# Patient Record
Sex: FEMALE | Race: WHITE | Employment: OTHER | URBAN - METROPOLITAN AREA
[De-identification: names, ages, dates, MRNs, and addresses within clinical notes are randomized per-mention and may not be internally consistent; named-entity substitution may affect disease eponyms.]

---

## 2018-04-14 ENCOUNTER — HOSPITAL ENCOUNTER (EMERGENCY)
Age: 73
Discharge: HOME | End: 2018-04-14
Payer: MEDICARE

## 2018-04-14 VITALS
OXYGEN SATURATION: 92 % | RESPIRATION RATE: 20 BRPM | SYSTOLIC BLOOD PRESSURE: 148 MMHG | TEMPERATURE: 98.1 F | WEIGHT: 167.56 LBS | BODY MASS INDEX: 27.92 KG/M2 | HEART RATE: 61 BPM | HEIGHT: 65 IN | DIASTOLIC BLOOD PRESSURE: 68 MMHG

## 2018-04-14 DIAGNOSIS — B02.9 HERPES ZOSTER WITHOUT COMPLICATION: Primary | ICD-10-CM

## 2018-04-14 PROCEDURE — 99282 EMERGENCY DEPT VISIT SF MDM: CPT

## 2018-04-14 RX ORDER — ACYCLOVIR 400 MG/1
400 TABLET ORAL
Qty: 50 TABLET | Refills: 0 | Status: SHIPPED | OUTPATIENT
Start: 2018-04-14 | End: 2018-04-24

## 2018-04-14 RX ORDER — AMLODIPINE BESYLATE 2.5 MG/1
5 TABLET ORAL DAILY
COMMUNITY
End: 2019-05-28 | Stop reason: SDUPTHER

## 2018-05-03 ENCOUNTER — APPOINTMENT (OUTPATIENT)
Dept: GENERAL RADIOLOGY | Age: 73
End: 2018-05-03
Payer: MEDICARE

## 2018-05-03 ENCOUNTER — HOSPITAL ENCOUNTER (EMERGENCY)
Age: 73
Discharge: HOME | End: 2018-05-04
Attending: EMERGENCY MEDICINE
Payer: MEDICARE

## 2018-05-03 DIAGNOSIS — J06.9 VIRAL UPPER RESPIRATORY TRACT INFECTION: Primary | ICD-10-CM

## 2018-05-03 DIAGNOSIS — R68.89 FLU-LIKE SYMPTOMS: ICD-10-CM

## 2018-05-03 LAB
ANION GAP SERPL CALCULATED.3IONS-SCNC: 9 MMOL/L (ref 7–16)
BUN BLDV-MCNC: 11 MG/DL (ref 8–23)
CALCIUM SERPL-MCNC: 9.3 MG/DL (ref 8.6–10.2)
CHLORIDE BLD-SCNC: 100 MMOL/L (ref 98–107)
CO2: 30 MMOL/L (ref 22–29)
CREAT SERPL-MCNC: 0.7 MG/DL (ref 0.5–1)
EKG ATRIAL RATE: 67 BPM
EKG P AXIS: 54 DEGREES
EKG P-R INTERVAL: 150 MS
EKG Q-T INTERVAL: 390 MS
EKG QRS DURATION: 90 MS
EKG QTC CALCULATION (BAZETT): 412 MS
EKG R AXIS: 39 DEGREES
EKG T AXIS: 28 DEGREES
EKG VENTRICULAR RATE: 67 BPM
GFR AFRICAN AMERICAN: >60
GFR NON-AFRICAN AMERICAN: >60 ML/MIN/1.73
GLUCOSE BLD-MCNC: 109 MG/DL (ref 74–109)
HCT VFR BLD CALC: 42.6 % (ref 34–48)
HEMOGLOBIN: 14.1 G/DL (ref 11.5–15.5)
INFLUENZA A BY PCR: NOT DETECTED
INFLUENZA B BY PCR: NOT DETECTED
MAGNESIUM: 1.9 MG/DL (ref 1.6–2.6)
MCH RBC QN AUTO: 31.3 PG (ref 26–35)
MCHC RBC AUTO-ENTMCNC: 33.1 % (ref 32–34.5)
MCV RBC AUTO: 94.7 FL (ref 80–99.9)
PDW BLD-RTO: 12.8 FL (ref 11.5–15)
PLATELET # BLD: 163 E9/L (ref 130–450)
PMV BLD AUTO: 10.7 FL (ref 7–12)
POTASSIUM SERPL-SCNC: 4.3 MMOL/L (ref 3.5–5)
PRO-BNP: 145 PG/ML (ref 0–125)
RBC # BLD: 4.5 E12/L (ref 3.5–5.5)
SODIUM BLD-SCNC: 139 MMOL/L (ref 132–146)
TROPONIN: <0.01 NG/ML (ref 0–0.03)
WBC # BLD: 8 E9/L (ref 4.5–11.5)

## 2018-05-03 PROCEDURE — 85027 COMPLETE CBC AUTOMATED: CPT

## 2018-05-03 PROCEDURE — 80048 BASIC METABOLIC PNL TOTAL CA: CPT

## 2018-05-03 PROCEDURE — 83735 ASSAY OF MAGNESIUM: CPT

## 2018-05-03 PROCEDURE — 83880 ASSAY OF NATRIURETIC PEPTIDE: CPT

## 2018-05-03 PROCEDURE — 71046 X-RAY EXAM CHEST 2 VIEWS: CPT

## 2018-05-03 PROCEDURE — 93005 ELECTROCARDIOGRAM TRACING: CPT

## 2018-05-03 PROCEDURE — 99284 EMERGENCY DEPT VISIT MOD MDM: CPT

## 2018-05-03 PROCEDURE — 84484 ASSAY OF TROPONIN QUANT: CPT

## 2018-05-03 PROCEDURE — 36415 COLL VENOUS BLD VENIPUNCTURE: CPT

## 2018-05-03 PROCEDURE — 87502 INFLUENZA DNA AMP PROBE: CPT

## 2018-05-03 ASSESSMENT — ENCOUNTER SYMPTOMS
EYES NEGATIVE: 1
CHEST TIGHTNESS: 0
COUGH: 1
RHINORRHEA: 1
ALLERGIC/IMMUNOLOGIC NEGATIVE: 1
SHORTNESS OF BREATH: 0
ABDOMINAL PAIN: 0

## 2018-05-04 VITALS
HEIGHT: 64 IN | BODY MASS INDEX: 29.88 KG/M2 | OXYGEN SATURATION: 95 % | RESPIRATION RATE: 20 BRPM | DIASTOLIC BLOOD PRESSURE: 92 MMHG | HEART RATE: 58 BPM | WEIGHT: 175 LBS | SYSTOLIC BLOOD PRESSURE: 115 MMHG | TEMPERATURE: 98.6 F

## 2018-05-04 NOTE — ED PROVIDER NOTES
61-year-old female presenting with coughing and nasal discharges started yesterday. She felt feverish in addition is having some body aches. This has continued through today. She had shingles couple of weeks ago that has resolved. No chest pain, no shortness of breath, no lightheadedness, no dizziness. She is tolerating oral intake, she continues to take care of herself at home. She is awake and alert and oriented at this time. No significant cardiac or lung history. Review of Systems   Constitutional: Positive for fever. HENT: Positive for rhinorrhea. Eyes: Negative. Respiratory: Positive for cough. Negative for chest tightness and shortness of breath. Cardiovascular: Negative for chest pain and palpitations. Gastrointestinal: Negative for abdominal pain. Endocrine: Negative. Genitourinary: Negative. Musculoskeletal: Positive for myalgias. Skin: Negative. Allergic/Immunologic: Negative. Neurological: Negative. Hematological: Negative. Psychiatric/Behavioral: Negative. Physical Exam   Constitutional: She is oriented to person, place, and time. She appears well-developed and well-nourished. No distress. Looks uncomfortable   HENT:   Head: Normocephalic and atraumatic. Discharge from left naris   Eyes: Conjunctivae are normal. Pupils are equal, round, and reactive to light. Neck: Normal range of motion. No thyromegaly present. Cardiovascular: Normal rate and regular rhythm. Pulmonary/Chest: Effort normal and breath sounds normal. No respiratory distress. She has no wheezes. She has no rales. She exhibits no tenderness. Abdominal: Soft. She exhibits no distension. There is no tenderness. There is no rebound and no guarding. Musculoskeletal: Normal range of motion. She exhibits no edema or tenderness. Neurological: She is alert and oriented to person, place, and time. No cranial nerve deficit. Coordination normal.   Skin: Skin is warm and dry.  No erythema. Psychiatric: She has a normal mood and affect. Procedures    [facility]       EKG: Sinus rhythm, rate 67, normal axis, no ST elevations or depressions, no T wave abnormalities. No significant changes compared to 10/2017. Time: 0035  Re-evaluation. Patients symptoms show no change  Repeat physical examination is not changed         --------------------------------------------- PAST HISTORY ---------------------------------------------  Past Medical History:  has a past medical history of Arthritis; Bladder dysfunction; Bowel dysfunction; HTN (hypertension); and Ulcer ([address] Past Surgical History:  has a past surgical history that includes Hysterectomy; Brain aneurysm surgery (1993); Nerve Surgery; Colonoscopy; Endoscopy, colon, diagnostic; and Cataract removal with implant (Right, 11/22/2016). Social History:  reports that she has been smoking Cigarettes. She has been smoking about 0.50 packs per day. She has never used smokeless tobacco. She reports that she does not drink alcohol or use drugs. Family History: family history includes Heart Disease in her father. The patients home medications have been reviewed. Allergies: Dilantin [phenytoin sodium extended]; Mobic [meloxicam];  Phenobarbital; Phenytoin sodium extended; Prednisone; Sulfa antibiotics; and Famciclovir    -------------------------------------------------- RESULTS -------------------------------------------------  Labs:  Results for orders placed or performed during the hospital encounter of 05/03/18   Rapid influenza A/B antigens   Result Value Ref Range    Influenza A by PCR Not Detected Not Detected    Influenza B by PCR Not Detected Not Detected   CBC   Result Value Ref Range    WBC 8.0 4.5 - 11.5 E9/L    RBC 4.50 3.50 - 5.50 E12/L    Hemoglobin 14.1 11.5 - 15.5 g/dL    Hematocrit 42.6 34.0 - 48.0 %    MCV 94.7 80.0 - 99.9 fL    MCH 31.3 26.0 - 35.0 pg    MCHC 33.1 32.0 - 34.5 %    RDW 12.8 11.5 - 15.0 fL Platelets 353 512 - 131 E9/L    MPV 10.7 7.0 - 12.0 fL   Basic Metabolic Panel   Result Value Ref Range    Sodium 139 132 - 146 mmol/L    Potassium 4.3 3.5 - 5.0 mmol/L    Chloride 100 98 - 107 mmol/L    CO2 30 (H) 22 - 29 mmol/L    Anion Gap 9 7 - 16 mmol/L    Glucose 109 74 - 109 mg/dL    BUN 11 8 - 23 mg/dL    CREATININE 0.7 0.5 - 1.0 mg/dL    GFR Non-African American >60 >=60 mL/min/1.73    GFR African American >60     Calcium 9.3 8.6 - 10.2 mg/dL   Brain Natriuretic Peptide   Result Value Ref Range    Pro- (H) 0 - 125 pg/mL   Troponin   Result Value Ref Range    Troponin <0.01 0.00 - 0.03 ng/mL   Magnesium   Result Value Ref Range    Magnesium 1.9 1.6 - 2.6 mg/dL   EKG 12 Lead   Result Value Ref Range    Ventricular Rate 67 BPM    Atrial Rate 67 BPM    P-R Interval 150 ms    QRS Duration 90 ms    Q-T Interval 390 ms    QTc Calculation (Bazett) 412 ms    P Axis 54 degrees    R Axis 39 degrees    T Axis 28 degrees       Radiology:  XR CHEST STANDARD (2 VW)   Final Result   1. No acute cardiopulmonary disease.          ------------------------- NURSING NOTES AND VITALS REVIEWED ---------------------------  Date / Time Roomed:  5/3/2018 10:31 PM  ED Bed Assignment:  14/14    The nursing notes within the ED encounter and vital signs as below have been reviewed. BP (!) 151/73   Pulse 82   Temp 98.6 °F (37 °C) (Oral)   Resp 16   Ht 5' 4\" (1.626 m)   Wt 175 lb (79.4 kg)   SpO2 95%   BMI 30.04 kg/m²   Oxygen Saturation Interpretation: Normal      ------------------------------------------ PROGRESS NOTES ------------------------------------------  I have spoken with the patient and spouse and discussed todays results, in addition to providing specific details for the plan of care and counseling regarding the diagnosis and prognosis. Their questions are answered at this time and they are agreeable with the plan. I discussed at length with them reasons for immediate return here for re evaluation.  They

## 2018-05-05 ENCOUNTER — HOSPITAL ENCOUNTER (EMERGENCY)
Age: 73
Discharge: HOME | End: 2018-05-05
Attending: EMERGENCY MEDICINE
Payer: MEDICARE

## 2018-05-05 ENCOUNTER — APPOINTMENT (OUTPATIENT)
Dept: CT IMAGING | Age: 73
End: 2018-05-05
Payer: MEDICARE

## 2018-05-05 VITALS
BODY MASS INDEX: 29.71 KG/M2 | DIASTOLIC BLOOD PRESSURE: 72 MMHG | HEIGHT: 64 IN | TEMPERATURE: 97.3 F | SYSTOLIC BLOOD PRESSURE: 140 MMHG | RESPIRATION RATE: 18 BRPM | OXYGEN SATURATION: 95 % | HEART RATE: 58 BPM | WEIGHT: 174 LBS

## 2018-05-05 DIAGNOSIS — R25.1 TREMOR: Primary | ICD-10-CM

## 2018-05-05 LAB
ALBUMIN SERPL-MCNC: 4 G/DL (ref 3.5–5.2)
ALP BLD-CCNC: 77 U/L (ref 35–104)
ALT SERPL-CCNC: 14 U/L (ref 0–32)
ANION GAP SERPL CALCULATED.3IONS-SCNC: 12 MMOL/L (ref 7–16)
AST SERPL-CCNC: 16 U/L (ref 0–31)
BASOPHILS ABSOLUTE: 0.06 E9/L (ref 0–0.2)
BASOPHILS RELATIVE PERCENT: 0.9 % (ref 0–2)
BILIRUB SERPL-MCNC: 0.9 MG/DL (ref 0–1.2)
BUN BLDV-MCNC: 16 MG/DL (ref 8–23)
CALCIUM SERPL-MCNC: 10 MG/DL (ref 8.6–10.2)
CHLORIDE BLD-SCNC: 99 MMOL/L (ref 98–107)
CO2: 27 MMOL/L (ref 22–29)
CREAT SERPL-MCNC: 0.8 MG/DL (ref 0.5–1)
EOSINOPHILS ABSOLUTE: 0.08 E9/L (ref 0.05–0.5)
EOSINOPHILS RELATIVE PERCENT: 1.3 % (ref 0–6)
GFR AFRICAN AMERICAN: >60
GFR NON-AFRICAN AMERICAN: >60 ML/MIN/1.73
GLUCOSE BLD-MCNC: 134 MG/DL (ref 74–109)
HCT VFR BLD CALC: 45.3 % (ref 34–48)
HEMOGLOBIN: 15.1 G/DL (ref 11.5–15.5)
IMMATURE GRANULOCYTES #: 0.01 E9/L
IMMATURE GRANULOCYTES %: 0.2 % (ref 0–5)
LYMPHOCYTES ABSOLUTE: 1.59 E9/L (ref 1.5–4)
LYMPHOCYTES RELATIVE PERCENT: 25.2 % (ref 20–42)
MAGNESIUM: 1.9 MG/DL (ref 1.6–2.6)
MCH RBC QN AUTO: 31.2 PG (ref 26–35)
MCHC RBC AUTO-ENTMCNC: 33.3 % (ref 32–34.5)
MCV RBC AUTO: 93.6 FL (ref 80–99.9)
MONOCYTES ABSOLUTE: 0.66 E9/L (ref 0.1–0.95)
MONOCYTES RELATIVE PERCENT: 10.4 % (ref 2–12)
NEUTROPHILS ABSOLUTE: 3.92 E9/L (ref 1.8–7.3)
NEUTROPHILS RELATIVE PERCENT: 62 % (ref 43–80)
PDW BLD-RTO: 12.8 FL (ref 11.5–15)
PLATELET # BLD: 189 E9/L (ref 130–450)
PMV BLD AUTO: 10.7 FL (ref 7–12)
POTASSIUM SERPL-SCNC: 4.4 MMOL/L (ref 3.5–5)
RBC # BLD: 4.84 E12/L (ref 3.5–5.5)
SODIUM BLD-SCNC: 138 MMOL/L (ref 132–146)
TOTAL PROTEIN: 7.1 G/DL (ref 6.4–8.3)
TSH SERPL DL<=0.05 MIU/L-ACNC: 1.67 UIU/ML (ref 0.27–4.2)
WBC # BLD: 6.3 E9/L (ref 4.5–11.5)

## 2018-05-05 PROCEDURE — 85025 COMPLETE CBC W/AUTO DIFF WBC: CPT

## 2018-05-05 PROCEDURE — 99284 EMERGENCY DEPT VISIT MOD MDM: CPT

## 2018-05-05 PROCEDURE — 70450 CT HEAD/BRAIN W/O DYE: CPT

## 2018-05-05 PROCEDURE — 80053 COMPREHEN METABOLIC PANEL: CPT

## 2018-05-05 PROCEDURE — 96374 THER/PROPH/DIAG INJ IV PUSH: CPT

## 2018-05-05 PROCEDURE — 83735 ASSAY OF MAGNESIUM: CPT

## 2018-05-05 PROCEDURE — 36415 COLL VENOUS BLD VENIPUNCTURE: CPT

## 2018-05-05 PROCEDURE — 6360000002 HC RX W HCPCS: Performed by: PREVENTIVE MEDICINE

## 2018-05-05 PROCEDURE — 84443 ASSAY THYROID STIM HORMONE: CPT

## 2018-05-05 RX ORDER — LORAZEPAM 2 MG/ML
1 INJECTION INTRAMUSCULAR ONCE
Status: COMPLETED | OUTPATIENT
Start: 2018-05-05 | End: 2018-05-05

## 2018-05-05 RX ADMIN — LORAZEPAM 1 MG: 2 INJECTION INTRAMUSCULAR; INTRAVENOUS at 14:20

## 2018-05-05 ASSESSMENT — ENCOUNTER SYMPTOMS
CHEST TIGHTNESS: 0
NAUSEA: 0
VOMITING: 0
SHORTNESS OF BREATH: 0
ABDOMINAL PAIN: 0
CONSTIPATION: 0
ALLERGIC/IMMUNOLOGIC NEGATIVE: 1
COUGH: 0
DIARRHEA: 0

## 2018-05-16 ENCOUNTER — OFFICE VISIT (OUTPATIENT)
Dept: NEUROLOGY | Age: 73
End: 2018-05-16
Payer: MEDICARE

## 2018-05-16 VITALS
OXYGEN SATURATION: 91 % | BODY MASS INDEX: 29.71 KG/M2 | SYSTOLIC BLOOD PRESSURE: 124 MMHG | DIASTOLIC BLOOD PRESSURE: 79 MMHG | TEMPERATURE: 97.9 F | RESPIRATION RATE: 18 BRPM | WEIGHT: 174 LBS | HEIGHT: 64 IN | HEART RATE: 63 BPM

## 2018-05-16 DIAGNOSIS — G30.0 EARLY ONSET ALZHEIMER'S DEMENTIA WITHOUT BEHAVIORAL DISTURBANCE (HCC): Primary | ICD-10-CM

## 2018-05-16 DIAGNOSIS — F02.80 EARLY ONSET ALZHEIMER'S DEMENTIA WITHOUT BEHAVIORAL DISTURBANCE (HCC): Primary | ICD-10-CM

## 2018-05-16 LAB
EKG ATRIAL RATE: 55 BPM
EKG P AXIS: 46 DEGREES
EKG P-R INTERVAL: 154 MS
EKG Q-T INTERVAL: 432 MS
EKG QRS DURATION: 92 MS
EKG QTC CALCULATION (BAZETT): 413 MS
EKG R AXIS: 38 DEGREES
EKG T AXIS: 46 DEGREES
EKG VENTRICULAR RATE: 55 BPM

## 2018-05-16 PROCEDURE — 99213 OFFICE O/P EST LOW 20 MIN: CPT | Performed by: NURSE PRACTITIONER

## 2018-05-16 PROCEDURE — 4004F PT TOBACCO SCREEN RCVD TLK: CPT | Performed by: NURSE PRACTITIONER

## 2018-05-16 PROCEDURE — G8598 ASA/ANTIPLAT THER USED: HCPCS | Performed by: NURSE PRACTITIONER

## 2018-05-16 PROCEDURE — 3017F COLORECTAL CA SCREEN DOC REV: CPT | Performed by: NURSE PRACTITIONER

## 2018-05-16 PROCEDURE — G8417 CALC BMI ABV UP PARAM F/U: HCPCS | Performed by: NURSE PRACTITIONER

## 2018-05-16 PROCEDURE — G8400 PT W/DXA NO RESULTS DOC: HCPCS | Performed by: NURSE PRACTITIONER

## 2018-05-16 PROCEDURE — G8427 DOCREV CUR MEDS BY ELIG CLIN: HCPCS | Performed by: NURSE PRACTITIONER

## 2018-05-16 PROCEDURE — 4040F PNEUMOC VAC/ADMIN/RCVD: CPT | Performed by: NURSE PRACTITIONER

## 2018-05-16 PROCEDURE — 1090F PRES/ABSN URINE INCON ASSESS: CPT | Performed by: NURSE PRACTITIONER

## 2018-05-16 PROCEDURE — 1123F ACP DISCUSS/DSCN MKR DOCD: CPT | Performed by: NURSE PRACTITIONER

## 2018-05-16 RX ORDER — MIRTAZAPINE 15 MG/1
7.5 TABLET, FILM COATED ORAL NIGHTLY
COMMUNITY
End: 2018-06-24 | Stop reason: ALTCHOICE

## 2018-05-18 ENCOUNTER — TELEPHONE (OUTPATIENT)
Dept: SPEECH THERAPY | Age: 73
End: 2018-05-18

## 2018-06-04 ENCOUNTER — TELEPHONE (OUTPATIENT)
Dept: SPEECH THERAPY | Age: 73
End: 2018-06-04

## 2018-06-13 ENCOUNTER — HOSPITAL ENCOUNTER (OUTPATIENT)
Age: 73
Discharge: HOME | End: 2018-06-13
Payer: MEDICARE

## 2018-06-13 ENCOUNTER — TELEPHONE (OUTPATIENT)
Dept: SPEECH THERAPY | Age: 73
End: 2018-06-13

## 2018-06-13 LAB
ALBUMIN SERPL-MCNC: 4.2 G/DL (ref 3.5–5.2)
ALP BLD-CCNC: 81 U/L (ref 35–104)
ALT SERPL-CCNC: 12 U/L (ref 0–32)
ANION GAP SERPL CALCULATED.3IONS-SCNC: 14 MMOL/L (ref 7–16)
AST SERPL-CCNC: 13 U/L (ref 0–31)
BASOPHILS ABSOLUTE: 0.05 E9/L (ref 0–0.2)
BASOPHILS RELATIVE PERCENT: 0.6 % (ref 0–2)
BILIRUB SERPL-MCNC: 0.7 MG/DL (ref 0–1.2)
BUN BLDV-MCNC: 13 MG/DL (ref 8–23)
CALCIUM SERPL-MCNC: 9.9 MG/DL (ref 8.6–10.2)
CHLORIDE BLD-SCNC: 100 MMOL/L (ref 98–107)
CO2: 26 MMOL/L (ref 22–29)
CREAT SERPL-MCNC: 0.7 MG/DL (ref 0.5–1)
EOSINOPHILS ABSOLUTE: 0.03 E9/L (ref 0.05–0.5)
EOSINOPHILS RELATIVE PERCENT: 0.4 % (ref 0–6)
GFR AFRICAN AMERICAN: >60
GFR NON-AFRICAN AMERICAN: >60 ML/MIN/1.73
GLUCOSE BLD-MCNC: 116 MG/DL (ref 74–109)
HCT VFR BLD CALC: 44.6 % (ref 34–48)
HEMOGLOBIN: 15.3 G/DL (ref 11.5–15.5)
IMMATURE GRANULOCYTES #: 0.03 E9/L
IMMATURE GRANULOCYTES %: 0.4 % (ref 0–5)
LYMPHOCYTES ABSOLUTE: 1.78 E9/L (ref 1.5–4)
LYMPHOCYTES RELATIVE PERCENT: 20.8 % (ref 20–42)
MAGNESIUM: 1.9 MG/DL (ref 1.6–2.6)
MCH RBC QN AUTO: 32 PG (ref 26–35)
MCHC RBC AUTO-ENTMCNC: 34.3 % (ref 32–34.5)
MCV RBC AUTO: 93.3 FL (ref 80–99.9)
MONOCYTES ABSOLUTE: 0.6 E9/L (ref 0.1–0.95)
MONOCYTES RELATIVE PERCENT: 7 % (ref 2–12)
NEUTROPHILS ABSOLUTE: 6.06 E9/L (ref 1.8–7.3)
NEUTROPHILS RELATIVE PERCENT: 70.8 % (ref 43–80)
PDW BLD-RTO: 12.4 FL (ref 11.5–15)
PLATELET # BLD: 216 E9/L (ref 130–450)
PMV BLD AUTO: 10.6 FL (ref 7–12)
POTASSIUM SERPL-SCNC: 4.6 MMOL/L (ref 3.5–5)
RBC # BLD: 4.78 E12/L (ref 3.5–5.5)
SODIUM BLD-SCNC: 140 MMOL/L (ref 132–146)
TOTAL PROTEIN: 7.3 G/DL (ref 6.4–8.3)
TSH SERPL DL<=0.05 MIU/L-ACNC: 1.59 UIU/ML (ref 0.27–4.2)
WBC # BLD: 8.6 E9/L (ref 4.5–11.5)

## 2018-06-13 PROCEDURE — 84443 ASSAY THYROID STIM HORMONE: CPT

## 2018-06-13 PROCEDURE — 36415 COLL VENOUS BLD VENIPUNCTURE: CPT

## 2018-06-13 PROCEDURE — 85025 COMPLETE CBC W/AUTO DIFF WBC: CPT

## 2018-06-13 PROCEDURE — 80053 COMPREHEN METABOLIC PANEL: CPT

## 2018-06-13 PROCEDURE — 83735 ASSAY OF MAGNESIUM: CPT

## 2018-06-14 ENCOUNTER — HOSPITAL ENCOUNTER (OUTPATIENT)
Dept: MAMMOGRAPHY | Age: 73
Discharge: HOME | End: 2018-06-16
Payer: MEDICARE

## 2018-06-14 DIAGNOSIS — Z12.39 BREAST SCREENING: ICD-10-CM

## 2018-06-14 PROCEDURE — 77067 SCR MAMMO BI INCL CAD: CPT

## 2018-06-24 ENCOUNTER — HOSPITAL ENCOUNTER (EMERGENCY)
Age: 73
Discharge: HOME | End: 2018-06-24
Attending: EMERGENCY MEDICINE
Payer: MEDICARE

## 2018-06-24 ENCOUNTER — APPOINTMENT (OUTPATIENT)
Dept: ULTRASOUND IMAGING | Age: 73
End: 2018-06-24
Payer: MEDICARE

## 2018-06-24 VITALS
HEIGHT: 65 IN | RESPIRATION RATE: 18 BRPM | HEART RATE: 51 BPM | BODY MASS INDEX: 28.66 KG/M2 | WEIGHT: 172 LBS | DIASTOLIC BLOOD PRESSURE: 75 MMHG | SYSTOLIC BLOOD PRESSURE: 127 MMHG | OXYGEN SATURATION: 98 % | TEMPERATURE: 97.7 F

## 2018-06-24 DIAGNOSIS — K29.00 ACUTE GASTRITIS, PRESENCE OF BLEEDING UNSPECIFIED, UNSPECIFIED GASTRITIS TYPE: ICD-10-CM

## 2018-06-24 DIAGNOSIS — R11.0 NAUSEA: ICD-10-CM

## 2018-06-24 DIAGNOSIS — R10.13 ABDOMINAL PAIN, EPIGASTRIC: Primary | ICD-10-CM

## 2018-06-24 LAB
ALBUMIN SERPL-MCNC: 3.9 G/DL (ref 3.5–5.2)
ALP BLD-CCNC: 69 U/L (ref 35–104)
ALT SERPL-CCNC: 9 U/L (ref 0–32)
ANION GAP SERPL CALCULATED.3IONS-SCNC: 10 MMOL/L (ref 7–16)
AST SERPL-CCNC: 10 U/L (ref 0–31)
BASOPHILS ABSOLUTE: 0.05 E9/L (ref 0–0.2)
BASOPHILS RELATIVE PERCENT: 0.7 % (ref 0–2)
BILIRUB SERPL-MCNC: 0.6 MG/DL (ref 0–1.2)
BILIRUBIN URINE: NEGATIVE
BLOOD, URINE: NEGATIVE
BUN BLDV-MCNC: 14 MG/DL (ref 8–23)
CALCIUM SERPL-MCNC: 9.9 MG/DL (ref 8.6–10.2)
CHLORIDE BLD-SCNC: 103 MMOL/L (ref 98–107)
CLARITY: CLEAR
CO2: 27 MMOL/L (ref 22–29)
COLOR: YELLOW
CREAT SERPL-MCNC: 0.8 MG/DL (ref 0.5–1)
EKG ATRIAL RATE: 57 BPM
EKG P AXIS: 41 DEGREES
EKG P-R INTERVAL: 156 MS
EKG Q-T INTERVAL: 430 MS
EKG QRS DURATION: 84 MS
EKG QTC CALCULATION (BAZETT): 418 MS
EKG R AXIS: 14 DEGREES
EKG T AXIS: 32 DEGREES
EKG VENTRICULAR RATE: 57 BPM
EOSINOPHILS ABSOLUTE: 0.09 E9/L (ref 0.05–0.5)
EOSINOPHILS RELATIVE PERCENT: 1.3 % (ref 0–6)
GFR AFRICAN AMERICAN: >60
GFR NON-AFRICAN AMERICAN: >60 ML/MIN/1.73
GLUCOSE BLD-MCNC: 91 MG/DL
GLUCOSE BLD-MCNC: 93 MG/DL (ref 74–109)
GLUCOSE URINE: NEGATIVE MG/DL
HCT VFR BLD CALC: 42.7 % (ref 34–48)
HEMOGLOBIN: 14.9 G/DL (ref 11.5–15.5)
IMMATURE GRANULOCYTES #: 0.01 E9/L
IMMATURE GRANULOCYTES %: 0.1 % (ref 0–5)
KETONES, URINE: NEGATIVE MG/DL
LACTIC ACID: 1.1 MMOL/L (ref 0.5–2.2)
LEUKOCYTE ESTERASE, URINE: NEGATIVE
LIPASE: 30 U/L (ref 13–60)
LYMPHOCYTES ABSOLUTE: 2.68 E9/L (ref 1.5–4)
LYMPHOCYTES RELATIVE PERCENT: 37.7 % (ref 20–42)
MCH RBC QN AUTO: 32.3 PG (ref 26–35)
MCHC RBC AUTO-ENTMCNC: 34.9 % (ref 32–34.5)
MCV RBC AUTO: 92.4 FL (ref 80–99.9)
MONOCYTES ABSOLUTE: 0.5 E9/L (ref 0.1–0.95)
MONOCYTES RELATIVE PERCENT: 7 % (ref 2–12)
NEUTROPHILS ABSOLUTE: 3.77 E9/L (ref 1.8–7.3)
NEUTROPHILS RELATIVE PERCENT: 53.2 % (ref 43–80)
NITRITE, URINE: NEGATIVE
PDW BLD-RTO: 12 FL (ref 11.5–15)
PH UA: 6.5 (ref 5–9)
PLATELET # BLD: 201 E9/L (ref 130–450)
PMV BLD AUTO: 10.7 FL (ref 7–12)
POC ANION GAP: 18
POC BUN: 14
POC CHLORIDE: 103
POC CO2: 26
POC CREATININE: NORMAL
POC POTASSIUM: NORMAL
POC SODIUM: 142
POTASSIUM SERPL-SCNC: 4 MMOL/L (ref 3.5–5)
PROTEIN UA: NEGATIVE MG/DL
RBC # BLD: 4.62 E12/L (ref 3.5–5.5)
SODIUM BLD-SCNC: 140 MMOL/L (ref 132–146)
SPECIFIC GRAVITY UA: 1.01 (ref 1–1.03)
TOTAL PROTEIN: 6.7 G/DL (ref 6.4–8.3)
TROPONIN: <0.01 NG/ML (ref 0–0.03)
UROBILINOGEN, URINE: 0.2 E.U./DL
WBC # BLD: 7.1 E9/L (ref 4.5–11.5)

## 2018-06-24 PROCEDURE — 83690 ASSAY OF LIPASE: CPT

## 2018-06-24 PROCEDURE — 83605 ASSAY OF LACTIC ACID: CPT

## 2018-06-24 PROCEDURE — 93005 ELECTROCARDIOGRAM TRACING: CPT | Performed by: EMERGENCY MEDICINE

## 2018-06-24 PROCEDURE — 80053 COMPREHEN METABOLIC PANEL: CPT

## 2018-06-24 PROCEDURE — 84484 ASSAY OF TROPONIN QUANT: CPT

## 2018-06-24 PROCEDURE — 36415 COLL VENOUS BLD VENIPUNCTURE: CPT

## 2018-06-24 PROCEDURE — 76705 ECHO EXAM OF ABDOMEN: CPT

## 2018-06-24 PROCEDURE — 81003 URINALYSIS AUTO W/O SCOPE: CPT

## 2018-06-24 PROCEDURE — 99284 EMERGENCY DEPT VISIT MOD MDM: CPT

## 2018-06-24 PROCEDURE — 6360000002 HC RX W HCPCS: Performed by: EMERGENCY MEDICINE

## 2018-06-24 PROCEDURE — 96372 THER/PROPH/DIAG INJ SC/IM: CPT

## 2018-06-24 PROCEDURE — 6370000000 HC RX 637 (ALT 250 FOR IP): Performed by: EMERGENCY MEDICINE

## 2018-06-24 PROCEDURE — 85025 COMPLETE CBC W/AUTO DIFF WBC: CPT

## 2018-06-24 RX ORDER — PANTOPRAZOLE SODIUM 20 MG/1
20 TABLET, DELAYED RELEASE ORAL DAILY
COMMUNITY
End: 2018-09-06 | Stop reason: ALTCHOICE

## 2018-06-24 RX ORDER — SUCRALFATE 1 G/1
1 TABLET ORAL 4 TIMES DAILY
Qty: 60 TABLET | Refills: 0 | Status: SHIPPED | OUTPATIENT
Start: 2018-06-24 | End: 2018-09-06 | Stop reason: ALTCHOICE

## 2018-06-24 RX ADMIN — LIDOCAINE HYDROCHLORIDE: 20 SOLUTION ORAL; TOPICAL at 21:17

## 2018-06-24 RX ADMIN — TRIMETHOBENZAMIDE HYDROCHLORIDE 200 MG: 100 INJECTION INTRAMUSCULAR at 22:28

## 2018-06-24 ASSESSMENT — PAIN SCALES - GENERAL: PAINLEVEL_OUTOF10: 5

## 2018-06-24 ASSESSMENT — ENCOUNTER SYMPTOMS
EYE REDNESS: 0
VOMITING: 0
DIARRHEA: 1
SHORTNESS OF BREATH: 0
NAUSEA: 1
ABDOMINAL PAIN: 1

## 2018-06-24 ASSESSMENT — PAIN DESCRIPTION - FREQUENCY: FREQUENCY: CONTINUOUS

## 2018-06-24 ASSESSMENT — PAIN DESCRIPTION - LOCATION: LOCATION: ABDOMEN

## 2018-06-24 ASSESSMENT — PAIN DESCRIPTION - DESCRIPTORS: DESCRIPTORS: ACHING

## 2018-06-24 ASSESSMENT — PAIN DESCRIPTION - ORIENTATION: ORIENTATION: UPPER

## 2018-06-24 ASSESSMENT — PAIN DESCRIPTION - PAIN TYPE: TYPE: ACUTE PAIN

## 2018-07-09 ENCOUNTER — HOSPITAL ENCOUNTER (OUTPATIENT)
Dept: ULTRASOUND IMAGING | Age: 73
Discharge: HOME | End: 2018-07-09
Payer: MEDICARE

## 2018-07-09 ENCOUNTER — HOSPITAL ENCOUNTER (OUTPATIENT)
Dept: MAMMOGRAPHY | Age: 73
Discharge: HOME | End: 2018-07-11
Payer: MEDICARE

## 2018-07-09 DIAGNOSIS — N63.0 BREAST NODULE: ICD-10-CM

## 2018-07-09 PROCEDURE — 77065 DX MAMMO INCL CAD UNI: CPT

## 2018-07-09 PROCEDURE — 76642 ULTRASOUND BREAST LIMITED: CPT

## 2018-07-27 ENCOUNTER — HOSPITAL ENCOUNTER (OUTPATIENT)
Dept: MAMMOGRAPHY | Age: 73
Discharge: HOME | End: 2018-07-29
Payer: MEDICARE

## 2018-07-27 ENCOUNTER — HOSPITAL ENCOUNTER (OUTPATIENT)
Dept: ULTRASOUND IMAGING | Age: 73
Discharge: HOME | End: 2018-07-27
Payer: MEDICARE

## 2018-07-27 DIAGNOSIS — N63.0 BREAST MASS: ICD-10-CM

## 2018-07-27 DIAGNOSIS — N63.20 LEFT BREAST MASS: ICD-10-CM

## 2018-07-27 PROCEDURE — 76942 ECHO GUIDE FOR BIOPSY: CPT

## 2018-07-27 PROCEDURE — 19286 PERQ DEV BREAST ADD US IMAG: CPT

## 2018-07-27 PROCEDURE — 77065 DX MAMMO INCL CAD UNI: CPT

## 2018-07-27 PROCEDURE — 19083 BX BREAST 1ST LESION US IMAG: CPT

## 2018-07-27 PROCEDURE — 88305 TISSUE EXAM BY PATHOLOGIST: CPT

## 2018-07-27 PROCEDURE — 19285 PERQ DEV BREAST 1ST US IMAG: CPT

## 2018-08-08 ENCOUNTER — OFFICE VISIT (OUTPATIENT)
Dept: FAMILY MEDICINE CLINIC | Age: 73
End: 2018-08-08
Payer: MEDICARE

## 2018-08-08 VITALS
HEART RATE: 68 BPM | SYSTOLIC BLOOD PRESSURE: 134 MMHG | OXYGEN SATURATION: 97 % | HEIGHT: 64 IN | WEIGHT: 175 LBS | BODY MASS INDEX: 29.88 KG/M2 | DIASTOLIC BLOOD PRESSURE: 80 MMHG

## 2018-08-08 DIAGNOSIS — L23.7 ALLERGIC CONTACT DERMATITIS DUE TO PLANTS, EXCEPT FOOD: Primary | ICD-10-CM

## 2018-08-08 PROCEDURE — G8598 ASA/ANTIPLAT THER USED: HCPCS | Performed by: NURSE PRACTITIONER

## 2018-08-08 PROCEDURE — G8427 DOCREV CUR MEDS BY ELIG CLIN: HCPCS | Performed by: NURSE PRACTITIONER

## 2018-08-08 PROCEDURE — 4040F PNEUMOC VAC/ADMIN/RCVD: CPT | Performed by: NURSE PRACTITIONER

## 2018-08-08 PROCEDURE — 1090F PRES/ABSN URINE INCON ASSESS: CPT | Performed by: NURSE PRACTITIONER

## 2018-08-08 PROCEDURE — 1123F ACP DISCUSS/DSCN MKR DOCD: CPT | Performed by: NURSE PRACTITIONER

## 2018-08-08 PROCEDURE — 1101F PT FALLS ASSESS-DOCD LE1/YR: CPT | Performed by: NURSE PRACTITIONER

## 2018-08-08 PROCEDURE — 96372 THER/PROPH/DIAG INJ SC/IM: CPT | Performed by: NURSE PRACTITIONER

## 2018-08-08 PROCEDURE — G8417 CALC BMI ABV UP PARAM F/U: HCPCS | Performed by: NURSE PRACTITIONER

## 2018-08-08 PROCEDURE — G8400 PT W/DXA NO RESULTS DOC: HCPCS | Performed by: NURSE PRACTITIONER

## 2018-08-08 PROCEDURE — 4004F PT TOBACCO SCREEN RCVD TLK: CPT | Performed by: NURSE PRACTITIONER

## 2018-08-08 PROCEDURE — 99213 OFFICE O/P EST LOW 20 MIN: CPT | Performed by: NURSE PRACTITIONER

## 2018-08-08 PROCEDURE — 3017F COLORECTAL CA SCREEN DOC REV: CPT | Performed by: NURSE PRACTITIONER

## 2018-08-08 RX ORDER — HYDROXYZINE PAMOATE 25 MG/1
25 CAPSULE ORAL 3 TIMES DAILY PRN
Qty: 30 CAPSULE | Refills: 0 | Status: SHIPPED | OUTPATIENT
Start: 2018-08-08 | End: 2018-08-16 | Stop reason: ALTCHOICE

## 2018-08-08 RX ORDER — DIPHENHYDRAMINE HYDROCHLORIDE 50 MG/ML
25 INJECTION INTRAMUSCULAR; INTRAVENOUS ONCE
Status: COMPLETED | OUTPATIENT
Start: 2018-08-08 | End: 2018-08-08

## 2018-08-08 RX ORDER — CETIRIZINE HYDROCHLORIDE 10 MG/1
10 TABLET ORAL DAILY
Qty: 30 TABLET | Refills: 0 | Status: SHIPPED | OUTPATIENT
Start: 2018-08-08 | End: 2018-08-16 | Stop reason: ALTCHOICE

## 2018-08-08 RX ADMIN — DIPHENHYDRAMINE HYDROCHLORIDE 25 MG: 50 INJECTION INTRAMUSCULAR; INTRAVENOUS at 14:23

## 2018-08-08 NOTE — PROGRESS NOTES
Chief Complaint:   Poison Ivy (c/o poison ivy x1wk, states OTC topical meds not working)      History of Present Illness   Source of history provided by:  patient. Leatha Ornelas is a 68 y.o. old female who has a past medical history of:   Past Medical History:   Diagnosis Date    Arthritis     Bladder dysfunction     Bowel dysfunction     HTN (hypertension)     Ulcer     presents to the [facility] care for complaint of redness to the hands, arms, and legs which began 7 days ago. The symptoms were triggered by pulling weeds without gloves. Since onset the symptoms have progressed. Pt has  had similar symptoms in the past.  Pt states the area is itchy, burns slightly, and has not noted streaking. Pt exposed to poison ivy. Denies any fever, chills, HA, recent illness, myalgias, vomiting, or lethargy. Using OTC steroid cream.      ROS    Unless otherwise stated in this report or unable to obtain because of the patient's clinical or mental status as evidenced by the medical record, this patients's positive and negative responses for Review of Systems, constitutional, psych, eyes, ENT, cardiovascular, respiratory, gastrointestinal, neurological, genitourinary, musculoskeletal, integument systems and systems related to the presenting problem are either stated in the preceding or were not pertinent or were negative for the symptoms and/or complaints related to the medical problem. Past Surgical History:  has a past surgical history that includes Hysterectomy; Brain aneurysm surgery (1993); Nerve Surgery; Colonoscopy; Endoscopy, colon, diagnostic; and Cataract removal with implant (Right, 11/22/2016). Social History:  reports that she has been smoking Cigarettes. She has been smoking about 0.25 packs per day. She has never used smokeless tobacco. She reports that she does not drink alcohol or use drugs. Family History: family history includes Heart Disease in her father.    Allergies: Dilantin [phenytoin advised of sedation properties. Pt is going to the dentist from here and to  her prescriptions. She is going to stop back on her way home for benadryl injection. Caution using with Valium which she takes PRN. She will go home and not be going out again today. Advised to wash shoes and clothing that may have come into contact with poison ivy. Red flag symptoms discussed. Pt advised to f/u with PCP in 2-3 days for recheck and further management. ER if changes or worse. Pt advised to take all medications as directed.      Administrations This Visit     diphenhydrAMINE (BENADRYL) injection 25 mg     Admin Date  08/08/2018 Action  Given Dose  25 mg Route  Intramuscular Administered By  Micheal Saleh

## 2018-08-16 ENCOUNTER — OFFICE VISIT (OUTPATIENT)
Dept: NEUROLOGY | Age: 73
End: 2018-08-16
Payer: MEDICARE

## 2018-08-16 VITALS
HEART RATE: 66 BPM | BODY MASS INDEX: 30.39 KG/M2 | WEIGHT: 178 LBS | OXYGEN SATURATION: 95 % | SYSTOLIC BLOOD PRESSURE: 138 MMHG | HEIGHT: 64 IN | DIASTOLIC BLOOD PRESSURE: 73 MMHG | RESPIRATION RATE: 18 BRPM | TEMPERATURE: 97.3 F

## 2018-08-16 DIAGNOSIS — M54.17 L-S RADICULOPATHY: ICD-10-CM

## 2018-08-16 DIAGNOSIS — G25.0 ESSENTIAL TREMOR: Primary | ICD-10-CM

## 2018-08-16 PROBLEM — R11.0 NAUSEA: Status: RESOLVED | Noted: 2018-06-24 | Resolved: 2018-08-16

## 2018-08-16 PROBLEM — K29.00 ACUTE GASTRITIS: Status: RESOLVED | Noted: 2018-06-24 | Resolved: 2018-08-16

## 2018-08-16 PROBLEM — R10.13 ABDOMINAL PAIN, EPIGASTRIC: Status: RESOLVED | Noted: 2018-06-24 | Resolved: 2018-08-16

## 2018-08-16 PROCEDURE — G8427 DOCREV CUR MEDS BY ELIG CLIN: HCPCS | Performed by: NURSE PRACTITIONER

## 2018-08-16 PROCEDURE — 99214 OFFICE O/P EST MOD 30 MIN: CPT | Performed by: NURSE PRACTITIONER

## 2018-08-16 PROCEDURE — G8598 ASA/ANTIPLAT THER USED: HCPCS | Performed by: NURSE PRACTITIONER

## 2018-08-16 PROCEDURE — 1101F PT FALLS ASSESS-DOCD LE1/YR: CPT | Performed by: NURSE PRACTITIONER

## 2018-08-16 PROCEDURE — G8400 PT W/DXA NO RESULTS DOC: HCPCS | Performed by: NURSE PRACTITIONER

## 2018-08-16 PROCEDURE — G8417 CALC BMI ABV UP PARAM F/U: HCPCS | Performed by: NURSE PRACTITIONER

## 2018-08-16 PROCEDURE — 3017F COLORECTAL CA SCREEN DOC REV: CPT | Performed by: NURSE PRACTITIONER

## 2018-08-16 PROCEDURE — 1090F PRES/ABSN URINE INCON ASSESS: CPT | Performed by: NURSE PRACTITIONER

## 2018-08-16 PROCEDURE — 4040F PNEUMOC VAC/ADMIN/RCVD: CPT | Performed by: NURSE PRACTITIONER

## 2018-08-16 PROCEDURE — 1123F ACP DISCUSS/DSCN MKR DOCD: CPT | Performed by: NURSE PRACTITIONER

## 2018-08-16 PROCEDURE — 4004F PT TOBACCO SCREEN RCVD TLK: CPT | Performed by: NURSE PRACTITIONER

## 2018-08-16 RX ORDER — POTASSIUM CHLORIDE 750 MG/1
20 TABLET, FILM COATED, EXTENDED RELEASE ORAL 3 TIMES DAILY
COMMUNITY

## 2018-08-16 NOTE — PROGRESS NOTES
[address] [place] [address]. Dagmar Zamora M.D., F.A.C.P. Rishi Barry, NAMITA, APRN-CNS  Megan Hernandez, MSN, APRN, FNP-C  [address], [place], [address]  Phone: [phone]  Fax: [phone]       Sandrita Arenas is a 68 y.o. right handed female     We are following her for ? memory problems    Hx of cerebral aneurysm in 1993 with residual speech deficits (which are minimal)     She presents alone    She did not go to her formal cog eval---upon review of the chart, several messages [facility] by [facility] to schedule her. She has refused this in the past and still firmly denies any memory issues. She tells me her daughter (who lives in [place]) thinks she has memory issues----she has since taken her daughter off her med record as an emergency contact and does not want her to have information. She tells me their relationship is very stressful and causes her anxiety. She did not have her labs drawn. She denies any issues working her finances, driving, or performing her ADLs. No short term memory issues and she remains social. No behavioral changes or dangerous behaviors. She has a significant vocal and head tremor for me---as well as b/l hand tremors----this has been chronic and she tells me the severity depends on how upset she gets. She is unable to apply makeup or perform fine motor tasks due to these tremors, but no issues feeding herself or dressing herself. She can still write--though her handwriting is bad. She still notes R sided low back pains radiating into her buttocks---she does not exercise much due to these pains. No falls or walking troubles    She remains on Paxil for depression and anxiety---as well as Valium 5 mg daily PRN. She feels her issues are well controlled. She is eating, drinking, and sleeping well.     No other new neuro issues to report    Medically, she is getting over poison ivy but no other new issues    No chest pain or palpitations  No SOB  No vertigo, lightheadedness or loss of consciousness  No incontinence of bowels or bladder  No itching or bruising appreciated  No numbness, tingling or focal arm/leg weakness    ROS otherwise negative    Current Outpatient Prescriptions   Medication Sig Dispense Refill    hydrOXYzine (VISTARIL) 25 MG capsule Take 1 capsule by mouth 3 times daily as needed for Itching 30 capsule 0    hydrocortisone 2.5 % cream Apply topically 2 times daily. 28 g 1    cetirizine (ZYRTEC ALLERGY) 10 MG tablet Take 1 tablet by mouth daily 30 tablet 0    pantoprazole (PROTONIX) 20 MG tablet Take 20 mg by mouth daily       sucralfate (CARAFATE) 1 GM tablet Take 1 tablet by mouth 4 times daily for 15 days 60 tablet 0    amLODIPine (NORVASC) 2.5 MG tablet Take 2.5 mg by mouth daily      ibuprofen (ADVIL;MOTRIN) 200 MG tablet Take 200 mg by mouth every 6 hours as needed for Pain      acetaminophen (TYLENOL) 500 MG tablet Take 500 mg by mouth every 6 hours as needed for Pain      diazepam (VALIUM) 5 MG tablet Take 5 mg by mouth as needed. .  1    aspirin (ECOTRIN LOW STRENGTH) 81 MG EC tablet Take 1 tablet by mouth daily 60 tablet 5    Cholecalciferol (VITAMIN D3) 2000 units CAPS Take 1 tablet by mouth daily      rosuvastatin (CRESTOR) 5 MG tablet Take 5 mg by mouth daily      atenolol (TENORMIN) 25 MG tablet Take 25 mg by mouth daily      folic acid (FOLVITE) 1 MG tablet Take 1 mg by mouth daily      Potassium (POTASSIMIN PO) Take 20 mEq by mouth 3 times daily       PARoxetine (PAXIL) 10 MG tablet Take 10 mg by mouth daily        No current facility-administered medications for this visit. Objective:     /73 (Site: Left Arm, Position: Sitting, Cuff Size: Large Adult)   Pulse 66   Temp 97.3 °F (36.3 °C)   Resp 18   Ht 5' 4\" (1.626 m)   Wt 178 lb (80.7 kg)   SpO2 95%   Breastfeeding?  No   BMI 30.55 kg/m²       General Appearance: alert, cooperative, in no distress--appears stated

## 2018-08-16 NOTE — PATIENT INSTRUCTIONS
Patient Education        Stopping Smoking: Care Instructions  Your Care Instructions  Cigarette smokers crave the nicotine in cigarettes. Giving it up is much harder than simply changing a habit. Your body has to stop craving the nicotine. It is hard to quit, but you can do it. There are many tools that people use to quit smoking. You may find that combining tools works best for you. There are several steps to quitting. First you get ready to quit. Then you get support to help you. After that, you learn new skills and behaviors to become a nonsmoker. For many people, a necessary step is getting and using medicine. Your doctor will help you set up the plan that best meets your needs. You may want to attend a smoking cessation program to help you quit smoking. When you choose a program, look for one that has proven success. Ask your doctor for ideas. You will greatly increase your chances of success if you take medicine as well as get counseling or join a cessation program.  Some of the changes you feel when you first quit tobacco are uncomfortable. Your body will miss the nicotine at first, and you may feel short-tempered and grumpy. You may have trouble sleeping or concentrating. Medicine can help you deal with these symptoms. You may struggle with changing your smoking habits and rituals. The last step is the tricky one: Be prepared for the smoking urge to continue for a time. This is a lot to deal with, but keep at it. You will feel better. Follow-up care is a key part of your treatment and safety. Be sure to make and go to all appointments, and call your doctor if you are having problems. It's also a good idea to know your test results and keep a list of the medicines you take. How can you care for yourself at home? · Ask your family, friends, and coworkers for support. You have a better chance of quitting if you have help and support.   · Join a support group, such as Nicotine Anonymous, for people who are nicotine. · Be prepared to keep trying. Most people are not successful the first few times they try to quit. Do not get mad at yourself if you smoke again. Make a list of things you learned and think about when you want to try again, such as next week, next month, or next year. Where can you learn more? Go to https://chpepiceweb.[employer]. org and sign in to your [employer] account. Enter I898 in the [employer] box to learn more about \"Stopping Smoking: Care Instructions. \"     If you do not have an account, please click on the \"Sign Up Now\" link. Current as of: November 29, 2017  Content Version: 11.7  © 0899-9439 [employer], [employer]. Care instructions adapted under license by [facility] ([facility]). If you have questions about a medical condition or this instruction, always ask your healthcare professional. [place] [address] any warranty or liability for your use of this information. Patient Education        Benign Essential Tremor: Care Instructions  Your Care Instructions    Benign essential tremor is a medical term for shaking that you can't control. Your hand or fingers may shake when you lift a cup or point at something. Or your voice may shake when you speak. This type of tremor is not harmful. It is not caused by a stroke or Parkinson's disease. Some things can affect how much you shake. For example, drinking or eating something with caffeine may make tremors worse for a while. Some medicines also can increase tremors. These include antidepressants and too much thyroid replacement. Talk to your doctor if you think one of your medicines makes your tremors worse. If you are self-conscious about your tremors, there are some things you can do to reduce them or make them less noticeable. This includes taking medicine. Follow-up care is a key part of your treatment and safety. Be sure to make and go to all appointments, and call your doctor if you are having problems.  It's floor, and hold it straight out behind you. Be careful not to let your hip drop down, because that will twist your trunk. 4. Hold for about 6 seconds, then lower your leg and switch to the other leg. 5. Repeat 8 to 12 times on each leg. 6. Over time, work up to holding for 10 to 30 seconds each time. 7. If you feel stable and secure with your leg raised, try raising the opposite arm straight out in front of you at the same time. Knee-to-chest exercise    1. Lie on your back with your knees bent and your feet flat on the floor. 2. Bring one knee to your chest, keeping the other foot flat on the floor (or keeping the other leg straight, whichever feels better on your lower back). 3. Keep your lower back pressed to the floor. Hold for at least 15 to 30 seconds. 4. Relax, and lower the knee to the starting position. 5. Repeat with the other leg. Repeat 2 to 4 times with each leg. 6. To get more stretch, put your other leg flat on the floor while pulling your knee to your chest.  Curl-ups    1. Lie on the floor on your back with your knees bent at a 90-degree angle. Your feet should be flat on the floor, about 12 inches from your buttocks. 2. Cross your arms over your chest. If this bothers your neck, try putting your hands behind your neck (not your head), with your elbows spread apart. 3. Slowly tighten your belly muscles and raise your shoulder blades off the floor. 4. Keep your head in line with your body, and do not press your chin to your chest.  5. Hold this position for 1 or 2 seconds, then slowly lower yourself back down to the floor. 6. Repeat 8 to 12 times. Pelvic tilt exercise    1. Lie on your back with your knees bent. 2. \"Brace\" your stomach. This means to tighten your muscles by pulling in and imagining your belly button moving toward your spine. You should feel like your back is pressing to the floor and your hips and pelvis are rocking back.   3. Hold for about 6 seconds while you breathe

## 2018-08-27 ENCOUNTER — TELEPHONE (OUTPATIENT)
Dept: NEUROLOGY | Age: 73
End: 2018-08-27

## 2018-08-27 NOTE — TELEPHONE ENCOUNTER
MA left message for pt inquiring on if she has had labs completed as recommended by Nichole Smith 8/16/18 or, if she has not, when pt planned to complete them (orders are in overdue results in basket).   Electronically signed by Ronald Knutson on 8/27/18 at 11:48 AM

## 2018-09-06 ENCOUNTER — HOSPITAL ENCOUNTER (EMERGENCY)
Age: 73
Discharge: HOME | End: 2018-09-06
Attending: EMERGENCY MEDICINE
Payer: MEDICARE

## 2018-09-06 VITALS
BODY MASS INDEX: 31.8 KG/M2 | DIASTOLIC BLOOD PRESSURE: 65 MMHG | WEIGHT: 162 LBS | RESPIRATION RATE: 18 BRPM | HEIGHT: 60 IN | HEART RATE: 52 BPM | TEMPERATURE: 98 F | SYSTOLIC BLOOD PRESSURE: 124 MMHG | OXYGEN SATURATION: 96 %

## 2018-09-06 DIAGNOSIS — F41.1 ANXIETY STATE: ICD-10-CM

## 2018-09-06 DIAGNOSIS — R25.1 TREMOR: Primary | ICD-10-CM

## 2018-09-06 LAB
ALBUMIN SERPL-MCNC: 4 G/DL (ref 3.5–5.2)
ALP BLD-CCNC: 62 U/L (ref 35–104)
ALT SERPL-CCNC: 11 U/L (ref 0–32)
ANION GAP SERPL CALCULATED.3IONS-SCNC: 13 MMOL/L (ref 7–16)
AST SERPL-CCNC: 12 U/L (ref 0–31)
BASOPHILS ABSOLUTE: 0.06 E9/L (ref 0–0.2)
BASOPHILS RELATIVE PERCENT: 0.8 % (ref 0–2)
BILIRUB SERPL-MCNC: 0.6 MG/DL (ref 0–1.2)
BUN BLDV-MCNC: 12 MG/DL (ref 8–23)
CALCIUM SERPL-MCNC: 9.8 MG/DL (ref 8.6–10.2)
CHLORIDE BLD-SCNC: 102 MMOL/L (ref 98–107)
CO2: 26 MMOL/L (ref 22–29)
CREAT SERPL-MCNC: 0.8 MG/DL (ref 0.5–1)
EKG ATRIAL RATE: 53 BPM
EKG P AXIS: 61 DEGREES
EKG P-R INTERVAL: 146 MS
EKG Q-T INTERVAL: 420 MS
EKG QRS DURATION: 80 MS
EKG QTC CALCULATION (BAZETT): 394 MS
EKG R AXIS: 53 DEGREES
EKG T AXIS: 42 DEGREES
EKG VENTRICULAR RATE: 53 BPM
EOSINOPHILS ABSOLUTE: 0.1 E9/L (ref 0.05–0.5)
EOSINOPHILS RELATIVE PERCENT: 1.4 % (ref 0–6)
GFR AFRICAN AMERICAN: >60
GFR NON-AFRICAN AMERICAN: >60 ML/MIN/1.73
GLUCOSE BLD-MCNC: 92 MG/DL (ref 74–109)
HCT VFR BLD CALC: 45.3 % (ref 34–48)
HEMOGLOBIN: 15.3 G/DL (ref 11.5–15.5)
IMMATURE GRANULOCYTES #: 0.01 E9/L
IMMATURE GRANULOCYTES %: 0.1 % (ref 0–5)
LYMPHOCYTES ABSOLUTE: 2.93 E9/L (ref 1.5–4)
LYMPHOCYTES RELATIVE PERCENT: 40.5 % (ref 20–42)
MCH RBC QN AUTO: 31.2 PG (ref 26–35)
MCHC RBC AUTO-ENTMCNC: 33.8 % (ref 32–34.5)
MCV RBC AUTO: 92.4 FL (ref 80–99.9)
MONOCYTES ABSOLUTE: 0.55 E9/L (ref 0.1–0.95)
MONOCYTES RELATIVE PERCENT: 7.6 % (ref 2–12)
NEUTROPHILS ABSOLUTE: 3.58 E9/L (ref 1.8–7.3)
NEUTROPHILS RELATIVE PERCENT: 49.6 % (ref 43–80)
PDW BLD-RTO: 12.1 FL (ref 11.5–15)
PLATELET # BLD: 198 E9/L (ref 130–450)
PMV BLD AUTO: 11.4 FL (ref 7–12)
POTASSIUM SERPL-SCNC: 3.9 MMOL/L (ref 3.5–5)
RBC # BLD: 4.9 E12/L (ref 3.5–5.5)
SODIUM BLD-SCNC: 141 MMOL/L (ref 132–146)
TOTAL PROTEIN: 6.9 G/DL (ref 6.4–8.3)
WBC # BLD: 7.2 E9/L (ref 4.5–11.5)

## 2018-09-06 PROCEDURE — 80053 COMPREHEN METABOLIC PANEL: CPT

## 2018-09-06 PROCEDURE — 93005 ELECTROCARDIOGRAM TRACING: CPT | Performed by: NURSE PRACTITIONER

## 2018-09-06 PROCEDURE — 36415 COLL VENOUS BLD VENIPUNCTURE: CPT

## 2018-09-06 PROCEDURE — 99284 EMERGENCY DEPT VISIT MOD MDM: CPT

## 2018-09-06 PROCEDURE — 85025 COMPLETE CBC W/AUTO DIFF WBC: CPT

## 2018-09-06 PROCEDURE — 6370000000 HC RX 637 (ALT 250 FOR IP): Performed by: EMERGENCY MEDICINE

## 2018-09-06 RX ORDER — LORAZEPAM 1 MG/1
1 TABLET ORAL ONCE
Status: COMPLETED | OUTPATIENT
Start: 2018-09-06 | End: 2018-09-06

## 2018-09-06 RX ORDER — CLONIDINE HYDROCHLORIDE 0.1 MG/1
0.1 TABLET ORAL EVERY 8 HOURS PRN
Refills: 2 | COMMUNITY
Start: 2018-08-31

## 2018-09-06 RX ADMIN — LORAZEPAM 1 MG: 1 TABLET ORAL at 22:35

## 2018-09-07 NOTE — ED NOTES
In room to medicate pt. Pt refusing ativan at this time. Pt states \"as long as i'm laying down, I don't have an issue with tremors\". Pt stating she needs to have her med rec completed because it is always wrong. This nurse to review meds with pt at this time. Pt respirations are easy and unlabored. Cardiac monitoring in place. No signs of distress noted. Call light within reach. Will continue to monitor.        made aware of pt refusal of medication     Nilesh Mares RN  09/06/18 1500

## 2018-09-07 NOTE — ED PROVIDER NOTES
°F (36.2 °C) (Temporal)   Resp 18   Ht 5' (1.524 m)   Wt 162 lb (73.5 kg)   SpO2 99%   BMI 31.64 kg/m²   Oxygen Saturation Interpretation: Normal    ---------------------------------------------------PHYSICAL EXAM--------------------------------------    Constitutional/General: Alert and oriented x3, well appearing, non toxic in NAD  Head: NC/AT  Eyes: PERRL, EOMI  Mouth: Oropharynx clear, handling secretions, no trismus  Neck: Supple, full ROM,   Pulmonary: Lungs clear to auscultation bilaterally, no wheezes, rales, or rhonchi. Not in respiratory distress  Cardiovascular:  Regular rate and rhythm, no murmurs, gallops, or rubs. 2+ distal pulses  Abdomen: Soft, non tender, non distended,   Extremities: Moves all extremities x 4. Warm and well perfused  Skin: warm and dry   Neurologic: GCS 15,  Psych: Normal Affect      ------------------------------ ED COURSE/MEDICAL DECISION MAKING----------------------  Medications   LORazepam (ATIVAN) tablet 1 mg (1 mg Oral Given 9/6/18 2235)       Medical Decision Making:    Pt is a 68year old female presenting for tremors. She reports that her tremors worsened over the past four days because her nerve pills are not working. She takes 1 5 mg pill twice a day. Pt has  Had tremors for the past 20 years. She notes that her pill usually helps her and was afraid to take another one today. Ativan was given in the ED. Labs were obtained and reviewed. Pt can be discharged home. Pt is agreeable and will be discharged home. She should follow up with her PCP in a couple of days and return if her symptoms worsen. Re-Evaluation:  Re-evaluation. Patients symptoms are improving    Counseling: The emergency provider has spoken with the patient and spouse/SO and discussed todays results, in addition to providing specific details for the plan of care and counseling regarding the diagnosis and prognosis.   Questions are answered at this time and they are agreeable with the plan.      --------------------------------- IMPRESSION AND DISPOSITION ---------------------------------    IMPRESSION  1. Tremor    2. Anxiety state        DISPOSITION  Disposition: Discharge to home  Patient condition is stable    9/6/18, 10:16 PM.    This note is prepared by Zeus Rosado, acting as Scribe for Sharonda Mistry MD.    Sharonda Mistry MD:  The scribe's documentation has been prepared under my direction and personally reviewed by me in its entirety. I confirm that the note above accurately reflects all work, treatment, procedures, and medical decision making performed by me.          Sharonda Mistry MD  09/06/18 5910

## 2018-09-08 ENCOUNTER — APPOINTMENT (OUTPATIENT)
Dept: GENERAL RADIOLOGY | Age: 73
End: 2018-09-08
Payer: MEDICARE

## 2018-09-08 ENCOUNTER — HOSPITAL ENCOUNTER (EMERGENCY)
Age: 73
Discharge: HOME | End: 2018-09-08
Attending: EMERGENCY MEDICINE
Payer: MEDICARE

## 2018-09-08 ENCOUNTER — APPOINTMENT (OUTPATIENT)
Dept: CT IMAGING | Age: 73
End: 2018-09-08
Payer: MEDICARE

## 2018-09-08 VITALS
RESPIRATION RATE: 16 BRPM | DIASTOLIC BLOOD PRESSURE: 63 MMHG | HEIGHT: 64 IN | WEIGHT: 150 LBS | OXYGEN SATURATION: 97 % | BODY MASS INDEX: 25.61 KG/M2 | HEART RATE: 60 BPM | SYSTOLIC BLOOD PRESSURE: 132 MMHG | TEMPERATURE: 96.6 F

## 2018-09-08 DIAGNOSIS — G25.0 ESSENTIAL TREMOR: ICD-10-CM

## 2018-09-08 DIAGNOSIS — B02.9 HERPES ZOSTER WITHOUT COMPLICATION: Primary | ICD-10-CM

## 2018-09-08 LAB
ALBUMIN SERPL-MCNC: 4.1 G/DL (ref 3.5–5.2)
ALP BLD-CCNC: 62 U/L (ref 35–104)
ALT SERPL-CCNC: 11 U/L (ref 0–32)
ANION GAP SERPL CALCULATED.3IONS-SCNC: 11 MMOL/L (ref 7–16)
AST SERPL-CCNC: 13 U/L (ref 0–31)
BASOPHILS ABSOLUTE: 0.06 E9/L (ref 0–0.2)
BASOPHILS RELATIVE PERCENT: 0.9 % (ref 0–2)
BILIRUB SERPL-MCNC: 0.6 MG/DL (ref 0–1.2)
BILIRUBIN URINE: NEGATIVE
BLOOD, URINE: NEGATIVE
BUN BLDV-MCNC: 8 MG/DL (ref 8–23)
CALCIUM SERPL-MCNC: 9.8 MG/DL (ref 8.6–10.2)
CHLORIDE BLD-SCNC: 105 MMOL/L (ref 98–107)
CLARITY: CLEAR
CO2: 28 MMOL/L (ref 22–29)
COLOR: YELLOW
CREAT SERPL-MCNC: 0.8 MG/DL (ref 0.5–1)
EKG ATRIAL RATE: 55 BPM
EKG P AXIS: 52 DEGREES
EKG P-R INTERVAL: 144 MS
EKG Q-T INTERVAL: 418 MS
EKG QRS DURATION: 84 MS
EKG QTC CALCULATION (BAZETT): 399 MS
EKG R AXIS: 28 DEGREES
EKG T AXIS: 34 DEGREES
EKG VENTRICULAR RATE: 55 BPM
EOSINOPHILS ABSOLUTE: 0.06 E9/L (ref 0.05–0.5)
EOSINOPHILS RELATIVE PERCENT: 0.9 % (ref 0–6)
GFR AFRICAN AMERICAN: >60
GFR NON-AFRICAN AMERICAN: >60 ML/MIN/1.73
GLUCOSE BLD-MCNC: 117 MG/DL (ref 74–109)
GLUCOSE URINE: NEGATIVE MG/DL
HCT VFR BLD CALC: 45.2 % (ref 34–48)
HEMOGLOBIN: 15.1 G/DL (ref 11.5–15.5)
IMMATURE GRANULOCYTES #: 0.01 E9/L
IMMATURE GRANULOCYTES %: 0.1 % (ref 0–5)
KETONES, URINE: NEGATIVE MG/DL
LACTIC ACID: 1.4 MMOL/L (ref 0.5–2.2)
LEUKOCYTE ESTERASE, URINE: NEGATIVE
LYMPHOCYTES ABSOLUTE: 2.23 E9/L (ref 1.5–4)
LYMPHOCYTES RELATIVE PERCENT: 31.9 % (ref 20–42)
MAGNESIUM: 2 MG/DL (ref 1.6–2.6)
MCH RBC QN AUTO: 31.1 PG (ref 26–35)
MCHC RBC AUTO-ENTMCNC: 33.4 % (ref 32–34.5)
MCV RBC AUTO: 93.2 FL (ref 80–99.9)
MONOCYTES ABSOLUTE: 0.51 E9/L (ref 0.1–0.95)
MONOCYTES RELATIVE PERCENT: 7.3 % (ref 2–12)
NEUTROPHILS ABSOLUTE: 4.13 E9/L (ref 1.8–7.3)
NEUTROPHILS RELATIVE PERCENT: 58.9 % (ref 43–80)
NITRITE, URINE: NEGATIVE
PDW BLD-RTO: 12.2 FL (ref 11.5–15)
PH UA: 6.5 (ref 5–9)
PLATELET # BLD: 194 E9/L (ref 130–450)
PMV BLD AUTO: 11.3 FL (ref 7–12)
POTASSIUM SERPL-SCNC: 4.2 MMOL/L (ref 3.5–5)
PROTEIN UA: NEGATIVE MG/DL
RBC # BLD: 4.85 E12/L (ref 3.5–5.5)
SODIUM BLD-SCNC: 144 MMOL/L (ref 132–146)
SPECIFIC GRAVITY UA: 1.01 (ref 1–1.03)
TOTAL PROTEIN: 7 G/DL (ref 6.4–8.3)
TROPONIN: <0.01 NG/ML (ref 0–0.03)
TSH SERPL DL<=0.05 MIU/L-ACNC: 1.11 UIU/ML (ref 0.27–4.2)
UROBILINOGEN, URINE: 0.2 E.U./DL
WBC # BLD: 7 E9/L (ref 4.5–11.5)

## 2018-09-08 PROCEDURE — 70450 CT HEAD/BRAIN W/O DYE: CPT

## 2018-09-08 PROCEDURE — 81003 URINALYSIS AUTO W/O SCOPE: CPT

## 2018-09-08 PROCEDURE — 85025 COMPLETE CBC W/AUTO DIFF WBC: CPT

## 2018-09-08 PROCEDURE — 83735 ASSAY OF MAGNESIUM: CPT

## 2018-09-08 PROCEDURE — 80053 COMPREHEN METABOLIC PANEL: CPT

## 2018-09-08 PROCEDURE — 99285 EMERGENCY DEPT VISIT HI MDM: CPT

## 2018-09-08 PROCEDURE — 36415 COLL VENOUS BLD VENIPUNCTURE: CPT

## 2018-09-08 PROCEDURE — 6370000000 HC RX 637 (ALT 250 FOR IP): Performed by: EMERGENCY MEDICINE

## 2018-09-08 PROCEDURE — 93005 ELECTROCARDIOGRAM TRACING: CPT | Performed by: PHYSICIAN ASSISTANT

## 2018-09-08 PROCEDURE — 71045 X-RAY EXAM CHEST 1 VIEW: CPT

## 2018-09-08 PROCEDURE — 84443 ASSAY THYROID STIM HORMONE: CPT

## 2018-09-08 PROCEDURE — 84484 ASSAY OF TROPONIN QUANT: CPT

## 2018-09-08 PROCEDURE — 83605 ASSAY OF LACTIC ACID: CPT

## 2018-09-08 RX ORDER — ACYCLOVIR 800 MG/1
800 TABLET ORAL
Qty: 35 TABLET | Refills: 0 | Status: SHIPPED | OUTPATIENT
Start: 2018-09-08 | End: 2018-09-15

## 2018-09-08 RX ORDER — LORAZEPAM 1 MG/1
1 TABLET ORAL ONCE
Status: COMPLETED | OUTPATIENT
Start: 2018-09-08 | End: 2018-09-08

## 2018-09-08 RX ADMIN — LORAZEPAM 1 MG: 1 TABLET ORAL at 19:57

## 2018-09-08 NOTE — ED NOTES
Lab made aware of TSH and magnesium add ons. Lactic obtained and sent to lab.       Servando Carrasco RN  09/08/18 0281

## 2018-09-12 ENCOUNTER — OFFICE VISIT (OUTPATIENT)
Dept: NEUROLOGY | Age: 73
End: 2018-09-12
Payer: MEDICARE

## 2018-09-12 VITALS
RESPIRATION RATE: 12 BRPM | HEART RATE: 85 BPM | OXYGEN SATURATION: 96 % | DIASTOLIC BLOOD PRESSURE: 97 MMHG | HEIGHT: 64 IN | BODY MASS INDEX: 26.8 KG/M2 | TEMPERATURE: 98.2 F | WEIGHT: 157 LBS | SYSTOLIC BLOOD PRESSURE: 133 MMHG

## 2018-09-12 DIAGNOSIS — G25.0 ESSENTIAL TREMOR: Primary | ICD-10-CM

## 2018-09-12 PROCEDURE — 3017F COLORECTAL CA SCREEN DOC REV: CPT | Performed by: NURSE PRACTITIONER

## 2018-09-12 PROCEDURE — G8427 DOCREV CUR MEDS BY ELIG CLIN: HCPCS | Performed by: NURSE PRACTITIONER

## 2018-09-12 PROCEDURE — 1101F PT FALLS ASSESS-DOCD LE1/YR: CPT | Performed by: NURSE PRACTITIONER

## 2018-09-12 PROCEDURE — G8417 CALC BMI ABV UP PARAM F/U: HCPCS | Performed by: NURSE PRACTITIONER

## 2018-09-12 PROCEDURE — 1123F ACP DISCUSS/DSCN MKR DOCD: CPT | Performed by: NURSE PRACTITIONER

## 2018-09-12 PROCEDURE — 99215 OFFICE O/P EST HI 40 MIN: CPT | Performed by: NURSE PRACTITIONER

## 2018-09-12 PROCEDURE — 4004F PT TOBACCO SCREEN RCVD TLK: CPT | Performed by: NURSE PRACTITIONER

## 2018-09-12 PROCEDURE — G8400 PT W/DXA NO RESULTS DOC: HCPCS | Performed by: NURSE PRACTITIONER

## 2018-09-12 PROCEDURE — 4040F PNEUMOC VAC/ADMIN/RCVD: CPT | Performed by: NURSE PRACTITIONER

## 2018-09-12 PROCEDURE — G8598 ASA/ANTIPLAT THER USED: HCPCS | Performed by: NURSE PRACTITIONER

## 2018-09-12 PROCEDURE — 1090F PRES/ABSN URINE INCON ASSESS: CPT | Performed by: NURSE PRACTITIONER

## 2018-09-12 RX ORDER — GABAPENTIN 100 MG/1
100 CAPSULE ORAL 3 TIMES DAILY
Qty: 90 CAPSULE | Refills: 1 | Status: SHIPPED | OUTPATIENT
Start: 2018-09-12 | End: 2018-09-19 | Stop reason: ALTCHOICE

## 2018-09-12 NOTE — PATIENT INSTRUCTIONS
with a straw. When should you call for help? Watch closely for changes in your health, and be sure to contact your doctor if:    · You notice your tremors are getting worse.     · You can't do your everyday activities because of your tremors.     · You are sad and embarrassed about your shaking. Where can you learn more? Go to [email or web address].[email or web address] org and sign in to your [employer] account. Enter B746 in the [place] box to learn more about \"Benign Essential Tremor: Care Instructions. \"     If you do not have an account, please click on the \"Sign Up Now\" link. Current as of: October 9, 2017  Content Version: 11.7  © 4351-2600 [employer]. Care instructions adapted under license by [facility] ([facility]). If you have questions about a medical condition or this instruction, always ask your healthcare professional. [place] [address] any warranty or liability for your use of this information. Patient Education          gabapentin  Pronunciation:  GA ba PEN tin  Brand:  Gralise, Horizant, Neurontin  What is the most important information I should know about gabapentin? Some people have thoughts about suicide while taking this medicine. Children taking gabapentin may have behavior changes. Stay alert to changes in your mood or symptoms. Report any new or worsening symptoms to your doctor. Do not stop using gabapentin suddenly, even if you feel fine. What is gabapentin? Gabapentin is an anti-epileptic drug, also called an anticonvulsant. It affects chemicals and nerves in the body that are involved in the cause of seizures and some types of pain. Gabapentin is used in adults to treat nerve pain caused by herpes virus or shingles (herpes zoster). The Horizant brand of gabapentin is also used to treat restless legs syndrome (RLS). The Neurontin brand of gabapentin is also used to treat seizures in adults and children who are at least 1years old.   Use only the possible side effects of gabapentin? Get emergency medical help if you have signs of an allergic reaction: hives; difficult breathing; swelling of your face, lips, tongue, or throat. Seek medical treatment if you have a serious drug reaction that can affect many parts of your body. Symptoms may include: skin rash, fever, swollen glands, flu-like symptoms, muscle aches, severe weakness, unusual bruising, or yellowing of your skin or eyes. This reaction may occur several weeks after you began using gabapentin. Report any new or worsening symptoms to your doctor, such as: mood or behavior changes, anxiety, panic attacks, trouble sleeping, or if you feel impulsive, irritable, agitated, hostile, aggressive, restless, hyperactive (mentally or physically), depressed, or have thoughts about suicide or hurting yourself. Call your doctor at once if you have:  · increased seizures;  · severe weakness or tiredness;  · problems with balance or muscle movement;  · upper stomach pain;  · chest pain, new or worsening cough with fever, trouble breathing;  · severe tingling or numbness;  · rapid eye movement; or  · kidney problems --little or no urination, painful or difficult urination, swelling in your feet or ankles. Some side effects are more likely in children taking gabapentin. Contact your doctor if the child taking this medicine has any of the following side effects:  · changes in behavior;  · memory problems;  · trouble concentrating; or  · acting restless, hostile, or aggressive. Common side effects may include:  · headache, dizziness, drowsiness, tiredness;  · swelling in your hands or feet;  · problems with your eyes;  · coordination problems; or  · (in children) fever, nausea, vomiting. This is not a complete list of side effects and others may occur. Call your doctor for medical advice about side effects. You may report side effects to FDA at 2-495-FDA-8337. What other drugs will affect gabapentin?   Taking gabapentin with other drugs that make you sleepy can worsen this effect. Ask your doctor before taking a sleeping pill, narcotic medication, muscle relaxer, or medicine for anxiety, depression, or seizures. Other drugs may interact with gabapentin, including prescription and over-the-counter medicines, vitamins, and herbal products. Tell your doctor about all your current medicines and any medicine you start or stop using. Where can I get more information? Your pharmacist can provide more information about gabapentin. Remember, keep this and all other medicines out of the reach of children, never share your medicines with others, and use this medication only for the indication prescribed. Every effort has been made to ensure that the information provided by [address] [place], [place] [address]can Dr is accurate, up-to-date, and complete, but no guarantee is made to that effect. Drug information contained herein may be time sensitive. [facility] information has been compiled for use by healthcare practitioners and consumers in the [place] and therefore [facility] does not warrant that uses outside of the [place] are appropriate, unless specifically indicated otherwise. [facility]'s drug information does not endorse drugs, diagnose patients or recommend therapy. [facility] drug information is an informational resource designed to assist licensed healthcare practitioners in caring for their patients and/or to serve consumers viewing this service as a supplement to, and not a substitute for, the expertise, skill, knowledge and judgment of healthcare practitioners. The absence of a warning for a given drug or drug combination in no way should be construed to indicate that the drug or drug combination is safe, effective or appropriate for any given patient. [facility] does not assume any responsibility for any aspect of healthcare administered with the aid of information [facility] provides.  The information contained herein is not intended to cover all possible uses, directions, precautions, warnings, drug interactions, allergic reactions, or adverse effects. If you have questions about the drugs you are taking, check with your doctor, nurse or pharmacist.  Copyright 5089-9131 [address] Version: 14.01. Revision date: 10/10/2017. Care instructions adapted under license by [facility] ([facility]). If you have questions about a medical condition or this instruction, always ask your healthcare professional. [place] [address] any warranty or liability for your use of this information.

## 2018-09-12 NOTE — PROGRESS NOTES
[address] [place] [address]. Kaykay Pascal M.D., F.A.C.P. Padmini Mares DNP, APRN-CNS  Martín Vasquez, MSN, APRN, FNP-C  [address] [facility]  [place], [address] [address]  Phone: [phone]  Fax: [phone]       Enrrique Solis is a 68 y.o. right handed female     We are following her for memory problems and essential tremor    Hx of cerebral aneurysm in 1993 with residual speech deficits (which are minimal)     She presents with her boyfriend today    She requested an earlier follow up due to worsening head, vocal, and hand tremors since her last visit. She also states for the past week she has been \"not able to think right. \" She went to the ER on 9/6 and 9/8 for these issues and had negative workups both times. She was given ativan there but states it did not help   CT proved no acute changes    She is markedly anxious for the interview and exam, initially stating she needed to go to the ER because she did not feel right and was having tremors, but then agreeing to stay for the visit. She gets up and paces frequently    When I speak calmly with her and get her to relax her head and hand tremors decrease. Primidone was recommended at her first visit, but she initially declined and was sent home with printed information on the drug. She now refuses this drug due to possible cross sensitivity with Dilantin and Phenobarb---listed as allergies, though she does not remember these allergies were and does not remember any swelling, SOB, or anaphylactic-type issues   On atenolol 12.5 mg daily for cardiac reasons----recommended possible increase in this for tremors as well at last visit     She denies any issues working her finances, driving, or performing her ADLs. No behavioral changes or dangerous behaviors. Still able to feed herself with her tremor but her hand does shake. No resting tremors, bradykinesias, falls, gait issues, or freezing spells.     She continues to take Valium 5 intact  HS slow but not ataxic    Gait:  Normal; no festination    DTR:   BE throughout    No Mcnulty's  No pathological reflexes    Laboratory/Radiology:     Lab Results   Component Value Date     09/08/2018    K 4.2 09/08/2018     09/08/2018    CO2 28 09/08/2018    BUN 8 09/08/2018    CREATININE 0.8 09/08/2018    GLUCOSE 117 (H) 09/08/2018    CALCIUM 9.8 09/08/2018    PROT 7.0 09/08/2018    LABALBU 4.1 09/08/2018    BILITOT 0.6 09/08/2018    ALKPHOS 62 09/08/2018    AST 13 09/08/2018    ALT 11 09/08/2018    LABGLOM >60 09/08/2018    GFRAA >60 09/08/2018       Lab Results   Component Value Date    WBC 7.0 09/08/2018    HGB 15.1 09/08/2018    HCT 45.2 09/08/2018    MCV 93.2 09/08/2018     09/08/2018     TSH August 2018: 1.110    Vit B12, folate, RPR pending    CT head Sept 2018: aneurysm clip noted; remote small vessel disease; no acute pathology    All labs and images personally reviewed at the time of this office visit    Assessment:     The patient suffers from essential tremors of her head and hands----and now also reports a week of acute cognitive changes. CT head proved no acute changes and ER workup x2 a few days ago negative for metabolic or infectious processes    Her neuro exam reveals worsening tremors---as well as marked anxiety and near panic. No parkinsonisms   She is now refusing primidone as described above    Already on atenolol 12.5 mg and I again recommend increasing this to aid with tremor---or switching to Inderal LA----defer this to PCP    Will try gabapentin titration starting with 100 mg TID to aid her tremor. Side effects were reviewed---she remains very fearful of any medications but I educated her that without intervention, her tremor will not improve. Current Valium 5 mg BID PRN not helpful to reduce tremor and I worry about oversedation and side effects with increasing this further while she is under psych care.  Her \"inability to think\" is most likely due to her

## 2018-09-13 ENCOUNTER — TELEPHONE (OUTPATIENT)
Dept: NEUROLOGY | Age: 73
End: 2018-09-13

## 2018-09-14 ENCOUNTER — TELEPHONE (OUTPATIENT)
Dept: NEUROLOGY | Age: 73
End: 2018-09-14

## 2018-09-14 DIAGNOSIS — G25.0 ESSENTIAL TREMOR: Primary | ICD-10-CM

## 2018-09-14 NOTE — TELEPHONE ENCOUNTER
She is on a very low starting dose of gabapentin and yes, a possible side effect is tremors, but it is also used to treat tremors. Many medications used to treat certain conditions can also make them worse. I would like her to continue to try this medication and give it more time to work. I highly recommend she contact her psychiatrist/counselor---as her panic and anxiety are greatly contributing to her issues.     Thank you

## 2018-09-17 NOTE — TELEPHONE ENCOUNTER
Spoke at length with Megan Villatoro about her fears over the gabapentin. For the past three days she has taken 100 mg nightly---she is fearful of taking anymore because of a possible past experience with the medication that she cannot remember. She feels she suffers with \"burning in the front of her head\" when she wakes up in the morning after taking it. I told her I doubted such a small dose at night was causing these sensations in the morning. Her tremor remains severe    She remains panicked during the conversation and has not yet communicated with her psych provider. She tells me her Valium is not working for her anymore. I explained that it will be very difficult to treat her tremor when she remains in such a panicked state but I advised her to try taking the gabapentin twice a day for a few days then three times a day per our original plan. She still remains hesitant to try primidone due to cross sensitivity with Dilantin. She will call me back after she has spoken to her psych provider and if she has any additional concerns or questions.

## 2018-09-18 ENCOUNTER — TELEPHONE (OUTPATIENT)
Dept: NEUROLOGY | Age: 73
End: 2018-09-18

## 2018-09-19 ENCOUNTER — TELEPHONE (OUTPATIENT)
Dept: NEUROLOGY | Age: 73
End: 2018-09-19

## 2018-09-19 DIAGNOSIS — G25.0 ESSENTIAL TREMOR: Primary | ICD-10-CM

## 2018-09-19 RX ORDER — PRIMIDONE 50 MG/1
TABLET ORAL
Qty: 30 TABLET | Refills: 1 | Status: SHIPPED | OUTPATIENT
Start: 2018-09-19 | End: 2019-01-28 | Stop reason: ALTCHOICE

## 2018-09-19 NOTE — TELEPHONE ENCOUNTER
Spoke with Umang [place] who stated that she went to [facility] for her tremor and they sent her home and said \"there's nothing they can do for me and to follow up with you. \" I again educated her that I feel she should start primidone despite her previous intolerance to Dilantin and Phenobarb (\"head felt funny\" but still is not clear on the reaction). She is now agreeable but remains markedly anxious and panicked about side effects, reactions, times of administration, etc. Educated her at length that this will be increased slowly over time and reviewed some common side effects with her. She will be carefully monitored for reactions. She continually repeats same questions/concerns despite my education and guidance. She will call back next week after trying the medication nightly for a few days--she can increase to BID after one week and we will titrate accordingly. She will stop gabapentin. I again advised her to call her psychiatrist for counseling and recommendations regarding her severe anxiety and panic. She will go to the ER if she feels her symptoms are new or worsening in the interim.     She will follow up with Dr. Matthew Cristina per her request.

## 2018-09-19 NOTE — TELEPHONE ENCOUNTER
Pt left a voicemail after closing on 9/18/18 stating she would like Alexandria Jones to return her call at [phone] or [phone].   Electronically signed by Fish Avelar on 9/19/18 at 8:04 AM

## 2018-11-13 ENCOUNTER — HOSPITAL ENCOUNTER (OUTPATIENT)
Age: 73
Discharge: HOME | End: 2018-11-13
Payer: MEDICARE

## 2018-11-13 LAB
ALBUMIN SERPL-MCNC: 4.1 G/DL (ref 3.5–5.2)
ALP BLD-CCNC: 73 U/L (ref 35–104)
ALT SERPL-CCNC: 13 U/L (ref 0–32)
ANION GAP SERPL CALCULATED.3IONS-SCNC: 10 MMOL/L (ref 7–16)
AST SERPL-CCNC: 14 U/L (ref 0–31)
BASOPHILS ABSOLUTE: 0.08 E9/L (ref 0–0.2)
BASOPHILS RELATIVE PERCENT: 1.1 % (ref 0–2)
BILIRUB SERPL-MCNC: 0.8 MG/DL (ref 0–1.2)
BILIRUBIN URINE: NEGATIVE
BLOOD, URINE: NEGATIVE
BUN BLDV-MCNC: 21 MG/DL (ref 8–23)
CALCIUM SERPL-MCNC: 9.5 MG/DL (ref 8.6–10.2)
CHLORIDE BLD-SCNC: 103 MMOL/L (ref 98–107)
CHOLESTEROL, FASTING: 186 MG/DL (ref 0–199)
CLARITY: CLEAR
CO2: 29 MMOL/L (ref 22–29)
COLOR: YELLOW
CREAT SERPL-MCNC: 0.6 MG/DL (ref 0.5–1)
EOSINOPHILS ABSOLUTE: 0.17 E9/L (ref 0.05–0.5)
EOSINOPHILS RELATIVE PERCENT: 2.4 % (ref 0–6)
GFR AFRICAN AMERICAN: >60
GFR NON-AFRICAN AMERICAN: >60 ML/MIN/1.73
GLUCOSE FASTING: 113 MG/DL (ref 74–99)
GLUCOSE URINE: NEGATIVE MG/DL
HCT VFR BLD CALC: 43.9 % (ref 34–48)
HDLC SERPL-MCNC: 86 MG/DL
HEMOGLOBIN: 14.1 G/DL (ref 11.5–15.5)
IMMATURE GRANULOCYTES #: 0.02 E9/L
IMMATURE GRANULOCYTES %: 0.3 % (ref 0–5)
KETONES, URINE: NEGATIVE MG/DL
LDL CHOLESTEROL CALCULATED: 88 MG/DL (ref 0–99)
LEUKOCYTE ESTERASE, URINE: NEGATIVE
LYMPHOCYTES ABSOLUTE: 2.41 E9/L (ref 1.5–4)
LYMPHOCYTES RELATIVE PERCENT: 34 % (ref 20–42)
MAGNESIUM: 1.9 MG/DL (ref 1.6–2.6)
MCH RBC QN AUTO: 31 PG (ref 26–35)
MCHC RBC AUTO-ENTMCNC: 32.1 % (ref 32–34.5)
MCV RBC AUTO: 96.5 FL (ref 80–99.9)
MONOCYTES ABSOLUTE: 0.59 E9/L (ref 0.1–0.95)
MONOCYTES RELATIVE PERCENT: 8.3 % (ref 2–12)
NEUTROPHILS ABSOLUTE: 3.81 E9/L (ref 1.8–7.3)
NEUTROPHILS RELATIVE PERCENT: 53.9 % (ref 43–80)
NITRITE, URINE: NEGATIVE
PDW BLD-RTO: 13 FL (ref 11.5–15)
PH UA: 6.5 (ref 5–9)
PLATELET # BLD: 214 E9/L (ref 130–450)
PMV BLD AUTO: 10.9 FL (ref 7–12)
POTASSIUM SERPL-SCNC: 4.5 MMOL/L (ref 3.5–5)
PROTEIN UA: NEGATIVE MG/DL
RBC # BLD: 4.55 E12/L (ref 3.5–5.5)
SODIUM BLD-SCNC: 142 MMOL/L (ref 132–146)
SPECIFIC GRAVITY UA: 1.02 (ref 1–1.03)
TOTAL CK: 57 U/L (ref 20–180)
TOTAL PROTEIN: 7 G/DL (ref 6.4–8.3)
TRIGLYCERIDE, FASTING: 60 MG/DL (ref 0–149)
TSH SERPL DL<=0.05 MIU/L-ACNC: 1.37 UIU/ML (ref 0.27–4.2)
UROBILINOGEN, URINE: 0.2 E.U./DL
VLDLC SERPL CALC-MCNC: 12 MG/DL
WBC # BLD: 7.1 E9/L (ref 4.5–11.5)

## 2018-11-13 PROCEDURE — 36415 COLL VENOUS BLD VENIPUNCTURE: CPT

## 2018-11-13 PROCEDURE — 84443 ASSAY THYROID STIM HORMONE: CPT

## 2018-11-13 PROCEDURE — 82550 ASSAY OF CK (CPK): CPT

## 2018-11-13 PROCEDURE — 80061 LIPID PANEL: CPT

## 2018-11-13 PROCEDURE — 80053 COMPREHEN METABOLIC PANEL: CPT

## 2018-11-13 PROCEDURE — 81003 URINALYSIS AUTO W/O SCOPE: CPT

## 2018-11-13 PROCEDURE — 85025 COMPLETE CBC W/AUTO DIFF WBC: CPT

## 2018-11-13 PROCEDURE — 83735 ASSAY OF MAGNESIUM: CPT

## 2019-01-28 ENCOUNTER — OFFICE VISIT (OUTPATIENT)
Dept: NEUROLOGY | Age: 74
End: 2019-01-28
Payer: MEDICARE

## 2019-01-28 VITALS
HEIGHT: 64 IN | TEMPERATURE: 96.4 F | BODY MASS INDEX: 29.19 KG/M2 | HEART RATE: 62 BPM | OXYGEN SATURATION: 98 % | SYSTOLIC BLOOD PRESSURE: 169 MMHG | WEIGHT: 171 LBS | DIASTOLIC BLOOD PRESSURE: 86 MMHG | RESPIRATION RATE: 18 BRPM

## 2019-01-28 DIAGNOSIS — F41.9 ANXIETY: ICD-10-CM

## 2019-01-28 DIAGNOSIS — G25.0 ESSENTIAL TREMOR: Primary | ICD-10-CM

## 2019-01-28 PROCEDURE — G8417 CALC BMI ABV UP PARAM F/U: HCPCS | Performed by: PSYCHIATRY & NEUROLOGY

## 2019-01-28 PROCEDURE — G8484 FLU IMMUNIZE NO ADMIN: HCPCS | Performed by: PSYCHIATRY & NEUROLOGY

## 2019-01-28 PROCEDURE — 1090F PRES/ABSN URINE INCON ASSESS: CPT | Performed by: PSYCHIATRY & NEUROLOGY

## 2019-01-28 PROCEDURE — 1101F PT FALLS ASSESS-DOCD LE1/YR: CPT | Performed by: PSYCHIATRY & NEUROLOGY

## 2019-01-28 PROCEDURE — 3017F COLORECTAL CA SCREEN DOC REV: CPT | Performed by: PSYCHIATRY & NEUROLOGY

## 2019-01-28 PROCEDURE — 99214 OFFICE O/P EST MOD 30 MIN: CPT | Performed by: PSYCHIATRY & NEUROLOGY

## 2019-01-28 PROCEDURE — G8427 DOCREV CUR MEDS BY ELIG CLIN: HCPCS | Performed by: PSYCHIATRY & NEUROLOGY

## 2019-01-28 RX ORDER — PROPRANOLOL HYDROCHLORIDE 20 MG/1
20 TABLET ORAL 2 TIMES DAILY
Qty: 180 TABLET | Refills: 3 | Status: SHIPPED | OUTPATIENT
Start: 2019-01-28 | End: 2019-10-29 | Stop reason: SDUPTHER

## 2019-01-28 RX ORDER — PROPRANOLOL HYDROCHLORIDE 20 MG/1
20 TABLET ORAL
COMMUNITY
End: 2019-01-28 | Stop reason: SDUPTHER

## 2019-02-09 ENCOUNTER — APPOINTMENT (OUTPATIENT)
Dept: CT IMAGING | Age: 74
End: 2019-02-09
Payer: MEDICARE

## 2019-02-09 ENCOUNTER — HOSPITAL ENCOUNTER (EMERGENCY)
Age: 74
Discharge: HOME | End: 2019-02-09
Attending: EMERGENCY MEDICINE
Payer: MEDICARE

## 2019-02-09 VITALS
WEIGHT: 170 LBS | TEMPERATURE: 97.6 F | BODY MASS INDEX: 29.02 KG/M2 | RESPIRATION RATE: 23 BRPM | HEIGHT: 64 IN | DIASTOLIC BLOOD PRESSURE: 77 MMHG | HEART RATE: 53 BPM | OXYGEN SATURATION: 96 % | SYSTOLIC BLOOD PRESSURE: 160 MMHG

## 2019-02-09 DIAGNOSIS — S39.012A BACK STRAIN, INITIAL ENCOUNTER: Primary | ICD-10-CM

## 2019-02-09 LAB
ALBUMIN SERPL-MCNC: 3.7 G/DL (ref 3.5–5.2)
ALP BLD-CCNC: 67 U/L (ref 35–104)
ALT SERPL-CCNC: 13 U/L (ref 0–32)
ANION GAP SERPL CALCULATED.3IONS-SCNC: 9 MMOL/L (ref 7–16)
AST SERPL-CCNC: 15 U/L (ref 0–31)
BASOPHILS ABSOLUTE: 0.07 E9/L (ref 0–0.2)
BASOPHILS RELATIVE PERCENT: 1.2 % (ref 0–2)
BILIRUB SERPL-MCNC: 0.7 MG/DL (ref 0–1.2)
BUN BLDV-MCNC: 16 MG/DL (ref 8–23)
CALCIUM SERPL-MCNC: 9.4 MG/DL (ref 8.6–10.2)
CHLORIDE BLD-SCNC: 103 MMOL/L (ref 98–107)
CO2: 28 MMOL/L (ref 22–29)
CO2: 30 MMOL/L (ref 22–29)
CREAT SERPL-MCNC: 0.6 MG/DL (ref 0.5–1)
EOSINOPHILS ABSOLUTE: 0.12 E9/L (ref 0.05–0.5)
EOSINOPHILS RELATIVE PERCENT: 2 % (ref 0–6)
GFR AFRICAN AMERICAN: >60
GFR AFRICAN AMERICAN: >60
GFR NON-AFRICAN AMERICAN: >60 ML/MIN/1.73
GFR NON-AFRICAN AMERICAN: >60 ML/MIN/1.73
GLUCOSE BLD-MCNC: 100 MG/DL (ref 74–99)
GLUCOSE BLD-MCNC: 94 MG/DL (ref 74–99)
HCT VFR BLD CALC: 43 % (ref 34–48)
HEMOGLOBIN: 14.3 G/DL (ref 11.5–15.5)
IMMATURE GRANULOCYTES #: 0.01 E9/L
IMMATURE GRANULOCYTES %: 0.2 % (ref 0–5)
LYMPHOCYTES ABSOLUTE: 2.43 E9/L (ref 1.5–4)
LYMPHOCYTES RELATIVE PERCENT: 41 % (ref 20–42)
MCH RBC QN AUTO: 31.4 PG (ref 26–35)
MCHC RBC AUTO-ENTMCNC: 33.3 % (ref 32–34.5)
MCV RBC AUTO: 94.3 FL (ref 80–99.9)
MONOCYTES ABSOLUTE: 0.48 E9/L (ref 0.1–0.95)
MONOCYTES RELATIVE PERCENT: 8.1 % (ref 2–12)
NEUTROPHILS ABSOLUTE: 2.82 E9/L (ref 1.8–7.3)
NEUTROPHILS RELATIVE PERCENT: 47.5 % (ref 43–80)
PDW BLD-RTO: 12 FL (ref 11.5–15)
PLATELET # BLD: 183 E9/L (ref 130–450)
PMV BLD AUTO: 10.6 FL (ref 7–12)
POC ANION GAP: 7 MMOL/L (ref 7–16)
POC BUN: 16 MG/DL (ref 8–23)
POC CHLORIDE: 103 MMOL/L (ref 100–108)
POC CREATININE: 0.6 MG/DL (ref 0.5–1)
POC POTASSIUM: 4.1 MMOL/L (ref 3.5–5)
POC SODIUM: 140 MMOL/L (ref 132–146)
POTASSIUM SERPL-SCNC: 4.1 MMOL/L (ref 3.5–5)
RBC # BLD: 4.56 E12/L (ref 3.5–5.5)
SODIUM BLD-SCNC: 140 MMOL/L (ref 132–146)
TOTAL PROTEIN: 6.6 G/DL (ref 6.4–8.3)
TROPONIN: <0.01 NG/ML (ref 0–0.03)
WBC # BLD: 5.9 E9/L (ref 4.5–11.5)

## 2019-02-09 PROCEDURE — 80053 COMPREHEN METABOLIC PANEL: CPT

## 2019-02-09 PROCEDURE — 85025 COMPLETE CBC W/AUTO DIFF WBC: CPT

## 2019-02-09 PROCEDURE — 80051 ELECTROLYTE PANEL: CPT

## 2019-02-09 PROCEDURE — 99284 EMERGENCY DEPT VISIT MOD MDM: CPT

## 2019-02-09 PROCEDURE — 6360000004 HC RX CONTRAST MEDICATION: Performed by: RADIOLOGY

## 2019-02-09 PROCEDURE — 82565 ASSAY OF CREATININE: CPT

## 2019-02-09 PROCEDURE — 84520 ASSAY OF UREA NITROGEN: CPT

## 2019-02-09 PROCEDURE — 36415 COLL VENOUS BLD VENIPUNCTURE: CPT

## 2019-02-09 PROCEDURE — 71275 CT ANGIOGRAPHY CHEST: CPT

## 2019-02-09 PROCEDURE — 82947 ASSAY GLUCOSE BLOOD QUANT: CPT

## 2019-02-09 PROCEDURE — 93005 ELECTROCARDIOGRAM TRACING: CPT | Performed by: NURSE PRACTITIONER

## 2019-02-09 PROCEDURE — 84484 ASSAY OF TROPONIN QUANT: CPT

## 2019-02-09 PROCEDURE — 2580000003 HC RX 258: Performed by: NURSE PRACTITIONER

## 2019-02-09 PROCEDURE — 74174 CTA ABD&PLVS W/CONTRAST: CPT

## 2019-02-09 RX ORDER — 0.9 % SODIUM CHLORIDE 0.9 %
1000 INTRAVENOUS SOLUTION INTRAVENOUS ONCE
Status: COMPLETED | OUTPATIENT
Start: 2019-02-09 | End: 2019-02-09

## 2019-02-09 RX ADMIN — SODIUM CHLORIDE 1000 ML: 9 INJECTION, SOLUTION INTRAVENOUS at 17:03

## 2019-02-09 RX ADMIN — IOPAMIDOL 80 ML: 755 INJECTION, SOLUTION INTRAVENOUS at 17:20

## 2019-02-10 LAB
EKG ATRIAL RATE: 51 BPM
EKG P AXIS: 34 DEGREES
EKG P-R INTERVAL: 172 MS
EKG Q-T INTERVAL: 436 MS
EKG QRS DURATION: 90 MS
EKG QTC CALCULATION (BAZETT): 401 MS
EKG R AXIS: 6 DEGREES
EKG T AXIS: 23 DEGREES
EKG VENTRICULAR RATE: 51 BPM

## 2019-04-27 ENCOUNTER — HOSPITAL ENCOUNTER (OUTPATIENT)
Age: 74
Discharge: HOME | End: 2019-04-27
Payer: MEDICARE

## 2019-04-27 LAB
ALBUMIN SERPL-MCNC: 4.1 G/DL (ref 3.5–5.2)
ALP BLD-CCNC: 67 U/L (ref 35–104)
ALT SERPL-CCNC: 10 U/L (ref 0–32)
ANION GAP SERPL CALCULATED.3IONS-SCNC: 8 MMOL/L (ref 7–16)
AST SERPL-CCNC: 11 U/L (ref 0–31)
BASOPHILS ABSOLUTE: 0.07 E9/L (ref 0–0.2)
BASOPHILS RELATIVE PERCENT: 1.4 % (ref 0–2)
BILIRUB SERPL-MCNC: 0.8 MG/DL (ref 0–1.2)
BUN BLDV-MCNC: 20 MG/DL (ref 8–23)
CALCIUM SERPL-MCNC: 9.5 MG/DL (ref 8.6–10.2)
CHLORIDE BLD-SCNC: 102 MMOL/L (ref 98–107)
CHOLESTEROL, FASTING: 158 MG/DL (ref 0–199)
CO2: 29 MMOL/L (ref 22–29)
CREAT SERPL-MCNC: 0.8 MG/DL (ref 0.5–1)
EOSINOPHILS ABSOLUTE: 0.11 E9/L (ref 0.05–0.5)
EOSINOPHILS RELATIVE PERCENT: 2.2 % (ref 0–6)
GFR AFRICAN AMERICAN: >60
GFR NON-AFRICAN AMERICAN: >60 ML/MIN/1.73
GLUCOSE FASTING: 135 MG/DL (ref 74–99)
HCT VFR BLD CALC: 43.1 % (ref 34–48)
HDLC SERPL-MCNC: 77 MG/DL
HEMOGLOBIN: 14.6 G/DL (ref 11.5–15.5)
IMMATURE GRANULOCYTES #: 0.01 E9/L
IMMATURE GRANULOCYTES %: 0.2 % (ref 0–5)
LDL CHOLESTEROL CALCULATED: 68 MG/DL (ref 0–99)
LYMPHOCYTES ABSOLUTE: 1.82 E9/L (ref 1.5–4)
LYMPHOCYTES RELATIVE PERCENT: 35.8 % (ref 20–42)
MCH RBC QN AUTO: 32.2 PG (ref 26–35)
MCHC RBC AUTO-ENTMCNC: 33.9 % (ref 32–34.5)
MCV RBC AUTO: 95.1 FL (ref 80–99.9)
MONOCYTES ABSOLUTE: 0.35 E9/L (ref 0.1–0.95)
MONOCYTES RELATIVE PERCENT: 6.9 % (ref 2–12)
NEUTROPHILS ABSOLUTE: 2.73 E9/L (ref 1.8–7.3)
NEUTROPHILS RELATIVE PERCENT: 53.5 % (ref 43–80)
PDW BLD-RTO: 12.5 FL (ref 11.5–15)
PLATELET # BLD: 203 E9/L (ref 130–450)
PMV BLD AUTO: 10.3 FL (ref 7–12)
POTASSIUM SERPL-SCNC: 4.2 MMOL/L (ref 3.5–5)
RBC # BLD: 4.53 E12/L (ref 3.5–5.5)
SODIUM BLD-SCNC: 139 MMOL/L (ref 132–146)
TOTAL PROTEIN: 6.8 G/DL (ref 6.4–8.3)
TRIGLYCERIDE, FASTING: 64 MG/DL (ref 0–149)
VLDLC SERPL CALC-MCNC: 13 MG/DL
WBC # BLD: 5.1 E9/L (ref 4.5–11.5)

## 2019-04-27 PROCEDURE — 80061 LIPID PANEL: CPT

## 2019-04-27 PROCEDURE — 80053 COMPREHEN METABOLIC PANEL: CPT

## 2019-04-27 PROCEDURE — 85025 COMPLETE CBC W/AUTO DIFF WBC: CPT

## 2019-04-27 PROCEDURE — 36415 COLL VENOUS BLD VENIPUNCTURE: CPT

## 2019-05-05 ENCOUNTER — APPOINTMENT (OUTPATIENT)
Dept: GENERAL RADIOLOGY | Age: 74
DRG: 244 | End: 2019-05-05
Payer: MEDICARE

## 2019-05-05 ENCOUNTER — HOSPITAL ENCOUNTER (INPATIENT)
Age: 74
LOS: 4 days | Discharge: HOME | DRG: 244 | End: 2019-05-09
Attending: EMERGENCY MEDICINE | Admitting: INTERNAL MEDICINE
Payer: MEDICARE

## 2019-05-05 DIAGNOSIS — R00.1 SINUS BRADYCARDIA, PERSISTENT: Primary | ICD-10-CM

## 2019-05-05 DIAGNOSIS — R00.1 SINUS BRADYCARDIA: ICD-10-CM

## 2019-05-05 LAB
ALBUMIN SERPL-MCNC: 4.5 G/DL (ref 3.5–5.2)
ALP BLD-CCNC: 65 U/L (ref 35–104)
ALT SERPL-CCNC: 9 U/L (ref 0–32)
ANION GAP SERPL CALCULATED.3IONS-SCNC: 10 MMOL/L (ref 7–16)
AST SERPL-CCNC: 13 U/L (ref 0–31)
BASOPHILS ABSOLUTE: 0.06 E9/L (ref 0–0.2)
BASOPHILS RELATIVE PERCENT: 1 % (ref 0–2)
BILIRUB SERPL-MCNC: 1 MG/DL (ref 0–1.2)
BILIRUBIN URINE: NEGATIVE
BLOOD, URINE: NEGATIVE
BUN BLDV-MCNC: 15 MG/DL (ref 8–23)
CALCIUM SERPL-MCNC: 9.6 MG/DL (ref 8.6–10.2)
CHLORIDE BLD-SCNC: 104 MMOL/L (ref 98–107)
CLARITY: CLEAR
CO2: 25 MMOL/L (ref 22–29)
COLOR: YELLOW
CREAT SERPL-MCNC: 0.7 MG/DL (ref 0.5–1)
EOSINOPHILS ABSOLUTE: 0.11 E9/L (ref 0.05–0.5)
EOSINOPHILS RELATIVE PERCENT: 1.8 % (ref 0–6)
GFR AFRICAN AMERICAN: >60
GFR NON-AFRICAN AMERICAN: >60 ML/MIN/1.73
GLUCOSE BLD-MCNC: 106 MG/DL (ref 74–99)
GLUCOSE URINE: NEGATIVE MG/DL
HCT VFR BLD CALC: 42.6 % (ref 34–48)
HEMOGLOBIN: 14.4 G/DL (ref 11.5–15.5)
IMMATURE GRANULOCYTES #: 0.01 E9/L
IMMATURE GRANULOCYTES %: 0.2 % (ref 0–5)
KETONES, URINE: NEGATIVE MG/DL
LEUKOCYTE ESTERASE, URINE: NEGATIVE
LYMPHOCYTES ABSOLUTE: 2.38 E9/L (ref 1.5–4)
LYMPHOCYTES RELATIVE PERCENT: 39.7 % (ref 20–42)
MCH RBC QN AUTO: 31.5 PG (ref 26–35)
MCHC RBC AUTO-ENTMCNC: 33.8 % (ref 32–34.5)
MCV RBC AUTO: 93.2 FL (ref 80–99.9)
MONOCYTES ABSOLUTE: 0.44 E9/L (ref 0.1–0.95)
MONOCYTES RELATIVE PERCENT: 7.3 % (ref 2–12)
NEUTROPHILS ABSOLUTE: 2.99 E9/L (ref 1.8–7.3)
NEUTROPHILS RELATIVE PERCENT: 50 % (ref 43–80)
NITRITE, URINE: NEGATIVE
PDW BLD-RTO: 12 FL (ref 11.5–15)
PH UA: 6 (ref 5–9)
PLATELET # BLD: 216 E9/L (ref 130–450)
PMV BLD AUTO: 10.6 FL (ref 7–12)
POTASSIUM SERPL-SCNC: 4.2 MMOL/L (ref 3.5–5)
PRO-BNP: 112 PG/ML (ref 0–125)
PROTEIN UA: NEGATIVE MG/DL
RBC # BLD: 4.57 E12/L (ref 3.5–5.5)
SODIUM BLD-SCNC: 139 MMOL/L (ref 132–146)
SPECIFIC GRAVITY UA: <=1.005 (ref 1–1.03)
TOTAL PROTEIN: 7.4 G/DL (ref 6.4–8.3)
TROPONIN: <0.01 NG/ML (ref 0–0.03)
TSH SERPL DL<=0.05 MIU/L-ACNC: 1.74 UIU/ML (ref 0.27–4.2)
UROBILINOGEN, URINE: 0.2 E.U./DL
WBC # BLD: 6 E9/L (ref 4.5–11.5)

## 2019-05-05 PROCEDURE — 36415 COLL VENOUS BLD VENIPUNCTURE: CPT

## 2019-05-05 PROCEDURE — 81003 URINALYSIS AUTO W/O SCOPE: CPT

## 2019-05-05 PROCEDURE — 2060000000 HC ICU INTERMEDIATE R&B

## 2019-05-05 PROCEDURE — 84443 ASSAY THYROID STIM HORMONE: CPT

## 2019-05-05 PROCEDURE — 80053 COMPREHEN METABOLIC PANEL: CPT

## 2019-05-05 PROCEDURE — 83880 ASSAY OF NATRIURETIC PEPTIDE: CPT

## 2019-05-05 PROCEDURE — 93010 ELECTROCARDIOGRAM REPORT: CPT | Performed by: INTERNAL MEDICINE

## 2019-05-05 PROCEDURE — 99285 EMERGENCY DEPT VISIT HI MDM: CPT

## 2019-05-05 PROCEDURE — 87088 URINE BACTERIA CULTURE: CPT

## 2019-05-05 PROCEDURE — 84484 ASSAY OF TROPONIN QUANT: CPT

## 2019-05-05 PROCEDURE — 71045 X-RAY EXAM CHEST 1 VIEW: CPT

## 2019-05-05 PROCEDURE — 93005 ELECTROCARDIOGRAM TRACING: CPT | Performed by: STUDENT IN AN ORGANIZED HEALTH CARE EDUCATION/TRAINING PROGRAM

## 2019-05-05 PROCEDURE — 85025 COMPLETE CBC W/AUTO DIFF WBC: CPT

## 2019-05-05 ASSESSMENT — ENCOUNTER SYMPTOMS
SORE THROAT: 0
EYE PAIN: 0
CONSTIPATION: 0
EYE REDNESS: 0
COUGH: 0
ABDOMINAL PAIN: 0
NAUSEA: 0
DIARRHEA: 0
VOMITING: 0
SHORTNESS OF BREATH: 0
SINUS PAIN: 0

## 2019-05-05 ASSESSMENT — PAIN SCALES - GENERAL: PAINLEVEL_OUTOF10: 0

## 2019-05-05 NOTE — ED NOTES
The patient denies dizziness, chest pain, dyspnea, shortness of breath, awaiting orders.      Zhang Lawler RN  05/05/19 2762

## 2019-05-05 NOTE — ED PROVIDER NOTES
The patient is a 46-year-old female presenting with bradycardia. Patient states that her heart rate has declined throughout the day. She takes her heart rate at home with her blood pressure device. She denies any symptoms. Her heart rate upon arrival is 44 bpm. She denies any headache, chest pain, shortness of breath, numbness, tingling, abdominal pain, nausea, vomiting. Has any recent change in medication or recent illness. He admits to intermittent chills over the past week. The history is provided by the patient. Patient is on propranolol, clonidine, amlodipine  Review of Systems   Constitutional: Negative for chills and fever. HENT: Negative for congestion, sinus pain and sore throat. Eyes: Negative for pain and redness. Respiratory: Negative for cough and shortness of breath. Cardiovascular: Negative for chest pain, palpitations and leg swelling. Gastrointestinal: Negative for abdominal pain, constipation, diarrhea, nausea and vomiting. Endocrine: Negative for polyuria. Genitourinary: Negative for difficulty urinating, dysuria, frequency and hematuria. Musculoskeletal: Negative for neck pain. Skin: Negative. Neurological: Negative for dizziness, weakness, light-headedness and headaches. Hematological: Negative. Psychiatric/Behavioral: Negative for agitation and confusion. Physical Exam   Constitutional: She is oriented to person, place, and time. She appears well-developed and well-nourished. HENT:   Head: Normocephalic and atraumatic. Eyes: Pupils are equal, round, and reactive to light. Conjunctivae and EOM are normal.   Neck: Normal range of motion. No JVD present. Cardiovascular: Regular rhythm, normal heart sounds, intact distal pulses and normal pulses. Tachycardia present. No murmur heard. Pulmonary/Chest: Effort normal and breath sounds normal. No respiratory distress. She has no wheezes. Abdominal: Soft.  Bowel sounds are normal. She exhibits no distension and no mass. There is no tenderness. There is no guarding. Musculoskeletal: She exhibits no edema. Neurological: She is oriented to person, place, and time. Skin: Skin is warm and dry. Psychiatric: She has a normal mood and affect. Vitals reviewed. Procedures    MDM    ED Course as of May 06 0116   Mary Alston May 05, 2019   1807 Point of care EKG shows sinus bradycardia. [WL]   1843 No acute findings on chest x-ray. [WL]   2015 Spoke with Dr. Hieu Long, they would like earlier to be consulted in for the patient to be admitted. [WL]   2031 Spoke with Dr. Diya Mcgrath, he agrees to see the patient.    [WL]      ED Course User Index  [WL] Reji Justin,      Patient presents to the ED for   Chief Complaint   Patient presents with    Bradycardia      [facility] [place]   . Workup in the ED revealed asymptomatic bradycardia. Patient remained stable for her entirety here in the emergency department without any symptoms. Her workup did not demonstrate any abnormalities or bump in her troponin. Patient continues to be non-toxic on re-evaluation. Findings were discussed with the patient and reasons to immediately return to the ED were articulated to them. They will follow-up with their PMD.      --------------------------------------------- PAST HISTORY ---------------------------------------------  Past Medical History:  has a past medical history of Arthritis, Bladder dysfunction, Bowel dysfunction, HTN (hypertension), and Ulcer. Past Surgical History:  has a past surgical history that includes Hysterectomy; Brain aneurysm surgery (1993); Nerve Surgery; Colonoscopy; Endoscopy, colon, diagnostic; and Cataract removal with implant (Right, 11/22/2016). Social History:  reports that she quit smoking 3 days ago. Her smoking use included cigarettes. She smoked 0.25 packs per day.  She has never used smokeless tobacco. She reports that she does not drink alcohol or use Urinalysis   Result Value Ref Range    Color, UA Yellow Straw/Yellow    Clarity, UA Clear Clear    Glucose, Ur Negative Negative mg/dL    Bilirubin Urine Negative Negative    Ketones, Urine Negative Negative mg/dL    Specific Gravity, UA <=1.005 1.005 - 1.030    Blood, Urine Negative Negative    pH, UA 6.0 5.0 - 9.0    Protein, UA Negative Negative mg/dL    Urobilinogen, Urine 0.2 <2.0 E.U./dL    Nitrite, Urine Negative Negative    Leukocyte Esterase, Urine Negative Negative   TSH without Reflex   Result Value Ref Range    TSH 1.740 0.270 - 4.200 uIU/mL   Brain Natriuretic Peptide   Result Value Ref Range    Pro- 0 - 125 pg/mL   EKG 12 Lead   Result Value Ref Range    Ventricular Rate 43 BPM    Atrial Rate 43 BPM    P-R Interval 168 ms    QRS Duration 88 ms    Q-T Interval 436 ms    QTc Calculation (Bazett) 368 ms    P Axis 23 degrees    R Axis 11 degrees    T Axis 24 degrees       Radiology:  XR CHEST PORTABLE   Final Result      1. No evidence of acute cardiopulmonary process radiographically   showing no significant interval changes. 2. Unchanged borderline cardiomegaly. ------------------------- NURSING NOTES AND VITALS REVIEWED ---------------------------  Date / Time Roomed:  5/5/2019  5:19 PM  ED Bed Assignment:  0624/0624-02    The nursing notes within the ED encounter and vital signs as below have been reviewed. BP (!) 148/67   Pulse 85   Temp 98.7 °F (37.1 °C) (Oral)   Resp 16   Ht 5' 4\" (1.626 m)   Wt 171 lb 6.4 oz (77.7 kg)   SpO2 95%   BMI 29.42 kg/m²           --------------------------------- ADDITIONAL PROVIDER NOTES ---------------------------------  At this time the patient is without objective evidence of an acute process requiring hospitalization or inpatient management. They have remained hemodynamically stable throughout their entire ED visit and are stable for discharge with outpatient follow-up.      The plan has been discussed in detail and they are aware of the specific conditions for emergent return, as well as the importance of follow-up. Current Discharge Medication List          Diagnosis:  1. Sinus bradycardia, persistent        Disposition:  Patient's disposition: Discharge  Patient's condition is stable.            Cullen Sanchez DO  Resident  05/06/19 6400

## 2019-05-05 NOTE — LETTER
Beneficiary Notification Letter     This Ben Cheema Provider is Participating in an Innovative Payment and [address] [place] [address] Anon Raices from Medicare     Greetings:   [address] is participating in a Medicare initiative called the [facility] for [facility]. You are receiving this letter because your health care provider has identified you as a patient who is receiving care through this initiative. Health care providers participating in the [facility], including Bridgette Boykin, will work with Medicare to improve care for patients. Your Medicare rights have not been changed. You still have all the same Medicare rights and protections, including the right to choose which hospital, doctor, or other health care provider you see. However, because Bridgette Boykin chose to participate in the [address] [place] [address], all Medicare beneficiaries who meet the eligibility criteria of this initiative will receive care under the initiative. If you do not wish to receive care under the [employer] [facility], you must choose a health care provider that does not participate in this initiative for your care. Regardless of which health care provider you see, Medicare will continue to cover all of your medically necessary services. Bundled Payments for Care Improvement Advanced aims to help improve your care     The [employer] [facility] is an innovative Medicare initiative that encourages your doctors, hospitals, and other health care providers to work more closely together so you get better care during and following certain hospital stays.  In this initiative, doctors and hospitals may work closely with certain health care providers and

## 2019-05-06 LAB
EKG ATRIAL RATE: 43 BPM
EKG P AXIS: 23 DEGREES
EKG P-R INTERVAL: 168 MS
EKG Q-T INTERVAL: 436 MS
EKG QRS DURATION: 88 MS
EKG QTC CALCULATION (BAZETT): 368 MS
EKG R AXIS: 11 DEGREES
EKG T AXIS: 24 DEGREES
EKG VENTRICULAR RATE: 43 BPM

## 2019-05-06 PROCEDURE — 97530 THERAPEUTIC ACTIVITIES: CPT | Performed by: PHYSICAL THERAPIST

## 2019-05-06 PROCEDURE — 6360000002 HC RX W HCPCS: Performed by: INTERNAL MEDICINE

## 2019-05-06 PROCEDURE — 6370000000 HC RX 637 (ALT 250 FOR IP): Performed by: INTERNAL MEDICINE

## 2019-05-06 PROCEDURE — 97530 THERAPEUTIC ACTIVITIES: CPT

## 2019-05-06 PROCEDURE — 2060000000 HC ICU INTERMEDIATE R&B

## 2019-05-06 PROCEDURE — 97161 PT EVAL LOW COMPLEX 20 MIN: CPT | Performed by: PHYSICAL THERAPIST

## 2019-05-06 PROCEDURE — 2580000003 HC RX 258: Performed by: INTERNAL MEDICINE

## 2019-05-06 PROCEDURE — 97165 OT EVAL LOW COMPLEX 30 MIN: CPT

## 2019-05-06 RX ORDER — AMLODIPINE BESYLATE 5 MG/1
5 TABLET ORAL DAILY
Status: DISCONTINUED | OUTPATIENT
Start: 2019-05-06 | End: 2019-05-09 | Stop reason: HOSPADM

## 2019-05-06 RX ORDER — PAROXETINE 10 MG/1
10 TABLET, FILM COATED ORAL DAILY
Status: DISCONTINUED | OUTPATIENT
Start: 2019-05-06 | End: 2019-05-09 | Stop reason: HOSPADM

## 2019-05-06 RX ORDER — ASPIRIN 81 MG/1
81 TABLET ORAL DAILY
Status: DISCONTINUED | OUTPATIENT
Start: 2019-05-06 | End: 2019-05-09 | Stop reason: HOSPADM

## 2019-05-06 RX ORDER — HYDRALAZINE HYDROCHLORIDE 25 MG/1
25 TABLET, FILM COATED ORAL EVERY 8 HOURS PRN
Status: DISCONTINUED | OUTPATIENT
Start: 2019-05-06 | End: 2019-05-09 | Stop reason: HOSPADM

## 2019-05-06 RX ORDER — ROSUVASTATIN CALCIUM 5 MG/1
5 TABLET, COATED ORAL DAILY
Status: DISCONTINUED | OUTPATIENT
Start: 2019-05-06 | End: 2019-05-09 | Stop reason: HOSPADM

## 2019-05-06 RX ORDER — SODIUM CHLORIDE 0.9 % (FLUSH) 0.9 %
10 SYRINGE (ML) INJECTION PRN
Status: DISCONTINUED | OUTPATIENT
Start: 2019-05-06 | End: 2019-05-09 | Stop reason: HOSPADM

## 2019-05-06 RX ORDER — DIAZEPAM 5 MG/1
5 TABLET ORAL EVERY 8 HOURS PRN
Status: DISCONTINUED | OUTPATIENT
Start: 2019-05-06 | End: 2019-05-09 | Stop reason: HOSPADM

## 2019-05-06 RX ORDER — SODIUM CHLORIDE 0.9 % (FLUSH) 0.9 %
10 SYRINGE (ML) INJECTION EVERY 12 HOURS SCHEDULED
Status: DISCONTINUED | OUTPATIENT
Start: 2019-05-06 | End: 2019-05-09 | Stop reason: HOSPADM

## 2019-05-06 RX ORDER — ONDANSETRON 2 MG/ML
4 INJECTION INTRAMUSCULAR; INTRAVENOUS EVERY 6 HOURS PRN
Status: DISCONTINUED | OUTPATIENT
Start: 2019-05-06 | End: 2019-05-09 | Stop reason: HOSPADM

## 2019-05-06 RX ORDER — POTASSIUM CHLORIDE 7.45 MG/ML
10 INJECTION INTRAVENOUS PRN
Status: DISCONTINUED | OUTPATIENT
Start: 2019-05-06 | End: 2019-05-09 | Stop reason: HOSPADM

## 2019-05-06 RX ORDER — POTASSIUM CHLORIDE 20 MEQ/1
20 TABLET, EXTENDED RELEASE ORAL 3 TIMES DAILY
Status: DISCONTINUED | OUTPATIENT
Start: 2019-05-06 | End: 2019-05-09 | Stop reason: HOSPADM

## 2019-05-06 RX ORDER — FOLIC ACID 1 MG/1
1 TABLET ORAL DAILY
Status: DISCONTINUED | OUTPATIENT
Start: 2019-05-06 | End: 2019-05-09 | Stop reason: HOSPADM

## 2019-05-06 RX ADMIN — FOLIC ACID 1 MG: 1 TABLET ORAL at 08:50

## 2019-05-06 RX ADMIN — POTASSIUM CHLORIDE 20 MEQ: 20 TABLET, EXTENDED RELEASE ORAL at 14:16

## 2019-05-06 RX ADMIN — Medication 10 ML: at 08:51

## 2019-05-06 RX ADMIN — PAROXETINE HYDROCHLORIDE 10 MG: 10 TABLET, FILM COATED ORAL at 09:40

## 2019-05-06 RX ADMIN — DIAZEPAM 5 MG: 5 TABLET ORAL at 00:29

## 2019-05-06 RX ADMIN — VITAMIN D, TAB 1000IU (100/BT) 1000 UNITS: 25 TAB at 08:50

## 2019-05-06 RX ADMIN — POTASSIUM CHLORIDE 20 MEQ: 20 TABLET, EXTENDED RELEASE ORAL at 21:11

## 2019-05-06 RX ADMIN — ENOXAPARIN SODIUM 40 MG: 40 INJECTION SUBCUTANEOUS at 08:50

## 2019-05-06 RX ADMIN — AMLODIPINE BESYLATE 5 MG: 5 TABLET ORAL at 08:50

## 2019-05-06 RX ADMIN — POTASSIUM CHLORIDE 20 MEQ: 20 TABLET, EXTENDED RELEASE ORAL at 08:50

## 2019-05-06 RX ADMIN — Medication 10 ML: at 21:11

## 2019-05-06 RX ADMIN — ROSUVASTATIN CALCIUM 5 MG: 5 TABLET, FILM COATED ORAL at 22:16

## 2019-05-06 RX ADMIN — DIAZEPAM 5 MG: 5 TABLET ORAL at 14:17

## 2019-05-06 RX ADMIN — DIAZEPAM 5 MG: 5 TABLET ORAL at 22:17

## 2019-05-06 RX ADMIN — ASPIRIN 81 MG: 81 TABLET, COATED ORAL at 08:50

## 2019-05-06 ASSESSMENT — PAIN SCALES - GENERAL: PAINLEVEL_OUTOF10: 0

## 2019-05-06 NOTE — PROGRESS NOTES
radiographically   showing no significant interval changes. 2. Unchanged borderline cardiomegaly. Micro: No results for input(s): BC in the last 72 hours. No results for input(s): Ivy Alu in the last 72 hours. No results for input(s): ORG in the last 72 hours. No results for input(s): [address] in the last 72 hours. Recent Labs     05/05/19  1810   LABURIN <10,000 CFU/mL  Gram positive organism        No results for input(s): CULTRESP in the last 72 hours.      REVIEW OF SYSTEMS:  Gen: Negative for nausea, vomiting, diarrhea, fever, chills, night sweats  HEENT: Negative for double vision, blurred vision, sore throat   Heart: Negative for HTN, palpitations, chest pain  Lungs: Negative for wheezes, asthma or SOB  GI: Negative for nausea, vomiting  : Negative for dysuria, hematuria  Endo: Negative for diabetes, thyroid disorders  Heme: Negative for DVT or bleeding disorders  Psych: Negative for Depression or anxiety  Ortho: Negative for pain in the joints, arthritis or gout  Neuro: Positive for tremors  Objective:   Vitals: BP (!) 123/57   Pulse (!) 46   Temp 97.6 °F (36.4 °C)   Resp 16   Ht 5' 4\" (1.626 m)   Wt 171 lb 6.4 oz (77.7 kg)   SpO2 96%   BMI 29.42 kg/m²   General Appearance:    Alert, cooperative, no distress, appears stated age   Head:    Normocephalic, without obvious abnormality, atraumatic   Eyes:    PERRL, conjunctiva/corneas clear, EOM's intact, fundi     benign, both eyes        Ears:    Normal TM's and external ear canals, both ears   Nose:   Nares normal, septum midline, mucosa normal, no         drainage  or sinus tenderness   Throat:   Lips, mucosa, and tongue normal; teeth and gums normal   Neck:   Supple, symmetrical, trachea midline, no adenopathy;        thyroid:  No enlargement/tenderness/nodules; no carotid    bruit or JVD   Back:     Symmetric, no curvature, ROM normal, no CVA tenderness   Lungs:     Clear to auscultation bilaterally, respirations unlabored Chest wall:    No tenderness or deformity   Heart:    Regular rate and rhythm,Bradycardic, S1 and S2 normal, no murmur,       rub or gallop   Abdomen:     Soft, non-tender, bowel sounds active all four quadrants,     no masses, no organomegaly   Genitalia:     Extremities:   Extremities normal, atraumatic, no cyanosis or edema   Pulses:   2+ and symmetric all extremities   Skin:   Skin color, texture, turgor normal, no rashes or lesions   Lymph nodes:   Cervical, supraclavicular, and axillary nodes normal   Neurologic:   CNII-XII intact. Normal strength, sensation and reflexes       throughout     Assessment:   Patient Active Problem List:    Bradycardia    Hypertension     Anxiety    Depression    Essential Tremors        Plan:   1. Admitted to Telemetry  2. Consulted Dr. Mariza Fortune  3. D/C Beta blockers  4. Rest of medications as per order sheet  5.  Monitor Heart Rate     Code Status: Full code  DVT prophylaxis: Lovenox 40mg once a day subcute                Karl Armstrong MD

## 2019-05-06 NOTE — PROGRESS NOTES
Physical Therapy      Room #:  0624/0624-02  Patient Name: Chris Short    PHYSICAL THERAPY INITIAL EVALUATION    Plan of care: No skilled needs identified; will discharge from services. If condition changes, please reorder physical therapy. Placement recommendation: home   Equipment recommendation:  None        AM-PAC Basic Mobility        AM-PAC Mobility Inpatient   How much difficulty turning over in bed?: None  How much difficulty sitting down on / standing up from a chair with arms?: None  How much difficulty moving from lying on back to sitting on side of bed?: None  How much help from another person moving to and from a bed to a chair?: None  How much help from another person needed to walk in hospital room?: None  How much help from another person for climbing 3-5 steps with a railing?: None  AM-PAC Inpatient Mobility Raw Score : 24  AM-PAC Inpatient T-Scale Score : 61.14  Mobility Inpatient CMS 0-100% Score: 0  Mobility Inpatient CMS G-Code Modifier : CH     Order:  EVALUATE AND TREAT  Diagnosis/Problem list:       Patient Active Problem List   Diagnosis    Cerebral atherosclerosis    Hypercholesterolemia    Right cataract    Bradycardia       Past medical history:       Diagnosis Date    Arthritis     Bladder dysfunction     Bowel dysfunction     HTN (hypertension)     Ulcer    ;      Procedure Laterality Date    BRAIN ANEURYSM SURGERY  1993    CATARACT REMOVAL WITH IMPLANT Right 11/22/2016    COLONOSCOPY      ENDOSCOPY, COLON, DIAGNOSTIC      HYSTERECTOMY      NERVE SURGERY      pinched nerve in left arm, had surgery       The admitting diagnosis and active problem list as listed above have been reviewed prior to the initiation of this evaluation.     Precautions: falls ,   Social history: Patient lives with significant other  in a condo  with 2 steps to enter with Rail     Prior Level of Function: Patient ambulated independently    Equipment owned: ,  none    Mentation: alert, cooperative, oriented x 3 and follows directions,      ROM: wfl    STRENGTH: wfl    PAIN: (measured on a visual analog scale with 0=no pain and 10=excruciating pain) 0/10. FUNCTIONAL ASSESSMENT   Bed Mobility- Supine to sit- Independent      Scooting-Independent     Sit to supine- Independent    Transfers-Sit to stand-  Independent      Gait:  Patient ambulated 100 feet x 2 using no device with  Independent      Steps:  5 steps with rail independent       Balance: sit-good       stand good       Edema: no  Endurance: good     Treatment: Therapist educated and facilitated patient on techniques to increase safety and independence with bed mobility, balance, functional transfers, and functional mobility. Sat EOB x 5 minutes to increase dynamic sitting balance and activity tolerance. Patient ambulated in rodriguez independent  . Patient demonstrating good understanding of education/techniques. At end of session, patient in bed with   call light and phone within reach,   all lines and tubes I ntact, nursing notified. Rehab Potential: good     Patients Goal: home       ASSESSMENT  No PT needs at this time. Nursing to ambulate patient every shift.        Amie Singh, PT

## 2019-05-06 NOTE — PROGRESS NOTES
Spoke to Dr Margy Calderon again for admission orders. Offered to put them in for him. He said he would do the orders. Patient is becoming frustrated about missing her medications.

## 2019-05-06 NOTE — PROGRESS NOTES
Occupational Therapy  Occupational Therapy Initial Assessment      Date:2019  Patient Name: Sheryl Holman  MRN: [id number]  : 1945  Room: [address] [place] [address]      Evaluating OT: HARISH Coffman/ [facility] #[id number]      Recommendation for Placement: Home   Recommended Adaptive Equipment: TBD   AM-PAC Daily Activity Raw Score:         Diagnosis: Bradycardia     Pertinent Medical History:    Past Medical History:   Diagnosis Date    Arthritis     Bladder dysfunction     Bowel dysfunction     HTN (hypertension)     Ulcer       Precautions:  Falls     Home Living: Pt lives with boyfriend in [facility], 1 story, 2 steps to enter with rail, walk-in shower. Equipment owned: walker, cane    Prior Level of Function: Independent with ADLs , Independent with IADLs; ambulated no  Driving: yes  Occupation: retired    Pain Level: 0/10 c/o pain   Cognition: A&O: 4/4; Follows 2 step directions   Memory:  good     Sequencing:  good     Problem solving:  good     Judgement/safety:  good       Functional Assessment:   Initial Eval Status  Date: 19 Treatment Status  Date: Short Term Goals  Treatment frequency: PRN 1-3x/week   Feeding Independent   Independent    Grooming Independent for sink level grooming tasks  Independent    UB Dressing Independent   Independent    LB Dressing Independent to don and doff shoes at [address] [place] [address] Road with the use of grab bars  Independent    Bed Mobility  Supine to sit: Independent   Sit to supine: Independent   Supine to sit:  Independent   Sit to supine: Independent    Functional Transfers Supervision for sit to stand, progressing to no device  Independent    Functional Mobility Independent approx 50ft x2 with no device  Independent    Balance Sitting:     Static:  good    Dynamic:good  Standing: good     Activity Tolerance Good, fatigues easily     Visual/  Perceptual Glasses: yes                   Strength ROM Additional Info:    RUE  4-/5  WFL good  and wfl FMC/dexterity noted during ADL tasks     LUE 4-/5  WFL good  and wfl FMC/dexterity noted during ADL tasks         Hearing:  WFL   Sensation:   No c/o numbness or tingling   Tone:  WFL  Edema: none noted                            Comments/Treatment: Upon arrival, patient supine in bed. OT eval completed. Therapist facilitated: Bed mobility, sitting balance at EOB for 5 minutes with no assist, Pt independently donned shoes at EOB, transfer training with verbal cues for hand placement, static standing balance with supervision progressing to independent, functional mobility independently with no device, supervision with the use of grab bars during toilet transfer, verbal cues for walker sequence and safety, pt returned to bed at end of eval. All needs within reach. Pt would benefit from continued skilled OT to increase safety and independence with completion of ADL/IADL tasks for functional independence and quality of life.     Eval Complexity: Low      Assessment of current deficits   Functional mobility [x]  ADLs [x] Strength [x]  Cognition []  Functional transfers  [x] IADLs [x] Safety Awareness [x]  Endurance [x]  Fine Motor Coordination [] Balance [x] Vision/perception [] Sensation []   Gross Motor Coordination [] ROM [] Delirium []                  Motor Control []    Plan of Care:   ADL retraining [x]   Equipment needs [x]   Neuromuscular re-education [x] Energy Conservation Techniques [x]  Functional Transfer training [x] Patient and/or Family Education [x]  Functional Mobility training [x]  Environmental Modifications [x]  Cognitive re-training []   Compensatory techniques for ADLs [x]  Splinting Needs []   Positioning to improve overall function [x]   Therapeutic Activity [x]  Therapeutic Exercise  [x]  Visual/Perceptual: []    Delirium prevention/treatment  []   Other:  []    Rehab Potential: Good for established goals     Patient / Family Goal: Have pace maker

## 2019-05-06 NOTE — PLAN OF CARE
Problem: Falls - Risk of:  Goal: Will remain free from falls  Description  Will remain free from falls  Outcome: Met This Shift  Goal: Absence of physical injury  Description  Absence of physical injury  Outcome: Met This Shift     Problem: Discharge Planning:  Goal: Discharged to appropriate level of care  Description  Discharged to appropriate level of care  Outcome: Not Met This Shift  Note:   Not ready for discharge at this time

## 2019-05-06 NOTE — PLAN OF CARE
Problem: Discharge Planning:  Goal: Discharged to appropriate level of care  Description  Discharged to appropriate level of care  Outcome: Met This Shift

## 2019-05-06 NOTE — H&P
History and Physical      CHIEF COMPLAINT:  Low heart rate    History of Present Illness: This is a 68year old female patient that was brought into the emergency room with reports of having a low heart rate. She takes her blood pressure and heart rate at home and noticed her heart rate was declining over the course of the day. She denies any symptoms other than she states she had some intermittent chills earlier this week. On arrival to the emergency room her heart rate was at 44. The laboratory studies and chest xray are unremarkable. The EKG shows sinus bradycardia. At this time she is being admitted for evaluation and treatment. REVIEW OF SYSTEMS:  Constitutional: Negative for chills and fever. HENT: Negative for congestion, sinus pain and sore throat. Eyes: Negative for pain and redness. Respiratory: Negative for cough and shortness of breath. Cardiovascular: Negative for chest pain, palpitations and leg swelling. Gastrointestinal: Negative for abdominal pain, constipation, diarrhea, nausea and vomiting. Endocrine: Negative for polyuria. Genitourinary: Negative for difficulty urinating, dysuria, frequency and hematuria. Musculoskeletal: Negative for neck pain. Skin: Negative. Neurological: Negative for dizziness, weakness, light-headedness and headaches. Hematological: Negative. Psychiatric/Behavioral: Negative for agitation and confusion.          PMH:  Past Medical History:   Diagnosis Date    Arthritis     Bladder dysfunction     Bowel dysfunction     HTN (hypertension)     Ulcer        Surgical History:  Past Surgical History:   Procedure Laterality Date    BRAIN ANEURYSM SURGERY  1993    CATARACT REMOVAL WITH IMPLANT Right 11/22/2016    COLONOSCOPY      ENDOSCOPY, COLON, DIAGNOSTIC      HYSTERECTOMY      NERVE SURGERY      pinched nerve in left arm, had surgery       Medications Prior to Admission:    Prior to Admission medications    Medication Sig Start light. Conjunctivae and EOM are normal.   Neck: Normal range of motion. No JVD present. Cardiovascular: Regular rhythm, normal heart sounds, intact distal pulses and normal pulses. Tachycardia present. No murmur heard. Pulmonary/Chest: Effort normal and breath sounds normal. No respiratory distress. She has no wheezes. Abdominal: Soft. Bowel sounds are normal. She exhibits no distension and no mass. There is no tenderness. There is no guarding. Musculoskeletal: She exhibits no edema. Neurological: She is oriented to person, place, and time. Skin: Skin is warm and dry. Psychiatric: She has a normal mood and affect.      Labs:        Results for orders placed or performed during the hospital encounter of 05/05/19   CBC Auto Differential   Result Value Ref Range     WBC 6.0 4.5 - 11.5 E9/L     RBC 4.57 3.50 - 5.50 E12/L     Hemoglobin 14.4 11.5 - 15.5 g/dL     Hematocrit 42.6 34.0 - 48.0 %     MCV 93.2 80.0 - 99.9 fL     MCH 31.5 26.0 - 35.0 pg     MCHC 33.8 32.0 - 34.5 %     RDW 12.0 11.5 - 15.0 fL     Platelets 932 860 - 884 E9/L     MPV 10.6 7.0 - 12.0 fL     Neutrophils % 50.0 43.0 - 80.0 %     Immature Granulocytes % 0.2 0.0 - 5.0 %     Lymphocytes % 39.7 20.0 - 42.0 %     Monocytes % 7.3 2.0 - 12.0 %     Eosinophils % 1.8 0.0 - 6.0 %     Basophils % 1.0 0.0 - 2.0 %     Neutrophils # 2.99 1.80 - 7.30 E9/L     Immature Granulocytes # 0.01 E9/L     Lymphocytes # 2.38 1.50 - 4.00 E9/L     Monocytes # 0.44 0.10 - 0.95 E9/L     Eosinophils # 0.11 0.05 - 0.50 E9/L     Basophils # 0.06 0.00 - 0.20 E9/L   Comprehensive Metabolic Panel   Result Value Ref Range     Sodium 139 132 - 146 mmol/L     Potassium 4.2 3.5 - 5.0 mmol/L     Chloride 104 98 - 107 mmol/L     CO2 25 22 - 29 mmol/L     Anion Gap 10 7 - 16 mmol/L     Glucose 106 (H) 74 - 99 mg/dL     BUN 15 8 - 23 mg/dL     CREATININE 0.7 0.5 - 1.0 mg/dL     GFR Non-African American >60 >=60 mL/min/1.73     GFR African American >60       Calcium 9.6 8.6 - 10.2 mg/dL     Total Protein 7.4 6.4 - 8.3 g/dL     Alb 4.5 3.5 - 5.2 g/dL     Total Bilirubin 1.0 0.0 - 1.2 mg/dL     Alkaline Phosphatase 65 35 - 104 U/L     ALT 9 0 - 32 U/L     AST 13 0 - 31 U/L   Troponin   Result Value Ref Range     Troponin <0.01 0.00 - 0.03 ng/mL   Urinalysis   Result Value Ref Range     Color, UA Yellow Straw/Yellow     Clarity, UA Clear Clear     Glucose, Ur Negative Negative mg/dL     Bilirubin Urine Negative Negative     Ketones, Urine Negative Negative mg/dL     Specific Gravity, UA <=1.005 1.005 - 1.030     Blood, Urine Negative Negative     pH, UA 6.0 5.0 - 9.0     Protein, UA Negative Negative mg/dL     Urobilinogen, Urine 0.2 <2.0 E.U./dL     Nitrite, Urine Negative Negative     Leukocyte Esterase, Urine Negative Negative   TSH without Reflex   Result Value Ref Range     TSH 1.740 0.270 - 4.200 uIU/mL   Brain Natriuretic Peptide   Result Value Ref Range     Pro- 0 - 125 pg/mL       Radiology: Xr Chest Portable    Result Date: 5/5/2019  LOCATION: 100 CHEST Clinical indication: Bradycardia, smoker, hypertension. Comparison was made with previous study from September 8, 2018. TECHNIQUE; The portable single frontal view of the chest was obtained in upright position. FINDINGS; The image shows lung fields and CP angles are clear with no acute infiltrate or focal consolidation. No congestion or effusion. The trachea is midline. No pneumothorax. No significant focal pleural pathology. The cardiomediastinal shadows redemonstrate borderline cardiomegaly with no significant hilar adenopathy. 1. No evidence of acute cardiopulmonary process radiographically showing no significant interval changes. 2. Unchanged borderline cardiomegaly. ASSESSMENT:      Active Problems:    Bradycardia    Hypertension     Anxiety    Depression    Essential Tremors        PLAN:    1. Admit to Telemetry  2. Consult Dr. Korin Walsh  3. D/C Beta blockers  4.  Rest of medications as per order sheet  5.  Monitor Heart Rate    Code Status: Full code  DVT prophylaxis: Lovenox 40mg once a day subcute      Electronically signed by Carlos Hernandez MD on 5/6/2019 at 9:05 AM

## 2019-05-07 LAB
BASOPHILS ABSOLUTE: 0.06 E9/L (ref 0–0.2)
BASOPHILS RELATIVE PERCENT: 1 % (ref 0–2)
EOSINOPHILS ABSOLUTE: 0.11 E9/L (ref 0.05–0.5)
EOSINOPHILS RELATIVE PERCENT: 1.8 % (ref 0–6)
HCT VFR BLD CALC: 42.1 % (ref 34–48)
HEMOGLOBIN: 14.5 G/DL (ref 11.5–15.5)
IMMATURE GRANULOCYTES #: 0.01 E9/L
IMMATURE GRANULOCYTES %: 0.2 % (ref 0–5)
LYMPHOCYTES ABSOLUTE: 3.18 E9/L (ref 1.5–4)
LYMPHOCYTES RELATIVE PERCENT: 50.9 % (ref 20–42)
MCH RBC QN AUTO: 31.9 PG (ref 26–35)
MCHC RBC AUTO-ENTMCNC: 34.4 % (ref 32–34.5)
MCV RBC AUTO: 92.5 FL (ref 80–99.9)
MONOCYTES ABSOLUTE: 0.51 E9/L (ref 0.1–0.95)
MONOCYTES RELATIVE PERCENT: 8.2 % (ref 2–12)
NEUTROPHILS ABSOLUTE: 2.38 E9/L (ref 1.8–7.3)
NEUTROPHILS RELATIVE PERCENT: 37.9 % (ref 43–80)
PDW BLD-RTO: 12 FL (ref 11.5–15)
PLATELET # BLD: 183 E9/L (ref 130–450)
PMV BLD AUTO: 10.3 FL (ref 7–12)
RBC # BLD: 4.55 E12/L (ref 3.5–5.5)
WBC # BLD: 6.3 E9/L (ref 4.5–11.5)

## 2019-05-07 PROCEDURE — 85025 COMPLETE CBC W/AUTO DIFF WBC: CPT

## 2019-05-07 PROCEDURE — 6370000000 HC RX 637 (ALT 250 FOR IP): Performed by: INTERNAL MEDICINE

## 2019-05-07 PROCEDURE — 6360000002 HC RX W HCPCS: Performed by: INTERNAL MEDICINE

## 2019-05-07 PROCEDURE — 2060000000 HC ICU INTERMEDIATE R&B

## 2019-05-07 PROCEDURE — 36415 COLL VENOUS BLD VENIPUNCTURE: CPT

## 2019-05-07 PROCEDURE — 2580000003 HC RX 258: Performed by: INTERNAL MEDICINE

## 2019-05-07 RX ADMIN — POTASSIUM CHLORIDE 20 MEQ: 20 TABLET, EXTENDED RELEASE ORAL at 08:12

## 2019-05-07 RX ADMIN — ENOXAPARIN SODIUM 40 MG: 40 INJECTION SUBCUTANEOUS at 08:11

## 2019-05-07 RX ADMIN — DIAZEPAM 5 MG: 5 TABLET ORAL at 12:34

## 2019-05-07 RX ADMIN — Medication 10 ML: at 20:19

## 2019-05-07 RX ADMIN — DIAZEPAM 5 MG: 5 TABLET ORAL at 20:19

## 2019-05-07 RX ADMIN — ROSUVASTATIN CALCIUM 5 MG: 5 TABLET, FILM COATED ORAL at 20:19

## 2019-05-07 RX ADMIN — AMLODIPINE BESYLATE 5 MG: 5 TABLET ORAL at 08:11

## 2019-05-07 RX ADMIN — POTASSIUM CHLORIDE 20 MEQ: 20 TABLET, EXTENDED RELEASE ORAL at 20:19

## 2019-05-07 RX ADMIN — POTASSIUM CHLORIDE 20 MEQ: 20 TABLET, EXTENDED RELEASE ORAL at 13:30

## 2019-05-07 RX ADMIN — PAROXETINE HYDROCHLORIDE 10 MG: 10 TABLET, FILM COATED ORAL at 08:12

## 2019-05-07 RX ADMIN — VITAMIN D, TAB 1000IU (100/BT) 1000 UNITS: 25 TAB at 08:12

## 2019-05-07 RX ADMIN — Medication 10 ML: at 08:11

## 2019-05-07 RX ADMIN — ASPIRIN 81 MG: 81 TABLET, COATED ORAL at 08:12

## 2019-05-07 RX ADMIN — FOLIC ACID 1 MG: 1 TABLET ORAL at 08:11

## 2019-05-07 ASSESSMENT — PAIN SCALES - GENERAL: PAINLEVEL_OUTOF10: 0

## 2019-05-07 NOTE — PROGRESS NOTES
Hospitalist Progress Note  5/7/2019 2:54 PM  Subjective:   Admit Date: 5/5/2019 PCP: Flavio Godinez MD  Interval History: Patient awake alert hemodynamically stable no chest pain or shortness of breath she had a lot of questions regarding a stopping propranolol I explained in detail that with bradycardia and we cannot give beta blockers. 5/7/19 Had a long discussion with the Patient regarding the need for pacemaker and what it does and why she cannot take the Inderal at this time for the tremors also discussed the case with Dr. Zita Stratton. Pain is:None  Nausea:None  Bowel Movement/Flatus yes  Data:   Scheduled Meds:   amLODIPine  5 mg Oral Daily    aspirin  81 mg Oral Daily    vitamin D  1,000 Units Oral Daily    folic acid  1 mg Oral Daily    PARoxetine  10 mg Oral Daily    potassium chloride  20 mEq Oral TID    rosuvastatin  5 mg Oral Daily    sodium chloride flush  10 mL Intravenous 2 times per day    enoxaparin  40 mg Subcutaneous Daily     Continuous Infusions:  PRN Meds:diazepam, sodium chloride flush, potassium chloride, magnesium hydroxide, ondansetron, hydrALAZINE  I/O last 3 completed shifts: In: 300 [P.O.:300]  Out: 0   No intake/output data recorded. Intake/Output Summary (Last 24 hours) at 5/7/2019 1454  Last data filed at 5/7/2019 0523  Gross per 24 hour   Intake 60 ml   Output --   Net 60 ml     CBC:   Recent Labs     05/05/19 1755 05/07/19  0532   WBC 6.0 6.3   HGB 14.4 14.5    183     BMP:   Recent Labs     05/05/19  1755      K 4.2      CO2 25   BUN 15   CREATININE 0.7   GLUCOSE 106*     Hepatic:   Recent Labs     05/05/19  1755   AST 13   ALT 9   BILITOT 1.0   ALKPHOS 65     Troponin:   Recent Labs     05/05/19 1755   TROPONINI <0.01     BNP: No results for input(s): BNP in the last 72 hours. Lipids: No results for input(s): CHOL, HDL in the last 72 hours.     Invalid input(s): LDLCALCU  ABGs: No results found for: PHART, PO2ART, WHG2GZE  INR: No results for input(s): INR in the last 72 hours. -----------------------------------------------------------------  RAD:   XR CHEST PORTABLE   Final Result      1. No evidence of acute cardiopulmonary process radiographically   showing no significant interval changes. 2. Unchanged borderline cardiomegaly. Micro: No results for input(s): BC in the last 72 hours. No results for input(s): David Coventry in the last 72 hours. No results for input(s): ORG in the last 72 hours. No results for input(s): [address] in the last 72 hours. Recent Labs     05/05/19  1810   LABURIN <10,000 CFU/mL  Gram positive organism  <10,000 CFU/mL  Gram positive organism        No results for input(s): CULTRESP in the last 72 hours.      REVIEW OF SYSTEMS:  Gen: Negative for nausea, vomiting, diarrhea, fever, chills, night sweats  HEENT: Negative for double vision, blurred vision, sore throat   Heart: Negative for HTN, palpitations, chest pain  Lungs: Negative for wheezes, asthma or SOB  GI: Negative for nausea, vomiting  : Negative for dysuria, hematuria  Endo: Negative for diabetes, thyroid disorders  Heme: Negative for DVT or bleeding disorders  Psych: Negative for Depression or anxiety  Ortho: Negative for pain in the joints, arthritis or gout  Neuro: Positive for tremors  Objective:   Vitals: BP (!) 143/88   Pulse 62   Temp 97.7 °F (36.5 °C) (Oral)   Resp 19   Ht 5' 4\" (1.626 m)   Wt 172 lb 4.8 oz (78.2 kg)   SpO2 97%   BMI 29.58 kg/m²   General Appearance:    Alert, cooperative, no distress, appears stated age   Head:    Normocephalic, without obvious abnormality, atraumatic   Eyes:    PERRL, conjunctiva/corneas clear, EOM's intact, fundi     benign, both eyes        Ears:    Normal TM's and external ear canals, both ears   Nose:   Nares normal, septum midline, mucosa normal, no         drainage  or sinus tenderness   Throat:   Lips, mucosa, and tongue normal; teeth and gums normal   Neck:   Supple, symmetrical, trachea midline, no adenopathy;        thyroid:  No enlargement/tenderness/nodules; no carotid    bruit or JVD   Back:     Symmetric, no curvature, ROM normal, no CVA tenderness   Lungs:     Clear to auscultation bilaterally, respirations unlabored   Chest wall:    No tenderness or deformity   Heart:    Regular rate and rhythm,Bradycardic, S1 and S2 normal, no murmur,       rub or gallop   Abdomen:     Soft, non-tender, bowel sounds active all four quadrants,     no masses, no organomegaly   Genitalia:     Extremities:   Extremities normal, atraumatic, no cyanosis or edema   Pulses:   2+ and symmetric all extremities   Skin:   Skin color, texture, turgor normal, no rashes or lesions   Lymph nodes:   Cervical, supraclavicular, and axillary nodes normal   Neurologic:   CNII-XII intact. Normal strength, sensation and reflexes       throughout     Assessment:   Patient Active Problem List:    Bradycardia    Hypertension     Anxiety    Depression    Essential Tremors        Plan:   1. Admitted to Telemetry  2. Consulted Dr. Daniel Cota  3. D/C Beta blockers  4. Rest of medications as per order sheet  5. Monitor Heart Rate  6.  Discussed regarding the need for pacemaker patient was agreeable     Code Status: Full code  DVT prophylaxis: Lovenox 40mg once a day subcute                Rishi Zhou MD

## 2019-05-07 NOTE — FLOWSHEET NOTE
05/07/19 1746   Provider Notification   Reason for Communication Evaluate  (inquiring pt & family state pace maker insert shaina.  per  )   Provider Name Dr Bridgette Reeder   Provider Notification Physician   Method of Communication Other (Comment)  (message on cell to call back)

## 2019-05-07 NOTE — PROGRESS NOTES
RDW 12.0 05/07/2019     05/07/2019       ASSESSMENT    1-SSS / Symptomatic sinus bradycardia    Please refer to my consult. Patient wants to stay on Inderal in view of severe tremor in her hand. She wants to proceed with pacemaker understanding all possible complications of pacemaker like death,pneumothorax ,pacer site infection or hematoma. PLAN  As per orders.

## 2019-05-07 NOTE — CARE COORDINATION
SOCIAL WORK / DISCHARGE PLANNING:  Sw met with pt at bedside to discuss transition to care. Pt resides with sig other in 1 story [address]. Pt reports independent with all ADLS/IADLS. No dme. Denies HHC/FRANCOISE/rehab. Pt plans to return home as previous. No dc needs identified or requested at this time.  Sw will follow, assist prn                  Electronically signed by CARLOS ALBERTO Serna Aas on 5/7/2019 at 3:11 PM

## 2019-05-07 NOTE — CONSULTS
[address]                                  CONSULTATION    PATIENT NAME: Soha Valdes                 :        1945  MED REC NO:   [id number]                            ROOM:       4818  ACCOUNT NO:   [de-identified]                           ADMIT DATE: 2019  PROVIDER:     Helena Sloan MD    CONSULT DATE:  2019    HISTORY OF PRESENT ILLNESS:  The patient is a 71-year-old lady who I saw  in the past.    She has a long history of smoking. She had cardiac catheterization in  by Dr. Sae Guidry with patent  coronary vessels. She was admitted to [facility] in 2017 with  chest pain and her coronary CTA showed findings consistent with moderate  50% stenosis of the mid segment of the left anterior descending vessel  and the plan at that time was risk factor modification. She has essential tremor and she was seen by neurologist at the  [facility]. She was kept on propranolol. She presented to the hospital yesterday with bradycardia. She stated  that she checks her pulse at home. She noticed episodes of bradycardia  with heart rate sometimes dropping to about 38 a minute. She has baseline fatigue and dyspnea with activities. However, she  thought she was more tired than usual.    Her EKG in ER showed sinus bradycardia with heart rate around 43 a  minute. She was admitted by Dr. Rollo Bamberger. Cardiac consult was requested. The patient was lying semi-upright in bed when I came to see her. She  did not appear in acute distress. She looked tired. PAST MEDICAL HISTORY:  As above plus history of essential tremor. History of hypertension. Arthritis. Hysterectomy. HABITS:  The patient has long history of smoking, even though she cut  down to less than half pack a day recently. She was never a heavy  alcohol drinker.     MEDICATIONS:  Prior to admission, according to the chart, the patient is  on,  1. Inderal 20 mg twice a day. 2.  Clonidine 0.1 mg every 8 hours as needed. 3.  Potassium. 4.  Amlodipine. 5.  Diazepam.  6.  Aspirin. 7.  Vitamin. 8.  Crestor. 9.  Folic acid. 10.  Paxil. REVIEW OF SYSTEMS:  CONSTITUTIONAL:  No fever or chills. HEENT:  No  nosebleed. No hearing problem. No double vision. No stridor. No  hoarseness of the voice. CARDIAC:  No chest pain. PULMONARY:  The  patient gets short of breath easily with activities. GASTROENTEROLOGY:   No abdominal pain. No vomiting. No diarrhea. GENITOURINARY:  No  dysuria or frequency. No bladder or kidney tumor. NEURO:  No history  of seizure or stroke. No syncope. Essential tremor. HEMATOLOGY:  No  bleeding disorder. No adenopathy. ENDOCRINOLOGY:  No polyuria or  polydipsia. SKIN:  No skin disorder. MUSCULOSKELETAL:  The patient has  arthritis. PSYCH:  The patient takes antidepressant medication. PHYSICAL EXAMINATION:  GENERAL:  The patient was lying semi-upright, in no acute distress. VITAL SIGNS:  Blood pressure 130/60, pulse 50, temperature 98.2. Heart  rate was 42 a minute last night. NECK:  No JVD. HEART:  Regular S1 and S2. No S3.  1/6 systolic murmur heard best at  the left sternal border. LUNGS:  Mildly diminished breath sounds in the bases. No rales or  wheezing. ABDOMEN:  Soft, nontender. EXTREMITIES:  No edema, cyanosis or clubbing. Palpable pulses in feet. NEURO:  Alert and awake with no focal deficits. DIAGNOSTIC DATA:  EKG on arrival to ER is showing sinus bradycardia with  heart rate of 43 a minute and nonspecific T-wave changes. Chest x-ray is unremarkable. LABORATORY DATA:  WBC 6, hemoglobin 14.4, platelet count 902. Sodium  139, potassium 4.2. BUN 15, creatinine 0.7. Troponin is negative. IMPRESSION:  The patient has problem with sinus bradycardia.     She has baseline fatigue and dyspnea with exertion that was felt to be  related to smoking and essential tremor. She thought her symptoms of  fatigue are worse lately. She appears to have symptomatic sinus bradycardia. That can be  triggered by the Inderal that she takes for her essential tremor. Inderal was stopped and her heart rate is better. The patient stated that she needs Inderal for her essential tremor. She  was started on that by her Neurologist.    I discussed with her the option of permanent pacemaker in view of her  sinus bradycardia, so we _____ she is not on Inderal that she feels she  needs. I discussed other possible complications of pacemaker like  death, pneumothorax, pacemaker site infection or hematoma. She is going  to think about it with her family. I have discussed the case with her family as well.     Thank you for the referral.        Mary Ellen Howe MD    D: 05/06/2019 18:05:36       T: 05/06/2019 18:10:01     [email or web address]  Job#: [id number]     Doc#: [id number]    CC:

## 2019-05-08 ENCOUNTER — ANESTHESIA (OUTPATIENT)
Dept: OPERATING ROOM | Age: 74
DRG: 244 | End: 2019-05-08
Payer: MEDICARE

## 2019-05-08 ENCOUNTER — APPOINTMENT (OUTPATIENT)
Dept: GENERAL RADIOLOGY | Age: 74
DRG: 244 | End: 2019-05-08
Payer: MEDICARE

## 2019-05-08 ENCOUNTER — ANESTHESIA EVENT (OUTPATIENT)
Dept: OPERATING ROOM | Age: 74
DRG: 244 | End: 2019-05-08
Payer: MEDICARE

## 2019-05-08 VITALS
RESPIRATION RATE: 13 BRPM | SYSTOLIC BLOOD PRESSURE: 113 MMHG | DIASTOLIC BLOOD PRESSURE: 69 MMHG | OXYGEN SATURATION: 99 %

## 2019-05-08 LAB — URINE CULTURE, ROUTINE: NORMAL

## 2019-05-08 PROCEDURE — 0JH606Z INSERTION OF PACEMAKER, DUAL CHAMBER INTO CHEST SUBCUTANEOUS TISSUE AND FASCIA, OPEN APPROACH: ICD-10-PCS | Performed by: SPECIALIST

## 2019-05-08 PROCEDURE — 3700000000 HC ANESTHESIA ATTENDED CARE: Performed by: SPECIALIST

## 2019-05-08 PROCEDURE — 2060000000 HC ICU INTERMEDIATE R&B

## 2019-05-08 PROCEDURE — 3600000012 HC SURGERY LEVEL 2 ADDTL 15MIN: Performed by: SPECIALIST

## 2019-05-08 PROCEDURE — 3600000002 HC SURGERY LEVEL 2 BASE: Performed by: SPECIALIST

## 2019-05-08 PROCEDURE — 7100000001 HC PACU RECOVERY - ADDTL 15 MIN: Performed by: SPECIALIST

## 2019-05-08 PROCEDURE — 3209999900 FLUORO FOR SURGICAL PROCEDURES

## 2019-05-08 PROCEDURE — 6360000002 HC RX W HCPCS

## 2019-05-08 PROCEDURE — 6360000002 HC RX W HCPCS: Performed by: NURSE ANESTHETIST, CERTIFIED REGISTERED

## 2019-05-08 PROCEDURE — 7100000000 HC PACU RECOVERY - FIRST 15 MIN: Performed by: SPECIALIST

## 2019-05-08 PROCEDURE — 2500000003 HC RX 250 WO HCPCS: Performed by: SPECIALIST

## 2019-05-08 PROCEDURE — 02H63JZ INSERTION OF PACEMAKER LEAD INTO RIGHT ATRIUM, PERCUTANEOUS APPROACH: ICD-10-PCS | Performed by: SPECIALIST

## 2019-05-08 PROCEDURE — 6360000002 HC RX W HCPCS: Performed by: INTERNAL MEDICINE

## 2019-05-08 PROCEDURE — C1785 PMKR, DUAL, RATE-RESP: HCPCS | Performed by: SPECIALIST

## 2019-05-08 PROCEDURE — C1898 LEAD, PMKR, OTHER THAN TRANS: HCPCS | Performed by: SPECIALIST

## 2019-05-08 PROCEDURE — 6370000000 HC RX 637 (ALT 250 FOR IP): Performed by: SPECIALIST

## 2019-05-08 PROCEDURE — 2580000003 HC RX 258: Performed by: INTERNAL MEDICINE

## 2019-05-08 PROCEDURE — 02HK3JZ INSERTION OF PACEMAKER LEAD INTO RIGHT VENTRICLE, PERCUTANEOUS APPROACH: ICD-10-PCS | Performed by: SPECIALIST

## 2019-05-08 PROCEDURE — 3700000001 HC ADD 15 MINUTES (ANESTHESIA): Performed by: SPECIALIST

## 2019-05-08 PROCEDURE — 2580000003 HC RX 258: Performed by: NURSE ANESTHETIST, CERTIFIED REGISTERED

## 2019-05-08 PROCEDURE — 6370000000 HC RX 637 (ALT 250 FOR IP): Performed by: INTERNAL MEDICINE

## 2019-05-08 PROCEDURE — 2709999900 HC NON-CHARGEABLE SUPPLY: Performed by: SPECIALIST

## 2019-05-08 DEVICE — LEAD PACE 6FR L46CM 10MM SPACE TI NITRIDE PLAT IRIDIUM SIL: Type: IMPLANTABLE DEVICE | Status: FUNCTIONAL

## 2019-05-08 DEVICE — PACEMAKER CARD 20GM 10.4CC W50XH47MM THK6MM 2 CHMBR IS-1: Type: IMPLANTABLE DEVICE | Status: FUNCTIONAL

## 2019-05-08 DEVICE — LEAD PACE 6FR L52CM 10MM SPACE TI NITRIDE PLAT IRIDIUM SIL: Type: IMPLANTABLE DEVICE | Status: FUNCTIONAL

## 2019-05-08 RX ORDER — PROPRANOLOL HYDROCHLORIDE 10 MG/1
20 TABLET ORAL 2 TIMES DAILY
Status: DISCONTINUED | OUTPATIENT
Start: 2019-05-08 | End: 2019-05-09 | Stop reason: HOSPADM

## 2019-05-08 RX ORDER — PROPOFOL 10 MG/ML
INJECTION, EMULSION INTRAVENOUS CONTINUOUS PRN
Status: DISCONTINUED | OUTPATIENT
Start: 2019-05-08 | End: 2019-05-08 | Stop reason: SDUPTHER

## 2019-05-08 RX ORDER — MEPERIDINE HYDROCHLORIDE 50 MG/ML
12.5 INJECTION INTRAMUSCULAR; INTRAVENOUS; SUBCUTANEOUS EVERY 5 MIN PRN
Status: DISCONTINUED | OUTPATIENT
Start: 2019-05-08 | End: 2019-05-08 | Stop reason: HOSPADM

## 2019-05-08 RX ORDER — FENTANYL CITRATE 50 UG/ML
INJECTION, SOLUTION INTRAMUSCULAR; INTRAVENOUS PRN
Status: DISCONTINUED | OUTPATIENT
Start: 2019-05-08 | End: 2019-05-08 | Stop reason: SDUPTHER

## 2019-05-08 RX ORDER — CEFAZOLIN SODIUM 1 G/50ML
SOLUTION INTRAVENOUS PRN
Status: DISCONTINUED | OUTPATIENT
Start: 2019-05-08 | End: 2019-05-08 | Stop reason: SDUPTHER

## 2019-05-08 RX ORDER — LIDOCAINE HYDROCHLORIDE 20 MG/ML
INJECTION, SOLUTION INFILTRATION; PERINEURAL PRN
Status: DISCONTINUED | OUTPATIENT
Start: 2019-05-08 | End: 2019-05-08 | Stop reason: ALTCHOICE

## 2019-05-08 RX ORDER — CEFAZOLIN SODIUM 1 G/3ML
INJECTION, POWDER, FOR SOLUTION INTRAMUSCULAR; INTRAVENOUS
Status: COMPLETED
Start: 2019-05-08 | End: 2019-05-08

## 2019-05-08 RX ORDER — SODIUM CHLORIDE, SODIUM LACTATE, POTASSIUM CHLORIDE, CALCIUM CHLORIDE 600; 310; 30; 20 MG/100ML; MG/100ML; MG/100ML; MG/100ML
INJECTION, SOLUTION INTRAVENOUS CONTINUOUS PRN
Status: DISCONTINUED | OUTPATIENT
Start: 2019-05-08 | End: 2019-05-08 | Stop reason: SDUPTHER

## 2019-05-08 RX ORDER — HYDROMORPHONE HYDROCHLORIDE 1 MG/ML
0.25 INJECTION, SOLUTION INTRAMUSCULAR; INTRAVENOUS; SUBCUTANEOUS EVERY 5 MIN PRN
Status: DISCONTINUED | OUTPATIENT
Start: 2019-05-08 | End: 2019-05-08 | Stop reason: HOSPADM

## 2019-05-08 RX ADMIN — FENTANYL CITRATE 50 MCG: 50 INJECTION, SOLUTION INTRAMUSCULAR; INTRAVENOUS at 16:37

## 2019-05-08 RX ADMIN — CEFAZOLIN SODIUM 1 G: 1 SOLUTION INTRAVENOUS at 16:36

## 2019-05-08 RX ADMIN — ROSUVASTATIN CALCIUM 5 MG: 5 TABLET, FILM COATED ORAL at 20:33

## 2019-05-08 RX ADMIN — Medication 10 ML: at 20:33

## 2019-05-08 RX ADMIN — ASPIRIN 81 MG: 81 TABLET, COATED ORAL at 08:19

## 2019-05-08 RX ADMIN — DIAZEPAM 5 MG: 5 TABLET ORAL at 21:06

## 2019-05-08 RX ADMIN — VITAMIN D, TAB 1000IU (100/BT) 1000 UNITS: 25 TAB at 08:18

## 2019-05-08 RX ADMIN — AMLODIPINE BESYLATE 5 MG: 5 TABLET ORAL at 08:18

## 2019-05-08 RX ADMIN — Medication 10 ML: at 08:19

## 2019-05-08 RX ADMIN — PROPRANOLOL HYDROCHLORIDE 20 MG: 10 TABLET ORAL at 20:36

## 2019-05-08 RX ADMIN — SODIUM CHLORIDE, POTASSIUM CHLORIDE, SODIUM LACTATE AND CALCIUM CHLORIDE: 600; 310; 30; 20 INJECTION, SOLUTION INTRAVENOUS at 16:12

## 2019-05-08 RX ADMIN — ENOXAPARIN SODIUM 40 MG: 40 INJECTION SUBCUTANEOUS at 08:18

## 2019-05-08 RX ADMIN — PROPOFOL 75 MCG/KG/MIN: 10 INJECTION, EMULSION INTRAVENOUS at 16:37

## 2019-05-08 RX ADMIN — POTASSIUM CHLORIDE 20 MEQ: 20 TABLET, EXTENDED RELEASE ORAL at 13:14

## 2019-05-08 RX ADMIN — PAROXETINE HYDROCHLORIDE 10 MG: 10 TABLET, FILM COATED ORAL at 08:23

## 2019-05-08 RX ADMIN — CEFAZOLIN: 1 INJECTION, POWDER, FOR SOLUTION INTRAMUSCULAR; INTRAVENOUS at 21:32

## 2019-05-08 RX ADMIN — POTASSIUM CHLORIDE 20 MEQ: 20 TABLET, EXTENDED RELEASE ORAL at 20:33

## 2019-05-08 RX ADMIN — FENTANYL CITRATE 50 MCG: 50 INJECTION, SOLUTION INTRAMUSCULAR; INTRAVENOUS at 16:45

## 2019-05-08 RX ADMIN — POTASSIUM CHLORIDE 20 MEQ: 20 TABLET, EXTENDED RELEASE ORAL at 08:18

## 2019-05-08 RX ADMIN — FOLIC ACID 1 MG: 1 TABLET ORAL at 08:18

## 2019-05-08 ASSESSMENT — PULMONARY FUNCTION TESTS
PIF_VALUE: 0
PIF_VALUE: 1
PIF_VALUE: 0
PIF_VALUE: 1
PIF_VALUE: 0
PIF_VALUE: 1
PIF_VALUE: 0
PIF_VALUE: 1
PIF_VALUE: 0
PIF_VALUE: 0
PIF_VALUE: 1
PIF_VALUE: 0
PIF_VALUE: 1
PIF_VALUE: 0
PIF_VALUE: 1
PIF_VALUE: 0
PIF_VALUE: 1
PIF_VALUE: 0
PIF_VALUE: 0
PIF_VALUE: 1
PIF_VALUE: 0
PIF_VALUE: 1
PIF_VALUE: 0
PIF_VALUE: 1
PIF_VALUE: 1
PIF_VALUE: 0
PIF_VALUE: 0
PIF_VALUE: 1
PIF_VALUE: 1
PIF_VALUE: 0
PIF_VALUE: 1
PIF_VALUE: 0
PIF_VALUE: 1
PIF_VALUE: 1
PIF_VALUE: 0
PIF_VALUE: 0
PIF_VALUE: 1
PIF_VALUE: 1
PIF_VALUE: 0
PIF_VALUE: 1
PIF_VALUE: 0
PIF_VALUE: 1
PIF_VALUE: 1
PIF_VALUE: 0
PIF_VALUE: 0
PIF_VALUE: 1
PIF_VALUE: 1
PIF_VALUE: 0
PIF_VALUE: 1
PIF_VALUE: 1
PIF_VALUE: 0
PIF_VALUE: 1
PIF_VALUE: 0
PIF_VALUE: 1
PIF_VALUE: 0
PIF_VALUE: 1
PIF_VALUE: 1
PIF_VALUE: 0
PIF_VALUE: 1
PIF_VALUE: 0
PIF_VALUE: 0
PIF_VALUE: 1
PIF_VALUE: 0
PIF_VALUE: 1

## 2019-05-08 ASSESSMENT — PAIN SCALES - GENERAL
PAINLEVEL_OUTOF10: 0

## 2019-05-08 ASSESSMENT — PAIN DESCRIPTION - ORIENTATION: ORIENTATION: LEFT;UPPER

## 2019-05-08 ASSESSMENT — PAIN DESCRIPTION - LOCATION: LOCATION: CHEST

## 2019-05-08 ASSESSMENT — PAIN DESCRIPTION - PAIN TYPE
TYPE: SURGICAL PAIN
TYPE: SURGICAL PAIN

## 2019-05-08 NOTE — PROGRESS NOTES
Spoke with OR at this time- Patient NPO effective now for pacemaker insertion at 1500- Significant other- [facility]

## 2019-05-08 NOTE — PLAN OF CARE
Problem: Discharge Planning:  Goal: Discharged to appropriate level of care  Description  Discharged to appropriate level of care  Outcome: Met This Shift  Note:   Plan DC home     Problem: Falls - Risk of:  Goal: Will remain free from falls  Description  Will remain free from falls  5/8/2019 0912 by Bisi Bernard RN  Outcome: Met This Shift  5/8/2019 0233 by Ngoc Nice RN  Outcome: Met This Shift  Goal: Absence of physical injury  Description  Absence of physical injury  Outcome: Met This Shift  Note:   Falls risk 35

## 2019-05-08 NOTE — ANESTHESIA PRE PROCEDURE
Department of Anesthesiology  Preprocedure Note       Name:  Chidi Lawler   Age:  68 y.o.  :  1945                                          MRN:  [id number]         Date:  2019      Surgeon: Jarad Carrillo):  Felicity Joseph MD    Procedure: PACEMAKER INSERTION ([address]) (N/A )    Medications prior to admission:   Prior to Admission medications    Medication Sig Start Date End Date Taking? Authorizing Provider   propranolol (INDERAL) 20 MG tablet Take 1 tablet by mouth 2 times daily 19   Adri Garcia MD   cloNIDine (CATAPRES) 0.1 MG tablet Take 0.1 mg by mouth every 8 hours as needed PRN for BP > 165/95 18   Historical Provider, MD   potassium chloride (KLOR-CON) 10 MEQ extended release tablet Take 20 mEq by mouth 3 times daily    Historical Provider, MD   amLODIPine (NORVASC) 2.5 MG tablet Take 5 mg by mouth daily     Historical Provider, MD   diazepam (VALIUM) 5 MG tablet Take 5 mg by mouth every 8 hours as needed.  . 18   Historical Provider, MD   aspirin (ECOTRIN LOW STRENGTH) 81 MG EC tablet Take 1 tablet by mouth daily 18   Lila Fragoso APRN - CNP   Cholecalciferol (VITAMIN D3) 1000 units CAPS Take 1 tablet by mouth daily    Historical Provider, MD   rosuvastatin (CRESTOR) 5 MG tablet Take 5 mg by mouth daily    Historical Provider, MD   folic acid (FOLVITE) 1 MG tablet Take 1 mg by mouth daily    Historical Provider, MD   PARoxetine (PAXIL) 10 MG tablet Take 10 mg by mouth daily     Historical Provider, MD       Current medications:    Current Facility-Administered Medications   Medication Dose Route Frequency Provider Last Rate Last Dose    amLODIPine (NORVASC) tablet 5 mg  5 mg Oral Daily Michelle Roberto MD   5 mg at 1918    aspirin EC tablet 81 mg  81 mg Oral Daily Michelle Roberto MD   81 mg at 1919    vitamin D (CHOLECALCIFEROL) tablet 1,000 Units  1,000 Units Oral Daily Michelle Roberto MD   1,000 Units at 1918    diazepam (VALIUM) tablet 5 mg  5 mg Oral Q8H PRN Jacky Cabezas MD   5 mg at 30/07/59 0857    folic acid (FOLVITE) tablet 1 mg  1 mg Oral Daily Jacky Cabezas MD   1 mg at 05/08/19 0818    PARoxetine (PAXIL) tablet 10 mg  10 mg Oral Daily Jacky Cabezas MD   10 mg at 05/08/19 9455    potassium chloride (KLOR-CON M) extended release tablet 20 mEq  20 mEq Oral TID Jacky Cabezas MD   20 mEq at 05/08/19 0818    rosuvastatin (CRESTOR) tablet 5 mg  5 mg Oral Daily Jacky Cabezas MD   5 mg at 05/07/19 2019    sodium chloride flush 0.9 % injection 10 mL  10 mL Intravenous 2 times per day Jacky Cabezas MD   10 mL at 05/08/19 0819    sodium chloride flush 0.9 % injection 10 mL  10 mL Intravenous PRN Jacky Cabezas MD        potassium chloride 10 mEq/100 mL IVPB (Peripheral Line)  10 mEq Intravenous PRN Jacky Cabezas MD        magnesium hydroxide (MILK OF MAGNESIA) 400 MG/5ML suspension 30 mL  30 mL Oral Daily PRN Jacky Cabezas MD        ondansetron (ZOFRAN) injection 4 mg  4 mg Intravenous Q6H PRN Jacky Cabezas MD        enoxaparin (LOVENOX) injection 40 mg  40 mg Subcutaneous Daily Jacky Cabezas MD   40 mg at 05/08/19 0818    hydrALAZINE (APRESOLINE) tablet 25 mg  25 mg Oral Q8H PRN Jacky Cabezas MD           Allergies:     Allergies   Allergen Reactions    Dilantin [Phenytoin Sodium Extended]      Made head feel funny    Mobic [Meloxicam]     Phenobarbital      Made head feel funny but cannot remember otherwise    Phenytoin Sodium Extended     Prednisone     Sulfa Antibiotics     Famciclovir Palpitations       Problem List:    Patient Active Problem List   Diagnosis Code    Cerebral atherosclerosis I67.2    Hypercholesterolemia E78.00    Right cataract H26.9    Bradycardia R00.1       Past Medical History:        Diagnosis Date    Arthritis     Bladder dysfunction     Bowel dysfunction     HTN (hypertension)     Ulcer        Past Surgical History:        Procedure Laterality Date    BRAIN ANEURYSM SURGERY  1993    CATARACT REMOVAL WITH IMPLANT Right 2016    COLONOSCOPY      ENDOSCOPY, COLON, DIAGNOSTIC      HYSTERECTOMY      NERVE SURGERY      pinched nerve in left arm, had surgery       Social History:    Social History     Tobacco Use    Smoking status: Former Smoker     Packs/day: 0.25     Types: Cigarettes     Last attempt to quit: 5/3/2019     Years since quittin.0    Smokeless tobacco: Never Used   Substance Use Topics    Alcohol use: No                                Counseling given: Not Answered      Vital Signs (Current):   Vitals:    19 1930 19 2345 19 0700 19 0800   BP: 138/66 134/76  136/66   Pulse: 54 55  59   Resp: 17 16  18   Temp: 97.8 °F (36.6 °C) 97.9 °F (36.6 °C)  97.9 °F (36.6 °C)   TempSrc: Oral Oral  Oral   SpO2: 97% 98%  96%   Weight:   168 lb 1.6 oz (76.2 kg)    Height:                                                  BP Readings from Last 3 Encounters:   19 136/66   19 (!) 160/77   19 (!) 169/86       NPO Status:                                                                                 BMI:   Wt Readings from Last 3 Encounters:   19 168 lb 1.6 oz (76.2 kg)   19 170 lb (77.1 kg)   19 171 lb (77.6 kg)     Body mass index is 28.85 kg/m².     CBC:   Lab Results   Component Value Date    WBC 6.3 2019    RBC 4.55 2019    HGB 14.5 2019    HCT 42.1 2019    MCV 92.5 2019    RDW 12.0 2019     2019       CMP:   Lab Results   Component Value Date     2019    K 4.2 2019     2019    CO2 25 2019    BUN 15 2019    CREATININE 0.7 2019    GFRAA >60 2019    LABGLOM >60 2019    GLUCOSE 106 2019    PROT 7.4 2019    CALCIUM 9.6 2019    BILITOT 1.0 2019    ALKPHOS 65 2019    AST 13 2019    ALT 9 2019       POC Tests: No results for input(s): POCGLU, POCNA, POCK, POCCL, POCBUN, POCHEMO, POCHCT in the

## 2019-05-08 NOTE — PROGRESS NOTES
Hospitalist Progress Note  5/8/2019 2:18 PM  Subjective:   Admit Date: 5/5/2019 PCP: Addie Martinez MD  Interval History: Patient awake alert hemodynamically stable no chest pain or shortness of breath she had a lot of questions regarding a stopping propranolol I explained in detail that with bradycardia and we cannot give beta blockers. 5/7/19 Had a long discussion with the Patient regarding the need for pacemaker and what it does and why she cannot take the Inderal at this time for the tremors also discussed the case with Dr. David Kelley. Patient going for pacemaker implantation today  Pain is:None  Nausea:None  Bowel Movement/Flatus yes  Data:   Scheduled Meds:   amLODIPine  5 mg Oral Daily    aspirin  81 mg Oral Daily    vitamin D  1,000 Units Oral Daily    folic acid  1 mg Oral Daily    PARoxetine  10 mg Oral Daily    potassium chloride  20 mEq Oral TID    rosuvastatin  5 mg Oral Daily    sodium chloride flush  10 mL Intravenous 2 times per day    enoxaparin  40 mg Subcutaneous Daily     Continuous Infusions:  PRN Meds:meperidine, HYDROmorphone, diazepam, sodium chloride flush, potassium chloride, magnesium hydroxide, ondansetron, hydrALAZINE  I/O last 3 completed shifts: In: 240 [P.O.:240]  Out: -   I/O this shift:  In: 120 [P.O.:120]  Out: -     Intake/Output Summary (Last 24 hours) at 5/8/2019 1418  Last data filed at 5/8/2019 1309  Gross per 24 hour   Intake 360 ml   Output --   Net 360 ml     CBC:   Recent Labs     05/05/19 1755 05/07/19  0532   WBC 6.0 6.3   HGB 14.4 14.5    183     BMP:   Recent Labs     05/05/19 1755      K 4.2      CO2 25   BUN 15   CREATININE 0.7   GLUCOSE 106*     Hepatic:   Recent Labs     05/05/19 1755   AST 13   ALT 9   BILITOT 1.0   ALKPHOS 65     Troponin:   Recent Labs     05/05/19 1755   TROPONINI <0.01     BNP: No results for input(s): BNP in the last 72 hours. Lipids: No results for input(s): CHOL, HDL in the last 72 hours.     Invalid input(s): LDLCALCU  ABGs: No results found for: PHART, PO2ART, [id number]  INR: No results for input(s): INR in the last 72 hours. -----------------------------------------------------------------  RAD:   XR CHEST PORTABLE   Final Result      1. No evidence of acute cardiopulmonary process radiographically   showing no significant interval changes. 2. Unchanged borderline cardiomegaly. Fluoro For Surgical Procedures    (Results Pending)       Micro: No results for input(s): BC in the last 72 hours. No results for input(s): Ronold Peat in the last 72 hours. No results for input(s): ORG in the last 72 hours. No results for input(s): [address] in the last 72 hours. Recent Labs     05/05/19  1810   LABURIN <10,000 CFU/mL  Mixed gram positive organisms        No results for input(s): CULTRESP in the last 72 hours.      REVIEW OF SYSTEMS:  Gen: Negative for nausea, vomiting, diarrhea, fever, chills, night sweats  HEENT: Negative for double vision, blurred vision, sore throat   Heart: Negative for HTN, palpitations, chest pain  Lungs: Negative for wheezes, asthma or SOB  GI: Negative for nausea, vomiting  : Negative for dysuria, hematuria  Endo: Negative for diabetes, thyroid disorders  Heme: Negative for DVT or bleeding disorders  Psych: Negative for Depression or anxiety  Ortho: Negative for pain in the joints, arthritis or gout  Neuro: Positive for tremors  Objective:   Vitals: /66   Pulse 59   Temp 97.9 °F (36.6 °C) (Oral)   Resp 18   Ht 5' 4\" (1.626 m)   Wt 168 lb 1.6 oz (76.2 kg)   SpO2 96%   BMI 28.85 kg/m²   General Appearance:    Alert, cooperative, no distress, appears stated age   Head:    Normocephalic, without obvious abnormality, atraumatic   Eyes:    PERRL, conjunctiva/corneas clear, EOM's intact, fundi     benign, both eyes        Ears:    Normal TM's and external ear canals, both ears   Nose:   Nares normal, septum midline, mucosa normal, no         drainage  or sinus tenderness   Throat:   Lips, mucosa, and tongue normal; teeth and gums normal   Neck:   Supple, symmetrical, trachea midline, no adenopathy;        thyroid:  No enlargement/tenderness/nodules; no carotid    bruit or JVD   Back:     Symmetric, no curvature, ROM normal, no CVA tenderness   Lungs:     Clear to auscultation bilaterally, respirations unlabored   Chest wall:    No tenderness or deformity   Heart:    Regular rate and rhythm,Bradycardic, S1 and S2 normal, no murmur,       rub or gallop   Abdomen:     Soft, non-tender, bowel sounds active all four quadrants,     no masses, no organomegaly   Genitalia:     Extremities:   Extremities normal, atraumatic, no cyanosis or edema   Pulses:   2+ and symmetric all extremities   Skin:   Skin color, texture, turgor normal, no rashes or lesions   Lymph nodes:   Cervical, supraclavicular, and axillary nodes normal   Neurologic:   CNII-XII intact. Normal strength, sensation and reflexes       throughout     Assessment:   Patient Active Problem List:    Bradycardia    Hypertension     Anxiety    Depression    Essential Tremors        Plan:   1. Admitted to Telemetry  2. Consulted Dr. Dianne Beavers  3. D/C Beta blockers  4. Rest of medications as per order sheet  5. Monitor Heart Rate  6.  Discussed regarding the need for pacemaker patient was agreeable     Code Status: Full code  DVT prophylaxis: Lovenox 40mg once a day subcute                Roland Flores MD

## 2019-05-08 NOTE — ANESTHESIA POSTPROCEDURE EVALUATION
Department of Anesthesiology  Postprocedure Note    Patient: Cade Ordoñez  MRN: [id number]  YOB: 1945  Date of evaluation: 5/8/2019  Time:  6:40 PM     Procedure Summary     Date:  05/08/19 Room / Location:  [address] [place] [address] 02 / [address]    Anesthesia Start:  8205 Anesthesia Stop:  9867    Procedure:  PACEMAKER INSERTION (Naomy Favor) (N/A Chest) Diagnosis:  (SINUS BRADYCARDIA)    Surgeon:  Tristne French MD Responsible Provider:  Christy Hicks MD    Anesthesia Type:  MAC ASA Status:  3          Anesthesia Type: MAC    Chelle Phase I: Chelle Score: 10    Chelle Phase II:      Last vitals: Reviewed and per EMR flowsheets.        Anesthesia Post Evaluation    Patient location during evaluation: PACU  Patient participation: complete - patient participated  Level of consciousness: awake  Pain score: 0  Airway patency: patent  Nausea & Vomiting: no nausea  Complications: no  Cardiovascular status: blood pressure returned to baseline  Respiratory status: acceptable  Hydration status: euvolemic

## 2019-05-08 NOTE — H&P
.Blood pressure 136/66, pulse 59, temperature 97.9 °F (36.6 °C), temperature source Oral, resp. rate 18, height 5' 4\" (1.626 m), weight 168 lb 1.6 oz (76.2 kg), SpO2 96 %, not currently breastfeeding. H/P reviewed. No changes.     Plan for pacemaker insertion

## 2019-05-09 ENCOUNTER — APPOINTMENT (OUTPATIENT)
Dept: GENERAL RADIOLOGY | Age: 74
DRG: 244 | End: 2019-05-09
Payer: MEDICARE

## 2019-05-09 VITALS
SYSTOLIC BLOOD PRESSURE: 114 MMHG | HEIGHT: 64 IN | HEART RATE: 67 BPM | BODY MASS INDEX: 29.12 KG/M2 | OXYGEN SATURATION: 94 % | WEIGHT: 170.6 LBS | TEMPERATURE: 97.6 F | RESPIRATION RATE: 18 BRPM | DIASTOLIC BLOOD PRESSURE: 60 MMHG

## 2019-05-09 PROCEDURE — 2580000003 HC RX 258: Performed by: SPECIALIST

## 2019-05-09 PROCEDURE — 6360000002 HC RX W HCPCS: Performed by: INTERNAL MEDICINE

## 2019-05-09 PROCEDURE — 71046 X-RAY EXAM CHEST 2 VIEWS: CPT

## 2019-05-09 PROCEDURE — 2580000003 HC RX 258: Performed by: INTERNAL MEDICINE

## 2019-05-09 PROCEDURE — 97530 THERAPEUTIC ACTIVITIES: CPT

## 2019-05-09 PROCEDURE — 97535 SELF CARE MNGMENT TRAINING: CPT

## 2019-05-09 PROCEDURE — 6360000002 HC RX W HCPCS: Performed by: SPECIALIST

## 2019-05-09 PROCEDURE — 6370000000 HC RX 637 (ALT 250 FOR IP): Performed by: SPECIALIST

## 2019-05-09 PROCEDURE — 6370000000 HC RX 637 (ALT 250 FOR IP): Performed by: INTERNAL MEDICINE

## 2019-05-09 RX ADMIN — DIAZEPAM 5 MG: 5 TABLET ORAL at 08:38

## 2019-05-09 RX ADMIN — Medication 10 ML: at 08:39

## 2019-05-09 RX ADMIN — VITAMIN D, TAB 1000IU (100/BT) 1000 UNITS: 25 TAB at 08:39

## 2019-05-09 RX ADMIN — POTASSIUM CHLORIDE 20 MEQ: 20 TABLET, EXTENDED RELEASE ORAL at 08:39

## 2019-05-09 RX ADMIN — FOLIC ACID 1 MG: 1 TABLET ORAL at 08:38

## 2019-05-09 RX ADMIN — POTASSIUM CHLORIDE 20 MEQ: 20 TABLET, EXTENDED RELEASE ORAL at 13:45

## 2019-05-09 RX ADMIN — PROPRANOLOL HYDROCHLORIDE 20 MG: 10 TABLET ORAL at 08:38

## 2019-05-09 RX ADMIN — WATER 1 G: 1 INJECTION INTRAMUSCULAR; INTRAVENOUS; SUBCUTANEOUS at 08:40

## 2019-05-09 RX ADMIN — ENOXAPARIN SODIUM 40 MG: 40 INJECTION SUBCUTANEOUS at 08:39

## 2019-05-09 RX ADMIN — AMLODIPINE BESYLATE 5 MG: 5 TABLET ORAL at 08:39

## 2019-05-09 RX ADMIN — ASPIRIN 81 MG: 81 TABLET, COATED ORAL at 08:38

## 2019-05-09 RX ADMIN — WATER 1 G: 1 INJECTION INTRAMUSCULAR; INTRAVENOUS; SUBCUTANEOUS at 00:06

## 2019-05-09 RX ADMIN — PAROXETINE HYDROCHLORIDE 10 MG: 10 TABLET, FILM COATED ORAL at 08:39

## 2019-05-09 ASSESSMENT — PAIN SCALES - GENERAL
PAINLEVEL_OUTOF10: 0
PAINLEVEL_OUTOF10: 0

## 2019-05-09 NOTE — PROCEDURES
[address]                            CARDIAC CATHETERIZATION    PATIENT NAME: Ricardo Escalera                 :        1945  MED REC NO:   [id number]                            ROOM:       9591  ACCOUNT NO:   [de-identified]                           ADMIT DATE: 2019  PROVIDER:     Monisha Zuniga MD    DATE OF PROCEDURE:  2019    PROCEDURE PERFORMED:  Insertion of dual chamber permanent pacemaker. INDICATION:  Symptomatic sinus bradycardia. Sick sinus syndrome. The patient was prepped and draped in the usual fashion. Depths of  given area was anesthetized using 2% Xylocaine. The patient was given  sedation by the CRNA and anesthesiologist.    Cut was made under the left clavicle. Pacemaker pocket was prepared. Using [facility] needle, the left subclavian vein was cannulated. Guidewire  was advanced to the needle and the needle was pulled out. Using [facility]  needle, the left subclavian vein was cannulated again. Another  guidewire was advanced to the needle and the needle was pulled out. Ventricular introducer and dilator were then advanced over one of the  guidewires. The wire and the dilator were removed. Ventricular lead  was then advanced through the introducer into the superior vena cava. The introducer was peeled off. The ventricular lead was then positioned  in the right ventricle under fluoroscopy. Good pacemaker parameter were  obtained. Atrial introducer and dilator were then advanced over the remaining  guidewire. The wire and the dilator were removed. Atrial lead was then  advanced through the introducer into the superior vena cava. The  introducer was peeled off. The atrial lead was then positioned and the  right atrial appendage under fluoroscopy. Good pacemaker parameters  were obtained. The two leads were then connected to the pacemaker generator.

## 2019-05-09 NOTE — PROGRESS NOTES
Cardiology  Progress Note      SUBJECTIVE:  No chest pain. No dyspnea. Current Inpatient Medications  Current Facility-Administered Medications: ceFAZolin (ANCEF) 1 g in sterile water 10 mL IV syringe, 1 g, Intravenous, Q8H  propranolol (INDERAL) tablet 20 mg, 20 mg, Oral, BID  amLODIPine (NORVASC) tablet 5 mg, 5 mg, Oral, Daily  aspirin EC tablet 81 mg, 81 mg, Oral, Daily  vitamin D (CHOLECALCIFEROL) tablet 1,000 Units, 1,000 Units, Oral, Daily  diazepam (VALIUM) tablet 5 mg, 5 mg, Oral, J1P PRN  folic acid (FOLVITE) tablet 1 mg, 1 mg, Oral, Daily  PARoxetine (PAXIL) tablet 10 mg, 10 mg, Oral, Daily  potassium chloride (KLOR-CON M) extended release tablet 20 mEq, 20 mEq, Oral, TID  rosuvastatin (CRESTOR) tablet 5 mg, 5 mg, Oral, Daily  sodium chloride flush 0.9 % injection 10 mL, 10 mL, Intravenous, 2 times per day  sodium chloride flush 0.9 % injection 10 mL, 10 mL, Intravenous, PRN  potassium chloride 10 mEq/100 mL IVPB (Peripheral Line), 10 mEq, Intravenous, PRN  magnesium hydroxide (MILK OF MAGNESIA) 400 MG/5ML suspension 30 mL, 30 mL, Oral, Daily PRN  ondansetron (ZOFRAN) injection 4 mg, 4 mg, Intravenous, Q6H PRN  enoxaparin (LOVENOX) injection 40 mg, 40 mg, Subcutaneous, Daily  hydrALAZINE (APRESOLINE) tablet 25 mg, 25 mg, Oral, Q8H PRN      Physical  VITALS:  /60   Pulse 67   Temp 97.6 °F (36.4 °C) (Oral)   Resp 18   Ht 5' 4\" (1.626 m)   Wt 170 lb 9.6 oz (77.4 kg)   SpO2 94%   BMI 29.28 kg/m²   CURRENT TEMPERATURE:  Temp: 97.6 °F (36.4 °C)  NECK:  no jugular venous distension  BACK:  symmetric  LUNGS:  diminished breath sounds right base and left base. Pacer site is healing well.   CARDIOVASCULAR:  normal S1 and S2, no S3 and no S4  ABDOMEN:  non-distended  EXTREMITIES: no pedal edema noted    DATA:        Cardiology Labs:    BMP:    Lab Results   Component Value Date     05/05/2019    K 4.2 05/05/2019     05/05/2019    CO2 25 05/05/2019    BUN 15 05/05/2019     CBC:    Lab Results   Component Value Date    WBC 6.3 05/07/2019    RBC 4.55 05/07/2019    HGB 14.5 05/07/2019    HCT 42.1 05/07/2019    MCV 92.5 05/07/2019    RDW 12.0 05/07/2019     05/07/2019       ASSESSMENT    1-SSS / Symptomatic sinus bradycardia    Please refer to my consult. Patient wants to stay on Inderal in view of severe tremor in her hand. S/P Permanent pacemaker. Back on Inderal    Ok for discharge. PLAN  As per orders.

## 2019-05-09 NOTE — PROGRESS NOTES
Spoke with Dr. Juan Hall office- stated that doxycycline will be called into [facility] in [place] per patient request

## 2019-05-09 NOTE — DISCHARGE INSTR - COC
Continuity of Care Form    Patient Name: Carlos Unger   :  1945  MRN:  [id number]    Admit date:  2019  Discharge date:  ***    Code Status Order: Full Code   Advance Directives:   Advance Care Flowsheet Documentation     Date/Time Healthcare Directive Type of Healthcare Directive Copy in [address] [place] [address] [address] Agent's Name Healthcare Agent's Phone Number    [phone]  Yes, patient has an advance directive for healthcare treatment  Durable power of  for health care;Living will  No, copy requested from family  --  Aguila Josafat            Admitting Physician:  Trang Canseco MD  PCP: Aaron Reyna MD    Discharging Nurse: [facility] Unit/Room#: 3057/6230-21  Discharging Unit Phone Number: ***    Emergency Contact:   Extended Emergency Contact Information  Primary Emergency Contact: Renzo Carr  Address: [place] [address].           Paulette BENSONmaríajaiden [address] Phone: [phone]  Mobile Phone: [phone]  Relation: Domestic Partner  Secondary Emergency Contact: [address] [place] [address] Phone: [phone]  Mobile Phone: [phone]  Relation: Child    Past Surgical History:  Past Surgical History:   Procedure Laterality Date    BRAIN ANEURYSM SURGERY      CATARACT REMOVAL WITH IMPLANT Right 2016    COLONOSCOPY      ENDOSCOPY, COLON, DIAGNOSTIC      HYSTERECTOMY      NERVE SURGERY      pinched nerve in left arm, had surgery       Immunization History: There is no immunization history on file for this patient.     Active Problems:  Patient Active Problem List   Diagnosis Code    Cerebral atherosclerosis I67.2    Hypercholesterolemia E78.00    Right cataract H26.9    Bradycardia R00.1       Isolation/Infection:   Isolation          No Isolation            Nurse Assessment:  Last Vital Signs: /60   Pulse 67   Temp 97.6 °F (36.4 °C) (Oral)   Resp 18   Ht 5' 4\" (1.626 m)   Wt 170 lb 9.6 oz (77.4 kg)   SpO2 94%   BMI 29.28 kg/m²     Last documented pain score (0-10 scale): Pain Level: 0  Last Weight:   Wt Readings from Last 1 Encounters:   19 170 lb 9.6 oz (77.4 kg)     Mental Status:  {IP PT MENTAL STATUS:06284}    IV Access:  { MAIRA IV ACCESS:432353227}    Nursing Mobility/ADLs:  Walking   {CHP DME WQUN:230115962}  Transfer  {CHP DME DTZM:598174024}  Bathing  {CHP DME VIGM:725038178}  Dressing  {CHP DME BHBW:843503291}  Toileting  {CHP DME VQLA:811542829}  Feeding  {CHP DME UMSF:993933831}  Med Admin  {CHP DME ZNLS:891268837}  Med Delivery   { MAIRA MED Delivery:567839952}    Wound Care Documentation and Therapy:        Elimination:  Continence:   · Bowel: {YES / LL:81589}  · Bladder: {YES / BZ:85020}  Urinary Catheter: {Urinary Catheter:676993128}   Colostomy/Ileostomy/Ileal Conduit: {YES / WO:82689}       Date of Last BM: ***    Intake/Output Summary (Last 24 hours) at 2019 0950  Last data filed at 2019 0833  Gross per 24 hour   Intake 1320 ml   Output 800 ml   Net 520 ml     I/O last 3 completed shifts: In: 1200 [P.O.:600;  I.V.:600]  Out: 800 [Urine:800]    Safety Concerns:     [employer] Safety Concerns:241397758}    Impairments/Disabilities:      [employer] Impairments/Disabilities:837007614}    Nutrition Therapy:  Current Nutrition Therapy:   [employer] Diet List:557482971}    Routes of Feeding: {CHP DME Other Feedings:674791716}  Liquids: {Slp liquid thickness:87982}  Daily Fluid Restriction: {CHP DME Yes amt example:158383771}  Last Modified Barium Swallow with Video (Video Swallowing Test): {Done Not Done QOAO:035759689}    Treatments at the Time of Hospital Discharge:   Respiratory Treatments: ***  Oxygen Therapy:  {Therapy; copd oxygen:24192}  Ventilator:    { CC Vent AFNQ:410836250}    Rehab Therapies: {THERAPEUTIC INTERVENTION:0724149674}  Weight Bearing Status/Restrictions: [address] MCKEON Weight Bearin}  Other Medical Equipment (for information only, NOT a DME order):  {EQUIPMENT:549014976}  Other Treatments: ***    Patient's personal belongings (please select all that are sent with patient):  {CHP DME Belongings:243318257}    RN SIGNATURE:  {Esignature:519970201}    CASE MANAGEMENT/SOCIAL WORK SECTION    Inpatient Status Date: ***    Readmission Risk Assessment Score:  Readmission Risk              Risk of Unplanned Readmission:        10           Discharging to Facility/ Agency   · Name:   · Address:  · Phone:  · Fax:    Dialysis Facility (if applicable)   · Name:  · Address:  · Dialysis Schedule:  · Phone:  · Fax:    / signature: {Esignature:400700554}    PHYSICIAN SECTION    Prognosis: {Prognosis:5629124251}    Condition at Discharge: [address] [place] [address] Patient Condition:983874789}    Rehab Potential (if transferring to Rehab): {Prognosis:5658674428}    Recommended Labs or Other Treatments After Discharge: ***    Physician Certification: I certify the above information and transfer of [facility]  is necessary for the continuing treatment of the diagnosis listed and that she requires {Admit to Appropriate Level of Care:21716} for {GREATER/LESS:147959219} 30 days.      Update Admission H&P: {CHP DME Changes in BNQRN:396365500}    PHYSICIAN SIGNATURE:  Electronically signed by Janiya Gray MD on 5/9/19 at 9:50 AM

## 2019-05-09 NOTE — PLAN OF CARE
Problem: Falls - Risk of:  Goal: Will remain free from falls  Description  Will remain free from falls  5/8/2019 2124 by Tegan Arthur RN  Outcome: Met This Shift     Problem: Falls - Risk of:  Goal: Absence of physical injury  Description  Absence of physical injury  5/8/2019 2124 by Tegan Arthur RN  Outcome: Met This Shift

## 2019-05-09 NOTE — PROGRESS NOTES
OT bedside Tx note      Date:2019  Patient Name: Nam Irene  MRN: [id number]  : 1945  Room: [address] [place] [address]      Evaluating OT: Darlina Leventhal, OTR/ Courtney Sanabria #[id number]      Recommendation for Placement: Home   Recommended Adaptive Equipment: TBD   AM-PAC Daily Activity Raw Score: 20        Diagnosis: Bradycardia     Pertinent Medical History:    Past Medical History:   Diagnosis Date    Arthritis     Bladder dysfunction     Bowel dysfunction     HTN (hypertension)     Ulcer       Precautions:  Falls, pacemaker precautions     Home Living: Pt lives with boyfriend in [facility], 1 story, 2 steps to enter with rail, walk-in shower. Equipment owned: walker, cane    Prior Level of Function: Independent with ADLs , Independent with IADLs; ambulated no  Driving: yes  Occupation: retired    Per initial eval  Pain Level: 0/10 c/o pain   Cognition: A&O: 4/4; Follows 2 step directions   Memory:  good     Sequencing:  good     Problem solving:  good     Judgement/safety:  good       Functional Assessment:   Initial Eval Status  Date: 19 Treatment Status  Date: 19 Short Term Goals  Treatment frequency: PRN 1-3x/week   Feeding Independent  n/t Independent    Grooming Independent for sink level grooming tasks Pt seated EOB demo'd ability to complete grooming tasks with SBA/supervision  Independent    UB Dressing Independent  MOD A to [facility] with v/c's for ramakrishna technique and follow pacer precautions  Independent    LB Dressing Independent to don and doff shoes at EOB N/t  Independent    Bathing Independent N/t  [facility] with the use of grab bars N/t  Independent    Bed Mobility  Supine to sit: Independent   Sit to supine: Independent  Independent supine<>sit  Supine to sit:  Independent   Sit to supine: Independent    Functional Transfers Supervision for sit to stand, progressing to no device Supervision sit<>stand from EOB Independent    Functional Mobility Independent approx 50ft x2 with no device Supervision 15 ft no AD  Independent    Balance Sitting:     Static:  good    Dynamic:good  Standing: good Sitting:   Static: good   Dynamic: good  Standing: fair+    Activity Tolerance Good, fatigues easily Fair     Visual/  Perceptual Glasses: yes                    Comments/Treatment: Upon arrival, patient seated EOB. Pt educated with regards to pacemaker precautions demo'd fair understanding, UE ramakrishna dressing techniques, functional transfers, functional mobility. At end of session pt transfer to x-ray.      Time in: 1045  Time out: 1110  Total tx time: 25 minutes     Norma KRISHNAMURTHY/L 88118

## 2019-05-21 NOTE — DISCHARGE SUMMARY
Discharge Summary    Linda Castañeda  :  1945  MRN:  [id number]    Admit date:  2019  Discharge date:  2019    Admitting Physician:  Lawyer Romero MD    Discharge Diagnoses:    Patient Active Problem List   Diagnosis    Cerebral atherosclerosis    Hypercholesterolemia    Bradycardia       Consults:  Dr. Zita Stratton    HPI/Hospital Course: This is a 68year old female patient that was admitted with bradycardia. Dr. Zita Stratton was consulted. The patient was taking Inderal for her tremors. Dr. Zita Stratton held the Inderal and the patients heart rate improved. Dr. Zita Stratton discussed with the patient regarding possible pacemaker insertion. The patient agreed and underwent procedure. The Inderal was restarted. Over the course the patient remained stable. Her heart rate was stable with the new pacemaker. She was seen and examined and offered no new complaints. She was discharged to home in stable condition.     Discharge Medications:        Medication List      CONTINUE taking these medications    amLODIPine 2.5 MG tablet  Commonly known as:  NORVASC     aspirin 81 MG EC tablet  Commonly known as:  ECOTRIN LOW STRENGTH  Take 1 tablet by mouth daily     diazepam 5 MG tablet  Commonly known as:  VALIUM     folic acid 1 MG tablet  Commonly known as:  FOLVITE     PAXIL 10 MG tablet  Generic drug:  PARoxetine     potassium chloride 10 MEQ extended release tablet  Commonly known as:  KLOR-CON     propranolol 20 MG tablet  Commonly known as:  INDERAL  Take 1 tablet by mouth 2 times daily     rosuvastatin 5 MG tablet  Commonly known as:  CRESTOR     Vitamin D3 1000 units Caps        ASK your doctor about these medications    cloNIDine 0.1 MG tablet  Commonly known as:  CATAPRES            Disposition:   home    Follow-up with her primary care physician in 7-10 days    Note that over 30 minutes was spent in preparing discharge papers, discussing discharge with patient, medication review, etc.      Signed:  Manson Severe Abdulkadir [facility]  5/21/2019, 8:55 AM

## 2019-05-28 ENCOUNTER — OFFICE VISIT (OUTPATIENT)
Dept: NEUROLOGY | Age: 74
End: 2019-05-28
Payer: MEDICARE

## 2019-05-28 VITALS
HEIGHT: 64 IN | DIASTOLIC BLOOD PRESSURE: 80 MMHG | WEIGHT: 169 LBS | BODY MASS INDEX: 28.85 KG/M2 | HEART RATE: 53 BPM | RESPIRATION RATE: 18 BRPM | TEMPERATURE: 96.9 F | SYSTOLIC BLOOD PRESSURE: 124 MMHG | OXYGEN SATURATION: 94 %

## 2019-05-28 DIAGNOSIS — G25.0 ESSENTIAL TREMOR: Primary | ICD-10-CM

## 2019-05-28 DIAGNOSIS — F41.9 ANXIETY: ICD-10-CM

## 2019-05-28 PROCEDURE — G8400 PT W/DXA NO RESULTS DOC: HCPCS | Performed by: PSYCHIATRY & NEUROLOGY

## 2019-05-28 PROCEDURE — 3017F COLORECTAL CA SCREEN DOC REV: CPT | Performed by: PSYCHIATRY & NEUROLOGY

## 2019-05-28 PROCEDURE — 1111F DSCHRG MED/CURRENT MED MERGE: CPT | Performed by: PSYCHIATRY & NEUROLOGY

## 2019-05-28 PROCEDURE — 4040F PNEUMOC VAC/ADMIN/RCVD: CPT | Performed by: PSYCHIATRY & NEUROLOGY

## 2019-05-28 PROCEDURE — 4004F PT TOBACCO SCREEN RCVD TLK: CPT | Performed by: PSYCHIATRY & NEUROLOGY

## 2019-05-28 PROCEDURE — 1090F PRES/ABSN URINE INCON ASSESS: CPT | Performed by: PSYCHIATRY & NEUROLOGY

## 2019-05-28 PROCEDURE — G8598 ASA/ANTIPLAT THER USED: HCPCS | Performed by: PSYCHIATRY & NEUROLOGY

## 2019-05-28 PROCEDURE — G8427 DOCREV CUR MEDS BY ELIG CLIN: HCPCS | Performed by: PSYCHIATRY & NEUROLOGY

## 2019-05-28 PROCEDURE — 1123F ACP DISCUSS/DSCN MKR DOCD: CPT | Performed by: PSYCHIATRY & NEUROLOGY

## 2019-05-28 PROCEDURE — G8417 CALC BMI ABV UP PARAM F/U: HCPCS | Performed by: PSYCHIATRY & NEUROLOGY

## 2019-05-28 PROCEDURE — 99215 OFFICE O/P EST HI 40 MIN: CPT | Performed by: PSYCHIATRY & NEUROLOGY

## 2019-05-28 RX ORDER — AMLODIPINE BESYLATE 5 MG/1
5 TABLET ORAL DAILY
COMMUNITY
End: 2019-10-05 | Stop reason: ALTCHOICE

## 2019-05-28 NOTE — PROGRESS NOTES
anxious               Head: normocephalic/atraumatic              Eyes: conjunctiva/corneas clear               Neck:  limited ROM -----no cogwheeling              Lungs: clear to auscultation , respirations unlabored; a well-healing left chest pacemaker site               Heart: regular rate and rhythm, S1 and S2 normal, no murmur, rub or gallop               Extremities: trace ankle edema; brawny induration of both ankles and feet              Pulses: 1+ and symmetric all extremities     Mental Status: alert; oriented x4-----still anxious, with moderate vocal tremors  I again found no dysarthrias or aphasias; all phases of memory were intact     Cranial Nerves:  I: smell NA   II: visual acuity  NA   II: visual fields Full to confrontation   II: pupils ROBBI   III,VII: ptosis None   III,IV,VI: extraocular muscles  EOMI   V: mastication Normal   V: facial light touch sensation  Normal   V,VII: corneal reflex      VII: facial muscle function - upper  Normal   VII: facial muscle function - lower Normal   VIII: hearing Normal   IX: soft palate elevation  Normal   IX,X: gag reflex     XI: trapezius strength  5/5   XI: sternocleidomastoid strength 5/5   XI: neck extension strength  5/5   XII: tongue strength  Normal      Motor:  5/5 throughout  Minimal horizontal tremors of the head but none in the hands     Sensory:  LT intact  PP decreased to both mid calves  Vibratory mod impaired at both ankles     Coordination:   Cerebellar testing was intact      Gait:  Normal     DTR:   I found no reflexes  There were no pathological ones    Her neurological examination revealed only vocal quivering and minimal horizontal head tremors     Laboratory/Radiology:      As above     Assessment:      The patient suffers from late life tremors of her voice and hands---her vocal tremors are more prominent. She remains on low-dose propranolol.   She will continue with Inderal 20 mg twice daily, especially since she is status post pacemaker insertion. I still question the role of diazepam 3 times daily. Her anxiety is a significant factor---I recommended increasing Paxil to 20 mg at night. Her erratic sleep habits and eating habits are also contributing. I again recommended retiring at midnight every night---and watching television only in the family room. She was also eat 3 meals on a regular basis.     She was stable medically---status post pacemaker insertion. .  She must stop smoking. Plan:      Propranolol was maintained at 20 mg twice daily    She will modify her eating and sleeping habits    Hopefully, she will stop smoking    She will call at any time if problems arise    She will return in 5 months    I spent 40 minutes with the patient with over 50 % spent in counseling and disease management.   All patient issues were addressed and all questions were answered.

## 2019-06-14 ENCOUNTER — TELEPHONE (OUTPATIENT)
Dept: NEUROLOGY | Age: 74
End: 2019-06-14

## 2019-06-14 NOTE — TELEPHONE ENCOUNTER
Pt called to confirm Propranolol dosage of 20mg bid. Pt stated she has been taking the med at 8am and 4:30pm but MA advised the med should be taken 12 hrs apart to maintain a steady blood level. Pt understood.   Electronically signed by Mayersjusten Pascal on 6/14/19 at 9:35 AM

## 2019-07-02 ENCOUNTER — TELEPHONE (OUTPATIENT)
Dept: NEUROLOGY | Age: 74
End: 2019-07-02

## 2019-07-10 ENCOUNTER — TELEPHONE (OUTPATIENT)
Dept: NEUROLOGY | Age: 74
End: 2019-07-10

## 2019-07-10 NOTE — TELEPHONE ENCOUNTER
Pt called stating she has been having dry mouth since starting Propranolol and another physician recommended she contact Dr. Clair Loyola to see if he could change pt's medication to evening only instead of bid. Forwarding to Dr. Clair Loyola for advisement.   Electronically signed by Qamar Parsons on 7/10/19 at 2:57 PM

## 2019-07-11 NOTE — TELEPHONE ENCOUNTER
MA informed pt she is to take Propranolol 20mg qpm but if tremor worsens she can increase to 40mg qpm all per Dr. Lindsay Barber. Pt wrote down instructions and verbalized understanding.   Electronically signed by Yenifer Bueno on 7/11/19 at 9:33 AM

## 2019-07-23 ENCOUNTER — HOSPITAL ENCOUNTER (OUTPATIENT)
Age: 74
Discharge: HOME | End: 2019-07-23
Payer: MEDICARE

## 2019-07-23 PROCEDURE — 86787 VARICELLA-ZOSTER ANTIBODY: CPT

## 2019-07-23 PROCEDURE — 86695 HERPES SIMPLEX TYPE 1 TEST: CPT

## 2019-07-23 PROCEDURE — 86696 HERPES SIMPLEX TYPE 2 TEST: CPT

## 2019-07-23 PROCEDURE — 86694 HERPES SIMPLEX NES ANTBDY: CPT

## 2019-07-23 PROCEDURE — 36415 COLL VENOUS BLD VENIPUNCTURE: CPT

## 2019-07-25 LAB — VARICELLA-ZOSTER VIRUS AB, IGG: NORMAL

## 2019-07-26 LAB
HERPES TYPE 1/2 IGM COMBINED: 0.89 IV
HERPES TYPE I/II IGG COMBINED: >22.4 IV
HSV 1 GLYCOPROTEIN G AB IGG: 5.31 IV
HSV 2 GLYCOPROTEIN G AB IGG: 3.43 IV

## 2019-08-08 ENCOUNTER — HOSPITAL ENCOUNTER (OUTPATIENT)
Dept: MAMMOGRAPHY | Age: 74
Discharge: HOME | End: 2019-08-10
Payer: MEDICARE

## 2019-08-08 DIAGNOSIS — Z12.39 BREAST CANCER SCREENING: ICD-10-CM

## 2019-08-08 PROCEDURE — 77067 SCR MAMMO BI INCL CAD: CPT

## 2019-08-21 ENCOUNTER — HOSPITAL ENCOUNTER (OUTPATIENT)
Age: 74
Discharge: HOME | End: 2019-08-21
Payer: MEDICARE

## 2019-08-21 LAB
ALBUMIN SERPL-MCNC: 4 G/DL (ref 3.5–5.2)
ALP BLD-CCNC: 63 U/L (ref 35–104)
ALT SERPL-CCNC: 14 U/L (ref 0–32)
ANION GAP SERPL CALCULATED.3IONS-SCNC: 8 MMOL/L (ref 7–16)
AST SERPL-CCNC: 14 U/L (ref 0–31)
BILIRUB SERPL-MCNC: 0.4 MG/DL (ref 0–1.2)
BUN BLDV-MCNC: 27 MG/DL (ref 8–23)
CALCIUM SERPL-MCNC: 9.6 MG/DL (ref 8.6–10.2)
CHLORIDE BLD-SCNC: 104 MMOL/L (ref 98–107)
CO2: 29 MMOL/L (ref 22–29)
CREAT SERPL-MCNC: 0.6 MG/DL (ref 0.5–1)
GFR AFRICAN AMERICAN: >60
GFR NON-AFRICAN AMERICAN: >60 ML/MIN/1.73
GLUCOSE BLD-MCNC: 101 MG/DL (ref 74–99)
POTASSIUM SERPL-SCNC: 4.3 MMOL/L (ref 3.5–5)
SODIUM BLD-SCNC: 141 MMOL/L (ref 132–146)
TOTAL PROTEIN: 7 G/DL (ref 6.4–8.3)

## 2019-08-21 PROCEDURE — 36415 COLL VENOUS BLD VENIPUNCTURE: CPT

## 2019-08-21 PROCEDURE — 80053 COMPREHEN METABOLIC PANEL: CPT

## 2019-09-18 ENCOUNTER — TELEPHONE (OUTPATIENT)
Dept: NEUROLOGY | Age: 74
End: 2019-09-18

## 2019-09-26 ENCOUNTER — HOSPITAL ENCOUNTER (EMERGENCY)
Age: 74
Discharge: HOME | End: 2019-09-26
Attending: FAMILY MEDICINE
Payer: MEDICARE

## 2019-09-26 VITALS
BODY MASS INDEX: 27.99 KG/M2 | OXYGEN SATURATION: 97 % | HEIGHT: 65 IN | HEART RATE: 69 BPM | SYSTOLIC BLOOD PRESSURE: 148 MMHG | TEMPERATURE: 98.3 F | DIASTOLIC BLOOD PRESSURE: 84 MMHG | WEIGHT: 168 LBS | RESPIRATION RATE: 18 BRPM

## 2019-09-26 DIAGNOSIS — I10 ASYMPTOMATIC HYPERTENSION: Primary | ICD-10-CM

## 2019-09-26 PROCEDURE — 6370000000 HC RX 637 (ALT 250 FOR IP): Performed by: PHYSICIAN ASSISTANT

## 2019-09-26 PROCEDURE — 99283 EMERGENCY DEPT VISIT LOW MDM: CPT

## 2019-09-26 RX ORDER — DIAZEPAM 5 MG/1
5 TABLET ORAL ONCE
Status: COMPLETED | OUTPATIENT
Start: 2019-09-26 | End: 2019-09-26

## 2019-09-26 RX ADMIN — DIAZEPAM 5 MG: 5 TABLET ORAL at 17:18

## 2019-09-26 NOTE — ED PROVIDER NOTES
Wt 168 lb (76.2 kg)   SpO2 97%   BMI 28.39 kg/m²   Oxygen Saturation Interpretation: Normal      --------------------------------PHYSICAL EXAM------------------------------------      Constitutional/General: Alert and oriented x3, well appearing, non toxic in NAD. Tremulous voice, anxious  Head: NC/AT  Eyes: PERRL, EOMI  Mouth: Oropharynx clear, handling secretions, no trismus  Neck: Supple, full ROM, no meningeal signs  Pulmonary: Lungs clear to auscultation bilaterally, no wheezes, rales, or rhonchi. Not in respiratory distress  Cardiovascular:  Regular rate and rhythm, no murmurs, gallops, or rubs. 2+ distal pulses  Abdomen: Soft, + BS. No distension. Nontender. No palpable rigidity, rebound or guarding  Extremities: Moves all extremities x 4. Warm and well perfused  Skin: warm and dry without rash  Neurologic: GCS 15. Mild UE tremor noted. Tremulous and shaky voice. Worse on arrival and improved after meds. Psych: Normal Affect      ------------------------ ED COURSE/MEDICAL DECISION MAKING----------------------  Medications   diazepam (VALIUM) tablet 5 mg (5 mg Oral Given 9/26/19 1718)         Medical Decision Making:    Seen and evaluate with Dr. Elma Arango as well. Patient requested a dose of her diazepam.  She states she usually takes in the afternoon and admits that when she gets nervous her voice becomes more tremulous. She was much improved prior to discharge. I rechecked her blood pressure several times and got much better readings more in the range of 136/72 or 144/70. The patient was reassured. She has absolutely no other symptoms and we did not feel that any further work-up is indicated. I advised her to call her PCP, Dr. Karel Abreu and see if he wants to put her back on the amlodipine. I advised the patient that otherwise she should not be making any self adjustments with her meds.   She was advised also not to check her blood pressure quite so frequently but that if she does check it

## 2019-10-05 ENCOUNTER — HOSPITAL ENCOUNTER (EMERGENCY)
Age: 74
Discharge: HOME | End: 2019-10-05
Attending: EMERGENCY MEDICINE
Payer: MEDICARE

## 2019-10-05 ENCOUNTER — APPOINTMENT (OUTPATIENT)
Dept: GENERAL RADIOLOGY | Age: 74
End: 2019-10-05
Payer: MEDICARE

## 2019-10-05 VITALS
SYSTOLIC BLOOD PRESSURE: 129 MMHG | WEIGHT: 168 LBS | BODY MASS INDEX: 28.68 KG/M2 | HEIGHT: 64 IN | RESPIRATION RATE: 18 BRPM | TEMPERATURE: 97.5 F | DIASTOLIC BLOOD PRESSURE: 77 MMHG | HEART RATE: 68 BPM | OXYGEN SATURATION: 98 %

## 2019-10-05 DIAGNOSIS — M54.50 ACUTE LEFT-SIDED LOW BACK PAIN WITHOUT SCIATICA: ICD-10-CM

## 2019-10-05 DIAGNOSIS — D17.1 LIPOMA OF TORSO: Primary | ICD-10-CM

## 2019-10-05 PROCEDURE — 6370000000 HC RX 637 (ALT 250 FOR IP): Performed by: EMERGENCY MEDICINE

## 2019-10-05 PROCEDURE — 99285 EMERGENCY DEPT VISIT HI MDM: CPT

## 2019-10-05 PROCEDURE — 71046 X-RAY EXAM CHEST 2 VIEWS: CPT

## 2019-10-05 RX ORDER — ACETAMINOPHEN 325 MG/1
650 TABLET ORAL ONCE
Status: COMPLETED | OUTPATIENT
Start: 2019-10-05 | End: 2019-10-05

## 2019-10-05 RX ADMIN — ACETAMINOPHEN 650 MG: 325 TABLET, FILM COATED ORAL at 09:59

## 2019-10-05 ASSESSMENT — PAIN DESCRIPTION - PROGRESSION
CLINICAL_PROGRESSION: GRADUALLY IMPROVING
CLINICAL_PROGRESSION: NOT CHANGED

## 2019-10-05 ASSESSMENT — ENCOUNTER SYMPTOMS
BACK PAIN: 1
ABDOMINAL PAIN: 0
VOMITING: 0
SHORTNESS OF BREATH: 0
EYES NEGATIVE: 1
NAUSEA: 0
COUGH: 0

## 2019-10-05 ASSESSMENT — PAIN DESCRIPTION - DIRECTION: RADIATING_TOWARDS: LEFT SIDE OF NECK

## 2019-10-05 ASSESSMENT — PAIN SCALES - GENERAL
PAINLEVEL_OUTOF10: 2
PAINLEVEL_OUTOF10: 2
PAINLEVEL_OUTOF10: 1

## 2019-10-05 ASSESSMENT — PAIN DESCRIPTION - ONSET: ONSET: SUDDEN

## 2019-10-05 ASSESSMENT — PAIN - FUNCTIONAL ASSESSMENT
PAIN_FUNCTIONAL_ASSESSMENT: PREVENTS OR INTERFERES SOME ACTIVE ACTIVITIES AND ADLS
PAIN_FUNCTIONAL_ASSESSMENT: ACTIVITIES ARE NOT PREVENTED

## 2019-10-05 ASSESSMENT — PAIN DESCRIPTION - PAIN TYPE
TYPE: ACUTE PAIN
TYPE: ACUTE PAIN

## 2019-10-05 ASSESSMENT — PAIN DESCRIPTION - DESCRIPTORS: DESCRIPTORS: SHARP;RADIATING;STABBING

## 2019-10-05 ASSESSMENT — PAIN DESCRIPTION - ORIENTATION
ORIENTATION: LEFT
ORIENTATION: LEFT

## 2019-10-05 ASSESSMENT — PAIN DESCRIPTION - FREQUENCY: FREQUENCY: CONTINUOUS

## 2019-10-05 ASSESSMENT — PAIN DESCRIPTION - LOCATION
LOCATION: SHOULDER
LOCATION: SHOULDER

## 2019-10-09 ENCOUNTER — INITIAL CONSULT (OUTPATIENT)
Dept: SURGERY | Age: 74
End: 2019-10-09
Payer: MEDICARE

## 2019-10-09 VITALS
RESPIRATION RATE: 18 BRPM | TEMPERATURE: 98.1 F | DIASTOLIC BLOOD PRESSURE: 82 MMHG | HEIGHT: 64 IN | BODY MASS INDEX: 28.68 KG/M2 | HEART RATE: 64 BPM | WEIGHT: 168 LBS | SYSTOLIC BLOOD PRESSURE: 139 MMHG

## 2019-10-09 DIAGNOSIS — D17.1 LIPOMA OF BACK: ICD-10-CM

## 2019-10-09 DIAGNOSIS — M25.512 ACUTE PAIN OF LEFT SHOULDER: Primary | ICD-10-CM

## 2019-10-09 PROCEDURE — 4040F PNEUMOC VAC/ADMIN/RCVD: CPT | Performed by: SURGERY

## 2019-10-09 PROCEDURE — 99204 OFFICE O/P NEW MOD 45 MIN: CPT | Performed by: SURGERY

## 2019-10-09 PROCEDURE — G8484 FLU IMMUNIZE NO ADMIN: HCPCS | Performed by: SURGERY

## 2019-10-09 PROCEDURE — G8417 CALC BMI ABV UP PARAM F/U: HCPCS | Performed by: SURGERY

## 2019-10-09 PROCEDURE — G8598 ASA/ANTIPLAT THER USED: HCPCS | Performed by: SURGERY

## 2019-10-09 PROCEDURE — 4004F PT TOBACCO SCREEN RCVD TLK: CPT | Performed by: SURGERY

## 2019-10-09 PROCEDURE — 1090F PRES/ABSN URINE INCON ASSESS: CPT | Performed by: SURGERY

## 2019-10-09 PROCEDURE — 1123F ACP DISCUSS/DSCN MKR DOCD: CPT | Performed by: SURGERY

## 2019-10-09 PROCEDURE — G8427 DOCREV CUR MEDS BY ELIG CLIN: HCPCS | Performed by: SURGERY

## 2019-10-09 PROCEDURE — G8400 PT W/DXA NO RESULTS DOC: HCPCS | Performed by: SURGERY

## 2019-10-09 PROCEDURE — 3017F COLORECTAL CA SCREEN DOC REV: CPT | Performed by: SURGERY

## 2019-10-09 RX ORDER — HYDROCHLOROTHIAZIDE 12.5 MG/1
25 TABLET ORAL DAILY
COMMUNITY
End: 2020-02-25 | Stop reason: SDUPTHER

## 2019-10-10 ENCOUNTER — HOSPITAL ENCOUNTER (EMERGENCY)
Age: 74
Discharge: HOME | End: 2019-10-10
Attending: EMERGENCY MEDICINE
Payer: MEDICARE

## 2019-10-10 ENCOUNTER — APPOINTMENT (OUTPATIENT)
Dept: CT IMAGING | Age: 74
End: 2019-10-10
Payer: MEDICARE

## 2019-10-10 VITALS
SYSTOLIC BLOOD PRESSURE: 157 MMHG | OXYGEN SATURATION: 99 % | HEIGHT: 64 IN | BODY MASS INDEX: 28.68 KG/M2 | TEMPERATURE: 98.6 F | DIASTOLIC BLOOD PRESSURE: 73 MMHG | WEIGHT: 168 LBS | HEART RATE: 65 BPM | RESPIRATION RATE: 20 BRPM

## 2019-10-10 DIAGNOSIS — R41.840 POOR CONCENTRATION: Primary | ICD-10-CM

## 2019-10-10 LAB
ALBUMIN SERPL-MCNC: 4.1 G/DL (ref 3.5–5.2)
ALP BLD-CCNC: 71 U/L (ref 35–104)
ALT SERPL-CCNC: 10 U/L (ref 0–32)
ANION GAP SERPL CALCULATED.3IONS-SCNC: 9 MMOL/L (ref 7–16)
AST SERPL-CCNC: 14 U/L (ref 0–31)
BASOPHILS ABSOLUTE: 0.04 E9/L (ref 0–0.2)
BASOPHILS RELATIVE PERCENT: 0.7 % (ref 0–2)
BILIRUB SERPL-MCNC: 0.9 MG/DL (ref 0–1.2)
BILIRUBIN URINE: NEGATIVE
BLOOD, URINE: NEGATIVE
BUN BLDV-MCNC: 13 MG/DL (ref 8–23)
CALCIUM SERPL-MCNC: 9.8 MG/DL (ref 8.6–10.2)
CHLORIDE BLD-SCNC: 103 MMOL/L (ref 98–107)
CLARITY: CLEAR
CO2: 28 MMOL/L (ref 22–29)
COLOR: YELLOW
CREAT SERPL-MCNC: 0.7 MG/DL (ref 0.5–1)
EKG ATRIAL RATE: 66 BPM
EKG P AXIS: 50 DEGREES
EKG P-R INTERVAL: 210 MS
EKG Q-T INTERVAL: 388 MS
EKG QRS DURATION: 94 MS
EKG QTC CALCULATION (BAZETT): 406 MS
EKG R AXIS: 5 DEGREES
EKG T AXIS: 13 DEGREES
EKG VENTRICULAR RATE: 66 BPM
EOSINOPHILS ABSOLUTE: 0.06 E9/L (ref 0.05–0.5)
EOSINOPHILS RELATIVE PERCENT: 1.1 % (ref 0–6)
GFR AFRICAN AMERICAN: >60
GFR NON-AFRICAN AMERICAN: >60 ML/MIN/1.73
GLUCOSE BLD-MCNC: 107 MG/DL
GLUCOSE BLD-MCNC: 114 MG/DL (ref 74–99)
GLUCOSE URINE: NEGATIVE MG/DL
HCT VFR BLD CALC: 44.5 % (ref 34–48)
HEMOGLOBIN: 15.2 G/DL (ref 11.5–15.5)
IMMATURE GRANULOCYTES #: 0.01 E9/L
IMMATURE GRANULOCYTES %: 0.2 % (ref 0–5)
KETONES, URINE: NEGATIVE MG/DL
LEUKOCYTE ESTERASE, URINE: NEGATIVE
LYMPHOCYTES ABSOLUTE: 1.94 E9/L (ref 1.5–4)
LYMPHOCYTES RELATIVE PERCENT: 34.5 % (ref 20–42)
MCH RBC QN AUTO: 31.7 PG (ref 26–35)
MCHC RBC AUTO-ENTMCNC: 34.2 % (ref 32–34.5)
MCV RBC AUTO: 92.9 FL (ref 80–99.9)
METER GLUCOSE: 107 MG/DL (ref 74–99)
MONOCYTES ABSOLUTE: 0.37 E9/L (ref 0.1–0.95)
MONOCYTES RELATIVE PERCENT: 6.6 % (ref 2–12)
NEUTROPHILS ABSOLUTE: 3.2 E9/L (ref 1.8–7.3)
NEUTROPHILS RELATIVE PERCENT: 56.9 % (ref 43–80)
NITRITE, URINE: NEGATIVE
PDW BLD-RTO: 11.6 FL (ref 11.5–15)
PH UA: 6.5 (ref 5–9)
PLATELET # BLD: 193 E9/L (ref 130–450)
PMV BLD AUTO: 10.4 FL (ref 7–12)
POTASSIUM REFLEX MAGNESIUM: 4.1 MMOL/L (ref 3.5–5)
PRO-BNP: 61 PG/ML (ref 0–125)
PROTEIN UA: NEGATIVE MG/DL
RBC # BLD: 4.79 E12/L (ref 3.5–5.5)
SODIUM BLD-SCNC: 140 MMOL/L (ref 132–146)
SPECIFIC GRAVITY UA: <=1.005 (ref 1–1.03)
TOTAL PROTEIN: 7.2 G/DL (ref 6.4–8.3)
TROPONIN: <0.01 NG/ML (ref 0–0.03)
UROBILINOGEN, URINE: 0.2 E.U./DL
WBC # BLD: 5.6 E9/L (ref 4.5–11.5)

## 2019-10-10 PROCEDURE — 83880 ASSAY OF NATRIURETIC PEPTIDE: CPT

## 2019-10-10 PROCEDURE — 93010 ELECTROCARDIOGRAM REPORT: CPT | Performed by: INTERNAL MEDICINE

## 2019-10-10 PROCEDURE — 84484 ASSAY OF TROPONIN QUANT: CPT

## 2019-10-10 PROCEDURE — 85025 COMPLETE CBC W/AUTO DIFF WBC: CPT

## 2019-10-10 PROCEDURE — 93005 ELECTROCARDIOGRAM TRACING: CPT | Performed by: EMERGENCY MEDICINE

## 2019-10-10 PROCEDURE — 82962 GLUCOSE BLOOD TEST: CPT

## 2019-10-10 PROCEDURE — 70450 CT HEAD/BRAIN W/O DYE: CPT

## 2019-10-10 PROCEDURE — 81003 URINALYSIS AUTO W/O SCOPE: CPT

## 2019-10-10 PROCEDURE — 99284 EMERGENCY DEPT VISIT MOD MDM: CPT

## 2019-10-10 PROCEDURE — 80053 COMPREHEN METABOLIC PANEL: CPT

## 2019-10-10 PROCEDURE — 36415 COLL VENOUS BLD VENIPUNCTURE: CPT

## 2019-10-11 ENCOUNTER — TELEPHONE (OUTPATIENT)
Dept: NEUROLOGY | Age: 74
End: 2019-10-11

## 2019-10-12 ENCOUNTER — HOSPITAL ENCOUNTER (EMERGENCY)
Age: 74
Discharge: HOME | End: 2019-10-12
Attending: EMERGENCY MEDICINE
Payer: MEDICARE

## 2019-10-12 ENCOUNTER — APPOINTMENT (OUTPATIENT)
Dept: GENERAL RADIOLOGY | Age: 74
End: 2019-10-12
Payer: MEDICARE

## 2019-10-12 VITALS
WEIGHT: 168 LBS | TEMPERATURE: 96.2 F | HEART RATE: 62 BPM | OXYGEN SATURATION: 98 % | BODY MASS INDEX: 28.68 KG/M2 | RESPIRATION RATE: 16 BRPM | HEIGHT: 64 IN | DIASTOLIC BLOOD PRESSURE: 79 MMHG | SYSTOLIC BLOOD PRESSURE: 160 MMHG

## 2019-10-12 DIAGNOSIS — N30.00 ACUTE CYSTITIS WITHOUT HEMATURIA: Primary | ICD-10-CM

## 2019-10-12 LAB
ALBUMIN SERPL-MCNC: 4.1 G/DL (ref 3.5–5.2)
ALP BLD-CCNC: 74 U/L (ref 35–104)
ALT SERPL-CCNC: 12 U/L (ref 0–32)
ANION GAP SERPL CALCULATED.3IONS-SCNC: 7 MMOL/L (ref 7–16)
AST SERPL-CCNC: 17 U/L (ref 0–31)
BACTERIA: ABNORMAL /HPF
BASOPHILS ABSOLUTE: 0.06 E9/L (ref 0–0.2)
BASOPHILS RELATIVE PERCENT: 0.9 % (ref 0–2)
BILIRUB SERPL-MCNC: 0.7 MG/DL (ref 0–1.2)
BILIRUBIN URINE: NEGATIVE
BLOOD, URINE: NEGATIVE
BUN BLDV-MCNC: 13 MG/DL (ref 8–23)
CALCIUM SERPL-MCNC: 9.6 MG/DL (ref 8.6–10.2)
CHLORIDE BLD-SCNC: 100 MMOL/L (ref 98–107)
CHP ED QC CHECK: YES
CLARITY: CLEAR
CO2: 28 MMOL/L (ref 22–29)
COLOR: YELLOW
CREAT SERPL-MCNC: 0.7 MG/DL (ref 0.5–1)
EKG ATRIAL RATE: 67 BPM
EKG P AXIS: 36 DEGREES
EKG P-R INTERVAL: 180 MS
EKG Q-T INTERVAL: 396 MS
EKG QRS DURATION: 94 MS
EKG QTC CALCULATION (BAZETT): 418 MS
EKG R AXIS: -5 DEGREES
EKG T AXIS: 12 DEGREES
EKG VENTRICULAR RATE: 67 BPM
EOSINOPHILS ABSOLUTE: 0.1 E9/L (ref 0.05–0.5)
EOSINOPHILS RELATIVE PERCENT: 1.6 % (ref 0–6)
GFR AFRICAN AMERICAN: >60
GFR NON-AFRICAN AMERICAN: >60 ML/MIN/1.73
GLUCOSE BLD-MCNC: 104 MG/DL
GLUCOSE BLD-MCNC: 112 MG/DL (ref 74–99)
GLUCOSE URINE: NEGATIVE MG/DL
HCT VFR BLD CALC: 45.3 % (ref 34–48)
HEMOGLOBIN: 15 G/DL (ref 11.5–15.5)
IMMATURE GRANULOCYTES #: 0 E9/L
IMMATURE GRANULOCYTES %: 0 % (ref 0–5)
INR BLD: 1.1
KETONES, URINE: NEGATIVE MG/DL
LEUKOCYTE ESTERASE, URINE: ABNORMAL
LYMPHOCYTES ABSOLUTE: 2.12 E9/L (ref 1.5–4)
LYMPHOCYTES RELATIVE PERCENT: 33.3 % (ref 20–42)
MAGNESIUM: 2 MG/DL (ref 1.6–2.6)
MCH RBC QN AUTO: 31.1 PG (ref 26–35)
MCHC RBC AUTO-ENTMCNC: 33.1 % (ref 32–34.5)
MCV RBC AUTO: 94 FL (ref 80–99.9)
METER GLUCOSE: 104 MG/DL (ref 74–99)
MONOCYTES ABSOLUTE: 0.58 E9/L (ref 0.1–0.95)
MONOCYTES RELATIVE PERCENT: 9.1 % (ref 2–12)
NEUTROPHILS ABSOLUTE: 3.5 E9/L (ref 1.8–7.3)
NEUTROPHILS RELATIVE PERCENT: 55.1 % (ref 43–80)
NITRITE, URINE: NEGATIVE
PDW BLD-RTO: 11.8 FL (ref 11.5–15)
PH UA: 6.5 (ref 5–9)
PLATELET # BLD: 213 E9/L (ref 130–450)
PMV BLD AUTO: 10.8 FL (ref 7–12)
POTASSIUM SERPL-SCNC: 4.3 MMOL/L (ref 3.5–5)
PROTEIN UA: NEGATIVE MG/DL
PROTHROMBIN TIME: 12.8 SEC (ref 9.3–12.4)
RBC # BLD: 4.82 E12/L (ref 3.5–5.5)
RBC UA: ABNORMAL /HPF (ref 0–2)
REASON FOR REJECTION: NORMAL
REJECTED TEST: NORMAL
SODIUM BLD-SCNC: 135 MMOL/L (ref 132–146)
SPECIFIC GRAVITY UA: <=1.005 (ref 1–1.03)
TOTAL PROTEIN: 7.5 G/DL (ref 6.4–8.3)
TROPONIN: <0.01 NG/ML (ref 0–0.03)
UROBILINOGEN, URINE: 0.2 E.U./DL
WBC # BLD: 6.4 E9/L (ref 4.5–11.5)
WBC UA: ABNORMAL /HPF (ref 0–5)
YEAST: ABNORMAL

## 2019-10-12 PROCEDURE — 82962 GLUCOSE BLOOD TEST: CPT

## 2019-10-12 PROCEDURE — 71046 X-RAY EXAM CHEST 2 VIEWS: CPT

## 2019-10-12 PROCEDURE — 83735 ASSAY OF MAGNESIUM: CPT

## 2019-10-12 PROCEDURE — 80053 COMPREHEN METABOLIC PANEL: CPT

## 2019-10-12 PROCEDURE — 93010 ELECTROCARDIOGRAM REPORT: CPT | Performed by: INTERNAL MEDICINE

## 2019-10-12 PROCEDURE — 85610 PROTHROMBIN TIME: CPT

## 2019-10-12 PROCEDURE — 85025 COMPLETE CBC W/AUTO DIFF WBC: CPT

## 2019-10-12 PROCEDURE — 36415 COLL VENOUS BLD VENIPUNCTURE: CPT

## 2019-10-12 PROCEDURE — 99285 EMERGENCY DEPT VISIT HI MDM: CPT

## 2019-10-12 PROCEDURE — 81001 URINALYSIS AUTO W/SCOPE: CPT

## 2019-10-12 PROCEDURE — 84484 ASSAY OF TROPONIN QUANT: CPT

## 2019-10-12 PROCEDURE — 93005 ELECTROCARDIOGRAM TRACING: CPT | Performed by: PHYSICIAN ASSISTANT

## 2019-10-12 PROCEDURE — 6370000000 HC RX 637 (ALT 250 FOR IP): Performed by: EMERGENCY MEDICINE

## 2019-10-12 RX ORDER — CEPHALEXIN 500 MG/1
500 CAPSULE ORAL 3 TIMES DAILY
Qty: 15 CAPSULE | Refills: 0 | Status: SHIPPED | OUTPATIENT
Start: 2019-10-12 | End: 2019-10-17

## 2019-10-12 RX ORDER — CEPHALEXIN 500 MG/1
500 CAPSULE ORAL ONCE
Status: COMPLETED | OUTPATIENT
Start: 2019-10-12 | End: 2019-10-12

## 2019-10-12 RX ADMIN — CEPHALEXIN 500 MG: 500 CAPSULE ORAL at 22:15

## 2019-10-17 ENCOUNTER — HOSPITAL ENCOUNTER (EMERGENCY)
Age: 74
Discharge: HOME | End: 2019-10-17
Attending: EMERGENCY MEDICINE
Payer: MEDICARE

## 2019-10-17 ENCOUNTER — APPOINTMENT (OUTPATIENT)
Dept: CT IMAGING | Age: 74
End: 2019-10-17
Payer: MEDICARE

## 2019-10-17 VITALS
OXYGEN SATURATION: 96 % | HEIGHT: 64 IN | WEIGHT: 168 LBS | RESPIRATION RATE: 14 BRPM | TEMPERATURE: 97.3 F | SYSTOLIC BLOOD PRESSURE: 147 MMHG | DIASTOLIC BLOOD PRESSURE: 73 MMHG | BODY MASS INDEX: 28.68 KG/M2 | HEART RATE: 64 BPM

## 2019-10-17 DIAGNOSIS — I10 HYPERTENSION, UNSPECIFIED TYPE: Primary | ICD-10-CM

## 2019-10-17 LAB
ALBUMIN SERPL-MCNC: 4.2 G/DL (ref 3.5–5.2)
ALP BLD-CCNC: 70 U/L (ref 35–104)
ALT SERPL-CCNC: 10 U/L (ref 0–32)
ANION GAP SERPL CALCULATED.3IONS-SCNC: 7 MMOL/L (ref 7–16)
AST SERPL-CCNC: 14 U/L (ref 0–31)
BACTERIA: NORMAL /HPF
BASOPHILS ABSOLUTE: 0.06 E9/L (ref 0–0.2)
BASOPHILS RELATIVE PERCENT: 1 % (ref 0–2)
BILIRUB SERPL-MCNC: 0.6 MG/DL (ref 0–1.2)
BILIRUBIN URINE: NEGATIVE
BLOOD, URINE: NEGATIVE
BUN BLDV-MCNC: 14 MG/DL (ref 8–23)
CALCIUM SERPL-MCNC: 9.9 MG/DL (ref 8.6–10.2)
CHLORIDE BLD-SCNC: 101 MMOL/L (ref 98–107)
CLARITY: CLEAR
CO2: 31 MMOL/L (ref 22–29)
COLOR: YELLOW
CREAT SERPL-MCNC: 0.8 MG/DL (ref 0.5–1)
EKG ATRIAL RATE: 61 BPM
EKG P AXIS: 2 DEGREES
EKG P-R INTERVAL: 228 MS
EKG Q-T INTERVAL: 402 MS
EKG QRS DURATION: 94 MS
EKG QTC CALCULATION (BAZETT): 404 MS
EKG R AXIS: 16 DEGREES
EKG T AXIS: 20 DEGREES
EKG VENTRICULAR RATE: 61 BPM
EOSINOPHILS ABSOLUTE: 0.08 E9/L (ref 0.05–0.5)
EOSINOPHILS RELATIVE PERCENT: 1.3 % (ref 0–6)
GFR AFRICAN AMERICAN: >60
GFR NON-AFRICAN AMERICAN: >60 ML/MIN/1.73
GLUCOSE BLD-MCNC: 110 MG/DL (ref 74–99)
GLUCOSE URINE: NEGATIVE MG/DL
HCT VFR BLD CALC: 42.9 % (ref 34–48)
HEMOGLOBIN: 14.6 G/DL (ref 11.5–15.5)
IMMATURE GRANULOCYTES #: 0.01 E9/L
IMMATURE GRANULOCYTES %: 0.2 % (ref 0–5)
KETONES, URINE: NEGATIVE MG/DL
LEUKOCYTE ESTERASE, URINE: ABNORMAL
LYMPHOCYTES ABSOLUTE: 2.33 E9/L (ref 1.5–4)
LYMPHOCYTES RELATIVE PERCENT: 37.7 % (ref 20–42)
MCH RBC QN AUTO: 31.9 PG (ref 26–35)
MCHC RBC AUTO-ENTMCNC: 34 % (ref 32–34.5)
MCV RBC AUTO: 93.7 FL (ref 80–99.9)
MONOCYTES ABSOLUTE: 0.49 E9/L (ref 0.1–0.95)
MONOCYTES RELATIVE PERCENT: 7.9 % (ref 2–12)
NEUTROPHILS ABSOLUTE: 3.21 E9/L (ref 1.8–7.3)
NEUTROPHILS RELATIVE PERCENT: 51.9 % (ref 43–80)
NITRITE, URINE: NEGATIVE
PDW BLD-RTO: 11.8 FL (ref 11.5–15)
PH UA: 7 (ref 5–9)
PLATELET # BLD: 216 E9/L (ref 130–450)
PMV BLD AUTO: 10.5 FL (ref 7–12)
POTASSIUM SERPL-SCNC: 3.7 MMOL/L (ref 3.5–5)
PROTEIN UA: NEGATIVE MG/DL
RBC # BLD: 4.58 E12/L (ref 3.5–5.5)
RBC UA: NORMAL /HPF (ref 0–2)
SODIUM BLD-SCNC: 139 MMOL/L (ref 132–146)
SPECIFIC GRAVITY UA: 1.01 (ref 1–1.03)
TOTAL PROTEIN: 7 G/DL (ref 6.4–8.3)
TROPONIN: <0.01 NG/ML (ref 0–0.03)
UROBILINOGEN, URINE: 0.2 E.U./DL
WBC # BLD: 6.2 E9/L (ref 4.5–11.5)
WBC UA: NORMAL /HPF (ref 0–5)

## 2019-10-17 PROCEDURE — 99284 EMERGENCY DEPT VISIT MOD MDM: CPT

## 2019-10-17 PROCEDURE — 36415 COLL VENOUS BLD VENIPUNCTURE: CPT

## 2019-10-17 PROCEDURE — 81001 URINALYSIS AUTO W/SCOPE: CPT

## 2019-10-17 PROCEDURE — 93010 ELECTROCARDIOGRAM REPORT: CPT | Performed by: INTERNAL MEDICINE

## 2019-10-17 PROCEDURE — 80053 COMPREHEN METABOLIC PANEL: CPT

## 2019-10-17 PROCEDURE — 93005 ELECTROCARDIOGRAM TRACING: CPT | Performed by: PHYSICIAN ASSISTANT

## 2019-10-17 PROCEDURE — 70450 CT HEAD/BRAIN W/O DYE: CPT

## 2019-10-17 PROCEDURE — 84484 ASSAY OF TROPONIN QUANT: CPT

## 2019-10-17 PROCEDURE — 85025 COMPLETE CBC W/AUTO DIFF WBC: CPT

## 2019-10-17 ASSESSMENT — ENCOUNTER SYMPTOMS
DIARRHEA: 0
NAUSEA: 0
WHEEZING: 0
BLOOD IN STOOL: 0
CHEST TIGHTNESS: 0
VOMITING: 0
EYE PAIN: 0
ALLERGIC/IMMUNOLOGIC NEGATIVE: 1
STRIDOR: 0
ABDOMINAL PAIN: 0
BACK PAIN: 0
CONSTIPATION: 0
COUGH: 0
CHOKING: 0
COLOR CHANGE: 0

## 2019-10-17 ASSESSMENT — PAIN SCALES - GENERAL
PAINLEVEL_OUTOF10: 3
PAINLEVEL_OUTOF10: 0

## 2019-10-29 ENCOUNTER — OFFICE VISIT (OUTPATIENT)
Dept: NEUROLOGY | Age: 74
End: 2019-10-29
Payer: MEDICARE

## 2019-10-29 VITALS
WEIGHT: 158 LBS | RESPIRATION RATE: 17 BRPM | HEART RATE: 63 BPM | HEIGHT: 64 IN | DIASTOLIC BLOOD PRESSURE: 80 MMHG | BODY MASS INDEX: 26.98 KG/M2 | SYSTOLIC BLOOD PRESSURE: 135 MMHG | OXYGEN SATURATION: 96 %

## 2019-10-29 DIAGNOSIS — G25.0 ESSENTIAL TREMOR: Primary | ICD-10-CM

## 2019-10-29 DIAGNOSIS — F41.9 ANXIETY: ICD-10-CM

## 2019-10-29 PROCEDURE — 1090F PRES/ABSN URINE INCON ASSESS: CPT | Performed by: PSYCHIATRY & NEUROLOGY

## 2019-10-29 PROCEDURE — G8427 DOCREV CUR MEDS BY ELIG CLIN: HCPCS | Performed by: PSYCHIATRY & NEUROLOGY

## 2019-10-29 PROCEDURE — 3017F COLORECTAL CA SCREEN DOC REV: CPT | Performed by: PSYCHIATRY & NEUROLOGY

## 2019-10-29 PROCEDURE — 99215 OFFICE O/P EST HI 40 MIN: CPT | Performed by: PSYCHIATRY & NEUROLOGY

## 2019-10-29 PROCEDURE — G8484 FLU IMMUNIZE NO ADMIN: HCPCS | Performed by: PSYCHIATRY & NEUROLOGY

## 2019-10-29 PROCEDURE — 1123F ACP DISCUSS/DSCN MKR DOCD: CPT | Performed by: PSYCHIATRY & NEUROLOGY

## 2019-10-29 PROCEDURE — 4040F PNEUMOC VAC/ADMIN/RCVD: CPT | Performed by: PSYCHIATRY & NEUROLOGY

## 2019-10-29 PROCEDURE — G8598 ASA/ANTIPLAT THER USED: HCPCS | Performed by: PSYCHIATRY & NEUROLOGY

## 2019-10-29 PROCEDURE — 4004F PT TOBACCO SCREEN RCVD TLK: CPT | Performed by: PSYCHIATRY & NEUROLOGY

## 2019-10-29 PROCEDURE — G8400 PT W/DXA NO RESULTS DOC: HCPCS | Performed by: PSYCHIATRY & NEUROLOGY

## 2019-10-29 PROCEDURE — G8417 CALC BMI ABV UP PARAM F/U: HCPCS | Performed by: PSYCHIATRY & NEUROLOGY

## 2019-10-29 RX ORDER — PROPRANOLOL HYDROCHLORIDE 20 MG/1
20 TABLET ORAL 2 TIMES DAILY
Qty: 180 TABLET | Refills: 3 | Status: SHIPPED
Start: 2019-10-29 | End: 2020-05-07 | Stop reason: SDUPTHER

## 2019-11-27 ENCOUNTER — HOSPITAL ENCOUNTER (OUTPATIENT)
Age: 74
Discharge: HOME | End: 2019-11-27
Payer: MEDICARE

## 2019-11-27 LAB
ALBUMIN SERPL-MCNC: 3.9 G/DL (ref 3.5–5.2)
ALP BLD-CCNC: 67 U/L (ref 35–104)
ALT SERPL-CCNC: 10 U/L (ref 0–32)
ANION GAP SERPL CALCULATED.3IONS-SCNC: 10 MMOL/L (ref 7–16)
AST SERPL-CCNC: 11 U/L (ref 0–31)
BASOPHILS ABSOLUTE: 0.07 E9/L (ref 0–0.2)
BASOPHILS RELATIVE PERCENT: 1.3 % (ref 0–2)
BILIRUB SERPL-MCNC: 0.6 MG/DL (ref 0–1.2)
BUN BLDV-MCNC: 13 MG/DL (ref 8–23)
CALCIUM SERPL-MCNC: 9.3 MG/DL (ref 8.6–10.2)
CHLORIDE BLD-SCNC: 105 MMOL/L (ref 98–107)
CHOLESTEROL, FASTING: 144 MG/DL (ref 0–199)
CO2: 29 MMOL/L (ref 22–29)
CREAT SERPL-MCNC: 0.7 MG/DL (ref 0.5–1)
EOSINOPHILS ABSOLUTE: 0.11 E9/L (ref 0.05–0.5)
EOSINOPHILS RELATIVE PERCENT: 2.1 % (ref 0–6)
GFR AFRICAN AMERICAN: >60
GFR NON-AFRICAN AMERICAN: >60 ML/MIN/1.73
GLUCOSE FASTING: 138 MG/DL (ref 74–99)
HBA1C MFR BLD: 6 % (ref 4–5.6)
HCT VFR BLD CALC: 43.4 % (ref 34–48)
HDLC SERPL-MCNC: 65 MG/DL
HEMOGLOBIN: 14.3 G/DL (ref 11.5–15.5)
IMMATURE GRANULOCYTES #: 0.01 E9/L
IMMATURE GRANULOCYTES %: 0.2 % (ref 0–5)
LDL CHOLESTEROL CALCULATED: 66 MG/DL (ref 0–99)
LYMPHOCYTES ABSOLUTE: 1.77 E9/L (ref 1.5–4)
LYMPHOCYTES RELATIVE PERCENT: 33.8 % (ref 20–42)
MAGNESIUM: 2 MG/DL (ref 1.6–2.6)
MCH RBC QN AUTO: 31.6 PG (ref 26–35)
MCHC RBC AUTO-ENTMCNC: 32.9 % (ref 32–34.5)
MCV RBC AUTO: 95.8 FL (ref 80–99.9)
MONOCYTES ABSOLUTE: 0.5 E9/L (ref 0.1–0.95)
MONOCYTES RELATIVE PERCENT: 9.5 % (ref 2–12)
NEUTROPHILS ABSOLUTE: 2.78 E9/L (ref 1.8–7.3)
NEUTROPHILS RELATIVE PERCENT: 53.1 % (ref 43–80)
PDW BLD-RTO: 12.4 FL (ref 11.5–15)
PLATELET # BLD: 185 E9/L (ref 130–450)
PMV BLD AUTO: 10.5 FL (ref 7–12)
POTASSIUM SERPL-SCNC: 4 MMOL/L (ref 3.5–5)
RBC # BLD: 4.53 E12/L (ref 3.5–5.5)
SODIUM BLD-SCNC: 144 MMOL/L (ref 132–146)
TOTAL PROTEIN: 6.8 G/DL (ref 6.4–8.3)
TRIGLYCERIDE, FASTING: 63 MG/DL (ref 0–149)
VLDLC SERPL CALC-MCNC: 13 MG/DL
WBC # BLD: 5.2 E9/L (ref 4.5–11.5)

## 2019-11-27 PROCEDURE — 83036 HEMOGLOBIN GLYCOSYLATED A1C: CPT

## 2019-11-27 PROCEDURE — 80053 COMPREHEN METABOLIC PANEL: CPT

## 2019-11-27 PROCEDURE — 83735 ASSAY OF MAGNESIUM: CPT

## 2019-11-27 PROCEDURE — 85025 COMPLETE CBC W/AUTO DIFF WBC: CPT

## 2019-11-27 PROCEDURE — 80061 LIPID PANEL: CPT

## 2019-11-27 PROCEDURE — 36415 COLL VENOUS BLD VENIPUNCTURE: CPT

## 2019-12-02 ENCOUNTER — TELEPHONE (OUTPATIENT)
Dept: NEUROLOGY | Age: 74
End: 2019-12-02

## 2019-12-05 ENCOUNTER — OFFICE VISIT (OUTPATIENT)
Dept: NEUROLOGY | Age: 74
End: 2019-12-05
Payer: MEDICARE

## 2019-12-05 VITALS
DIASTOLIC BLOOD PRESSURE: 76 MMHG | RESPIRATION RATE: 17 BRPM | OXYGEN SATURATION: 99 % | HEART RATE: 60 BPM | BODY MASS INDEX: 27.14 KG/M2 | HEIGHT: 64 IN | SYSTOLIC BLOOD PRESSURE: 137 MMHG | WEIGHT: 159 LBS

## 2019-12-05 DIAGNOSIS — F41.9 ANXIETY: ICD-10-CM

## 2019-12-05 DIAGNOSIS — G25.0 ESSENTIAL TREMOR: Primary | ICD-10-CM

## 2019-12-05 PROCEDURE — G8417 CALC BMI ABV UP PARAM F/U: HCPCS | Performed by: PSYCHIATRY & NEUROLOGY

## 2019-12-05 PROCEDURE — 4040F PNEUMOC VAC/ADMIN/RCVD: CPT | Performed by: PSYCHIATRY & NEUROLOGY

## 2019-12-05 PROCEDURE — G8400 PT W/DXA NO RESULTS DOC: HCPCS | Performed by: PSYCHIATRY & NEUROLOGY

## 2019-12-05 PROCEDURE — 1123F ACP DISCUSS/DSCN MKR DOCD: CPT | Performed by: PSYCHIATRY & NEUROLOGY

## 2019-12-05 PROCEDURE — 1090F PRES/ABSN URINE INCON ASSESS: CPT | Performed by: PSYCHIATRY & NEUROLOGY

## 2019-12-05 PROCEDURE — 3017F COLORECTAL CA SCREEN DOC REV: CPT | Performed by: PSYCHIATRY & NEUROLOGY

## 2019-12-05 PROCEDURE — 99215 OFFICE O/P EST HI 40 MIN: CPT | Performed by: PSYCHIATRY & NEUROLOGY

## 2019-12-05 PROCEDURE — G8598 ASA/ANTIPLAT THER USED: HCPCS | Performed by: PSYCHIATRY & NEUROLOGY

## 2019-12-05 PROCEDURE — G8484 FLU IMMUNIZE NO ADMIN: HCPCS | Performed by: PSYCHIATRY & NEUROLOGY

## 2019-12-05 PROCEDURE — 4004F PT TOBACCO SCREEN RCVD TLK: CPT | Performed by: PSYCHIATRY & NEUROLOGY

## 2019-12-05 PROCEDURE — G8427 DOCREV CUR MEDS BY ELIG CLIN: HCPCS | Performed by: PSYCHIATRY & NEUROLOGY

## 2020-02-10 RX ORDER — FOLIC ACID 0.8 MG
TABLET ORAL DAILY
COMMUNITY

## 2020-02-11 ENCOUNTER — HOSPITAL ENCOUNTER (EMERGENCY)
Age: 75
Discharge: HOME | End: 2020-02-11
Attending: EMERGENCY MEDICINE
Payer: MEDICARE

## 2020-02-11 VITALS
SYSTOLIC BLOOD PRESSURE: 126 MMHG | HEIGHT: 64 IN | DIASTOLIC BLOOD PRESSURE: 80 MMHG | WEIGHT: 152 LBS | OXYGEN SATURATION: 96 % | RESPIRATION RATE: 20 BRPM | TEMPERATURE: 97.6 F | BODY MASS INDEX: 25.95 KG/M2 | HEART RATE: 60 BPM

## 2020-02-11 PROCEDURE — 99283 EMERGENCY DEPT VISIT LOW MDM: CPT

## 2020-02-11 ASSESSMENT — ENCOUNTER SYMPTOMS
ABDOMINAL DISTENTION: 0
BACK PAIN: 0
EYE REDNESS: 0
SORE THROAT: 0
SHORTNESS OF BREATH: 0
NAUSEA: 0
VOMITING: 0
SINUS PRESSURE: 0
EYE PAIN: 0
EYE DISCHARGE: 0
DIARRHEA: 0
WHEEZING: 0
COUGH: 0

## 2020-02-11 NOTE — ED PROVIDER NOTES
Patient is a 20-year-old female with past medical history of hypercholesteremia, bradycardia, hypertension, cerebral atherosclerosis presenting to the emergency department for hypertension. States she takes her blood pressure regularly at home and has noticed that has been running high. Today she noticed it was 181/100 which worried her. She does have clonidine at home to take when her blood pressures over 501 systolic. She states she did take this. She states she was nervous it was that high so wanted to be evaluated emergency department. She has [place] no other complaints at this time other than her blood pressure being high. She states when she arrived here, her blood pressure was in the 140s and she \"felt stupid for coming in\". She denies any headaches, blurry vision, changes in vision, chest pain, shortness of breath, abdominal pain, urinary complaints, decreased urination. She is on propranolol at home for an essential tremor and states that her normal dose is now spread out over 4 times in the day rather than the initial 2 times it was. Patient states she has been watching her blood pressure more closely and checking it multiple times a day because she is worried that will be high. Review of Systems   Constitutional: Negative for chills and fever. HENT: Negative for ear pain, sinus pressure and sore throat. Eyes: Negative for pain, discharge and redness. Respiratory: Negative for cough, shortness of breath and wheezing. Cardiovascular: Negative for chest pain. Gastrointestinal: Negative for abdominal distention, diarrhea, nausea and vomiting. Endocrine: Negative for polydipsia and polyuria. Genitourinary: Negative for decreased urine volume, dysuria, enuresis, flank pain and frequency. Musculoskeletal: Negative for arthralgias and back pain. Skin: Negative for rash and wound. Neurological: Negative for weakness and headaches.    Hematological: Negative for adenopathy. All other systems reviewed and are negative. Physical Exam  Vitals signs and nursing note reviewed. Constitutional:       Appearance: She is well-developed. HENT:      Head: Normocephalic and atraumatic. Mouth/Throat:      Mouth: Mucous membranes are moist.   Eyes:      General: No scleral icterus. Extraocular Movements: Extraocular movements intact. Conjunctiva/sclera: Conjunctivae normal.      Pupils: Pupils are equal, round, and reactive to light. Neck:      Musculoskeletal: Normal range of motion and neck supple. Cardiovascular:      Rate and Rhythm: Normal rate and regular rhythm. Pulses: Normal pulses. Heart sounds: No murmur. Pulmonary:      Effort: Pulmonary effort is normal. No respiratory distress. Breath sounds: Normal breath sounds. No wheezing or rales. Abdominal:      General: Bowel sounds are normal.      Palpations: Abdomen is soft. Tenderness: There is no abdominal tenderness. There is no guarding or rebound. Musculoskeletal: Normal range of motion. General: No swelling or tenderness. Right lower leg: No edema. Left lower leg: No edema. Skin:     General: Skin is warm and dry. Capillary Refill: Capillary refill takes less than 2 seconds. Neurological:      General: No focal deficit present. Mental Status: She is alert and oriented to person, place, and time. Cranial Nerves: No cranial nerve deficit. Sensory: No sensory deficit. Motor: No weakness. Coordination: Coordination normal.      Gait: Gait normal.          Procedures     MDM   Patient presented emergency department for hypertension. Blood pressure on arrival was 142/75. Patient absolutely no complaints while in the department. Shared decision making had with the patient about lab work and work-up on the emergency department.   Patient states she does not know why we would do lab work here if her PCP gave her a

## 2020-02-12 ENCOUNTER — HOSPITAL ENCOUNTER (OUTPATIENT)
Age: 75
Discharge: HOME | End: 2020-02-12
Payer: MEDICARE

## 2020-02-12 LAB
ALBUMIN SERPL-MCNC: 4.1 G/DL (ref 3.5–5.2)
ALP BLD-CCNC: 71 U/L (ref 35–104)
ALT SERPL-CCNC: 10 U/L (ref 0–32)
ANION GAP SERPL CALCULATED.3IONS-SCNC: 13 MMOL/L (ref 7–16)
AST SERPL-CCNC: 13 U/L (ref 0–31)
BASOPHILS ABSOLUTE: 0.06 E9/L (ref 0–0.2)
BASOPHILS RELATIVE PERCENT: 0.8 % (ref 0–2)
BILIRUB SERPL-MCNC: 1.1 MG/DL (ref 0–1.2)
BUN BLDV-MCNC: 14 MG/DL (ref 8–23)
CALCIUM SERPL-MCNC: 9.8 MG/DL (ref 8.6–10.2)
CHLORIDE BLD-SCNC: 97 MMOL/L (ref 98–107)
CHOLESTEROL, TOTAL: 147 MG/DL (ref 0–199)
CO2: 27 MMOL/L (ref 22–29)
CREAT SERPL-MCNC: 0.7 MG/DL (ref 0.5–1)
EOSINOPHILS ABSOLUTE: 0.07 E9/L (ref 0.05–0.5)
EOSINOPHILS RELATIVE PERCENT: 0.9 % (ref 0–6)
GFR AFRICAN AMERICAN: >60
GFR NON-AFRICAN AMERICAN: >60 ML/MIN/1.73
GLUCOSE BLD-MCNC: 160 MG/DL (ref 74–99)
HBA1C MFR BLD: 6 % (ref 4–5.6)
HCT VFR BLD CALC: 45.9 % (ref 34–48)
HDLC SERPL-MCNC: 65 MG/DL
HEMOGLOBIN: 15.6 G/DL (ref 11.5–15.5)
IMMATURE GRANULOCYTES #: 0.03 E9/L
IMMATURE GRANULOCYTES %: 0.4 % (ref 0–5)
LDL CHOLESTEROL CALCULATED: 65 MG/DL (ref 0–99)
LYMPHOCYTES ABSOLUTE: 1.59 E9/L (ref 1.5–4)
LYMPHOCYTES RELATIVE PERCENT: 21.1 % (ref 20–42)
MAGNESIUM: 2 MG/DL (ref 1.6–2.6)
MCH RBC QN AUTO: 31.6 PG (ref 26–35)
MCHC RBC AUTO-ENTMCNC: 34 % (ref 32–34.5)
MCV RBC AUTO: 92.9 FL (ref 80–99.9)
MONOCYTES ABSOLUTE: 0.46 E9/L (ref 0.1–0.95)
MONOCYTES RELATIVE PERCENT: 6.1 % (ref 2–12)
NEUTROPHILS ABSOLUTE: 5.31 E9/L (ref 1.8–7.3)
NEUTROPHILS RELATIVE PERCENT: 70.7 % (ref 43–80)
PDW BLD-RTO: 12.1 FL (ref 11.5–15)
PLATELET # BLD: 204 E9/L (ref 130–450)
PMV BLD AUTO: 10.5 FL (ref 7–12)
POTASSIUM SERPL-SCNC: 3.7 MMOL/L (ref 3.5–5)
RBC # BLD: 4.94 E12/L (ref 3.5–5.5)
SODIUM BLD-SCNC: 137 MMOL/L (ref 132–146)
TOTAL PROTEIN: 7 G/DL (ref 6.4–8.3)
TRIGL SERPL-MCNC: 85 MG/DL (ref 0–149)
VLDLC SERPL CALC-MCNC: 17 MG/DL
WBC # BLD: 7.5 E9/L (ref 4.5–11.5)

## 2020-02-12 PROCEDURE — 85025 COMPLETE CBC W/AUTO DIFF WBC: CPT

## 2020-02-12 PROCEDURE — 83735 ASSAY OF MAGNESIUM: CPT

## 2020-02-12 PROCEDURE — 80061 LIPID PANEL: CPT

## 2020-02-12 PROCEDURE — 80053 COMPREHEN METABOLIC PANEL: CPT

## 2020-02-12 PROCEDURE — 36415 COLL VENOUS BLD VENIPUNCTURE: CPT

## 2020-02-12 PROCEDURE — 83036 HEMOGLOBIN GLYCOSYLATED A1C: CPT

## 2020-02-25 ENCOUNTER — OFFICE VISIT (OUTPATIENT)
Dept: NEUROLOGY | Age: 75
End: 2020-02-25
Payer: MEDICARE

## 2020-02-25 VITALS
SYSTOLIC BLOOD PRESSURE: 113 MMHG | DIASTOLIC BLOOD PRESSURE: 77 MMHG | HEART RATE: 69 BPM | HEIGHT: 62 IN | OXYGEN SATURATION: 95 % | RESPIRATION RATE: 18 BRPM | TEMPERATURE: 97.7 F | WEIGHT: 152 LBS | BODY MASS INDEX: 27.97 KG/M2

## 2020-02-25 PROCEDURE — 99215 OFFICE O/P EST HI 40 MIN: CPT | Performed by: PSYCHIATRY & NEUROLOGY

## 2020-02-25 PROCEDURE — 4040F PNEUMOC VAC/ADMIN/RCVD: CPT | Performed by: PSYCHIATRY & NEUROLOGY

## 2020-02-25 PROCEDURE — G8417 CALC BMI ABV UP PARAM F/U: HCPCS | Performed by: PSYCHIATRY & NEUROLOGY

## 2020-02-25 PROCEDURE — G8400 PT W/DXA NO RESULTS DOC: HCPCS | Performed by: PSYCHIATRY & NEUROLOGY

## 2020-02-25 PROCEDURE — G8484 FLU IMMUNIZE NO ADMIN: HCPCS | Performed by: PSYCHIATRY & NEUROLOGY

## 2020-02-25 PROCEDURE — 1123F ACP DISCUSS/DSCN MKR DOCD: CPT | Performed by: PSYCHIATRY & NEUROLOGY

## 2020-02-25 PROCEDURE — G8428 CUR MEDS NOT DOCUMENT: HCPCS | Performed by: PSYCHIATRY & NEUROLOGY

## 2020-02-25 PROCEDURE — 4004F PT TOBACCO SCREEN RCVD TLK: CPT | Performed by: PSYCHIATRY & NEUROLOGY

## 2020-02-25 PROCEDURE — 1090F PRES/ABSN URINE INCON ASSESS: CPT | Performed by: PSYCHIATRY & NEUROLOGY

## 2020-02-25 PROCEDURE — 3017F COLORECTAL CA SCREEN DOC REV: CPT | Performed by: PSYCHIATRY & NEUROLOGY

## 2020-02-25 RX ORDER — HYDROCHLOROTHIAZIDE 25 MG/1
25 TABLET ORAL DAILY
COMMUNITY

## 2020-02-25 NOTE — PROGRESS NOTES
dosing. Her blood pressures were now well controlled. She was rarely driving at present. She was sleeping better. She still played computer games late at night. She was eating well. She still smoked 5 cigarettes daily. She again denied weight changes.     Review of systems was otherwise unremarkable. Objective:      /77 (Site: Left Upper Arm, Position: Sitting, Cuff Size: Medium Adult)   Pulse 69  Temp 98.2 °F (36.8 °C) (Oral)   Resp 12   Ht 5' 4\" (1.626 m)   Wt 157 lb (71.2 kg)   SpO2 96%   BMI 26.95 kg/m²      General Appearance: alert, cooperative, she remained anxious               Neck:  limited ROM -----no cogwheeling              Lungs: clear to auscultation; respirations unlabored; a well-healed left chest pacemaker site               Heart: regular rate and rhythm, S1 and S2 normal, no murmurs, rub or gallops               Extremities: trace ankle edema; brawny induration of both ankles and feet              Pulses: 1+ in all limbs     Mental Status: alert; oriented x4-----she remained anxious, with moderate vocal quivering again  I again found no dysarthrias or aphasias; she now asked the same question multiple times     Cranial Nerves:  No abnormalities were found    Motor:  5/5 throughout  Minimal horizontal tremors of the head and minimal postural tremors of both hands     Sensory:  Light touch was intact  Pinprick was decreased to both mid calves  Vibratory was moderately impaired at both ankles     Coordination:   There were no ataxic movements     Gait:  She walked well     DTR:   I found no reflexes  There were no pathological ones    Her neurological examination revealed unchanged vocal quivering and increased horizontal head tremors and postural hand tremors.     Laboratory/Radiology:      A recent CMP was unremarkable as well as a CBC with differential.     Assessment:      The patient suffers from late life tremors involving her voice, head and hands.    She continues on low-dose propranolol at 10 mg 4 times daily, but now reportedly without effect. She also reports vague left upper quadrant discomforts, unlikely related to propranolol. I still question the role of diazepam 5 mg 3 times daily to treat her anxiety. These remain high doses for this benzodiazepine and may be contributing to her lethargy and inattention. In addition, diazepam can be used to treat tremors. I again recommend reducing and/or stopping this medication and using increasing doses of Paxil---or Zoloft. Hopefully, she will discuss these recommendations with her psychologist.    In the interim, I will gradually propranolol. She will now take 10 mg 3 times daily. She report back in 1 month, when additional decreases will be considered. She was more inattentive today, requiring multiple answers to the same questions. Once again, the high-dose diazepam may be contributing. However, there may be underlying, early dementia. Her erratic sleep habits and eating habits are also contributing. I again recommended retiring at midnight every night---and watching television only in the family room. She was also eat 3 meals on a regular basis.     She is stable medically---status post pacemaker insertion. .  She must stop smoking. Plan:      Propranolol was decreased to 10 mg 3 times daily. See report back in 1 month. She will modify her eating and sleeping habits. Hopefully, she will stop smoking. I again recommended reducing diazepam and increasing Paxil or adding Zoloft. She will call at any time if problems arise. She will return in 4 months    I spent 40 minutes with the patient with over 50 % spent in counseling and disease management.   All patient issues were addressed and all questions were answered.

## 2020-02-27 ENCOUNTER — TELEPHONE (OUTPATIENT)
Dept: NEUROLOGY | Age: 75
End: 2020-02-27

## 2020-02-28 NOTE — TELEPHONE ENCOUNTER
Yes, please have her continue with 1/2 tablet 4 times daily for now. Please have her call back if additional problems arise.   Thank you

## 2020-03-05 ENCOUNTER — TELEPHONE (OUTPATIENT)
Dept: NEUROLOGY | Age: 75
End: 2020-03-05

## 2020-03-05 NOTE — TELEPHONE ENCOUNTER
Once again, she can decrease her propranolol to 3 times daily, from 4 times daily. She will not go through withdrawal with this reduction.   However, if she so wishes, she may call her cardiologist.  Thank you

## 2020-03-05 NOTE — TELEPHONE ENCOUNTER
Patient called in asking if she would have withdrawals going from four half tablets to 3 half tables. Informed her that if Dr. Enrique Min told her on 2/25/2020 it was ok to take them three times a day, she should be ok. It was hard to make sense of what she was trying to tell me. She kept asking me if she should get a hold of her cardiologist and that she could not go to the ER because they do not treat tremors. She said her hands and feet are cold all the time. She stated that she needed to talk to her psychologist about her Paxil and diazepam but did not think that they would want to decrease her diazepam or increase her Paxil, per her 2/25/2020 office note. Informed her that this would be forwarded to Dr. Enrique Min for advisement.

## 2020-03-10 NOTE — TELEPHONE ENCOUNTER
Called patient and notified her ok to decrease Propanolol from 4 to 3 times daily.   Electronically signed by Radha Duenas MA on 3/10/20 at 1:39 PM EDT

## 2020-05-07 RX ORDER — PROPRANOLOL HYDROCHLORIDE 20 MG/1
20 TABLET ORAL 4 TIMES DAILY
Qty: 120 TABLET | Refills: 11 | Status: SHIPPED
Start: 2020-05-07 | End: 2020-05-07 | Stop reason: SDUPTHER

## 2020-05-07 RX ORDER — PROPRANOLOL HYDROCHLORIDE 10 MG/1
10 TABLET ORAL 3 TIMES DAILY
Qty: 90 TABLET | Refills: 5 | Status: SHIPPED
Start: 2020-05-07 | End: 2020-10-12

## 2020-05-07 NOTE — TELEPHONE ENCOUNTER
Patient called in stating she is changing pharmacy from [facility] to [facility]ivery. She needs new script sent for her Propranolol. She currently is on 20 mg and is splitting them in half and taking half tablet TID. She states when she does this, sometimes her tablet breaks in to pieces. Can we change dosage from 20 MG to 10 MG so she does not have to split them in half. Put in new order for signature from Dr. Monica Dumont. Please advise.      Please sign new order

## 2020-06-23 ENCOUNTER — OFFICE VISIT (OUTPATIENT)
Dept: NEUROLOGY | Age: 75
End: 2020-06-23
Payer: MEDICARE

## 2020-06-23 VITALS
BODY MASS INDEX: 27.4 KG/M2 | TEMPERATURE: 97.2 F | SYSTOLIC BLOOD PRESSURE: 132 MMHG | DIASTOLIC BLOOD PRESSURE: 87 MMHG | WEIGHT: 148.9 LBS | OXYGEN SATURATION: 94 % | HEIGHT: 62 IN | HEART RATE: 63 BPM

## 2020-06-23 PROCEDURE — G8427 DOCREV CUR MEDS BY ELIG CLIN: HCPCS | Performed by: PSYCHIATRY & NEUROLOGY

## 2020-06-23 PROCEDURE — G8417 CALC BMI ABV UP PARAM F/U: HCPCS | Performed by: PSYCHIATRY & NEUROLOGY

## 2020-06-23 PROCEDURE — 4040F PNEUMOC VAC/ADMIN/RCVD: CPT | Performed by: PSYCHIATRY & NEUROLOGY

## 2020-06-23 PROCEDURE — 3017F COLORECTAL CA SCREEN DOC REV: CPT | Performed by: PSYCHIATRY & NEUROLOGY

## 2020-06-23 PROCEDURE — 99215 OFFICE O/P EST HI 40 MIN: CPT | Performed by: PSYCHIATRY & NEUROLOGY

## 2020-06-23 PROCEDURE — 1090F PRES/ABSN URINE INCON ASSESS: CPT | Performed by: PSYCHIATRY & NEUROLOGY

## 2020-06-23 PROCEDURE — 4004F PT TOBACCO SCREEN RCVD TLK: CPT | Performed by: PSYCHIATRY & NEUROLOGY

## 2020-06-23 PROCEDURE — 1123F ACP DISCUSS/DSCN MKR DOCD: CPT | Performed by: PSYCHIATRY & NEUROLOGY

## 2020-06-23 PROCEDURE — G8400 PT W/DXA NO RESULTS DOC: HCPCS | Performed by: PSYCHIATRY & NEUROLOGY

## 2020-09-03 ENCOUNTER — HOSPITAL ENCOUNTER (OUTPATIENT)
Dept: MAMMOGRAPHY | Age: 75
Discharge: HOME | End: 2020-09-05
Payer: MEDICARE

## 2020-09-03 ENCOUNTER — HOSPITAL ENCOUNTER (OUTPATIENT)
Age: 75
Discharge: HOME | End: 2020-09-03
Payer: MEDICARE

## 2020-09-03 LAB
ALBUMIN SERPL-MCNC: 3.6 G/DL (ref 3.5–5.2)
ALP BLD-CCNC: 84 U/L (ref 35–104)
ALT SERPL-CCNC: 8 U/L (ref 0–32)
ANION GAP SERPL CALCULATED.3IONS-SCNC: 12 MMOL/L (ref 7–16)
AST SERPL-CCNC: 11 U/L (ref 0–31)
BACTERIA: ABNORMAL /HPF
BASOPHILS ABSOLUTE: 0.06 E9/L (ref 0–0.2)
BASOPHILS RELATIVE PERCENT: 1 % (ref 0–2)
BILIRUB SERPL-MCNC: 0.6 MG/DL (ref 0–1.2)
BILIRUBIN URINE: NEGATIVE
BLOOD, URINE: ABNORMAL
BUN BLDV-MCNC: 21 MG/DL (ref 8–23)
CALCIUM SERPL-MCNC: 9 MG/DL (ref 8.6–10.2)
CHLORIDE BLD-SCNC: 100 MMOL/L (ref 98–107)
CHOLESTEROL, TOTAL: 160 MG/DL (ref 0–199)
CLARITY: CLEAR
CO2: 26 MMOL/L (ref 22–29)
COLOR: YELLOW
CREAT SERPL-MCNC: 0.7 MG/DL (ref 0.5–1)
EOSINOPHILS ABSOLUTE: 0.11 E9/L (ref 0.05–0.5)
EOSINOPHILS RELATIVE PERCENT: 1.8 % (ref 0–6)
EPITHELIAL CELLS, UA: ABNORMAL /HPF
GFR AFRICAN AMERICAN: >60
GFR NON-AFRICAN AMERICAN: >60 ML/MIN/1.73
GLUCOSE BLD-MCNC: 125 MG/DL (ref 74–99)
GLUCOSE URINE: NEGATIVE MG/DL
HBA1C MFR BLD: 6.2 % (ref 4–5.6)
HCT VFR BLD CALC: 43.4 % (ref 34–48)
HDLC SERPL-MCNC: 66 MG/DL
HEMOGLOBIN: 13.8 G/DL (ref 11.5–15.5)
IMMATURE GRANULOCYTES #: 0 E9/L
IMMATURE GRANULOCYTES %: 0 % (ref 0–5)
KETONES, URINE: NEGATIVE MG/DL
LDL CHOLESTEROL CALCULATED: 83 MG/DL (ref 0–99)
LEUKOCYTE ESTERASE, URINE: NEGATIVE
LYMPHOCYTES ABSOLUTE: 2.04 E9/L (ref 1.5–4)
LYMPHOCYTES RELATIVE PERCENT: 32.7 % (ref 20–42)
MAGNESIUM: 2 MG/DL (ref 1.6–2.6)
MCH RBC QN AUTO: 32 PG (ref 26–35)
MCHC RBC AUTO-ENTMCNC: 31.8 % (ref 32–34.5)
MCV RBC AUTO: 100.7 FL (ref 80–99.9)
MONOCYTES ABSOLUTE: 0.47 E9/L (ref 0.1–0.95)
MONOCYTES RELATIVE PERCENT: 7.5 % (ref 2–12)
NEUTROPHILS ABSOLUTE: 3.56 E9/L (ref 1.8–7.3)
NEUTROPHILS RELATIVE PERCENT: 57 % (ref 43–80)
NITRITE, URINE: NEGATIVE
PDW BLD-RTO: 12.1 FL (ref 11.5–15)
PH UA: 5.5 (ref 5–9)
PLATELET # BLD: 241 E9/L (ref 130–450)
PMV BLD AUTO: 9.9 FL (ref 7–12)
POTASSIUM SERPL-SCNC: 3.9 MMOL/L (ref 3.5–5)
PROTEIN UA: NEGATIVE MG/DL
RBC # BLD: 4.31 E12/L (ref 3.5–5.5)
RBC UA: ABNORMAL /HPF (ref 0–2)
SODIUM BLD-SCNC: 138 MMOL/L (ref 132–146)
SPECIFIC GRAVITY UA: 1.02 (ref 1–1.03)
TOTAL PROTEIN: 6.8 G/DL (ref 6.4–8.3)
TRIGL SERPL-MCNC: 56 MG/DL (ref 0–149)
UROBILINOGEN, URINE: 0.2 E.U./DL
VLDLC SERPL CALC-MCNC: 11 MG/DL
WBC # BLD: 6.2 E9/L (ref 4.5–11.5)
WBC UA: ABNORMAL /HPF (ref 0–5)

## 2020-09-03 PROCEDURE — 36415 COLL VENOUS BLD VENIPUNCTURE: CPT

## 2020-09-03 PROCEDURE — 85025 COMPLETE CBC W/AUTO DIFF WBC: CPT

## 2020-09-03 PROCEDURE — 80061 LIPID PANEL: CPT

## 2020-09-03 PROCEDURE — 83735 ASSAY OF MAGNESIUM: CPT

## 2020-09-03 PROCEDURE — 80053 COMPREHEN METABOLIC PANEL: CPT

## 2020-09-03 PROCEDURE — 77067 SCR MAMMO BI INCL CAD: CPT

## 2020-09-03 PROCEDURE — 81001 URINALYSIS AUTO W/SCOPE: CPT

## 2020-09-03 PROCEDURE — 83036 HEMOGLOBIN GLYCOSYLATED A1C: CPT

## 2020-10-12 RX ORDER — PROPRANOLOL HYDROCHLORIDE 10 MG/1
TABLET ORAL
Qty: 270 TABLET | Refills: 0 | Status: SHIPPED
Start: 2020-10-12 | End: 2020-12-30 | Stop reason: SDUPTHER

## 2020-10-22 ENCOUNTER — TELEPHONE (OUTPATIENT)
Dept: NEUROLOGY | Age: 75
End: 2020-10-22

## 2020-10-22 NOTE — TELEPHONE ENCOUNTER
Pt called stating Propranolol Rx was never received from [facility]. MA contacted [facility] who stated Rx was mailed 10/13 and advised pt contact them at [phone] regarding re-delivery. Pt informed and understood.   Electronically signed by Antonio Barrera on 10/22/20 at 11:13 AM EDT

## 2020-12-15 ENCOUNTER — OFFICE VISIT (OUTPATIENT)
Dept: NEUROLOGY | Age: 75
End: 2020-12-15
Payer: MEDICARE

## 2020-12-15 VITALS
HEART RATE: 68 BPM | BODY MASS INDEX: 30.36 KG/M2 | DIASTOLIC BLOOD PRESSURE: 74 MMHG | WEIGHT: 166 LBS | OXYGEN SATURATION: 97 % | SYSTOLIC BLOOD PRESSURE: 138 MMHG | RESPIRATION RATE: 18 BRPM | TEMPERATURE: 97.2 F

## 2020-12-15 PROCEDURE — 4040F PNEUMOC VAC/ADMIN/RCVD: CPT | Performed by: PSYCHIATRY & NEUROLOGY

## 2020-12-15 PROCEDURE — 3017F COLORECTAL CA SCREEN DOC REV: CPT | Performed by: PSYCHIATRY & NEUROLOGY

## 2020-12-15 PROCEDURE — 1090F PRES/ABSN URINE INCON ASSESS: CPT | Performed by: PSYCHIATRY & NEUROLOGY

## 2020-12-15 PROCEDURE — 1123F ACP DISCUSS/DSCN MKR DOCD: CPT | Performed by: PSYCHIATRY & NEUROLOGY

## 2020-12-15 PROCEDURE — G8427 DOCREV CUR MEDS BY ELIG CLIN: HCPCS | Performed by: PSYCHIATRY & NEUROLOGY

## 2020-12-15 PROCEDURE — 99215 OFFICE O/P EST HI 40 MIN: CPT | Performed by: PSYCHIATRY & NEUROLOGY

## 2020-12-15 PROCEDURE — 4004F PT TOBACCO SCREEN RCVD TLK: CPT | Performed by: PSYCHIATRY & NEUROLOGY

## 2020-12-15 PROCEDURE — G8400 PT W/DXA NO RESULTS DOC: HCPCS | Performed by: PSYCHIATRY & NEUROLOGY

## 2020-12-15 PROCEDURE — G8417 CALC BMI ABV UP PARAM F/U: HCPCS | Performed by: PSYCHIATRY & NEUROLOGY

## 2020-12-15 PROCEDURE — G8484 FLU IMMUNIZE NO ADMIN: HCPCS | Performed by: PSYCHIATRY & NEUROLOGY

## 2020-12-15 RX ORDER — CLOPIDOGREL BISULFATE 75 MG/1
75 TABLET ORAL DAILY
COMMUNITY
End: 2021-03-17 | Stop reason: ALTCHOICE

## 2020-12-15 NOTE — PROGRESS NOTES
post pacemaker insertion. .  She must stop smoking. Plan:      Propranolol was decreased to 10 mg 3 times daily. She will take diazepam 5 mg at 8 AM.    Hopefully, she will stop smoking. I again recommended reducing diazepam and increasing paroxetine or adding sertraline    She will call at any time if problems arise. She will return in 6 months. I spent 40 minutes with the patient with over 50 % spent in counseling and disease management.   All patient issues were addressed and all questions were answered.

## 2020-12-30 RX ORDER — PROPRANOLOL HYDROCHLORIDE 10 MG/1
10 TABLET ORAL 3 TIMES DAILY
Qty: 270 TABLET | Refills: 0 | Status: SHIPPED
Start: 2020-12-30 | End: 2021-03-17 | Stop reason: SDUPTHER

## 2021-01-26 ENCOUNTER — TELEPHONE (OUTPATIENT)
Dept: NEUROLOGY | Age: 76
End: 2021-01-26

## 2021-01-26 NOTE — TELEPHONE ENCOUNTER
Can she take her Propranolol and Hydrochlorothiazide @ 8 am with food.   Electronically signed by Jackson Laird on 1/26/21 at 2:44 PM EST

## 2021-01-28 ENCOUNTER — HOSPITAL ENCOUNTER (OUTPATIENT)
Age: 76
Discharge: HOME | End: 2021-01-28
Payer: MEDICARE

## 2021-01-28 LAB
ALBUMIN SERPL-MCNC: 4.2 G/DL (ref 3.5–5.2)
ALP BLD-CCNC: 72 U/L (ref 35–104)
ALT SERPL-CCNC: 11 U/L (ref 0–32)
ANION GAP SERPL CALCULATED.3IONS-SCNC: 9 MMOL/L (ref 7–16)
AST SERPL-CCNC: 12 U/L (ref 0–31)
BASOPHILS ABSOLUTE: 0.06 E9/L (ref 0–0.2)
BASOPHILS RELATIVE PERCENT: 1.1 % (ref 0–2)
BILIRUB SERPL-MCNC: 0.9 MG/DL (ref 0–1.2)
BUN BLDV-MCNC: 9 MG/DL (ref 8–23)
CALCIUM SERPL-MCNC: 9.8 MG/DL (ref 8.6–10.2)
CHLORIDE BLD-SCNC: 103 MMOL/L (ref 98–107)
CHOLESTEROL, TOTAL: 144 MG/DL (ref 0–199)
CO2: 29 MMOL/L (ref 22–29)
CREAT SERPL-MCNC: 0.8 MG/DL (ref 0.5–1)
EOSINOPHILS ABSOLUTE: 0.1 E9/L (ref 0.05–0.5)
EOSINOPHILS RELATIVE PERCENT: 1.8 % (ref 0–6)
GFR AFRICAN AMERICAN: >60
GFR NON-AFRICAN AMERICAN: >60 ML/MIN/1.73
GLUCOSE BLD-MCNC: 141 MG/DL (ref 74–99)
HBA1C MFR BLD: 6.3 % (ref 4–5.6)
HCT VFR BLD CALC: 45.8 % (ref 34–48)
HDLC SERPL-MCNC: 72 MG/DL
HEMOGLOBIN: 15.8 G/DL (ref 11.5–15.5)
IMMATURE GRANULOCYTES #: 0.01 E9/L
IMMATURE GRANULOCYTES %: 0.2 % (ref 0–5)
LDL CHOLESTEROL CALCULATED: 53 MG/DL (ref 0–99)
LYMPHOCYTES ABSOLUTE: 1.98 E9/L (ref 1.5–4)
LYMPHOCYTES RELATIVE PERCENT: 35.7 % (ref 20–42)
MCH RBC QN AUTO: 32.6 PG (ref 26–35)
MCHC RBC AUTO-ENTMCNC: 34.5 % (ref 32–34.5)
MCV RBC AUTO: 94.6 FL (ref 80–99.9)
MONOCYTES ABSOLUTE: 0.42 E9/L (ref 0.1–0.95)
MONOCYTES RELATIVE PERCENT: 7.6 % (ref 2–12)
NEUTROPHILS ABSOLUTE: 2.97 E9/L (ref 1.8–7.3)
NEUTROPHILS RELATIVE PERCENT: 53.6 % (ref 43–80)
PDW BLD-RTO: 12.5 FL (ref 11.5–15)
PLATELET # BLD: 221 E9/L (ref 130–450)
PMV BLD AUTO: 10.2 FL (ref 7–12)
POTASSIUM SERPL-SCNC: 4.1 MMOL/L (ref 3.5–5)
RBC # BLD: 4.84 E12/L (ref 3.5–5.5)
SODIUM BLD-SCNC: 141 MMOL/L (ref 132–146)
TOTAL PROTEIN: 7.2 G/DL (ref 6.4–8.3)
TRIGL SERPL-MCNC: 94 MG/DL (ref 0–149)
TSH SERPL DL<=0.05 MIU/L-ACNC: 2.29 UIU/ML (ref 0.27–4.2)
VLDLC SERPL CALC-MCNC: 19 MG/DL
WBC # BLD: 5.5 E9/L (ref 4.5–11.5)

## 2021-01-28 PROCEDURE — 83036 HEMOGLOBIN GLYCOSYLATED A1C: CPT

## 2021-01-28 PROCEDURE — 85025 COMPLETE CBC W/AUTO DIFF WBC: CPT

## 2021-01-28 PROCEDURE — 80053 COMPREHEN METABOLIC PANEL: CPT

## 2021-01-28 PROCEDURE — 36415 COLL VENOUS BLD VENIPUNCTURE: CPT

## 2021-01-28 PROCEDURE — 84443 ASSAY THYROID STIM HORMONE: CPT

## 2021-01-28 PROCEDURE — 80061 LIPID PANEL: CPT

## 2021-01-28 PROCEDURE — 83735 ASSAY OF MAGNESIUM: CPT

## 2021-01-29 LAB — MAGNESIUM: 2 MG/DL (ref 1.6–2.6)

## 2021-03-17 ENCOUNTER — OFFICE VISIT (OUTPATIENT)
Dept: NEUROLOGY | Age: 76
End: 2021-03-17
Payer: MEDICARE

## 2021-03-17 VITALS
HEART RATE: 95 BPM | RESPIRATION RATE: 18 BRPM | HEIGHT: 64 IN | BODY MASS INDEX: 28.49 KG/M2 | OXYGEN SATURATION: 96 % | SYSTOLIC BLOOD PRESSURE: 124 MMHG | DIASTOLIC BLOOD PRESSURE: 66 MMHG

## 2021-03-17 DIAGNOSIS — G25.0 ESSENTIAL TREMOR: Primary | ICD-10-CM

## 2021-03-17 DIAGNOSIS — B02.23 POST-HERPETIC POLYNEUROPATHY: ICD-10-CM

## 2021-03-17 PROCEDURE — 1123F ACP DISCUSS/DSCN MKR DOCD: CPT | Performed by: PSYCHIATRY & NEUROLOGY

## 2021-03-17 PROCEDURE — 4004F PT TOBACCO SCREEN RCVD TLK: CPT | Performed by: PSYCHIATRY & NEUROLOGY

## 2021-03-17 PROCEDURE — 1090F PRES/ABSN URINE INCON ASSESS: CPT | Performed by: PSYCHIATRY & NEUROLOGY

## 2021-03-17 PROCEDURE — G8417 CALC BMI ABV UP PARAM F/U: HCPCS | Performed by: PSYCHIATRY & NEUROLOGY

## 2021-03-17 PROCEDURE — G8427 DOCREV CUR MEDS BY ELIG CLIN: HCPCS | Performed by: PSYCHIATRY & NEUROLOGY

## 2021-03-17 PROCEDURE — G8484 FLU IMMUNIZE NO ADMIN: HCPCS | Performed by: PSYCHIATRY & NEUROLOGY

## 2021-03-17 PROCEDURE — 99215 OFFICE O/P EST HI 40 MIN: CPT | Performed by: PSYCHIATRY & NEUROLOGY

## 2021-03-17 PROCEDURE — 3017F COLORECTAL CA SCREEN DOC REV: CPT | Performed by: PSYCHIATRY & NEUROLOGY

## 2021-03-17 PROCEDURE — G8400 PT W/DXA NO RESULTS DOC: HCPCS | Performed by: PSYCHIATRY & NEUROLOGY

## 2021-03-17 PROCEDURE — 4040F PNEUMOC VAC/ADMIN/RCVD: CPT | Performed by: PSYCHIATRY & NEUROLOGY

## 2021-03-17 RX ORDER — PROPRANOLOL HYDROCHLORIDE 10 MG/1
10 TABLET ORAL 3 TIMES DAILY
Qty: 270 TABLET | Refills: 3 | Status: SHIPPED
Start: 2021-03-17 | End: 2021-09-29 | Stop reason: DRUGHIGH

## 2021-03-17 NOTE — PROGRESS NOTES
[]  [address]  [address] [place] [address], [facility] [address]  [place], [address] Vinnie Schuler was a 67-year-old right handed woman who was followed for essential tremors. She remained a good historian. Her medications were now propranolol 10 mg 3 times daily, clonidine, hydrochlorothiazide, amlodipine, diazepam 5 mg 3 times daily, aspirin, paroxetine, vitamin D3 and folic acid. She was no longer Crestor; she was using acyclovir for reported recurrent shingles. She was diagnosed with late life hand and vocal tremors. Primidone was initially recommended, but she refused to take that expressing concerns about cross reactions with previous drugs she had used-- Dilantin and phenobarbital.  She continued on low-dose propranolol10 mg 3 times daily. Overall, she felt her tremors had improved. She did not wish to change that dosing. She also continued with diazepam and paroxetine for anxiety, with improvement as well. Fortunately, there remained no side effects from the diazepam.    She again denied any abdominal tremors. My nurse practitioner questioned the role of anxiety producing these tremorsas did I. I previously recommended increasing paroxetine to 20 mg daily. She refused. She suffered from shingles in her right buttocks 5 months ago. She noted pains in that area, on occasion shooting down the right leg. She had received acyclovir, with prompt resolution. However, with recurrent pains and possible recurrent rashes in her buttocks, she received additional doses of acyclovir. She also reported more difficulties walking, at times dragging her right foot. There were no other associated issues. The patient denied other neurological problems. She presented with no memory deficits, despite her family's observations in the past.  There was no weakness, numbness, pains, headaches, clumsiness or loss of consciousness.       Her blood pressures continued well controlled. She was rarely driving due to the pandemic lockdown. She slept better. She still played computer games late at night. She was eating well. She still smoked 5 cigarettes daily. She denied weight changes. She questioned receiving the COVID-19 vaccination. That vaccine has been refused during her shingles infection and treatment. She was practicing proper pandemic precautions.     Review of systems was otherwise unremarkable.     Objective:      /86 (Site: Left Upper Arm, Position: Sitting, Cuff Size: Medium Adult)   Pulse 63  Temp 98.2 °F (36.8 °C) (Oral)   Resp 18   Ht 5' 4\" (1.626 m)   Wt 166 lb (71.2 kg)     General Appearance: alert, cooperative, oriented              Neck:  limited range of motion-----no cogwheeling              Lungs: clear to auscultation; respirations unlabored; a well-healed left chest pacemaker site               Heart: regular rate and rhythm, S1 and S2 normal, no murmurs, rub or gallops               Extremities: trace ankle edema; brawny induration of both ankles and feet              Pulses: 1+ in all limbs     Mental Status: alert; oriented and cooperative-----she was less anxious, with moderate vocal quivering; she remained attentive today;  I found no dysarthrias or aphasias     Cranial Nerves:  No abnormalities were found    Motor:  5/5 throughout  Minimal horizontal tremors of the head but no postural tremors of both hands     Sensory:  Light touch was intact  Pinprick was decreased to both mid calves  Vibratory was moderately impaired at both ankles     Coordination:   There were no ataxic movements     Gait:  She walked well, there was minimal right foot drop     DTR:   I found no reflexes  There were no pathological ones    Her neurological examination revealed decreasing tremors.     Laboratory/Radiology:      A recent CMP was unremarkable as well as a CBC with differential.     Assessment:      The patient suffers from late life tremors involving her voice, head and hands. She continues on low-dose propranolol at 10 mg 3 times daily, as well as diazepam 3 times daily. Her tremors have moderated with that regimen. We will continue that medication. Again I recommended reducing diazepam.    I still question using diazepam 5 mg 3 times daily for her anxiety. These remain high doses in this elderly woman. Instead, I again recommended increasing paroxetine or using sertraline instead. She now displays right foot drop, from an L5 radiculopathy, likely aggravated by her shingles. However, compressive lumbosacral radicular disease is another possibility. For now, I recommended hightop tennis shoes. However, if her deficits worsen, I recommend EDX studies and AFO bracing. I recommended the COVID-19 vaccination. I was uncertain why she was receiving acyclovir on a regular basis. I suspect her right buttocks pains are related to postherpetic neuralgia alone. Fortunately, she was eating and sleeping better.     She is stable medicallystatus post pacemaker insertion. .  She must stop smoking. Plan:      Propranolol was maintained 10 mg 3 times daily. She will take diazepam 5 mg as before, for now. Hopefully, she will stop smoking. Hopefully, she will wear hightop tennis shoes. I recommended the COVID-19 vaccination soon. She will call at any time if problems arise. She will return in 4 months. I spent 40 minutes with the patient with over 50 % spent in counseling and disease management.   All patient issues were addressed and all questions were answered.

## 2021-05-26 ENCOUNTER — TELEPHONE (OUTPATIENT)
Dept: NEUROLOGY | Age: 76
End: 2021-05-26

## 2021-05-26 NOTE — TELEPHONE ENCOUNTER
Patient called in wanting to know if she can take Tums with her Propranolol. She cannot take omeprazole or Pepcid. Please advise.

## 2021-06-10 ENCOUNTER — TELEPHONE (OUTPATIENT)
Dept: NEUROLOGY | Age: 76
End: 2021-06-10

## 2021-06-10 NOTE — TELEPHONE ENCOUNTER
Patient called in asking if she can take [address] with her medication. She stated that it was recommended by gastro doctor. Please advise.

## 2021-06-22 ENCOUNTER — VIRTUAL VISIT (OUTPATIENT)
Dept: NEUROLOGY | Age: 76
End: 2021-06-22
Payer: MEDICARE

## 2021-06-22 DIAGNOSIS — R25.1 TREMOR: ICD-10-CM

## 2021-06-22 PROCEDURE — 99443 PR PHYS/QHP TELEPHONE EVALUATION 21-30 MIN: CPT | Performed by: PSYCHIATRY & NEUROLOGY

## 2021-06-22 RX ORDER — PANTOPRAZOLE SODIUM 40 MG/1
40 TABLET, DELAYED RELEASE ORAL DAILY
COMMUNITY
End: 2021-09-22 | Stop reason: ALTCHOICE

## 2021-06-22 NOTE — PROGRESS NOTES
computer games late at night. She was eating well. She still smoked 5 cigarettes daily. She denied weight changes. She had received the COVID-19 vaccination.     Review of systems was otherwise unremarkable. Objective:      She is afebrile and in no acute distress. She was breathing comfortably, without chest pain or shortness of breath. She denied any skin abnormalities or weight changes. There were no palpitations. Her abdomen remained soft and nontender. Her limbs were unremarkable without edema. She displayed marked vocal quivering over the phone, again with hesitation in answering many responses. However, there were difficulties hearing my questions. I found no aphasia. She denied any cranial nerve abnormalities. She reported no wasting of her limbs with intact strength throughout. She was not clumsy. She felt her head was moving to side to side but there were no shaking of her hands. She walks slowly but without issues. She appreciated light touch in all limbs.     Her neurological examination was reportedly unchanged except for her increasing vocal tremors. Laboratory/Radiology:      A recent CMP was unremarkable as well as a CBC with differential.     Assessment:      The patient suffers from late life tremors involving her voice, head and hands. She continues on low-dose propranolol at 10 mg 3 times daily, as well as diazepam now twice daily. Her vocal tremors have worsened. I now question underlying dementia as well. Hopefully, I can see her in the office soon. Diane Johnson She will remain on diazepam 5 mg twice daily for now. I again recommended increasing paroxetine or using sertraline instead. She had displayed right foot drop, from an L5 radiculopathy, likely aggravated by her shingles. However, compressive lumbosacral radicular disease is another possibility. For now, I recommended hightop tennis shoes. Pending my upcoming examination, EDX studies will be considered. She had received the COVID-19 vaccination. She was eating and sleeping better.     She is stable medicallystatus post pacemaker insertion. .  She must stop smoking. Plan:      Propranolol was maintained at 10 mg 3 times daily. She will take diazepam 5 mg twice daily for now. Hopefully, she will stop smoking. Hopefully, she will wear hightop tennis shoes. She will call at any time if problems arise. Hopefully, she will return to the office in 3 months. I spent 30 minutes with the patient with over 50 % spent in counseling and disease management. All patient issues were addressed and all questions were answered. Marily Ann was a 77year old individual who was evaluated via telephone on 6/22/2021. Consent:  She and/or health care decision maker is aware that that she may receive a bill for this telephone service, depending on her insurance coverage, and has provided verbal consent to proceed--- yes. Documentation:  I communicated with the patient and/or health care decision maker about her tremors. Details of this discussion including any medical advice provided per telephone. I affirmed this is a Patient Initiated Episode with an Established Patient who has not had a related appointment within my department in the past 7 days or scheduled within the next 24 hours. Again I spent 30 minutes with the patient.       Tavia Eugene MD

## 2021-06-22 NOTE — PROGRESS NOTES
Gris Cartwright was read the following message We want to confirm that, for purposes of billing, this is a virtual visit with your provider for which we will submit a claim for reimbursement with your insurance company. You will be responsible for any copays, coinsurance amounts or other amounts not covered by your insurance company. If you do not accept this, unfortunately we will not be able to schedule or proceed with a virtual visit with the provider. Do you accept? Vito Jorge responded Yes .

## 2021-07-12 ENCOUNTER — HOSPITAL ENCOUNTER (OUTPATIENT)
Age: 76
Discharge: HOME | End: 2021-07-12
Payer: MEDICARE

## 2021-07-12 LAB
ALBUMIN SERPL-MCNC: 4 G/DL (ref 3.5–5.2)
ALP BLD-CCNC: 70 U/L (ref 35–104)
ALT SERPL-CCNC: 13 U/L (ref 0–32)
ANION GAP SERPL CALCULATED.3IONS-SCNC: 10 MMOL/L (ref 7–16)
AST SERPL-CCNC: 13 U/L (ref 0–31)
BACTERIA: ABNORMAL /HPF
BASOPHILS ABSOLUTE: 0.04 E9/L (ref 0–0.2)
BASOPHILS RELATIVE PERCENT: 0.9 % (ref 0–2)
BILIRUB SERPL-MCNC: 1.1 MG/DL (ref 0–1.2)
BILIRUBIN URINE: NEGATIVE
BLOOD, URINE: NEGATIVE
BUN BLDV-MCNC: 8 MG/DL (ref 6–23)
CALCIUM SERPL-MCNC: 10 MG/DL (ref 8.6–10.2)
CHLORIDE BLD-SCNC: 101 MMOL/L (ref 98–107)
CHOLESTEROL, FASTING: 147 MG/DL (ref 0–199)
CLARITY: ABNORMAL
CO2: 27 MMOL/L (ref 22–29)
COLOR: YELLOW
CREAT SERPL-MCNC: 0.9 MG/DL (ref 0.5–1)
EOSINOPHILS ABSOLUTE: 0.09 E9/L (ref 0.05–0.5)
EOSINOPHILS RELATIVE PERCENT: 2.1 % (ref 0–6)
EPITHELIAL CELLS, UA: ABNORMAL /HPF
GFR AFRICAN AMERICAN: >60
GFR NON-AFRICAN AMERICAN: >60 ML/MIN/1.73
GLUCOSE FASTING: 165 MG/DL (ref 74–99)
GLUCOSE URINE: NEGATIVE MG/DL
HBA1C MFR BLD: 6.6 % (ref 4–5.6)
HCT VFR BLD CALC: 46.1 % (ref 34–48)
HDLC SERPL-MCNC: 59 MG/DL
HEMOGLOBIN: 16.2 G/DL (ref 11.5–15.5)
IMMATURE GRANULOCYTES #: 0.01 E9/L
IMMATURE GRANULOCYTES %: 0.2 % (ref 0–5)
KETONES, URINE: NEGATIVE MG/DL
LDL CHOLESTEROL CALCULATED: 65 MG/DL (ref 0–99)
LEUKOCYTE ESTERASE, URINE: ABNORMAL
LYMPHOCYTES ABSOLUTE: 1.6 E9/L (ref 1.5–4)
LYMPHOCYTES RELATIVE PERCENT: 36.9 % (ref 20–42)
MCH RBC QN AUTO: 32.5 PG (ref 26–35)
MCHC RBC AUTO-ENTMCNC: 35.1 % (ref 32–34.5)
MCV RBC AUTO: 92.6 FL (ref 80–99.9)
MONOCYTES ABSOLUTE: 0.42 E9/L (ref 0.1–0.95)
MONOCYTES RELATIVE PERCENT: 9.7 % (ref 2–12)
NEUTROPHILS ABSOLUTE: 2.18 E9/L (ref 1.8–7.3)
NEUTROPHILS RELATIVE PERCENT: 50.2 % (ref 43–80)
NITRITE, URINE: NEGATIVE
PDW BLD-RTO: 13.2 FL (ref 11.5–15)
PH UA: 6 (ref 5–9)
PLATELET # BLD: 226 E9/L (ref 130–450)
PMV BLD AUTO: 10.2 FL (ref 7–12)
POTASSIUM SERPL-SCNC: 4.2 MMOL/L (ref 3.5–5)
PROTEIN UA: NEGATIVE MG/DL
RBC # BLD: 4.98 E12/L (ref 3.5–5.5)
RBC UA: ABNORMAL /HPF (ref 0–2)
SODIUM BLD-SCNC: 138 MMOL/L (ref 132–146)
SPECIFIC GRAVITY UA: 1.01 (ref 1–1.03)
TOTAL PROTEIN: 6.9 G/DL (ref 6.4–8.3)
TRIGLYCERIDE, FASTING: 116 MG/DL (ref 0–149)
TSH SERPL DL<=0.05 MIU/L-ACNC: 2.19 UIU/ML (ref 0.27–4.2)
UROBILINOGEN, URINE: 0.2 E.U./DL
VLDLC SERPL CALC-MCNC: 23 MG/DL
WBC # BLD: 4.3 E9/L (ref 4.5–11.5)
WBC UA: ABNORMAL /HPF (ref 0–5)

## 2021-07-12 PROCEDURE — 84443 ASSAY THYROID STIM HORMONE: CPT

## 2021-07-12 PROCEDURE — 36415 COLL VENOUS BLD VENIPUNCTURE: CPT

## 2021-07-12 PROCEDURE — 80061 LIPID PANEL: CPT

## 2021-07-12 PROCEDURE — 80053 COMPREHEN METABOLIC PANEL: CPT

## 2021-07-12 PROCEDURE — 81001 URINALYSIS AUTO W/SCOPE: CPT

## 2021-07-12 PROCEDURE — 83036 HEMOGLOBIN GLYCOSYLATED A1C: CPT

## 2021-07-12 PROCEDURE — 85025 COMPLETE CBC W/AUTO DIFF WBC: CPT

## 2021-09-24 NOTE — H&P
History and Physical    Patient's Name/Date of Birth: Mornea Walls / 1945 (77 y.o.)    Date: September 24, 2021     Chief Complaint: Decreased vision of the left eye    HPI: Mature left cataract with decreased vision . Risks and complications as well as options and benefits were discussed with the patient and she has elected to proceed with left cataract extraction with intra ocular lens implant. Past Medical History:   Diagnosis Date    A-fib ([address]     PACEMAKER AND WATCHMAN    Arthritis     Bladder dysfunction     Bowel dysfunction     HTN (hypertension)     Hyperlipidemia     Kidney stones     Tremor     Ulcer        Past Surgical History:   Procedure Laterality Date    ARTERIAL CLOT SURGERY  08/03/2020    Watchman inserted     BRAIN ANEURYSM SURGERY  1993    CATARACT REMOVAL WITH IMPLANT Right 11/22/2016    COLONOSCOPY      ENDOSCOPY, COLON, DIAGNOSTIC      HEMORRHOID SURGERY      HYSTERECTOMY      LITHOTRIPSY      NERVE SURGERY      pinched nerve in left arm, had surgery    PACEMAKER INSERTION N/A 05/08/2019    PACEMAKER INSERTION (Horacio Yang) performed by Janee Valle MD at [address] [place] [address]         Prior to Admission medications    Medication Sig Start Date End Date Taking? Authorizing Provider   propranolol (INDERAL) 10 MG tablet Take 1 tablet by mouth 3 times daily 3/17/21  Yes Kala Krypton., MD   hydroCHLOROthiazide (HYDRODIURIL) 25 MG tablet Take 25 mg by mouth daily   Yes Historical Provider, MD   Magnesium 500 MG CAPS Take by mouth daily    Yes Historical Provider, MD   cloNIDine (CATAPRES) 0.1 MG tablet Take 0.1 mg by mouth every 8 hours as needed PRN for BP > 165/95 8/31/18  Yes Historical Provider, MD   potassium chloride (KLOR-CON) 10 MEQ extended release tablet Take 20 mEq by mouth 3 times daily   Yes Historical Provider, MD   diazepam (VALIUM) 5 MG tablet Take 5 mg by mouth every 8 hours as needed.  . 2/4/18  Yes Historical Provider, MD aspirin (ECOTRIN LOW STRENGTH) 81 MG EC tablet Take 1 tablet by mouth daily 1/17/18  Yes Jeyosn Shah, APRN - CNP   Cholecalciferol (VITAMIN D3) 1000 units CAPS Take 1 tablet by mouth daily   Yes Historical Provider, MD   rosuvastatin (CRESTOR) 5 MG tablet Take 5 mg by mouth daily   Yes Historical Provider, MD   folic acid (FOLVITE) 1 MG tablet Take 1 mg by mouth daily   Yes Historical Provider, MD   PARoxetine (PAXIL) 10 MG tablet Take 20 mg by mouth every morning    Yes Historical Provider, MD       Dilantin [phenytoin sodium extended], Fentanyl, Mobic [meloxicam], Omeprazole, Phenobarbital, Phenytoin sodium extended, Prednisone, Sulfa antibiotics, and Famciclovir    Family History   Problem Relation Age of Onset    Heart Disease Father        Social History     Socioeconomic History    Marital status:      Spouse name: Not on file    Number of children: Not on file    Years of education: Not on file    Highest education level: Not on file   Occupational History    Not on file   Tobacco Use    Smoking status: Current Every Day Smoker     Packs/day: 0.50     Years: 60.00     Pack years: 30.00     Types: Cigarettes    Smokeless tobacco: Never Used    Tobacco comment: Quit smoking for 15yrs after anuerysm, then restarted   Vaping Use    Vaping Use: Never used   Substance and Sexual Activity    Alcohol use: No    Drug use: No    Sexual activity: Not Currently   Other Topics Concern    Not on file   Social History Narrative    Not on file     Social Determinants of Health     Financial Resource Strain:     Difficulty of Paying Living Expenses:    Food Insecurity:     Worried About Running Out of Food in the Last Year:     Ran Out of Food in the Last Year:    Transportation Needs:     Lack of Transportation (Medical):      Lack of Transportation (Non-Medical):    Physical Activity:     Days of Exercise per Week:     Minutes of Exercise per Session:    Stress:     Feeling of Stress : Social Connections:     Frequency of Communication with Friends and Family:     Frequency of Social Gatherings with Friends and Family:     Attends Christianity Services:     Active Member of Clubs or Organizations:     Attends Club or Organization Meetings:     Marital Status:    Intimate Partner Violence:     Fear of Current or Ex-Partner:     Emotionally Abused:     Physically Abused:     Sexually Abused:        Review of Systems:   CONSTITUTIONAL: Oriented to person, place and time   EYES:  Mature left cataract with decreased vision effecting reading, driving and all routine home activities. Visual acuity is 20/80  HEENT:  negative  RESPIRATORY:  negative  CARDIOVASCULAR:  negative  GASTROINTESTINAL:  negative  GENITOURINARY:  negative  INTEGUMENT/BREAST:  negative  HEMATOLOGIC/LYMPHATIC:  negative  ALLERGIC/IMMUNOLOGIC:  negative  ENDOCRINE:  negative  MUSCULOSKELETAL:  negative  NEUROLOGICAL:  negative  BEHAVIOR/PSYCH:  negative    Physical Exam:  Vitals:    09/22/21 1209   Weight: 172 lb (78 kg)   Height: 5' 4\" (1.626 m)       CONSTITUTIONAL:  awake, alert, cooperative, no apparent distress, and appears stated age  EYES:  Lids and lashes normal, pupils equal, round and reactive to light, extra ocular muscles intact, sclera clear, conjunctiva normal  ENT:  Normocephalic, without obvious abnormality, atraumatic, sinuses nontender on palpation, external ears without lesions, oral pharynx with moist mucus membranes, tonsils without erythema or exudates, gums normal and good dentition.   NECK:  Supple, symmetrical, trachea midline, no adenopathy, thyroid symmetric, not enlarged and no tenderness, skin normal  HEMATOLOGIC/LYMPHATICS:  no cervical lymphadenopathy and no supraclavicular lymphadenopathy  BACK:  Symmetric, no curvature, spinous processes are non-tender on palpation, paraspinous muscles are non-tender on palpation, no costal vertebral tenderness  LUNGS:  No increased work of breathing, good air exchange, clear to auscultation bilaterally, no crackles or wheezing  CARDIOVASCULAR:  Normal apical impulse, regular rate and rhythm, normal S1 and S2, no S3 or S4, and no murmur noted  ABDOMEN:  No scars, normal bowel sounds, soft, non-distended, non-tender, no masses palpated, no hepatosplenomegally  CHEST/BREASTS:  Breasts symmetrical, skin without lesion(s), no nipple retraction or dimpling, no nipple discharge, no masses palpated, no axillary or supraclavicular adenopathy  GENITAL/URINARY: Deferred   MUSCULOSKELETAL:  There is no redness, warmth, or swelling of the joints. Full range of motion noted. Motor strength is 5 out of 5 all extremities bilaterally. Tone is normal.  NEUROLOGIC:  Awake, alert, oriented to name, place and time. Cranial nerves II-XII are grossly intact. Motor is 5 out of 5 bilaterally. Cerebellar finger to nose, heel to shin intact. Sensory is intact. Babinski down going, Romberg negative, and gait is normal.  SKIN:  no bruising or bleeding, normal skin color, texture, turgor and no redness, warmth, or swelling    Assessment:  Active Problems:    * No active hospital problems. *  Resolved Problems:    * No resolved hospital problems. *      Plan:  1.  Left cataract extraction with intra ocular lens implant    Electronically signed by Va Lancaster MD on 9/24/21 at 1:12 PM EDT

## 2021-09-29 ENCOUNTER — OFFICE VISIT (OUTPATIENT)
Dept: NEUROLOGY | Age: 76
End: 2021-09-29
Payer: MEDICARE

## 2021-09-29 VITALS
HEART RATE: 60 BPM | DIASTOLIC BLOOD PRESSURE: 75 MMHG | HEIGHT: 64 IN | BODY MASS INDEX: 28.85 KG/M2 | TEMPERATURE: 97.3 F | WEIGHT: 169 LBS | SYSTOLIC BLOOD PRESSURE: 147 MMHG | RESPIRATION RATE: 18 BRPM

## 2021-09-29 DIAGNOSIS — F41.9 ANXIETY: Primary | ICD-10-CM

## 2021-09-29 DIAGNOSIS — R25.1 TREMOR: ICD-10-CM

## 2021-09-29 DIAGNOSIS — M54.17 LUMBOSACRAL RADICULOPATHY: ICD-10-CM

## 2021-09-29 PROCEDURE — G8417 CALC BMI ABV UP PARAM F/U: HCPCS | Performed by: PSYCHIATRY & NEUROLOGY

## 2021-09-29 PROCEDURE — G8427 DOCREV CUR MEDS BY ELIG CLIN: HCPCS | Performed by: PSYCHIATRY & NEUROLOGY

## 2021-09-29 PROCEDURE — 1123F ACP DISCUSS/DSCN MKR DOCD: CPT | Performed by: PSYCHIATRY & NEUROLOGY

## 2021-09-29 PROCEDURE — G8400 PT W/DXA NO RESULTS DOC: HCPCS | Performed by: PSYCHIATRY & NEUROLOGY

## 2021-09-29 PROCEDURE — 1090F PRES/ABSN URINE INCON ASSESS: CPT | Performed by: PSYCHIATRY & NEUROLOGY

## 2021-09-29 PROCEDURE — 3017F COLORECTAL CA SCREEN DOC REV: CPT | Performed by: PSYCHIATRY & NEUROLOGY

## 2021-09-29 PROCEDURE — 99215 OFFICE O/P EST HI 40 MIN: CPT | Performed by: PSYCHIATRY & NEUROLOGY

## 2021-09-29 PROCEDURE — 4040F PNEUMOC VAC/ADMIN/RCVD: CPT | Performed by: PSYCHIATRY & NEUROLOGY

## 2021-09-29 PROCEDURE — 4004F PT TOBACCO SCREEN RCVD TLK: CPT | Performed by: PSYCHIATRY & NEUROLOGY

## 2021-09-29 RX ORDER — PROPRANOLOL HYDROCHLORIDE 10 MG/1
10 TABLET ORAL 4 TIMES DAILY
Qty: 360 TABLET | Refills: 3 | Status: SHIPPED
Start: 2021-09-29 | End: 2022-04-07 | Stop reason: SDUPTHER

## 2021-09-29 NOTE — PROGRESS NOTES
was always very pleasant. Her skin was unremarkable. Head examination was unrevealing. Her neck was supple, without thyromegaly and with full range of motion. Her spine displayed minimal gibbus deformity, without tenderness. Her lungs were clear to auscultation. Cardiac testing proved unrevealing. Peripheral pulses were +1 without bruits. Her limbs displayed minimal brawny induration of both ankles and feet, but no other abnormalities.     Neurological examination produced an intact mental status except for her marked vocal quivering. I found no dysarthria or aphasia. There were no cranial nerve abnormalities. Strength was 5/5 throughout, with normal tone and bulk. There were no postural tremors of her hands or head. Light touch was intact, with slightly decreased pinprick and vibration both lower calves. She walked well. Finger-to-nose and heel-to-shin testing were performed well.     Neurological examination revealed only focal tremors.     Laboratory/Radiology:      A recent CMP was unremarkable as well as a CBC with differential.     Assessment:      The patient suffers from late life tremors involving her voice, head and hands. She continues on low-dose propranolol at 10 mg thrice daily, as well as diazepam as needed. She now displays only focal tremors. However she does report the drugs not lasting long enough. I recommended her taking propranolol 10 mg every 4 hours while awake. I still question using diazepam as needed. Hopefully, that drug could be weaned and she use only paroxetine for anxiety. Her right foot drop, from an L5 radiculopathy and shingles, has improved. I now recommend only hightop tennis shoes or winter boots. She received the COVID-19 vaccination. She continued eating and sleeping better. She remained active.     She is stable medicallystatus post pacemaker insertion. .  She must stop smoking.     Plan:      Propranolol was increased to 10 mg every 4 hours while awake. Sanjeev Mcguire She will, hopefully, reduce diazepam administration. Hopefully, she also stop smoking. We will write a disability parking pass for her. Again I recommended hightop tennis shoes and winter boots. She will call at any time if problems arise. She will return in 6 months. I spent 40 minutes with the patient with over 50 % spent in counseling and disease management.   All patient issues were addressed and all questions were answered.

## 2021-09-29 NOTE — LETTER
[facility]  [address]  [address] [place]  Phone: [phone]  Fax: [phone]    Terrie Jha MD         September 29, 2021     Patient: Armond Johnson   YOB: 1945   Date of Visit: 9/29/2021       To Whom It May Concern: It is my medical opinion that Phani Hackett requires a disability parking placard for the following reasons:  Essential Tremors    Duration of need: September 2026    If you have any questions or concerns, please don't hesitate to call.     Sincerely,        Terrie Jha MD

## 2021-09-30 ENCOUNTER — TELEPHONE (OUTPATIENT)
Dept: NEUROLOGY | Age: 76
End: 2021-09-30

## 2021-10-07 ENCOUNTER — HOSPITAL ENCOUNTER (OUTPATIENT)
Dept: MAMMOGRAPHY | Age: 76
Discharge: HOME | End: 2021-10-09
Payer: MEDICARE

## 2021-10-07 VITALS — WEIGHT: 170 LBS | HEIGHT: 64 IN | BODY MASS INDEX: 29.02 KG/M2

## 2021-10-07 DIAGNOSIS — Z12.31 ENCOUNTER FOR SCREENING MAMMOGRAM FOR MALIGNANT NEOPLASM OF BREAST: ICD-10-CM

## 2021-10-07 PROCEDURE — 77067 SCR MAMMO BI INCL CAD: CPT

## 2021-10-11 ENCOUNTER — ANESTHESIA EVENT (OUTPATIENT)
Dept: OPERATING ROOM | Age: 76
End: 2021-10-11
Payer: MEDICARE

## 2021-10-11 ASSESSMENT — LIFESTYLE VARIABLES: SMOKING_STATUS: 1

## 2021-10-11 NOTE — ANESTHESIA PRE PROCEDURE
Department of Anesthesiology  Preprocedure Note       Name:  Pearson Mcardle   Age:  77 y.o.  :  1945                                          MRN:  [id number]         Date:  10/11/2021      Surgeon: Marito Sims):  Suzanne Mays MD    Procedure: Procedure(s):  LEFT CATARACT EXTRACTION WITH IOL    Medications prior to admission:   Prior to Admission medications    Medication Sig Start Date End Date Taking? Authorizing Provider   hydroCHLOROthiazide (HYDRODIURIL) 25 MG tablet Take 25 mg by mouth daily   Yes Historical Provider, MD   Magnesium 500 MG CAPS Take by mouth daily    Yes Historical Provider, MD   cloNIDine (CATAPRES) 0.1 MG tablet Take 0.1 mg by mouth every 8 hours as needed PRN for BP > 165/95 18  Yes Historical Provider, MD   potassium chloride (KLOR-CON) 10 MEQ extended release tablet Take 20 mEq by mouth 3 times daily   Yes Historical Provider, MD   diazepam (VALIUM) 5 MG tablet Take 5 mg by mouth every 8 hours as needed. . 18  Yes Historical Provider, MD   aspirin (ECOTRIN LOW STRENGTH) 81 MG EC tablet Take 1 tablet by mouth daily 18  Yes Samantha Mason APRN - CNP   Cholecalciferol (VITAMIN D3) 1000 units CAPS Take 1 tablet by mouth daily   Yes Historical Provider, MD   rosuvastatin (CRESTOR) 5 MG tablet Take 5 mg by mouth daily   Yes Historical Provider, MD   folic acid (FOLVITE) 1 MG tablet Take 1 mg by mouth daily   Yes Historical Provider, MD   PARoxetine (PAXIL) 10 MG tablet Take 20 mg by mouth every morning    Yes Historical Provider, MD   propranolol (INDERAL) 10 MG tablet Take 1 tablet by mouth 4 times daily  Patient taking differently: Take 10 mg by mouth 4 times daily Takes for tremors 21  Tawnya Neves MD       Current medications:    No current facility-administered medications for this encounter.      Current Outpatient Medications   Medication Sig Dispense Refill    hydroCHLOROthiazide (HYDRODIURIL) 25 MG tablet Take 25 mg by mouth daily  Magnesium 500 MG CAPS Take by mouth daily       cloNIDine (CATAPRES) 0.1 MG tablet Take 0.1 mg by mouth every 8 hours as needed PRN for BP > 165/95  2    potassium chloride (KLOR-CON) 10 MEQ extended release tablet Take 20 mEq by mouth 3 times daily      diazepam (VALIUM) 5 MG tablet Take 5 mg by mouth every 8 hours as needed. .  1    aspirin (ECOTRIN LOW STRENGTH) 81 MG EC tablet Take 1 tablet by mouth daily 60 tablet 5    Cholecalciferol (VITAMIN D3) 1000 units CAPS Take 1 tablet by mouth daily      rosuvastatin (CRESTOR) 5 MG tablet Take 5 mg by mouth daily      folic acid (FOLVITE) 1 MG tablet Take 1 mg by mouth daily      PARoxetine (PAXIL) 10 MG tablet Take 20 mg by mouth every morning       propranolol (INDERAL) 10 MG tablet Take 1 tablet by mouth 4 times daily (Patient taking differently: Take 10 mg by mouth 4 times daily Takes for tremors) 360 tablet 3       Allergies:     Allergies   Allergen Reactions    Dilantin [Phenytoin Sodium Extended]      Made head feel funny    Fentanyl      States it gave her shingles    Mobic [Meloxicam]     Omeprazole      Other reaction(s): Nausea    Phenobarbital      Made head feel funny but cannot remember otherwise    Phenytoin Sodium Extended     Prednisone     Sulfa Antibiotics     Famciclovir Palpitations       Problem List:    Patient Active Problem List   Diagnosis Code    Cerebral atherosclerosis I67.2    Hypercholesterolemia E78.00    Right cataract H26.9    Bradycardia R00.1       Past Medical History:        Diagnosis Date    A-fib ([facility] [address]     PACEMAKER AND WATCHMAN    Arthritis     Bladder dysfunction     Bowel dysfunction     HTN (hypertension)     Hyperlipidemia     Kidney stones     Tremor     Ulcer        Past Surgical History:        Procedure Laterality Date    ARTERIAL CLOT SURGERY  08/03/2020    Watchman inserted     BRAIN ANEURYSM SURGERY  1993    CATARACT REMOVAL WITH IMPLANT Right 11/22/2016    COLONOSCOPY      ENDOSCOPY, COLON, DIAGNOSTIC      HEMORRHOID SURGERY      HYSTERECTOMY      LITHOTRIPSY      NERVE SURGERY      pinched nerve in left arm, had surgery    PACEMAKER INSERTION N/A 05/08/2019    PACEMAKER INSERTION (Marsha Conner) performed by Dominic Arthur MD at [address] [address]         Social History:    Social History     Tobacco Use    Smoking status: Current Every Day Smoker     Packs/day: 0.50     Years: 60.00     Pack years: 30.00     Types: Cigarettes    Smokeless tobacco: Never Used    Tobacco comment: Quit smoking for 15yrs after anuerysm, then restarted   Substance Use Topics    Alcohol use: No                                Ready to quit: Not Answered  Counseling given: Not Answered  Comment: Quit smoking for 15yrs after anuerysm, then restarted      Vital Signs (Current):   Vitals:    09/22/21 1209   Weight: 172 lb (78 kg)   Height: 5' 4\" (1.626 m)                                              BP Readings from Last 3 Encounters:   09/29/21 (!) 147/75   03/17/21 124/66   12/15/20 138/74       NPO Status:                                                                                 BMI:   Wt Readings from Last 3 Encounters:   10/07/21 170 lb (77.1 kg)   09/29/21 169 lb (76.7 kg)   12/15/20 166 lb (75.3 kg)     Body mass index is 29.52 kg/m².     CBC:   Lab Results   Component Value Date    WBC 4.3 07/12/2021    RBC 4.98 07/12/2021    HGB 16.2 07/12/2021    HCT 46.1 07/12/2021    MCV 92.6 07/12/2021    RDW 13.2 07/12/2021     07/12/2021       CMP:   Lab Results   Component Value Date     07/12/2021    K 4.2 07/12/2021    K 4.1 10/10/2019     07/12/2021    CO2 27 07/12/2021    BUN 8 07/12/2021    CREATININE 0.9 07/12/2021    GFRAA >60 07/12/2021    LABGLOM >60 07/12/2021    GLUCOSE 141 01/28/2021    PROT 6.9 07/12/2021    CALCIUM 10.0 07/12/2021    BILITOT 1.1 07/12/2021    ALKPHOS 70 07/12/2021    AST 13 07/12/2021    ALT 13 07/12/2021       POC Tests: No results for input(s): POCGLU, POCNA, POCK, POCCL, POCBUN, POCHEMO, POCHCT in the last 72 hours. Coags:   Lab Results   Component Value Date    PROTIME 12.8 10/12/2019    INR 1.1 10/12/2019       HCG (If Applicable): No results found for: PREGTESTUR, PREGSERUM, HCG, HCGQUANT     ABGs: No results found for: PHART, PO2ART, PGW4DIE, AUO6FTV, BEART, H4IAXXHQ     Type & Screen (If Applicable):  No results found for: LABABO, LABRH    Drug/Infectious Status (If Applicable):  No results found for: HIV, HEPCAB    COVID-19 Screening (If Applicable): No results found for: COVID19        Anesthesia Evaluation  Patient summary reviewed no history of anesthetic complications:   Airway:         Dental:          Pulmonary:   (+) current smoker                          ROS comment: CXR 10/2019:  FINDINGS:  The heart is normal in size. There is a normal appearance to the  pulmonary vasculature. No focal airspace opacity or evidence of   pleural effusion. No pneumothorax. No free air beneath the diaphragm. Left clavian pacemaker is unchanged. Cardiovascular:    (+) hypertension:, pacemaker (Pacemaker for sick sinus syndrome): pacemaker, dysrhythmias: atrial fibrillation, hyperlipidemia      ECG reviewed                     ROS comment: EKG 10/2019: atrial paced rhythm     Neuro/Psych:   (+) neuromuscular disease (Tremor):, depression/anxiety              ROS comment: Brain aneurysm s/p clip placement  right foot drop GI/Hepatic/Renal:   (+) renal disease: kidney stones,           Endo/Other:    (+) : arthritis:., .                  ROS comment: Bladder dysfunction Abdominal:             Vascular: negative vascular ROS. Other Findings:             Anesthesia Plan      MAC     ASA 3       Induction: intravenous. Preoperative evaluation note updated on 10/11/2021 based on review of patient's medical records on same date. Patient to be evaluated in person by anesthesiologist on day of procedure. Rubin Cuadra MD   10/11/2021

## 2021-10-12 ENCOUNTER — ANESTHESIA (OUTPATIENT)
Dept: OPERATING ROOM | Age: 76
End: 2021-10-12
Payer: MEDICARE

## 2021-10-12 ENCOUNTER — TELEPHONE (OUTPATIENT)
Dept: NEUROLOGY | Age: 76
End: 2021-10-12

## 2021-10-12 ENCOUNTER — HOSPITAL ENCOUNTER (OUTPATIENT)
Age: 76
Setting detail: OUTPATIENT SURGERY
Discharge: HOME | End: 2021-10-12
Attending: OPHTHALMOLOGY | Admitting: OPHTHALMOLOGY
Payer: MEDICARE

## 2021-10-12 VITALS
BODY MASS INDEX: 29.37 KG/M2 | RESPIRATION RATE: 16 BRPM | HEART RATE: 60 BPM | SYSTOLIC BLOOD PRESSURE: 145 MMHG | TEMPERATURE: 98 F | OXYGEN SATURATION: 98 % | HEIGHT: 64 IN | WEIGHT: 172 LBS | DIASTOLIC BLOOD PRESSURE: 87 MMHG

## 2021-10-12 VITALS
RESPIRATION RATE: 19 BRPM | OXYGEN SATURATION: 96 % | DIASTOLIC BLOOD PRESSURE: 83 MMHG | SYSTOLIC BLOOD PRESSURE: 151 MMHG

## 2021-10-12 PROBLEM — H26.9 LEFT CATARACT: Status: ACTIVE | Noted: 2021-10-12

## 2021-10-12 PROCEDURE — 6370000000 HC RX 637 (ALT 250 FOR IP): Performed by: OPHTHALMOLOGY

## 2021-10-12 PROCEDURE — 2500000003 HC RX 250 WO HCPCS: Performed by: OPHTHALMOLOGY

## 2021-10-12 PROCEDURE — 3600000003 HC SURGERY LEVEL 3 BASE: Performed by: OPHTHALMOLOGY

## 2021-10-12 PROCEDURE — 2709999900 HC NON-CHARGEABLE SUPPLY: Performed by: OPHTHALMOLOGY

## 2021-10-12 PROCEDURE — 2500000003 HC RX 250 WO HCPCS: Performed by: NURSE ANESTHETIST, CERTIFIED REGISTERED

## 2021-10-12 PROCEDURE — 7100000010 HC PHASE II RECOVERY - FIRST 15 MIN: Performed by: OPHTHALMOLOGY

## 2021-10-12 PROCEDURE — 2580000003 HC RX 258: Performed by: ANESTHESIOLOGY

## 2021-10-12 PROCEDURE — 2720000010 HC SURG SUPPLY STERILE: Performed by: OPHTHALMOLOGY

## 2021-10-12 PROCEDURE — V2632 POST CHMBR INTRAOCULAR LENS: HCPCS | Performed by: OPHTHALMOLOGY

## 2021-10-12 PROCEDURE — 7100000011 HC PHASE II RECOVERY - ADDTL 15 MIN: Performed by: OPHTHALMOLOGY

## 2021-10-12 PROCEDURE — 3700000000 HC ANESTHESIA ATTENDED CARE: Performed by: OPHTHALMOLOGY

## 2021-10-12 DEVICE — LENS INTOCU +21.5 DIOPT A CONSTANT 118.8 L13MM DIA6MM 0DEG: Type: IMPLANTABLE DEVICE | Site: EYE | Status: FUNCTIONAL

## 2021-10-12 RX ORDER — PHENYLEPHRINE HYDROCHLORIDE 100 MG/ML
1 SOLUTION/ DROPS OPHTHALMIC PRN
Status: DISCONTINUED | OUTPATIENT
Start: 2021-10-12 | End: 2021-10-12 | Stop reason: HOSPADM

## 2021-10-12 RX ORDER — ALFENTANIL HYDROCHLORIDE 500 UG/ML
INJECTION INTRAVENOUS PRN
Status: DISCONTINUED | OUTPATIENT
Start: 2021-10-12 | End: 2021-10-12 | Stop reason: SDUPTHER

## 2021-10-12 RX ORDER — SODIUM CHLORIDE, SODIUM LACTATE, POTASSIUM CHLORIDE, CALCIUM CHLORIDE 600; 310; 30; 20 MG/100ML; MG/100ML; MG/100ML; MG/100ML
INJECTION, SOLUTION INTRAVENOUS CONTINUOUS
Status: DISCONTINUED | OUTPATIENT
Start: 2021-10-12 | End: 2021-10-12 | Stop reason: HOSPADM

## 2021-10-12 RX ORDER — BALANCED SALT SOLUTION 6.4; .75; .48; .3; 3.9; 1.7 MG/ML; MG/ML; MG/ML; MG/ML; MG/ML; MG/ML
SOLUTION OPHTHALMIC PRN
Status: DISCONTINUED | OUTPATIENT
Start: 2021-10-12 | End: 2021-10-12 | Stop reason: ALTCHOICE

## 2021-10-12 RX ORDER — CYCLOPENTOLATE HYDROCHLORIDE 10 MG/ML
1 SOLUTION/ DROPS OPHTHALMIC
Status: COMPLETED | OUTPATIENT
Start: 2021-10-12 | End: 2021-10-12

## 2021-10-12 RX ORDER — TETRACAINE HYDROCHLORIDE 5 MG/ML
1 SOLUTION OPHTHALMIC ONCE
Status: COMPLETED | OUTPATIENT
Start: 2021-10-12 | End: 2021-10-12

## 2021-10-12 RX ORDER — FLURBIPROFEN SODIUM 0.3 MG/ML
1 SOLUTION/ DROPS OPHTHALMIC
Status: COMPLETED | OUTPATIENT
Start: 2021-10-12 | End: 2021-10-12

## 2021-10-12 RX ORDER — TETRACAINE HYDROCHLORIDE 5 MG/ML
SOLUTION OPHTHALMIC PRN
Status: DISCONTINUED | OUTPATIENT
Start: 2021-10-12 | End: 2021-10-12 | Stop reason: ALTCHOICE

## 2021-10-12 RX ORDER — PHENYLEPHRINE HCL 2.5 %
1 DROPS OPHTHALMIC (EYE)
Status: COMPLETED | OUTPATIENT
Start: 2021-10-12 | End: 2021-10-12

## 2021-10-12 RX ADMIN — SODIUM CHLORIDE, POTASSIUM CHLORIDE, SODIUM LACTATE AND CALCIUM CHLORIDE: 600; 310; 30; 20 INJECTION, SOLUTION INTRAVENOUS at 06:12

## 2021-10-12 RX ADMIN — TETRACAINE HYDROCHLORIDE 1 DROP: 25 LIQUID OPHTHALMIC at 06:36

## 2021-10-12 RX ADMIN — PHENYLEPHRINE HYDROCHLORIDE 1 DROP: 25 SOLUTION/ DROPS OPHTHALMIC at 06:30

## 2021-10-12 RX ADMIN — CYCLOPENTOLATE HYDROCHLORIDE 1 DROP: 10 SOLUTION/ DROPS OPHTHALMIC at 06:20

## 2021-10-12 RX ADMIN — FLURBIPROFEN SODIUM 1 DROP: 0.3 SOLUTION/ DROPS OPHTHALMIC at 06:20

## 2021-10-12 RX ADMIN — ALFENTANIL HYDROCHLORIDE 250 MCG: 500 INJECTION INTRAVENOUS at 07:04

## 2021-10-12 RX ADMIN — FLURBIPROFEN SODIUM 1 DROP: 0.3 SOLUTION/ DROPS OPHTHALMIC at 06:25

## 2021-10-12 RX ADMIN — FLURBIPROFEN SODIUM 1 DROP: 0.3 SOLUTION/ DROPS OPHTHALMIC at 06:30

## 2021-10-12 RX ADMIN — PHENYLEPHRINE HYDROCHLORIDE 1 DROP: 25 SOLUTION/ DROPS OPHTHALMIC at 06:20

## 2021-10-12 RX ADMIN — PHENYLEPHRINE HYDROCHLORIDE 1 DROP: 25 SOLUTION/ DROPS OPHTHALMIC at 06:25

## 2021-10-12 RX ADMIN — CYCLOPENTOLATE HYDROCHLORIDE 1 DROP: 10 SOLUTION/ DROPS OPHTHALMIC at 06:25

## 2021-10-12 RX ADMIN — ALFENTANIL HYDROCHLORIDE 250 MCG: 500 INJECTION INTRAVENOUS at 06:58

## 2021-10-12 RX ADMIN — CYCLOPENTOLATE HYDROCHLORIDE 1 DROP: 10 SOLUTION/ DROPS OPHTHALMIC at 06:30

## 2021-10-12 ASSESSMENT — PAIN SCALES - GENERAL
PAINLEVEL_OUTOF10: 0

## 2021-10-12 ASSESSMENT — LIFESTYLE VARIABLES: SMOKING_STATUS: 1

## 2021-10-12 ASSESSMENT — PAIN - FUNCTIONAL ASSESSMENT: PAIN_FUNCTIONAL_ASSESSMENT: 0-10

## 2021-10-12 NOTE — ANESTHESIA POSTPROCEDURE EVALUATION
Department of Anesthesiology  Postprocedure Note    Patient: Kym Bautista  MRN: [id number]  YOB: 1945  Date of evaluation: 10/12/2021  Time:  7:29 AM     Procedure Summary     Date: 10/12/21 Room / Location: [address] [place] [address]    Anesthesia Start: 5433 Anesthesia Stop: 3308    Procedure: LEFT CATARACT EXTRACTION WITH IOL (Left ) Diagnosis: (LEFT CATARACT)    Surgeons: Lakia Duke MD Responsible Provider: Jenifer Mendoza MD    Anesthesia Type: MAC ASA Status: 3          Anesthesia Type: MAC    Chelle Phase I: Chelle Score: 10    Chelle Phase II: Chelle Score: 10    Last vitals: Reviewed and per EMR flowsheets.        Anesthesia Post Evaluation    Patient location during evaluation: PACU  Patient participation: complete - patient participated  Level of consciousness: awake  Pain score: 0  Airway patency: patent  Nausea & Vomiting: no nausea  Complications: no  Cardiovascular status: blood pressure returned to baseline  Respiratory status: acceptable  Hydration status: euvolemic

## 2021-10-12 NOTE — OP NOTE
PREOPERATIVE DIAGNOSIS:  Left Cataract    POSTOPERATIVE DIAGNOSIS: Left Cataract    PROCEDURE:  Left phacoemulsification with intraocular lens implant. ANESTHESIA:  Local Mac    ESTIMATED BLOOD LOSS:  Minimal    COMPLICATIONS:  None    DESCRIPTION OF PROCEDURE:  The patient was brought into the operating room. The operative eye was marked, which was the left eye. The left eye was then prepped with a full-strength Betadine preparation. The periorbital area was copiously washed with Betadine. The lashes were washed with Betadine. Dilute Betadine was then also put in the inferior and superior fornices and left in place for approximately a minute or 2. This was then irrigated with sterile water. The face was then wiped and the preparation was repeated 1 more time. The drape was then placed over the operative eye with the sticky adhesive placed at the lid margins so that it draped the lashes out of the operative field superiorly and inferiorly, as well as isolated the meibomian glands behind the drape. This cleared the operative field of any meibomian gland secretions and eyelashes. Next, a lid speculum was positioned in the left eye. A super sharp blade was used to make a side-port incision at the 2 o'clock position. A clear corneal incision was fashioned over the 12 o;clock meridian with a 2.3mm keratome at the limbus. Healon was used to reform the anterior chamber. A continuous tear anterior capsulotomy was then performed with a bent needle cystotome. Hydrodissection was performed by irrigating with balanced salt solution through a syringe underneath the anterior capsular flap to loosen and allow free rotation of the lens nucleus. Phacoemulsification was used and the entire lens nucleus was removed. The I A unit was instilled in the eye, and the remaining cortex was removed. Healon was used to separate the anterior and posterior capsular flaps.   The PCBOO 21.5  diopter lens was then instilled into the capsular bag and dialed to 3 and 9 o'clock positions. Healon was removed from in front of and behind the lens. The lens was found to be well centered, well positioned in the bag and stable. The wound was checked and found to be airtight and watertight. The lid speculum was removed and the drape was removed. The patient was brought to the recovery room in excellent condition, given postoperative instructions, and discharge in excellent condition.    Operative Note      Patient: Emile Moran  YOB: 1945  MRN: [id number]    Date of Procedure: 10/12/2021    Pre-Op Diagnosis: LEFT CATARACT    Post-Op Diagnosis: Same       Procedure(s):  LEFT CATARACT EXTRACTION WITH IOL    Surgeon(s):  Juan Bagley MD    Assistant:   * No surgical staff found *    Anesthesia: Monitor Anesthesia Care    Estimated Blood Loss (mL): Minimal    Complications: None    Specimens:   * No specimens in log *    Implants:  * No implants in log *      Drains: * No LDAs found *    Findings: Left cataract    Detailed Description of Procedure:   Left cataract extraction with intra ocular lens implant    Electronically signed by Cathey Epley, MD on 10/12/2021 at 6:55 AM

## 2021-10-12 NOTE — ANESTHESIA PRE PROCEDURE
Department of Anesthesiology  Preprocedure Note       Name:  Tammie Baxter   Age:  77 y.o.  :  1945                                          MRN:  [id number]         Date:  10/12/2021      Surgeon: Pedro Cerda):  Jose J Crum MD    Procedure: Procedure(s):  LEFT CATARACT EXTRACTION WITH IOL    Medications prior to admission:   Prior to Admission medications    Medication Sig Start Date End Date Taking? Authorizing Provider   propranolol (INDERAL) 10 MG tablet Take 1 tablet by mouth 4 times daily  Patient taking differently: Take 10 mg by mouth 4 times daily Takes for tremors 21  Payal Colón MD   hydroCHLOROthiazide (HYDRODIURIL) 25 MG tablet Take 25 mg by mouth daily    Historical Provider, MD   Magnesium 500 MG CAPS Take by mouth daily     Historical Provider, MD   cloNIDine (CATAPRES) 0.1 MG tablet Take 0.1 mg by mouth every 8 hours as needed PRN for BP > 165/95 18   Historical Provider, MD   potassium chloride (KLOR-CON) 10 MEQ extended release tablet Take 20 mEq by mouth 3 times daily    Historical Provider, MD   diazepam (VALIUM) 5 MG tablet Take 5 mg by mouth every 8 hours as needed. . 18   Historical Provider, MD   aspirin (ECOTRIN LOW STRENGTH) 81 MG EC tablet Take 1 tablet by mouth daily 18   FRANK Jacob - CNP   Cholecalciferol (VITAMIN D3) 1000 units CAPS Take 1 tablet by mouth daily    Historical Provider, MD   rosuvastatin (CRESTOR) 5 MG tablet Take 5 mg by mouth daily    Historical Provider, MD   folic acid (FOLVITE) 1 MG tablet Take 1 mg by mouth daily    Historical Provider, MD   PARoxetine (PAXIL) 10 MG tablet Take 20 mg by mouth every morning     Historical Provider, MD       Current medications:    No current facility-administered medications for this visit. No current outpatient medications on file.      Facility-Administered Medications Ordered in Other Visits   Medication Dose Route Frequency Provider Last Rate Last Admin    lactated ringers infusion   IntraVENous Continuous Catrachito Metzger  mL/hr at 10/12/21 0612 New Bag at 10/12/21 0612    phenylephrine (ESTRADA-SYNEPHRINE) 10 % ophthalmic solution 1 drop  1 drop Left Eye PRN Robe Muhammad MD           Allergies: Allergies   Allergen Reactions    Dilantin [Phenytoin Sodium Extended]      Made head feel funny    Fentanyl      States it gave her shingles    Mobic [Meloxicam]     Omeprazole      Other reaction(s): Nausea    Phenobarbital      Made head feel funny but cannot remember otherwise    Phenytoin Sodium Extended     Prednisone     Sulfa Antibiotics     Famciclovir Palpitations       Problem List:    Patient Active Problem List   Diagnosis Code    Cerebral atherosclerosis I67.2    Hypercholesterolemia E78.00    Right cataract H26.9    Bradycardia R00.1       Past Medical History:        Diagnosis Date    A[facility]fib ([facility] [address]     PACEMAKER AND WATCHMAN    Arthritis     Bladder dysfunction     Bowel dysfunction     HTN (hypertension)     Hyperlipidemia     Kidney stones     Tremor     Ulcer        Past Surgical History:        Procedure Laterality Date    ARTERIAL CLOT SURGERY  08/03/2020    Watchman inserted     BRAIN ANEURYSM SURGERY  1993    CATARACT REMOVAL WITH IMPLANT Right 11/22/2016    COLONOSCOPY      ENDOSCOPY, COLON, DIAGNOSTIC      HEMORRHOID SURGERY      HYSTERECTOMY      LITHOTRIPSY      NERVE SURGERY      pinched nerve in left arm, had surgery    PACEMAKER INSERTION N/A 05/08/2019    PACEMAKER INSERTION (Nixon Bliss) performed by Alonso Alfaro MD at [address] [place] [address]         Social History:    Social History     Tobacco Use    Smoking status: Current Every Day Smoker     Packs/day: 0.50     Years: 60.00     Pack years: 30.00     Types: Cigarettes    Smokeless tobacco: Never Used    Tobacco comment: Quit smoking for 15yrs after anuerysm, then restarted   Substance Use Topics    Alcohol use:  No Ready to quit: Not Answered  Counseling given: Not Answered  Comment: Quit smoking for 15yrs after anuerysm, then restarted      Vital Signs (Current): There were no vitals filed for this visit. BP Readings from Last 3 Encounters:   10/12/21 (!) 150/74   09/29/21 (!) 147/75   03/17/21 124/66       NPO Status:                                                                                 BMI:   Wt Readings from Last 3 Encounters:   09/22/21 172 lb (78 kg)   10/07/21 170 lb (77.1 kg)   09/29/21 169 lb (76.7 kg)     There is no height or weight on file to calculate BMI.    CBC:   Lab Results   Component Value Date    WBC 4.3 07/12/2021    RBC 4.98 07/12/2021    HGB 16.2 07/12/2021    HCT 46.1 07/12/2021    MCV 92.6 07/12/2021    RDW 13.2 07/12/2021     07/12/2021       CMP:   Lab Results   Component Value Date     07/12/2021    K 4.2 07/12/2021    K 4.1 10/10/2019     07/12/2021    CO2 27 07/12/2021    BUN 8 07/12/2021    CREATININE 0.9 07/12/2021    GFRAA >60 07/12/2021    LABGLOM >60 07/12/2021    GLUCOSE 141 01/28/2021    PROT 6.9 07/12/2021    CALCIUM 10.0 07/12/2021    BILITOT 1.1 07/12/2021    ALKPHOS 70 07/12/2021    AST 13 07/12/2021    ALT 13 07/12/2021       POC Tests: No results for input(s): POCGLU, POCNA, POCK, POCCL, POCBUN, POCHEMO, POCHCT in the last 72 hours.     Coags:   Lab Results   Component Value Date    PROTIME 12.8 10/12/2019    INR 1.1 10/12/2019       HCG (If Applicable): No results found for: PREGTESTUR, PREGSERUM, HCG, HCGQUANT     ABGs: No results found for: PHART, PO2ART, BXI6QRH, MOU3KBB, BEART, [id number]     Type & Screen (If Applicable):  No results found for: LABABO, LABRH    Drug/Infectious Status (If Applicable):  No results found for: HIV, HEPCAB    COVID-19 Screening (If Applicable): No results found for: COVID19        Anesthesia Evaluation  Patient summary reviewed no history of anesthetic complications: Airway: Mallampati: IV  TM distance: <3 FB   Neck ROM: limited  Mouth opening: < 3 FB Dental:    (+) upper dentures, lower dentures and edentulous      Pulmonary: breath sounds clear to auscultation  (+) current smoker                          ROS comment: CXR 10/2019:  FINDINGS:  The heart is normal in size. There is a normal appearance to the  pulmonary vasculature. No focal airspace opacity or evidence of   pleural effusion. No pneumothorax. No free air beneath the diaphragm. Left clavian pacemaker is unchanged. Cardiovascular:    (+) hypertension:, pacemaker (Pacemaker for sick sinus syndrome): pacemaker, dysrhythmias: atrial fibrillation, hyperlipidemia      ECG reviewed  Rhythm: regular  Rate: normal                 ROS comment: EKG 10/2019: atrial paced rhythm 61. Neuro/Psych:   (+) neuromuscular disease (Tremor):, depression/anxiety              ROS comment: Brain aneurysm s/p clip placement  right foot drop GI/Hepatic/Renal:   (+) renal disease: kidney stones,           Endo/Other:    (+) : arthritis:., .                  ROS comment: Bladder dysfunction Abdominal:             Vascular: negative vascular ROS. Other Findings:               Anesthesia Plan      MAC     ASA 3       Induction: intravenous. Anesthetic plan and risks discussed with patient. Plan discussed with CRNA. Attending anesthesiologist reviewed and agrees with Preprocedure content        Preoperative evaluation note updated on 10/11/2021 based on review of patient's medical records on same date. Patient to be evaluated in person by anesthesiologist on day of procedure.        Benita Soliz MD   10/12/2021

## 2021-10-12 NOTE — ANESTHESIA POSTPROCEDURE EVALUATION
Department of Anesthesiology  Postprocedure Note    Patient: Tamar Rouse  MRN: [id number]  YOB: 1945  Date of evaluation: 10/12/2021  Time:  7:26 AM     Procedure Summary     Date: 10/12/21 Room / Location: [address] [place] [address]    Anesthesia Start: 8658 Anesthesia Stop: 7773    Procedure: LEFT CATARACT EXTRACTION WITH IOL (Left ) Diagnosis: (LEFT CATARACT)    Surgeons: Roselyn Pierson MD Responsible Provider: Onesimo Lopez MD    Anesthesia Type: MAC ASA Status: 3          Anesthesia Type: MAC    Chelle Phase I: Chelle Score: 10    Chelle Phase II: Chelle Score: 10    Last vitals: Reviewed and per EMR flowsheets.        Anesthesia Post Evaluation    Patient location during evaluation: PACU  Patient participation: complete - patient participated  Level of consciousness: awake  Pain score: 0  Airway patency: patent  Nausea & Vomiting: no nausea  Complications: no  Cardiovascular status: blood pressure returned to baseline  Respiratory status: acceptable  Hydration status: euvolemic

## 2021-10-12 NOTE — TELEPHONE ENCOUNTER
Patient called in and left message that she is had cataract surgery today at 5:45 am. She is wanting to know if she was ok to take her propranolol early with her other meds. Per Dr. Lorene Sherwood, she was perfectly ok to take it with her other meds. Called patient back to let her know it's ok to start taking again at regular time tomorrow.

## 2021-10-14 ENCOUNTER — HOSPITAL ENCOUNTER (EMERGENCY)
Age: 76
Discharge: HOME | End: 2021-10-15
Attending: EMERGENCY MEDICINE
Payer: MEDICARE

## 2021-10-14 DIAGNOSIS — I10 PRIMARY HYPERTENSION: Primary | ICD-10-CM

## 2021-10-14 PROCEDURE — 99285 EMERGENCY DEPT VISIT HI MDM: CPT

## 2021-10-14 ASSESSMENT — ENCOUNTER SYMPTOMS
SHORTNESS OF BREATH: 0
NAUSEA: 0
RHINORRHEA: 0
VOMITING: 0
COUGH: 0
ABDOMINAL PAIN: 0
DIARRHEA: 0
COLOR CHANGE: 0
BACK PAIN: 0

## 2021-10-15 VITALS
HEIGHT: 64 IN | RESPIRATION RATE: 18 BRPM | DIASTOLIC BLOOD PRESSURE: 78 MMHG | SYSTOLIC BLOOD PRESSURE: 143 MMHG | OXYGEN SATURATION: 97 % | BODY MASS INDEX: 29.02 KG/M2 | WEIGHT: 170 LBS | HEART RATE: 63 BPM | TEMPERATURE: 97.7 F

## 2021-10-15 LAB
ANION GAP SERPL CALCULATED.3IONS-SCNC: 8 MMOL/L (ref 7–16)
BASOPHILS ABSOLUTE: 0.06 E9/L (ref 0–0.2)
BASOPHILS RELATIVE PERCENT: 0.9 % (ref 0–2)
BUN BLDV-MCNC: 10 MG/DL (ref 6–23)
CALCIUM SERPL-MCNC: 10.1 MG/DL (ref 8.6–10.2)
CHLORIDE BLD-SCNC: 103 MMOL/L (ref 98–107)
CO2: 30 MMOL/L (ref 22–29)
CREAT SERPL-MCNC: 0.7 MG/DL (ref 0.5–1)
EKG ATRIAL RATE: 326 BPM
EKG P AXIS: -58 DEGREES
EKG P-R INTERVAL: 216 MS
EKG Q-T INTERVAL: 428 MS
EKG QRS DURATION: 130 MS
EKG QTC CALCULATION (BAZETT): 428 MS
EKG R AXIS: 4 DEGREES
EKG T AXIS: 0 DEGREES
EKG VENTRICULAR RATE: 60 BPM
EOSINOPHILS ABSOLUTE: 0.15 E9/L (ref 0.05–0.5)
EOSINOPHILS RELATIVE PERCENT: 2.3 % (ref 0–6)
GFR AFRICAN AMERICAN: >60
GFR NON-AFRICAN AMERICAN: >60 ML/MIN/1.73
GLUCOSE BLD-MCNC: 118 MG/DL (ref 74–99)
HCT VFR BLD CALC: 45.3 % (ref 34–48)
HEMOGLOBIN: 15.2 G/DL (ref 11.5–15.5)
IMMATURE GRANULOCYTES #: 0.01 E9/L
IMMATURE GRANULOCYTES %: 0.2 % (ref 0–5)
LYMPHOCYTES ABSOLUTE: 2.69 E9/L (ref 1.5–4)
LYMPHOCYTES RELATIVE PERCENT: 42.1 % (ref 20–42)
MAGNESIUM: 2.1 MG/DL (ref 1.6–2.6)
MCH RBC QN AUTO: 31.7 PG (ref 26–35)
MCHC RBC AUTO-ENTMCNC: 33.6 % (ref 32–34.5)
MCV RBC AUTO: 94.4 FL (ref 80–99.9)
MONOCYTES ABSOLUTE: 0.44 E9/L (ref 0.1–0.95)
MONOCYTES RELATIVE PERCENT: 6.9 % (ref 2–12)
NEUTROPHILS ABSOLUTE: 3.04 E9/L (ref 1.8–7.3)
NEUTROPHILS RELATIVE PERCENT: 47.6 % (ref 43–80)
PDW BLD-RTO: 12.7 FL (ref 11.5–15)
PLATELET # BLD: 212 E9/L (ref 130–450)
PMV BLD AUTO: 10.3 FL (ref 7–12)
POTASSIUM REFLEX MAGNESIUM: 3.5 MMOL/L (ref 3.5–5)
RBC # BLD: 4.8 E12/L (ref 3.5–5.5)
SODIUM BLD-SCNC: 141 MMOL/L (ref 132–146)
WBC # BLD: 6.4 E9/L (ref 4.5–11.5)

## 2021-10-15 PROCEDURE — 83735 ASSAY OF MAGNESIUM: CPT

## 2021-10-15 PROCEDURE — 93005 ELECTROCARDIOGRAM TRACING: CPT | Performed by: EMERGENCY MEDICINE

## 2021-10-15 PROCEDURE — 85025 COMPLETE CBC W/AUTO DIFF WBC: CPT

## 2021-10-15 PROCEDURE — 80048 BASIC METABOLIC PNL TOTAL CA: CPT

## 2021-10-15 NOTE — ED PROVIDER NOTES
Patient presents to the ED for evaluation of elevated blood pressure. Patient has a known history of hypertension and is on antihypertensives at home. She states that her systolic was 326 at home. She did take her clonidine at this time. Her blood pressure did improve per EMS. It was in the 150s. She states that her blood pressure has been fluctuating. She has had hypertension for several years. She has not been experiencing any chest pain, dizziness, lightheadedness, or shortness of breath. She does note that when she took her blood pressure and noticed how high it was this made her anxious. She states she is feeling better now. Blood pressure in the ED here is 173/79. Review of Systems   Constitutional: Negative for chills, diaphoresis, fatigue and fever. HENT: Negative for congestion and rhinorrhea. Eyes: Negative for visual disturbance. Respiratory: Negative for cough and shortness of breath. Cardiovascular: Negative for chest pain, palpitations and leg swelling. Gastrointestinal: Negative for abdominal pain, diarrhea, nausea and vomiting. Genitourinary: Negative for decreased urine volume and difficulty urinating. Musculoskeletal: Negative for back pain and neck pain. Skin: Negative for color change and pallor. Neurological: Negative for dizziness, light-headedness and headaches. Hematological: Does not bruise/bleed easily. Psychiatric/Behavioral: The patient is nervous/anxious (was anxious earlier, resolved). All other systems reviewed and are negative. Physical Exam  Vitals and nursing note reviewed. Constitutional:       General: She is not in acute distress. Appearance: She is well-developed and normal weight. She is not diaphoretic. HENT:      Head: Normocephalic and atraumatic. Eyes:      Conjunctiva/sclera: Conjunctivae normal.   Cardiovascular:      Rate and Rhythm: Normal rate and regular rhythm. Heart sounds: Normal heart sounds. No murmur heard. Pulmonary:      Effort: Pulmonary effort is normal. No respiratory distress. Breath sounds: Normal breath sounds. No wheezing or rales. Abdominal:      General: Bowel sounds are normal.      Palpations: Abdomen is soft. Tenderness: There is no abdominal tenderness. There is no guarding or rebound. Musculoskeletal:      Cervical back: Normal range of motion and neck supple. Comments: There is no pretibial edema nor calf tenderness bilaterally      Skin:     General: Skin is warm and dry. Neurological:      Mental Status: She is alert and oriented to person, place, and time. Procedures     MDM     EKG Interpretation    Interpreted by emergency department physician    Rhythm: Atrial paced rhythm  Rate: normal  Axis: normal  Ectopy: none  Conduction: Paced rhythm with prolonged AV conduction similar to prior EKGs  ST Segments: no acute change  T Waves: no acute change  Q Waves: none    Clinical Impression: no acute changes    Latricia Bush DO      ED Course as of Oct 15 0141   Fri Oct 15, 2021   0109 Patient resting in bed no acute distress. Blood pressure is much improved. No complaints by the patient at this time. [MS]   0139 Discussed additional results of labs with patient. She is in no distress and is comfortably discharged home. She will follow up with her PCP for further evaluation. [MS]      ED Course User Index  [MS] Latricia Bush DO       --------------------------------------------- PAST HISTORY ---------------------------------------------  Past Medical History:  has a past medical history of A-fib ([address] Arthritis, Bladder dysfunction, Bowel dysfunction, HTN (hypertension), Hyperlipidemia, Kidney stones, Tremor, and Ulcer. Past Surgical History:  has a past surgical history that includes Hysterectomy; Brain aneurysm surgery (1993); Nerve Surgery;  Colonoscopy; Endoscopy, colon, diagnostic; Cataract removal with implant (Right, 11/22/2016); Pacemaker insertion (N/A, 05/08/2019); Arterial clot surgery (08/03/2020); pacemaker placement; Hemorrhoid surgery; Lithotripsy; and Intracapsular cataract extraction (Left, 10/12/2021). Social History:  reports that she has been smoking cigarettes. She has a 30.00 pack-year smoking history. She has never used smokeless tobacco. She reports that she does not drink alcohol and does not use drugs. Family History: family history includes Heart Disease in her father; Ovarian Cancer in her paternal aunt; Stomach Cancer in her paternal uncle. The patients home medications have been reviewed.     Allergies: Dilantin [phenytoin sodium extended], Fentanyl, Mobic [meloxicam], Omeprazole, Phenobarbital, Phenytoin sodium extended, Prednisone, Sulfa antibiotics, and Famciclovir    -------------------------------------------------- RESULTS -------------------------------------------------  Labs:  Results for orders placed or performed during the hospital encounter of 74/20/16   Basic Metabolic Panel w/ Reflex to MG   Result Value Ref Range    Sodium 141 132 - 146 mmol/L    Potassium reflex Magnesium 3.5 3.5 - 5.0 mmol/L    Chloride 103 98 - 107 mmol/L    CO2 30 (H) 22 - 29 mmol/L    Anion Gap 8 7 - 16 mmol/L    Glucose 118 (H) 74 - 99 mg/dL    BUN 10 6 - 23 mg/dL    CREATININE 0.7 0.5 - 1.0 mg/dL    GFR Non-African American >60 >=60 mL/min/1.73    GFR African American >60     Calcium 10.1 8.6 - 10.2 mg/dL   CBC Auto Differential   Result Value Ref Range    WBC 6.4 4.5 - 11.5 E9/L    RBC 4.80 3.50 - 5.50 E12/L    Hemoglobin 15.2 11.5 - 15.5 g/dL    Hematocrit 45.3 34.0 - 48.0 %    MCV 94.4 80.0 - 99.9 fL    MCH 31.7 26.0 - 35.0 pg    MCHC 33.6 32.0 - 34.5 %    RDW 12.7 11.5 - 15.0 fL    Platelets 871 393 - 607 E9/L    MPV 10.3 7.0 - 12.0 fL    Neutrophils % 47.6 43.0 - 80.0 %    Immature Granulocytes % 0.2 0.0 - 5.0 %    Lymphocytes % 42.1 (H) 20.0 - 42.0 %    Monocytes % 6.9 2.0 - 12.0 %    Eosinophils % 2.3 0.0 - 6.0 % Basophils % 0.9 0.0 - 2.0 %    Neutrophils Absolute 3.04 1.80 - 7.30 E9/L    Immature Granulocytes # 0.01 E9/L    Lymphocytes Absolute 2.69 1.50 - 4.00 E9/L    Monocytes Absolute 0.44 0.10 - 0.95 E9/L    Eosinophils Absolute 0.15 0.05 - 0.50 E9/L    Basophils Absolute 0.06 0.00 - 0.20 E9/L   Magnesium   Result Value Ref Range    Magnesium 2.1 1.6 - 2.6 mg/dL   EKG 12 Lead   Result Value Ref Range    Ventricular Rate 60 BPM    Atrial Rate 326 BPM    P-R Interval 216 ms    QRS Duration 130 ms    Q-T Interval 428 ms    QTc Calculation (Bazett) 428 ms    P Axis -58 degrees    R Axis 4 degrees    T Axis 0 degrees       Radiology:  No orders to display       ------------------------- NURSING NOTES AND VITALS REVIEWED ---------------------------  Date / Time Roomed:  10/14/2021 11:36 PM  ED Bed Assignment:  13/13    The nursing notes within the ED encounter and vital signs as below have been reviewed. BP (!) 143/78   Pulse 60   Temp 97.8 °F (36.6 °C) (Oral)   Resp 22   Ht 5' 4\" (1.626 m)   Wt 170 lb (77.1 kg)   SpO2 97%   BMI 29.18 kg/m²   Oxygen Saturation Interpretation: Normal      ------------------------------------------ PROGRESS NOTES ------------------------------------------  I have spoken with the patient and discussed todays results, in addition to providing specific details for the plan of care and counseling regarding the diagnosis and prognosis. Their questions are answered at this time and they are agreeable with the plan. I discussed at length with them reasons for immediate return here for re evaluation. They will followup with primary care by calling their office tomorrow. --------------------------------- ADDITIONAL PROVIDER NOTES ---------------------------------  At this time the patient is without objective evidence of an acute process requiring hospitalization or inpatient management.   They have remained hemodynamically stable throughout their entire ED visit and are stable for discharge with outpatient follow-up. The plan has been discussed in detail and they are aware of the specific conditions for emergent return, as well as the importance of follow-up. New Prescriptions    No medications on file       Diagnosis:  1. Primary hypertension        Disposition:  Patient's disposition: Discharge to home  Patient's condition is stable.            Molly Eldridge DO  10/15/21 [id number]

## 2021-10-20 ENCOUNTER — TELEPHONE (OUTPATIENT)
Dept: NEUROLOGY | Age: 76
End: 2021-10-20

## 2021-10-20 NOTE — TELEPHONE ENCOUNTER
Patient called stating she takes propranol 10 mg 3x daily even thought it is ordered 4 x daily. Her PCP said not to increase it because she now takes amlodipine due to hypertension.  Pt is requesting a call from Dr Jones Wooten

## 2021-10-20 NOTE — TELEPHONE ENCOUNTER
Her blood pressures are running relatively high, despite taking other antihypertensives. Propranolol is being used for her tremors. I now recommend she take 2 tablets or 20 mg at night, to avoid hypotension, if her primary care physician agrees. Please have her report back in 3 to 4 weeks.   Thank you

## 2021-10-21 NOTE — TELEPHONE ENCOUNTER
Pt questioned if she is to take Propranonol 10mg 2 tablets only at night. And nothing during the day. Please advise? Pt was instructed that Dr Ainsley Klein will not be available til 10/26.

## 2021-10-24 NOTE — TELEPHONE ENCOUNTER
She may take at least 20 mg of propranolol at night. If necessary, she may take an additional 10 mg once or twice daily, if her tremors persist.  Once again, I do not feel that low-dose propranolol will lower her blood pressure significantly. If she feels lightheaded, please have her report back.   Thank you

## 2021-10-26 ENCOUNTER — TELEPHONE (OUTPATIENT)
Dept: NEUROLOGY | Age: 76
End: 2021-10-26

## 2021-11-02 NOTE — TELEPHONE ENCOUNTER
The patient claims she was not shaking more, despite calling the office. She was definitely more confused, not understanding her protocol for taking propranolol. Please set up an appointment sooner; I now question underlying dementia. In the interim, she will continue with propranolol 3 or 4 times daily.   Thank you

## 2021-11-09 ENCOUNTER — TELEPHONE (OUTPATIENT)
Dept: NEUROLOGY | Age: 76
End: 2021-11-09

## 2021-11-09 NOTE — TELEPHONE ENCOUNTER
Pt called stating that her tremors are increased and she took her valium . She wanted to know if she should take her Propranolol. . Dr Edward Benton notified and instructed to take her propanolol now and not to take any more Valium. Pt became upset  And stated she needed her Valium.  Notified Dr Edward Benton she states he will try to call her tonight

## 2021-11-09 NOTE — TELEPHONE ENCOUNTER
Appointment moved forward to December. And explained to patient that she could take propranolol 3 to 4 x daily. Verbalized understanding.

## 2021-11-10 NOTE — TELEPHONE ENCOUNTER
I had a long talk with the patient last night. Once again, there were difficulties explained to her my proposed treatment plan. Hopefully, she will take diazepam only 5 mg in the morning and 5 mg in the evening. I informed her she should not be on this medication at all. This drug was prescribed by psychiatry. I recommended buspirone for her anxiety. She will take propranolol first thing in the morning to reduce her tremors and every 4 hours afterwards, as well. She claimed propranolol made her tremors worse; I told her that drug does not produce more tremors. I again question underlying dementia. Hopefully, she will be seen soon in the office.   Thank you

## 2021-11-11 ENCOUNTER — TELEPHONE (OUTPATIENT)
Dept: NEUROLOGY | Age: 76
End: 2021-11-11

## 2021-11-11 NOTE — TELEPHONE ENCOUNTER
Pt called stating that her tremors yesterday have increased . Notified Dr Rhonda Davis  . He wanted her to decrease her Propanolol 10mg to bid . Then on 11/14 stop medication. Notified patient , pt became very upset that  she was doing this over the weekend. Spoke with Dr Rhonda Davis . Pt to decrease her Propranolol 10mg to tid until Monday 11/15 then bid. Follow up appointment made for Nov 16.

## 2021-11-16 ENCOUNTER — OFFICE VISIT (OUTPATIENT)
Dept: NEUROLOGY | Age: 76
End: 2021-11-16
Payer: MEDICARE

## 2021-11-16 VITALS
DIASTOLIC BLOOD PRESSURE: 79 MMHG | RESPIRATION RATE: 18 BRPM | WEIGHT: 171 LBS | TEMPERATURE: 97.3 F | OXYGEN SATURATION: 98 % | SYSTOLIC BLOOD PRESSURE: 147 MMHG | HEART RATE: 68 BPM | BODY MASS INDEX: 29.35 KG/M2

## 2021-11-16 DIAGNOSIS — B02.23 POST-HERPETIC POLYNEUROPATHY: ICD-10-CM

## 2021-11-16 DIAGNOSIS — G25.0 ESSENTIAL TREMOR: Primary | ICD-10-CM

## 2021-11-16 PROCEDURE — G8484 FLU IMMUNIZE NO ADMIN: HCPCS | Performed by: PSYCHIATRY & NEUROLOGY

## 2021-11-16 PROCEDURE — G8400 PT W/DXA NO RESULTS DOC: HCPCS | Performed by: PSYCHIATRY & NEUROLOGY

## 2021-11-16 PROCEDURE — 99215 OFFICE O/P EST HI 40 MIN: CPT | Performed by: PSYCHIATRY & NEUROLOGY

## 2021-11-16 PROCEDURE — 1123F ACP DISCUSS/DSCN MKR DOCD: CPT | Performed by: PSYCHIATRY & NEUROLOGY

## 2021-11-16 PROCEDURE — 4004F PT TOBACCO SCREEN RCVD TLK: CPT | Performed by: PSYCHIATRY & NEUROLOGY

## 2021-11-16 PROCEDURE — G8417 CALC BMI ABV UP PARAM F/U: HCPCS | Performed by: PSYCHIATRY & NEUROLOGY

## 2021-11-16 PROCEDURE — 4040F PNEUMOC VAC/ADMIN/RCVD: CPT | Performed by: PSYCHIATRY & NEUROLOGY

## 2021-11-16 PROCEDURE — 1090F PRES/ABSN URINE INCON ASSESS: CPT | Performed by: PSYCHIATRY & NEUROLOGY

## 2021-11-16 PROCEDURE — G8427 DOCREV CUR MEDS BY ELIG CLIN: HCPCS | Performed by: PSYCHIATRY & NEUROLOGY

## 2021-11-16 PROCEDURE — 3017F COLORECTAL CA SCREEN DOC REV: CPT | Performed by: PSYCHIATRY & NEUROLOGY

## 2021-11-16 RX ORDER — VALACYCLOVIR HYDROCHLORIDE 1 G/1
1000 TABLET, FILM COATED ORAL 3 TIMES DAILY
COMMUNITY
End: 2022-04-27

## 2021-11-16 NOTE — PROGRESS NOTES
[]   María Prajapati was a 81-year-old right handed woman who was followed for essential tremors. She remained a good historian. Her medications were now propranolol 10 mg thrice daily, clonidine, hydrochlorothiazide, diazepam 5 mg twice daily, cholecalciferol, rosuvastatin, paroxetine, valacyclovir and aspirin. She suffered from late life hand and vocal tremors. Primidone was initially recommended, but she refused to take that medication, expressing concerns about drug interactions. She remained on low-dose propranolol 10 mg 3 times daily. Her hand tremors improved but her vocal quivering continued. She noted the drug wearing off 30 minutes before the next dosing, as well as more tremors upon awakening. She was again reluctant to increase e that dosing. She was now on diazepam 5 mg twice daily instead of 3 times daily. She was also on paroxetine for anxiety, without ill effects. Reducing diazepam improved her overall function. She denied increasing anxiety    There remain no abdominal tremors or other abnormal movements. She suffered from shingles in her right buttocks years ago. This postherpetic neuralgia had resolved. She continued walking without issues and driving without problems. There was only minimal right foot drop, which was not problematic. The patient denied other neurological problems. She denied memory deficits, weakness, numbness, pains, headaches, clumsiness or loss of consciousness. Her blood pressures continued well controlled. She slept better. She still played computer games. She was eating well. She still smoked a few cigarettes daily. She lost 10 pounds since her last visit. She received the COVID-19 vaccination without issues. She questioned receiving the booster shot. She continued practicing proper pandemic precautions.     Review of systems was otherwise unremarkable.     Objective:      /79  Pulse 68  Temp 97.3 °F  Resp 18 Ht 5' 4\" (1.626 m)   Wt 171 lb    She was an elderly woman in no acute distress, who was alert, cooperative and oriented. She remained very pleasant. Her skin was unremarkable. Head examination was unrevealing. Her neck was supple, without thyromegaly and with full range of motion. Her spine displayed minimal gibbus deformity, without tenderness. Her lungs were clear to auscultation. Cardiac testing proved unrevealing. Peripheral pulses were +1 without bruits. Her limbs displayed minimal brawny induration of both ankles and feet, without other abnormalities.     Neurological examination produced an intact mental status except for her marked vocal quivering. I again found no dysarthria or aphasia. There were no cranial nerve abnormalities. Strength was 5/5 throughout, with normal tone and bulk. There were only minimal postural tremors of her hands or head. Light touch was intact, with slightly decreased pinprick and vibration in both lower calves. She walked well. Finger-to-nose and heel-to-shin testing were performed well.     Neurological examination again revealed only minimal tremors.     Laboratory/Radiology:      A recent CMP was unremarkable as well as a CBC with differential.     Assessment:      The patient suffers from late life tremors involving her voice, head and hands. She continues well on low-dose propranolol at 10 mg thrice daily, as well as diazepam 5 mg twice daily. She now displays only focal tremors. She will continue with her current regimen of propranolol and diazepam.    Her right foot drop, from an L5 radiculopathy and shingles, has improved. I again recommend only hightop tennis shoes or winter boots. Her persistent questioning raises the possibility of mild cognitive impairment. She will be followed closely for additional issues. She received the COVID-19 vaccination. I recommended she also receive the booster. She continued eating and sleeping better.   She remained active.     She is stable medically, status post pacemaker insertion. .  She must stop smoking. Plan:      Propranolol was maintained at 10 mg 3 times daily, with the reduced diazepam at 5 mg twice daily. she requires a booster vaccine. Hopefully, she will also stop smoking. Again I recommended hightop tennis shoes and winter boots. She will call at any time if problems arise. She will return in 4 months. I spent 40 minutes with the patient with over 50 % spent in counseling and disease management.   All patient issues were addressed and all questions were answered.

## 2022-02-02 ENCOUNTER — HOSPITAL ENCOUNTER (OUTPATIENT)
Age: 77
Discharge: HOME | End: 2022-02-02
Payer: MEDICARE

## 2022-02-02 LAB
ALBUMIN SERPL-MCNC: 4 G/DL (ref 3.5–5.2)
ALP BLD-CCNC: 82 U/L (ref 35–104)
ALT SERPL-CCNC: 12 U/L (ref 0–32)
ANION GAP SERPL CALCULATED.3IONS-SCNC: 10 MMOL/L (ref 7–16)
AST SERPL-CCNC: 14 U/L (ref 0–31)
BASOPHILS ABSOLUTE: 0.07 E9/L (ref 0–0.2)
BASOPHILS RELATIVE PERCENT: 1.1 % (ref 0–2)
BILIRUB SERPL-MCNC: 0.8 MG/DL (ref 0–1.2)
BUN BLDV-MCNC: 20 MG/DL (ref 6–23)
CALCIUM SERPL-MCNC: 9.4 MG/DL (ref 8.6–10.2)
CHLORIDE BLD-SCNC: 102 MMOL/L (ref 98–107)
CHOLESTEROL, TOTAL: 190 MG/DL (ref 0–199)
CO2: 25 MMOL/L (ref 22–29)
CREAT SERPL-MCNC: 0.6 MG/DL (ref 0.5–1)
EOSINOPHILS ABSOLUTE: 0.18 E9/L (ref 0.05–0.5)
EOSINOPHILS RELATIVE PERCENT: 2.9 % (ref 0–6)
GFR AFRICAN AMERICAN: >60
GFR NON-AFRICAN AMERICAN: >60 ML/MIN/1.73
GLUCOSE BLD-MCNC: 167 MG/DL (ref 74–99)
HBA1C MFR BLD: 6.7 % (ref 4–5.6)
HCT VFR BLD CALC: 45.1 % (ref 34–48)
HDLC SERPL-MCNC: 77 MG/DL
HEMOGLOBIN: 15 G/DL (ref 11.5–15.5)
IMMATURE GRANULOCYTES #: 0.02 E9/L
IMMATURE GRANULOCYTES %: 0.3 % (ref 0–5)
LDL CHOLESTEROL CALCULATED: 97 MG/DL (ref 0–99)
LYMPHOCYTES ABSOLUTE: 1.75 E9/L (ref 1.5–4)
LYMPHOCYTES RELATIVE PERCENT: 28.1 % (ref 20–42)
MAGNESIUM: 1.9 MG/DL (ref 1.6–2.6)
MCH RBC QN AUTO: 32 PG (ref 26–35)
MCHC RBC AUTO-ENTMCNC: 33.3 % (ref 32–34.5)
MCV RBC AUTO: 96.2 FL (ref 80–99.9)
MONOCYTES ABSOLUTE: 0.45 E9/L (ref 0.1–0.95)
MONOCYTES RELATIVE PERCENT: 7.2 % (ref 2–12)
NEUTROPHILS ABSOLUTE: 3.76 E9/L (ref 1.8–7.3)
NEUTROPHILS RELATIVE PERCENT: 60.4 % (ref 43–80)
PDW BLD-RTO: 12.7 FL (ref 11.5–15)
PLATELET # BLD: 215 E9/L (ref 130–450)
PMV BLD AUTO: 10.5 FL (ref 7–12)
POTASSIUM SERPL-SCNC: 3.9 MMOL/L (ref 3.5–5)
RBC # BLD: 4.69 E12/L (ref 3.5–5.5)
SODIUM BLD-SCNC: 137 MMOL/L (ref 132–146)
TOTAL PROTEIN: 7.2 G/DL (ref 6.4–8.3)
TRIGL SERPL-MCNC: 82 MG/DL (ref 0–149)
TSH SERPL DL<=0.05 MIU/L-ACNC: 1.79 UIU/ML (ref 0.27–4.2)
VLDLC SERPL CALC-MCNC: 16 MG/DL
WBC # BLD: 6.2 E9/L (ref 4.5–11.5)

## 2022-02-02 PROCEDURE — 80053 COMPREHEN METABOLIC PANEL: CPT

## 2022-02-02 PROCEDURE — 83735 ASSAY OF MAGNESIUM: CPT

## 2022-02-02 PROCEDURE — 80061 LIPID PANEL: CPT

## 2022-02-02 PROCEDURE — 83036 HEMOGLOBIN GLYCOSYLATED A1C: CPT

## 2022-02-02 PROCEDURE — 36415 COLL VENOUS BLD VENIPUNCTURE: CPT

## 2022-02-02 PROCEDURE — 84443 ASSAY THYROID STIM HORMONE: CPT

## 2022-02-02 PROCEDURE — 85025 COMPLETE CBC W/AUTO DIFF WBC: CPT

## 2022-02-21 ENCOUNTER — TELEPHONE (OUTPATIENT)
Dept: NEUROLOGY | Age: 77
End: 2022-02-21

## 2022-02-21 NOTE — TELEPHONE ENCOUNTER
Patient called in stating that she is still getting shingles. She got her shingles vaccine in December and she got shingles again. She cannot get it again. She stated that she is getting them every month. She is wanting to know if Dr. Yokasta Vernon know of someone she can go to, if she should go to [facility], see infectious disease? Please advise. She would like a call back from Dr. Yokasta Vernon.

## 2022-02-22 NOTE — TELEPHONE ENCOUNTER
I truly doubt she is getting shingles over and over again. She must see infectious disease first here and, then if necessary, a [facility] physician.   Thank you

## 2022-03-10 ENCOUNTER — OFFICE VISIT (OUTPATIENT)
Dept: NEUROLOGY | Age: 77
End: 2022-03-10
Payer: MEDICARE

## 2022-03-10 VITALS
TEMPERATURE: 97.2 F | HEIGHT: 64 IN | WEIGHT: 186 LBS | BODY MASS INDEX: 31.76 KG/M2 | OXYGEN SATURATION: 97 % | HEART RATE: 63 BPM | SYSTOLIC BLOOD PRESSURE: 140 MMHG | DIASTOLIC BLOOD PRESSURE: 89 MMHG

## 2022-03-10 DIAGNOSIS — G25.0 ESSENTIAL TREMOR: Primary | ICD-10-CM

## 2022-03-10 PROCEDURE — 4040F PNEUMOC VAC/ADMIN/RCVD: CPT | Performed by: PSYCHIATRY & NEUROLOGY

## 2022-03-10 PROCEDURE — 4004F PT TOBACCO SCREEN RCVD TLK: CPT | Performed by: PSYCHIATRY & NEUROLOGY

## 2022-03-10 PROCEDURE — G8400 PT W/DXA NO RESULTS DOC: HCPCS | Performed by: PSYCHIATRY & NEUROLOGY

## 2022-03-10 PROCEDURE — G8417 CALC BMI ABV UP PARAM F/U: HCPCS | Performed by: PSYCHIATRY & NEUROLOGY

## 2022-03-10 PROCEDURE — G8427 DOCREV CUR MEDS BY ELIG CLIN: HCPCS | Performed by: PSYCHIATRY & NEUROLOGY

## 2022-03-10 PROCEDURE — 99215 OFFICE O/P EST HI 40 MIN: CPT | Performed by: PSYCHIATRY & NEUROLOGY

## 2022-03-10 PROCEDURE — 3017F COLORECTAL CA SCREEN DOC REV: CPT | Performed by: PSYCHIATRY & NEUROLOGY

## 2022-03-10 PROCEDURE — 1090F PRES/ABSN URINE INCON ASSESS: CPT | Performed by: PSYCHIATRY & NEUROLOGY

## 2022-03-10 PROCEDURE — 1123F ACP DISCUSS/DSCN MKR DOCD: CPT | Performed by: PSYCHIATRY & NEUROLOGY

## 2022-03-10 PROCEDURE — G8484 FLU IMMUNIZE NO ADMIN: HCPCS | Performed by: PSYCHIATRY & NEUROLOGY

## 2022-03-10 NOTE — PROGRESS NOTES
practitioners. I spent 40 minutes with the patient with over 50 % spent in counseling and disease management.   All patient issues were addressed and all questions were answered.

## 2022-04-02 ENCOUNTER — HOSPITAL ENCOUNTER (EMERGENCY)
Age: 77
Discharge: HOME | End: 2022-04-02
Attending: EMERGENCY MEDICINE
Payer: MEDICARE

## 2022-04-02 VITALS
SYSTOLIC BLOOD PRESSURE: 156 MMHG | RESPIRATION RATE: 18 BRPM | HEART RATE: 60 BPM | WEIGHT: 186 LBS | DIASTOLIC BLOOD PRESSURE: 72 MMHG | OXYGEN SATURATION: 98 % | BODY MASS INDEX: 31.93 KG/M2 | TEMPERATURE: 97.4 F

## 2022-04-02 DIAGNOSIS — I10 ASYMPTOMATIC HYPERTENSION: Primary | ICD-10-CM

## 2022-04-02 PROCEDURE — 99285 EMERGENCY DEPT VISIT HI MDM: CPT

## 2022-04-02 NOTE — ED PROVIDER NOTES
80-year-old female patient with past medical history of hypertension presents today to the emergency department with complaints of elevated blood pressure reading at home. She states that she routinely takes her blood pressure at home and today she noted that it was 162/102. Patient states that she normally takes medications at home for her hypertension and she took a clonidine just prior to arrival.  Patient states that her complaints are mild in severity and constant. She denies any aggravating or alleviating factors. She states that nothing seems to have made her symptoms better or worse. She did not recheck her blood pressure before coming to the emergency department. She states that her blood pressure has been well controlled on her medications and she has not missed any doses. Denies any recent illnesses. She denies any chest pain, headache, changes in her vision. She does not feel short of breath. She denies feeling dizzy or lightheaded. Review of Systems   Constitutional: Negative for diaphoresis and fever. HENT: Negative for ear pain, hearing loss, rhinorrhea, sinus pain, sore throat and trouble swallowing. Eyes: Negative for pain. Respiratory: Negative for cough, shortness of breath and wheezing. Cardiovascular: Negative for chest pain and palpitations. Gastrointestinal: Negative for abdominal pain, diarrhea, nausea and vomiting. Endocrine: Negative for polyuria. Genitourinary: Negative for flank pain, frequency, hematuria and urgency. Musculoskeletal: Negative for back pain, neck pain and neck stiffness. Neurological: Negative for dizziness, speech difficulty, weakness, light-headedness and numbness. Psychiatric/Behavioral: Negative for confusion. The patient is not nervous/anxious. Physical Exam  Constitutional:       General: She is not in acute distress. Appearance: Normal appearance. She is not ill-appearing, toxic-appearing or diaphoretic. HENT:      Head: Normocephalic and atraumatic. Nose: No rhinorrhea. Eyes:      General: No scleral icterus. Extraocular Movements: Extraocular movements intact. Pupils: Pupils are equal, round, and reactive to light. Cardiovascular:      Heart sounds: Normal heart sounds. No murmur heard. No friction rub. No gallop. Pulmonary:      Breath sounds: Normal breath sounds. No wheezing, rhonchi or rales. Abdominal:      Palpations: Abdomen is soft. Tenderness: There is no abdominal tenderness. Musculoskeletal:         General: No swelling. Cervical back: Normal range of motion. No rigidity or tenderness. Right lower leg: No edema. Left lower leg: No edema. Lymphadenopathy:      Cervical: No cervical adenopathy. Skin:     General: Skin is warm and dry. Coloration: Skin is not jaundiced. Neurological:      General: No focal deficit present. Mental Status: She is alert and oriented to person, place, and time. Cranial Nerves: No cranial nerve deficit. Sensory: No sensory deficit. Motor: No weakness. Psychiatric:         Mood and Affect: Mood normal.         Behavior: Behavior normal.         Thought Content: Thought content normal.         Judgment: Judgment normal.          Procedures     MDM  Number of Diagnoses or Management Options  Asymptomatic hypertension  Diagnosis management comments: This is a 61-year-old female who presents today to the emergency department with complaints of elevated blood pressure reading at home. Patient states that she is completely asymptomatic but regularly checks her blood pressure and today noted that it was 165/102. She did take a clonidine prior to coming to the emergency department. On arrival to the ED she is nontoxic, no acute distress. Vital signs reviewed and showed that her blood pressure is initially 140/65. Otherwise normal vital signs. On exam her lungs are clear to auscultation.   She has a normal neurologic exam with no focal findings. No nystagmus noted. Patient is able to ambulate without assistance or difficulty. Patient was observed for an hour and her blood pressure remained in the 820H to 610 systolic. Given that the patient has already taking clonidine at home and that she is completely asymptomatic there is no need for further intervention at this time. Patient was encouraged to take her home medications as directed and to follow-up with her PCP on Monday. She is agreeable to this plan. She is stable for discharge today from the ED. Return precautions were given. ED Course as of 04/02/22 1535   Sat Apr 02, 2022   [facility]:     I have personally performed and/or participated in the history, exam, medical decision making, and procedures and agree with all pertinent clinical information unless otherwise noted. I have also reviewed and agree with the past medical, family and social history unless otherwise noted. I have discussed this patient in detail with the resident, and provided the instruction and education regarding patient here because her blood pressure was high today. She checks routinely and when she checked it today it was high so she took her normal as needed dose of clonidine and when she rechecked it is still had gone down so she came here. She states she feels fine she has had no symptoms today. She has had no chest pain, palpitations or shortness of breath. No abdominal pain. No back or flank pain. No confusion. No headache. .  My findings/plan: Patient resting comfortably in the bed in no distress. Heart rate regular, lungs are clear and equal.  Abdomen soft and nontender. Arms legs are neurovascular tact. No pretibial edema or calf pain. No focal neurologic deficit. Patient's blood pressure is not significantly elevated here. She has no symptoms.   She has longstanding and known chronic hypertension and has a as needed medicine at home which she took as directed and that has worked for her normal longstanding hypertension. At this time she has no red flags on exam or per complaints here today. [NC]      ED Course User Index  [NC] Kalli Almeida DO       ED Course as of 04/04/22 1446   Sat Apr 02, 2022   1522 ATTENDING PROVIDER ATTESTATION:     I have personally performed and/or participated in the history, exam, medical decision making, and procedures and agree with all pertinent clinical information unless otherwise noted. I have also reviewed and agree with the past medical, family and social history unless otherwise noted. I have discussed this patient in detail with the resident, and provided the instruction and education regarding patient here because her blood pressure was high today. She checks routinely and when she checked it today it was high so she took her normal as needed dose of clonidine and when she rechecked it is still had gone down so she came here. She states she feels fine she has had no symptoms today. She has had no chest pain, palpitations or shortness of breath. No abdominal pain. No back or flank pain. No confusion. No headache. .  My findings/plan: Patient resting comfortably in the bed in no distress. Heart rate regular, lungs are clear and equal.  Abdomen soft and nontender. Arms legs are neurovascular tact. No pretibial edema or calf pain. No focal neurologic deficit. Patient's blood pressure is not significantly elevated here. She has no symptoms. She has longstanding and known chronic hypertension and has a as needed medicine at home which she took as directed and that has worked for her normal longstanding hypertension. At this time she has no red flags on exam or per complaints here today.        [NC]      ED Course User Index  [NC] Kalli Almeida DO       --------------------------------------------- PAST HISTORY ---------------------------------------------  Past Medical History:  has a past medical history of A-fib ([address] Arthritis, Bladder dysfunction, Bowel dysfunction, HTN (hypertension), Hyperlipidemia, Kidney stones, Tremor, and Ulcer. Past Surgical History:  has a past surgical history that includes Hysterectomy; Brain aneurysm surgery (1993); Nerve Surgery; Colonoscopy; Endoscopy, colon, diagnostic; Cataract removal with implant (Right, 11/22/2016); Pacemaker insertion (N/A, 05/08/2019); Arterial clot surgery (08/03/2020); pacemaker placement; Hemorrhoid surgery; Lithotripsy; and Intracapsular cataract extraction (Left, 10/12/2021). Social History:  reports that she has been smoking cigarettes. She has a 30.00 pack-year smoking history. She has never used smokeless tobacco. She reports that she does not drink alcohol and does not use drugs. Family History: family history includes Heart Disease in her father; Ovarian Cancer in her paternal aunt; Stomach Cancer in her paternal uncle. The patients home medications have been reviewed. Allergies: Dilantin [phenytoin sodium extended], Fentanyl, Mobic [meloxicam], Omeprazole, Phenobarbital, Phenytoin sodium extended, Prednisone, Sulfa antibiotics, and Famciclovir    -------------------------------------------------- RESULTS -------------------------------------------------  Labs:  No results found for this visit on 04/02/22. Radiology:  No orders to display       ------------------------- NURSING NOTES AND VITALS REVIEWED ---------------------------  Date / Time Roomed:  4/2/2022  2:42 PM  ED Bed Assignment:  [id number]    The nursing notes within the ED encounter and vital signs as below have been reviewed.    BP (!) 156/72   Pulse 60   Temp 97.4 °F (36.3 °C) (Oral)   Resp 18   Wt 186 lb (84.4 kg)   SpO2 98%   BMI 31.93 kg/m²   Oxygen Saturation Interpretation: Normal      ------------------------------------------ PROGRESS NOTES ------------------------------------------  2:46 PM EDT  I have spoken with the patient and discussed todays results, in addition to providing specific details for the plan of care and counseling regarding the diagnosis and prognosis. Their questions are answered at this time and they are agreeable with the plan. I discussed at length with them reasons for immediate return here for re evaluation. They will followup with their primary care physician by calling their office on Monday.      --------------------------------- ADDITIONAL PROVIDER NOTES ---------------------------------  At this time the patient is without objective evidence of an acute process requiring hospitalization or inpatient management. They have remained hemodynamically stable throughout their entire ED visit and are stable for discharge with outpatient follow-up. The plan has been discussed in detail and they are aware of the specific conditions for emergent return, as well as the importance of follow-up. Discharge Medication List as of 4/2/2022  3:50 PM          Diagnosis:  1. Asymptomatic hypertension        Disposition:  Patient's disposition: Discharge to home  Patient's condition is stable.        Nandini Bryant DO  Resident  04/04/22 1447

## 2022-04-04 ASSESSMENT — ENCOUNTER SYMPTOMS
SHORTNESS OF BREATH: 0
TROUBLE SWALLOWING: 0
WHEEZING: 0
COUGH: 0
BACK PAIN: 0
ABDOMINAL PAIN: 0
SORE THROAT: 0
NAUSEA: 0
VOMITING: 0
DIARRHEA: 0
SINUS PAIN: 0
RHINORRHEA: 0
EYE PAIN: 0

## 2022-04-07 RX ORDER — PROPRANOLOL HYDROCHLORIDE 10 MG/1
10 TABLET ORAL 3 TIMES DAILY
Qty: 270 TABLET | Refills: 3 | Status: SHIPPED
Start: 2022-04-07 | End: 2022-04-27 | Stop reason: SDUPTHER

## 2022-04-22 ENCOUNTER — HOSPITAL ENCOUNTER (OUTPATIENT)
Dept: GENERAL RADIOLOGY | Age: 77
Discharge: HOME | End: 2022-04-24
Payer: MEDICARE

## 2022-04-22 ENCOUNTER — HOSPITAL ENCOUNTER (OUTPATIENT)
Age: 77
Discharge: HOME | End: 2022-04-24
Payer: MEDICARE

## 2022-04-22 DIAGNOSIS — M79.671 FOOT PAIN, RIGHT: ICD-10-CM

## 2022-04-22 PROCEDURE — 73630 X-RAY EXAM OF FOOT: CPT

## 2022-04-27 ENCOUNTER — SCHEDULED TELEPHONE ENCOUNTER (OUTPATIENT)
Dept: NEUROLOGY | Age: 77
End: 2022-04-27
Payer: MEDICARE

## 2022-04-27 DIAGNOSIS — G25.0 ESSENTIAL TREMOR: Primary | ICD-10-CM

## 2022-04-27 PROCEDURE — 99448 NTRPROF PH1/NTRNET/EHR 21-30: CPT | Performed by: PSYCHIATRY & NEUROLOGY

## 2022-04-27 RX ORDER — FOLIC ACID 1 MG/1
1 TABLET ORAL DAILY
COMMUNITY

## 2022-04-27 RX ORDER — PROPRANOLOL HYDROCHLORIDE 10 MG/1
TABLET ORAL
Qty: 270 TABLET | Refills: 3 | Status: SHIPPED
Start: 2022-04-27 | End: 2022-04-28 | Stop reason: SDUPTHER

## 2022-04-27 NOTE — PROGRESS NOTES
Ginger Brown was read the following message We want to confirm that, for purposes of billing, this is a virtual visit with your provider for which we will submit a claim for reimbursement with your insurance company. You will be responsible for any copays, coinsurance amounts or other amounts not covered by your insurance company. If you do not accept this, unfortunately we will not be able to schedule or proceed with a virtual visit with the provider. Do you accept? [facility] responded Yes . Patient called in to reschedule his 21  apptointment. Jazzy Arceo Appointment changed to 11/11. . Thank you

## 2022-04-27 NOTE — PROGRESS NOTES
[]  [address]  [address], [address], [address] Vinnie George Junior was a 77year-old right handed woman who was followed for essential tremors. She remained a good historian. This visit was telephone, complicated by her poor hearing. Her medications were now rosuvastatin, propranolol 10 mg thrice daily, clonidine, hydrochlorothiazide, amlodipine, diazepam 5 mg  daily, aspirin, paroxetine, cholecalciferol and folic acid. She suffered from late life hand and vocal tremors. Primidone was initially recommended, but she refused to take that,expressing concerns about cross reactions with previous drugs she had used, phenotype and phenobarbital.  She continued on low-dose propranolol 10 mg thrice daily. She felt her tremors had improved without regimen. She continued with diazepam, now once daily, and paroxetine for anxiety, with improvement as well. She was to continue with 1 dose of diazepam in the afternoon to reduce spasms. She denied other tremors or abnormal movements. My nurse practitioner questioned the role of anxiety producing these tremors, as did I. I previously recommended increasing paroxetine to 20 mg daily. She refused. She suffered from shingles in her right buttocks over 2 years ago. The pains in that area, occasionally radiating into the right leg, were abating. She received acyclovir, with prompt resolution. She still noted difficulties walking, at times dragging her right foot. There were no other deficits. The patient denied other neurological problems. There were no memory deficits, despite her family's past observations. She reported no weakness, numbness, pains, headaches, clumsiness or loss of consciousness. She responded to my questions more readily today. Her blood pressures were reportedly well controlled. She rarely drove. She slept better. She still played computer games. She was eating well.   She smoking. Plan:      Propranolol was maintained at 10 mg 3 times daily. She will take diazepam 5 mg once daily for now. Hopefully, she will stop smoking. She will call at any time if problems arise. She will return to the office in 4 months. I spent 30 minutes with the patient with over 50 % spent in counseling and disease management. All patient issues were addressed and all questions were answered. Jennifer Esteban was a 77year old individual who was evaluated via telephone on 4/27/2022. Consent:  She and/or health care decision maker is aware that that she may receive a bill for this telephone service, depending on her insurance coverage, and has provided verbal consent to proceed--- yes. Documentation:  I communicated with the patient and/or health care decision maker about her tremors. Details of this discussion including any medical advice provided per telephone. I affirmed this is a Patient Initiated Episode with an Established Patient who has not had a related appointment within my department in the past 7 days or scheduled within the next 24 hours. Again I spent 30 minutes with the patient.       Nick Odonnell MD

## 2022-04-28 RX ORDER — PROPRANOLOL HYDROCHLORIDE 10 MG/1
TABLET ORAL
Qty: 270 TABLET | Refills: 3 | Status: SHIPPED | OUTPATIENT
Start: 2022-04-28

## 2022-05-22 ENCOUNTER — HOSPITAL ENCOUNTER (EMERGENCY)
Age: 77
Discharge: HOME | End: 2022-05-23
Attending: EMERGENCY MEDICINE
Payer: MEDICARE

## 2022-05-22 DIAGNOSIS — R42 VERTIGO: Primary | ICD-10-CM

## 2022-05-22 LAB
BASOPHILS ABSOLUTE: 0.05 E9/L (ref 0–0.2)
BASOPHILS RELATIVE PERCENT: 0.8 % (ref 0–2)
EOSINOPHILS ABSOLUTE: 0.11 E9/L (ref 0.05–0.5)
EOSINOPHILS RELATIVE PERCENT: 1.8 % (ref 0–6)
HCT VFR BLD CALC: 44.3 % (ref 34–48)
HEMOGLOBIN: 15.1 G/DL (ref 11.5–15.5)
IMMATURE GRANULOCYTES #: 0.01 E9/L
IMMATURE GRANULOCYTES %: 0.2 % (ref 0–5)
LYMPHOCYTES ABSOLUTE: 2.53 E9/L (ref 1.5–4)
LYMPHOCYTES RELATIVE PERCENT: 40.7 % (ref 20–42)
MCH RBC QN AUTO: 31.1 PG (ref 26–35)
MCHC RBC AUTO-ENTMCNC: 34.1 % (ref 32–34.5)
MCV RBC AUTO: 91.3 FL (ref 80–99.9)
METER GLUCOSE: 135 MG/DL (ref 74–99)
MONOCYTES ABSOLUTE: 0.49 E9/L (ref 0.1–0.95)
MONOCYTES RELATIVE PERCENT: 7.9 % (ref 2–12)
NEUTROPHILS ABSOLUTE: 3.02 E9/L (ref 1.8–7.3)
NEUTROPHILS RELATIVE PERCENT: 48.6 % (ref 43–80)
PDW BLD-RTO: 12.2 FL (ref 11.5–15)
PLATELET # BLD: 209 E9/L (ref 130–450)
PMV BLD AUTO: 10.6 FL (ref 7–12)
RBC # BLD: 4.85 E12/L (ref 3.5–5.5)
WBC # BLD: 6.2 E9/L (ref 4.5–11.5)

## 2022-05-22 PROCEDURE — 93005 ELECTROCARDIOGRAM TRACING: CPT | Performed by: STUDENT IN AN ORGANIZED HEALTH CARE EDUCATION/TRAINING PROGRAM

## 2022-05-22 PROCEDURE — 85025 COMPLETE CBC W/AUTO DIFF WBC: CPT

## 2022-05-22 PROCEDURE — 99284 EMERGENCY DEPT VISIT MOD MDM: CPT

## 2022-05-22 PROCEDURE — 84484 ASSAY OF TROPONIN QUANT: CPT

## 2022-05-22 PROCEDURE — 80053 COMPREHEN METABOLIC PANEL: CPT

## 2022-05-22 PROCEDURE — 82962 GLUCOSE BLOOD TEST: CPT

## 2022-05-22 ASSESSMENT — PAIN - FUNCTIONAL ASSESSMENT: PAIN_FUNCTIONAL_ASSESSMENT: NONE - DENIES PAIN

## 2022-05-23 ENCOUNTER — APPOINTMENT (OUTPATIENT)
Dept: CT IMAGING | Age: 77
End: 2022-05-23
Payer: MEDICARE

## 2022-05-23 VITALS
OXYGEN SATURATION: 94 % | BODY MASS INDEX: 31.86 KG/M2 | HEIGHT: 64 IN | DIASTOLIC BLOOD PRESSURE: 75 MMHG | SYSTOLIC BLOOD PRESSURE: 153 MMHG | RESPIRATION RATE: 17 BRPM | HEART RATE: 60 BPM | TEMPERATURE: 97.5 F | WEIGHT: 186.6 LBS

## 2022-05-23 LAB
ALBUMIN SERPL-MCNC: 3.8 G/DL (ref 3.5–5.2)
ALP BLD-CCNC: 67 U/L (ref 35–104)
ALT SERPL-CCNC: 13 U/L (ref 0–32)
ANION GAP SERPL CALCULATED.3IONS-SCNC: 7 MMOL/L (ref 7–16)
AST SERPL-CCNC: 12 U/L (ref 0–31)
BACTERIA: ABNORMAL /HPF
BILIRUB SERPL-MCNC: 1 MG/DL (ref 0–1.2)
BILIRUBIN URINE: NEGATIVE
BLOOD, URINE: NEGATIVE
BUN BLDV-MCNC: 15 MG/DL (ref 6–23)
CALCIUM SERPL-MCNC: 9.5 MG/DL (ref 8.6–10.2)
CHLORIDE BLD-SCNC: 100 MMOL/L (ref 98–107)
CLARITY: CLEAR
CO2: 30 MMOL/L (ref 22–29)
COLOR: YELLOW
CREAT SERPL-MCNC: 0.6 MG/DL (ref 0.5–1)
EKG ATRIAL RATE: 60 BPM
EKG P-R INTERVAL: 210 MS
EKG Q-T INTERVAL: 432 MS
EKG QRS DURATION: 134 MS
EKG QTC CALCULATION (BAZETT): 432 MS
EKG R AXIS: 41 DEGREES
EKG T AXIS: 23 DEGREES
EKG VENTRICULAR RATE: 60 BPM
EPITHELIAL CELLS, UA: ABNORMAL /HPF
GFR AFRICAN AMERICAN: >60
GFR NON-AFRICAN AMERICAN: >60 ML/MIN/1.73
GLUCOSE BLD-MCNC: 149 MG/DL (ref 74–99)
GLUCOSE URINE: NEGATIVE MG/DL
KETONES, URINE: 15 MG/DL
LEUKOCYTE ESTERASE, URINE: NEGATIVE
NITRITE, URINE: NEGATIVE
PH UA: 6.5 (ref 5–9)
POTASSIUM REFLEX MAGNESIUM: 3.7 MMOL/L (ref 3.5–5)
PROTEIN UA: NEGATIVE MG/DL
RBC UA: ABNORMAL /HPF (ref 0–2)
SODIUM BLD-SCNC: 137 MMOL/L (ref 132–146)
SPECIFIC GRAVITY UA: 1.01 (ref 1–1.03)
TOTAL PROTEIN: 6.7 G/DL (ref 6.4–8.3)
TROPONIN, HIGH SENSITIVITY: 8 NG/L (ref 0–9)
UROBILINOGEN, URINE: 0.2 E.U./DL
WBC UA: ABNORMAL /HPF (ref 0–5)

## 2022-05-23 PROCEDURE — 87088 URINE BACTERIA CULTURE: CPT

## 2022-05-23 PROCEDURE — 6370000000 HC RX 637 (ALT 250 FOR IP): Performed by: EMERGENCY MEDICINE

## 2022-05-23 PROCEDURE — 81001 URINALYSIS AUTO W/SCOPE: CPT

## 2022-05-23 PROCEDURE — 70450 CT HEAD/BRAIN W/O DYE: CPT

## 2022-05-23 RX ORDER — MECLIZINE HCL 12.5 MG/1
25 TABLET ORAL ONCE
Status: COMPLETED | OUTPATIENT
Start: 2022-05-23 | End: 2022-05-23

## 2022-05-23 RX ORDER — MECLIZINE HYDROCHLORIDE 25 MG/1
25 TABLET ORAL 3 TIMES DAILY PRN
Qty: 21 TABLET | Refills: 0 | Status: SHIPPED | OUTPATIENT
Start: 2022-05-23 | End: 2022-05-30

## 2022-05-23 RX ADMIN — MECLIZINE 25 MG: 12.5 TABLET ORAL at 00:50

## 2022-05-23 ASSESSMENT — PAIN - FUNCTIONAL ASSESSMENT: PAIN_FUNCTIONAL_ASSESSMENT: NONE - DENIES PAIN

## 2022-05-23 NOTE — ED PROVIDER NOTES
HPI:  5/22/22, Time: 11:23 PM EDT   Chief complaint: Andrew Hector is a 77 y.o. female presenting to the ED for dizziness. The complaint has been intermittent, moderate in severity, and worsened by changing position. Patient states she took some medications around 8 PM and a few hours later had a dizziness episode along with nausea and 2 episodes of emesis. Patient describes the dizziness as room spinning. She denies any previous episodes of vertigo. Patient has a history of a cerebral aneurysm that left her with baseline dysarthria. She denies any changes in her dysarthria today. Patient denies fever/chills, sore throat, cough, congestion, chest pain, shortness of breath, edema, headache, visual disturbances, focal paresthesias, focal weakness, abdominal pain, diarrhea, constipation, dysuria, hematuria, trauma, neck or back pain, rash or other complaints. ROS:   A complete review of systems was performed and all pertinent positives and negatives are stated within HPI, all other systems reviewed and are negative.            --------------------------------------------- PAST HISTORY ---------------------------------------------  Past Medical History:  has a past medical history of A-fib ([address] Arthritis, Bladder dysfunction, Bowel dysfunction, HTN (hypertension), Hyperlipidemia, Kidney stones, Tremor, and Ulcer. Past Surgical History:  has a past surgical history that includes Hysterectomy; Brain aneurysm surgery (1993); Nerve Surgery; Colonoscopy; Endoscopy, colon, diagnostic; Cataract removal with implant (Right, 11/22/2016); Pacemaker insertion (N/A, 05/08/2019); Arterial clot surgery (08/03/2020); pacemaker placement; Hemorrhoid surgery; Lithotripsy; and Intracapsular cataract extraction (Left, 10/12/2021). Social History:  reports that she has been smoking cigarettes. She has a 30.00 pack-year smoking history.  She has never used smokeless tobacco. She reports that she does not drink alcohol and does not use drugs. Family History: family history includes Heart Disease in her father; Ovarian Cancer in her paternal aunt; Stomach Cancer in her paternal uncle. The patients home medications have been reviewed. Allergies: Corticosteroids, Dilantin [phenytoin sodium extended], Fentanyl, Mobic [meloxicam], Nsaids, Omeprazole, Phenobarbital, Phenytoin sodium extended, Prednisone, Sulfa antibiotics, and Famciclovir        ----------------------------------------PHYSICAL EXAM--------------------------------------    Constitutional:  Well developed, well nourished, no acute distress, non-toxic appearance   Eyes:  PERRL, conjunctiva normal, EOMI, no nystagmus  HENT:  Atraumatic, external ears normal, nose normal, oropharynx moist, no pharyngeal exudates. Neck- normal range of motion, no nuchal rigidity   Respiratory:  No respiratory distress, normal breath sounds, no rales, no wheezing   Cardiovascular:  Normal rate, normal rhythm, no murmurs. Radial and DP pulses 2+ bilaterally. GI:  Soft, nondistended, nontender, no organomegaly, no mass, no rebound, no guarding   Musculoskeletal:  No edema, no tenderness, no deformities. Back- no tenderness  Integument:  Well hydrated, no rash. Adequate perfusion. Neurologic:  Alert & oriented x 3, CN 2-12 normal, normal motor function, normal sensory function, no focal deficits noted. Normal gait. Psychiatric:  Speech and behavior appropriate       -------------------------------------------------- RESULTS -------------------------------------------------  I have personally reviewed all laboratory and imaging results for this patient. Results are listed below.      LABS:  Results for orders placed or performed during the hospital encounter of 05/22/22   CBC with Auto Differential   Result Value Ref Range    WBC 6.2 4.5 - 11.5 E9/L    RBC 4.85 3.50 - 5.50 E12/L    Hemoglobin 15.1 11.5 - 15.5 g/dL    Hematocrit 44.3 34.0 - 48.0 %    MCV 91.3 80.0 - 99.9 fL    MCH 31.1 26.0 - 35.0 pg    MCHC 34.1 32.0 - 34.5 %    RDW 12.2 11.5 - 15.0 fL    Platelets 719 127 - 255 E9/L    MPV 10.6 7.0 - 12.0 fL    Neutrophils % 48.6 43.0 - 80.0 %    Immature Granulocytes % 0.2 0.0 - 5.0 %    Lymphocytes % 40.7 20.0 - 42.0 %    Monocytes % 7.9 2.0 - 12.0 %    Eosinophils % 1.8 0.0 - 6.0 %    Basophils % 0.8 0.0 - 2.0 %    Neutrophils Absolute 3.02 1.80 - 7.30 E9/L    Immature Granulocytes # 0.01 E9/L    Lymphocytes Absolute 2.53 1.50 - 4.00 E9/L    Monocytes Absolute 0.49 0.10 - 0.95 E9/L    Eosinophils Absolute 0.11 0.05 - 0.50 E9/L    Basophils Absolute 0.05 0.00 - 0.20 E9/L   Comprehensive Metabolic Panel w/ Reflex to MG   Result Value Ref Range    Sodium 137 132 - 146 mmol/L    Potassium reflex Magnesium 3.7 3.5 - 5.0 mmol/L    Chloride 100 98 - 107 mmol/L    CO2 30 (H) 22 - 29 mmol/L    Anion Gap 7 7 - 16 mmol/L    Glucose 149 (H) 74 - 99 mg/dL    BUN 15 6 - 23 mg/dL    CREATININE 0.6 0.5 - 1.0 mg/dL    GFR Non-African American >60 >=60 mL/min/1.73    GFR African American >60     Calcium 9.5 8.6 - 10.2 mg/dL    Total Protein 6.7 6.4 - 8.3 g/dL    Albumin 3.8 3.5 - 5.2 g/dL    Total Bilirubin 1.0 0.0 - 1.2 mg/dL    Alkaline Phosphatase 67 35 - 104 U/L    ALT 13 0 - 32 U/L    AST 12 0 - 31 U/L   Troponin   Result Value Ref Range    Troponin, High Sensitivity 8 0 - 9 ng/L   Urinalysis with Microscopic   Result Value Ref Range    Color, UA Yellow Straw/Yellow    Clarity, UA Clear Clear    Glucose, Ur Negative Negative mg/dL    Bilirubin Urine Negative Negative    Ketones, Urine 15 (A) Negative mg/dL    Specific Gravity, UA 1.015 1.005 - 1.030    Blood, Urine Negative Negative    pH, UA 6.5 5.0 - 9.0    Protein, UA Negative Negative mg/dL    Urobilinogen, Urine 0.2 <2.0 E.U./dL    Nitrite, Urine Negative Negative    Leukocyte Esterase, Urine Negative Negative    WBC, UA 0-1 0 - 5 /HPF    RBC, UA NONE 0 - 2 /HPF    Epithelial Cells, UA RARE /HPF    Bacteria, UA NONE SEEN None Seen /HPF   POCT Glucose   Result Value Ref Range    Meter Glucose 135 (H) 74 - 99 mg/dL   EKG 12 Lead   Result Value Ref Range    Ventricular Rate 60 BPM    Atrial Rate 60 BPM    P-R Interval 210 ms    QRS Duration 134 ms    Q-T Interval 432 ms    QTc Calculation (Bazett) 432 ms    R Axis 41 degrees    T Axis 23 degrees       RADIOLOGY:  Interpreted by Radiologist.  CT Head WO Contrast   Final Result   No acute intracranial abnormality. Chronic appearing findings as detailed   above. Short-term follow-up recommended if symptoms persist.      RECOMMENDATIONS:   Unavailable                 EKG Interpretation by Me  Rhythm: Atrial paced rhythm with prolonged AV conduction  Rate: 60  Axis: normal  ST Segments: normal  T Waves: no acute change  Clinical Impression: right bundle branch block  Comparison to prior EKG: stable as compared to patient's most recent EKG      ------------------------- NURSING NOTES AND VITALS REVIEWED ---------------------------  The nursing notes within the ED encounter and vital signs as below have been reviewed by myself. BP (!) 153/75   Pulse 60   Temp 97.5 °F (36.4 °C) (Oral)   Resp 17   Ht 5' 4\" (1.626 m)   Wt 186 lb 9.6 oz (84.6 kg)   SpO2 94%   BMI 32.03 kg/m²   Oxygen Saturation Interpretation: Normal      The patients available past medical records and past encounters were reviewed. ------------------------------ ED COURSE/MEDICAL DECISION MAKING----------------------  Medications   meclizine (ANTIVERT) tablet 25 mg (25 mg Oral Given 5/23/22 0050)          MEDICATIONS  Discharge Medication List as of 5/23/2022  4:40 AM      START taking these medications    Details   meclizine (ANTIVERT) 25 MG tablet Take 1 tablet by mouth 3 times daily as needed for Dizziness, Disp-21 tablet, R-0Print               Medical Decision Making:    Patient is a 78-year-old female presenting with complaint of nausea and dizziness.   Patient was stable on arrival and seen in no acute distress. Labs and imaging were obtained. CT head was unremarkable. Patient was given Antivert. Her lab work was unremarkable. She reported improvement of her symptoms and was able to ambulate without difficulty. Her vitals remained stable throughout her ED stay. She was discharged to home with a prescription for Antivert for vertigo. This patient's ED course included: re-evaluation prior to disposition, IV medications and a personal history and physicial eaxmination    This patient has remained hemodynamically stable during their ED course. Counseling: The emergency provider has spoken with the patient and discussed todays results, in addition to providing specific details for the plan of care and counseling regarding the diagnosis and prognosis. Questions are answered at this time and they are agreeable with the plan.    --------------------------- IMPRESSION AND DISPOSITION ---------------------------------    IMPRESSION  1.  Vertigo        DISPOSITION  Disposition: Discharge to home  Patient condition is stable           Gabino aWlden DO  Resident  05/23/22 9307

## 2022-05-23 NOTE — ED NOTES
Pt ambulated to restroom with steady gait, urine sent.  Pt back in bed and on monitor, awaiting results     Donte Galvez RN  05/23/22 3703

## 2022-05-23 NOTE — ED NOTES
Called patient's neighbor, Lyric @[phone], who is coming to get her     Angela Rivera, VIOLET  05/23/22 1000 [facility], RN  05/23/22 7939

## 2022-05-23 NOTE — ED NOTES
Will medicate patient after CT head due to oral medication -- patient made aware of this also     Bere Carias RN  05/23/22 0021

## 2022-05-23 NOTE — ED NOTES
Pt ambulated well in hallway with RN at this time -- denied any lightheadedness or dizziness, notifying provider     Herminia Motta RN  05/23/22 1995

## 2022-05-24 ENCOUNTER — TELEPHONE (OUTPATIENT)
Dept: NEUROLOGY | Age: 77
End: 2022-05-24

## 2022-05-24 NOTE — TELEPHONE ENCOUNTER
Pt called stating that her psychiatrist agreed that she can take 1/2 tablet of diazapam tid. Also wanted Dr Emmanuel Ortiz to know that she has spasms occasionally on the side where her brain clips from her aneurysm is.

## 2022-05-25 LAB — URINE CULTURE, ROUTINE: NORMAL

## 2022-05-26 ENCOUNTER — TELEPHONE (OUTPATIENT)
Dept: NEUROLOGY | Age: 77
End: 2022-05-26

## 2022-07-14 ENCOUNTER — HOSPITAL ENCOUNTER (OUTPATIENT)
Age: 77
Discharge: HOME | End: 2022-07-14
Payer: MEDICARE

## 2022-07-14 LAB
ALBUMIN SERPL-MCNC: 3.8 G/DL (ref 3.5–5.2)
ALP BLD-CCNC: 77 U/L (ref 35–104)
ALT SERPL-CCNC: 11 U/L (ref 0–32)
ANION GAP SERPL CALCULATED.3IONS-SCNC: 10 MMOL/L (ref 7–16)
AST SERPL-CCNC: 13 U/L (ref 0–31)
BASOPHILS ABSOLUTE: 0.09 E9/L (ref 0–0.2)
BASOPHILS RELATIVE PERCENT: 1.3 % (ref 0–2)
BILIRUB SERPL-MCNC: 0.8 MG/DL (ref 0–1.2)
BUN BLDV-MCNC: 13 MG/DL (ref 6–23)
CALCIUM SERPL-MCNC: 9.5 MG/DL (ref 8.6–10.2)
CHLORIDE BLD-SCNC: 102 MMOL/L (ref 98–107)
CHOLESTEROL, FASTING: 144 MG/DL (ref 0–199)
CO2: 27 MMOL/L (ref 22–29)
CREAT SERPL-MCNC: 0.7 MG/DL (ref 0.5–1)
EOSINOPHILS ABSOLUTE: 0.15 E9/L (ref 0.05–0.5)
EOSINOPHILS RELATIVE PERCENT: 2.2 % (ref 0–6)
GFR AFRICAN AMERICAN: >60
GFR NON-AFRICAN AMERICAN: >60 ML/MIN/1.73
GLUCOSE FASTING: 193 MG/DL (ref 74–99)
HBA1C MFR BLD: 6.7 % (ref 4–5.6)
HCT VFR BLD CALC: 46.5 % (ref 34–48)
HDLC SERPL-MCNC: 59 MG/DL
HEMOGLOBIN: 15.5 G/DL (ref 11.5–15.5)
IMMATURE GRANULOCYTES #: 0.01 E9/L
IMMATURE GRANULOCYTES %: 0.1 % (ref 0–5)
LDL CHOLESTEROL CALCULATED: 59 MG/DL (ref 0–99)
LYMPHOCYTES ABSOLUTE: 2.2 E9/L (ref 1.5–4)
LYMPHOCYTES RELATIVE PERCENT: 32.5 % (ref 20–42)
MAGNESIUM: 1.9 MG/DL (ref 1.6–2.6)
MCH RBC QN AUTO: 31.8 PG (ref 26–35)
MCHC RBC AUTO-ENTMCNC: 33.3 % (ref 32–34.5)
MCV RBC AUTO: 95.3 FL (ref 80–99.9)
MONOCYTES ABSOLUTE: 0.52 E9/L (ref 0.1–0.95)
MONOCYTES RELATIVE PERCENT: 7.7 % (ref 2–12)
NEUTROPHILS ABSOLUTE: 3.79 E9/L (ref 1.8–7.3)
NEUTROPHILS RELATIVE PERCENT: 56.2 % (ref 43–80)
PDW BLD-RTO: 12.4 FL (ref 11.5–15)
PLATELET # BLD: 275 E9/L (ref 130–450)
PMV BLD AUTO: 9.9 FL (ref 7–12)
POTASSIUM SERPL-SCNC: 4.1 MMOL/L (ref 3.5–5)
RBC # BLD: 4.88 E12/L (ref 3.5–5.5)
SODIUM BLD-SCNC: 139 MMOL/L (ref 132–146)
TOTAL PROTEIN: 6.9 G/DL (ref 6.4–8.3)
TRIGLYCERIDE, FASTING: 131 MG/DL (ref 0–149)
TSH SERPL DL<=0.05 MIU/L-ACNC: 2.03 UIU/ML (ref 0.27–4.2)
VLDLC SERPL CALC-MCNC: 26 MG/DL
WBC # BLD: 6.8 E9/L (ref 4.5–11.5)

## 2022-07-14 PROCEDURE — 36415 COLL VENOUS BLD VENIPUNCTURE: CPT

## 2022-07-14 PROCEDURE — 83036 HEMOGLOBIN GLYCOSYLATED A1C: CPT

## 2022-07-14 PROCEDURE — 83735 ASSAY OF MAGNESIUM: CPT

## 2022-07-14 PROCEDURE — 80053 COMPREHEN METABOLIC PANEL: CPT

## 2022-07-14 PROCEDURE — 84443 ASSAY THYROID STIM HORMONE: CPT

## 2022-07-14 PROCEDURE — 85025 COMPLETE CBC W/AUTO DIFF WBC: CPT

## 2022-07-14 PROCEDURE — 80061 LIPID PANEL: CPT

## 2022-08-17 ENCOUNTER — OFFICE VISIT (OUTPATIENT)
Dept: NEUROLOGY | Age: 77
End: 2022-08-17
Payer: MEDICARE

## 2022-08-17 VITALS
HEIGHT: 64 IN | OXYGEN SATURATION: 97 % | BODY MASS INDEX: 28.85 KG/M2 | RESPIRATION RATE: 18 BRPM | HEART RATE: 67 BPM | TEMPERATURE: 97.6 F | WEIGHT: 169 LBS | SYSTOLIC BLOOD PRESSURE: 132 MMHG | DIASTOLIC BLOOD PRESSURE: 76 MMHG

## 2022-08-17 DIAGNOSIS — S09.90XD TRAUMATIC INJURY OF HEAD, SUBSEQUENT ENCOUNTER: ICD-10-CM

## 2022-08-17 DIAGNOSIS — M54.17 LUMBOSACRAL RADICULOPATHY: Primary | ICD-10-CM

## 2022-08-17 DIAGNOSIS — G25.0 ESSENTIAL TREMOR: ICD-10-CM

## 2022-08-17 PROCEDURE — 1090F PRES/ABSN URINE INCON ASSESS: CPT | Performed by: PSYCHIATRY & NEUROLOGY

## 2022-08-17 PROCEDURE — 4004F PT TOBACCO SCREEN RCVD TLK: CPT | Performed by: PSYCHIATRY & NEUROLOGY

## 2022-08-17 PROCEDURE — G8427 DOCREV CUR MEDS BY ELIG CLIN: HCPCS | Performed by: PSYCHIATRY & NEUROLOGY

## 2022-08-17 PROCEDURE — 1123F ACP DISCUSS/DSCN MKR DOCD: CPT | Performed by: PSYCHIATRY & NEUROLOGY

## 2022-08-17 PROCEDURE — G8417 CALC BMI ABV UP PARAM F/U: HCPCS | Performed by: PSYCHIATRY & NEUROLOGY

## 2022-08-17 PROCEDURE — 99215 OFFICE O/P EST HI 40 MIN: CPT | Performed by: PSYCHIATRY & NEUROLOGY

## 2022-08-17 PROCEDURE — G8400 PT W/DXA NO RESULTS DOC: HCPCS | Performed by: PSYCHIATRY & NEUROLOGY

## 2022-08-17 NOTE — PROGRESS NOTES
in no acute distress, who was alert, cooperative and oriented. She remained very pleasant. Her skin was unremarkable. Head examination produced a resolving ecchymosis of the left eyelid, without scleral involvement her neck was supple, without thyromegaly. Her spine displayed minimal gibbus deformity, without tenderness. Her lungs were clear to auscultation. Cardiac testing proved unrevealing. Peripheral pulses were +1 without bruits. Her limbs displayed minimal brawny induration of both ankles and feet, without other abnormalities. Neurological examination produced an intact mental status except for moderate vocal quivering. There was no dysarthria or aphasia. I found no cranial nerve abnormalities. Strength was 5/5 throughout, with normal tone and bulk. There were no postural tremors of her hands or horizontal head movements. Light touch continued intact, with slightly decreased pinprick and vibration in both lower calves. Finger-to-nose and heel-to-shin testings were performed well. She walked well. Neurological examination was unchanged, displaying no tremors. Laboratory/Radiology:      Recent CMP and CBC with differential were was unremarkable. Hemoglobin A1c was 6.7. Assessment:      The patient suffers from late life tremors involving her voice, head and hands. She continues well on low-dose propranolol 10 mg thrice daily. Her interactions and cognition have improved with decreasing diazepam.  She again displays only vocal quivering. She will continue with her current regimen. Hopefully, diazepam can be discontinued. Her right foot drop, from an L5 radiculopathy and shingles, has improved. Her cognition has improved with decreasing diazepam.     She is stable medically, status post pacemaker insertion. However, her hemoglobin A1c's are elevated; she suffers from minimal diabetes mellitus. Those difficulties will be addressed with primary care.     Plan: Propranolol was maintained at 10 mg thrice daily, with the reduced diazepam at 2.5 mg daily. Hopefully, she will also stop smoking. She is exercising more, but must be careful cycling. She will call at any time if problems arise. She will return in 5 months, to see JUSTINA Ray. I spent 40 minutes with the patient with over 50 % spent in counseling and disease management. All patient issues were addressed and all questions were answered.

## 2022-10-17 ENCOUNTER — TELEPHONE (OUTPATIENT)
Dept: NEUROLOGY | Age: 77
End: 2022-10-17

## 2022-10-24 ENCOUNTER — HOSPITAL ENCOUNTER (OUTPATIENT)
Age: 77
Discharge: HOME | End: 2022-10-24
Payer: MEDICARE

## 2022-10-24 LAB
ALBUMIN SERPL-MCNC: 3.9 G/DL (ref 3.5–5.2)
ALP BLD-CCNC: 75 U/L (ref 35–104)
ALT SERPL-CCNC: 13 U/L (ref 0–32)
ANION GAP SERPL CALCULATED.3IONS-SCNC: 8 MMOL/L (ref 7–16)
AST SERPL-CCNC: 15 U/L (ref 0–31)
BASOPHILS ABSOLUTE: 0.06 E9/L (ref 0–0.2)
BASOPHILS RELATIVE PERCENT: 0.8 % (ref 0–2)
BILIRUB SERPL-MCNC: 1.4 MG/DL (ref 0–1.2)
BILIRUBIN URINE: NEGATIVE
BLOOD, URINE: NEGATIVE
BUN BLDV-MCNC: 12 MG/DL (ref 6–23)
CALCIUM SERPL-MCNC: 9.9 MG/DL (ref 8.6–10.2)
CHLORIDE BLD-SCNC: 102 MMOL/L (ref 98–107)
CHOLESTEROL, TOTAL: 164 MG/DL (ref 0–199)
CLARITY: CLEAR
CO2: 26 MMOL/L (ref 22–29)
COLOR: YELLOW
CREAT SERPL-MCNC: 0.8 MG/DL (ref 0.5–1)
EOSINOPHILS ABSOLUTE: 0.1 E9/L (ref 0.05–0.5)
EOSINOPHILS RELATIVE PERCENT: 1.3 % (ref 0–6)
GFR SERPL CREATININE-BSD FRML MDRD: >60 ML/MIN/1.73
GLUCOSE BLD-MCNC: 180 MG/DL (ref 74–99)
GLUCOSE URINE: NEGATIVE MG/DL
HBA1C MFR BLD: 7.1 % (ref 4–5.6)
HCT VFR BLD CALC: 48.5 % (ref 34–48)
HDLC SERPL-MCNC: 70 MG/DL
HEMOGLOBIN: 16.7 G/DL (ref 11.5–15.5)
IMMATURE GRANULOCYTES #: 0.01 E9/L
IMMATURE GRANULOCYTES %: 0.1 % (ref 0–5)
KETONES, URINE: NEGATIVE MG/DL
LDL CHOLESTEROL CALCULATED: 72 MG/DL (ref 0–99)
LEUKOCYTE ESTERASE, URINE: NEGATIVE
LYMPHOCYTES ABSOLUTE: 1.63 E9/L (ref 1.5–4)
LYMPHOCYTES RELATIVE PERCENT: 21.4 % (ref 20–42)
MAGNESIUM: 1.9 MG/DL (ref 1.6–2.6)
MCH RBC QN AUTO: 32.6 PG (ref 26–35)
MCHC RBC AUTO-ENTMCNC: 34.4 % (ref 32–34.5)
MCV RBC AUTO: 94.5 FL (ref 80–99.9)
MICROALBUMIN UR-MCNC: <12 MG/L
MONOCYTES ABSOLUTE: 0.49 E9/L (ref 0.1–0.95)
MONOCYTES RELATIVE PERCENT: 6.4 % (ref 2–12)
NEUTROPHILS ABSOLUTE: 5.32 E9/L (ref 1.8–7.3)
NEUTROPHILS RELATIVE PERCENT: 70 % (ref 43–80)
NITRITE, URINE: NEGATIVE
PDW BLD-RTO: 12.7 FL (ref 11.5–15)
PH UA: 6.5 (ref 5–9)
PLATELET # BLD: 249 E9/L (ref 130–450)
PMV BLD AUTO: 10.3 FL (ref 7–12)
POTASSIUM SERPL-SCNC: 4.2 MMOL/L (ref 3.5–5)
PROTEIN UA: NEGATIVE MG/DL
RBC # BLD: 5.13 E12/L (ref 3.5–5.5)
SODIUM BLD-SCNC: 136 MMOL/L (ref 132–146)
SPECIFIC GRAVITY UA: 1.01 (ref 1–1.03)
TOTAL PROTEIN: 7.1 G/DL (ref 6.4–8.3)
TRIGL SERPL-MCNC: 108 MG/DL (ref 0–149)
UROBILINOGEN, URINE: 0.2 E.U./DL
VITAMIN D 25-HYDROXY: 30 NG/ML (ref 30–100)
VLDLC SERPL CALC-MCNC: 22 MG/DL
WBC # BLD: 7.6 E9/L (ref 4.5–11.5)

## 2022-10-24 PROCEDURE — 85025 COMPLETE CBC W/AUTO DIFF WBC: CPT

## 2022-10-24 PROCEDURE — 80053 COMPREHEN METABOLIC PANEL: CPT

## 2022-10-24 PROCEDURE — 82044 UR ALBUMIN SEMIQUANTITATIVE: CPT

## 2022-10-24 PROCEDURE — 81003 URINALYSIS AUTO W/O SCOPE: CPT

## 2022-10-24 PROCEDURE — 83735 ASSAY OF MAGNESIUM: CPT

## 2022-10-24 PROCEDURE — 80061 LIPID PANEL: CPT

## 2022-10-24 PROCEDURE — 36415 COLL VENOUS BLD VENIPUNCTURE: CPT

## 2022-10-24 PROCEDURE — 82306 VITAMIN D 25 HYDROXY: CPT

## 2022-10-24 PROCEDURE — 83036 HEMOGLOBIN GLYCOSYLATED A1C: CPT

## 2022-11-04 ENCOUNTER — HOSPITAL ENCOUNTER (OUTPATIENT)
Dept: MAMMOGRAPHY | Age: 77
Discharge: HOME | End: 2022-11-06
Payer: MEDICARE

## 2022-11-04 DIAGNOSIS — Z12.31 ENCOUNTER FOR SCREENING MAMMOGRAM FOR MALIGNANT NEOPLASM OF BREAST: ICD-10-CM

## 2022-11-04 PROCEDURE — 77067 SCR MAMMO BI INCL CAD: CPT

## 2022-11-08 ENCOUNTER — TELEPHONE (OUTPATIENT)
Dept: MAMMOGRAPHY | Age: 77
End: 2022-11-08

## 2022-11-08 NOTE — TELEPHONE ENCOUNTER
Order request faxed to Dr. Polo Mejia for right breast diagnostic mammogram and limited ultrasound as recommended from mammogram performed at [facility] on 11/4/22. Successful fax confirmation.  CHINO EatonN, RN -Breast Nurse Navigator

## 2022-11-09 ENCOUNTER — TELEPHONE (OUTPATIENT)
Dept: MAMMOGRAPHY | Age: 77
End: 2022-11-09

## 2022-11-09 NOTE — TELEPHONE ENCOUNTER
Call to patient in reference to her mammogram performed at [facility] on 11/4/22. Instructed patient that the radiologist has recommended additional breast imaging in order to make a final determination and further recommendations. Patient verbalized understanding and is agreeable to proceed at [facility]-. Orders received per Dr. Gilson Galeano and scanned into media.  Patient scheduled for 11/21/22 at 10 am. CHINO TobarN, RN -Breast Navigator

## 2022-11-15 ENCOUNTER — TELEPHONE (OUTPATIENT)
Dept: NEUROLOGY | Age: 77
End: 2022-11-15

## 2022-11-15 NOTE — TELEPHONE ENCOUNTER
Is taking her Propanolol 3 times per day, has been having tremors longer before her next dose, feels like the meds aren't working as well as they were before.   Wants to know if if she's having worse tremors before she feels like she is due for her next dose if she would be ok to take it earlier that usual.

## 2022-11-17 NOTE — TELEPHONE ENCOUNTER
Notified pt that Dr Johnathon Kramer ordered her to increase her propanol 10mg four times day, or she take 20mg twice a daily. Pt is instructed to call office in 3-4 weeks.

## 2022-11-21 ENCOUNTER — HOSPITAL ENCOUNTER (OUTPATIENT)
Dept: ULTRASOUND IMAGING | Age: 77
Discharge: HOME | End: 2022-11-21
Payer: MEDICARE

## 2022-11-21 ENCOUNTER — HOSPITAL ENCOUNTER (OUTPATIENT)
Dept: MAMMOGRAPHY | Age: 77
Discharge: HOME | End: 2022-11-23
Payer: MEDICARE

## 2022-11-21 DIAGNOSIS — R92.8 ABNORMAL MAMMOGRAM: ICD-10-CM

## 2022-11-21 PROCEDURE — 77065 DX MAMMO INCL CAD UNI: CPT

## 2022-11-21 PROCEDURE — 76642 ULTRASOUND BREAST LIMITED: CPT

## 2023-01-03 ENCOUNTER — HOSPITAL ENCOUNTER (OUTPATIENT)
Dept: GENERAL RADIOLOGY | Age: 78
Discharge: HOME | End: 2023-01-05
Payer: MEDICARE

## 2023-01-03 DIAGNOSIS — R09.89 CHEST CONGESTION: ICD-10-CM

## 2023-01-03 PROCEDURE — 70220 X-RAY EXAM OF SINUSES: CPT

## 2023-01-03 PROCEDURE — 71046 X-RAY EXAM CHEST 2 VIEWS: CPT

## 2023-01-12 RX ORDER — PROPRANOLOL HYDROCHLORIDE 10 MG/1
TABLET ORAL
Qty: 270 TABLET | Refills: 3 | Status: SHIPPED | OUTPATIENT
Start: 2023-01-12

## 2023-01-12 NOTE — TELEPHONE ENCOUNTER
Patient will be out of her Propanalol in around two weeks, needs a new script sent in because she has changed pharmacies.   New pharmacy is [facility]  Fax: [phone]  Phone: [phone] (for if calling in script)

## 2023-01-30 ENCOUNTER — OFFICE VISIT (OUTPATIENT)
Dept: NEUROLOGY | Age: 78
End: 2023-01-30
Payer: MEDICARE

## 2023-01-30 VITALS
DIASTOLIC BLOOD PRESSURE: 92 MMHG | SYSTOLIC BLOOD PRESSURE: 158 MMHG | OXYGEN SATURATION: 97 % | TEMPERATURE: 97.8 F | BODY MASS INDEX: 29.01 KG/M2 | WEIGHT: 169 LBS | HEART RATE: 60 BPM

## 2023-01-30 DIAGNOSIS — G25.0 ESSENTIAL TREMOR: Primary | ICD-10-CM

## 2023-01-30 PROBLEM — H26.9 LEFT CATARACT: Status: RESOLVED | Noted: 2021-10-12 | Resolved: 2023-01-30

## 2023-01-30 PROBLEM — R00.1 BRADYCARDIA: Status: RESOLVED | Noted: 2019-05-05 | Resolved: 2023-01-30

## 2023-01-30 PROCEDURE — 1123F ACP DISCUSS/DSCN MKR DOCD: CPT | Performed by: NURSE PRACTITIONER

## 2023-01-30 PROCEDURE — 99215 OFFICE O/P EST HI 40 MIN: CPT | Performed by: NURSE PRACTITIONER

## 2023-01-30 RX ORDER — DIAZEPAM 5 MG/1
5 TABLET ORAL EVERY 8 HOURS PRN
COMMUNITY

## 2023-01-30 RX ORDER — VALACYCLOVIR HYDROCHLORIDE 1 G/1
TABLET, FILM COATED ORAL
COMMUNITY
Start: 2023-01-11

## 2023-01-30 NOTE — PROGRESS NOTES
[address] [facility] MSN, APRN-CNP, [address] [facility]         Office Extended Follow Up:                            Skyler Nelson is a 69 y.o. right handed female     We are following for essential tremors--previously followed with Dr. Silvia Yoo    She presents alone and is a good historian with an additional significant past medical history of A. fib s/p watchman placement; pacemaker, brain aneurysm s/p clipping 1993, elevated A1c, HTN, kidney stones, lumbar radiculopathy, recurrent shingles, anxiety and depression. She is  with 1 adult child. She was a homemaker. She currently smokes about 1/4 pack of cigarettes per day for 60 years; no history of alcohol or illicit drugs. No family history of neurologic disease to her knowledge. She has been following with our service since 2017, for hand and vocal tremors beginning a number of years ago--I had actually seen her myself back in 2018. She has a significant vocal tremor and notes moderate action tremors of both hands, right more than left, mainly with doing fine motor tasks such as writing. She denies any resting tremors, gait abnormalities, freezing spells, or muscle stiffness she has been on low-dose propranolol 10 mg 3 times daily for a while now which controls her tremors fairly well. She is hesitant to consider any medication adjustments today due to side effects. She has been on diazepam since following her brain aneurysm surgery in 1993. She was on much higher doses in the past and now uses it very infrequently for anxiety. She is also on Prozac. She  states that her \"voice changed\" after her aneurysm clipping--but otherwise she suffered no major sequela.   She was on Dilantin and Tegretol after the brain surgery with no documented actual seizure activity--and had an EEG that was normal.  She was eventually taken off of these medications. Tremor medications failed or contraindicated: Gabapentin, Tegretol. She has adamantly declined primidone due to fears of side effects. Topiramate is contraindicated with her history of kidney stones. As explained above she is already on propranolol. She is currently on an SSRI as well. She consumes no caffeine. She has been trying to watch her carbohydrate intake, since her A1c is now 7.1. She may need to be on antidiabetic agents if it does not improve. She never had actual COVID to her knowledge but it was suspected based on her loss of taste and smell began 2 months ago during respiratory illness. She still notes these problems. She is on low-dose chronic antiviral therapy for recurrent shingles which typically affects her right buttock. There have been concerns about cognitive decline in the past but she denies any further issues at this time. Her mother suffered from dementia. No chest pain or palpitations  No SOB  No vertigo, lightheadedness or loss of consciousness  No falls, tripping or stumbling  No incontinence of bowels or bladder  No itching or bruising appreciated  No numbness, tingling or focal arm/leg weakness  No swallowing problems    ROS otherwise negative      Current Outpatient Medications on File Prior to Visit   Medication Sig Dispense Refill    valACYclovir (VALTREX) 1 g tablet TAKE ONE TABLET BY MOUTH EVERY DAY      diazePAM (VALIUM) 5 MG tablet Take 5 mg by mouth every 8 hours as needed for Anxiety.       propranolol (INDERAL) 10 MG tablet Taker 1 tablet three times daily 015 tablet 3    folic acid (FOLVITE) 1 MG tablet Take 1 tablet by mouth daily      hydroCHLOROthiazide (HYDRODIURIL) 25 MG tablet Take 25 mg by mouth daily      Magnesium 500 MG CAPS Take by mouth daily       cloNIDine (CATAPRES) 0.1 MG tablet Take 0.1 mg by mouth every 8 hours as needed PRN for BP > 165/95  2    potassium chloride (KLOR-CON) 10 MEQ extended release tablet Take 20 mEq by mouth 3 times daily       aspirin (ECOTRIN LOW STRENGTH) 81 MG EC tablet Take 1 tablet by mouth daily 60 tablet 5    Cholecalciferol (VITAMIN D3) 1000 units CAPS Take 1 tablet by mouth daily      rosuvastatin (CRESTOR) 5 MG tablet Take 5 mg by mouth daily      PARoxetine (PAXIL) 10 MG tablet Take 20 mg by mouth every morning        No current facility-administered medications on file prior to visit. Objective:     BP (!) 158/92   Pulse 60   Temp 97.8 °F (36.6 °C)   Wt 169 lb (76.7 kg)   SpO2 97%   BMI 29.01 kg/m²     General appearance: alert, appears stated age and cooperative  Head: Normocephalic, without obvious abnormality, atraumatic  Eyes: conjunctivae/corneas clear. Neck: Full range of motion without cervicalgia or cogwheeling  Lungs: clear to auscultation bilaterally  Heart: regular rate and rhythm  Extremities: extremities normal, atraumatic, no cyanosis or edema  Pulses: 2+ and symmetric  Skin: Skin color, texture, turgor normal. No rashes or lesions       Mental Status: Alert, oriented x4--anxious but pleasant    Appropriate attention/concentration  Intact fundus of knowledge  Questionable memories    Speech:  Moderate vocal quivering  Language: no aphasias    Cranial Nerves:  I: smell NA   II: visual acuity  NA   II: visual fields Full to confrontation   II: pupils ROBBI   III,VII: ptosis None   III,IV,VI: extraocular muscles  Full ROM   V: mastication Normal   V: facial light touch sensation  Normal   V,VII: corneal reflex     VII: facial muscle function - upper  Normal   VII: facial muscle function - lower Normal   VIII: hearing Normal   IX: soft palate elevation  Normal   IX,X: gag reflex    XI: trapezius strength  5/5   XI: sternocleidomastoid strength 5/5   XI: neck extension strength  5/5   XII: tongue strength  Normal       Motor:  5/5 throughout  Normal bulk and tone  Mild action tremors of both hands when outstretched  No cogwheeling or bradykinesia  No resting tremor    Sensory:  LT normal in all limbs    Coordination:   FN, FFM, SADIQ normal    Gait:   Normal    DTR:   2+ throughout    No Mcnulty's  No pathological reflexes    Laboratory/Radiology:     CBC with Differential:    Lab Results   Component Value Date/Time    WBC 7.6 10/24/2022 09:45 AM    RBC 5.13 10/24/2022 09:45 AM    HGB 16.7 10/24/2022 09:45 AM    HCT 48.5 10/24/2022 09:45 AM     10/24/2022 09:45 AM    MCV 94.5 10/24/2022 09:45 AM    MCH 32.6 10/24/2022 09:45 AM    MCHC 34.4 10/24/2022 09:45 AM    RDW 12.7 10/24/2022 09:45 AM    LYMPHOPCT 21.4 10/24/2022 09:45 AM    MONOPCT 6.4 10/24/2022 09:45 AM    BASOPCT 0.8 10/24/2022 09:45 AM    MONOSABS 0.49 10/24/2022 09:45 AM    LYMPHSABS 1.63 10/24/2022 09:45 AM    EOSABS 0.10 10/24/2022 09:45 AM    BASOSABS 0.06 10/24/2022 09:45 AM     CMP:    Lab Results   Component Value Date/Time     10/24/2022 09:45 AM    K 4.2 10/24/2022 09:45 AM    K 3.7 05/22/2022 11:44 PM     10/24/2022 09:45 AM    CO2 26 10/24/2022 09:45 AM    BUN 12 10/24/2022 09:45 AM    CREATININE 0.8 10/24/2022 09:45 AM    GFRAA >60 07/14/2022 08:32 AM    LABGLOM >60 10/24/2022 09:45 AM    GLUCOSE 180 10/24/2022 09:45 AM    PROT 7.1 10/24/2022 09:45 AM    LABALBU 3.9 10/24/2022 09:45 AM    CALCIUM 9.9 10/24/2022 09:45 AM    BILITOT 1.4 10/24/2022 09:45 AM    ALKPHOS 75 10/24/2022 09:45 AM    AST 15 10/24/2022 09:45 AM    ALT 13 10/24/2022 09:45 AM     HgBA1c:    Lab Results   Component Value Date/Time    LABA1C 7.1 10/24/2022 09:45 AM     TSH:    Lab Results   Component Value Date/Time    TSH 2.030 07/14/2022 08:32 AM     CT head May 2022: No acute intracranial abnormality. Chronic appearing findings as detailed above. Short-term follow-up recommended if symptoms persist.     All pertinent labs and images personally reviewed today    Assessment:     Essential tremors: With focal and action tremors of her hands.   Hand tremors appear to be under fairly good control on beta-blockade, and she has been hesitant to consider further medication increases or additions due to side effects. Randall Jeterty peripheral nerve stimulating device may be a good option for her to improve her quality of life and ability to perform her ADLs.   Her anxiety does aggravate her tremors    History of brain aneurysm surgery s/p clipping    Plan:     -Information given on Rachel Trio device--pt will think about it  -Continue propranolol 10 mg TID  -Control anxiety; quit smoking, control diabetes    RTO in 6 mos or sooner PRN    FRANK Garner - CNP  3:27 PM  1/30/2023

## 2023-02-06 ENCOUNTER — HOSPITAL ENCOUNTER (OUTPATIENT)
Age: 78
Discharge: HOME | End: 2023-02-06
Payer: MEDICARE

## 2023-02-06 LAB
ALBUMIN SERPL-MCNC: 3.8 G/DL (ref 3.5–5.2)
ALP BLD-CCNC: 68 U/L (ref 35–104)
ALT SERPL-CCNC: 14 U/L (ref 0–32)
ANION GAP SERPL CALCULATED.3IONS-SCNC: 9 MMOL/L (ref 7–16)
AST SERPL-CCNC: 18 U/L (ref 0–31)
BASOPHILS ABSOLUTE: 0.05 E9/L (ref 0–0.2)
BASOPHILS RELATIVE PERCENT: 0.8 % (ref 0–2)
BILIRUB SERPL-MCNC: 0.9 MG/DL (ref 0–1.2)
BUN BLDV-MCNC: 19 MG/DL (ref 6–23)
CALCIUM SERPL-MCNC: 9.2 MG/DL (ref 8.6–10.2)
CHLORIDE BLD-SCNC: 104 MMOL/L (ref 98–107)
CHOLESTEROL, FASTING: 150 MG/DL (ref 0–199)
CO2: 25 MMOL/L (ref 22–29)
CREAT SERPL-MCNC: 0.7 MG/DL (ref 0.5–1)
EOSINOPHILS ABSOLUTE: 0.17 E9/L (ref 0.05–0.5)
EOSINOPHILS RELATIVE PERCENT: 2.8 % (ref 0–6)
GFR SERPL CREATININE-BSD FRML MDRD: >60 ML/MIN/1.73
GLUCOSE FASTING: 152 MG/DL (ref 74–99)
HBA1C MFR BLD: 6.7 % (ref 4–5.6)
HCT VFR BLD CALC: 45 % (ref 34–48)
HDLC SERPL-MCNC: 66 MG/DL
HEMOGLOBIN: 14.9 G/DL (ref 11.5–15.5)
IMMATURE GRANULOCYTES #: 0.01 E9/L
IMMATURE GRANULOCYTES %: 0.2 % (ref 0–5)
LDL CHOLESTEROL CALCULATED: 73 MG/DL (ref 0–99)
LYMPHOCYTES ABSOLUTE: 2.11 E9/L (ref 1.5–4)
LYMPHOCYTES RELATIVE PERCENT: 35 % (ref 20–42)
MCH RBC QN AUTO: 32.5 PG (ref 26–35)
MCHC RBC AUTO-ENTMCNC: 33.1 % (ref 32–34.5)
MCV RBC AUTO: 98 FL (ref 80–99.9)
MICROALBUMIN UR-MCNC: <12 MG/L
MONOCYTES ABSOLUTE: 0.58 E9/L (ref 0.1–0.95)
MONOCYTES RELATIVE PERCENT: 9.6 % (ref 2–12)
NEUTROPHILS ABSOLUTE: 3.11 E9/L (ref 1.8–7.3)
NEUTROPHILS RELATIVE PERCENT: 51.6 % (ref 43–80)
PDW BLD-RTO: 12.1 FL (ref 11.5–15)
PLATELET # BLD: 215 E9/L (ref 130–450)
PMV BLD AUTO: 10.6 FL (ref 7–12)
POTASSIUM SERPL-SCNC: 4 MMOL/L (ref 3.5–5)
RBC # BLD: 4.59 E12/L (ref 3.5–5.5)
SODIUM BLD-SCNC: 138 MMOL/L (ref 132–146)
TOTAL PROTEIN: 6.5 G/DL (ref 6.4–8.3)
TRIGLYCERIDE, FASTING: 56 MG/DL (ref 0–149)
VLDLC SERPL CALC-MCNC: 11 MG/DL
WBC # BLD: 6 E9/L (ref 4.5–11.5)

## 2023-02-06 PROCEDURE — 83036 HEMOGLOBIN GLYCOSYLATED A1C: CPT

## 2023-02-06 PROCEDURE — 85025 COMPLETE CBC W/AUTO DIFF WBC: CPT

## 2023-02-06 PROCEDURE — 80053 COMPREHEN METABOLIC PANEL: CPT

## 2023-02-06 PROCEDURE — 36415 COLL VENOUS BLD VENIPUNCTURE: CPT

## 2023-02-06 PROCEDURE — 80061 LIPID PANEL: CPT

## 2023-02-06 PROCEDURE — 82044 UR ALBUMIN SEMIQUANTITATIVE: CPT

## 2023-03-03 ENCOUNTER — HOSPITAL ENCOUNTER (OUTPATIENT)
Age: 78
End: 2023-03-03
Payer: MEDICARE

## 2023-03-03 ENCOUNTER — HOSPITAL ENCOUNTER (OUTPATIENT)
Dept: GENERAL RADIOLOGY | Age: 78
End: 2023-03-03
Payer: MEDICARE

## 2023-03-03 DIAGNOSIS — M25.511 ACUTE PAIN OF RIGHT SHOULDER: ICD-10-CM

## 2023-03-03 PROCEDURE — 73030 X-RAY EXAM OF SHOULDER: CPT

## 2023-03-04 VITALS
BODY MASS INDEX: 29.87 KG/M2 | WEIGHT: 174 LBS | SYSTOLIC BLOOD PRESSURE: 194 MMHG | HEART RATE: 67 BPM | DIASTOLIC BLOOD PRESSURE: 89 MMHG | TEMPERATURE: 97.7 F | OXYGEN SATURATION: 100 %

## 2023-03-04 PROCEDURE — 99283 EMERGENCY DEPT VISIT LOW MDM: CPT

## 2023-03-05 ENCOUNTER — HOSPITAL ENCOUNTER (EMERGENCY)
Age: 78
Discharge: HOME | End: 2023-03-05
Attending: EMERGENCY MEDICINE
Payer: MEDICARE

## 2023-03-05 DIAGNOSIS — M25.511 CHRONIC RIGHT SHOULDER PAIN: ICD-10-CM

## 2023-03-05 DIAGNOSIS — G89.29 CHRONIC RIGHT SHOULDER PAIN: ICD-10-CM

## 2023-03-05 DIAGNOSIS — K08.89 DENTALGIA: Primary | ICD-10-CM

## 2023-03-05 PROCEDURE — 6370000000 HC RX 637 (ALT 250 FOR IP): Performed by: EMERGENCY MEDICINE

## 2023-03-05 RX ORDER — HYDROCODONE BITARTRATE AND ACETAMINOPHEN 5; 325 MG/1; MG/1
1 TABLET ORAL ONCE
Status: COMPLETED | OUTPATIENT
Start: 2023-03-05 | End: 2023-03-05

## 2023-03-05 RX ADMIN — HYDROCODONE BITARTRATE AND ACETAMINOPHEN 1 TABLET: 5; 325 TABLET ORAL at 02:09

## 2023-03-05 ASSESSMENT — ENCOUNTER SYMPTOMS
EYE PAIN: 0
VOMITING: 0
BACK PAIN: 0
COUGH: 0
EYE DISCHARGE: 0
SINUS PRESSURE: 0
SORE THROAT: 0
DIARRHEA: 0
ABDOMINAL DISTENTION: 0
SHORTNESS OF BREATH: 0
NAUSEA: 0
EYE REDNESS: 0
WHEEZING: 0
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
## 2023-05-09 ENCOUNTER — TELEPHONE (OUTPATIENT)
Dept: NEUROLOGY | Age: 78
End: 2023-05-09

## 2023-05-09 NOTE — TELEPHONE ENCOUNTER
Patient called in stated she is having a colonoscopy tomorrow. She takes her propranolol at 8 am, 2 pm and 8 pm. She has to get up at 5:30 tomorrow and wants to know what time she can take her propranolol tomorrow. Please advise.

## 2023-07-19 ENCOUNTER — OFFICE VISIT (OUTPATIENT)
Dept: NEUROLOGY | Age: 78
End: 2023-07-19
Payer: MEDICARE

## 2023-07-19 VITALS
OXYGEN SATURATION: 97 % | HEART RATE: 63 BPM | BODY MASS INDEX: 28.32 KG/M2 | DIASTOLIC BLOOD PRESSURE: 84 MMHG | TEMPERATURE: 97.4 F | WEIGHT: 165 LBS | SYSTOLIC BLOOD PRESSURE: 132 MMHG

## 2023-07-19 DIAGNOSIS — G25.0 ESSENTIAL TREMOR: Primary | ICD-10-CM

## 2023-07-19 PROCEDURE — 99214 OFFICE O/P EST MOD 30 MIN: CPT | Performed by: NURSE PRACTITIONER

## 2023-07-19 PROCEDURE — 1123F ACP DISCUSS/DSCN MKR DOCD: CPT | Performed by: NURSE PRACTITIONER

## 2023-07-19 NOTE — PROGRESS NOTES
[address] MSN, APRN-CNP, [facility] , [address] [facility], [address] [place] [address]      [phone]         Office Follow Up:                            Leland Lynch is a 69 y.o. right handed female     We are following for essential tremors    She presents alone and is a good historian    Tremors are about the same since last visit, most bothersome in her voice. She is able to perform all of her ADLs without issues. She was given information about Bridgette Yahir at her last visit, but at this time her hand tremors are nonbothersome. Taking divided doses of propranolol. She again is hesitant to consider any additions of medications but has some questions about primidone. She has followed with psychiatry for her longstanding anxiety. On Prozac--no longer using diazepam.      She is now also complaining of right trapezius and posterior neck pains and tightness extending into her shoulder. She had an x-ray in March which showed mild degenerative changes. She was told by another provider that it may be her neck. She has been doing neck exercises faithfully which is helpful. No known injuries. Also still complaining of loss of taste and smell. Another CT of her head was ordered in February by PCP due to these issues. She had one in May 2022 that was unremarkable. Unable to have an MRI    Tremor medications failed or contraindicated: Gabapentin, Tegretol. Topiramate is contraindicated with her history of kidney stones.      No chest pain or palpitations  No SOB  No vertigo, lightheadedness or loss of consciousness  No falls, tripping or stumbling  No incontinence of bowels or bladder  No itching or bruising appreciated  No numbness, tingling or focal arm/leg weakness  No swallowing problems    ROS otherwise negative      Current Outpatient Medications on File Prior to Visit   Medication Sig Dispense Refill    Acetaminophen (TYLENOL) 325 MG CAPS Take by

## 2023-08-01 ENCOUNTER — HOSPITAL ENCOUNTER (OUTPATIENT)
Age: 78
Discharge: HOME | End: 2023-08-03
Payer: MEDICARE

## 2023-08-01 ENCOUNTER — HOSPITAL ENCOUNTER (OUTPATIENT)
Dept: GENERAL RADIOLOGY | Age: 78
Discharge: HOME | End: 2023-08-03
Payer: MEDICARE

## 2023-08-01 DIAGNOSIS — M54.2 CERVICALGIA: ICD-10-CM

## 2023-08-01 PROCEDURE — 72040 X-RAY EXAM NECK SPINE 2-3 VW: CPT

## 2023-08-09 ENCOUNTER — HOSPITAL ENCOUNTER (OUTPATIENT)
Dept: CT IMAGING | Age: 78
Discharge: HOME | End: 2023-08-09
Attending: INTERNAL MEDICINE
Payer: MEDICARE

## 2023-08-09 DIAGNOSIS — R43.2 LOSS OF TASTE: ICD-10-CM

## 2023-08-09 DIAGNOSIS — R43.0 LOSS OF SENSE OF SMELL: ICD-10-CM

## 2023-08-09 PROCEDURE — 70450 CT HEAD/BRAIN W/O DYE: CPT

## 2023-08-11 ENCOUNTER — TELEPHONE (OUTPATIENT)
Dept: NEUROLOGY | Age: 78
End: 2023-08-11

## 2023-08-11 NOTE — TELEPHONE ENCOUNTER
Steve Blake wanted you to know she had a CT scan done at [address] [place] [address]. Her PCP ordered it. She wanted to know if you could view the results.

## 2023-08-14 ENCOUNTER — HOSPITAL ENCOUNTER (OUTPATIENT)
Age: 78
Discharge: HOME | End: 2023-08-14
Payer: MEDICARE

## 2023-08-14 LAB
ALBUMIN SERPL-MCNC: 4 G/DL (ref 3.5–5.2)
ALP SERPL-CCNC: 83 U/L (ref 35–104)
ALT SERPL-CCNC: 13 U/L (ref 0–32)
ANION GAP SERPL CALCULATED.3IONS-SCNC: 10 MMOL/L (ref 7–16)
AST SERPL-CCNC: 16 U/L (ref 0–31)
BASOPHILS # BLD: 0.06 K/UL (ref 0–0.2)
BASOPHILS NFR BLD: 1 % (ref 0–2)
BILIRUB SERPL-MCNC: 1.2 MG/DL (ref 0–1.2)
BUN SERPL-MCNC: 14 MG/DL (ref 6–23)
CALCIUM SERPL-MCNC: 9.7 MG/DL (ref 8.6–10.2)
CHLORIDE SERPL-SCNC: 104 MMOL/L (ref 98–107)
CHOLEST SERPL-MCNC: 138 MG/DL
CO2 SERPL-SCNC: 26 MMOL/L (ref 22–29)
CREAT SERPL-MCNC: 0.7 MG/DL (ref 0.5–1)
EOSINOPHIL # BLD: 0.17 K/UL (ref 0.05–0.5)
EOSINOPHILS RELATIVE PERCENT: 3 % (ref 0–6)
ERYTHROCYTE [DISTWIDTH] IN BLOOD BY AUTOMATED COUNT: 12 % (ref 11.5–15)
GFR SERPL CREATININE-BSD FRML MDRD: >60 ML/MIN/1.73M2
GLUCOSE SERPL-MCNC: 110 MG/DL (ref 74–99)
HBA1C MFR BLD: 6.5 % (ref 4–5.6)
HCT VFR BLD AUTO: 43.7 % (ref 34–48)
HDLC SERPL-MCNC: 60 MG/DL
HGB BLD-MCNC: 14.8 G/DL (ref 11.5–15.5)
IMM GRANULOCYTES # BLD AUTO: <0.03 K/UL (ref 0–0.58)
IMM GRANULOCYTES NFR BLD: 0 % (ref 0–5)
LDLC SERPL CALC-MCNC: 64 MG/DL
LYMPHOCYTES NFR BLD: 1.96 K/UL (ref 1.5–4)
LYMPHOCYTES RELATIVE PERCENT: 32 % (ref 20–42)
MCH RBC QN AUTO: 33.1 PG (ref 26–35)
MCHC RBC AUTO-ENTMCNC: 33.9 G/DL (ref 32–34.5)
MCV RBC AUTO: 97.8 FL (ref 80–99.9)
MONOCYTES NFR BLD: 0.61 K/UL (ref 0.1–0.95)
MONOCYTES NFR BLD: 10 % (ref 2–12)
NEUTROPHILS NFR BLD: 54 % (ref 43–80)
NEUTS SEG NFR BLD: 3.36 K/UL (ref 1.8–7.3)
PLATELET # BLD AUTO: 218 K/UL (ref 130–450)
PMV BLD AUTO: 10.5 FL (ref 7–12)
POTASSIUM SERPL-SCNC: 3.7 MMOL/L (ref 3.5–5)
PROT SERPL-MCNC: 6.9 G/DL (ref 6.4–8.3)
RBC # BLD AUTO: 4.47 M/UL (ref 3.5–5.5)
SODIUM SERPL-SCNC: 140 MMOL/L (ref 132–146)
TRIGL SERPL-MCNC: 69 MG/DL
TSH SERPL DL<=0.05 MIU/L-ACNC: 1.34 UIU/ML (ref 0.76–16.11)
VLDLC SERPL CALC-MCNC: 14 MG/DL
WBC OTHER # BLD: 6.2 K/UL (ref 4.5–11.5)

## 2023-08-14 PROCEDURE — 84443 ASSAY THYROID STIM HORMONE: CPT

## 2023-08-14 PROCEDURE — 36415 COLL VENOUS BLD VENIPUNCTURE: CPT

## 2023-08-14 PROCEDURE — 80053 COMPREHEN METABOLIC PANEL: CPT

## 2023-08-14 PROCEDURE — 83036 HEMOGLOBIN GLYCOSYLATED A1C: CPT

## 2023-08-14 PROCEDURE — 85025 COMPLETE CBC W/AUTO DIFF WBC: CPT

## 2023-08-14 PROCEDURE — 80061 LIPID PANEL: CPT

## 2023-10-10 RX ORDER — PROPRANOLOL HYDROCHLORIDE 10 MG/1
TABLET ORAL
Qty: 270 TABLET | Refills: 3 | Status: SHIPPED | OUTPATIENT
Start: 2023-10-10

## 2023-11-06 ENCOUNTER — HOSPITAL ENCOUNTER (OUTPATIENT)
Dept: MAMMOGRAPHY | Age: 78
Discharge: HOME | End: 2023-11-08
Attending: OBSTETRICS & GYNECOLOGY
Payer: MEDICARE

## 2023-11-06 DIAGNOSIS — Z12.31 SCREENING MAMMOGRAM FOR HIGH-RISK PATIENT: ICD-10-CM

## 2023-11-06 PROCEDURE — 77063 BREAST TOMOSYNTHESIS BI: CPT

## 2023-12-05 ENCOUNTER — OFFICE VISIT (OUTPATIENT)
Dept: NEUROLOGY | Age: 78
End: 2023-12-05
Payer: MEDICARE

## 2023-12-05 VITALS
TEMPERATURE: 97.8 F | SYSTOLIC BLOOD PRESSURE: 131 MMHG | DIASTOLIC BLOOD PRESSURE: 76 MMHG | OXYGEN SATURATION: 97 % | HEART RATE: 72 BPM

## 2023-12-05 DIAGNOSIS — G25.0 ESSENTIAL TREMOR: Primary | ICD-10-CM

## 2023-12-05 PROCEDURE — 1123F ACP DISCUSS/DSCN MKR DOCD: CPT | Performed by: NURSE PRACTITIONER

## 2023-12-05 PROCEDURE — 99214 OFFICE O/P EST MOD 30 MIN: CPT | Performed by: NURSE PRACTITIONER

## 2023-12-05 RX ORDER — DIAZEPAM 5 MG/1
TABLET ORAL
COMMUNITY
Start: 2023-11-14

## 2023-12-05 RX ORDER — PROPRANOLOL HYDROCHLORIDE 10 MG/1
TABLET ORAL
Qty: 270 TABLET | Refills: 3
Start: 2023-12-05

## 2023-12-05 RX ORDER — PAROXETINE HYDROCHLORIDE 20 MG/1
TABLET, FILM COATED ORAL
COMMUNITY
Start: 2023-11-22

## 2023-12-05 NOTE — PROGRESS NOTES
CALCIUM 9.7 08/14/2023 08:48 AM    BILITOT 1.2 08/14/2023 08:48 AM    ALKPHOS 83 08/14/2023 08:48 AM    AST 16 08/14/2023 08:48 AM    ALT 13 08/14/2023 08:48 AM     HgBA1c:    Lab Results   Component Value Date/Time    LABA1C 6.5 08/14/2023 08:48 AM     CT head Aug 2023  IMPRESSION:  No acute intracranial abnormality. XRay c-spine Aug 2023  IMPRESSION:  Moderate degenerative changes of the cervical spine, as above. No evidence  of an acute osseous abnormality. All pertinent labs and images personally reviewed today    Assessment:     Essential tremors: Prominent vocal tremors. Increase in hand tremors most likely due to increased anxiety, stress, and possible right cervical radiculopathy. Divided doses of propranolol have been effective and there is limited ability to use other medications due to her hesitancy and previous side effects. Unfortunately Yahir Veronika Trio device was unaffordable.       History of brain aneurysm surgery s/p clipping    Plan:     -Offered extended release propranolol but she declined  -Continue propranolol 10 mg 4 times daily  -Offered EMG of arms but she declined  -Offered pain mgmt referral to discuss injections but she declined  -Offered CT c-spine but she declined  -Recommend routine neck exercises  -Control anxiety; quit smoking, control diabetes  -Recommend weighted silverware and wrist weights    RTO in 6 mos or sooner PRN    FRANK Decker - CNP  9:24 AM  12/5/2023

## 2024-01-04 ENCOUNTER — TELEPHONE (OUTPATIENT)
Dept: CASE MANAGEMENT | Age: 79
End: 2024-01-04

## 2024-01-04 NOTE — TELEPHONE ENCOUNTER
I called the patient and she confirmed her CT lung screening at [facility] on 1/5/2024 at 11:30 am.  I reminded the patient to arrive at 11:00 am, enter through the main entrance, and register. Patient confirmed.          Electronically signed by Myriam Jordan on 1/4/24 at 2:39 PM EST

## 2024-01-05 ENCOUNTER — HOSPITAL ENCOUNTER (OUTPATIENT)
Dept: CT IMAGING | Age: 79
Discharge: HOME | End: 2024-01-05
Attending: INTERNAL MEDICINE
Payer: MEDICARE

## 2024-01-05 DIAGNOSIS — Z12.2 SCREENING FOR MALIGNANT NEOPLASM OF RESPIRATORY ORGAN: ICD-10-CM

## 2024-01-05 DIAGNOSIS — F17.210 CIGARETTE SMOKER: ICD-10-CM

## 2024-01-05 PROCEDURE — 71271 CT THORAX LUNG CANCER SCR C-: CPT

## 2024-01-08 ENCOUNTER — TELEPHONE (OUTPATIENT)
Dept: CASE MANAGEMENT | Age: 79
End: 2024-01-08

## 2024-01-08 NOTE — TELEPHONE ENCOUNTER
No call, encounter opened to process CT Lung Screening.    CT Lung Screen: 1/5/2024    IMPRESSION:  No suspicious pulmonary nodule.     LUNG RADS:  Lung-RADS 1 - Negative (v2022)     Management:  12 month screening LDCT     RECOMMENDATIONS:  If you would like to register your patient with the [facility] Lung Nodule/Lung  Cancer Screening Program, please contact the Nurse Navigator at  [phone].    Pack years: 31    Social History     Tobacco Use  Smoking Status: Current Every Day Smoker    Start Date: 1962   Quit Date:    Types: Cigarettes   Packs/Day: 0.5   Years: 62   Pack Years: 31   Smokeless Tobacco: Never         Results letter sent to patient via my chart or mailed.     Myriam Jordan  Imaging Navigator   [facility]   [phone]

## 2024-02-21 ENCOUNTER — HOSPITAL ENCOUNTER (OUTPATIENT)
Age: 79
Discharge: HOME | End: 2024-02-21
Payer: MEDICARE

## 2024-02-21 LAB
ALBUMIN SERPL-MCNC: 3.9 G/DL (ref 3.5–5.2)
ALP SERPL-CCNC: 67 U/L (ref 35–104)
ALT SERPL-CCNC: 13 U/L (ref 0–32)
ANION GAP SERPL CALCULATED.3IONS-SCNC: 9 MMOL/L (ref 7–16)
AST SERPL-CCNC: 16 U/L (ref 0–31)
BACTERIA URNS QL MICRO: ABNORMAL
BILIRUB SERPL-MCNC: 0.9 MG/DL (ref 0–1.2)
BILIRUB UR QL STRIP: NEGATIVE
BUN SERPL-MCNC: 20 MG/DL (ref 6–23)
CALCIUM SERPL-MCNC: 9.3 MG/DL (ref 8.6–10.2)
CHLORIDE SERPL-SCNC: 104 MMOL/L (ref 98–107)
CHOLEST SERPL-MCNC: 142 MG/DL
CLARITY UR: CLEAR
CO2 SERPL-SCNC: 26 MMOL/L (ref 22–29)
COLOR UR: YELLOW
CREAT SERPL-MCNC: 0.7 MG/DL (ref 0.5–1)
EPI CELLS #/AREA URNS HPF: ABNORMAL /HPF
ERYTHROCYTE [DISTWIDTH] IN BLOOD BY AUTOMATED COUNT: 12.4 % (ref 11.5–15)
GFR SERPL CREATININE-BSD FRML MDRD: >60 ML/MIN/1.73M2
GLUCOSE SERPL-MCNC: 126 MG/DL (ref 74–99)
GLUCOSE UR STRIP-MCNC: NEGATIVE MG/DL
HBA1C MFR BLD: 6.4 % (ref 4–5.6)
HCT VFR BLD AUTO: 44.7 % (ref 34–48)
HDLC SERPL-MCNC: 61 MG/DL
HGB BLD-MCNC: 15.1 G/DL (ref 11.5–15.5)
HGB UR QL STRIP.AUTO: NEGATIVE
KETONES UR STRIP-MCNC: NEGATIVE MG/DL
LDLC SERPL CALC-MCNC: 68 MG/DL
LEUKOCYTE ESTERASE UR QL STRIP: NEGATIVE
MCH RBC QN AUTO: 32.7 PG (ref 26–35)
MCHC RBC AUTO-ENTMCNC: 33.8 G/DL (ref 32–34.5)
MCV RBC AUTO: 96.8 FL (ref 80–99.9)
NITRITE UR QL STRIP: NEGATIVE
PH UR STRIP: 7 [PH] (ref 5–9)
PLATELET # BLD AUTO: 211 K/UL (ref 130–450)
PMV BLD AUTO: 10.7 FL (ref 7–12)
POTASSIUM SERPL-SCNC: 3.6 MMOL/L (ref 3.5–5)
PROT SERPL-MCNC: 6.6 G/DL (ref 6.4–8.3)
PROT UR STRIP-MCNC: NEGATIVE MG/DL
RBC # BLD AUTO: 4.62 M/UL (ref 3.5–5.5)
RBC #/AREA URNS HPF: ABNORMAL /HPF
SODIUM SERPL-SCNC: 139 MMOL/L (ref 132–146)
SP GR UR STRIP: 1.02 (ref 1–1.03)
TRIGL SERPL-MCNC: 66 MG/DL
UROBILINOGEN UR STRIP-ACNC: 0.2 EU/DL (ref 0–1)
VLDLC SERPL CALC-MCNC: 13 MG/DL
WBC #/AREA URNS HPF: ABNORMAL /HPF
WBC OTHER # BLD: 5.4 K/UL (ref 4.5–11.5)

## 2024-02-21 PROCEDURE — 80061 LIPID PANEL: CPT

## 2024-02-21 PROCEDURE — 36415 COLL VENOUS BLD VENIPUNCTURE: CPT

## 2024-02-21 PROCEDURE — 83036 HEMOGLOBIN GLYCOSYLATED A1C: CPT

## 2024-02-21 PROCEDURE — 80053 COMPREHEN METABOLIC PANEL: CPT

## 2024-02-21 PROCEDURE — 82570 ASSAY OF URINE CREATININE: CPT

## 2024-02-21 PROCEDURE — 81001 URINALYSIS AUTO W/SCOPE: CPT

## 2024-02-21 PROCEDURE — 82043 UR ALBUMIN QUANTITATIVE: CPT

## 2024-02-21 PROCEDURE — 85027 COMPLETE CBC AUTOMATED: CPT

## 2024-02-22 LAB
BASOPHILS # BLD: 0.06 K/UL (ref 0–0.2)
BASOPHILS NFR BLD: 1 % (ref 0–2)
CREAT UR-MCNC: 101.8 MG/DL (ref 29–226)
EOSINOPHIL # BLD: 0.13 K/UL (ref 0.05–0.5)
EOSINOPHILS RELATIVE PERCENT: 2 % (ref 0–6)
IMM GRANULOCYTES # BLD AUTO: <0.03 K/UL (ref 0–0.58)
IMM GRANULOCYTES NFR BLD: 0 % (ref 0–5)
LYMPHOCYTES NFR BLD: 1.89 K/UL (ref 1.5–4)
LYMPHOCYTES RELATIVE PERCENT: 35 % (ref 20–42)
MICROALBUMIN UR-MCNC: <12 MG/L (ref 0–19)
MICROALBUMIN/CREAT UR-RTO: NORMAL MCG/MG CREAT (ref 0–30)
MONOCYTES NFR BLD: 0.5 K/UL (ref 0.1–0.95)
MONOCYTES NFR BLD: 9 % (ref 2–12)
NEUTROPHILS NFR BLD: 53 % (ref 43–80)
NEUTS SEG NFR BLD: 2.88 K/UL (ref 1.8–7.3)

## 2024-02-29 NOTE — PROGRESS NOTES
Detail Level: Detailed Patient admitted to 0624-02. Patient is alert and oriented x4. No reported dizziness or pain. Dr Powell Laser made aware of admission and daughters concerns about possible pace maker and also that she wants to be notified before the patient is discharged or any procedures are done for her mother. Her daughter Brandon Kirby is her POA and her number is in the chart.

## 2024-03-17 LAB
BACTERIA URNS QL MICRO: NORMAL
BILIRUB UR QL STRIP: NORMAL
CLARITY UR: NORMAL
COLOR UR: NORMAL
EPI CELLS #/AREA URNS HPF: NORMAL /HPF (ref 0–5)
GLUCOSE UR STRIP-MCNC: NORMAL MG/DL
HGB UR QL STRIP.AUTO: NORMAL
KETONES UR STRIP-MCNC: NORMAL MG/DL
LEUKOCYTE ESTERASE UR QL STRIP: NORMAL
NITRITE UR QL STRIP: NORMAL
PH UR STRIP: NORMAL [PH] (ref 5–8)
PROT UR STRIP-MCNC: NORMAL MG/DL
RBC #/AREA URNS HPF: NORMAL /HPF (ref 0–2)
SP GR UR STRIP: NORMAL (ref 1–1.03)
UROBILINOGEN UR STRIP-ACNC: NORMAL EU/DL (ref 0–1)
WBC #/AREA URNS HPF: NORMAL /HPF (ref 0–5)

## 2024-04-08 NOTE — ED PROVIDER NOTES
[facility]  Discharge Summary     Christi Tavarez  : 1959    Status: Inpatient  Day #: 6    Attending: Alvaro Arvizu MD  PCP: ÁNGEL Delgado     Date of Admission:  2024  Date of Discharge:  2024     Hospital Discharge Diagnoses:     Generalized weakness  Recurrent falls  Moderate acute to subacute inferior right cerebellar hematoma vs infarct vs mass with hemorrhage  Hematemesis  Alcohol abuse with withdrawal  Hematuria from lucas trauma  RAN  Mild hyponatremia  Metabolic acidosis  Thrombocytopenia  DM2   HL  Anxiety d/o      History of Present Illness:     Copied from Admission H&P:    Christi Tavarez is a(n) 64 year old female, who presents for evaluation of multiple falls. PMHx significant for NIDDM type II, hypothyroidism, anxiety, hyperlipidemia, current alcohol use.  Patient accompanied by daughter at bedside.  History obtained mostly by patient who reports that she has been falling for the last 2 weeks.  She feels like her legs are giving out and she has been falling on the ground at home, hitting her head multiple times.  Denies lightheadedness or dizziness prior to the falls and states that her legs are just giving out and she feels very weak.  Daughter at bedside concerned that patient has had the falls as well as vomiting prior to ED presentation.  Daughter states that she has been vomiting, no blood noted per family and patient.  She has felt nauseous.  Has had difficulty with keeping any food down.  Denies any fever or chills.  Reports the last time she went to her PCP was in 2023 and per chart review she did see her PCP back then.  Currently she reports taking Paxil for her anxiety.  She declines taking her metformin for her diabetes and does not check her blood sugar.  Denies any blood in her stool.  No recent colonoscopy.  Denies previous EGD.  Reports drinking alcohol, about 1-2 shots of vodka every day for the last 10 years.  Denies any previous history of  alcohol withdrawal.  Last alcohol drink 2 days prior to current presentation.  Denies any change in vision, headache, loss of consciousness.  Reports that she is able to move her extremities, just difficult to walk.  Denies symptoms of dysuria.  Denies sick contacts.  On presentation to the ED, vital signs remarkable for tachycardia.  Lab work remarkable for elevated blood glucose 168, mildly decreased sodium level 133, creatinine 1.10 with EGFR 56, WBC 13.8 with left shift, mild thrombocytopenia with platelet count 126.  Hemoglobin noted to be normal 12.0.   CT head reveals moderate acute to subacute inferior right cerebellar infarct with focal region of hemorrhagic transformation 2 x 1.9 cm versus mass with hemorrhage. Chest x-ray with right side anterior third/fourth rib fractures.     During my evaluation, patient noted to be have emesis with streaks of blood.  Patient is hemodynamically stable with reassuring hemoglobin of 12.  She will be admitted under hospitalist service with consultation to gastroenterology as well as neurology for further evaluation.       Hospital Course:     Generalized weakness  Recurrent falls  Moderate acute to subacute inferior right cerebellar hematoma vs infarct vs mass with hemorrhage  -MRI brain results reviewed - rec repeat MRI 3 months  -neuro consult appreciated  -Neurochecks stable  -TSH elevated, normal free T4  -vit B12 ok  -PT/OT eval  -PMR consulted - rec acute rehab     Hematemesis  -resolved  -GI consult appreciated  -monitor hgb - stable  -cont PPI  -no endoscopy rec as hgb now stable     Current alcohol use  Alcohol withdrawal - resolved  -Last alcohol use 2 days prior to presentation  -CIWA scores low. Ativan prn.     Questionable Acute UTI  -no culture sent unfortunately. UA without pyuria  -was put on ceftriaxone on admission, now off     Acute urinary retention - ruled out  Hematuria - likely lucas trauma  -appreciate urology consult  -bladder scans likely false  positive due to ascites as not much came out when catheterized  -CT urogram reviewed  -lucas removed.      Acute kidney injury - resolved  Mild hyponatremia - resolved  Hypochloremia- resolved  Metabolic acidosis - resolved  -Suspecting secondary to decreased oral intake as well as worsened by alcohol use.  -off IVF     Thrombocytopenia  -Suspecting secondary to alcohol use  -stable     NIDDM type II  -Patient stopped taking metformin months ago.   -Last hemoglobin A1c  5.5  -ISS  -resume glipizide on discharge  -monitor BG     H/o hyperlipidemia  -Patient stopped taking statin months ago.     H/o anxiety  -cont lexapro     Consultants         Provider   Role Specialty     Niko Zuluaga MD  Consulting Physician UROLOGY     Kenny Dean MD  Consulting Physician Rehabilitation     Ma, Leonela Montano MD  Consulting Physician GASTROENTEROLOGY     Wallace Guillen MD  Consulting Physician Psychiatry     Lawrence Mena MD  Consulting Physician GASTROENTEROLOGY     Ryley, Pepper Caballero DO  Consulting Physician NEUROLOGY     Marcia Meadows MD  Consulting Physician Internal Medicine             Physical Exam:   Blood pressure 107/60, pulse 108, temperature 98.3 °F (36.8 °C), temperature source Oral, resp. rate 17, height 4' 10\" (1.473 m), weight 153 lb 11.2 oz (69.7 kg), SpO2 95%.  General:  Alert, no distress  HEENT:  Normocephalic, atraumatic  Cardiac:  Regular rate, regular rhythm  Pulmonary:  Clear to auscultation bilaterally, respirations unlabored  Gastrointestinal:  Soft, non-tender, normal bowel sounds  Musculoskeletal:  No joint swelling  Extremities:  No edema, no cyanosis, no clubbing  Neurologic:  nonfocal  Psychiatric:  Normal affect, calm and appropriate         Discharge Medications        START taking these medications        Instructions Prescription details   chlordiazePOXIDE 5 MG Caps  Commonly known as: Librium      Take 1 capsule (5 mg total) by mouth in the morning and 1 capsule (5 mg total) before  bedtime.   Refills: 0     escitalopram 10 MG Tabs  Commonly known as: Lexapro      Take 1 tablet (10 mg total) by mouth nightly.   Refills: 0     folic acid 1 MG Tabs  Commonly known as: Folvite      Take 1 tablet (1 mg total) by mouth daily.   Refills: 0     lamoTRIgine 25 MG Tabs  Commonly known as: LaMICtal      Take 1 tablet (25 mg total) by mouth 2 (two) times daily.   Refills: 0     multivitamin with minerals Tabs      Take 1 tablet by mouth daily.   Refills: 0     traZODone 50 MG Tabs  Commonly known as: Desyrel      Take 0.5 tablets (25 mg total) by mouth nightly.   Refills: 0            CONTINUE taking these medications        Instructions Prescription details   glipiZIDE 5 MG Tabs  Commonly known as: Glucotrol      Take 1 tablet (5 mg total) by mouth every morning before breakfast. With food   Refills: 0            STOP taking these medications      PARoxetine 10 MG Tabs  Commonly known as: Paxil                   Hospital Discharge Diagnoses:  brain hemorrhage    Lace+ Score: 56  59-90 High Risk  29-58 Medium Risk  0-28   Low Risk.    TCM Follow-Up Recommendation:  LACE 29-58: Moderate Risk of readmission after discharge from the hospital.        I spent >30 minutes on this discharge. Discussed treatment and discharge plans.       Alvaro Arvizu MD     vesicular erythematous rash on the left buttock. Patient will be given acyclovir, which she has tolerated in the past. Pharmacy kindly provided instruction of frequency and how to take this antiviral medication. All questions answered. Return indications and follow up discussed. Patient agreeable with the plan and discharge. This patient's ED course included: a personal history and physicial examination and re-evaluation prior to disposition    This patient has remained hemodynamically stable during their ED course. Counseling: The emergency provider has spoken with the patient and  and discussed todays results, in addition to providing specific details for the plan of care and counseling regarding the diagnosis and prognosis. Questions are answered at this time and they are agreeable with the plan.       --------------------------------- IMPRESSION AND DISPOSITION ---------------------------------    IMPRESSION  1. Herpes zoster without complication    2. Essential tremor        DISPOSITION  Disposition: Discharge to home  Patient condition is fair    9/8/18, 5:28 PM.    This note is prepared by Johanne Krause, acting as Scribe for Marta Henry MD.    Marta Henry MD:  The scribe's documentation has been prepared under my direction and personally reviewed by me in its entirety. I confirm that the note above accurately reflects all work, treatment, procedures, and medical decision making performed by me.           Marta Henry MD  09/08/18 3410

## 2024-06-11 PROBLEM — M54.2 NECK PAIN: Status: ACTIVE | Noted: 2024-06-11

## 2024-06-21 ENCOUNTER — HOSPITAL ENCOUNTER (OUTPATIENT)
Dept: CT IMAGING | Age: 79
End: 2024-06-21
Payer: MEDICARE

## 2024-06-21 DIAGNOSIS — M54.2 NECK PAIN: ICD-10-CM

## 2024-06-21 PROCEDURE — 72125 CT NECK SPINE W/O DYE: CPT

## 2024-07-20 ENCOUNTER — APPOINTMENT (OUTPATIENT)
Dept: GENERAL RADIOLOGY | Age: 79
End: 2024-07-20
Payer: MEDICARE

## 2024-07-20 ENCOUNTER — APPOINTMENT (OUTPATIENT)
Dept: CT IMAGING | Age: 79
End: 2024-07-20
Attending: EMERGENCY MEDICINE
Payer: MEDICARE

## 2024-07-20 LAB
ALBUMIN SERPL-MCNC: 4.3 G/DL (ref 3.5–5.2)
ALP SERPL-CCNC: 76 U/L (ref 35–104)
ALT SERPL-CCNC: 16 U/L (ref 0–32)
ANION GAP SERPL CALCULATED.3IONS-SCNC: 13 MMOL/L (ref 7–16)
AST SERPL-CCNC: 16 U/L (ref 0–31)
BACTERIA URNS QL MICRO: ABNORMAL
BASOPHILS # BLD: 0.07 K/UL (ref 0–0.2)
BASOPHILS NFR BLD: 1 % (ref 0–2)
BILIRUB SERPL-MCNC: 1.1 MG/DL (ref 0–1.2)
BILIRUB UR QL STRIP: NEGATIVE
BUN SERPL-MCNC: 14 MG/DL (ref 6–23)
CALCIUM SERPL-MCNC: 9.8 MG/DL (ref 8.6–10.2)
CHLORIDE SERPL-SCNC: 100 MMOL/L (ref 98–107)
CLARITY UR: CLEAR
CO2 SERPL-SCNC: 25 MMOL/L (ref 22–29)
COLOR UR: YELLOW
CREAT SERPL-MCNC: 0.7 MG/DL (ref 0.5–1)
EOSINOPHIL # BLD: 0.07 K/UL (ref 0.05–0.5)
EOSINOPHILS RELATIVE PERCENT: 1 % (ref 0–6)
ERYTHROCYTE [DISTWIDTH] IN BLOOD BY AUTOMATED COUNT: 11.9 % (ref 11.5–15)
GFR, ESTIMATED: 89 ML/MIN/1.73M2
GLUCOSE SERPL-MCNC: 128 MG/DL (ref 74–99)
GLUCOSE UR STRIP-MCNC: NEGATIVE MG/DL
HCT VFR BLD AUTO: 48.3 % (ref 34–48)
HGB BLD-MCNC: 17.3 G/DL (ref 11.5–15.5)
HGB UR QL STRIP.AUTO: NEGATIVE
IMM GRANULOCYTES # BLD AUTO: <0.03 K/UL (ref 0–0.58)
IMM GRANULOCYTES NFR BLD: 0 % (ref 0–5)
KETONES UR STRIP-MCNC: NEGATIVE MG/DL
LACTATE BLDV-SCNC: 1 MMOL/L (ref 0.5–2.2)
LEUKOCYTE ESTERASE UR QL STRIP: ABNORMAL
LYMPHOCYTES NFR BLD: 2.95 K/UL (ref 1.5–4)
LYMPHOCYTES RELATIVE PERCENT: 34 % (ref 20–42)
MCH RBC QN AUTO: 34.1 PG (ref 26–35)
MCHC RBC AUTO-ENTMCNC: 35.8 G/DL (ref 32–34.5)
MCV RBC AUTO: 95.1 FL (ref 80–99.9)
MONOCYTES NFR BLD: 0.65 K/UL (ref 0.1–0.95)
MONOCYTES NFR BLD: 8 % (ref 2–12)
NEUTROPHILS NFR BLD: 57 % (ref 43–80)
NEUTS SEG NFR BLD: 4.93 K/UL (ref 1.8–7.3)
NITRITE UR QL STRIP: NEGATIVE
PH UR STRIP: 7 [PH] (ref 5–9)
PLATELET # BLD AUTO: 232 K/UL (ref 130–450)
PMV BLD AUTO: 10.4 FL (ref 7–12)
POTASSIUM SERPL-SCNC: 4.2 MMOL/L (ref 3.5–5)
PROT SERPL-MCNC: 7.5 G/DL (ref 6.4–8.3)
PROT UR STRIP-MCNC: NEGATIVE MG/DL
RBC # BLD AUTO: 5.08 M/UL (ref 3.5–5.5)
RBC #/AREA URNS HPF: ABNORMAL /HPF
SARS-COV-2 RDRP RESP QL NAA+PROBE: NOT DETECTED
SODIUM SERPL-SCNC: 138 MMOL/L (ref 132–146)
SP GR UR STRIP: 1.01 (ref 1–1.03)
SPECIMEN DESCRIPTION: NORMAL
TROPONIN I SERPL HS-MCNC: 10 NG/L (ref 0–9)
TROPONIN I SERPL HS-MCNC: 7 NG/L (ref 0–9)
UROBILINOGEN UR STRIP-ACNC: 0.2 EU/DL (ref 0–1)
WBC #/AREA URNS HPF: ABNORMAL /HPF
WBC OTHER # BLD: 8.7 K/UL (ref 4.5–11.5)

## 2024-07-20 PROCEDURE — 99285 EMERGENCY DEPT VISIT HI MDM: CPT

## 2024-07-20 PROCEDURE — 2580000003 HC RX 258: Performed by: EMERGENCY MEDICINE

## 2024-07-20 PROCEDURE — 84484 ASSAY OF TROPONIN QUANT: CPT

## 2024-07-20 PROCEDURE — 96374 THER/PROPH/DIAG INJ IV PUSH: CPT

## 2024-07-20 PROCEDURE — 83605 ASSAY OF LACTIC ACID: CPT

## 2024-07-20 PROCEDURE — 71045 X-RAY EXAM CHEST 1 VIEW: CPT

## 2024-07-20 PROCEDURE — 87635 SARS-COV-2 COVID-19 AMP PRB: CPT

## 2024-07-20 PROCEDURE — 81001 URINALYSIS AUTO W/SCOPE: CPT

## 2024-07-20 PROCEDURE — 80053 COMPREHEN METABOLIC PANEL: CPT

## 2024-07-20 PROCEDURE — 6360000002 HC RX W HCPCS: Performed by: EMERGENCY MEDICINE

## 2024-07-20 PROCEDURE — 85025 COMPLETE CBC W/AUTO DIFF WBC: CPT

## 2024-07-20 PROCEDURE — 93005 ELECTROCARDIOGRAM TRACING: CPT

## 2024-07-20 RX ORDER — ONDANSETRON 2 MG/ML
4 INJECTION INTRAMUSCULAR; INTRAVENOUS EVERY 6 HOURS PRN
Status: DISCONTINUED | OUTPATIENT
Start: 2024-07-20 | End: 2024-07-21 | Stop reason: HOSPADM

## 2024-07-20 RX ORDER — SODIUM CHLORIDE 9 MG/ML
INJECTION, SOLUTION INTRAVENOUS CONTINUOUS
Status: DISCONTINUED | OUTPATIENT
Start: 2024-07-20 | End: 2024-07-21 | Stop reason: HOSPADM

## 2024-07-20 RX ADMIN — ONDANSETRON 4 MG: 2 INJECTION INTRAMUSCULAR; INTRAVENOUS at 22:52

## 2024-07-20 RX ADMIN — SODIUM CHLORIDE: 9 INJECTION, SOLUTION INTRAVENOUS at 22:00

## 2024-07-21 ENCOUNTER — APPOINTMENT (OUTPATIENT)
Dept: CT IMAGING | Age: 79
End: 2024-07-21
Payer: MEDICARE

## 2024-07-21 ENCOUNTER — HOSPITAL ENCOUNTER (EMERGENCY)
Age: 79
Discharge: HOME | End: 2024-07-21
Attending: EMERGENCY MEDICINE
Payer: MEDICARE

## 2024-07-21 VITALS
OXYGEN SATURATION: 97 % | DIASTOLIC BLOOD PRESSURE: 69 MMHG | BODY MASS INDEX: 29.53 KG/M2 | RESPIRATION RATE: 16 BRPM | HEART RATE: 65 BPM | WEIGHT: 173 LBS | HEIGHT: 64 IN | SYSTOLIC BLOOD PRESSURE: 148 MMHG | TEMPERATURE: 97.5 F

## 2024-07-21 DIAGNOSIS — R11.0 NAUSEA: Primary | ICD-10-CM

## 2024-07-21 DIAGNOSIS — R10.30 LOWER ABDOMINAL PAIN: ICD-10-CM

## 2024-07-21 DIAGNOSIS — H91.90 HEARING LOSS, UNSPECIFIED HEARING LOSS TYPE, UNSPECIFIED LATERALITY: ICD-10-CM

## 2024-07-21 DIAGNOSIS — R42 DIZZINESS: ICD-10-CM

## 2024-07-21 PROCEDURE — 70496 CT ANGIOGRAPHY HEAD: CPT

## 2024-07-21 PROCEDURE — 6360000004 HC RX CONTRAST MEDICATION: Performed by: RADIOLOGY

## 2024-07-21 PROCEDURE — 74176 CT ABD & PELVIS W/O CONTRAST: CPT

## 2024-07-21 RX ORDER — ONDANSETRON 4 MG/1
4 TABLET, FILM COATED ORAL EVERY 8 HOURS PRN
Qty: 9 TABLET | Refills: 0 | Status: SHIPPED | OUTPATIENT
Start: 2024-07-21 | End: 2024-07-24

## 2024-07-21 RX ADMIN — IOPAMIDOL 75 ML: 755 INJECTION, SOLUTION INTRAVENOUS at 00:16

## 2024-07-21 NOTE — ED NOTES
Patient advised that we need a urine sample and provided with specimen cup. Patient with registration at this time, then will attempt to give us a sample  
Patient is requesting an xray of her abdomen because primary care Dr wanted her to get xray of the abdomen for nausea tomorrow.  
Detail Level: Detailed
General Sunscreen Counseling: I recommended a broad spectrum sunscreen with a SPF of 30 or higher.  I explained that SPF 30 sunscreens block approximately 97 percent of the sun's harmful rays.  Sunscreens should be applied at least 15 minutes prior to expected sun exposure and then every 2 hours after that as long as sun exposure continues. If swimming or exercising sunscreen should be reapplied every 45 minutes to an hour after getting wet or sweating.  One ounce, or the equivalent of a shot glass full of sunscreen, is adequate to protect the skin not covered by a bathing suit. I also recommended a lip balm with a sunscreen as well. Sun protective clothing can be used in lieu of sunscreen but must be worn the entire time you are exposed to the sun's rays.

## 2024-07-21 NOTE — ED PROVIDER NOTES
Given 7/20/24 1660)   iopamidol (ISOVUE-370) 76 % injection 75 mL (75 mLs IntraVENous Given 7/21/24 0016)             Medical Decision Making:      History From:      Gloria Schuler is a 79 y.o. female history of atrial fibrillation anxiety depression arthritis history of pacemaker brain aneurysm history of hypertension history of kidney stones shingles presenting to the ED for nausea as well as decreased hearing, beginning days ago.  The complaint has been persistent, moderate in severity, and worsened by nothing.  Patient reporting ongoing nausea with decreased hearing for the last several days.  Patient does report feeling dizzy as well as feeling off balance.  Patient reporting some mild lower abdominal pain.  Patient reports no headache she was no chest pain or difficulty breathing she reports no productive cough.  Patient reporting no urinary symptoms.  Patient reporting no hemoptysis she reports no black or tarry stools.  Patient reporting no slurred speech or upper or lower extremity weakness.  CC/HPI Summary, DDx, ED Course, Reassessment, Tests Considered, Patient expectation:            Gloria Schuler is a 79 y.o. female history of atrial fibrillation anxiety depression arthritis history of pacemaker brain aneurysm history of hypertension history of kidney stones shingles presenting to the ED for nausea as well as decreased hearing, beginning days ago.  The complaint has been persistent, moderate in severity, and worsened by nothing.  Patient reporting ongoing nausea with decreased hearing for the last several days.  Patient does report feeling dizzy as well as feeling off balance.  Patient reporting some mild lower abdominal pain.  Patient reports no headache she was no chest pain or difficulty breathing she reports no productive cough.  Patient reporting no urinary symptoms.  Patient reporting no hemoptysis she reports no black or tarry stools.  Patient reporting no slurred speech or upper or

## 2024-07-24 LAB
EKG ATRIAL RATE: 63 BPM
EKG P AXIS: 37 DEGREES
EKG P-R INTERVAL: 162 MS
EKG Q-T INTERVAL: 426 MS
EKG QRS DURATION: 124 MS
EKG QTC CALCULATION (BAZETT): 435 MS
EKG R AXIS: 12 DEGREES
EKG T AXIS: 33 DEGREES
EKG VENTRICULAR RATE: 63 BPM

## 2024-08-04 ENCOUNTER — HOSPITAL ENCOUNTER (EMERGENCY)
Age: 79
Discharge: HOME | End: 2024-08-04
Attending: EMERGENCY MEDICINE
Payer: MEDICARE

## 2024-08-04 VITALS
HEART RATE: 68 BPM | OXYGEN SATURATION: 96 % | DIASTOLIC BLOOD PRESSURE: 80 MMHG | TEMPERATURE: 97.8 F | SYSTOLIC BLOOD PRESSURE: 155 MMHG | RESPIRATION RATE: 18 BRPM

## 2024-08-04 DIAGNOSIS — R11.0 NAUSEA: Primary | ICD-10-CM

## 2024-08-04 LAB
ALBUMIN SERPL-MCNC: 3.7 G/DL (ref 3.5–5.2)
ALP SERPL-CCNC: 69 U/L (ref 35–104)
ALT SERPL-CCNC: 18 U/L (ref 0–32)
ANION GAP SERPL CALCULATED.3IONS-SCNC: 9 MMOL/L (ref 7–16)
AST SERPL-CCNC: 23 U/L (ref 0–31)
BASOPHILS # BLD: 0.06 K/UL (ref 0–0.2)
BASOPHILS NFR BLD: 1 % (ref 0–2)
BILIRUB SERPL-MCNC: 1.1 MG/DL (ref 0–1.2)
BILIRUB UR QL STRIP: NEGATIVE
BUN SERPL-MCNC: 14 MG/DL (ref 6–23)
CALCIUM SERPL-MCNC: 9.6 MG/DL (ref 8.6–10.2)
CHLORIDE SERPL-SCNC: 104 MMOL/L (ref 98–107)
CLARITY UR: ABNORMAL
CO2 SERPL-SCNC: 26 MMOL/L (ref 22–29)
COLOR UR: YELLOW
CREAT SERPL-MCNC: 0.5 MG/DL (ref 0.5–1)
EOSINOPHIL # BLD: 0.06 K/UL (ref 0.05–0.5)
EOSINOPHILS RELATIVE PERCENT: 1 % (ref 0–6)
EPI CELLS #/AREA URNS HPF: ABNORMAL /HPF
ERYTHROCYTE [DISTWIDTH] IN BLOOD BY AUTOMATED COUNT: 12.1 % (ref 11.5–15)
GFR, ESTIMATED: >90 ML/MIN/1.73M2
GLUCOSE SERPL-MCNC: 212 MG/DL (ref 74–99)
GLUCOSE UR STRIP-MCNC: NEGATIVE MG/DL
HCT VFR BLD AUTO: 45.9 % (ref 34–48)
HGB BLD-MCNC: 15.8 G/DL (ref 11.5–15.5)
HGB UR QL STRIP.AUTO: NEGATIVE
IMM GRANULOCYTES # BLD AUTO: <0.03 K/UL (ref 0–0.58)
IMM GRANULOCYTES NFR BLD: 0 % (ref 0–5)
KETONES UR STRIP-MCNC: NEGATIVE MG/DL
LEUKOCYTE ESTERASE UR QL STRIP: NEGATIVE
LYMPHOCYTES NFR BLD: 1.32 K/UL (ref 1.5–4)
LYMPHOCYTES RELATIVE PERCENT: 18 % (ref 20–42)
MCH RBC QN AUTO: 33.1 PG (ref 26–35)
MCHC RBC AUTO-ENTMCNC: 34.4 G/DL (ref 32–34.5)
MCV RBC AUTO: 96.2 FL (ref 80–99.9)
MONOCYTES NFR BLD: 0.58 K/UL (ref 0.1–0.95)
MONOCYTES NFR BLD: 8 % (ref 2–12)
NEUTROPHILS NFR BLD: 72 % (ref 43–80)
NEUTS SEG NFR BLD: 5.16 K/UL (ref 1.8–7.3)
NITRITE UR QL STRIP: NEGATIVE
PH UR STRIP: 6.5 [PH] (ref 5–9)
PLATELET # BLD AUTO: 200 K/UL (ref 130–450)
PMV BLD AUTO: 10.6 FL (ref 7–12)
POTASSIUM SERPL-SCNC: 4.4 MMOL/L (ref 3.5–5)
PROT SERPL-MCNC: 6.7 G/DL (ref 6.4–8.3)
PROT UR STRIP-MCNC: NEGATIVE MG/DL
RBC # BLD AUTO: 4.77 M/UL (ref 3.5–5.5)
RBC #/AREA URNS HPF: ABNORMAL /HPF
SODIUM SERPL-SCNC: 139 MMOL/L (ref 132–146)
SP GR UR STRIP: 1.01 (ref 1–1.03)
TROPONIN I SERPL HS-MCNC: 7 NG/L (ref 0–9)
UROBILINOGEN UR STRIP-ACNC: 0.2 EU/DL (ref 0–1)
WBC #/AREA URNS HPF: ABNORMAL /HPF
WBC OTHER # BLD: 7.2 K/UL (ref 4.5–11.5)

## 2024-08-04 PROCEDURE — 81001 URINALYSIS AUTO W/SCOPE: CPT

## 2024-08-04 PROCEDURE — 96374 THER/PROPH/DIAG INJ IV PUSH: CPT

## 2024-08-04 PROCEDURE — 6360000002 HC RX W HCPCS

## 2024-08-04 PROCEDURE — 85025 COMPLETE CBC W/AUTO DIFF WBC: CPT

## 2024-08-04 PROCEDURE — 99284 EMERGENCY DEPT VISIT MOD MDM: CPT

## 2024-08-04 PROCEDURE — 84484 ASSAY OF TROPONIN QUANT: CPT

## 2024-08-04 PROCEDURE — 80053 COMPREHEN METABOLIC PANEL: CPT

## 2024-08-04 PROCEDURE — 93005 ELECTROCARDIOGRAM TRACING: CPT

## 2024-08-04 PROCEDURE — 2580000003 HC RX 258

## 2024-08-04 RX ORDER — ONDANSETRON 2 MG/ML
4 INJECTION INTRAMUSCULAR; INTRAVENOUS ONCE
Status: COMPLETED | OUTPATIENT
Start: 2024-08-04 | End: 2024-08-04

## 2024-08-04 RX ORDER — 0.9 % SODIUM CHLORIDE 0.9 %
1000 INTRAVENOUS SOLUTION INTRAVENOUS ONCE
Status: COMPLETED | OUTPATIENT
Start: 2024-08-04 | End: 2024-08-04

## 2024-08-04 RX ORDER — METOCLOPRAMIDE HYDROCHLORIDE 5 MG/ML
10 INJECTION INTRAMUSCULAR; INTRAVENOUS ONCE
Status: DISCONTINUED | OUTPATIENT
Start: 2024-08-04 | End: 2024-08-04 | Stop reason: HOSPADM

## 2024-08-04 RX ORDER — SODIUM CHLORIDE 0.9 % (FLUSH) 0.9 %
SYRINGE (ML) INJECTION
Status: DISCONTINUED
Start: 2024-08-04 | End: 2024-08-04 | Stop reason: HOSPADM

## 2024-08-04 RX ORDER — METOCLOPRAMIDE 10 MG/1
10 TABLET ORAL 4 TIMES DAILY
Qty: 20 TABLET | Refills: 0 | Status: SHIPPED | OUTPATIENT
Start: 2024-08-04 | End: 2024-08-09

## 2024-08-04 RX ORDER — ONDANSETRON 4 MG/1
4 TABLET, FILM COATED ORAL 3 TIMES DAILY PRN
Qty: 15 TABLET | Refills: 0 | Status: SHIPPED | OUTPATIENT
Start: 2024-08-04 | End: 2024-08-04 | Stop reason: SINTOL

## 2024-08-04 RX ADMIN — SODIUM CHLORIDE 1000 ML: 9 INJECTION, SOLUTION INTRAVENOUS at 11:07

## 2024-08-04 RX ADMIN — ONDANSETRON 4 MG: 2 INJECTION INTRAMUSCULAR; INTRAVENOUS at 11:06

## 2024-08-04 NOTE — ED PROVIDER NOTES
Watchman inserted     CARPAL TUNNEL RELEASE      pinched nerve in left arm, had surgery    CATARACT REMOVAL WITH IMPLANT Right 11/22/2016    COLONOSCOPY W/ POLYPECTOMY      ENDOSCOPY, COLON, DIAGNOSTIC      HEMORRHOID SURGERY      HYSTERECTOMY (CERVIX STATUS UNKNOWN)      INTRACAPSULAR CATARACT EXTRACTION Left 10/12/2021    LEFT CATARACT EXTRACTION WITH IOL performed by Wally TSANG MD at [facility] OR    LITHOTRIPSY      PACEMAKER INSERTION N/A 05/08/2019    PACEMAKER INSERTION ([facility]) performed by Brooks Hahn MD at [facility] OR       CURRENTMEDICATIONS       Previous Medications    ACETAMINOPHEN (TYLENOL) 325 MG CAPS    Take by mouth as needed    ASPIRIN (ECOTRIN LOW STRENGTH) 81 MG EC TABLET    Take 1 tablet by mouth daily    CHOLECALCIFEROL (VITAMIN D3) 1000 UNITS CAPS    Take 1 tablet by mouth daily    CLONIDINE (CATAPRES) 0.1 MG TABLET    Take 1 tablet by mouth every 8 hours as needed PRN for BP > 165/95    DIAZEPAM (VALIUM) 5 MG TABLET        FOLIC ACID (FOLVITE) 1 MG TABLET    Take 1 tablet by mouth daily    HYDROCHLOROTHIAZIDE (HYDRODIURIL) 25 MG TABLET    Take 1 tablet by mouth daily    MAGNESIUM OXIDE (MAGNESIUM-OXIDE) 250 MG TABS TABLET    Take by oral route.    MULTIPLE VITAMIN (MULTI-VITAMIN DAILY PO)    Multi Vitamin    PAROXETINE (PAXIL) 10 MG TABLET        POTASSIUM CHLORIDE (KLOR-CON) 10 MEQ EXTENDED RELEASE TABLET    Take 2 tablets by mouth 3 times daily    PROPRANOLOL (INDERAL) 10 MG TABLET    1 TABLET 4 TIMES DAILY.    ROSUVASTATIN (CRESTOR) 5 MG TABLET    Take 1 tablet by mouth daily    VALACYCLOVIR (VALTREX) 1 G TABLET    TAKE ONE TABLET BY MOUTH EVERY DAY       ALLERGIES     Acyclovir, Methocarbamol, Mirtazapine, Phenytoin, Primidone, Sulfa antibiotics, Corticosteroids, Dilantin [phenytoin sodium extended], Famciclovir, Famotidine, Fentanyl, Mobic [meloxicam], Omeprazole, and Phenobarbital    FAMILYHISTORY       Family History   Problem Relation Age of Onset    Dementia Mother

## 2024-08-04 NOTE — DISCHARGE INSTRUCTIONS
Please call and follow-up with your family physician as soon as possible.  Use Reglan as needed for nausea and vomiting.  Drink plenty of fluids.  Get plenty of rest and eat as tolerated.  Return to ER if symptoms persist or worsen.

## 2024-08-05 LAB
EKG ATRIAL RATE: 60 BPM
EKG P AXIS: 77 DEGREES
EKG P-R INTERVAL: 208 MS
EKG Q-T INTERVAL: 444 MS
EKG QRS DURATION: 126 MS
EKG QTC CALCULATION (BAZETT): 444 MS
EKG R AXIS: -20 DEGREES
EKG T AXIS: 27 DEGREES
EKG VENTRICULAR RATE: 60 BPM

## 2024-08-05 PROCEDURE — 93010 ELECTROCARDIOGRAM REPORT: CPT | Performed by: INTERNAL MEDICINE

## 2024-08-11 ENCOUNTER — HOSPITAL ENCOUNTER (INPATIENT)
Facility: HOSPITAL | Age: 79
DRG: 552 | End: 2024-08-11
Attending: EMERGENCY MEDICINE | Admitting: STUDENT IN AN ORGANIZED HEALTH CARE EDUCATION/TRAINING PROGRAM
Payer: MEDICARE

## 2024-08-11 ENCOUNTER — APPOINTMENT (OUTPATIENT)
Dept: RADIOLOGY | Facility: HOSPITAL | Age: 79
End: 2024-08-11
Payer: MEDICARE

## 2024-08-11 DIAGNOSIS — R53.1 GENERALIZED WEAKNESS: Primary | ICD-10-CM

## 2024-08-11 DIAGNOSIS — M54.12 CERVICAL MYELOPATHY WITH CERVICAL RADICULOPATHY (MULTI): ICD-10-CM

## 2024-08-11 DIAGNOSIS — G95.9 CERVICAL MYELOPATHY WITH CERVICAL RADICULOPATHY (MULTI): ICD-10-CM

## 2024-08-11 DIAGNOSIS — R53.1 WEAKNESS: ICD-10-CM

## 2024-08-11 LAB
ANION GAP SERPL CALC-SCNC: 13 MMOL/L (ref 10–20)
BASOPHILS # BLD AUTO: 0.06 X10*3/UL (ref 0–0.1)
BASOPHILS NFR BLD AUTO: 0.8 %
BNP SERPL-MCNC: 28 PG/ML (ref 0–99)
BUN SERPL-MCNC: 19 MG/DL (ref 6–23)
CALCIUM SERPL-MCNC: 9.8 MG/DL (ref 8.6–10.6)
CARDIAC TROPONIN I PNL SERPL HS: 9 NG/L (ref 0–34)
CHLORIDE SERPL-SCNC: 99 MMOL/L (ref 98–107)
CO2 SERPL-SCNC: 29 MMOL/L (ref 21–32)
CREAT SERPL-MCNC: 0.62 MG/DL (ref 0.5–1.05)
EGFRCR SERPLBLD CKD-EPI 2021: >90 ML/MIN/1.73M*2
EOSINOPHIL # BLD AUTO: 0.1 X10*3/UL (ref 0–0.4)
EOSINOPHIL NFR BLD AUTO: 1.4 %
ERYTHROCYTE [DISTWIDTH] IN BLOOD BY AUTOMATED COUNT: 11.8 % (ref 11.5–14.5)
GLUCOSE SERPL-MCNC: 158 MG/DL (ref 74–99)
HCT VFR BLD AUTO: 43.2 % (ref 36–46)
HGB BLD-MCNC: 15.6 G/DL (ref 12–16)
IMM GRANULOCYTES # BLD AUTO: 0.01 X10*3/UL (ref 0–0.5)
IMM GRANULOCYTES NFR BLD AUTO: 0.1 % (ref 0–0.9)
LYMPHOCYTES # BLD AUTO: 2.53 X10*3/UL (ref 0.8–3)
LYMPHOCYTES NFR BLD AUTO: 34.8 %
MCH RBC QN AUTO: 33.1 PG (ref 26–34)
MCHC RBC AUTO-ENTMCNC: 36.1 G/DL (ref 32–36)
MCV RBC AUTO: 92 FL (ref 80–100)
MONOCYTES # BLD AUTO: 0.58 X10*3/UL (ref 0.05–0.8)
MONOCYTES NFR BLD AUTO: 8 %
NEUTROPHILS # BLD AUTO: 4 X10*3/UL (ref 1.6–5.5)
NEUTROPHILS NFR BLD AUTO: 54.9 %
NRBC BLD-RTO: 0 /100 WBCS (ref 0–0)
PLATELET # BLD AUTO: 216 X10*3/UL (ref 150–450)
POTASSIUM SERPL-SCNC: 3.4 MMOL/L (ref 3.5–5.3)
RBC # BLD AUTO: 4.72 X10*6/UL (ref 4–5.2)
SODIUM SERPL-SCNC: 138 MMOL/L (ref 136–145)
WBC # BLD AUTO: 7.3 X10*3/UL (ref 4.4–11.3)

## 2024-08-11 PROCEDURE — 83880 ASSAY OF NATRIURETIC PEPTIDE: CPT

## 2024-08-11 PROCEDURE — 96374 THER/PROPH/DIAG INJ IV PUSH: CPT

## 2024-08-11 PROCEDURE — 84484 ASSAY OF TROPONIN QUANT: CPT

## 2024-08-11 PROCEDURE — 99285 EMERGENCY DEPT VISIT HI MDM: CPT | Performed by: EMERGENCY MEDICINE

## 2024-08-11 PROCEDURE — 93010 ELECTROCARDIOGRAM REPORT: CPT | Performed by: EMERGENCY MEDICINE

## 2024-08-11 PROCEDURE — 71045 X-RAY EXAM CHEST 1 VIEW: CPT | Mod: FOREIGN READ | Performed by: RADIOLOGY

## 2024-08-11 PROCEDURE — 99285 EMERGENCY DEPT VISIT HI MDM: CPT

## 2024-08-11 PROCEDURE — 2500000004 HC RX 250 GENERAL PHARMACY W/ HCPCS (ALT 636 FOR OP/ED)

## 2024-08-11 PROCEDURE — 85025 COMPLETE CBC W/AUTO DIFF WBC: CPT

## 2024-08-11 PROCEDURE — 80048 BASIC METABOLIC PNL TOTAL CA: CPT

## 2024-08-11 PROCEDURE — 36415 COLL VENOUS BLD VENIPUNCTURE: CPT

## 2024-08-11 PROCEDURE — 71045 X-RAY EXAM CHEST 1 VIEW: CPT

## 2024-08-11 RX ORDER — ONDANSETRON HYDROCHLORIDE 2 MG/ML
4 INJECTION, SOLUTION INTRAVENOUS ONCE
Status: COMPLETED | OUTPATIENT
Start: 2024-08-11 | End: 2024-08-11

## 2024-08-11 ASSESSMENT — COLUMBIA-SUICIDE SEVERITY RATING SCALE - C-SSRS
2. HAVE YOU ACTUALLY HAD ANY THOUGHTS OF KILLING YOURSELF?: NO
6. HAVE YOU EVER DONE ANYTHING, STARTED TO DO ANYTHING, OR PREPARED TO DO ANYTHING TO END YOUR LIFE?: NO
1. IN THE PAST MONTH, HAVE YOU WISHED YOU WERE DEAD OR WISHED YOU COULD GO TO SLEEP AND NOT WAKE UP?: NO

## 2024-08-11 ASSESSMENT — LIFESTYLE VARIABLES
HAVE PEOPLE ANNOYED YOU BY CRITICIZING YOUR DRINKING: NO
EVER HAD A DRINK FIRST THING IN THE MORNING TO STEADY YOUR NERVES TO GET RID OF A HANGOVER: NO
TOTAL SCORE: 0
EVER FELT BAD OR GUILTY ABOUT YOUR DRINKING: NO
HAVE YOU EVER FELT YOU SHOULD CUT DOWN ON YOUR DRINKING: NO

## 2024-08-11 ASSESSMENT — PAIN DESCRIPTION - LOCATION: LOCATION: SHOULDER

## 2024-08-11 ASSESSMENT — PAIN DESCRIPTION - PAIN TYPE: TYPE: ACUTE PAIN

## 2024-08-11 ASSESSMENT — PAIN SCALES - GENERAL: PAINLEVEL_OUTOF10: 3

## 2024-08-11 ASSESSMENT — PAIN DESCRIPTION - DESCRIPTORS: DESCRIPTORS: ACHING

## 2024-08-11 ASSESSMENT — PAIN - FUNCTIONAL ASSESSMENT: PAIN_FUNCTIONAL_ASSESSMENT: 0-10

## 2024-08-11 ASSESSMENT — PAIN DESCRIPTION - ORIENTATION: ORIENTATION: RIGHT

## 2024-08-12 ENCOUNTER — APPOINTMENT (OUTPATIENT)
Dept: RADIOLOGY | Facility: HOSPITAL | Age: 79
End: 2024-08-12
Payer: MEDICARE

## 2024-08-12 ENCOUNTER — APPOINTMENT (OUTPATIENT)
Dept: RADIOLOGY | Facility: HOSPITAL | Age: 79
DRG: 552 | End: 2024-08-12
Payer: MEDICARE

## 2024-08-12 PROBLEM — R53.1 WEAKNESS: Status: ACTIVE | Noted: 2024-08-12

## 2024-08-12 LAB
APTT PPP: 29 SECONDS (ref 27–38)
CARDIAC TROPONIN I PNL SERPL HS: 9 NG/L (ref 0–34)
FOLATE SERPL-MCNC: >24 NG/ML
HCYS SERPL-SCNC: 7.72 UMOL/L (ref 5–13.9)
HOLD SPECIMEN: NORMAL
INR PPP: 1.1 (ref 0.9–1.1)
PROTHROMBIN TIME: 12.5 SECONDS (ref 9.8–12.8)
VIT B12 SERPL-MCNC: 781 PG/ML (ref 211–911)

## 2024-08-12 PROCEDURE — 74177 CT ABD & PELVIS W/CONTRAST: CPT

## 2024-08-12 PROCEDURE — 2550000001 HC RX 255 CONTRASTS: Performed by: EMERGENCY MEDICINE

## 2024-08-12 PROCEDURE — 72128 CT CHEST SPINE W/O DYE: CPT | Performed by: RADIOLOGY

## 2024-08-12 PROCEDURE — 74177 CT ABD & PELVIS W/CONTRAST: CPT | Performed by: RADIOLOGY

## 2024-08-12 PROCEDURE — 83921 ORGANIC ACID SINGLE QUANT: CPT

## 2024-08-12 PROCEDURE — G0378 HOSPITAL OBSERVATION PER HR: HCPCS

## 2024-08-12 PROCEDURE — 70450 CT HEAD/BRAIN W/O DYE: CPT | Mod: RCN

## 2024-08-12 PROCEDURE — 36415 COLL VENOUS BLD VENIPUNCTURE: CPT

## 2024-08-12 PROCEDURE — 82525 ASSAY OF COPPER: CPT

## 2024-08-12 PROCEDURE — 72125 CT NECK SPINE W/O DYE: CPT | Mod: RCN | Performed by: RADIOLOGY

## 2024-08-12 PROCEDURE — 72128 CT CHEST SPINE W/O DYE: CPT | Mod: RCN

## 2024-08-12 PROCEDURE — 85610 PROTHROMBIN TIME: CPT | Performed by: STUDENT IN AN ORGANIZED HEALTH CARE EDUCATION/TRAINING PROGRAM

## 2024-08-12 PROCEDURE — 2500000002 HC RX 250 W HCPCS SELF ADMINISTERED DRUGS (ALT 637 FOR MEDICARE OP, ALT 636 FOR OP/ED): Performed by: PHYSICIAN ASSISTANT

## 2024-08-12 PROCEDURE — S4991 NICOTINE PATCH NONLEGEND: HCPCS | Performed by: PHYSICIAN ASSISTANT

## 2024-08-12 PROCEDURE — 72131 CT LUMBAR SPINE W/O DYE: CPT | Mod: RCN

## 2024-08-12 PROCEDURE — 71275 CT ANGIOGRAPHY CHEST: CPT | Performed by: RADIOLOGY

## 2024-08-12 PROCEDURE — 99223 1ST HOSP IP/OBS HIGH 75: CPT

## 2024-08-12 PROCEDURE — 2500000001 HC RX 250 WO HCPCS SELF ADMINISTERED DRUGS (ALT 637 FOR MEDICARE OP): Performed by: PHYSICIAN ASSISTANT

## 2024-08-12 PROCEDURE — 36415 COLL VENOUS BLD VENIPUNCTURE: CPT | Performed by: STUDENT IN AN ORGANIZED HEALTH CARE EDUCATION/TRAINING PROGRAM

## 2024-08-12 PROCEDURE — 70496 CT ANGIOGRAPHY HEAD: CPT | Performed by: RADIOLOGY

## 2024-08-12 PROCEDURE — 83090 ASSAY OF HOMOCYSTEINE: CPT

## 2024-08-12 PROCEDURE — 84446 ASSAY OF VITAMIN E: CPT

## 2024-08-12 PROCEDURE — 82746 ASSAY OF FOLIC ACID SERUM: CPT

## 2024-08-12 PROCEDURE — 72131 CT LUMBAR SPINE W/O DYE: CPT | Performed by: RADIOLOGY

## 2024-08-12 PROCEDURE — 82607 VITAMIN B-12: CPT

## 2024-08-12 PROCEDURE — 72125 CT NECK SPINE W/O DYE: CPT

## 2024-08-12 PROCEDURE — 70496 CT ANGIOGRAPHY HEAD: CPT

## 2024-08-12 PROCEDURE — 2500000002 HC RX 250 W HCPCS SELF ADMINISTERED DRUGS (ALT 637 FOR MEDICARE OP, ALT 636 FOR OP/ED)

## 2024-08-12 PROCEDURE — 71275 CT ANGIOGRAPHY CHEST: CPT

## 2024-08-12 PROCEDURE — 2500000001 HC RX 250 WO HCPCS SELF ADMINISTERED DRUGS (ALT 637 FOR MEDICARE OP)

## 2024-08-12 RX ORDER — POTASSIUM CHLORIDE 20 MEQ/1
40 TABLET, EXTENDED RELEASE ORAL ONCE
Status: COMPLETED | OUTPATIENT
Start: 2024-08-12 | End: 2024-08-12

## 2024-08-12 RX ORDER — HYDROCHLOROTHIAZIDE 25 MG/1
25 TABLET ORAL DAILY
Status: DISCONTINUED | OUTPATIENT
Start: 2024-08-13 | End: 2024-08-26 | Stop reason: HOSPADM

## 2024-08-12 RX ORDER — POTASSIUM CHLORIDE 20 MEQ/1
20 TABLET, EXTENDED RELEASE ORAL 3 TIMES DAILY
Status: DISCONTINUED | OUTPATIENT
Start: 2024-08-12 | End: 2024-08-26 | Stop reason: HOSPADM

## 2024-08-12 RX ORDER — IBUPROFEN 200 MG
1 TABLET ORAL DAILY
Status: DISCONTINUED | OUTPATIENT
Start: 2024-08-12 | End: 2024-08-26 | Stop reason: HOSPADM

## 2024-08-12 RX ORDER — PROPRANOLOL HYDROCHLORIDE 10 MG/1
10 TABLET ORAL 4 TIMES DAILY
Status: DISCONTINUED | OUTPATIENT
Start: 2024-08-12 | End: 2024-08-12

## 2024-08-12 RX ORDER — VALACYCLOVIR HYDROCHLORIDE 500 MG/1
500 TABLET, FILM COATED ORAL DAILY
Status: DISCONTINUED | OUTPATIENT
Start: 2024-08-12 | End: 2024-08-26 | Stop reason: HOSPADM

## 2024-08-12 RX ORDER — ROSUVASTATIN CALCIUM 5 MG/1
5 TABLET, COATED ORAL NIGHTLY
Status: DISCONTINUED | OUTPATIENT
Start: 2024-08-12 | End: 2024-08-26 | Stop reason: HOSPADM

## 2024-08-12 RX ORDER — PROPRANOLOL HYDROCHLORIDE 10 MG/1
10 TABLET ORAL 4 TIMES DAILY
Status: DISCONTINUED | OUTPATIENT
Start: 2024-08-12 | End: 2024-08-26 | Stop reason: HOSPADM

## 2024-08-12 NOTE — ED TRIAGE NOTES
Pt reports bilateral arm weakness that started over 2 weeks ago. She reports that she loses her balance often and she has a new loss of taste that started over 6 weeks ago. Pt has a pace maker she denies having any recent falls . She is also having 3/10 right sided neck and shoulder pain

## 2024-08-12 NOTE — PROGRESS NOTES
Emergency Medicine Transition of Care Note.    I received Linda López in signout from Dr. Posada.  Please see the previous ED provider note for all HPI, PE and MDM up to the time of signout at 0700. This is in addition to the primary record.    In brief Linda López is an 79 y.o. female presenting for   Chief Complaint   Patient presents with    Weakness, Gen     At the time of signout we were awaiting: CT scans and final recommendations from either neurology or neurosurgery in regards to the findings    ED Course as of 08/14/24 1033   Mon Aug 12, 2024   0241 CT angio chest for pulmonary embolism [MEHREEN]      ED Course User Index  [MEHREEN] Kristian Posada MD         Diagnoses as of 08/14/24 1033   Generalized weakness       Medical Decision Making  Patient is a 79-year-old female with a past medical history of A-fib, MDD, pacemaker placement, brain aneurysm and hypertension who came into the emergency department for multiple complaints including bilateral upper extremity weakness, loss of balance, loss of taste and neck pain.      There was significant delay in performance of CT myelogram, patient needed a radiology performed FL LP, unfortunately patient could not get this while under our care as she takes SSRI (paxil) in AM daily, and despite being asked to not take her morning meds, did take her paxil this morning around 10 AM. We discussed with Radiology and requested that they proceed with the FL LP to allow for CT myelogram to be done today, but unfortunately per protocol, they were only able to waive the 48 hour med hold period down to 24 hours, and would not proceed with LP emergently today given seizure risk concern with LP in patient on SSRI.    At the time of signout we were pending CT myelogram and final recommendations by either neurology or neurosurgery in regards to the findings. Due to delay in myelogram and no final disposition patient admitted to CDU for CT myelogram and final disposition  "after that.     Final diagnoses:   [R53.1] Generalized weakness       Yulissa Norris DO     Send to U    [facility] \"Vinicius\" DO Myrna  [facility], [facility] [place]  Emergency Medicine PGY-2  "

## 2024-08-12 NOTE — ED PROVIDER NOTES
"History of Present Illness   Information Gathering: History is collected from patient and chart review for past medical history    HPI:  Linda López is a 79 y.o. female With history of A-fib, MDD, pacemaker placement, brain aneurysm and hypertension presenting to the emergency department for multiple medical complaints including bilateral upper extremity weakness, loss of balance, loss of taste and neck pain. Daughter states that over the past 2 and half weeks, patient has called her crying complaining of above symptoms. Most troubling symptoms the patient is that she feels too weak to walk the normal amount of distance that she typically is able to. She reports not feeling dizzy but \"off balance\". No fevers chills myalgias at home. Does endorse persistent nausea without vomiting. no chest pain or shortness of breath. No oxygen use at home. No history of falls or recent trauma. Has been seen by cardiology for concerns that symptoms may be related to her heart and states that the heart doctor told her everything was okay.    Physical Exam   Triage vitals:  T 37 °C (98.6 °F)  HR 66  /78  RR 16  O2 97 % None (Room air)    Physical Exam  Constitutional:       Appearance: Normal appearance.   HENT:      Head: Normocephalic and atraumatic.   Eyes:      Extraocular Movements: Extraocular movements intact.      Pupils: Pupils are equal, round, and reactive to light.   Cardiovascular:      Rate and Rhythm: Normal rate and regular rhythm.   Pulmonary:      Effort: Pulmonary effort is normal.      Breath sounds: Normal breath sounds.      Comments: Regular respiratory effort. No wheezes rales or rhonchi  Abdominal:      General: Abdomen is flat.      Palpations: Abdomen is soft.   Musculoskeletal:         General: No swelling or signs of injury. Normal range of motion.   Neurological:      General: No focal deficit present.      Mental Status: She is alert and oriented to person, place, and time.      " Comments: Patient is awake and alert. Speech is clear. Face is symmetric without facial droop and facial sensation to light touch equal bilaterally. Uvula midline. Tongue protrusion midline. Hearing intact bilaterally. Full and equal shoulder shrug and head turn against resistance. 4/5 motor strength of UEs and LEs. Sensation to light touch intact in all four extremities. Romberg's positive.         Medical Decision Making & ED Course   Medical Decision Makin y.o. female With history of A-fib, MDD, pacemaker placement, brain aneurysm and hypertension presenting to the emergency department for multiple medical complaints. On presentation, she is hemodynamically stable and afebrile. No focal neuro deficits but patient does have Romberg's positive. Symptoms persisting for greater than 2 weeks so bad not called. Considered Guillain-Barré but no precipitating viral infections vaccines and symptoms did not start from lower extremities. Patient's upper extremities are primarily affected. BMP shows no evidence of severe electrolyte derangements and CBC without leukocytosis or anemia. For nausea, patient's symptoms treated with 4 mg of Zofran with adequate relief. Cardiac etiologies considered but patient does not have chest pain, shortness of breath and troponins/BNP within normal limits which would be unexpected if patient symptoms truly due to cardiac etiologies. CT imaging of head neck chest abdomen and pelvis obtained showing no acute injury or suggestive reasoning as to why patient is experiencing bilateral upper extremity weakness or leg weakness. Patient could be experiencing possible compressive neuropathy. MRI considered, but due to patient's brain aneurysm clipping in the 1900s that is incompatible with MRI, MRI is not an option. Due to patient's neurologic symptoms with inability to obtain MRI, myelogram was considered and neurology consulted. 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 Following neurology recommendations, vitamin B12 vitamin EE copper folate MMA and homocystine ordered. In addition, CT myelogram is to be obtained. Radiology not able to complete CT myelogram overnight so patient signed out to day team with plan to obtain CT myelogram and to admit to neurosurgery versus neurology depending on results.    ED Course:  ED Course as of 08/12/24 0627   Mon Aug 12, 2024   0241 CT angio chest for pulmonary embolism [MEHREEN]      ED Course User Index  [MEHREEN] Kristian Posada MD       ----  EKG Independent Interpretation: EKG interpreted by myself. Please see ED Course for full interpretation.    Independent Result Review and Interpretation: Relevant laboratory and radiographic results were reviewed and independently interpreted by myself.  As necessary, they are commented on in the ED Course.    Chronic conditions affecting the patient's care: As documented above in MDM    The patient was discussed with the following consultants/services: As described in MDM      Disposition   Patient was signed out pending completion of their work-up.  Please see the next provider's transition of care note for the remainder of the patient's care.     Procedures   Procedures    Patient seen and discussed with ED attending physician.    Adryan Posada MD  Emergency Medicine, PGY-2     Kristian Posada MD  Resident  08/12/24 0632    ----------------------------------------------    This patient was seen by the resident physician. I have seen and examined the patient, agree with the workup, evaluation, management and diagnosis. The care plan has been discussed and I concur.    My assessment reveals the following:    HPI: Patient is a 78 y/o male accompanied by her daughter for 1 month of BLE weakness that has worsened over past 2 weeks and now also involves BUE. 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 Patient and daughter report patient present with imbalance and not able to walk as well as usual. No F/C. Some N/V. No CP/SOB. No urinary incontinence, dysuria, or urinary urgency. No changes in bowel movements. No headache. No paresthesias. No recent viral illnesses. Has been to outside emergency depts with negative work ups, so daughter brought her here for evaluation. Not walking a lot recently, so mostly bed bound.     Limitations to History: Dysarthria    Additional History Obtained from: Daughter at bedside    PE:  Vital signs reviewed in nursing triage note, EMR flowsheets, and at patient's bedside  GEN: Patient is awake, alert, calm, cooperative, and in mild to moderate neurological distress.  HEAD: Normocephalic and atraumatic.  EYES: Anicteric sclera. PERRL.   MOUTH: Mucous membranes moist.  THROAT: Clear. No erythema, exudates, or edema.  CV: Regular rate and rhythm. (+) s1/s2. No murmurs/rubs/gallops.  PULM: CTAB. No wheezes, rales, or crackles.  GI: Soft, non-tender, non-distended without rebound or guarding.  EXT: No deformities noted. Full range of motion at all major joints. Non-tender.  NEURO: Moves all extremities. No gross focal deficits. Sensation grossly intact throughout. Smile symmetrical. 5/5 strength in BUE and BLE. Romberg POSITIVE.   SKIN: Warm, dry. No erythema or ecchymosis.    Labs Reviewed   CBC WITH AUTO DIFFERENTIAL - Abnormal       Result Value    WBC 7.3      nRBC 0.0      RBC 4.72      Hemoglobin 15.6      Hematocrit 43.2      MCV 92      MCH 33.1      MCHC 36.1 (*)     RDW 11.8      Platelets 216      Neutrophils % 54.9      Immature Granulocytes %, Automated 0.1      Lymphocytes % 34.8      Monocytes % 8.0      Eosinophils % 1.4      Basophils % 0.8      Neutrophils Absolute 4.00      Immature Granulocytes Absolute, Automated 0.01      Lymphocytes Absolute 2.53      Monocytes Absolute  0.58      Eosinophils Absolute 0.10      Basophils Absolute 0.06     BASIC METABOLIC PANEL - Abnormal    Glucose 158 (*)     Sodium 138      Potassium 3.4 (*)     Chloride 99      Bicarbonate 29      Anion Gap 13      Urea Nitrogen 19      Creatinine 0.62      eGFR >90      Calcium 9.8     B-TYPE NATRIURETIC PEPTIDE - Normal    BNP 28      Narrative:        <100 pg/mL - Heart failure unlikely  100-299 pg/mL - Intermediate probability of acute heart                  failure exacerbation. Correlate with clinical                  context and patient history.    >=300 pg/mL - Heart Failure likely. Correlate with clinical                  context and patient history.     Biotin interference may cause falsely decreased results. Patients taking a Biotin dose of up to 5 mg/day should refrain from taking Biotin for 24 hours before sample  collection. Providers may contact their local laboratory for further information.   SERIAL TROPONIN-INITIAL - Normal    Troponin I, High Sensitivity ([facility]) 9      Narrative:     Less than 99th percentile of normal range cutoff-  Female and children under 18 years old <35 ng/L; Male <54 ng/L: Negative  Repeat testing should be performed if clinically indicated.     Female and children under 18 years old  ng/L; Male  ng/L:  Consistent with possible cardiac damage and possible increased clinical   risk. Serial measurements may help to assess extent of myocardial damage.     >120 ng/L: Consistent with cardiac damage, increased clinical risk and  myocardial infarction. Serial measurements may help assess extent of   myocardial damage.      NOTE: Children less than 1 year old may have higher baseline troponin   levels and results should be interpreted in conjunction with the overall   clinical context.    NOTE: Troponin I testing is performed using a different   testing methodology at [facility] than at other   [facility]. Direct result comparisons should only   be  made within the same method.     SERIAL TROPONIN, 1 HOUR - Normal    Troponin I, High Sensitivity (CMC) 9      Narrative:     Less than 99th percentile of normal range cutoff-  Female and children under 18 years old <35 ng/L; Male <54 ng/L: Negative  Repeat testing should be performed if clinically indicated.     Female and children under 18 years old  ng/L; Male  ng/L:  Consistent with possible cardiac damage and possible increased clinical   risk. Serial measurements may help to assess extent of myocardial damage.     >120 ng/L: Consistent with cardiac damage, increased clinical risk and  myocardial infarction. Serial measurements may help assess extent of   myocardial damage.      NOTE: Children less than 1 year old may have higher baseline troponin   levels and results should be interpreted in conjunction with the overall   clinical context.    NOTE: Troponin I testing is performed using a different   testing methodology at [facility] than at other   [facility]. Direct result comparisons should only   be made within the same method.     TROPONIN SERIES- (INITIAL, 1 HR)    Narrative:     The following orders were created for panel order Troponin I Series, High Sensitivity (0, 1 HR).  Procedure                               Abnormality         Status                     ---------                               -----------         ------                     Troponin I, High Sensiti...[[id number]]  Normal              Final result               Troponin, High Sensitivi...[[id number]]  Normal              Final result                 Please view results for these tests on the individual orders.   URINALYSIS WITH REFLEX CULTURE AND MICROSCOPIC    Narrative:     The following orders were created for panel order Urinalysis with Reflex Culture and Microscopic.  Procedure                               Abnormality         Status                     ---------                                -----------         ------                     Urinalysis with Reflex C...[[id number]]                                                 Extra Urine Gray Tube[[id number]]                                                         Please view results for these tests on the individual orders.   URINALYSIS WITH REFLEX CULTURE AND MICROSCOPIC   EXTRA URINE GRAY TUBE   VITAMIN B12   FOLATE   VITAMIN E   METHYLMALONIC ACID   HOMOCYSTEINE   COPPER, BLOOD     CT head wo IV contrast   Final Result   CT HEAD:   1. Postsurgical changes of right temporal craniotomy and aneurysmal   clipping right partially degrades imaging. Otherwise no acute   intracranial hemorrhage or mass effect identified.   2. Findings consistent with chronic small-vessel ischemic disease and   right temporal lobe encephalomalacia.             CT CERVICAL SPINE:   1. No acute fracture or traumatic malalignment of the cervical spine.   2. Multilevel spondylotic changes of the cervical spine as detailed   above, with multilevel neural foraminal narrowing.             I personally reviewed the image(s)/study and resident interpretation.   I agree with the findings as stated by resident Fredi Anderson.   Data analyzed and images interpreted at [facility], [place], [place].        MACRO:   none        Signed by: Beth Holguin 8/12/2024 2:31 AM   Dictation workstation:   [id number]      CT angio chest for pulmonary embolism   Final Result   1. No evidence of acute pulmonary embolism to the segmental level.   2. Small amount of mucus layering within the main bronchi and mucous   plugging of the bronchials. Ground-glass opacities in the bilateral   lung bases. Findings are consistent with aspiration pneumonitis and   superimposed atelectasis.   3. Patulous esophagus with debris and septations noted to the upper   level which could be related to esophagitis. Clinical correlation   suggested. Findings also increased aspiration risk.    4. Ectatic main pulmonary artery measuring 3.3 cm.   5. Cardiomegaly with enlargement of the left atrium.        I personally reviewed the image(s)/study and resident interpretation.   I agree with the findings as stated by resident Fredi Anderson.   Data analyzed and images interpreted at [place], [place].        MACRO:   None        Signed by: Beth Holguin 8/12/2024 2:37 AM   Dictation workstation:   [id number]      CT angio head w and wo IV contrast   Final Result   Postsurgical changes of right posterior circulation aneurysmal   clipping with associated streak artifact partially limiting   evaluation. Otherwise no evidence for significant stenosis or large   branch vessel cutoffs of the intracranial vessels.        Opacities noted on axial imaging without corresponding abnormality on   additional findings. Findings are favored to represent artifact   versus true aneurysm however attention on follow-up recommended.        I personally reviewed the image(s)/study and resident interpretation.   I agree with the findings as stated by resident Fredi Anderson.   Data analyzed and images interpreted at [place], [place].             MACRO:   None        Signed by: Beth Holguin 8/12/2024 3:47 AM   Dictation workstation:   [id number]      CT abdomen pelvis w IV contrast   Final Result   1.  No acute abnormality within the abdomen or pelvis.   2. No acute fracture of the thoracic or lumbar spine. Degenerative   changes of the spine without critical canal stenosis. Foraminal   narrowing in the lumbar spine as detailed.   3. Probable chronic calcifications within the posterior fecal sac at   the level of the midthoracic spine as detailed. No associated canal   stenosis identified..        I personally reviewed the image(s)/study and resident interpretation.   I agree with the findings as stated by resident Rai  Monica.   Data analyzed and images interpreted at [place], [place].        MACRO:   None        Signed by: Beth Holguin 8/12/2024 2:55 AM   Dictation workstation:   [id number]      CT cervical spine wo IV contrast   Final Result   CT HEAD:   1. Postsurgical changes of right temporal craniotomy and aneurysmal   clipping right partially degrades imaging. Otherwise no acute   intracranial hemorrhage or mass effect identified.   2. Findings consistent with chronic small-vessel ischemic disease and   right temporal lobe encephalomalacia.             CT CERVICAL SPINE:   1. No acute fracture or traumatic malalignment of the cervical spine.   2. Multilevel spondylotic changes of the cervical spine as detailed   above, with multilevel neural foraminal narrowing.             I personally reviewed the image(s)/study and resident interpretation.   I agree with the findings as stated by resident Fredi Anderson.   Data analyzed and images interpreted at [place], [place].        MACRO:   none        Signed by: Beth Holguin 8/12/2024 2:31 AM   Dictation workstation:   [id number]      CT thoracic spine wo IV contrast   Final Result   1.  No acute abnormality within the abdomen or pelvis.   2. No acute fracture of the thoracic or lumbar spine. Degenerative   changes of the spine without critical canal stenosis. Foraminal   narrowing in the lumbar spine as detailed.   3. Probable chronic calcifications within the posterior fecal sac at   the level of the midthoracic spine as detailed. No associated canal   stenosis identified..        I personally reviewed the image(s)/study and resident interpretation.   I agree with the findings as stated by resident Fredi Anderson.   Data analyzed and images interpreted at [place], [place].        MACRO:   None        Signed by: Beth Holguin  8/12/2024 2:55 AM   Dictation workstation:   [id number]      CT lumbar spine wo IV contrast   Final Result   1.  No acute abnormality within the abdomen or pelvis.   2. No acute fracture of the thoracic or lumbar spine. Degenerative   changes of the spine without critical canal stenosis. Foraminal   narrowing in the lumbar spine as detailed.   3. Probable chronic calcifications within the posterior fecal sac at   the level of the midthoracic spine as detailed. No associated canal   stenosis identified..        I personally reviewed the image(s)/study and resident interpretation.   I agree with the findings as stated by resident Fredi Anderson.   Data analyzed and images interpreted at [facility], [place], [place].        MACRO:   None        Signed by: Beth Holguin 8/12/2024 2:55 AM   Dictation workstation:   [id number]      XR chest 1 view   Final Result   No radiographic evidence of acute cardiopulmonary disease.   Signed by Cynthia Sam MD      CT cervical spine post myelogram    (Results Pending)   CT thoracic spine post myelogram    (Results Pending)   CT lumbar spine post myelogram    (Results Pending)     Medical Decision Making:  - Monitor  - IV  - Labs  - CT head without contrast  - CTA head without contrast  - CT chest PE study  - CT abd/pelvis with IV contrast  - CT C/T/L spine without contrast  - Neurology consult  - CT myelogram  - Admit, neuro vs NSGY.     EKG: EKG independently reviewed by the attending ED physician, and I and concur with the resident's/advanced practice provider's interpretation. Sinus rhythm at 65 bpm, normal axis, , , [id number], RBBB, no ST or T wave changes. Changed from old EKG 2/3/16 showing sinus iris without RBBB.     Differential Diagnoses Considered:  ICH, Brain mass, Spinal cord pathology    Chronic Medical Conditions Significantly Affecting Care: See HPI    External Records Reviewed: I reviewed recent and relevant  outside records including: Provider notes and tests from recent ED visits at outside hospitals.     Escalation of Care:  Appropriate for inpatient management    Discussion of Management with Other Providers:   I discussed the patient/results with: Neuro       MD José Miguel Grimm MD  08/12/24 0673

## 2024-08-12 NOTE — CONSULTS
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 calcifications present in the expected region of the posterior thecal sac dorsal to the T7, T8 and T9 vertebral bodies, likely chronic possibly related to remote infection, inflammation or hemorrhage. No definite mass effect identified.     CT LUMBAR SPINE:   PARASPINAL SOFT TISSUES: No paravertebral fluid collection or significant edema. ALIGNMENT: Dextrocurvature of the lower thoracic and lumbar spine. No traumatic spondylolisthesis or traumatic facet widening. VERTEBRAE: Bones appear mildly demineralized. No acute fracture. Vertebral body heights are maintained. SPINAL CANAL/INTERVERTEBRAL DISCS: No high-grade spinal canal stenosis. Moderate multilevel disc height loss, most significant at T12-L1. Moderate bilateral facet joint arthropathy from L2-S1. NEURAL FORAMINA: Disc bulge at L2-L3, L3-L4 and L5-S1 with facet hypertrophy with up to moderate foraminal narrowing. Disc bulge and facet hypertrophy at L4 L spine with up to severe foraminal stenosis.       1.  No acute abnormality within the abdomen or pelvis. 2. No acute fracture of the thoracic or lumbar spine. Degenerative changes of the spine without critical canal stenosis. Foraminal narrowing in the lumbar spine as  detailed. 3. Probable chronic calcifications within the posterior fecal sac at the level of the midthoracic spine as detailed. No associated canal stenosis identified..   I personally reviewed the image(s)/study and resident interpretation. I agree with the findings as stated by resident Fredi Anderson. Data analyzed and images interpreted at [facility], [place], [place].   MACRO: None   Signed by: Beth Holguin 8/12/2024 2:55 AM Dictation workstation:   [id number]    CT angio chest for pulmonary embolism    Result Date: 8/12/2024  Interpreted By:  Beth Holguin,  and Monica Rai STUDY: CT ANGIO CHEST FOR PULMONARY EMBOLISM;  8/12/2024 2:36 am   INDICATION: Signs/Symptoms:Weakness, fatigue shortness of breath.   COMPARISON: None   ACCESSION NUMBER(S): [id number]   ORDERING CLINICIAN: ROSSANA CRANE   TECHNIQUE: Helical data acquisition of the chest was obtained after intravenous administration of 150 cc of Omnipaque 350, as per PE protocol. Images were reformatted in coronal and sagittal planes. Axial and coronal maximum intensity projection (MIP) images were created and reviewed.   FINDINGS: POTENTIAL LIMITATIONS OF THE STUDY: None   HEART AND VESSELS: There are no discrete filling defects within main pulmonary artery and its branches to suggest acute pulmonary embolism. Main pulmonary artery and its branches are normal in caliber.   The thoracic aorta normal in course and caliber.There is moderate atherosclerosis present, including calcified and noncalcified plaques.Although, the study is not tailored for evaluation of aorta, there is no definite evidence of acute aortic pathology. Ectasia of the main pulmonary artery measuring 3.3 cm Coronary artery calcifications noted however exam is not optimized for evaluation.   Mild cardiomegaly with enlargement of the left atrium.There are no findings to suggest right heart strain. Left atrial appendage closure device in place.  Left chest pacemaker/ICD with leads terminating in the right atrium and right ventricle. There is no pericardial effusion seen.   MEDIASTINUM AND TOÑO, LOWER NECK AND AXILLA: The visualized thyroid gland is within normal limits. No evidence of thoracic lymphadenopathy by CT criteria. Patulous appearance of the esophagus with heterogenous material in the upper esophagus.   LUNGS AND AIRWAYS: There is mucous layering in the distal trachea and right and left main bronchi. Subtle mucous plugging in lower lobe bronchials. Ground-glass consolidation of the dependent portions of the lower lungs. Moderate centrilobular and apically predominant emphysematous changes.  There is mild diffuse bronchial wall thickening. Calcified granuloma noted dot No pleural effusion or pneumothorax.     UPPER ABDOMEN:   Please see dedicated CT abdomen pelvis for further evaluation.   CHEST WALL AND OSSEOUS STRUCTURES: Chest wall is within normal limits. Multilevel degenerative changes with endplate osteophytes in the visualized spine. No acute osseous pathology.There are no suspicious osseous lesions.       1. No evidence of acute pulmonary embolism to the segmental level. 2. Small amount of mucus layering within the main bronchi and mucous plugging of the bronchials. Ground-glass opacities in the bilateral lung bases. Findings are consistent with aspiration pneumonitis and superimposed atelectasis. 3. Patulous esophagus with debris and septations noted to the upper level which could be related to esophagitis. Clinical correlation suggested. Findings also increased aspiration risk. 4. Ectatic main pulmonary artery measuring 3.3 cm. 5. Cardiomegaly with enlargement of the left atrium.   I personally reviewed the image(s)/study and resident interpretation. I agree with the findings as stated by resident Fredi Anderson. Data analyzed and images interpreted at [facility], [place], [place].   MACRO: None   Signed  by: Beth Holguin 8/12/2024 2:37 AM Dictation workstation:   [id number]    CT head wo IV contrast    Result Date: 8/12/2024  Interpreted By:  Beth Holguin and Beyersdorf Conner STUDY: CT HEAD WO IV CONTRAST; CT CERVICAL SPINE WO IV CONTRAST;  8/12/2024 1:22 am   INDICATION: Signs/Symptoms:Hx of brain aneurysm. Generalized fatigue and weakness; Signs/Symptoms:BUE and BLE weakness   COMPARISON: CT head 03/28/2017   ACCESSION NUMBER(S): [id number]; [id number]   ORDERING CLINICIAN: ROSSANA FONTANEZ   TECHNIQUE: Axial noncontrast CT images of head with coronal and sagittal reconstructed images. Axial noncontrast CT images of the cervical spine with coronal and sagittal reconstructed images.   FINDINGS: CT HEAD:   Postsurgical changes of right temporal craniotomy and aneurysmal clipping near the suprasellar and right parasellar region. Associated metallic streak artifact limits evaluation of the region. Findings are similar prior imaging   BRAIN PARENCHYMA: Confluent region of hypoattenuating areas of periventricular and subcortical white matter, which is nonspecific, but favored to represent chronic small-vessel ischemic disease in a patient of this age. Mild encephalomalacia within the bilateral right temporal lobe. No evidence of acute intraparenchymal hemorrhage or parenchymal evidence of acute large territory ischemic infarct. No mass-effect, midline shift or effacement of cerebral sulci. Gray-white matter distinction is preserved.   VENTRICLES and EXTRA-AXIAL SPACES: No acute extra-axial or intraventricular hemorrhage. Ventricles and sulci are age-concordant.   PARANASAL SINUSES/MASTOIDS: No hemorrhage or air-fluid levels within the visualized paranasal sinuses. The mastoid air cells are well-aerated.   CALVARIUM/ORBITS: No skull fracture. Bilateral lens replacements. The orbits and globes are intact to the extent visualized.   EXTRACRANIAL SOFT TISSUES: No discernible abnormality. Postsurgical  changes of the lungs is   CT CERVICAL SPINE:   PREVERTEBRAL SOFT TISSUES: Within normal limits.   CRANIOCERVICAL JUNCTION: Intact.   ALIGNMENT: Loss of the normal cervical lordosis. There is grade 1 retrolisthesis of C3-C4 measuring 3.3 mm, and grade 1 retrolisthesis of C5-6 measuring 2.7 mm. Facet joint alignment is maintained. No traumatic malalignment or traumatic facet widening.   VERTEBRAE: No acute fracture. Vertebral body heights are maintained.   SPINAL CANAL/INTERVERTEBRAL DISCS: No high-grade spinal canal stenosis. Multilevel variable disc space height loss, most severe at C6-7. Prominent osteophytosis at this level along with subchondral sclerosis and cystic changes..   NEURAL FORAMINA: Mild uncovertebral hypertrophy at C4-5 with a mild bilateral neural foraminal narrowing. Moderate neural foraminal narrowing and right facet hypertrophy at C5-6 with moderate right neural foraminal narrowing. Severe uncovertebral hypertrophy at C6-7 with moderate bilateral neural foraminal narrowing.   OTHER: Dedicated chest imaging has been performed and will be reported separately..       CT HEAD: 1. Postsurgical changes of right temporal craniotomy and aneurysmal clipping right partially degrades imaging. Otherwise no acute intracranial hemorrhage or mass effect identified. 2. Findings consistent with chronic small-vessel ischemic disease and right temporal lobe encephalomalacia.     CT CERVICAL SPINE: 1. No acute fracture or traumatic malalignment of the cervical spine. 2. Multilevel spondylotic changes of the cervical spine as detailed above, with multilevel neural foraminal narrowing.     I personally reviewed the image(s)/study and resident interpretation. I agree with the findings as stated by resident Fredi Anderson. Data analyzed and images interpreted at [facility], [place], [place].   MACRO: none   Signed by: Beth Holguin 8/12/2024 2:31 AM Dictation workstation:    [id number]    CT cervical spine wo IV contrast    Result Date: 8/12/2024  Interpreted By:  Beth Holguin and Beyersdorf Conner STUDY: CT HEAD WO IV CONTRAST; CT CERVICAL SPINE WO IV CONTRAST;  8/12/2024 1:22 am   INDICATION: Signs/Symptoms:Hx of brain aneurysm. Generalized fatigue and weakness; Signs/Symptoms:BUE and BLE weakness   COMPARISON: CT head 03/28/2017   ACCESSION NUMBER(S): [id number]; [id number]   ORDERING CLINICIAN: ROSSANA FONTANEZ   TECHNIQUE: Axial noncontrast CT images of head with coronal and sagittal reconstructed images. Axial noncontrast CT images of the cervical spine with coronal and sagittal reconstructed images.   FINDINGS: CT HEAD:   Postsurgical changes of right temporal craniotomy and aneurysmal clipping near the suprasellar and right parasellar region. Associated metallic streak artifact limits evaluation of the region. Findings are similar prior imaging   BRAIN PARENCHYMA: Confluent region of hypoattenuating areas of periventricular and subcortical white matter, which is nonspecific, but favored to represent chronic small-vessel ischemic disease in a patient of this age. Mild encephalomalacia within the bilateral right temporal lobe. No evidence of acute intraparenchymal hemorrhage or parenchymal evidence of acute large territory ischemic infarct. No mass-effect, midline shift or effacement of cerebral sulci. Gray-white matter distinction is preserved.   VENTRICLES and EXTRA-AXIAL SPACES: No acute extra-axial or intraventricular hemorrhage. Ventricles and sulci are age-concordant.   PARANASAL SINUSES/MASTOIDS: No hemorrhage or air-fluid levels within the visualized paranasal sinuses. The mastoid air cells are well-aerated.   CALVARIUM/ORBITS: No skull fracture. Bilateral lens replacements. The orbits and globes are intact to the extent visualized.   EXTRACRANIAL SOFT TISSUES: No discernible abnormality. Postsurgical changes of the lungs is   CT CERVICAL SPINE:    PREVERTEBRAL SOFT TISSUES: Within normal limits.   CRANIOCERVICAL JUNCTION: Intact.   ALIGNMENT: Loss of the normal cervical lordosis. There is grade 1 retrolisthesis of C3-C4 measuring 3.3 mm, and grade 1 retrolisthesis of C5-6 measuring 2.7 mm. Facet joint alignment is maintained. No traumatic malalignment or traumatic facet widening.   VERTEBRAE: No acute fracture. Vertebral body heights are maintained.   SPINAL CANAL/INTERVERTEBRAL DISCS: No high-grade spinal canal stenosis. Multilevel variable disc space height loss, most severe at C6-7. Prominent osteophytosis at this level along with subchondral sclerosis and cystic changes..   NEURAL FORAMINA: Mild uncovertebral hypertrophy at C4-5 with a mild bilateral neural foraminal narrowing. Moderate neural foraminal narrowing and right facet hypertrophy at C5-6 with moderate right neural foraminal narrowing. Severe uncovertebral hypertrophy at C6-7 with moderate bilateral neural foraminal narrowing.   OTHER: Dedicated chest imaging has been performed and will be reported separately..       CT HEAD: 1. Postsurgical changes of right temporal craniotomy and aneurysmal clipping right partially degrades imaging. Otherwise no acute intracranial hemorrhage or mass effect identified. 2. Findings consistent with chronic small-vessel ischemic disease and right temporal lobe encephalomalacia.     CT CERVICAL SPINE: 1. No acute fracture or traumatic malalignment of the cervical spine. 2. Multilevel spondylotic changes of the cervical spine as detailed above, with multilevel neural foraminal narrowing.     I personally reviewed the image(s)/study and resident interpretation. I agree with the findings as stated by resident Fredi Anderson. Data analyzed and images interpreted at [facility], [place], [place].   MACRO: none   Signed by: Beth Holguin 8/12/2024 2:31 AM Dictation workstation:   [id number]    XR chest 1 view    Result Date:  8/12/2024  STUDY: Chest Radiograph;  8/11/2024 10:08 PM INDICATION: Fatigue. COMPARISON: None Available ACCESSION NUMBER(S): [id number] ORDERING CLINICIAN: ROSSANA CRANE TECHNIQUE:  Frontal chest was obtained at 22:08 hours. FINDINGS: Pacemaker on the left. CARDIOMEDIASTINAL SILHOUETTE: Cardiomediastinal silhouette is normal in size and configuration.  LUNGS: Lungs are clear.  ABDOMEN: No remarkable upper abdominal findings.  BONES: No acute osseous changes.    No radiographic evidence of acute cardiopulmonary disease. Signed by Cynthia Sam MD                 [place] Coma Scale  Best Eye Response: Spontaneous  Best Verbal Response: Oriented  Best Motor Response: Follows commands  [place] Coma Scale Score: 15                      Assessment/Plan   Active Problems:  There are no active Hospital Problems.    Linda López is a 79 y.o. right-handed female with a history of A-fib, Anxiety and depression, Arthritis, Artificial cardiac pacemaker, Brain aneurysm s/p clipping (told by ED not MRI compatible), Essential tremor, HTN (hypertension), Kidney stones, Lumbar radiculopathy presenting with subacute weakness, ataxia. Neurology is consulted for difficulty walking, upper extremity weakness. Her exam shows multiple signs of cervical cord disease.  Initial CT spine imaging showed severe foraminal narrowing at around C7 and less severe narrowing throughout rest of C-spine. CTH which shows R temporal cranioplasty, R posterior circulation clip with bloom artifact, and moderate posterior-predominant subcortical microvascular disease. CTA H shows the same plus moderate R>L cavernous+clinoid+supraclinoid ICA calcification. Overall findings concerning that her weakness is driven by cervical cord compression but she cannot have MRI to evaluate because her clip from her brain aneurysm is not thought to be MRI compatible. It is reasonable to obtain CT myelogram instead. Also would be reasonable to send for vitamins/metals  levels that could impact spinal cord.    Plan:  - please obtain cervical and thoracic myelograms  - vitamin E, B12, copper, folate, MMA, homocysteine levels.         Fausto Meyers MD PhD  PGY-2 Neurology    ======  Neurology Senior Resident Attestation and Addendum:  I personally saw and examined this patient, obtaining key portions of the history. I have read and edited the note above and agree with the assessment and plan. Briefly, this is a 78yo RHF PMHx HTN, SSS (s/p pacer 2019), pAF (s/p watchman 2020), cerebral aneurysm (s/p clipping 1993), ET presenting for subacute progressive ascending weakness and instability. Patient states walking has become unsteady over about 6 weeks. Progressively worsening, but seems to have more-sharply declined in past 2 weeks. Now starting to involve BUE. Now starting to have paresthesias in the RUE and pain in the R shoulder. Occasional electrical shocks of pain radiating down BUE and chest. Denies dizziness, vertigo, diplopia, bulbar weakness, bowel/bladder manifestations. On exam patient is A&Ox4; VF full; PERRL; eyes aligned in primary position w/o fixation instability; EOM full-range with smooth pursuits and no gaze-evoked nystagmus; intact vergence; saccades accurate and rapid; face symmetric; stutter and vocal tremor present; mild dysarthria; tongue midline. On uppers, there is some L thenar atrophy, weakness of ECRL/ECU, EDC, ADM, FDI. On  lowers, there is diminished LLE vibratory sense at the toe, ankle, and knee compared to left. Questionable C5 sensory level. Normal tone. Reflexes 3+ with spread throughout, London's & finger flexor reflex present bilaterally, 4bts ankle clonus bilaterally. Toes mute. Trace jaw jerk. No glabellar or palmomental reflexes. Symmetric high-frequency intention tremor. FNF & HtS intact; movements rapid. Gait narrow-based and unsteady with truncal instability. Personally read CTH which shows R temporal cranioplasty, R posterior circulation clip  with bloom artifact, and moderate posterior-predominant subcortical microvascular disease. CTA H shows the same plus moderate R>L cavernous+clinoid+supraclinoid ICA calcification. 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 Would also evaluate for metabolic etiologies. Gold standard of imaging would be MRI, but her aneurysm clip is non-compatible. Recommend serum metabolic myelopathy labs (Isabel, B12, copper, folate, MMA, homocysteine) and CT myelogram of C-spine. Further recommendations pending based on the above results.    Diego Kitchen MD (PGY-4)  [facility] Inpatient Neurology Team  General Neurology Pager [phone]

## 2024-08-12 NOTE — H&P
History and Physical  [facility] CLINICAL DECISION  Patient: Linda López  MRN: [id number]  : 1945  Date of Evaluation: 2024  ED Provider: Laith Brody PA-C      Limitations to history: None  Independent Historian: Yes  External Records Reviewed: Recent and relevant inpatient and outpatient notes in EMR      Patient History:  Linda López is a 79 y.o. female with a past medical history of A-fib, MDD, anxiety, pacemaker, brain aneurysm status post clipping, Essential tremor, HTN, Kidney stones, Lumbar radiculopathy who is admitted to the Clinical Decision Unit for upper/lower extremity weakness.  Symptom has been ongoing for approximately 2 weeks now.  She reports difficulty walking due to lower extremity weakness.  She also states that she feels off balance but not dizzy and has not sustained any falls.  She complained of pain to her cervical spine.  She also endorsed bilateral upper and lower extremity weakness.  She denies headache visual change dysphagia chest pain shortness of breath cough fever chills abdominal pain or urinary symptoms.  She did report nausea without vomiting.  No bowel or bladder incontinence.    The acute evaluation included:  Orders Placed This Encounter   Procedures    XR chest 1 view    CT head wo IV contrast    CT angio chest for pulmonary embolism    CT angio head w and wo IV contrast    CT abdomen pelvis w IV contrast    CT cervical spine wo IV contrast    CT thoracic spine wo IV contrast    CT lumbar spine wo IV contrast    CT cervical spine post myelogram    CT thoracic spine post myelogram    CT lumbar spine post myelogram    FL multiple regions myelogram with lumbar puncture    Urinalysis with Reflex Culture and Microscopic    CBC and Auto Differential    Basic metabolic panel    Troponin I Series, High Sensitivity (0, 1 HR)    B-Type Natriuretic Peptide    Urinalysis with Reflex Culture and Microscopic    Extra Urine Gray Tube    Troponin  I, High Sensitivity, Initial    Troponin, High Sensitivity, 1 Hour    Extra Tubes    Light Blue Top    Vitamin B12    Folate    Vitamin E    Methylmalonic acid, serum    Homocysteine    Copper, serum    Coagulation Screen    Adult diet Regular    Inpatient consult to Neurology    Send to CDU    Initiate observation status       I reviewed the below labs and imaging as ordered by the ED provider:  CT head wo IV contrast   Final Result   CT HEAD:   1. Postsurgical changes of right temporal craniotomy and aneurysmal   clipping right partially degrades imaging. Otherwise no acute   intracranial hemorrhage or mass effect identified.   2. Findings consistent with chronic small-vessel ischemic disease and   right temporal lobe encephalomalacia.             CT CERVICAL SPINE:   1. No acute fracture or traumatic malalignment of the cervical spine.   2. Multilevel spondylotic changes of the cervical spine as detailed   above, with multilevel neural foraminal narrowing.             I personally reviewed the image(s)/study and resident interpretation.   I agree with the findings as stated by resident Fredi Anderson.   Data analyzed and images interpreted at [facility], [place], [place].        MACRO:   none        Signed by: Beth Holguin 8/12/2024 2:31 AM   Dictation workstation:   [id number]      CT angio chest for pulmonary embolism   Final Result   1. No evidence of acute pulmonary embolism to the segmental level.   2. Small amount of mucus layering within the main bronchi and mucous   plugging of the bronchials. Ground-glass opacities in the bilateral   lung bases. Findings are consistent with aspiration pneumonitis and   superimposed atelectasis.   3. Patulous esophagus with debris and septations noted to the upper   level which could be related to esophagitis. Clinical correlation   suggested. Findings also increased aspiration risk.   4. Ectatic main pulmonary artery measuring 3.3  cm.   5. Cardiomegaly with enlargement of the left atrium.        I personally reviewed the image(s)/study and resident interpretation.   I agree with the findings as stated by resident Fredi Anderson.   Data analyzed and images interpreted at [place], [place].        MACRO:   None        Signed by: Beth Holguin 8/12/2024 2:37 AM   Dictation workstation:   [id number]      CT angio head w and wo IV contrast   Final Result   Postsurgical changes of right posterior circulation aneurysmal   clipping with associated streak artifact partially limiting   evaluation. Otherwise no evidence for significant stenosis or large   branch vessel cutoffs of the intracranial vessels.        Opacities noted on axial imaging without corresponding abnormality on   additional findings. Findings are favored to represent artifact   versus true aneurysm however attention on follow-up recommended.        I personally reviewed the image(s)/study and resident interpretation.   I agree with the findings as stated by resident Fredi Anderson.   Data analyzed and images interpreted at [place], [place].             MACRO:   None        Signed by: Beth Holguin 8/12/2024 3:47 AM   Dictation workstation:   [id number]      CT abdomen pelvis w IV contrast   Final Result   1.  No acute abnormality within the abdomen or pelvis.   2. No acute fracture of the thoracic or lumbar spine. Degenerative   changes of the spine without critical canal stenosis. Foraminal   narrowing in the lumbar spine as detailed.   3. Probable chronic calcifications within the posterior fecal sac at   the level of the midthoracic spine as detailed. No associated canal   stenosis identified..        I personally reviewed the image(s)/study and resident interpretation.   I agree with the findings as stated by resident Fredi Anderson.   Data analyzed and images interpreted at  [place], [place].        MACRO:   None        Signed by: Beth Holguin 8/12/2024 2:55 AM   Dictation workstation:   [id number]      CT cervical spine wo IV contrast   Final Result   CT HEAD:   1. Postsurgical changes of right temporal craniotomy and aneurysmal   clipping right partially degrades imaging. Otherwise no acute   intracranial hemorrhage or mass effect identified.   2. Findings consistent with chronic small-vessel ischemic disease and   right temporal lobe encephalomalacia.             CT CERVICAL SPINE:   1. No acute fracture or traumatic malalignment of the cervical spine.   2. Multilevel spondylotic changes of the cervical spine as detailed   above, with multilevel neural foraminal narrowing.             I personally reviewed the image(s)/study and resident interpretation.   I agree with the findings as stated by resident Fredi Anderson.   Data analyzed and images interpreted at [place], [place].        MACRO:   none        Signed by: Beth Holguin 8/12/2024 2:31 AM   Dictation workstation:   [id number]      CT thoracic spine wo IV contrast   Final Result   1.  No acute abnormality within the abdomen or pelvis.   2. No acute fracture of the thoracic or lumbar spine. Degenerative   changes of the spine without critical canal stenosis. Foraminal   narrowing in the lumbar spine as detailed.   3. Probable chronic calcifications within the posterior fecal sac at   the level of the midthoracic spine as detailed. No associated canal   stenosis identified..        I personally reviewed the image(s)/study and resident interpretation.   I agree with the findings as stated by resident Fredi Anderson.   Data analyzed and images interpreted at [place], [place].        MACRO:   None        Signed by: Beth Holguin 8/12/2024 2:55 AM   Dictation workstation:    [id number]      CT lumbar spine wo IV contrast   Final Result   1.  No acute abnormality within the abdomen or pelvis.   2. No acute fracture of the thoracic or lumbar spine. Degenerative   changes of the spine without critical canal stenosis. Foraminal   narrowing in the lumbar spine as detailed.   3. Probable chronic calcifications within the posterior fecal sac at   the level of the midthoracic spine as detailed. No associated canal   stenosis identified..        I personally reviewed the image(s)/study and resident interpretation.   I agree with the findings as stated by resident Fredi Anderson.   Data analyzed and images interpreted at [place], [place].        MACRO:   None        Signed by: Beth Holguin 8/12/2024 2:55 AM   Dictation workstation:   [id number]      XR chest 1 view   Final Result   No radiographic evidence of acute cardiopulmonary disease.   Signed by Cynthia Sam MD      CT cervical spine post myelogram    (Results Pending)   CT thoracic spine post myelogram    (Results Pending)   CT lumbar spine post myelogram    (Results Pending)   FL multiple regions myelogram with lumbar puncture    (Results Pending)       Labs Reviewed   CBC WITH AUTO DIFFERENTIAL - Abnormal       Result Value    WBC 7.3      nRBC 0.0      RBC 4.72      Hemoglobin 15.6      Hematocrit 43.2      MCV 92      MCH 33.1      MCHC 36.1 (*)     RDW 11.8      Platelets 216      Neutrophils % 54.9      Immature Granulocytes %, Automated 0.1      Lymphocytes % 34.8      Monocytes % 8.0      Eosinophils % 1.4      Basophils % 0.8      Neutrophils Absolute 4.00      Immature Granulocytes Absolute, Automated 0.01      Lymphocytes Absolute 2.53      Monocytes Absolute 0.58      Eosinophils Absolute 0.10      Basophils Absolute 0.06     BASIC METABOLIC PANEL - Abnormal    Glucose 158 (*)     Sodium 138      Potassium 3.4 (*)     Chloride 99      Bicarbonate 29      Anion Gap 13       Urea Nitrogen 19      Creatinine 0.62      eGFR >90      Calcium 9.8     B-TYPE NATRIURETIC PEPTIDE - Normal    BNP 28      Narrative:        <100 pg/mL - Heart failure unlikely  100-299 pg/mL - Intermediate probability of acute heart                  failure exacerbation. Correlate with clinical                  context and patient history.    >=300 pg/mL - Heart Failure likely. Correlate with clinical                  context and patient history.     Biotin interference may cause falsely decreased results. Patients taking a Biotin dose of up to 5 mg/day should refrain from taking Biotin for 24 hours before sample  collection. Providers may contact their local laboratory for further information.   SERIAL TROPONIN-INITIAL - Normal    Troponin I, High Sensitivity ([facility]) 9      Narrative:     Less than 99th percentile of normal range cutoff-  Female and children under 18 years old <35 ng/L; Male <54 ng/L: Negative  Repeat testing should be performed if clinically indicated.     Female and children under 18 years old  ng/L; Male  ng/L:  Consistent with possible cardiac damage and possible increased clinical   risk. Serial measurements may help to assess extent of myocardial damage.     >120 ng/L: Consistent with cardiac damage, increased clinical risk and  myocardial infarction. Serial measurements may help assess extent of   myocardial damage.      NOTE: Children less than 1 year old may have higher baseline troponin   levels and results should be interpreted in conjunction with the overall   clinical context.    NOTE: Troponin I testing is performed using a different   testing methodology at [facility] than at other   [facility] hospitals. Direct result comparisons should only   be made within the same method.     SERIAL TROPONIN, 1 HOUR - Normal    Troponin I, High Sensitivity ([facility]) 9      Narrative:     Less than 99th percentile of normal range cutoff-  Female and children under 18 years old  <35 ng/L; Male <54 ng/L: Negative  Repeat testing should be performed if clinically indicated.     Female and children under 18 years old  ng/L; Male  ng/L:  Consistent with possible cardiac damage and possible increased clinical   risk. Serial measurements may help to assess extent of myocardial damage.     >120 ng/L: Consistent with cardiac damage, increased clinical risk and  myocardial infarction. Serial measurements may help assess extent of   myocardial damage.      NOTE: Children less than 1 year old may have higher baseline troponin   levels and results should be interpreted in conjunction with the overall   clinical context.    NOTE: Troponin I testing is performed using a different   testing methodology at [facility] than at other   [facility]. Direct result comparisons should only   be made within the same method.     VITAMIN B12 - Normal    Vitamin B12 781     FOLATE - Normal    Folate, Serum >24.0      Narrative:     Low           <3.4  Borderline 3.4-5.0  Normal        >5.0    Patients receiving more than 5 mg/day of biotin may have interference in test results. A sample should be taken no sooner than eight hours after previous dose. Contact the testing laboratory for additional information.    HOMOCYSTEINE - Normal    Homocysteine 7.72      Narrative:     Reference values apply to fasting specimens only. Non-fasting specimens produce slightly higher and likely  clinically insignificant changes in homocysteine levels.    COAGULATION SCREEN - Normal    Protime 12.5      INR 1.1      aPTT 29      Narrative:     The APTT is no longer used for monitoring Unfractionated Heparin Therapy. For monitoring Heparin Therapy, use the Heparin Assay.   TROPONIN SERIES- (INITIAL, 1 HR)    Narrative:     The following orders were created for panel order Troponin I Series, High Sensitivity (0, 1 HR).  Procedure                               Abnormality         Status                      ---------                               -----------         ------                     Troponin I, High Sensiti...[[id number]]  Normal              Final result               Troponin, High Sensitivi...[[id number]]  Normal              Final result                 Please view results for these tests on the individual orders.   URINALYSIS WITH REFLEX CULTURE AND MICROSCOPIC    Narrative:     The following orders were created for panel order Urinalysis with Reflex Culture and Microscopic.  Procedure                               Abnormality         Status                     ---------                               -----------         ------                     Urinalysis with Reflex C...[[id number]]                                                 Extra Urine Gray Tube[[id number]]                                                         Please view results for these tests on the individual orders.   URINALYSIS WITH REFLEX CULTURE AND MICROSCOPIC   EXTRA URINE GRAY TUBE   VITAMIN E   METHYLMALONIC ACID   COPPER, BLOOD           After discussion with the ED provider, a decision was made to admit the patient to the Clinical Decision Unit.    Upon admission to the Clinical Decision Unit, Mrs. López is alert and oriented x 4 and appears in no acute distress.  Vital signs are stable.  Medical workup initiated in the emergency department included laboratory studies and imaging.  Diagnostic information was obtained, reviewed and discussed with the patient.  CBC and differential without findings of leukocytosis or anemia.  Basic metabolic panel had no electrolyte derangement or WALTER.  TSH within normal limits at 2.6.  BNP of 28.  High-sensitivity serum troponin of 9 and 9.  Patient was pan scanned with findings of postsurgical changes and aneurysmal clipping.  Neurology was contacted and a consult requested.  Patient was seen by the neurology service who recommended the patient undergo CT myelogram to incompatible MRI due to  the aneurysmal clipping's.  Patient was placed into the CDU for additional symptom management and close observation pending imaging.      Past Medical History:   Diagnosis Date    Epilepsy, unspecified, not intractable, without status epilepticus (Multi) 11/18/2014    Epilepsy    Other conditions influencing health status     Ulcer    Personal history of other diseases of the circulatory system     History of hypertension    Personal history of other diseases of the digestive system     History of esophageal reflux    Personal history of other diseases of the musculoskeletal system and connective tissue     History of arthritis    Personal history of other diseases of the nervous system and sense organs     History of cataract    Personal history of other mental and behavioral disorders     History of depression    Personal history of other specified conditions     History of heartburn    Pure hypercholesterolemia, unspecified     High cholesterol    REM sleep behavior disorder 03/17/2017    REM behavioral disorder    Snoring     Snoring     Past Surgical History:   Procedure Laterality Date    CT HEAD ANGIO W AND WO IV CONTRAST  5/25/2017    CT HEAD ANGIO W AND WO IV CONTRAST 5/25/2017 [facility]     Social History     Socioeconomic History    Marital status:      Social Determinants of Health     Financial Resource Strain: Low Risk  (8/3/2020)    Received from [facility]    Overall Financial Resource Strain (CARDIA)     Difficulty of Paying Living Expenses: Not hard at all   Food Insecurity: No Food Insecurity (8/3/2020)    Received from [facility]    Hunger Vital Sign     Worried About Running Out of Food in the Last Year: Never true     Ran Out of Food in the Last Year: Never true   Transportation Needs: No Transportation Needs (8/3/2020)    Received from [facility]    PRAPARE - Transportation     Lack of Transportation (Medical): No     Lack of Transportation (Non-Medical):  "No         Medications/Allergies     Previous Medications    No medications on file     No Known Allergies      Review of Systems  All systems reviewed and otherwise negative, except as stated above in HPI.      Physical Exam     Visit Vitals  /77 (BP Location: Left arm, Patient Position: Lying)   Pulse 80   Temp 37 °C (98.6 °F) (Temporal)   Resp 18   Ht 1.626 m (5' 4\")   Wt 76.7 kg (169 lb)   SpO2 96%   BMI 29.01 kg/m²   BSA 1.86 m²         Physical exam    VS: As documented in the triage note and EMR flowsheet from this visit were reviewed.    General: Patient is AAOx3, normal appearance, is a good historian, answers questions appropriately    HEENT: head normocephalic, atraumatic, PERRLA, EOMs intact, oropharynx without erythema or exudate, buccal mucosa intact without lesions, nose is patent bilateral    Neck: supple with decreased ROM, reproducible tenderness palpated down the cervical spine midline.  No step-offs, palpable masses or gross deformities.  Negative for lymphadenopathy, JVD, thyromegaly, tracheal deviation, nuchal rigidity    Pulmonary: Clear to auscultation bilaterally, No wheezing, rales, or rhonchi, no accessory muscle use, able to speak full clear sentences    Cardiac: Normal rate and rhythm, no murmurs, rubs or gallops    GI: soft, non-tender, non-distended, normoactive bowelsounds in all four quadrants, no masses or organomegaly, no guarding or CVA tenderness noted    Musculoskeletal: full weight bearing, VIRAMONTES, no joint effusions, clubbing or edema noted    Skin: Warm, dry, intact, no lesions or rashes noted, turgor is good.    Neuro: patient follow commands, cranial nerves 2-12 grossly intact, motor strengths 4/5 upper and lower extremities, sensation are symmetrical.     Psych: Appropriate mood and affect for situation      Consultants  1) Neurology: Please see EMR for details      Impression and Plan  In summary, Linda López is admitted to the [facility] " [facility] Clinical Decision Unit for Weakness. Dr. Levine is the CDU admission attending.    This patient has been risk-stratified based on available history, physical exam, and related study findings. Admission to the observation status for further diagnosis/treatment/monitoring of weakness is warranted clinically. This extended period of observation is specifically required to determine the need for hospitalization.     The goals of this admission based on the patient’s clinical problem list are:  1) Stable vital signs  2) CT Myelogram      Assessment/ Plan  1) Weakness  -CT myelogram in the a.m.  -Neurology to reevaluate        When met, appropriate disposition will be arranged.

## 2024-08-13 PROBLEM — R53.1 GENERALIZED WEAKNESS: Status: ACTIVE | Noted: 2024-08-13

## 2024-08-13 PROCEDURE — 2500000004 HC RX 250 GENERAL PHARMACY W/ HCPCS (ALT 636 FOR OP/ED)

## 2024-08-13 PROCEDURE — 2500000002 HC RX 250 W HCPCS SELF ADMINISTERED DRUGS (ALT 637 FOR MEDICARE OP, ALT 636 FOR OP/ED): Performed by: PHYSICIAN ASSISTANT

## 2024-08-13 PROCEDURE — 99231 SBSQ HOSP IP/OBS SF/LOW 25: CPT | Performed by: STUDENT IN AN ORGANIZED HEALTH CARE EDUCATION/TRAINING PROGRAM

## 2024-08-13 PROCEDURE — S4991 NICOTINE PATCH NONLEGEND: HCPCS | Performed by: PHYSICIAN ASSISTANT

## 2024-08-13 PROCEDURE — 1100000001 HC PRIVATE ROOM DAILY

## 2024-08-13 PROCEDURE — 2500000001 HC RX 250 WO HCPCS SELF ADMINISTERED DRUGS (ALT 637 FOR MEDICARE OP): Performed by: PHYSICIAN ASSISTANT

## 2024-08-13 RX ORDER — ENOXAPARIN SODIUM 100 MG/ML
40 INJECTION SUBCUTANEOUS EVERY 24 HOURS
Status: DISCONTINUED | OUTPATIENT
Start: 2024-08-13 | End: 2024-08-16
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
## 2024-08-13 NOTE — PROGRESS NOTES
Subjective   Linda López is a 79 y.o. female on day 1of admission presenting with Weakness. She states that both arms and legs have become weaker in the past 2- 3 weeks such that she cannot accomplish her daily activities.  Pt has been evaluated by neurology whom recommend CT myelogram as she is not a candidate for MRI due to cerebral clips.  Pt is requesting her daily oral medicaitons and otherwise has no complaints at exam.       Objective     Physical Exam  Vitals and nursing note reviewed.   Constitutional:       General: She is not in acute distress.     Appearance: Normal appearance. She is well-developed. She is not ill-appearing, toxic-appearing or diaphoretic.      Comments: Voice spasticity.    HENT:      Head: Normocephalic and atraumatic.      Right Ear: Tympanic membrane normal.      Left Ear: Tympanic membrane normal.      Nose: Nose normal. No congestion or rhinorrhea.      Mouth/Throat:      Mouth: Mucous membranes are moist.      Pharynx: Oropharynx is clear.   Eyes:      Extraocular Movements: Extraocular movements intact.      Pupils: Pupils are equal, round, and reactive to light.   Cardiovascular:      Rate and Rhythm: Normal rate. Rhythm irregular.      Heart sounds: No murmur heard.  Pulmonary:      Effort: Pulmonary effort is normal. No respiratory distress.      Breath sounds: Normal breath sounds.   Abdominal:      General: Bowel sounds are normal. There is no distension.      Palpations: Abdomen is soft.      Tenderness: There is no abdominal tenderness. There is no guarding or rebound.   Musculoskeletal:         General: No tenderness. Normal range of motion.      Cervical back: Normal range of motion and neck supple. No rigidity.      Right lower leg: No edema.      Left lower leg: No edema.   Skin:     General: Skin is warm and dry.      Capillary Refill: Capillary refill takes less than 2 seconds.   Neurological:      General: No focal deficit present.      Mental Status:  "She is alert and oriented to person, place, and time. Mental status is at baseline.      GCS: GCS eye subscore is 4. GCS verbal subscore is 5. GCS motor subscore is 6.      Cranial Nerves: Cranial nerves 2-12 are intact. No cranial nerve deficit.      Sensory: Sensation is intact.      Motor: Weakness present.      Coordination: Coordination is intact.      Gait: Gait abnormal.      Comments: Thoughts concise.   Psychiatric:         Attention and Perception: Attention normal.         Mood and Affect: Mood normal.         Speech: Speech normal.         Behavior: Behavior is cooperative.         Cognition and Memory: Cognition normal.         Judgment: Judgment normal.         Last Recorded Vitals  Blood pressure 145/77, pulse 80, temperature 37 °C (98.6 °F), temperature source Temporal, resp. rate 18, height 1.626 m (5' 4\"), weight 76.7 kg (169 lb), SpO2 96%.  Intake/Output last 3 Shifts:  No intake/output data recorded.    Relevant Results  Results for orders placed or performed during the hospital encounter of 08/11/24 (from the past 24 hour(s))   Troponin, High Sensitivity, 1 Hour   Result Value Ref Range    Troponin I, High Sensitivity ([facility]) 9 0 - 34 ng/L   Vitamin B12   Result Value Ref Range    Vitamin B12 781 211 - 911 pg/mL   Folate   Result Value Ref Range    Folate, Serum >24.0 >5.0 ng/mL   Homocysteine   Result Value Ref Range    Homocysteine 7.72 5.00 - 13.90 umol/L   Coagulation Screen   Result Value Ref Range    Protime 12.5 9.8 - 12.8 seconds    INR 1.1 0.9 - 1.1    aPTT 29 27 - 38 seconds           Assessment/Plan   Principal Problem:    Weakness  CT myelogram, Neurology following  HTN:  continue daily meds  Anxiety/ depression:  Paxil  HLD: Crestor       I spent 30 minutes in the professional and overall care of this patient.      Jeanmarie Alvarado PA-C      "

## 2024-08-13 NOTE — PROGRESS NOTES
ADMISSION Note  [facility] CLINICAL DECISION  Patient: Linda López  MRN: [id number]  : 1945  Date of Evaluation: 2024  ED Provider: VIOLET Washington, CHAYO      Limitations to history: None  Independent Historian: Yes  External Records Reviewed: EMR       Subjective:    Linda López is a 79 y.o. female has undergone comprehensive diagnostic evaluation and therapeutic management in accordance with the CDU guidelines for upper and lower extremity weakness. Based on the patient's clinical response and diagnostic information during this period of observation, it has been determined that the patient will be admitted to neurology.     The acute evaluation included:  Orders Placed This Encounter   Procedures    XR chest 1 view    CT head wo IV contrast    CT angio chest for pulmonary embolism    CT angio head w and wo IV contrast    CT abdomen pelvis w IV contrast    CT cervical spine wo IV contrast    CT thoracic spine wo IV contrast    CT lumbar spine wo IV contrast    CT cervical spine post myelogram    CT thoracic spine post myelogram    CT lumbar spine post myelogram    FL multiple regions myelogram with lumbar puncture    Urinalysis with Reflex Culture and Microscopic    CBC and Auto Differential    Basic metabolic panel    Troponin I Series, High Sensitivity (0, 1 HR)    B-Type Natriuretic Peptide    Urinalysis with Reflex Culture and Microscopic    Extra Urine Gray Tube    Troponin I, High Sensitivity, Initial    Troponin, High Sensitivity, 1 Hour    Extra Tubes    Light Blue Top    Vitamin B12    Folate    Vitamin E    Methylmalonic acid, serum    Homocysteine    Copper, serum    Coagulation Screen    NPO Diet; Effective midnight    Inpatient consult to Neurology    Send to CDU    Initiate observation status    ED to floor bed request         Placed in observation at: 23        Past History     Past Medical History:   Diagnosis Date    Epilepsy, unspecified,  not intractable, without status epilepticus (Multi) 11/18/2014    Epilepsy    Other conditions influencing health status     Ulcer    Personal history of other diseases of the circulatory system     History of hypertension    Personal history of other diseases of the digestive system     History of esophageal reflux    Personal history of other diseases of the musculoskeletal system and connective tissue     History of arthritis    Personal history of other diseases of the nervous system and sense organs     History of cataract    Personal history of other mental and behavioral disorders     History of depression    Personal history of other specified conditions     History of heartburn    Pure hypercholesterolemia, unspecified     High cholesterol    REM sleep behavior disorder 03/17/2017    REM behavioral disorder    Snoring     Snoring     Past Surgical History:   Procedure Laterality Date    CT HEAD ANGIO W AND WO IV CONTRAST  5/25/2017    CT HEAD ANGIO W AND WO IV CONTRAST 5/25/2017 [facility]     Social History     Socioeconomic History    Marital status:      Social Determinants of Health     Financial Resource Strain: Low Risk  (8/3/2020)    Received from [facility]    Overall Financial Resource Strain (CARDIA)     Difficulty of Paying Living Expenses: Not hard at all   Food Insecurity: No Food Insecurity (8/3/2020)    Received from [facility]    Hunger Vital Sign     Worried About Running Out of Food in the Last Year: Never true     Ran Out of Food in the Last Year: Never true   Transportation Needs: No Transportation Needs (8/3/2020)    Received from [facility]    PRAPARE - Transportation     Lack of Transportation (Medical): No     Lack of Transportation (Non-Medical): No         Medications/Allergies     Previous Medications    No medications on file     No Known Allergies      Review of Systems  All systems reviewed and otherwise negative, except as stated above in  HPI.    Diagnostics reviewed by Hansa Murillo, APRN-CNP, DNP     Labs:  Results for orders placed or performed during the hospital encounter of 08/11/24   CBC and Auto Differential   Result Value Ref Range    WBC 7.3 4.4 - 11.3 x10*3/uL    nRBC 0.0 0.0 - 0.0 /100 WBCs    RBC 4.72 4.00 - 5.20 x10*6/uL    Hemoglobin 15.6 12.0 - 16.0 g/dL    Hematocrit 43.2 36.0 - 46.0 %    MCV 92 80 - 100 fL    MCH 33.1 26.0 - 34.0 pg    MCHC 36.1 (H) 32.0 - 36.0 g/dL    RDW 11.8 11.5 - 14.5 %    Platelets 216 150 - 450 x10*3/uL    Neutrophils % 54.9 40.0 - 80.0 %    Immature Granulocytes %, Automated 0.1 0.0 - 0.9 %    Lymphocytes % 34.8 13.0 - 44.0 %    Monocytes % 8.0 2.0 - 10.0 %    Eosinophils % 1.4 0.0 - 6.0 %    Basophils % 0.8 0.0 - 2.0 %    Neutrophils Absolute 4.00 1.60 - 5.50 x10*3/uL    Immature Granulocytes Absolute, Automated 0.01 0.00 - 0.50 x10*3/uL    Lymphocytes Absolute 2.53 0.80 - 3.00 x10*3/uL    Monocytes Absolute 0.58 0.05 - 0.80 x10*3/uL    Eosinophils Absolute 0.10 0.00 - 0.40 x10*3/uL    Basophils Absolute 0.06 0.00 - 0.10 x10*3/uL   Basic metabolic panel   Result Value Ref Range    Glucose 158 (H) 74 - 99 mg/dL    Sodium 138 136 - 145 mmol/L    Potassium 3.4 (L) 3.5 - 5.3 mmol/L    Chloride 99 98 - 107 mmol/L    Bicarbonate 29 21 - 32 mmol/L    Anion Gap 13 10 - 20 mmol/L    Urea Nitrogen 19 6 - 23 mg/dL    Creatinine 0.62 0.50 - 1.05 mg/dL    eGFR >90 >60 mL/min/1.73m*2    Calcium 9.8 8.6 - 10.6 mg/dL   B-Type Natriuretic Peptide   Result Value Ref Range    BNP 28 0 - 99 pg/mL   Troponin I, High Sensitivity, Initial   Result Value Ref Range    Troponin I, High Sensitivity ([facility]) 9 0 - 34 ng/L   Troponin, High Sensitivity, 1 Hour   Result Value Ref Range    Troponin I, High Sensitivity ([facility]) 9 0 - 34 ng/L   Light Blue Top   Result Value Ref Range    Extra Tube Hold for add-ons.    Vitamin B12   Result Value Ref Range    Vitamin B12 781 211 - 911 pg/mL   Folate   Result Value Ref Range    Folate, Serum  >24.0 >5.0 ng/mL   Homocysteine   Result Value Ref Range    Homocysteine 7.72 5.00 - 13.90 umol/L   Coagulation Screen   Result Value Ref Range    Protime 12.5 9.8 - 12.8 seconds    INR 1.1 0.9 - 1.1    aPTT 29 27 - 38 seconds     Radiographs:  CT head wo IV contrast   Final Result   CT HEAD:   1. Postsurgical changes of right temporal craniotomy and aneurysmal   clipping right partially degrades imaging. Otherwise no acute   intracranial hemorrhage or mass effect identified.   2. Findings consistent with chronic small-vessel ischemic disease and   right temporal lobe encephalomalacia.             CT CERVICAL SPINE:   1. No acute fracture or traumatic malalignment of the cervical spine.   2. Multilevel spondylotic changes of the cervical spine as detailed   above, with multilevel neural foraminal narrowing.             I personally reviewed the image(s)/study and resident interpretation.   I agree with the findings as stated by resident Fredi Anderson.   Data analyzed and images interpreted at [facility], [place], [place].        MACRO:   none        Signed by: Beth Holguin 8/12/2024 2:31 AM   Dictation workstation:   [id number]      CT angio chest for pulmonary embolism   Final Result   1. No evidence of acute pulmonary embolism to the segmental level.   2. Small amount of mucus layering within the main bronchi and mucous   plugging of the bronchials. Ground-glass opacities in the bilateral   lung bases. Findings are consistent with aspiration pneumonitis and   superimposed atelectasis.   3. Patulous esophagus with debris and septations noted to the upper   level which could be related to esophagitis. Clinical correlation   suggested. Findings also increased aspiration risk.   4. Ectatic main pulmonary artery measuring 3.3 cm.   5. Cardiomegaly with enlargement of the left atrium.        I personally reviewed the image(s)/study and resident interpretation.   I agree with the  findings as stated by resident Fredi Anderson.   Data analyzed and images interpreted at [place], [place].        MACRO:   None        Signed by: Beth Holguin 8/12/2024 2:37 AM   Dictation workstation:   [id number]      CT angio head w and wo IV contrast   Final Result   Postsurgical changes of right posterior circulation aneurysmal   clipping with associated streak artifact partially limiting   evaluation. Otherwise no evidence for significant stenosis or large   branch vessel cutoffs of the intracranial vessels.        Opacities noted on axial imaging without corresponding abnormality on   additional findings. Findings are favored to represent artifact   versus true aneurysm however attention on follow-up recommended.        I personally reviewed the image(s)/study and resident interpretation.   I agree with the findings as stated by resident Fredi Anderson.   Data analyzed and images interpreted at [place], [place].             MACRO:   None        Signed by: Beth Holguin 8/12/2024 3:47 AM   Dictation workstation:   [id number]      CT abdomen pelvis w IV contrast   Final Result   1.  No acute abnormality within the abdomen or pelvis.   2. No acute fracture of the thoracic or lumbar spine. Degenerative   changes of the spine without critical canal stenosis. Foraminal   narrowing in the lumbar spine as detailed.   3. Probable chronic calcifications within the posterior fecal sac at   the level of the midthoracic spine as detailed. No associated canal   stenosis identified..        I personally reviewed the image(s)/study and resident interpretation.   I agree with the findings as stated by resident Fredi Anderson.   Data analyzed and images interpreted at [place], [place].        MACRO:   None        Signed by: Beth Holguin 8/12/2024 2:55 AM   Dictation  workstation:   [id number]      CT cervical spine wo IV contrast   Final Result   CT HEAD:   1. Postsurgical changes of right temporal craniotomy and aneurysmal   clipping right partially degrades imaging. Otherwise no acute   intracranial hemorrhage or mass effect identified.   2. Findings consistent with chronic small-vessel ischemic disease and   right temporal lobe encephalomalacia.             CT CERVICAL SPINE:   1. No acute fracture or traumatic malalignment of the cervical spine.   2. Multilevel spondylotic changes of the cervical spine as detailed   above, with multilevel neural foraminal narrowing.             I personally reviewed the image(s)/study and resident interpretation.   I agree with the findings as stated by resident Fredi Anderson.   Data analyzed and images interpreted at [place], [place].        MACRO:   none        Signed by: Beth Holguin 8/12/2024 2:31 AM   Dictation workstation:   [id number]      CT thoracic spine wo IV contrast   Final Result   1.  No acute abnormality within the abdomen or pelvis.   2. No acute fracture of the thoracic or lumbar spine. Degenerative   changes of the spine without critical canal stenosis. Foraminal   narrowing in the lumbar spine as detailed.   3. Probable chronic calcifications within the posterior fecal sac at   the level of the midthoracic spine as detailed. No associated canal   stenosis identified..        I personally reviewed the image(s)/study and resident interpretation.   I agree with the findings as stated by resident Fredi Anderson.   Data analyzed and images interpreted at [place], [place].        MACRO:   None        Signed by: Beth Holguin 8/12/2024 2:55 AM   Dictation workstation:   [id number]      CT lumbar spine wo IV contrast   Final Result   1.  No acute abnormality within the abdomen or pelvis.   2. No acute fracture of the  "thoracic or lumbar spine. Degenerative   changes of the spine without critical canal stenosis. Foraminal   narrowing in the lumbar spine as detailed.   3. Probable chronic calcifications within the posterior fecal sac at   the level of the midthoracic spine as detailed. No associated canal   stenosis identified..        I personally reviewed the image(s)/study and resident interpretation.   I agree with the findings as stated by resident Fredi Anderson.   Data analyzed and images interpreted at [facility], [place], [place].        MACRO:   None        Signed by: Beth Holguin 8/12/2024 2:55 AM   Dictation workstation:   [id number]      XR chest 1 view   Final Result   No radiographic evidence of acute cardiopulmonary disease.   Signed by Cynthia Sam MD      CT cervical spine post myelogram    (Results Pending)   CT thoracic spine post myelogram    (Results Pending)   CT lumbar spine post myelogram    (Results Pending)   FL multiple regions myelogram with lumbar puncture    (Results Pending)           Physical Exam     Visit Vitals  /76   Pulse 60   Temp 37 °C (98.6 °F) (Temporal)   Resp 18   Ht 1.626 m (5' 4\")   Wt 76.7 kg (169 lb)   SpO2 96%   BMI 29.01 kg/m²   BSA 1.86 m²       GENERAL:  The patient appears nourished and normally developed. Vital signs as documented.     HEENT:  Head normocephalic, atraumatic, EOMs intact, PERRLA, Mucous membranes moist. Nares patent without copious rhinorrhea.  No lymphadenopathy.    PULMONARY:  Lungs are clear to auscultation, without any respiratory distress. Able to speak full sentences, no accessory muscle use    CARDIAC:   Normal rate. No murmurs, rubs or gallops    ABDOMEN:  Soft, non-distended, non-tender, BS positive x 4 quadrants, No rebound or guarding, no peritoneal signs, no CVA tenderness, no masses or organomegaly    MUSCULOSKELETAL:   Able to ambulate, Non edematous, with no obvious deformities. Pulses intact " distal    SKIN:   Good color, with no significant rashes.  No pallor.    NEURO:  No obvious neurological deficits, normal sensation and strength bilaterally.  Able to follow commands, NIH 0, CN 2-12 intact.    Psych: Appropriate mood and affect    Consultants  1) neurology       Impression and Plan    In summary, Linda López has been cared for according to the standard [facility] Clinical Decision Unit observation protocol for Weakness. This extended period of observation was specifically required to determine the need for hospitalization. Prior to discharge from observation, the final physical exam is documented above.     Significant events during the course of observation based on the goals of the clinical problem list include:   1) completion of her diagnostic exams   2) Neurology's final recommendation     Based on the patient's condition and test results, the patient will be admitted.     Total length of observation was 30+ hours. Dr. Mistry is the CDU disposition attending.      Admitting Diagnosis  Weakness    Issues Requiring Follow-Up  Will be determined at discharge     Discharge Meds     Your medication list      You have not been prescribed any medications.         Test Results Pending At Discharge  Pending Labs       Order Current Status    Extra Urine Gray Tube Collected (08/12/24 1350)    Urinalysis with Reflex Culture and Microscopic Collected (08/12/24 1350)    Urinalysis with Reflex Culture and Microscopic Collected (08/12/24 1350)    Copper, serum In process    Methylmalonic acid, serum In process    Vitamin E In process          CDU COURSE:  This is a 79 year old female who was admitted to the CDU for CT myelogram.  I was informed by [facility] medicine that she needs to be off her Paxil for 48 hours before her myelogram can be done and she received her last daily dose was yesterday due to increase chance of seizures.  Unless she is agreeable to sign a wavier, the test  will not get done.   This was discussed with the patient and her daughter and they opted to wait until tomorrow.   Neurology also re-assessed and due to the delay in testing, the patient will be admitted to neurology.   Nuclear medicine resident also came by to see the patient and answer the daughter's questions.   Has been stable while in the CDU.     Outpatient Follow-Up  No future appointments.      Hansa Murillo, APRN-CNP, DNP

## 2024-08-14 ENCOUNTER — APPOINTMENT (OUTPATIENT)
Dept: RADIOLOGY | Facility: HOSPITAL | Age: 79
DRG: 552 | End: 2024-08-14
Payer: MEDICARE

## 2024-08-14 LAB
A-TOCOPHEROL VIT E SERPL-MCNC: 11 MG/L (ref 5.5–18)
ANION GAP SERPL CALC-SCNC: 13 MMOL/L (ref 10–20)
BASOPHILS # BLD AUTO: 0.07 X10*3/UL (ref 0–0.1)
BASOPHILS NFR BLD AUTO: 1 %
BETA+GAMMA TOCOPHEROL SERPL-MCNC: 0.7 MG/L (ref 0–6)
BUN SERPL-MCNC: 22 MG/DL (ref 6–23)
CALCIUM SERPL-MCNC: 9.3 MG/DL (ref 8.6–10.6)
CHLORIDE SERPL-SCNC: 104 MMOL/L (ref 98–107)
CO2 SERPL-SCNC: 28 MMOL/L (ref 21–32)
CREAT SERPL-MCNC: 0.64 MG/DL (ref 0.5–1.05)
EGFRCR SERPLBLD CKD-EPI 2021: >90 ML/MIN/1.73M*2
EOSINOPHIL # BLD AUTO: 0.16 X10*3/UL (ref 0–0.4)
EOSINOPHIL NFR BLD AUTO: 2.4 %
ERYTHROCYTE [DISTWIDTH] IN BLOOD BY AUTOMATED COUNT: 11.9 % (ref 11.5–14.5)
FOLATE SERPL-MCNC: 24 NG/ML
GLUCOSE SERPL-MCNC: 121 MG/DL (ref 74–99)
HCT VFR BLD AUTO: 41.3 % (ref 36–46)
HGB BLD-MCNC: 14.5 G/DL (ref 12–16)
HOLD SPECIMEN: NORMAL
IMM GRANULOCYTES # BLD AUTO: 0.01 X10*3/UL (ref 0–0.5)
IMM GRANULOCYTES NFR BLD AUTO: 0.1 % (ref 0–0.9)
LYMPHOCYTES # BLD AUTO: 3.06 X10*3/UL (ref 0.8–3)
LYMPHOCYTES NFR BLD AUTO: 45.7 %
MCH RBC QN AUTO: 32.6 PG (ref 26–34)
MCHC RBC AUTO-ENTMCNC: 35.1 G/DL (ref 32–36)
MCV RBC AUTO: 93 FL (ref 80–100)
MONOCYTES # BLD AUTO: 0.48 X10*3/UL (ref 0.05–0.8)
MONOCYTES NFR BLD AUTO: 7.2 %
NEUTROPHILS # BLD AUTO: 2.92 X10*3/UL (ref 1.6–5.5)
NEUTROPHILS NFR BLD AUTO: 43.6 %
NRBC BLD-RTO: 0 /100 WBCS (ref 0–0)
PLATELET # BLD AUTO: 193 X10*3/UL (ref 150–450)
POTASSIUM SERPL-SCNC: 3.8 MMOL/L (ref 3.5–5.3)
RBC # BLD AUTO: 4.45 X10*6/UL (ref 4–5.2)
SODIUM SERPL-SCNC: 141 MMOL/L (ref 136–145)
TSH SERPL-ACNC: 2.65 MIU/L (ref 0.44–3.98)
WBC # BLD AUTO: 6.7 X10*3/UL (ref 4.4–11.3)

## 2024-08-14 PROCEDURE — 85025 COMPLETE CBC W/AUTO DIFF WBC: CPT

## 2024-08-14 PROCEDURE — 72129 CT CHEST SPINE W/DYE: CPT

## 2024-08-14 PROCEDURE — 82746 ASSAY OF FOLIC ACID SERUM: CPT

## 2024-08-14 PROCEDURE — 72129 CT CHEST SPINE W/DYE: CPT | Performed by: RADIOLOGY

## 2024-08-14 PROCEDURE — 2500000002 HC RX 250 W HCPCS SELF ADMINISTERED DRUGS (ALT 637 FOR MEDICARE OP, ALT 636 FOR OP/ED): Performed by: PHYSICIAN ASSISTANT

## 2024-08-14 PROCEDURE — 72132 CT LUMBAR SPINE W/DYE: CPT | Performed by: RADIOLOGY

## 2024-08-14 PROCEDURE — 99231 SBSQ HOSP IP/OBS SF/LOW 25: CPT

## 2024-08-14 PROCEDURE — 2500000001 HC RX 250 WO HCPCS SELF ADMINISTERED DRUGS (ALT 637 FOR MEDICARE OP): Performed by: PHYSICIAN ASSISTANT

## 2024-08-14 PROCEDURE — 72126 CT NECK SPINE W/DYE: CPT | Performed by: RADIOLOGY

## 2024-08-14 PROCEDURE — 72126 CT NECK SPINE W/DYE: CPT

## 2024-08-14 PROCEDURE — 36415 COLL VENOUS BLD VENIPUNCTURE: CPT

## 2024-08-14 PROCEDURE — 62305 MYELOGRAPHY LUMBAR INJECTION: CPT

## 2024-08-14 PROCEDURE — 009U3ZZ DRAINAGE OF SPINAL CANAL, PERCUTANEOUS APPROACH: ICD-10-PCS | Performed by: RADIOLOGY

## 2024-08-14 PROCEDURE — 2500000005 HC RX 250 GENERAL PHARMACY W/O HCPCS: Performed by: STUDENT IN AN ORGANIZED HEALTH CARE EDUCATION/TRAINING PROGRAM

## 2024-08-14 PROCEDURE — 1100000001 HC PRIVATE ROOM DAILY

## 2024-08-14 PROCEDURE — 72132 CT LUMBAR SPINE W/DYE: CPT

## 2024-08-14 PROCEDURE — 84443 ASSAY THYROID STIM HORMONE: CPT

## 2024-08-14 PROCEDURE — S4991 NICOTINE PATCH NONLEGEND: HCPCS | Performed by: PHYSICIAN ASSISTANT

## 2024-08-14 PROCEDURE — 80048 BASIC METABOLIC PNL TOTAL CA: CPT

## 2024-08-14 PROCEDURE — 2550000001 HC RX 255 CONTRASTS: Performed by: STUDENT IN AN ORGANIZED HEALTH CARE EDUCATION/TRAINING PROGRAM

## 2024-08-14 PROCEDURE — B01BYZZ FLUOROSCOPY OF SPINAL CORD USING OTHER CONTRAST: ICD-10-PCS | Performed by: RADIOLOGY

## 2024-08-14 PROCEDURE — 62305 MYELOGRAPHY LUMBAR INJECTION: CPT | Performed by: RADIOLOGY

## 2024-08-14 RX ORDER — LIDOCAINE HYDROCHLORIDE 10 MG/ML
13 INJECTION INFILTRATION; PERINEURAL ONCE
Status: COMPLETED | OUTPATIENT
Start: 2024-08-14 | End: 2024-08-14

## 2024-08-14 ASSESSMENT — PAIN SCALES - GENERAL
PAINLEVEL_OUTOF10: 0 - NO PAIN

## 2024-08-14 ASSESSMENT — PAIN - FUNCTIONAL ASSESSMENT
PAIN_FUNCTIONAL_ASSESSMENT: 0-10
PAIN_FUNCTIONAL_ASSESSMENT: 0-10

## 2024-08-14 NOTE — PRE-PROCEDURE NOTE
Interventional Radiology Preprocedure Note    Indication for procedure: The encounter diagnosis was Generalized weakness.    Relevant review of systems: NA    Relevant Labs:   Lab Results   Component Value Date    CREATININE 0.64 08/14/2024    EGFR >90 08/14/2024    INR 1.1 08/12/2024    PROTIME 12.5 08/12/2024       Planned Sedation/Anesthesia: Local        Directed physical examination:    Physical Exam  Constitutional:       General: She is not in acute distress.  HENT:      Head: Normocephalic and atraumatic.      Ears:      Comments: Hard of hearing  Pulmonary:      Effort: Pulmonary effort is normal. No respiratory distress.   Skin:     General: Skin is warm and dry.      Findings: No erythema or rash.   Neurological:      General: No focal deficit present.      Mental Status: She is alert and oriented to person, place, and time.      Comments: A&Ox3 to person, place, and date. Could explain benefits and risk of procedure to be performed.    Psychiatric:         Mood and Affect: Mood normal.         Behavior: Behavior normal.       Benefits, risks and alternatives of procedure and planned sedation have been discussed with the patient and/or their representative. All questions answered and they agree to proceed.

## 2024-08-14 NOTE — POST-PROCEDURE NOTE
Interventional Radiology Brief Postprocedure Note    Attending: Dr. Marr    Assistant: Dr. Nubia Roberts    Diagnosis: Generalized weakness     Description of procedure: The L3-L4 vertebral space marked under fluoroscopy. Patient was prepped and draped in the usual sterile fashion. 13 ml 1% lidocaine injected for local anesthesia. Under direct fluoroscopic guidance, a lumber puncture was performed at the L3-L4 interspace using a 20g spinal needle. A total of 10 mL of intrathecal contrast (Omnipaque 300) was subsequently injected. Multiple static fluoroscopic images were then obtained.  The stylet was replaced and needle was removed. A Band-Aid was applied. No immediate complications. The patient was then sent directly to the CT scanner. See PACSfor full details.      Anesthesia:  Local    Complications: None    Estimated Blood Loss: minimal    Medications  As of 08/14/24 1750      ondansetron (Zofran) injection 4 mg (mg) Total dose:  4 mg Dosing weight:  76.7      Date/Time Rate/Dose/Volume Action       08/11/24  2231 4 mg Given               iohexol (OMNIPaque) 350 mg iodine/mL solution 150 mL (mL) Total volume:  150 mL Dosing weight:  76.7      Date/Time Rate/Dose/Volume Action       08/12/24  0104 150 mL Given               propranolol (Inderal) tablet 10 mg (mg) Total dose:  90 mg* Dosing weight:  76.7   *Administration not included in total     Date/Time Rate/Dose/Volume Action       08/12/24  1200 10 mg Given      1604 10 mg Given      1815 *Not included in total MAR Hold      1829 *Not included in total MAR Unhold      2212 10 mg Given      2245 *10 mg Missed     08/13/24  0700 *10 mg Missed      1250 10 mg Given      1602 10 mg Given      2208 10 mg Given     08/14/24  0857 10 mg Given      1325 10 mg Given      1705 10 mg Given               valACYclovir (Valtrex) tablet 500 mg (mg) Total dose:  1,500 mg Dosing weight:  76.7      Date/Time Rate/Dose/Volume Action       08/12/24  1105 500 mg Given       1815 *Not included in total MAR Hold      1829 *Not included in total MAR Unhold     08/13/24  1247 500 mg Given     08/14/24  0856 500 mg Given               potassium chloride CR (Klor-Con M20) ER tablet 40 mEq (mEq) Total dose:  40 mEq Dosing weight:  76.7      Date/Time Rate/Dose/Volume Action       08/12/24  1107 40 mEq Given               potassium chloride CR (Klor-Con M20) ER tablet 20 mEq (mEq) Total dose:  100 mEq* Dosing weight:  76.7   *Administration not included in total     Date/Time Rate/Dose/Volume Action       08/12/24  2300 20 mEq Given     08/13/24  1249 20 mEq Given      1500 *20 mEq Missed      2208 20 mEq Given     08/14/24  0856 20 mEq Given      1705 20 mEq Given               nicotine (Nicoderm CQ) 14 mg/24 hr patch 1 patch (patch) Total dose:  3 patch Dosing weight:  76.7      Date/Time Rate/Dose/Volume Action       08/12/24  2007 1 patch (over 1440 min) Medication Applied     08/13/24  0859  (over 1440 min) Medication Removed      1250 1 patch (over 1440 min) Medication Applied     08/14/24  0856 1 patch (over 1440 min) Medication Applied      0859  (over 1440 min) Medication Removed               rosuvastatin (Crestor) tablet 5 mg (mg) Total dose:  10 mg Dosing weight:  76.7      Date/Time Rate/Dose/Volume Action       08/12/24  2259 5 mg Given     08/13/24  2208 5 mg Given               hydroCHLOROthiazide (HYDRODiuril) tablet 25 mg (mg) Total dose:  50 mg Dosing weight:  76.7      Date/Time Rate/Dose/Volume Action       08/13/24  1250 25 mg Given     08/14/24  0856 25 mg Given               enoxaparin (Lovenox) syringe 40 mg (mg) Total dose:  40 mg Dosing weight:  76.7      Date/Time Rate/Dose/Volume Action       08/13/24  1601 40 mg Given     08/14/24  0910 *Not included in total Held by provider      1145 *Not included in total Automatically Held               lidocaine (Xylocaine) 10 mg/mL (1 %) injection 13 mL (mL) Total volume:  13 mL Dosing weight:  76.7      Date/Time  Rate/Dose/Volume Action       08/14/24  1635 13 mL Given               iohexol (OMNIPaque) 300 mg iodine/mL solution 75 mL (mL) Total volume:  10 mL Dosing weight:  76.7      Date/Time Rate/Dose/Volume Action       08/14/24  1626 10 mL Given                   No specimens collected      See detailed result report with images in PACS.    The patient tolerated the procedure well without incident or complication and is in stable condition.

## 2024-08-14 NOTE — ASSESSMENT & PLAN NOTE
This a 78yo RHF PMHx HTN, SSS (s/p pacer 2019), pAF (s/p watchman 2020), cerebral aneurysm (s/p clipping 1993), ET presenting for subacute progressive ascending weakness and instability.      Patient reported unsteady walking for about 6 weeks. Progressively worsening, but seems to have more-sharply declined in past 2 weeks. Now starting to involve BUE. Now starting to have paresthesias in the RUE and pain in the R shoulder. Occasional electrical shocks of pain radiating down BUE and chest.     Initial neurological examination was significant for some L thenar atrophy, weakness of ECRL/ECU, EDC, ADM, FDI. On lowers, there is diminished LLE vibratory sense at the toe, ankle, and knee compared to left. Questionable C5 sensory level. Normal tone. Reflexes 3+ with spread throughout, London's & finger flexor reflex present bilaterally, 4bts ankle clonus bilaterally. Toes mute. Trace jaw jerk. Gait narrow-based and unsteady with truncal instability. CTH which shows R temporal cranioplasty, R posterior circulation clip with bloom artifact, and moderate posterior-predominant subcortical microvascular disease.      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 Gold standard of imaging would be MRI, but her aneurysm clip is non-compatible.      #Cervical myeloradiculopathy  :: Possible structural compression  :: B12, folate, homocysteine all within normal limits  - Pending CT myelogram  - Pending lab results for copper, methylmalonic acid, vitamin E     #MISC  -Continue home meds: hydrochlorothiazide daily, potassium, propranolol 10 mg QID, rosuvastatin 5 mg HS, Valtrex 500 mg daily.     F: None  E: as needed  N: NPO for myelogram, plan for regular diet after  D: Lovenox  I: pIV     Full code

## 2024-08-14 NOTE — PROGRESS NOTES
"Linda López is a 79 y.o. right-handed female with a history of A-fib, Anxiety and depression, Arthritis, Artificial cardiac pacemaker, Brain aneurysm s/p clipping (told by ED not MRI compatible), Essential tremor, HTN (hypertension), Kidney stones, Lumbar radiculopathy on day 1 of admission after presenting with subacute weakness, ataxia. Neurology was consulted for difficulty walking, upper extremity weakness.     Subjective   NAEON. Has been NPO since midnight ahead of CT myelogram today. Folate, homocysteine, B12 labs resulted wnl.       Objective     Physical Exam  Mental status: A+O x 4, dysarthric (baseline s/p aneurysmal clipping)  Cranial nerves: CN II-XII intact  Motor exam: 5/5 strength in all muscle groups  Reflexes: 3+ b/l biceps, BR, patellar, achilles reflexes. No plantar response seen.  Sensory: Sensation to light touch intact in b/l UE and LE. Reduced sensation to vibration in LE, L>R. Reduced proprioceptive sense in LE, R>L.  Coordination: no ataxia on finger-to-nose or heel-to-shin testing.  Gait: positive Romberg test. Gait narrow-based and unsteady.        Last Recorded Vitals  Blood pressure 125/70, pulse 60, temperature 36.7 °C (98.1 °F), temperature source Temporal, resp. rate 18, height 1.626 m (5' 4\"), weight 76.7 kg (169 lb), SpO2 98%.  Intake/Output last 3 Shifts:  No intake/output data recorded.    Relevant Results  Scheduled medications  [Held by provider] enoxaparin, 40 mg, subcutaneous, q24h  hydroCHLOROthiazide, 25 mg, oral, Daily  nicotine, 1 patch, transdermal, Daily  potassium chloride CR, 20 mEq, oral, TID  propranolol, 10 mg, oral, 4x daily  rosuvastatin, 5 mg, oral, Nightly  valACYclovir, 500 mg, oral, Daily      Continuous medications     PRN medications    Results for orders placed or performed during the hospital encounter of 08/11/24 (from the past 24 hour(s))   Basic metabolic panel   Result Value Ref Range    Glucose 121 (H) 74 - 99 mg/dL    Sodium 141 136 - 145 " mmol/L    Potassium 3.8 3.5 - 5.3 mmol/L    Chloride 104 98 - 107 mmol/L    Bicarbonate 28 21 - 32 mmol/L    Anion Gap 13 10 - 20 mmol/L    Urea Nitrogen 22 6 - 23 mg/dL    Creatinine 0.64 0.50 - 1.05 mg/dL    eGFR >90 >60 mL/min/1.73m*2    Calcium 9.3 8.6 - 10.6 mg/dL   Folate   Result Value Ref Range    Folate, Serum 24.0 >5.0 ng/mL   Thyroid Stimulating Hormone   Result Value Ref Range    Thyroid Stimulating Hormone 2.65 0.44 - 3.98 mIU/L   CBC and Auto Differential   Result Value Ref Range    WBC 6.7 4.4 - 11.3 x10*3/uL    nRBC 0.0 0.0 - 0.0 /100 WBCs    RBC 4.45 4.00 - 5.20 x10*6/uL    Hemoglobin 14.5 12.0 - 16.0 g/dL    Hematocrit 41.3 36.0 - 46.0 %    MCV 93 80 - 100 fL    MCH 32.6 26.0 - 34.0 pg    MCHC 35.1 32.0 - 36.0 g/dL    RDW 11.9 11.5 - 14.5 %    Platelets 193 150 - 450 x10*3/uL    Neutrophils % 43.6 40.0 - 80.0 %    Immature Granulocytes %, Automated 0.1 0.0 - 0.9 %    Lymphocytes % 45.7 13.0 - 44.0 %    Monocytes % 7.2 2.0 - 10.0 %    Eosinophils % 2.4 0.0 - 6.0 %    Basophils % 1.0 0.0 - 2.0 %    Neutrophils Absolute 2.92 1.60 - 5.50 x10*3/uL    Immature Granulocytes Absolute, Automated 0.01 0.00 - 0.50 x10*3/uL    Lymphocytes Absolute 3.06 (H) 0.80 - 3.00 x10*3/uL    Monocytes Absolute 0.48 0.05 - 0.80 x10*3/uL    Eosinophils Absolute 0.16 0.00 - 0.40 x10*3/uL    Basophils Absolute 0.07 0.00 - 0.10 x10*3/uL   SST TOP   Result Value Ref Range    Extra Tube Hold for add-ons.      CT angio head w and wo IV contrast    Result Date: 8/12/2024  Interpreted By:  Beth Holguin and Beyersdorf Conner STUDY: CT ANGIO HEAD W AND WO IV CONTRAST;  8/12/2024 1:22 am   INDICATION: Signs/Symptoms:bilateral upper extremity weaknes. Hx of aneurysm.   COMPARISON: CT head 08/12/2024   ACCESSION NUMBER(S): [id number]   ORDERING CLINICIAN: LILI FONTANEZ   TECHNIQUE: Unenhanced CT images of the head were obtained. Subsequently, 150 mL of Omnipaque 350 was administered intravenously and axial images of the head were  acquired.  Coronal, sagittal, and 3-D reconstructions were provided for review.   FINDINGS: Postsurgical changes of the aneurysm clip new right basilar cistern region motion limits evaluation due to streak artifact. Dedicated head CT is been performed and reported separately.   Anterior circulation: Atherosclerotic calcifications of the right greater than left carotid siphons. Streak artifact partially limits evaluation of the right distal ICA otherwise the bilateral intracranial internal carotid arteries, bilateral carotid terminals, bilateral proximal anterior and middle cerebral arteries are normal.   Posterior circulation: Tapering with diminutive intradural left vertebral artery. Streak artifact limits evaluation of the basilar tip and right greater than left proximal PCA. The mid to distal PCAs appear grossly patent.   Adjacent rounded opacities in the region of the right posterior communicating artery noted on axial projection without corresponding abnormality identified on additional projections and favored to represent volume averaging or artifact. Otherwise bilateral intracranial vertebral arteries, vertebrobasilar junction and basilar artery are normal.       Postsurgical changes of right posterior circulation aneurysmal clipping with associated streak artifact partially limiting evaluation. Otherwise no evidence for significant stenosis or large branch vessel cutoffs of the intracranial vessels.   Opacities noted on axial imaging without corresponding abnormality on additional findings. Findings are favored to represent artifact versus true aneurysm however attention on follow-up recommended.   I personally reviewed the image(s)/study and resident interpretation. I agree with the findings as stated by resident Fredi Anderson. Data analyzed and images interpreted at [facility], [place], [place].     MACRO: None   Signed by: Beth Holguin 8/12/2024 3:47 AM Dictation  workstation:   [id number]    CT abdomen pelvis w IV contrast    Result Date: 8/12/2024  Interpreted By:  Beth Holguin and Beyersdorf Conner STUDY: CT ABDOMEN PELVIS W IV CONTRAST; CT THORACIC SPINE WO IV CONTRAST; CT LUMBAR SPINE WO IV CONTRAST;  8/12/2024 2:12 am; 8/12/2024 2:11 am   INDICATION: Signs/Symptoms:N/V; Signs/Symptoms:BUE and BLE weakness; Signs/Symptoms:BUE and BLE weakness..   COMPARISON: None.   ACCESSION NUMBER(S): [id number]; [id number]; [id number]   ORDERING CLINICIAN: LILI FONTANEZ   TECHNIQUE: CT of the abdomen and pelvis was performed.  Standard contiguous axial images were obtained at 3 mm slice thickness through the abdomen and pelvis. Coronal and sagittal reconstructions at 3 mm slice thickness were performed.   150 ml of contrast omni 350 were administered intravenously without immediate complication.   FINDINGS: LOWER CHEST: Ground-glass attenuation of the lower lungs likely representing aspiration pneumonitis with a component of atelectasis. Emphysematous changes. Please see dedicated CTA chest for further evaluation.   ABDOMEN:   LIVER: The liver is normal in size measuring 15.3 cm in the craniocaudal axis. No focal hepatic lesions.   BILE DUCTS: The intrahepatic and extrahepatic ducts are not dilated.   GALLBLADDER: The gallbladder is nondistended and without evidence of radiopaque stones.   PANCREAS: The pancreas appears unremarkable without evidence of ductal dilatation or masses.   SPLEEN: The spleen is normal in size without focal lesions.   ADRENAL GLANDS: Bilateral adrenal glands appear normal.   KIDNEYS AND URETERS: The kidneys are normal in size and enhance symmetrically. Few bilateral hypodense lesions are too small to characterize, favored to represent simple cysts.  No hydroureteronephrosis. Contrast material is present within the bilateral renal collecting systems and ureters, limiting evaluation for calculi.   PELVIS:   BLADDER: Bladder is normal in appearance  without wall thickening. Layering contrast material within the bladder.   REPRODUCTIVE ORGANS: The uterus is surgically absent.   BOWEL: The stomach is unremarkable.   The small and large bowel are normal in caliber and demonstrate no wall thickening. Colonic diverticulosis without evidence of diverticulitis. The appendix is not definitely visualized. There is however no pericecal stranding or fluid. Moderate stool burden.   VESSELS: There is no aneurysmal dilatation of the abdominal aorta. The IVC appears normal. Moderate atherosclerotic calcification of the abdominal aorta and its branches.   PERITONEUM/RETROPERITONEUM/LYMPH NODES: There is no free or loculated fluid collection, no free intraperitoneal air. The retroperitoneum appears normal.  No abdominopelvic lymphadenopathy is present.   BONES AND ABDOMINAL WALL: Rightward curvature of the lumbar spine. The abdominal wall soft tissues appear normal.     CT THORACIC SPINE:   PARASPINAL SOFT TISSUES: No paravertebral fluid collection or significant edema. ALIGNMENT:  No traumatic spondylolisthesis or traumatic facet widening. VERTEBRAE:  No acute fracture. Vertebral body heights are maintained. SPINAL CANAL/INTERVERTEBRAL DISCS: No high-grade spinal canal stenosis. Moderate multilevel disc height loss, most severe at T11-12 and T12-L1. Multilevel anterior osteophytosis. The there are indeterminate peripheral calcifications present in the expected region of the posterior thecal sac dorsal to the T7, T8 and T9 vertebral bodies, likely chronic possibly related to remote infection, inflammation or hemorrhage. No definite mass effect identified.     CT LUMBAR SPINE:   PARASPINAL SOFT TISSUES: No paravertebral fluid collection or significant edema. ALIGNMENT: Dextrocurvature of the lower thoracic and lumbar spine. No traumatic spondylolisthesis or traumatic facet widening. VERTEBRAE: Bones appear mildly demineralized. No acute fracture. Vertebral body heights are  maintained. SPINAL CANAL/INTERVERTEBRAL DISCS: No high-grade spinal canal stenosis. Moderate multilevel disc height loss, most significant at T12-L1. Moderate bilateral facet joint arthropathy from L2-S1. NEURAL FORAMINA: Disc bulge at L2-L3, L3-L4 and L5-S1 with facet hypertrophy with up to moderate foraminal narrowing. Disc bulge and facet hypertrophy at L4 L spine with up to severe foraminal stenosis.       1.  No acute abnormality within the abdomen or pelvis. 2. No acute fracture of the thoracic or lumbar spine. Degenerative changes of the spine without critical canal stenosis. Foraminal narrowing in the lumbar spine as detailed. 3. Probable chronic calcifications within the posterior fecal sac at the level of the midthoracic spine as detailed. No associated canal stenosis identified..   I personally reviewed the image(s)/study and resident interpretation. I agree with the findings as stated by resident Fredi Anderson. Data analyzed and images interpreted at [place], [place].   MACRO: None   Signed by: Beth Holguin 8/12/2024 2:55 AM Dictation workstation:   [id number]    CT thoracic spine wo IV contrast    Result Date: 8/12/2024  Interpreted By:  Beth Holguin,  and Monica Rai STUDY: CT ABDOMEN PELVIS W IV CONTRAST; CT THORACIC SPINE WO IV CONTRAST; CT LUMBAR SPINE WO IV CONTRAST;  8/12/2024 2:12 am; 8/12/2024 2:11 am   INDICATION: Signs/Symptoms:N/V; Signs/Symptoms:BUE and BLE weakness; Signs/Symptoms:BUE and BLE weakness..   COMPARISON: None.   ACCESSION NUMBER(S): [id number]; [id number]; [id number]   ORDERING CLINICIAN: LILI FONTANEZ   TECHNIQUE: CT of the abdomen and pelvis was performed.  Standard contiguous axial images were obtained at 3 mm slice thickness through the abdomen and pelvis. Coronal and sagittal reconstructions at 3 mm slice thickness were performed.   150 ml of contrast omni 350 were administered intravenously without immediate  complication.   FINDINGS: LOWER CHEST: Ground-glass attenuation of the lower lungs likely representing aspiration pneumonitis with a component of atelectasis. Emphysematous changes. Please see dedicated CTA chest for further evaluation.   ABDOMEN:   LIVER: The liver is normal in size measuring 15.3 cm in the craniocaudal axis. No focal hepatic lesions.   BILE DUCTS: The intrahepatic and extrahepatic ducts are not dilated.   GALLBLADDER: The gallbladder is nondistended and without evidence of radiopaque stones.   PANCREAS: The pancreas appears unremarkable without evidence of ductal dilatation or masses.   SPLEEN: The spleen is normal in size without focal lesions.   ADRENAL GLANDS: Bilateral adrenal glands appear normal.   KIDNEYS AND URETERS: The kidneys are normal in size and enhance symmetrically. Few bilateral hypodense lesions are too small to characterize, favored to represent simple cysts.  No hydroureteronephrosis. Contrast material is present within the bilateral renal collecting systems and ureters, limiting evaluation for calculi.   PELVIS:   BLADDER: Bladder is normal in appearance without wall thickening. Layering contrast material within the bladder.   REPRODUCTIVE ORGANS: The uterus is surgically absent.   BOWEL: The stomach is unremarkable.   The small and large bowel are normal in caliber and demonstrate no wall thickening. Colonic diverticulosis without evidence of diverticulitis. The appendix is not definitely visualized. There is however no pericecal stranding or fluid. Moderate stool burden.   VESSELS: There is no aneurysmal dilatation of the abdominal aorta. The IVC appears normal. Moderate atherosclerotic calcification of the abdominal aorta and its branches.   PERITONEUM/RETROPERITONEUM/LYMPH NODES: There is no free or loculated fluid collection, no free intraperitoneal air. The retroperitoneum appears normal.  No abdominopelvic lymphadenopathy is present.   BONES AND ABDOMINAL WALL:  Rightward curvature of the lumbar spine. The abdominal wall soft tissues appear normal.     CT THORACIC SPINE:   PARASPINAL SOFT TISSUES: No paravertebral fluid collection or significant edema. ALIGNMENT:  No traumatic spondylolisthesis or traumatic facet widening. VERTEBRAE:  No acute fracture. Vertebral body heights are maintained. SPINAL CANAL/INTERVERTEBRAL DISCS: No high-grade spinal canal stenosis. Moderate multilevel disc height loss, most severe at T11-12 and T12-L1. Multilevel anterior osteophytosis. The there are indeterminate peripheral calcifications present in the expected region of the posterior thecal sac dorsal to the T7, T8 and T9 vertebral bodies, likely chronic possibly related to remote infection, inflammation or hemorrhage. No definite mass effect identified.     CT LUMBAR SPINE:   PARASPINAL SOFT TISSUES: No paravertebral fluid collection or significant edema. ALIGNMENT: Dextrocurvature of the lower thoracic and lumbar spine. No traumatic spondylolisthesis or traumatic facet widening. VERTEBRAE: Bones appear mildly demineralized. No acute fracture. Vertebral body heights are maintained. SPINAL CANAL/INTERVERTEBRAL DISCS: No high-grade spinal canal stenosis. Moderate multilevel disc height loss, most significant at T12-L1. Moderate bilateral facet joint arthropathy from L2-S1. NEURAL FORAMINA: Disc bulge at L2-L3, L3-L4 and L5-S1 with facet hypertrophy with up to moderate foraminal narrowing. Disc bulge and facet hypertrophy at L4 L spine with up to severe foraminal stenosis.       1.  No acute abnormality within the abdomen or pelvis. 2. No acute fracture of the thoracic or lumbar spine. Degenerative changes of the spine without critical canal stenosis. Foraminal narrowing in the lumbar spine as detailed. 3. Probable chronic calcifications within the posterior fecal sac at the level of the midthoracic spine as detailed. No associated canal stenosis identified..   I personally reviewed the  image(s)/study and resident interpretation. I agree with the findings as stated by resident Fredi Anderson. Data analyzed and images interpreted at [facility], [place], [place].   MACRO: None   Signed by: Beth Holguin 8/12/2024 2:55 AM Dictation workstation:   [id number]    CT lumbar spine wo IV contrast    Result Date: 8/12/2024  Interpreted By:  Beth Holguin and Beyersdorf Conner STUDY: CT ABDOMEN PELVIS W IV CONTRAST; CT THORACIC SPINE WO IV CONTRAST; CT LUMBAR SPINE WO IV CONTRAST;  8/12/2024 2:12 am; 8/12/2024 2:11 am   INDICATION: Signs/Symptoms:N/V; Signs/Symptoms:BUE and BLE weakness; Signs/Symptoms:BUE and BLE weakness..   COMPARISON: None.   ACCESSION NUMBER(S): [id number]; [id number]; [id number]   ORDERING CLINICIAN: LILI FONTANEZ   TECHNIQUE: CT of the abdomen and pelvis was performed.  Standard contiguous axial images were obtained at 3 mm slice thickness through the abdomen and pelvis. Coronal and sagittal reconstructions at 3 mm slice thickness were performed.   150 ml of contrast omni 350 were administered intravenously without immediate complication.   FINDINGS: LOWER CHEST: Ground-glass attenuation of the lower lungs likely representing aspiration pneumonitis with a component of atelectasis. Emphysematous changes. Please see dedicated CTA chest for further evaluation.   ABDOMEN:   LIVER: The liver is normal in size measuring 15.3 cm in the craniocaudal axis. No focal hepatic lesions.   BILE DUCTS: The intrahepatic and extrahepatic ducts are not dilated.   GALLBLADDER: The gallbladder is nondistended and without evidence of radiopaque stones.   PANCREAS: The pancreas appears unremarkable without evidence of ductal dilatation or masses.   SPLEEN: The spleen is normal in size without focal lesions.   ADRENAL GLANDS: Bilateral adrenal glands appear normal.   KIDNEYS AND URETERS: The kidneys are normal in size and enhance symmetrically. Few bilateral  hypodense lesions are too small to characterize, favored to represent simple cysts.  No hydroureteronephrosis. Contrast material is present within the bilateral renal collecting systems and ureters, limiting evaluation for calculi.   PELVIS:   BLADDER: Bladder is normal in appearance without wall thickening. Layering contrast material within the bladder.   REPRODUCTIVE ORGANS: The uterus is surgically absent.   BOWEL: The stomach is unremarkable.   The small and large bowel are normal in caliber and demonstrate no wall thickening. Colonic diverticulosis without evidence of diverticulitis. The appendix is not definitely visualized. There is however no pericecal stranding or fluid. Moderate stool burden.   VESSELS: There is no aneurysmal dilatation of the abdominal aorta. The IVC appears normal. Moderate atherosclerotic calcification of the abdominal aorta and its branches.   PERITONEUM/RETROPERITONEUM/LYMPH NODES: There is no free or loculated fluid collection, no free intraperitoneal air. The retroperitoneum appears normal.  No abdominopelvic lymphadenopathy is present.   BONES AND ABDOMINAL WALL: Rightward curvature of the lumbar spine. The abdominal wall soft tissues appear normal.     CT THORACIC SPINE:   PARASPINAL SOFT TISSUES: No paravertebral fluid collection or significant edema. ALIGNMENT:  No traumatic spondylolisthesis or traumatic facet widening. VERTEBRAE:  No acute fracture. Vertebral body heights are maintained. SPINAL CANAL/INTERVERTEBRAL DISCS: No high-grade spinal canal stenosis. Moderate multilevel disc height loss, most severe at T11-12 and T12-L1. Multilevel anterior osteophytosis. The there are indeterminate peripheral calcifications present in the expected region of the posterior thecal sac dorsal to the T7, T8 and T9 vertebral bodies, likely chronic possibly related to remote infection, inflammation or hemorrhage. No definite mass effect identified.     CT LUMBAR SPINE:   PARASPINAL SOFT  TISSUES: No paravertebral fluid collection or significant edema. ALIGNMENT: Dextrocurvature of the lower thoracic and lumbar spine. No traumatic spondylolisthesis or traumatic facet widening. VERTEBRAE: Bones appear mildly demineralized. No acute fracture. Vertebral body heights are maintained. SPINAL CANAL/INTERVERTEBRAL DISCS: No high-grade spinal canal stenosis. Moderate multilevel disc height loss, most significant at T12-L1. Moderate bilateral facet joint arthropathy from L2-S1. NEURAL FORAMINA: Disc bulge at L2-L3, L3-L4 and L5-S1 with facet hypertrophy with up to moderate foraminal narrowing. Disc bulge and facet hypertrophy at L4 L spine with up to severe foraminal stenosis.       1.  No acute abnormality within the abdomen or pelvis. 2. No acute fracture of the thoracic or lumbar spine. Degenerative changes of the spine without critical canal stenosis. Foraminal narrowing in the lumbar spine as detailed. 3. Probable chronic calcifications within the posterior fecal sac at the level of the midthoracic spine as detailed. No associated canal stenosis identified..   I personally reviewed the image(s)/study and resident interpretation. I agree with the findings as stated by resident Fredi Anderson. Data analyzed and images interpreted at [facility], [place], [place].   MACRO: None   Signed by: Beth Holguin 8/12/2024 2:55 AM Dictation workstation:   [id number]    CT angio chest for pulmonary embolism    Result Date: 8/12/2024  Interpreted By:  Beth Holguin and Beyersdorf Conner STUDY: CT ANGIO CHEST FOR PULMONARY EMBOLISM;  8/12/2024 2:36 am   INDICATION: Signs/Symptoms:Weakness, fatigue shortness of breath.   COMPARISON: None   ACCESSION NUMBER(S): [id number]   ORDERING CLINICIAN: ROSSANA CRANE   TECHNIQUE: Helical data acquisition of the chest was obtained after intravenous administration of 150 cc of Omnipaque 350, as per PE protocol. Images were reformatted in  coronal and sagittal planes. Axial and coronal maximum intensity projection (MIP) images were created and reviewed.   FINDINGS: POTENTIAL LIMITATIONS OF THE STUDY: None   HEART AND VESSELS: There are no discrete filling defects within main pulmonary artery and its branches to suggest acute pulmonary embolism. Main pulmonary artery and its branches are normal in caliber.   The thoracic aorta normal in course and caliber.There is moderate atherosclerosis present, including calcified and noncalcified plaques.Although, the study is not tailored for evaluation of aorta, there is no definite evidence of acute aortic pathology. Ectasia of the main pulmonary artery measuring 3.3 cm Coronary artery calcifications noted however exam is not optimized for evaluation.   Mild cardiomegaly with enlargement of the left atrium.There are no findings to suggest right heart strain. Left atrial appendage closure device in place. Left chest pacemaker/ICD with leads terminating in the right atrium and right ventricle. There is no pericardial effusion seen.   MEDIASTINUM AND TOÑO, LOWER NECK AND AXILLA: The visualized thyroid gland is within normal limits. No evidence of thoracic lymphadenopathy by CT criteria. Patulous appearance of the esophagus with heterogenous material in the upper esophagus.   LUNGS AND AIRWAYS: There is mucous layering in the distal trachea and right and left main bronchi. Subtle mucous plugging in lower lobe bronchials. Ground-glass consolidation of the dependent portions of the lower lungs. Moderate centrilobular and apically predominant emphysematous changes.  There is mild diffuse bronchial wall thickening. Calcified granuloma noted dot No pleural effusion or pneumothorax.     UPPER ABDOMEN:   Please see dedicated CT abdomen pelvis for further evaluation.   CHEST WALL AND OSSEOUS STRUCTURES: Chest wall is within normal limits. Multilevel degenerative changes with endplate osteophytes in the visualized spine. No  acute osseous pathology.There are no suspicious osseous lesions.       1. No evidence of acute pulmonary embolism to the segmental level. 2. Small amount of mucus layering within the main bronchi and mucous plugging of the bronchials. Ground-glass opacities in the bilateral lung bases. Findings are consistent with aspiration pneumonitis and superimposed atelectasis. 3. Patulous esophagus with debris and septations noted to the upper level which could be related to esophagitis. Clinical correlation suggested. Findings also increased aspiration risk. 4. Ectatic main pulmonary artery measuring 3.3 cm. 5. Cardiomegaly with enlargement of the left atrium.   I personally reviewed the image(s)/study and resident interpretation. I agree with the findings as stated by resident Fredi Anderson. Data analyzed and images interpreted at [facility], [place], [place].   MACRO: None   Signed by: Beth Holguin 8/12/2024 2:37 AM Dictation workstation:   [id number]    CT head wo IV contrast    Result Date: 8/12/2024  Interpreted By:  Beth Holguin and Beyersdorf Conner STUDY: CT HEAD WO IV CONTRAST; CT CERVICAL SPINE WO IV CONTRAST;  8/12/2024 1:22 am   INDICATION: Signs/Symptoms:Hx of brain aneurysm. Generalized fatigue and weakness; Signs/Symptoms:BUE and BLE weakness   COMPARISON: CT head 03/28/2017   ACCESSION NUMBER(S): [id number]; [id number]   ORDERING CLINICIAN: ROSSANA FONTANEZ   TECHNIQUE: Axial noncontrast CT images of head with coronal and sagittal reconstructed images. Axial noncontrast CT images of the cervical spine with coronal and sagittal reconstructed images.   FINDINGS: CT HEAD:   Postsurgical changes of right temporal craniotomy and aneurysmal clipping near the suprasellar and right parasellar region. Associated metallic streak artifact limits evaluation of the region. Findings are similar prior imaging   BRAIN PARENCHYMA: Confluent region of hypoattenuating  areas of periventricular and subcortical white matter, which is nonspecific, but favored to represent chronic small-vessel ischemic disease in a patient of this age. Mild encephalomalacia within the bilateral right temporal lobe. No evidence of acute intraparenchymal hemorrhage or parenchymal evidence of acute large territory ischemic infarct. No mass-effect, midline shift or effacement of cerebral sulci. Gray-white matter distinction is preserved.   VENTRICLES and EXTRA-AXIAL SPACES: No acute extra-axial or intraventricular hemorrhage. Ventricles and sulci are age-concordant.   PARANASAL SINUSES/MASTOIDS: No hemorrhage or air-fluid levels within the visualized paranasal sinuses. The mastoid air cells are well-aerated.   CALVARIUM/ORBITS: No skull fracture. Bilateral lens replacements. The orbits and globes are intact to the extent visualized.   EXTRACRANIAL SOFT TISSUES: No discernible abnormality. Postsurgical changes of the lungs is   CT CERVICAL SPINE:   PREVERTEBRAL SOFT TISSUES: Within normal limits.   CRANIOCERVICAL JUNCTION: Intact.   ALIGNMENT: Loss of the normal cervical lordosis. There is grade 1 retrolisthesis of C3-C4 measuring 3.3 mm, and grade 1 retrolisthesis of C5-6 measuring 2.7 mm. Facet joint alignment is maintained. No traumatic malalignment or traumatic facet widening.   VERTEBRAE: No acute fracture. Vertebral body heights are maintained.   SPINAL CANAL/INTERVERTEBRAL DISCS: No high-grade spinal canal stenosis. Multilevel variable disc space height loss, most severe at C6-7. Prominent osteophytosis at this level along with subchondral sclerosis and cystic changes..   NEURAL FORAMINA: Mild uncovertebral hypertrophy at C4-5 with a mild bilateral neural foraminal narrowing. Moderate neural foraminal narrowing and right facet hypertrophy at C5-6 with moderate right neural foraminal narrowing. Severe uncovertebral hypertrophy at C6-7 with moderate bilateral neural foraminal narrowing.   OTHER:  Dedicated chest imaging has been performed and will be reported separately..       CT HEAD: 1. Postsurgical changes of right temporal craniotomy and aneurysmal clipping right partially degrades imaging. Otherwise no acute intracranial hemorrhage or mass effect identified. 2. Findings consistent with chronic small-vessel ischemic disease and right temporal lobe encephalomalacia.     CT CERVICAL SPINE: 1. No acute fracture or traumatic malalignment of the cervical spine. 2. Multilevel spondylotic changes of the cervical spine as detailed above, with multilevel neural foraminal narrowing.     I personally reviewed the image(s)/study and resident interpretation. I agree with the findings as stated by resident Fredi Anderson. Data analyzed and images interpreted at [facility], [place], [place].   MACRO: none   Signed by: Beth Holguin 8/12/2024 2:31 AM Dictation workstation:   [id number]    CT cervical spine wo IV contrast    Result Date: 8/12/2024  Interpreted By:  Beth Holguin and Beyersdorf Conner STUDY: CT HEAD WO IV CONTRAST; CT CERVICAL SPINE WO IV CONTRAST;  8/12/2024 1:22 am   INDICATION: Signs/Symptoms:Hx of brain aneurysm. Generalized fatigue and weakness; Signs/Symptoms:BUE and BLE weakness   COMPARISON: CT head 03/28/2017   ACCESSION NUMBER(S): [id number]; [id number]   ORDERING CLINICIAN: ROSSANA FONTANEZ   TECHNIQUE: Axial noncontrast CT images of head with coronal and sagittal reconstructed images. Axial noncontrast CT images of the cervical spine with coronal and sagittal reconstructed images.   FINDINGS: CT HEAD:   Postsurgical changes of right temporal craniotomy and aneurysmal clipping near the suprasellar and right parasellar region. Associated metallic streak artifact limits evaluation of the region. Findings are similar prior imaging   BRAIN PARENCHYMA: Confluent region of hypoattenuating areas of periventricular and subcortical white matter,  which is nonspecific, but favored to represent chronic small-vessel ischemic disease in a patient of this age. Mild encephalomalacia within the bilateral right temporal lobe. No evidence of acute intraparenchymal hemorrhage or parenchymal evidence of acute large territory ischemic infarct. No mass-effect, midline shift or effacement of cerebral sulci. Gray-white matter distinction is preserved.   VENTRICLES and EXTRA-AXIAL SPACES: No acute extra-axial or intraventricular hemorrhage. Ventricles and sulci are age-concordant.   PARANASAL SINUSES/MASTOIDS: No hemorrhage or air-fluid levels within the visualized paranasal sinuses. The mastoid air cells are well-aerated.   CALVARIUM/ORBITS: No skull fracture. Bilateral lens replacements. The orbits and globes are intact to the extent visualized.   EXTRACRANIAL SOFT TISSUES: No discernible abnormality. Postsurgical changes of the lungs is   CT CERVICAL SPINE:   PREVERTEBRAL SOFT TISSUES: Within normal limits.   CRANIOCERVICAL JUNCTION: Intact.   ALIGNMENT: Loss of the normal cervical lordosis. There is grade 1 retrolisthesis of C3-C4 measuring 3.3 mm, and grade 1 retrolisthesis of C5-6 measuring 2.7 mm. Facet joint alignment is maintained. No traumatic malalignment or traumatic facet widening.   VERTEBRAE: No acute fracture. Vertebral body heights are maintained.   SPINAL CANAL/INTERVERTEBRAL DISCS: No high-grade spinal canal stenosis. Multilevel variable disc space height loss, most severe at C6-7. Prominent osteophytosis at this level along with subchondral sclerosis and cystic changes..   NEURAL FORAMINA: Mild uncovertebral hypertrophy at C4-5 with a mild bilateral neural foraminal narrowing. Moderate neural foraminal narrowing and right facet hypertrophy at C5-6 with moderate right neural foraminal narrowing. Severe uncovertebral hypertrophy at C6-7 with moderate bilateral neural foraminal narrowing.   OTHER: Dedicated chest imaging has been performed and will be  reported separately..       CT HEAD: 1. Postsurgical changes of right temporal craniotomy and aneurysmal clipping right partially degrades imaging. Otherwise no acute intracranial hemorrhage or mass effect identified. 2. Findings consistent with chronic small-vessel ischemic disease and right temporal lobe encephalomalacia.     CT CERVICAL SPINE: 1. No acute fracture or traumatic malalignment of the cervical spine. 2. Multilevel spondylotic changes of the cervical spine as detailed above, with multilevel neural foraminal narrowing.     I personally reviewed the image(s)/study and resident interpretation. I agree with the findings as stated by resident Fredi Anderson. Data analyzed and images interpreted at [place], [place].   MACRO: none   Signed by: Beth Holguin 8/12/2024 2:31 AM Dictation workstation:   [id number]    XR chest 1 view    Result Date: 8/12/2024  STUDY: Chest Radiograph;  8/11/2024 10:08 PM INDICATION: Fatigue. COMPARISON: None Available ACCESSION NUMBER(S): [id number] ORDERING CLINICIAN: ROSSANA CRANE TECHNIQUE:  Frontal chest was obtained at 22:08 hours. FINDINGS: Pacemaker on the left. CARDIOMEDIASTINAL SILHOUETTE: Cardiomediastinal silhouette is normal in size and configuration.  LUNGS: Lungs are clear.  ABDOMEN: No remarkable upper abdominal findings.  BONES: No acute osseous changes.    No radiographic evidence of acute cardiopulmonary disease. Signed by Cynthia Sam MD    XR hip left with pelvis when performed 2 or 3 views    Result Date: 8/7/2024  * * *Final Report* * * DATE OF EXAM: Aug  5 2024  4:13PM   NIX   [id number]  -  XR HIP 3V PELV+ AP/LAT LT  / ACCESSION #  [id number] PROCEDURE REASON: PAIN IN LEFT HIP      * * * * Physician Interpretation * * * * RESULT: Frontal and frog leg views of the  left hip were obtained with AP view pelvis.  There are no previous studies for comparison There is mild degenerative changes and joint space narrowing  of the left hip.  There is moderate joint space narrowing of the right hip with mild degenerative changes.  There is mild to moderate degenerative changes involving sacroiliac joints.  I see no fracture or dislocation    IMPRESSION: MILD DEGENERATIVE CHANGES LEFT HIP Transcribe Date/Time: Aug  7 2024  2:54P Dictated by: KERI MANZANARES DO This examination was interpreted and the report reviewed and electronically signed by: KERI MANZANARES DO on Aug  7 2024  2:55PM  EST Thank you for allowing us to participate in the care of your patient. Should there be any questions regarding this interpretation, please call [phone]. If you are unable to reach us at the number above, please feel free to contact [facility] at [phone].    CT abdomen pelvis wo IV contrast    Result Date: 7/21/2024  EXAMINATION: CT OF THE ABDOMEN AND PELVIS WITHOUT CONTRAST 7/20/2024 11:54 pm TECHNIQUE: CT of the abdomen and pelvis was performed without the administration of intravenous contrast. Multiplanar reformatted images are provided for review. Automated exposure control, iterative reconstruction, and/or weight based adjustment of the mA/kV was utilized to reduce the radiation dose to as low as reasonably achievable. COMPARISON: None. HISTORY: ORDERING SYSTEM PROVIDED HISTORY: Lower abdominal pain TECHNOLOGIST PROVIDED HISTORY: Reason for exam:->Lower abdominal pain Additional Contrast?->None Decision Support Exception - unselect if not a suspected or confirmed emergency medical condition->Emergency Medical Condition (MA) What reading provider will be dictating this exam?->CRC FINDINGS: LOWER CHEST: Visualized lung bases are clear. LIVER: Unremarkable. BILIARY: Gallbladder is without calcified stone.  CBD near upper normal limits for a patient of this age. SPLEEN: Unremarkable. PANCREAS: Grossly unremarkable. ADRENALS: Unremarkable. KIDNEYS: No hydronephrosis or nephrolithiasis. GI: No bowel obstruction.  Appendix not  "identified however there is no significant pericecal inflammatory change.  Mild colonic diverticulosis.  No pneumoperitoneum. LYMPH NODES: No significant lymphadenopathy. VESSELS: Abdominal aorta is nonaneurysmal with mild-to-moderate atherosclerotic calcification PELVIS: Unremarkable bladder.  Uterus is absent. BONES: No aggressive osseous lesion. ADDITIONAL FINDINGS: 8 mm nodular density right breast axial image 2.    No acute intra-abdominal pelvic process appreciated. 8 mm nodular density right breast.  Correlate with mammograms. Additional observations as above.    CT angio head w and wo IV contrast    Result Date: 7/21/2024  EXAMINATION: CTA OF THE HEAD WITH CONTRAST 7/20/2024 10:54 pm: TECHNIQUE: CTA of the head/brain was performed with the administration of intravenous contrast. Multiplanar reformatted images are provided for review.  MIP images are provided for review. Automated exposure control, iterative reconstruction, and/or weight based adjustment of the mA/kV was utilized to reduce the radiation dose to as low as reasonably achievable. COMPARISON: None. HISTORY: ORDERING SYSTEM PROVIDED HISTORY: Dizzy off balance TECHNOLOGIST PROVIDED HISTORY: Reason for exam:->Dizzy off balance Has a \"code stroke\" or \"stroke alert\" been called?-> What reading provider will be dictating this exam?->CRC FINDINGS: ANTERIOR CIRCULATION: No significant stenosis of the intracranial internal carotid, anterior cerebral, or middle cerebral arteries. No aneurysm. POSTERIOR CIRCULATION: No significant stenosis of the vertebral, basilar, or posterior cerebral arteries. No aneurysm. OTHER: No dural venous sinus thrombosis on this non-dedicated study. BRAIN: No mass effect or midline shift. No extra-axial fluid collection. The gray-white differentiation is maintained. Aneurysm clip seen near the tip of the basilar artery.  There is an old right pterional craniotomy.    Unremarkable CTA of the head.    XR chest 1 view    Result " Date: 7/20/2024  EXAMINATION: ONE XRAY VIEW OF THE CHEST 7/20/2024 11:22 pm COMPARISON: 10/12/2019. HISTORY: ORDERING SYSTEM PROVIDED HISTORY: Decreased appetite mild cough TECHNOLOGIST PROVIDED HISTORY: Reason for exam:->Decreased appetite mild cough FINDINGS: The lungs are without acute focal process.  There is no effusion or pneumothorax. The cardiomediastinal silhouette is without acute process. The osseous structures are without acute process.    No acute process.          Assessment/Plan   Assessment & Plan  Generalized weakness    This a 80yo RHF PMHx HTN, SSS (s/p pacer 2019), pAF (s/p watchman 2020), cerebral aneurysm (s/p clipping 1993), ET presenting for subacute progressive ascending weakness and instability.      Patient reported unsteady walking for about 6 weeks. Progressively worsening, but seems to have more-sharply declined in past 2 weeks. Now starting to involve BUE. Now starting to have paresthesias in the RUE and pain in the R shoulder. Occasional electrical shocks of pain radiating down BUE and chest.     Initial neurological examination was significant for some L thenar atrophy, weakness of ECRL/ECU, EDC, ADM, FDI. On lowers, there is diminished LLE vibratory sense at the toe, ankle, and knee compared to left. Questionable C5 sensory level. Normal tone. Reflexes 3+ with spread throughout, London's & finger flexor reflex present bilaterally, 4bts ankle clonus bilaterally. Toes mute. Trace jaw jerk. Gait narrow-based and unsteady with truncal instability. CTH which shows R temporal cranioplasty, R posterior circulation clip with bloom artifact, and moderate posterior-predominant subcortical microvascular disease.      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 Gold standard of imaging would be MRI, but her aneurysm clip is non-compatible.      #Cervical myeloradiculopathy  :: Possible  structural compression  :: B12, folate, homocysteine all within normal limits  - Pending CT myelogram  - Pending lab results for copper, methylmalonic acid, vitamin E     #MISC  -Continue home meds: hydrochlorothiazide daily, potassium, propranolol 10 mg QID, rosuvastatin 5 mg HS, Valtrex 500 mg daily.     F: None  E: as needed  N: NPO for myelogram, plan for regular diet after  D: Lovenox  I: pIV     Full code    Sofiya Hough, MS3      I personally saw, examined, and discussed the patient above. I have reviewed and agree with the excellent note above. All edits or corrections have been made directly into the note, including the physical exam and assessment/plan.    Tejinder Carey MD  Neurology Resident PGY4

## 2024-08-15 LAB
ALBUMIN SERPL BCP-MCNC: 2.8 G/DL (ref 3.4–5)
ANION GAP SERPL CALC-SCNC: 11 MMOL/L (ref 10–20)
BASOPHILS # BLD AUTO: 0.06 X10*3/UL (ref 0–0.1)
BASOPHILS NFR BLD AUTO: 1 %
BUN SERPL-MCNC: 16 MG/DL (ref 6–23)
CALCIUM SERPL-MCNC: 7.4 MG/DL (ref 8.6–10.6)
CHLORIDE SERPL-SCNC: 111 MMOL/L (ref 98–107)
CO2 SERPL-SCNC: 24 MMOL/L (ref 21–32)
COPPER SERPL-MCNC: 104.5 UG/DL (ref 80–155)
CREAT SERPL-MCNC: 0.42 MG/DL (ref 0.5–1.05)
EGFRCR SERPLBLD CKD-EPI 2021: >90 ML/MIN/1.73M*2
EOSINOPHIL # BLD AUTO: 0.13 X10*3/UL (ref 0–0.4)
EOSINOPHIL NFR BLD AUTO: 2.1 %
ERYTHROCYTE [DISTWIDTH] IN BLOOD BY AUTOMATED COUNT: 12 % (ref 11.5–14.5)
GLUCOSE SERPL-MCNC: 93 MG/DL (ref 74–99)
HCT VFR BLD AUTO: 44.5 % (ref 36–46)
HGB BLD-MCNC: 15.5 G/DL (ref 12–16)
IMM GRANULOCYTES # BLD AUTO: 0.01 X10*3/UL (ref 0–0.5)
IMM GRANULOCYTES NFR BLD AUTO: 0.2 % (ref 0–0.9)
LYMPHOCYTES # BLD AUTO: 2.25 X10*3/UL (ref 0.8–3)
LYMPHOCYTES NFR BLD AUTO: 36.4 %
MAGNESIUM SERPL-MCNC: 1.54 MG/DL (ref 1.6–2.4)
MCH RBC QN AUTO: 32.9 PG (ref 26–34)
MCHC RBC AUTO-ENTMCNC: 34.8 G/DL (ref 32–36)
MCV RBC AUTO: 95 FL (ref 80–100)
METHYLMALONATE SERPL-SCNC: 0.13 UMOL/L (ref 0–0.4)
MONOCYTES # BLD AUTO: 0.58 X10*3/UL (ref 0.05–0.8)
MONOCYTES NFR BLD AUTO: 9.4 %
NEUTROPHILS # BLD AUTO: 3.15 X10*3/UL (ref 1.6–5.5)
NEUTROPHILS NFR BLD AUTO: 50.9 %
NRBC BLD-RTO: 0 /100 WBCS (ref 0–0)
PHOSPHATE SERPL-MCNC: 3.2 MG/DL (ref 2.5–4.9)
PLATELET # BLD AUTO: 194 X10*3/UL (ref 150–450)
POTASSIUM SERPL-SCNC: 3.3 MMOL/L (ref 3.5–5.3)
RBC # BLD AUTO: 4.71 X10*6/UL (ref 4–5.2)
SODIUM SERPL-SCNC: 143 MMOL/L (ref 136–145)
WBC # BLD AUTO: 6.2 X10*3/UL (ref 4.4–11.3)

## 2024-08-15 PROCEDURE — 85025 COMPLETE CBC W/AUTO DIFF WBC: CPT

## 2024-08-15 PROCEDURE — 2500000002 HC RX 250 W HCPCS SELF ADMINISTERED DRUGS (ALT 637 FOR MEDICARE OP, ALT 636 FOR OP/ED): Performed by: PHYSICIAN ASSISTANT

## 2024-08-15 PROCEDURE — 97116 GAIT TRAINING THERAPY: CPT | Mod: GP

## 2024-08-15 PROCEDURE — 2500000001 HC RX 250 WO HCPCS SELF ADMINISTERED DRUGS (ALT 637 FOR MEDICARE OP): Performed by: PHYSICIAN ASSISTANT

## 2024-08-15 PROCEDURE — S4991 NICOTINE PATCH NONLEGEND: HCPCS | Performed by: PHYSICIAN ASSISTANT

## 2024-08-15 PROCEDURE — 1100000001 HC PRIVATE ROOM DAILY

## 2024-08-15 PROCEDURE — 83735 ASSAY OF MAGNESIUM: CPT

## 2024-08-15 PROCEDURE — 2500000004 HC RX 250 GENERAL PHARMACY W/ HCPCS (ALT 636 FOR OP/ED)

## 2024-08-15 PROCEDURE — 97161 PT EVAL LOW COMPLEX 20 MIN: CPT | Mod: GP

## 2024-08-15 PROCEDURE — 80069 RENAL FUNCTION PANEL: CPT

## 2024-08-15 PROCEDURE — 99221 1ST HOSP IP/OBS SF/LOW 40: CPT | Performed by: STUDENT IN AN ORGANIZED HEALTH CARE EDUCATION/TRAINING PROGRAM

## 2024-08-15 PROCEDURE — 36415 COLL VENOUS BLD VENIPUNCTURE: CPT

## 2024-08-15 RX ORDER — MAGNESIUM SULFATE HEPTAHYDRATE 40 MG/ML
2 INJECTION, SOLUTION INTRAVENOUS ONCE
Status: COMPLETED | OUTPATIENT
Start: 2024-08-15 | End: 2024-08-15

## 2024-08-15 SDOH — SOCIAL STABILITY: SOCIAL INSECURITY: HAVE YOU HAD ANY THOUGHTS OF HARMING ANYONE ELSE?: NO

## 2024-08-15 SDOH — SOCIAL STABILITY: SOCIAL INSECURITY: WERE YOU ABLE TO COMPLETE ALL THE BEHAVIORAL HEALTH SCREENINGS?: YES

## 2024-08-15 SDOH — SOCIAL STABILITY: SOCIAL INSECURITY: HAVE YOU HAD THOUGHTS OF HARMING ANYONE ELSE?: NO

## 2024-08-15 SDOH — SOCIAL STABILITY: SOCIAL INSECURITY: DO YOU FEEL UNSAFE GOING BACK TO THE PLACE WHERE YOU ARE LIVING?: NO

## 2024-08-15 SDOH — SOCIAL STABILITY: SOCIAL INSECURITY: HAS ANYONE EVER THREATENED TO HURT YOUR FAMILY OR YOUR PETS?: NO

## 2024-08-15 SDOH — SOCIAL STABILITY: SOCIAL INSECURITY: ARE THERE ANY APPARENT SIGNS OF INJURIES/BEHAVIORS THAT COULD BE RELATED TO ABUSE/NEGLECT?: NO

## 2024-08-15 SDOH — SOCIAL STABILITY: SOCIAL INSECURITY: ARE YOU OR HAVE YOU BEEN THREATENED OR ABUSED PHYSICALLY, EMOTIONALLY, OR SEXUALLY BY ANYONE?: NO

## 2024-08-15 SDOH — SOCIAL STABILITY: SOCIAL INSECURITY: DO YOU FEEL ANYONE HAS EXPLOITED OR TAKEN ADVANTAGE OF YOU FINANCIALLY OR OF YOUR PERSONAL PROPERTY?: NO

## 2024-08-15 SDOH — SOCIAL STABILITY: SOCIAL INSECURITY: DOES ANYONE TRY TO KEEP YOU FROM HAVING/CONTACTING OTHER FRIENDS OR DOING THINGS OUTSIDE YOUR HOME?: NO

## 2024-08-15 SDOH — SOCIAL STABILITY: SOCIAL INSECURITY: ABUSE: ADULT

## 2024-08-15 ASSESSMENT — LIFESTYLE VARIABLES
HOW OFTEN DO YOU HAVE 6 OR MORE DRINKS ON ONE OCCASION: NEVER
HOW OFTEN DO YOU HAVE A DRINK CONTAINING ALCOHOL: NEVER
SKIP TO QUESTIONS 9-10: 1
AUDIT-C TOTAL SCORE: 0
AUDIT-C TOTAL SCORE: 0
HOW MANY STANDARD DRINKS CONTAINING ALCOHOL DO YOU HAVE ON A TYPICAL DAY: PATIENT DOES NOT DRINK

## 2024-08-15 ASSESSMENT — PAIN SCALES - GENERAL
PAINLEVEL_OUTOF10: 0 - NO PAIN

## 2024-08-15 ASSESSMENT — COGNITIVE AND FUNCTIONAL STATUS - GENERAL
CLIMB 3 TO 5 STEPS WITH RAILING: A LOT
WALKING IN HOSPITAL ROOM: A LITTLE
MOVING FROM LYING ON BACK TO SITTING ON SIDE OF FLAT BED WITH BEDRAILS: A LITTLE
DAILY ACTIVITIY SCORE: 19
TURNING FROM BACK TO SIDE WHILE IN FLAT BAD: A LITTLE
MOBILITY SCORE: 18
MOBILITY SCORE: 15
HELP NEEDED FOR BATHING: A LITTLE
TURNING FROM BACK TO SIDE WHILE IN FLAT BAD: A LITTLE
WALKING IN HOSPITAL ROOM: A LOT
CLIMB 3 TO 5 STEPS WITH RAILING: TOTAL
MOVING TO AND FROM BED TO CHAIR: A LITTLE
TOILETING: A LITTLE
PERSONAL GROOMING: A LITTLE
STANDING UP FROM CHAIR USING ARMS: A LITTLE
STANDING UP FROM CHAIR USING ARMS: A LITTLE
MOVING TO AND FROM BED TO CHAIR: A LITTLE
DRESSING REGULAR LOWER BODY CLOTHING: A LITTLE
DRESSING REGULAR UPPER BODY CLOTHING: A LITTLE

## 2024-08-15 ASSESSMENT — PATIENT HEALTH QUESTIONNAIRE - PHQ9
SUM OF ALL RESPONSES TO PHQ9 QUESTIONS 1 & 2: 0
2. FEELING DOWN, DEPRESSED OR HOPELESS: NOT AT ALL
1. LITTLE INTEREST OR PLEASURE IN DOING THINGS: NOT AT ALL

## 2024-08-15 ASSESSMENT — ACTIVITIES OF DAILY LIVING (ADL): ADL_ASSISTANCE: INDEPENDENT

## 2024-08-15 ASSESSMENT — PAIN - FUNCTIONAL ASSESSMENT: PAIN_FUNCTIONAL_ASSESSMENT: 0-10

## 2024-08-15 NOTE — PROGRESS NOTES
"Linda López is a 79 y.o. right-handed female with a history of A-fib, Anxiety and depression, Arthritis, Artificial cardiac pacemaker, Brain aneurysm s/p clipping (told by ED not MRI compatible), Essential tremor, HTN (hypertension), Kidney stones, Lumbar radiculopathy on day 2 of admission after presenting with subacute weakness, ataxia. Neurology was consulted for difficulty walking, upper extremity weakness.    Subjective   NAEON. CT c-spine myelogram showed non-opacification of thecal sac, non-diagnostic. Patient reports continued numbness at the tip of her left thumb and initially stated she felt her walking was better, but later felt it was about the same.    Objective     Physical Exam  Mental status: A+O x 4, known vocal dystonia (baseline s/p aneurysmal clipping)  Motor exam: 5/5 strength in all muscle groups  Reflexes: 3+ b/l biceps, BR, patellar, achilles reflexes. No plantar response seen.  Sensory: Sensation to light touch intact in b/l UE and LE. Reduced sensation to vibration in LE, L>R. Proprioception intact in b/l LE.  Gait: Gait narrow-based and unsteady.    Last Recorded Vitals  Blood pressure 154/72, pulse 60, temperature 36.3 °C (97.3 °F), temperature source Temporal, resp. rate 16, height 1.626 m (5' 4\"), weight 76.7 kg (169 lb), SpO2 99%.  Intake/Output last 3 Shifts:  No intake/output data recorded.    Relevant Results  Scheduled medications  enoxaparin, 40 mg, subcutaneous, q24h  hydroCHLOROthiazide, 25 mg, oral, Daily  magnesium sulfate, 2 g, intravenous, Once  nicotine, 1 patch, transdermal, Daily  potassium chloride CR, 20 mEq, oral, TID  propranolol, 10 mg, oral, 4x daily  rosuvastatin, 5 mg, oral, Nightly  valACYclovir, 500 mg, oral, Daily      Continuous medications     PRN medications    Results for orders placed or performed during the hospital encounter of 08/11/24 (from the past 24 hour(s))   CBC and Auto Differential   Result Value Ref Range    WBC 6.2 4.4 - 11.3 x10*3/uL "    nRBC 0.0 0.0 - 0.0 /100 WBCs    RBC 4.71 4.00 - 5.20 x10*6/uL    Hemoglobin 15.5 12.0 - 16.0 g/dL    Hematocrit 44.5 36.0 - 46.0 %    MCV 95 80 - 100 fL    MCH 32.9 26.0 - 34.0 pg    MCHC 34.8 32.0 - 36.0 g/dL    RDW 12.0 11.5 - 14.5 %    Platelets 194 150 - 450 x10*3/uL    Neutrophils % 50.9 40.0 - 80.0 %    Immature Granulocytes %, Automated 0.2 0.0 - 0.9 %    Lymphocytes % 36.4 13.0 - 44.0 %    Monocytes % 9.4 2.0 - 10.0 %    Eosinophils % 2.1 0.0 - 6.0 %    Basophils % 1.0 0.0 - 2.0 %    Neutrophils Absolute 3.15 1.60 - 5.50 x10*3/uL    Immature Granulocytes Absolute, Automated 0.01 0.00 - 0.50 x10*3/uL    Lymphocytes Absolute 2.25 0.80 - 3.00 x10*3/uL    Monocytes Absolute 0.58 0.05 - 0.80 x10*3/uL    Eosinophils Absolute 0.13 0.00 - 0.40 x10*3/uL    Basophils Absolute 0.06 0.00 - 0.10 x10*3/uL   Renal Function Panel   Result Value Ref Range    Glucose 93 74 - 99 mg/dL    Sodium 143 136 - 145 mmol/L    Potassium 3.3 (L) 3.5 - 5.3 mmol/L    Chloride 111 (H) 98 - 107 mmol/L    Bicarbonate 24 21 - 32 mmol/L    Anion Gap 11 10 - 20 mmol/L    Urea Nitrogen 16 6 - 23 mg/dL    Creatinine 0.42 (L) 0.50 - 1.05 mg/dL    eGFR >90 >60 mL/min/1.73m*2    Calcium 7.4 (L) 8.6 - 10.6 mg/dL    Phosphorus 3.2 2.5 - 4.9 mg/dL    Albumin 2.8 (L) 3.4 - 5.0 g/dL   Magnesium   Result Value Ref Range    Magnesium 1.54 (L) 1.60 - 2.40 mg/dL     CT cervical spine post myelogram    Result Date: 8/14/2024  Interpreted By:  Rico Marr and [facility] [place] STUDY: CT CERVICAL SPINE POST MYELOGRAM; CT LUMBAR SPINE POST MYELOGRAM; [place] MULTIPLE REGIONS MYELOGRAM WITH LUMBAR PUNCTURE; CT THORACIC SPINE POST MYELOGRAM;  8/14/2024 4:57 pm; 8/14/2024 4:24 pm   INDICATION: Signs/Symptoms:Upper motor neuron symptoms of bilateral upper extremities. Unable to obtain MRI; Signs/Symptoms:Cervical, thoracic, lumbar for UE and LE weakness.   COMPARISON: CT cervical spine, thoracic spine, and lumbar spine without contrast 08/12/2024   ACCESSION  NUMBER(S): [id number]; [id number]; [id number]; [id number]   ORDERING CLINICIAN: LILI CARUSO   TECHNIQUE: After discussion of the risks, benefits and alternatives, standard written hospital consent was obtained. The patient was placed prone on the fluoroscopic table.  The lower back was prepped and draped in sterile fashion. One percent lidocaine was used for local anesthesia. Under fluoroscopic guidance, a 22 gauge spinal needle was advanced into the thecal sac at the L3-L4 level. 10 cc of Omnipaque 300 was administered intrathecally under intermittent fluoroscopic imaging. Fluoroscopy time was 1.2 minutes. The patient tolerated the procedure well.   Following the fluoroscopic myelogram, serial axial CT images were obtained through the cervical, thoracic, and lumbar spine, using a bone algorithm. Images were reformatted into sagittal and coronal planes.   FINDINGS: FLUOROSCOPIC MYELOGRAM:  The thecal sac is widely patent.  There is filling bilaterally of nerve root sleeves without cutoff. There is normal alignment.   CT MYELOGRAM: CERVICAL SPINE: No evidence of contrast opacification of the cervical spine thecal sac despite repeat imaging after placing patient in Trendelenburg for several minutes. Nondiagnostic CT myelogram of the cervical spine.   Alignment is unremarkable. Vertebral body heights are maintained. Intervertebral disc space narrowing at C6-C7. Degenerative changes throughout the cervical spine most significant at C6-C7. No evidence of canal stenosis from the craniocervical junction to the C4-5 level. Nondiagnostic examination of the spinal canal at the C5-6 level and below secondary to streak artifact from overlying soft tissues.   There is hypertrophy of the right facet joint of C3-4 with slight joint space widening (series 205, image 23).   THORACIC SPINE: Contrast is visualized within the thoracic spine from the T5-6 level down. Partial opacification of the canal at the T3-4  and T4-5 levels. No contrast is seen within the thecal sac at the T1-2 and T2-3 levels.   Heterogeneous opacification of the right dorsolateral aspect of the thecal sac at the T5-6 level in the dorsal aspect of the thecal sac at the T7-8 level, which may represent blood products. No high-grade canal stenosis at any level from T5-6 through T12-L1.   The vertebral body heights of the thoracic spine are maintained. The intervertebral disc spaces are maintained. No evidence of significant neural foraminal stenosis.     LUMBAR SPINE: Heterogeneous opacification of the thecal sac within the lumbar spine, with dense cyst contrast seen at the L5-S1 level where the thecal sac was accessed.   Vertebral body heights are maintained. Intervertebral disc spaces are maintained. L1-2: No canal or foraminal stenosis.   L2-3: No canal or foraminal stenosis.   L3-4: Shallow disc bulge with facet hypertrophy results in mild narrowing the spinal canal and bilateral foramina.   L4-5: Shallow partially calcified disc bulge with facet hypertrophy results in mild narrowing of bilateral foramina and minimal narrowing of the spinal canal.   L5-S1: No canal or foraminal stenosis.     OTHER: Chest: Pacemaker in place. Status post mitral valve replacement. Emphysematous changes throughout the lungs. Abdomen/pelvis: No remarkable findings within the visualized portions of the abdomen and pelvis. Soft tissues: No remarkable soft tissue findings.       CT myelogram 1. Non-diagnostic myelogram of the cervical spine secondary to non opacification of the thecal sac despite repeat imaging after Trendelenburg positioning. Within the limitations of this non-contrast CT cervical spine, no evidence of high-grade canal stenosis from the craniocervical junction to the C4-5 level. Nondiagnostic evaluation of the spinal canal from the C5-6 level and below secondary to streak artifact from overlying soft tissues. 2. Facet hypertrophy with slight widening of the  right facet joint of C3-4. All findings may be degenerative in etiology, joint widening is nonspecific and underlying infectious process can not be excluded. 3. Inconsistent contrast opacification of the thoracic thecal sac. The thoracic spine is well opacified from T3 through T12 without evidence of central spinal canal stenosis, spinal cord compression, or neural foraminal stenosis. The superior portion of the thoracic thecal sac is not well opacified from approximately T1 through T3, as well as heterogeneous filling defects within the dorsal aspect of the thecal sac at the T5-6 and T7-8 levels, possibly due to blood products. 4. Heterogeneous contrast opacification of the lumbar spine thecal sac. Within these limitations, mild multilevel degenerative change without evidence of high-grade spinal canal stenosis or neural foraminal stenosis.   Given limitations of the current myelogram, consider repeat myelogram if patient is amenable.   I, Dr. Marr, supervised and was available throughout this procedure as performed by fellow physician Dr. Ty. This study was performed and interpreted at [facility]. I agree with the procedural description and the findings as stated.   MACRO: None   Signed by: Rico Marr 8/14/2024 9:22 PM Dictation workstation:   [id number]    CT thoracic spine post myelogram    Result Date: 8/14/2024  Interpreted By:  Rico Marr and Hofer [place] STUDY: CT CERVICAL SPINE POST MYELOGRAM; CT LUMBAR SPINE POST MYELOGRAM; [place] MULTIPLE REGIONS MYELOGRAM WITH LUMBAR PUNCTURE; CT THORACIC SPINE POST MYELOGRAM;  8/14/2024 4:57 pm; 8/14/2024 4:24 pm   INDICATION: Signs/Symptoms:Upper motor neuron symptoms of bilateral upper extremities. Unable to obtain MRI; Signs/Symptoms:Cervical, thoracic, lumbar for UE and LE weakness.   COMPARISON: CT cervical spine, thoracic spine, and lumbar spine without contrast 08/12/2024   ACCESSION NUMBER(S): [id number]; [id number];  [id number]; [id number]   ORDERING CLINICIAN: LILI CARUSO   TECHNIQUE: After discussion of the risks, benefits and alternatives, standard written hospital consent was obtained. The patient was placed prone on the fluoroscopic table.  The lower back was prepped and draped in sterile fashion. One percent lidocaine was used for local anesthesia. Under fluoroscopic guidance, a 22 gauge spinal needle was advanced into the thecal sac at the L3-L4 level. 10 cc of Omnipaque 300 was administered intrathecally under intermittent fluoroscopic imaging. Fluoroscopy time was 1.2 minutes. The patient tolerated the procedure well.   Following the fluoroscopic myelogram, serial axial CT images were obtained through the cervical, thoracic, and lumbar spine, using a bone algorithm. Images were reformatted into sagittal and coronal planes.   FINDINGS: FLUOROSCOPIC MYELOGRAM:  The thecal sac is widely patent.  There is filling bilaterally of nerve root sleeves without cutoff. There is normal alignment.   CT MYELOGRAM: CERVICAL SPINE: No evidence of contrast opacification of the cervical spine thecal sac despite repeat imaging after placing patient in Trendelenburg for several minutes. Nondiagnostic CT myelogram of the cervical spine.   Alignment is unremarkable. Vertebral body heights are maintained. Intervertebral disc space narrowing at C6-C7. Degenerative changes throughout the cervical spine most significant at C6-C7. No evidence of canal stenosis from the craniocervical junction to the C4-5 level. Nondiagnostic examination of the spinal canal at the C5-6 level and below secondary to streak artifact from overlying soft tissues.   There is hypertrophy of the right facet joint of C3-4 with slight joint space widening (series 205, image 23).   THORACIC SPINE: Contrast is visualized within the thoracic spine from the T5-6 level down. Partial opacification of the canal at the T3-4 and T4-5 levels. No contrast is seen  within the thecal sac at the T1-2 and T2-3 levels.   Heterogeneous opacification of the right dorsolateral aspect of the thecal sac at the T5-6 level in the dorsal aspect of the thecal sac at the T7-8 level, which may represent blood products. No high-grade canal stenosis at any level from T5-6 through T12-L1.   The vertebral body heights of the thoracic spine are maintained. The intervertebral disc spaces are maintained. No evidence of significant neural foraminal stenosis.     LUMBAR SPINE: Heterogeneous opacification of the thecal sac within the lumbar spine, with dense cyst contrast seen at the L5-S1 level where the thecal sac was accessed.   Vertebral body heights are maintained. Intervertebral disc spaces are maintained. L1-2: No canal or foraminal stenosis.   L2-3: No canal or foraminal stenosis.   L3-4: Shallow disc bulge with facet hypertrophy results in mild narrowing the spinal canal and bilateral foramina.   L4-5: Shallow partially calcified disc bulge with facet hypertrophy results in mild narrowing of bilateral foramina and minimal narrowing of the spinal canal.   L5-S1: No canal or foraminal stenosis.     OTHER: Chest: Pacemaker in place. Status post mitral valve replacement. Emphysematous changes throughout the lungs. Abdomen/pelvis: No remarkable findings within the visualized portions of the abdomen and pelvis. Soft tissues: No remarkable soft tissue findings.       CT myelogram 1. Non-diagnostic myelogram of the cervical spine secondary to non opacification of the thecal sac despite repeat imaging after Trendelenburg positioning. Within the limitations of this non-contrast CT cervical spine, no evidence of high-grade canal stenosis from the craniocervical junction to the C4-5 level. Nondiagnostic evaluation of the spinal canal from the C5-6 level and below secondary to streak artifact from overlying soft tissues. 2. Facet hypertrophy with slight widening of the right facet joint of C3-4. All  findings may be degenerative in etiology, joint widening is nonspecific and underlying infectious process can not be excluded. 3. Inconsistent contrast opacification of the thoracic thecal sac. The thoracic spine is well opacified from T3 through T12 without evidence of central spinal canal stenosis, spinal cord compression, or neural foraminal stenosis. The superior portion of the thoracic thecal sac is not well opacified from approximately T1 through T3, as well as heterogeneous filling defects within the dorsal aspect of the thecal sac at the T5-6 and T7-8 levels, possibly due to blood products. 4. Heterogeneous contrast opacification of the lumbar spine thecal sac. Within these limitations, mild multilevel degenerative change without evidence of high-grade spinal canal stenosis or neural foraminal stenosis.   Given limitations of the current myelogram, consider repeat myelogram if patient is amenable.   I, Dr. Marr, supervised and was available throughout this procedure as performed by fellow physician Dr. Ty. This study was performed and interpreted at [facility]. I agree with the procedural description and the findings as stated.   MACRO: None   Signed by: Rico Marr 8/14/2024 9:22 PM Dictation workstation:   [id number]    CT lumbar spine post myelogram    Result Date: 8/14/2024  Interpreted By:  Rico Marr and Hofer [place] STUDY: CT CERVICAL SPINE POST MYELOGRAM; CT LUMBAR SPINE POST MYELOGRAM; [place] MULTIPLE REGIONS MYELOGRAM WITH LUMBAR PUNCTURE; CT THORACIC SPINE POST MYELOGRAM;  8/14/2024 4:57 pm; 8/14/2024 4:24 pm   INDICATION: Signs/Symptoms:Upper motor neuron symptoms of bilateral upper extremities. Unable to obtain MRI; Signs/Symptoms:Cervical, thoracic, lumbar for UE and LE weakness.   COMPARISON: CT cervical spine, thoracic spine, and lumbar spine without contrast 08/12/2024   ACCESSION NUMBER(S): [id number]; [id number]; [id number]; [id number]   ORDERING  CLINICIAN: LILI CARUSO   TECHNIQUE: After discussion of the risks, benefits and alternatives, standard written hospital consent was obtained. The patient was placed prone on the fluoroscopic table.  The lower back was prepped and draped in sterile fashion. One percent lidocaine was used for local anesthesia. Under fluoroscopic guidance, a 22 gauge spinal needle was advanced into the thecal sac at the L3-L4 level. 10 cc of Omnipaque 300 was administered intrathecally under intermittent fluoroscopic imaging. Fluoroscopy time was 1.2 minutes. The patient tolerated the procedure well.   Following the fluoroscopic myelogram, serial axial CT images were obtained through the cervical, thoracic, and lumbar spine, using a bone algorithm. Images were reformatted into sagittal and coronal planes.   FINDINGS: FLUOROSCOPIC MYELOGRAM:  The thecal sac is widely patent.  There is filling bilaterally of nerve root sleeves without cutoff. There is normal alignment.   CT MYELOGRAM: CERVICAL SPINE: No evidence of contrast opacification of the cervical spine thecal sac despite repeat imaging after placing patient in Trendelenburg for several minutes. Nondiagnostic CT myelogram of the cervical spine.   Alignment is unremarkable. Vertebral body heights are maintained. Intervertebral disc space narrowing at C6-C7. Degenerative changes throughout the cervical spine most significant at C6-C7. No evidence of canal stenosis from the craniocervical junction to the C4-5 level. Nondiagnostic examination of the spinal canal at the C5-6 level and below secondary to streak artifact from overlying soft tissues.   There is hypertrophy of the right facet joint of C3-4 with slight joint space widening (series 205, image 23).   THORACIC SPINE: Contrast is visualized within the thoracic spine from the T5-6 level down. Partial opacification of the canal at the T3-4 and T4-5 levels. No contrast is seen within the thecal sac at the T1-2 and T2-3  levels.   Heterogeneous opacification of the right dorsolateral aspect of the thecal sac at the T5-6 level in the dorsal aspect of the thecal sac at the T7-8 level, which may represent blood products. No high-grade canal stenosis at any level from T5-6 through T12-L1.   The vertebral body heights of the thoracic spine are maintained. The intervertebral disc spaces are maintained. No evidence of significant neural foraminal stenosis.     LUMBAR SPINE: Heterogeneous opacification of the thecal sac within the lumbar spine, with dense cyst contrast seen at the L5-S1 level where the thecal sac was accessed.   Vertebral body heights are maintained. Intervertebral disc spaces are maintained. L1-2: No canal or foraminal stenosis.   L2-3: No canal or foraminal stenosis.   L3-4: Shallow disc bulge with facet hypertrophy results in mild narrowing the spinal canal and bilateral foramina.   L4-5: Shallow partially calcified disc bulge with facet hypertrophy results in mild narrowing of bilateral foramina and minimal narrowing of the spinal canal.   L5-S1: No canal or foraminal stenosis.     OTHER: Chest: Pacemaker in place. Status post mitral valve replacement. Emphysematous changes throughout the lungs. Abdomen/pelvis: No remarkable findings within the visualized portions of the abdomen and pelvis. Soft tissues: No remarkable soft tissue findings.       CT myelogram 1. Non-diagnostic myelogram of the cervical spine secondary to non opacification of the thecal sac despite repeat imaging after Trendelenburg positioning. Within the limitations of this non-contrast CT cervical spine, no evidence of high-grade canal stenosis from the craniocervical junction to the C4-5 level. Nondiagnostic evaluation of the spinal canal from the C5-6 level and below secondary to streak artifact from overlying soft tissues. 2. Facet hypertrophy with slight widening of the right facet joint of C3-4. All findings may be degenerative in etiology, joint  widening is nonspecific and underlying infectious process can not be excluded. 3. Inconsistent contrast opacification of the thoracic thecal sac. The thoracic spine is well opacified from T3 through T12 without evidence of central spinal canal stenosis, spinal cord compression, or neural foraminal stenosis. The superior portion of the thoracic thecal sac is not well opacified from approximately T1 through T3, as well as heterogeneous filling defects within the dorsal aspect of the thecal sac at the T5-6 and T7-8 levels, possibly due to blood products. 4. Heterogeneous contrast opacification of the lumbar spine thecal sac. Within these limitations, mild multilevel degenerative change without evidence of high-grade spinal canal stenosis or neural foraminal stenosis.   Given limitations of the current myelogram, consider repeat myelogram if patient is amenable.   I, Dr. Marr, supervised and was available throughout this procedure as performed by fellow physician Dr. Ty. This study was performed and interpreted at [facility]. I agree with the procedural description and the findings as stated.   MACRO: None   Signed by: Rico Marr 8/14/2024 9:22 PM Dictation workstation:   [id number]    FL multiple regions myelogram with lumbar puncture    Result Date: 8/14/2024  Interpreted By:  Rico Marr and Hofer [place] STUDY: CT CERVICAL SPINE POST MYELOGRAM; CT LUMBAR SPINE POST MYELOGRAM; FL MULTIPLE REGIONS MYELOGRAM WITH LUMBAR PUNCTURE; CT THORACIC SPINE POST MYELOGRAM;  8/14/2024 4:57 pm; 8/14/2024 4:24 pm   INDICATION: Signs/Symptoms:Upper motor neuron symptoms of bilateral upper extremities. Unable to obtain MRI; Signs/Symptoms:Cervical, thoracic, lumbar for UE and LE weakness.   COMPARISON: CT cervical spine, thoracic spine, and lumbar spine without contrast 08/12/2024   ACCESSION NUMBER(S): [id number]; [id number]; [id number]; [id number]   ORDERING CLINICIAN: LILI FONTANEZ;  SHANNAN CARUSO   TECHNIQUE: After discussion of the risks, benefits and alternatives, standard written hospital consent was obtained. The patient was placed prone on the fluoroscopic table.  The lower back was prepped and draped in sterile fashion. One percent lidocaine was used for local anesthesia. Under fluoroscopic guidance, a 22 gauge spinal needle was advanced into the thecal sac at the L3-L4 level. 10 cc of Omnipaque 300 was administered intrathecally under intermittent fluoroscopic imaging. Fluoroscopy time was 1.2 minutes. The patient tolerated the procedure well.   Following the fluoroscopic myelogram, serial axial CT images were obtained through the cervical, thoracic, and lumbar spine, using a bone algorithm. Images were reformatted into sagittal and coronal planes.   FINDINGS: FLUOROSCOPIC MYELOGRAM:  The thecal sac is widely patent.  There is filling bilaterally of nerve root sleeves without cutoff. There is normal alignment.   CT MYELOGRAM: CERVICAL SPINE: No evidence of contrast opacification of the cervical spine thecal sac despite repeat imaging after placing patient in Trendelenburg for several minutes. Nondiagnostic CT myelogram of the cervical spine.   Alignment is unremarkable. Vertebral body heights are maintained. Intervertebral disc space narrowing at C6-C7. Degenerative changes throughout the cervical spine most significant at C6-C7. No evidence of canal stenosis from the craniocervical junction to the C4-5 level. Nondiagnostic examination of the spinal canal at the C5-6 level and below secondary to streak artifact from overlying soft tissues.   There is hypertrophy of the right facet joint of C3-4 with slight joint space widening (series 205, image 23).   THORACIC SPINE: Contrast is visualized within the thoracic spine from the T5-6 level down. Partial opacification of the canal at the T3-4 and T4-5 levels. No contrast is seen within the thecal sac at the T1-2 and T2-3 levels.   Heterogeneous  opacification of the right dorsolateral aspect of the thecal sac at the T5-6 level in the dorsal aspect of the thecal sac at the T7-8 level, which may represent blood products. No high-grade canal stenosis at any level from T5-6 through T12-L1.   The vertebral body heights of the thoracic spine are maintained. The intervertebral disc spaces are maintained. No evidence of significant neural foraminal stenosis.     LUMBAR SPINE: Heterogeneous opacification of the thecal sac within the lumbar spine, with dense cyst contrast seen at the L5-S1 level where the thecal sac was accessed.   Vertebral body heights are maintained. Intervertebral disc spaces are maintained. L1-2: No canal or foraminal stenosis.   L2-3: No canal or foraminal stenosis.   L3-4: Shallow disc bulge with facet hypertrophy results in mild narrowing the spinal canal and bilateral foramina.   L4-5: Shallow partially calcified disc bulge with facet hypertrophy results in mild narrowing of bilateral foramina and minimal narrowing of the spinal canal.   L5-S1: No canal or foraminal stenosis.     OTHER: Chest: Pacemaker in place. Status post mitral valve replacement. Emphysematous changes throughout the lungs. Abdomen/pelvis: No remarkable findings within the visualized portions of the abdomen and pelvis. Soft tissues: No remarkable soft tissue findings.       CT myelogram 1. Non-diagnostic myelogram of the cervical spine secondary to non opacification of the thecal sac despite repeat imaging after Trendelenburg positioning. Within the limitations of this non-contrast CT cervical spine, no evidence of high-grade canal stenosis from the craniocervical junction to the C4-5 level. Nondiagnostic evaluation of the spinal canal from the C5-6 level and below secondary to streak artifact from overlying soft tissues. 2. Facet hypertrophy with slight widening of the right facet joint of C3-4. All findings may be degenerative in etiology, joint widening is nonspecific  and underlying infectious process can not be excluded. 3. Inconsistent contrast opacification of the thoracic thecal sac. The thoracic spine is well opacified from T3 through T12 without evidence of central spinal canal stenosis, spinal cord compression, or neural foraminal stenosis. The superior portion of the thoracic thecal sac is not well opacified from approximately T1 through T3, as well as heterogeneous filling defects within the dorsal aspect of the thecal sac at the T5-6 and T7-8 levels, possibly due to blood products. 4. Heterogeneous contrast opacification of the lumbar spine thecal sac. Within these limitations, mild multilevel degenerative change without evidence of high-grade spinal canal stenosis or neural foraminal stenosis.   Given limitations of the current myelogram, consider repeat myelogram if patient is amenable.   I, Dr. Marr, supervised and was available throughout this procedure as performed by fellow physician Dr. Ty. This study was performed and interpreted at [facility]. I agree with the procedural description and the findings as stated.   MACRO: None   Signed by: Rico Marr 8/14/2024 9:22 PM Dictation workstation:   [id number]    CT angio head w and wo IV contrast    Result Date: 8/12/2024  Interpreted By:  Beth Holguin and Beyersdorf Conner STUDY: CT ANGIO HEAD W AND WO IV CONTRAST;  8/12/2024 1:22 am   INDICATION: Signs/Symptoms:bilateral upper extremity weaknes. Hx of aneurysm.   COMPARISON: CT head 08/12/2024   ACCESSION NUMBER(S): [id number]   ORDERING CLINICIAN: LILI FONTANEZ   TECHNIQUE: Unenhanced CT images of the head were obtained. Subsequently, 150 mL of Omnipaque 350 was administered intravenously and axial images of the head were acquired.  Coronal, sagittal, and 3-D reconstructions were provided for review.   FINDINGS: Postsurgical changes of the aneurysm clip new right basilar cistern region motion limits evaluation due to streak  artifact. Dedicated head CT is been performed and reported separately.   Anterior circulation: Atherosclerotic calcifications of the right greater than left carotid siphons. Streak artifact partially limits evaluation of the right distal ICA otherwise the bilateral intracranial internal carotid arteries, bilateral carotid terminals, bilateral proximal anterior and middle cerebral arteries are normal.   Posterior circulation: Tapering with diminutive intradural left vertebral artery. Streak artifact limits evaluation of the basilar tip and right greater than left proximal PCA. The mid to distal PCAs appear grossly patent.   Adjacent rounded opacities in the region of the right posterior communicating artery noted on axial projection without corresponding abnormality identified on additional projections and favored to represent volume averaging or artifact. Otherwise bilateral intracranial vertebral arteries, vertebrobasilar junction and basilar artery are normal.       Postsurgical changes of right posterior circulation aneurysmal clipping with associated streak artifact partially limiting evaluation. Otherwise no evidence for significant stenosis or large branch vessel cutoffs of the intracranial vessels.   Opacities noted on axial imaging without corresponding abnormality on additional findings. Findings are favored to represent artifact versus true aneurysm however attention on follow-up recommended.   I personally reviewed the image(s)/study and resident interpretation. I agree with the findings as stated by resident Fredi Anderson. Data analyzed and images interpreted at [facility], [place], [place].     MACRO: None   Signed by: Beth Holguin 8/12/2024 3:47 AM Dictation workstation:   [id number]    CT abdomen pelvis w IV contrast    Result Date: 8/12/2024  Interpreted By:  Beth Holguin and Beyersdorf Conner STUDY: CT ABDOMEN PELVIS W IV CONTRAST; CT THORACIC SPINE WO IV  CONTRAST; CT LUMBAR SPINE WO IV CONTRAST;  8/12/2024 2:12 am; 8/12/2024 2:11 am   INDICATION: Signs/Symptoms:N/V; Signs/Symptoms:BUE and BLE weakness; Signs/Symptoms:BUE and BLE weakness..   COMPARISON: None.   ACCESSION NUMBER(S): [id number]; [id number]; [id number]   ORDERING CLINICIAN: LILI FONTANEZ   TECHNIQUE: CT of the abdomen and pelvis was performed.  Standard contiguous axial images were obtained at 3 mm slice thickness through the abdomen and pelvis. Coronal and sagittal reconstructions at 3 mm slice thickness were performed.   150 ml of contrast omni 350 were administered intravenously without immediate complication.   FINDINGS: LOWER CHEST: Ground-glass attenuation of the lower lungs likely representing aspiration pneumonitis with a component of atelectasis. Emphysematous changes. Please see dedicated CTA chest for further evaluation.   ABDOMEN:   LIVER: The liver is normal in size measuring 15.3 cm in the craniocaudal axis. No focal hepatic lesions.   BILE DUCTS: The intrahepatic and extrahepatic ducts are not dilated.   GALLBLADDER: The gallbladder is nondistended and without evidence of radiopaque stones.   PANCREAS: The pancreas appears unremarkable without evidence of ductal dilatation or masses.   SPLEEN: The spleen is normal in size without focal lesions.   ADRENAL GLANDS: Bilateral adrenal glands appear normal.   KIDNEYS AND URETERS: The kidneys are normal in size and enhance symmetrically. Few bilateral hypodense lesions are too small to characterize, favored to represent simple cysts.  No hydroureteronephrosis. Contrast material is present within the bilateral renal collecting systems and ureters, limiting evaluation for calculi.   PELVIS:   BLADDER: Bladder is normal in appearance without wall thickening. Layering contrast material within the bladder.   REPRODUCTIVE ORGANS: The uterus is surgically absent.   BOWEL: The stomach is unremarkable.   The small and large bowel are normal in  caliber and demonstrate no wall thickening. Colonic diverticulosis without evidence of diverticulitis. The appendix is not definitely visualized. There is however no pericecal stranding or fluid. Moderate stool burden.   VESSELS: There is no aneurysmal dilatation of the abdominal aorta. The IVC appears normal. Moderate atherosclerotic calcification of the abdominal aorta and its branches.   PERITONEUM/RETROPERITONEUM/LYMPH NODES: There is no free or loculated fluid collection, no free intraperitoneal air. The retroperitoneum appears normal.  No abdominopelvic lymphadenopathy is present.   BONES AND ABDOMINAL WALL: Rightward curvature of the lumbar spine. The abdominal wall soft tissues appear normal.     CT THORACIC SPINE:   PARASPINAL SOFT TISSUES: No paravertebral fluid collection or significant edema. ALIGNMENT:  No traumatic spondylolisthesis or traumatic facet widening. VERTEBRAE:  No acute fracture. Vertebral body heights are maintained. SPINAL CANAL/INTERVERTEBRAL DISCS: No high-grade spinal canal stenosis. Moderate multilevel disc height loss, most severe at T11-12 and T12-L1. Multilevel anterior osteophytosis. The there are indeterminate peripheral calcifications present in the expected region of the posterior thecal sac dorsal to the T7, T8 and T9 vertebral bodies, likely chronic possibly related to remote infection, inflammation or hemorrhage. No definite mass effect identified.     CT LUMBAR SPINE:   PARASPINAL SOFT TISSUES: No paravertebral fluid collection or significant edema. ALIGNMENT: Dextrocurvature of the lower thoracic and lumbar spine. No traumatic spondylolisthesis or traumatic facet widening. VERTEBRAE: Bones appear mildly demineralized. No acute fracture. Vertebral body heights are maintained. SPINAL CANAL/INTERVERTEBRAL DISCS: No high-grade spinal canal stenosis. Moderate multilevel disc height loss, most significant at T12-L1. Moderate bilateral facet joint arthropathy from L2-S1. NEURAL  FORAMINA: Disc bulge at L2-L3, L3-L4 and L5-S1 with facet hypertrophy with up to moderate foraminal narrowing. Disc bulge and facet hypertrophy at L4 L spine with up to severe foraminal stenosis.       1.  No acute abnormality within the abdomen or pelvis. 2. No acute fracture of the thoracic or lumbar spine. Degenerative changes of the spine without critical canal stenosis. Foraminal narrowing in the lumbar spine as detailed. 3. Probable chronic calcifications within the posterior fecal sac at the level of the midthoracic spine as detailed. No associated canal stenosis identified..   I personally reviewed the image(s)/study and resident interpretation. I agree with the findings as stated by resident Fredi Anderson. Data analyzed and images interpreted at [facility], [place], [place].   MACRO: None   Signed by: Beth Holguin 8/12/2024 2:55 AM Dictation workstation:   [id number]    CT thoracic spine wo IV contrast    Result Date: 8/12/2024  Interpreted By:  Beth Holguin,  Colt Rai STUDY: CT ABDOMEN PELVIS W IV CONTRAST; CT THORACIC SPINE WO IV CONTRAST; CT LUMBAR SPINE WO IV CONTRAST;  8/12/2024 2:12 am; 8/12/2024 2:11 am   INDICATION: Signs/Symptoms:N/V; Signs/Symptoms:BUE and BLE weakness; Signs/Symptoms:BUE and BLE weakness..   COMPARISON: None.   ACCESSION NUMBER(S): [id number]; [id number]; [id number]   ORDERING CLINICIAN: LILI FONTANEZ   TECHNIQUE: CT of the abdomen and pelvis was performed.  Standard contiguous axial images were obtained at 3 mm slice thickness through the abdomen and pelvis. Coronal and sagittal reconstructions at 3 mm slice thickness were performed.   150 ml of contrast omni 350 were administered intravenously without immediate complication.   FINDINGS: LOWER CHEST: Ground-glass attenuation of the lower lungs likely representing aspiration pneumonitis with a component of atelectasis. Emphysematous changes. Please see dedicated CTA  chest for further evaluation.   ABDOMEN:   LIVER: The liver is normal in size measuring 15.3 cm in the craniocaudal axis. No focal hepatic lesions.   BILE DUCTS: The intrahepatic and extrahepatic ducts are not dilated.   GALLBLADDER: The gallbladder is nondistended and without evidence of radiopaque stones.   PANCREAS: The pancreas appears unremarkable without evidence of ductal dilatation or masses.   SPLEEN: The spleen is normal in size without focal lesions.   ADRENAL GLANDS: Bilateral adrenal glands appear normal.   KIDNEYS AND URETERS: The kidneys are normal in size and enhance symmetrically. Few bilateral hypodense lesions are too small to characterize, favored to represent simple cysts.  No hydroureteronephrosis. Contrast material is present within the bilateral renal collecting systems and ureters, limiting evaluation for calculi.   PELVIS:   BLADDER: Bladder is normal in appearance without wall thickening. Layering contrast material within the bladder.   REPRODUCTIVE ORGANS: The uterus is surgically absent.   BOWEL: The stomach is unremarkable.   The small and large bowel are normal in caliber and demonstrate no wall thickening. Colonic diverticulosis without evidence of diverticulitis. The appendix is not definitely visualized. There is however no pericecal stranding or fluid. Moderate stool burden.   VESSELS: There is no aneurysmal dilatation of the abdominal aorta. The IVC appears normal. Moderate atherosclerotic calcification of the abdominal aorta and its branches.   PERITONEUM/RETROPERITONEUM/LYMPH NODES: There is no free or loculated fluid collection, no free intraperitoneal air. The retroperitoneum appears normal.  No abdominopelvic lymphadenopathy is present.   BONES AND ABDOMINAL WALL: Rightward curvature of the lumbar spine. The abdominal wall soft tissues appear normal.     CT THORACIC SPINE:   PARASPINAL SOFT TISSUES: No paravertebral fluid collection or significant edema. ALIGNMENT:  No  traumatic spondylolisthesis or traumatic facet widening. VERTEBRAE:  No acute fracture. Vertebral body heights are maintained. SPINAL CANAL/INTERVERTEBRAL DISCS: No high-grade spinal canal stenosis. Moderate multilevel disc height loss, most severe at T11-12 and T12-L1. Multilevel anterior osteophytosis. The there are indeterminate peripheral calcifications present in the expected region of the posterior thecal sac dorsal to the T7, T8 and T9 vertebral bodies, likely chronic possibly related to remote infection, inflammation or hemorrhage. No definite mass effect identified.     CT LUMBAR SPINE:   PARASPINAL SOFT TISSUES: No paravertebral fluid collection or significant edema. ALIGNMENT: Dextrocurvature of the lower thoracic and lumbar spine. No traumatic spondylolisthesis or traumatic facet widening. VERTEBRAE: Bones appear mildly demineralized. No acute fracture. Vertebral body heights are maintained. SPINAL CANAL/INTERVERTEBRAL DISCS: No high-grade spinal canal stenosis. Moderate multilevel disc height loss, most significant at T12-L1. Moderate bilateral facet joint arthropathy from L2-S1. NEURAL FORAMINA: Disc bulge at L2-L3, L3-L4 and L5-S1 with facet hypertrophy with up to moderate foraminal narrowing. Disc bulge and facet hypertrophy at L4 L spine with up to severe foraminal stenosis.       1.  No acute abnormality within the abdomen or pelvis. 2. No acute fracture of the thoracic or lumbar spine. Degenerative changes of the spine without critical canal stenosis. Foraminal narrowing in the lumbar spine as detailed. 3. Probable chronic calcifications within the posterior fecal sac at the level of the midthoracic spine as detailed. No associated canal stenosis identified..   I personally reviewed the image(s)/study and resident interpretation. I agree with the findings as stated by resident Fredi Anderson. Data analyzed and images interpreted at [facility], [place], [place].    MACRO: None   Signed by: Beth Holguin 8/12/2024 2:55 AM Dictation workstation:   [id number]    CT lumbar spine wo IV contrast    Result Date: 8/12/2024  Interpreted By:  Beth Holguin and Beyersdorf Conner STUDY: CT ABDOMEN PELVIS W IV CONTRAST; CT THORACIC SPINE WO IV CONTRAST; CT LUMBAR SPINE WO IV CONTRAST;  8/12/2024 2:12 am; 8/12/2024 2:11 am   INDICATION: Signs/Symptoms:N/V; Signs/Symptoms:BUE and BLE weakness; Signs/Symptoms:BUE and BLE weakness..   COMPARISON: None.   ACCESSION NUMBER(S): [id number]; [id number]; [id number]   ORDERING CLINICIAN: LILI FONTANEZ   TECHNIQUE: CT of the abdomen and pelvis was performed.  Standard contiguous axial images were obtained at 3 mm slice thickness through the abdomen and pelvis. Coronal and sagittal reconstructions at 3 mm slice thickness were performed.   150 ml of contrast omni 350 were administered intravenously without immediate complication.   FINDINGS: LOWER CHEST: Ground-glass attenuation of the lower lungs likely representing aspiration pneumonitis with a component of atelectasis. Emphysematous changes. Please see dedicated CTA chest for further evaluation.   ABDOMEN:   LIVER: The liver is normal in size measuring 15.3 cm in the craniocaudal axis. No focal hepatic lesions.   BILE DUCTS: The intrahepatic and extrahepatic ducts are not dilated.   GALLBLADDER: The gallbladder is nondistended and without evidence of radiopaque stones.   PANCREAS: The pancreas appears unremarkable without evidence of ductal dilatation or masses.   SPLEEN: The spleen is normal in size without focal lesions.   ADRENAL GLANDS: Bilateral adrenal glands appear normal.   KIDNEYS AND URETERS: The kidneys are normal in size and enhance symmetrically. Few bilateral hypodense lesions are too small to characterize, favored to represent simple cysts.  No hydroureteronephrosis. Contrast material is present within the bilateral renal collecting systems and ureters, limiting evaluation  for calculi.   PELVIS:   BLADDER: Bladder is normal in appearance without wall thickening. Layering contrast material within the bladder.   REPRODUCTIVE ORGANS: The uterus is surgically absent.   BOWEL: The stomach is unremarkable.   The small and large bowel are normal in caliber and demonstrate no wall thickening. Colonic diverticulosis without evidence of diverticulitis. The appendix is not definitely visualized. There is however no pericecal stranding or fluid. Moderate stool burden.   VESSELS: There is no aneurysmal dilatation of the abdominal aorta. The IVC appears normal. Moderate atherosclerotic calcification of the abdominal aorta and its branches.   PERITONEUM/RETROPERITONEUM/LYMPH NODES: There is no free or loculated fluid collection, no free intraperitoneal air. The retroperitoneum appears normal.  No abdominopelvic lymphadenopathy is present.   BONES AND ABDOMINAL WALL: Rightward curvature of the lumbar spine. The abdominal wall soft tissues appear normal.     CT THORACIC SPINE:   PARASPINAL SOFT TISSUES: No paravertebral fluid collection or significant edema. ALIGNMENT:  No traumatic spondylolisthesis or traumatic facet widening. VERTEBRAE:  No acute fracture. Vertebral body heights are maintained. SPINAL CANAL/INTERVERTEBRAL DISCS: No high-grade spinal canal stenosis. Moderate multilevel disc height loss, most severe at T11-12 and T12-L1. Multilevel anterior osteophytosis. The there are indeterminate peripheral calcifications present in the expected region of the posterior thecal sac dorsal to the T7, T8 and T9 vertebral bodies, likely chronic possibly related to remote infection, inflammation or hemorrhage. No definite mass effect identified.     CT LUMBAR SPINE:   PARASPINAL SOFT TISSUES: No paravertebral fluid collection or significant edema. ALIGNMENT: Dextrocurvature of the lower thoracic and lumbar spine. No traumatic spondylolisthesis or traumatic facet widening. VERTEBRAE: Bones appear mildly  demineralized. No acute fracture. Vertebral body heights are maintained. SPINAL CANAL/INTERVERTEBRAL DISCS: No high-grade spinal canal stenosis. Moderate multilevel disc height loss, most significant at T12-L1. Moderate bilateral facet joint arthropathy from L2-S1. NEURAL FORAMINA: Disc bulge at L2-L3, L3-L4 and L5-S1 with facet hypertrophy with up to moderate foraminal narrowing. Disc bulge and facet hypertrophy at L4 L spine with up to severe foraminal stenosis.       1.  No acute abnormality within the abdomen or pelvis. 2. No acute fracture of the thoracic or lumbar spine. Degenerative changes of the spine without critical canal stenosis. Foraminal narrowing in the lumbar spine as detailed. 3. Probable chronic calcifications within the posterior fecal sac at the level of the midthoracic spine as detailed. No associated canal stenosis identified..   I personally reviewed the image(s)/study and resident interpretation. I agree with the findings as stated by resident Fredi Anderson. Data analyzed and images interpreted at [place], [place].   MACRO: None   Signed by: Beth Holguin 8/12/2024 2:55 AM Dictation workstation:   [id number]    CT angio chest for pulmonary embolism    Result Date: 8/12/2024  Interpreted By:  Beth Holguin and Beyersdorf Conner STUDY: CT ANGIO CHEST FOR PULMONARY EMBOLISM;  8/12/2024 2:36 am   INDICATION: Signs/Symptoms:Weakness, fatigue shortness of breath.   COMPARISON: None   ACCESSION NUMBER(S): [id number]   ORDERING CLINICIAN: ROSSANA CRANE   TECHNIQUE: Helical data acquisition of the chest was obtained after intravenous administration of 150 cc of Omnipaque 350, as per PE protocol. Images were reformatted in coronal and sagittal planes. Axial and coronal maximum intensity projection (MIP) images were created and reviewed.   FINDINGS: POTENTIAL LIMITATIONS OF THE STUDY: None   HEART AND VESSELS: There are no discrete filling defects  within main pulmonary artery and its branches to suggest acute pulmonary embolism. Main pulmonary artery and its branches are normal in caliber.   The thoracic aorta normal in course and caliber.There is moderate atherosclerosis present, including calcified and noncalcified plaques.Although, the study is not tailored for evaluation of aorta, there is no definite evidence of acute aortic pathology. Ectasia of the main pulmonary artery measuring 3.3 cm Coronary artery calcifications noted however exam is not optimized for evaluation.   Mild cardiomegaly with enlargement of the left atrium.There are no findings to suggest right heart strain. Left atrial appendage closure device in place. Left chest pacemaker/ICD with leads terminating in the right atrium and right ventricle. There is no pericardial effusion seen.   MEDIASTINUM AND TOÑO, LOWER NECK AND AXILLA: The visualized thyroid gland is within normal limits. No evidence of thoracic lymphadenopathy by CT criteria. Patulous appearance of the esophagus with heterogenous material in the upper esophagus.   LUNGS AND AIRWAYS: There is mucous layering in the distal trachea and right and left main bronchi. Subtle mucous plugging in lower lobe bronchials. Ground-glass consolidation of the dependent portions of the lower lungs. Moderate centrilobular and apically predominant emphysematous changes.  There is mild diffuse bronchial wall thickening. Calcified granuloma noted dot No pleural effusion or pneumothorax.     UPPER ABDOMEN:   Please see dedicated CT abdomen pelvis for further evaluation.   CHEST WALL AND OSSEOUS STRUCTURES: Chest wall is within normal limits. Multilevel degenerative changes with endplate osteophytes in the visualized spine. No acute osseous pathology.There are no suspicious osseous lesions.       1. No evidence of acute pulmonary embolism to the segmental level. 2. Small amount of mucus layering within the main bronchi and mucous plugging of the  bronchials. Ground-glass opacities in the bilateral lung bases. Findings are consistent with aspiration pneumonitis and superimposed atelectasis. 3. Patulous esophagus with debris and septations noted to the upper level which could be related to esophagitis. Clinical correlation suggested. Findings also increased aspiration risk. 4. Ectatic main pulmonary artery measuring 3.3 cm. 5. Cardiomegaly with enlargement of the left atrium.   I personally reviewed the image(s)/study and resident interpretation. I agree with the findings as stated by resident Fredi Anderson. Data analyzed and images interpreted at [facility], [place], [place].   MACRO: None   Signed by: Beth Holguin 8/12/2024 2:37 AM Dictation workstation:   [id number]    CT head wo IV contrast    Result Date: 8/12/2024  Interpreted By:  Beth Holguin and Beyersdorf Conner STUDY: CT HEAD WO IV CONTRAST; CT CERVICAL SPINE WO IV CONTRAST;  8/12/2024 1:22 am   INDICATION: Signs/Symptoms:Hx of brain aneurysm. Generalized fatigue and weakness; Signs/Symptoms:BUE and BLE weakness   COMPARISON: CT head 03/28/2017   ACCESSION NUMBER(S): [id number]; [id number]   ORDERING CLINICIAN: ROSSANA FONTANEZ   TECHNIQUE: Axial noncontrast CT images of head with coronal and sagittal reconstructed images. Axial noncontrast CT images of the cervical spine with coronal and sagittal reconstructed images.   FINDINGS: CT HEAD:   Postsurgical changes of right temporal craniotomy and aneurysmal clipping near the suprasellar and right parasellar region. Associated metallic streak artifact limits evaluation of the region. Findings are similar prior imaging   BRAIN PARENCHYMA: Confluent region of hypoattenuating areas of periventricular and subcortical white matter, which is nonspecific, but favored to represent chronic small-vessel ischemic disease in a patient of this age. Mild encephalomalacia within the bilateral right temporal  lobe. No evidence of acute intraparenchymal hemorrhage or parenchymal evidence of acute large territory ischemic infarct. No mass-effect, midline shift or effacement of cerebral sulci. Gray-white matter distinction is preserved.   VENTRICLES and EXTRA-AXIAL SPACES: No acute extra-axial or intraventricular hemorrhage. Ventricles and sulci are age-concordant.   PARANASAL SINUSES/MASTOIDS: No hemorrhage or air-fluid levels within the visualized paranasal sinuses. The mastoid air cells are well-aerated.   CALVARIUM/ORBITS: No skull fracture. Bilateral lens replacements. The orbits and globes are intact to the extent visualized.   EXTRACRANIAL SOFT TISSUES: No discernible abnormality. Postsurgical changes of the lungs is   CT CERVICAL SPINE:   PREVERTEBRAL SOFT TISSUES: Within normal limits.   CRANIOCERVICAL JUNCTION: Intact.   ALIGNMENT: Loss of the normal cervical lordosis. There is grade 1 retrolisthesis of C3-C4 measuring 3.3 mm, and grade 1 retrolisthesis of C5-6 measuring 2.7 mm. Facet joint alignment is maintained. No traumatic malalignment or traumatic facet widening.   VERTEBRAE: No acute fracture. Vertebral body heights are maintained.   SPINAL CANAL/INTERVERTEBRAL DISCS: No high-grade spinal canal stenosis. Multilevel variable disc space height loss, most severe at C6-7. Prominent osteophytosis at this level along with subchondral sclerosis and cystic changes..   NEURAL FORAMINA: Mild uncovertebral hypertrophy at C4-5 with a mild bilateral neural foraminal narrowing. Moderate neural foraminal narrowing and right facet hypertrophy at C5-6 with moderate right neural foraminal narrowing. Severe uncovertebral hypertrophy at C6-7 with moderate bilateral neural foraminal narrowing.   OTHER: Dedicated chest imaging has been performed and will be reported separately..       CT HEAD: 1. Postsurgical changes of right temporal craniotomy and aneurysmal clipping right partially degrades imaging. Otherwise no acute  intracranial hemorrhage or mass effect identified. 2. Findings consistent with chronic small-vessel ischemic disease and right temporal lobe encephalomalacia.     CT CERVICAL SPINE: 1. No acute fracture or traumatic malalignment of the cervical spine. 2. Multilevel spondylotic changes of the cervical spine as detailed above, with multilevel neural foraminal narrowing.     I personally reviewed the image(s)/study and resident interpretation. I agree with the findings as stated by resident Fredi Anderson. Data analyzed and images interpreted at [facility], [place], [place].   MACRO: none   Signed by: Beth Holguin 8/12/2024 2:31 AM Dictation workstation:   [id number]    CT cervical spine wo IV contrast    Result Date: 8/12/2024  Interpreted By:  Beth Holguin and [place]yersdorf Conner STUDY: CT HEAD WO IV CONTRAST; CT CERVICAL SPINE WO IV CONTRAST;  8/12/2024 1:22 am   INDICATION: Signs/Symptoms:Hx of brain aneurysm. Generalized fatigue and weakness; Signs/Symptoms:BUE and BLE weakness   COMPARISON: CT head 03/28/2017   ACCESSION NUMBER(S): [id number]; [id number]   ORDERING CLINICIAN: ROSSANA FONTANEZ   TECHNIQUE: Axial noncontrast CT images of head with coronal and sagittal reconstructed images. Axial noncontrast CT images of the cervical spine with coronal and sagittal reconstructed images.   FINDINGS: CT HEAD:   Postsurgical changes of right temporal craniotomy and aneurysmal clipping near the suprasellar and right parasellar region. Associated metallic streak artifact limits evaluation of the region. Findings are similar prior imaging   BRAIN PARENCHYMA: Confluent region of hypoattenuating areas of periventricular and subcortical white matter, which is nonspecific, but favored to represent chronic small-vessel ischemic disease in a patient of this age. Mild encephalomalacia within the bilateral right temporal lobe. No evidence of acute intraparenchymal hemorrhage  or parenchymal evidence of acute large territory ischemic infarct. No mass-effect, midline shift or effacement of cerebral sulci. Gray-white matter distinction is preserved.   VENTRICLES and EXTRA-AXIAL SPACES: No acute extra-axial or intraventricular hemorrhage. Ventricles and sulci are age-concordant.   PARANASAL SINUSES/MASTOIDS: No hemorrhage or air-fluid levels within the visualized paranasal sinuses. The mastoid air cells are well-aerated.   CALVARIUM/ORBITS: No skull fracture. Bilateral lens replacements. The orbits and globes are intact to the extent visualized.   EXTRACRANIAL SOFT TISSUES: No discernible abnormality. Postsurgical changes of the lungs is   CT CERVICAL SPINE:   PREVERTEBRAL SOFT TISSUES: Within normal limits.   CRANIOCERVICAL JUNCTION: Intact.   ALIGNMENT: Loss of the normal cervical lordosis. There is grade 1 retrolisthesis of C3-C4 measuring 3.3 mm, and grade 1 retrolisthesis of C5-6 measuring 2.7 mm. Facet joint alignment is maintained. No traumatic malalignment or traumatic facet widening.   VERTEBRAE: No acute fracture. Vertebral body heights are maintained.   SPINAL CANAL/INTERVERTEBRAL DISCS: No high-grade spinal canal stenosis. Multilevel variable disc space height loss, most severe at C6-7. Prominent osteophytosis at this level along with subchondral sclerosis and cystic changes..   NEURAL FORAMINA: Mild uncovertebral hypertrophy at C4-5 with a mild bilateral neural foraminal narrowing. Moderate neural foraminal narrowing and right facet hypertrophy at C5-6 with moderate right neural foraminal narrowing. Severe uncovertebral hypertrophy at C6-7 with moderate bilateral neural foraminal narrowing.   OTHER: Dedicated chest imaging has been performed and will be reported separately..       CT HEAD: 1. Postsurgical changes of right temporal craniotomy and aneurysmal clipping right partially degrades imaging. Otherwise no acute intracranial hemorrhage or mass effect identified. 2. Findings  consistent with chronic small-vessel ischemic disease and right temporal lobe encephalomalacia.     CT CERVICAL SPINE: 1. No acute fracture or traumatic malalignment of the cervical spine. 2. Multilevel spondylotic changes of the cervical spine as detailed above, with multilevel neural foraminal narrowing.     I personally reviewed the image(s)/study and resident interpretation. I agree with the findings as stated by resident Fredi Anderson. Data analyzed and images interpreted at [facility], [place], [place].   MACRO: none   Signed by: Beth Holguin 8/12/2024 2:31 AM Dictation workstation:   [id number]        Assessment/Plan   Assessment & Plan  Generalized weakness  This a 78yo RHF PMHx HTN, SSS (s/p pacer 2019), pAF (s/p watchman 2020), cerebral aneurysm (s/p clipping 1993), ET presenting for subacute progressive ascending weakness and instability.      Patient reported unsteady walking for about 6 weeks. Progressively worsening, but seems to have more-sharply declined in past 2 weeks. Now starting to involve BUE. Now starting to have paresthesias in the RUE and pain in the R shoulder. Occasional electrical shocks of pain radiating down BUE and chest.     Initial neurological examination was significant for some L thenar atrophy, weakness of ECRL/ECU, EDC, ADM, FDI. On lowers, there is diminished LLE vibratory sense at the toe, ankle, and knee compared to left. Questionable C5 sensory level. Normal tone. Reflexes 3+ with spread throughout, London's & finger flexor reflex present bilaterally, 4bts ankle clonus bilaterally. Toes mute. Trace jaw jerk. Gait narrow-based and unsteady with truncal instability. CTH which shows R temporal cranioplasty, R posterior circulation clip with bloom artifact, and moderate posterior-predominant subcortical microvascular disease.      Overall manifestation is most concerning for cervical myeloradiculopathy, of which most likely in this  demographic would be structural d/t compression. CT myelogram was inconclusive due to poor contrast uptake in the cervical spine. Consider technical vs obstructive cause. Plan to engage neurosurgery and consider cisterna magna myelogram.      #Cervical myeloradiculopathy  :: Possible structural compression  :: B12, folate, homocysteine, vitamin E, copper all within normal limits  :: CT myelogram inconclusive d/t incomplete contrast uptake, reach out to IR re: imaging difficulties  - Consult neurosurgery  - IR team approved cisterna magna myelogram, plan for add-on 8/16  - Pending lab results for methylmalonic acid     #MISC  -Continue home meds: hydrochlorothiazide daily, potassium, propranolol 10 mg QID, rosuvastatin 5 mg HS, Valtrex 500 mg daily    F: prn  E: prn  N: regular - NPO at [facility]  A: PIV  DVT: lovenox - HELD for now given pre-procedure  GI: N/A    CODE: FULL  NOK: Daughter Racheal, [phone]     Sofiya Hough, MS3    Fartun Emmanuel MD  Neurology PGY-2

## 2024-08-15 NOTE — PROGRESS NOTES
Physical Therapy    Physical Therapy Evaluation & Treatment    Patient Name: Linda López  MRN: [id number]  Today's Date: 8/15/2024   Time Calculation  Start Time: 1340  Stop Time: 1406  Time Calculation (min): 26 min    Assessment/Plan   PT Assessment  PT Assessment Results: Decreased endurance, Impaired balance, Decreased mobility, Decreased safety awareness  Rehab Prognosis: Good  Barriers to Discharge: lives alone  Evaluation/Treatment Tolerance: Patient tolerated treatment well  Medical Staff Made Aware: Yes  Strengths: Ability to acquire knowledge, Attitude of self  End of Session Communication: Bedside nurse  Assessment Comment: Pt with good tolerance to upright activity. Pt was able to ambulate multiple trials w/ and w/o WW. Pt with increased unsteadiness and decreased safety awareness. Patient would benefit from skilled physical therapy to maximize functional mobility and safety. Pt is appropriate for high intensity therapy when medically appropriate for DC.  End of Session Patient Position: Bed, 3 rail up, Alarm off, not on at start of session (Pt sitting EOB, CB in reach, RN aware)   IP OR SWING BED PT PLAN  Inpatient or Swing Bed: Inpatient  PT Plan  Treatment/Interventions: Bed mobility, Gait training, Transfer training, Stair training, Balance training, Strengthening, Endurance training, Therapeutic activity, Therapeutic exercise  PT Plan: Ongoing PT  PT Frequency: 5 times per week  PT Discharge Recommendations: High intensity level of continued care  Equipment Recommended upon Discharge:  (TBD)  PT Recommended Transfer Status: Assist x1, Assistive device (WW)  PT - OK to Discharge: Yes (meaning pt seen and dc rec made)  RN cleared prior to session    Subjective   General Visit Information:  General  Reason for Referral: 80yo  presenting with progressive BUE >BLE weakness x 6 weeks.  Past Medical History Relevant to Rehab: HTN, SSS (s/p pacer 2019), pAF (s/p watchman 2020), cerebral aneurysm (s/p  joseph 1993)  Missed Visit: Yes  Missed Visit Reason:  (Pt pending further imaging with Neurosurgery. Will re attempt as appropriate.)  Family/Caregiver Present: No  Prior to Session Communication: Bedside nurse  Patient Position Received: Bed, 2 rail up, Alarm off, not on at start of session  Preferred Learning Style: auditory, verbal  General Comment: Pt pleasant and agreeable to therapy. Pt with stuttered/broken speech throughout session  Home Living:  Home Living  Type of Home: Condo  Lives With: Alone  Home Adaptive Equipment: None  Home Layout: One level  Home Access: Stairs to enter with rails  Entrance Stairs-Rails: Right  Entrance Stairs-Number of Steps: 2  Bathroom Shower/Tub: Walk-in shower  Bathroom Equipment: Grab bars in shower  Bathroom Accessibility: has shower chair in basement that she hasn't set up yet  Home Living Comments: Pt has basement but states hse doesn't have to go into it for anything  Prior Level of Function:  Prior Function Per Pt/Caregiver Report  Level of [place]: Independent with ADLs and functional transfers, Independent with homemaking with ambulation  ADL Assistance: Independent  Homemaking Assistance: Independent  Ambulatory Assistance: Independent  Prior Function Comments: Pt states she was IND in ADLs and IADLs. Pt used no AD for ambulation prior to admit. Pt states she was very active prior to admit  Precautions:  Precautions  Medical Precautions: Fall precautions  Vital Signs:  Vital Signs  BP: 106/75    Objective   Pain:  Pain Assessment  Pain Assessment: 0-10  0-10 (Numeric) Pain Score: 0 - No pain  Cognition:  Cognition  Overall Cognitive Status: Within Functional Limits  Arousal/Alertness: Appropriate responses to stimuli  Orientation Level: Oriented X4  Following Commands: Follows one step commands consistently    General Assessments:  Activity Tolerance  Endurance: Tolerates 10 - 20 min exercise with multiple rests    Sensation  Light Touch: No apparent  deficits    Strength  Strength Comments: BLE grossly >3+/5  Coordination  Movements are Fluid and Coordinated: Yes    Postural Control  Posture Comment: forward head and rounded shoulders    Static Sitting Balance  Static Sitting-Balance Support: Bilateral upper extremity supported, Feet supported  Static Sitting-Level of Assistance: Close supervision    Static Standing Balance  Static Standing-Balance Support: Bilateral upper extremity supported  Static Standing-Level of Assistance: Contact guard  Static Standing-Comment/Number of Minutes: WW  Functional Assessments:  Bed Mobility  Bed Mobility: Yes  Bed Mobility 1  Bed Mobility 1: Supine to sitting  Level of Assistance 1: Close supervision  Bed Mobility Comments 1: min vc for sequencing and safety    Transfers  Transfer: Yes  Transfer 1  Transfer From 1: Sit to, Stand to  Transfer to 1: Stand, Sit  Technique 1: Sit to stand, Stand to sit  Transfer Device 1: Walker  Transfer Level of Assistance 1: Minimum assistance  Trials/Comments 1: 2 trials; mod vc for safety, hand placement, and posture    Ambulation/Gait Training  Apart of treatment    RLE   RLE :  (grossly >3+/5)  LLE   LLE :  (grossly >3+/5)  Treatments:  Ambulation/Gait Training  Ambulation/Gait Training Performed: Yes  Ambulation/Gait Training 1  Surface 1: Level tile  Device 1: Rolling walker  Assistance 1: Contact guard, Minimum assistance  Quality of Gait 1: Narrow base of support, Diminished heel strike, Inconsistent stride length, Decreased step length, Forward flexed posture (increased lateral sway, mild path deviation, unsteady, decreased foot clearance and adis)  Comments/Distance (ft) 1: 40ft x 2, 60ft x 4; mod vc for safety and posture; pt unsteady and at times requiring increased assist especially during turning  Ambulation/Gait Training 2  Surface 2: Level tile  Device 2: No device  Assistance 2: Minimum assistance, Moderate assistance  Quality of Gait 2: Inconsistent stride length,  Decreased step length, Forward flexed posture, Narrow base of support, Diminished heel strike (increased lateral sway, mild path deviation, unsteady, decreased foot clearance and adis)  Comments/Distance (ft) 2: 40ft x 2; pt very unsteady and required moments mod A during gait 2/2 moments of increased usteadiness; min vc for posture, safety, foot clearance, and balance  Outcome Measures:  [facility] Basic Mobility  Turning from your back to your side while in a flat bed without using bedrails: A little  Moving from lying on your back to sitting on the side of a flat bed without using bedrails: A little  Moving to and from bed to chair (including a wheelchair): A little  Standing up from a chair using your arms (e.g. wheelchair or bedside chair): A little  To walk in hospital room: A lot  Climbing 3-5 steps with railing: Total  Basic Mobility - Total Score: 15    Encounter Problems       Encounter Problems (Active)       PT Problem       Patient will complete supine to sit and sit to supine Independent        Start:  08/15/24    Expected End:  08/29/24            Patient will ambulate >150' with LRAD and Modified Independent        Start:  08/15/24    Expected End:  08/29/24            Patient will perform sit<>stand transfer with LRAD, and Modified Independent        Start:  08/15/24    Expected End:  08/29/24            Patient will ascend/descend 2 steps with Right Rail Ascending and supervision        Start:  08/15/24    Expected End:  08/29/24            Pt will score >/= to 24/28 on the [place] to indicate low fall risk       Start:  08/15/24    Expected End:  08/29/24                   Education Documentation  Precautions, taught by Cici Quiroga, PT at 8/15/2024  3:28 PM.  Learner: Patient  Readiness: Acceptance  Method: Demonstration, Explanation  Response: Verbalizes Understanding, Needs Reinforcement    Body Mechanics, taught by Cici Quiroga PT at 8/15/2024  3:28 PM.  Learner: Patient  Readiness:  Acceptance  Method: Demonstration, Explanation  Response: Verbalizes Understanding, Needs Reinforcement    Mobility Training, taught by Cici Quiroga PT at 8/15/2024  3:28 PM.  Learner: Patient  Readiness: Acceptance  Method: Demonstration, Explanation  Response: Verbalizes Understanding, Needs Reinforcement    Education Comments  No comments found.

## 2024-08-15 NOTE — CONSULTS
Date of Service:  8/15/2024 Attending Provider:  Reg Leslie DO     Reason for Consultation:  Linda López is being seen today for a consult requested by Reg Leslie DO for cervical myelopathy.      Subjective   History of Present Illness:  Linda is a 79 y.o. 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 Patient reports walking and being more active. In July she stopped walking as much due to generalized weakness. She denies dizziness, confusion, numbness, tingling, imbalance. She has not had any falls. She did not use any walker or cane at home. She only started to use walker while in the hospital. This morning she had a hard time feeding herself d/t hand weakness. She did not have issues with feeding, dressing, or fine motor movements prior to admission. She takes ASA 81mg, last took on admission.     Review of Systems   10 point ROS is obtained and negative except the ones mentioned in the HPI    Objective     Vitals:  Vitals:    08/15/24 0810   BP: 129/54   Pulse: 57   Resp: 17   Temp: 36.4 °C (97.5 °F)   SpO2: 99%         Exam:  Constitutional: No acute distress  Resp: breathing comfortably  Cardio: well perfused  GI: nondistended  MSK: full range of motion  Neuro: No acute distress, A&Ox3  Cranial Nerves II-XII: OU3R, EOMI, Face symmetric, Facial SILT, Palate/Tongue midline and symmetric, shoulder shrugs symmetric, hearing intact to finger rubs bilaterally  Motor:  RUE D5 B5 T5 HG/IO 5  RLE HF5 KE5 DF/PF 5  LUE D4 B5 T5 HG/IO 4+  LLE HF5 KE5 DF/PF 5  B/l wilkins, 3+ patellar reflexes  Sensation: SILT throughout all extremities  Psych: appropriate  Skin: no obvious lesions    Medical History  Past Medical History:   Diagnosis Date    Epilepsy, unspecified, not intractable, without status epilepticus (Multi) 11/18/2014     Epilepsy    Other conditions influencing health status     Ulcer    Personal history of other diseases of the circulatory system     History of hypertension    Personal history of other diseases of the digestive system     History of esophageal reflux    Personal history of other diseases of the musculoskeletal system and connective tissue     History of arthritis    Personal history of other diseases of the nervous system and sense organs     History of cataract    Personal history of other mental and behavioral disorders     History of depression    Personal history of other specified conditions     History of heartburn    Pure hypercholesterolemia, unspecified     High cholesterol    REM sleep behavior disorder 03/17/2017    REM behavioral disorder    Snoring     Snoring       Surgical History  Past Surgical History:   Procedure Laterality Date    CT HEAD ANGIO W AND WO IV CONTRAST  5/25/2017    CT HEAD ANGIO W AND WO IV CONTRAST 5/25/2017 [facility]        Medications  No current outpatient medications     Diagnostic Results:    Lab Results   Component Value Date    WBC 6.2 08/15/2024    HGB 15.5 08/15/2024    HCT 44.5 08/15/2024    MCV 95 08/15/2024     08/15/2024     Lab Results   Component Value Date    CREATININE 0.42 (L) 08/15/2024    BUN 16 08/15/2024     08/15/2024    K 3.3 (L) 08/15/2024     (H) 08/15/2024    CO2 24 08/15/2024     Lab Results   Component Value Date    INR 1.1 08/12/2024    INR 1.1 09/19/2018    PROTIME 12.5 08/12/2024    PROTIME 12.7 09/19/2018       === 08/11/24 ===    CT LUMBAR SPINE POST MYELOGRAM    - Impression -  CT myelogram  1. Non-diagnostic myelogram of the cervical spine secondary to non  opacification of the thecal sac despite repeat imaging after  Trendelenburg positioning. Within the limitations of this  non-contrast CT cervical spine, no evidence of high-grade canal  stenosis from the craniocervical junction to the C4-5 level.  Nondiagnostic  evaluation of the spinal canal from the C5-6 level and  below secondary to streak artifact from overlying soft tissues.  2. Facet hypertrophy with slight widening of the right facet joint of  C3-4. All findings may be degenerative in etiology, joint widening is  nonspecific and underlying infectious process can not be excluded.  3. Inconsistent contrast opacification of the thoracic thecal sac.  The thoracic spine is well opacified from T3 through T12 without  evidence of central spinal canal stenosis, spinal cord compression,  or neural foraminal stenosis. The superior portion of the thoracic  thecal sac is not well opacified from approximately T1 through T3, as  well as heterogeneous filling defects within the dorsal aspect of the  thecal sac at the T5-6 and T7-8 levels, possibly due to blood  products.  4. Heterogeneous contrast opacification of the lumbar spine thecal  sac. Within these limitations, mild multilevel degenerative change  without evidence of high-grade spinal canal stenosis or neural  foraminal stenosis.    Given limitations of the current myelogram, consider repeat myelogram  if patient is amenable.    I, Dr. Marr, supervised and was available throughout this procedure  as performed by fellow physician Dr. Ty. This study was performed  and interpreted at [facility]. I agree with  the procedural description and the findings as stated.    MACRO:  None    Signed by: Rico Marr 8/14/2024 9:22 PM  Dictation workstation:   [id number]    Assessment/Plan   Assessment:  Linda is a 79 y.o. 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, essential tremor, recurrent shingles, anxiety, depression p/w progressive BUE >BLE weakness [id number], CT myelogram limited d/t poor contrast in CS, pannus, PLL hypertrophy, R mild C3-4 facet widening    Patient with myelopathic signs on exam with radiographic findings of mild  canal stenosis although difficult to visualize clearly     Plan:  Recommend repeat myelogram to better visualize canal and assess for compression  Continue to hold ASA   Further recs pending imaging    Rubio Rutherford MD  Plan not finalized until note signed by attending    Awaiting repeat myelogram, recs to follow        French Matson MD, [facility]  Spine , [facility]  Nish Brown and Veronica Brown Chair in Spinal Neurosurgery  Neurosurgery , [facility] and [facility]  Complex Spine Surgery Fellowship Director   of Neurological Surgery  [facility] [employer]  Office: [phone]  Fax: [phone]

## 2024-08-15 NOTE — PROGRESS NOTES
Physical Therapy                 Therapy Communication Note    Patient Name: Linda López  MRN: [id number]  Today's Date: 8/15/2024     Discipline: Physical Therapy    Missed Visit Reason: Missed Visit Reason:  (Pt pending further imaging with Neurosurgery. Will re attempt as appropriate.)    Missed Time: Attempt    Comment:

## 2024-08-15 NOTE — ASSESSMENT & PLAN NOTE
This a 80yo RHF PMHx HTN, SSS (s/p pacer 2019), pAF (s/p watchman 2020), cerebral aneurysm (s/p clipping 1993), ET presenting for subacute progressive ascending weakness and instability.      Patient reported unsteady walking for about 6 weeks. Progressively worsening, but seems to have more-sharply declined in past 2 weeks. Now starting to involve BUE. Now starting to have paresthesias in the RUE and pain in the R shoulder. Occasional electrical shocks of pain radiating down BUE and chest.     Initial neurological examination was significant for some L thenar atrophy, weakness of ECRL/ECU, EDC, ADM, FDI. On lowers, there is diminished LLE vibratory sense at the toe, ankle, and knee compared to left. Questionable C5 sensory level. Normal tone. Reflexes 3+ with spread throughout, London's & finger flexor reflex present bilaterally, 4bts ankle clonus bilaterally. Toes mute. Trace jaw jerk. Gait narrow-based and unsteady with truncal instability. CTH which shows R temporal cranioplasty, R posterior circulation clip with bloom artifact, and moderate posterior-predominant subcortical microvascular disease.      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 CT myelogram was inconclusive due to poor contrast uptake in the cervical spine. Consider technical vs obstructive cause. Plan to engage neurosurgery and consider cisterna magna myelogram.      #Cervical myeloradiculopathy  :: Possible structural compression  :: B12, folate, homocysteine, vitamin E, copper all within normal limits  :: CT myelogram inconclusive d/t incomplete contrast uptake, reach out to IR re: imaging difficulties  - Consult neurosurgery  - IR team approved cisterna magna myelogram, plan for add-on 8/16  - Pending lab results for methylmalonic acid     #MISC  -Continue home meds: hydrochlorothiazide daily, potassium, propranolol 10 mg QID,  rosuvastatin 5 mg HS, Valtrex 500 mg daily    F: prn  E: prn  N: regular - NPO at [facility]  A: PIV  DVT: lovenox - HELD for now given pre-procedure  GI: N/A

## 2024-08-16 ENCOUNTER — APPOINTMENT (OUTPATIENT)
Dept: RADIOLOGY | Facility: HOSPITAL | Age: 79
DRG: 552 | End: 2024-08-16
Payer: MEDICARE

## 2024-08-16 ENCOUNTER — APPOINTMENT (OUTPATIENT)
Dept: RADIOLOGY | Facility: HOSPITAL | Age: 79
End: 2024-08-16
Payer: MEDICARE

## 2024-08-16 LAB
ALBUMIN SERPL BCP-MCNC: 3.7 G/DL (ref 3.4–5)
ANION GAP SERPL CALC-SCNC: 11 MMOL/L (ref 10–20)
BASOPHILS # BLD AUTO: 0.05 X10*3/UL (ref 0–0.1)
BASOPHILS NFR BLD AUTO: 0.9 %
BUN SERPL-MCNC: 17 MG/DL (ref 6–23)
CALCIUM SERPL-MCNC: 9.3 MG/DL (ref 8.6–10.6)
CHLORIDE SERPL-SCNC: 106 MMOL/L (ref 98–107)
CO2 SERPL-SCNC: 28 MMOL/L (ref 21–32)
CREAT SERPL-MCNC: 0.63 MG/DL (ref 0.5–1.05)
EGFRCR SERPLBLD CKD-EPI 2021: >90 ML/MIN/1.73M*2
EOSINOPHIL # BLD AUTO: 0.13 X10*3/UL (ref 0–0.4)
EOSINOPHIL NFR BLD AUTO: 2.3 %
ERYTHROCYTE [DISTWIDTH] IN BLOOD BY AUTOMATED COUNT: 12.1 % (ref 11.5–14.5)
GLUCOSE SERPL-MCNC: 136 MG/DL (ref 74–99)
HCT VFR BLD AUTO: 43.9 % (ref 36–46)
HGB BLD-MCNC: 14.8 G/DL (ref 12–16)
IMM GRANULOCYTES # BLD AUTO: 0.01 X10*3/UL (ref 0–0.5)
IMM GRANULOCYTES NFR BLD AUTO: 0.2 % (ref 0–0.9)
LYMPHOCYTES # BLD AUTO: 2.14 X10*3/UL (ref 0.8–3)
LYMPHOCYTES NFR BLD AUTO: 37.5 %
MAGNESIUM SERPL-MCNC: 2.06 MG/DL (ref 1.6–2.4)
MCH RBC QN AUTO: 32.8 PG (ref 26–34)
MCHC RBC AUTO-ENTMCNC: 33.7 G/DL (ref 32–36)
MCV RBC AUTO: 97 FL (ref 80–100)
MONOCYTES # BLD AUTO: 0.52 X10*3/UL (ref 0.05–0.8)
MONOCYTES NFR BLD AUTO: 9.1 %
NEUTROPHILS # BLD AUTO: 2.85 X10*3/UL (ref 1.6–5.5)
NEUTROPHILS NFR BLD AUTO: 50 %
NRBC BLD-RTO: 0 /100 WBCS (ref 0–0)
PHOSPHATE SERPL-MCNC: 3.2 MG/DL (ref 2.5–4.9)
PLATELET # BLD AUTO: 194 X10*3/UL (ref 150–450)
POTASSIUM SERPL-SCNC: 3.8 MMOL/L (ref 3.5–5.3)
RBC # BLD AUTO: 4.51 X10*6/UL (ref 4–5.2)
SODIUM SERPL-SCNC: 141 MMOL/L (ref 136–145)
WBC # BLD AUTO: 5.7 X10*3/UL (ref 4.4–11.3)

## 2024-08-16 PROCEDURE — 97112 NEUROMUSCULAR REEDUCATION: CPT | Mod: GP

## 2024-08-16 PROCEDURE — 84100 ASSAY OF PHOSPHORUS: CPT

## 2024-08-16 PROCEDURE — 2500000001 HC RX 250 WO HCPCS SELF ADMINISTERED DRUGS (ALT 637 FOR MEDICARE OP): Performed by: PHYSICIAN ASSISTANT

## 2024-08-16 PROCEDURE — 72129 CT CHEST SPINE W/DYE: CPT

## 2024-08-16 PROCEDURE — 2500000002 HC RX 250 W HCPCS SELF ADMINISTERED DRUGS (ALT 637 FOR MEDICARE OP, ALT 636 FOR OP/ED): Performed by: PHYSICIAN ASSISTANT

## 2024-08-16 PROCEDURE — 62305 MYELOGRAPHY LUMBAR INJECTION: CPT | Performed by: RADIOLOGY

## 2024-08-16 PROCEDURE — 97116 GAIT TRAINING THERAPY: CPT | Mod: GP

## 2024-08-16 PROCEDURE — 83735 ASSAY OF MAGNESIUM: CPT

## 2024-08-16 PROCEDURE — 99232 SBSQ HOSP IP/OBS MODERATE 35: CPT

## 2024-08-16 PROCEDURE — 1100000001 HC PRIVATE ROOM DAILY

## 2024-08-16 PROCEDURE — B01BYZZ FLUOROSCOPY OF SPINAL CORD USING OTHER CONTRAST: ICD-10-PCS | Performed by: RADIOLOGY

## 2024-08-16 PROCEDURE — 72126 CT NECK SPINE W/DYE: CPT | Performed by: RADIOLOGY

## 2024-08-16 PROCEDURE — 009U3ZZ DRAINAGE OF SPINAL CANAL, PERCUTANEOUS APPROACH: ICD-10-PCS | Performed by: RADIOLOGY

## 2024-08-16 PROCEDURE — 72126 CT NECK SPINE W/DYE: CPT

## 2024-08-16 PROCEDURE — 36415 COLL VENOUS BLD VENIPUNCTURE: CPT

## 2024-08-16 PROCEDURE — S4991 NICOTINE PATCH NONLEGEND: HCPCS | Performed by: PHYSICIAN ASSISTANT

## 2024-08-16 PROCEDURE — 2550000001 HC RX 255 CONTRASTS: Performed by: STUDENT IN AN ORGANIZED HEALTH CARE EDUCATION/TRAINING PROGRAM

## 2024-08-16 PROCEDURE — 2500000005 HC RX 250 GENERAL PHARMACY W/O HCPCS: Performed by: STUDENT IN AN ORGANIZED HEALTH CARE EDUCATION/TRAINING PROGRAM

## 2024-08-16 PROCEDURE — 72129 CT CHEST SPINE W/DYE: CPT | Performed by: RADIOLOGY

## 2024-08-16 PROCEDURE — 85025 COMPLETE CBC W/AUTO DIFF WBC: CPT

## 2024-08-16 PROCEDURE — 97535 SELF CARE MNGMENT TRAINING: CPT | Mod: GO

## 2024-08-16 PROCEDURE — 97165 OT EVAL LOW COMPLEX 30 MIN: CPT | Mod: GO

## 2024-08-16 RX ORDER — HYDROXYZINE HYDROCHLORIDE 25 MG/1
50 TABLET, FILM COATED ORAL ONCE
Status: COMPLETED | OUTPATIENT
Start: 2024-08-16 | End: 2024-08-23

## 2024-08-16 RX ORDER — ACETAMINOPHEN 325 MG/1
650 TABLET ORAL ONCE
Status: COMPLETED | OUTPATIENT
Start: 2024-08-16 | End: 2024-08-16

## 2024-08-16 RX ORDER — LIDOCAINE HYDROCHLORIDE 10 MG/ML
1 INJECTION, SOLUTION EPIDURAL; INFILTRATION; INTRACAUDAL; PERINEURAL ONCE
Status: COMPLETED | OUTPATIENT
Start: 2024-08-16 | End: 2024-08-16

## 2024-08-16 RX ORDER — ENOXAPARIN SODIUM 100 MG/ML
40 INJECTION SUBCUTANEOUS EVERY 24 HOURS
Status: DISCONTINUED | OUTPATIENT
Start: 2024-08-17 | End: 2024-08-26 | Stop reason: HOSPADM

## 2024-08-16 ASSESSMENT — COGNITIVE AND FUNCTIONAL STATUS - GENERAL
STANDING UP FROM CHAIR USING ARMS: A LITTLE
HELP NEEDED FOR BATHING: A LITTLE
MOVING TO AND FROM BED TO CHAIR: A LITTLE
CLIMB 3 TO 5 STEPS WITH RAILING: A LOT
DRESSING REGULAR UPPER BODY CLOTHING: A LITTLE
PERSONAL GROOMING: A LITTLE
MOBILITY SCORE: 18
DAILY ACTIVITIY SCORE: 16
TURNING FROM BACK TO SIDE WHILE IN FLAT BAD: A LITTLE
TOILETING: A LITTLE
WALKING IN HOSPITAL ROOM: A LITTLE
DRESSING REGULAR LOWER BODY CLOTHING: A LITTLE
DAILY ACTIVITIY SCORE: 19
MOVING FROM LYING ON BACK TO SITTING ON SIDE OF FLAT BED WITH BEDRAILS: A LITTLE
STANDING UP FROM CHAIR USING ARMS: A LITTLE
MOBILITY SCORE: 18
WALKING IN HOSPITAL ROOM: A LITTLE
DRESSING REGULAR LOWER BODY CLOTHING: A LOT
HELP NEEDED FOR BATHING: A LOT
PERSONAL GROOMING: A LITTLE
EATING MEALS: A LITTLE
DRESSING REGULAR UPPER BODY CLOTHING: A LITTLE
MOVING TO AND FROM BED TO CHAIR: A LITTLE
TURNING FROM BACK TO SIDE WHILE IN FLAT BAD: A LITTLE
TOILETING: A LITTLE
CLIMB 3 TO 5 STEPS WITH RAILING: A LITTLE

## 2024-08-16 ASSESSMENT — PAIN - FUNCTIONAL ASSESSMENT
PAIN_FUNCTIONAL_ASSESSMENT: 0-10

## 2024-08-16 ASSESSMENT — ACTIVITIES OF DAILY LIVING (ADL)
BATHING_ASSISTANCE: MODERATE
ADL_ASSISTANCE: INDEPENDENT
HOME_MANAGEMENT_TIME_ENTRY: 8

## 2024-08-16 ASSESSMENT — PAIN SCALES - GENERAL
PAINLEVEL_OUTOF10: 0 - NO PAIN
PAINLEVEL_OUTOF10: 0 - NO PAIN
PAINLEVEL_OUTOF10: 3

## 2024-08-16 NOTE — PROGRESS NOTES
"Linda López is a 79 y.o. right-handed female with a history of A-fib, Anxiety and depression, Arthritis, Artificial cardiac pacemaker, Brain aneurysm s/p clipping (told by ED not MRI compatible), Essential tremor, HTN (hypertension), Kidney stones, Lumbar radiculopathy on day 2 of admission after presenting with subacute weakness, ataxia. Neurology was consulted for difficulty walking, upper extremity weakness.    Subjective   NAEON. She reports no change in her symptoms this morning. Denies nausea, fevers, or chills. Still feels unsteady on her feet.    Objective     Physical Exam  Mental status: A+O x 4, known vocal dystonia (baseline s/p aneurysmal clipping)  Motor exam: 5/5 strength in all muscle groups  Reflexes: 3+ b/l biceps, BR, patellar, achilles reflexes. No plantar response seen.  Sensory: Sensation to light touch intact in b/l UE and LE. Reduced sensation to vibration in LE, L>R. Proprioception intact in b/l LE.  Gait: Deferred.    Last Recorded Vitals  Blood pressure 154/86, pulse 61, temperature 36.7 °C (98.1 °F), resp. rate 18, height 1.626 m (5' 4\"), weight 76.7 kg (169 lb), SpO2 97%.  Intake/Output last 3 Shifts:  I/O last 3 completed shifts:  In: 50 (0.7 mL/kg) [I.V.:50 (0.7 mL/kg)]  Out: 2 (0 mL/kg) [Urine:2 (0 mL/kg/hr)]  Weight: 76.7 kg     Relevant Results  Scheduled medications  [Held by provider] enoxaparin, 40 mg, subcutaneous, q24h  hydroCHLOROthiazide, 25 mg, oral, Daily  hydrOXYzine HCL, 50 mg, oral, Once  nicotine, 1 patch, transdermal, Daily  potassium chloride CR, 20 mEq, oral, TID  propranolol, 10 mg, oral, 4x daily  rosuvastatin, 5 mg, oral, Nightly  valACYclovir, 500 mg, oral, Daily      Continuous medications     PRN medications    No results found for this or any previous visit (from the past 24 hour(s)).    CT cervical spine post myelogram    Result Date: 8/14/2024  Interpreted By:  Rico Marr,  and Melony Asencio STUDY: CT CERVICAL SPINE POST MYELOGRAM; CT LUMBAR SPINE " POST MYELOGRAM; FL MULTIPLE REGIONS MYELOGRAM WITH LUMBAR PUNCTURE; CT THORACIC SPINE POST MYELOGRAM;  8/14/2024 4:57 pm; 8/14/2024 4:24 pm   INDICATION: Signs/Symptoms:Upper motor neuron symptoms of bilateral upper extremities. Unable to obtain MRI; Signs/Symptoms:Cervical, thoracic, lumbar for UE and LE weakness.   COMPARISON: CT cervical spine, thoracic spine, and lumbar spine without contrast 08/12/2024   ACCESSION NUMBER(S): [id number]; [id number]; [id number]; [id number]   ORDERING CLINICIAN: LILI CARUSO   TECHNIQUE: After discussion of the risks, benefits and alternatives, standard written hospital consent was obtained. The patient was placed prone on the fluoroscopic table.  The lower back was prepped and draped in sterile fashion. One percent lidocaine was used for local anesthesia. Under fluoroscopic guidance, a 22 gauge spinal needle was advanced into the thecal sac at the L3-L4 level. 10 cc of Omnipaque 300 was administered intrathecally under intermittent fluoroscopic imaging. Fluoroscopy time was 1.2 minutes. The patient tolerated the procedure well.   Following the fluoroscopic myelogram, serial axial CT images were obtained through the cervical, thoracic, and lumbar spine, using a bone algorithm. Images were reformatted into sagittal and coronal planes.   FINDINGS: FLUOROSCOPIC MYELOGRAM:  The thecal sac is widely patent.  There is filling bilaterally of nerve root sleeves without cutoff. There is normal alignment.   CT MYELOGRAM: CERVICAL SPINE: No evidence of contrast opacification of the cervical spine thecal sac despite repeat imaging after placing patient in Trendelenburg for several minutes. Nondiagnostic CT myelogram of the cervical spine.   Alignment is unremarkable. Vertebral body heights are maintained. Intervertebral disc space narrowing at C6-C7. Degenerative changes throughout the cervical spine most significant at C6-C7. No evidence of canal stenosis from the  craniocervical junction to the C4-5 level. Nondiagnostic examination of the spinal canal at the C5-6 level and below secondary to streak artifact from overlying soft tissues.   There is hypertrophy of the right facet joint of C3-4 with slight joint space widening (series 205, image 23).   THORACIC SPINE: Contrast is visualized within the thoracic spine from the T5-6 level down. Partial opacification of the canal at the T3-4 and T4-5 levels. No contrast is seen within the thecal sac at the T1-2 and T2-3 levels.   Heterogeneous opacification of the right dorsolateral aspect of the thecal sac at the T5-6 level in the dorsal aspect of the thecal sac at the T7-8 level, which may represent blood products. No high-grade canal stenosis at any level from T5-6 through T12-L1.   The vertebral body heights of the thoracic spine are maintained. The intervertebral disc spaces are maintained. No evidence of significant neural foraminal stenosis.     LUMBAR SPINE: Heterogeneous opacification of the thecal sac within the lumbar spine, with dense cyst contrast seen at the L5-S1 level where the thecal sac was accessed.   Vertebral body heights are maintained. Intervertebral disc spaces are maintained. L1-2: No canal or foraminal stenosis.   L2-3: No canal or foraminal stenosis.   L3-4: Shallow disc bulge with facet hypertrophy results in mild narrowing the spinal canal and bilateral foramina.   L4-5: Shallow partially calcified disc bulge with facet hypertrophy results in mild narrowing of bilateral foramina and minimal narrowing of the spinal canal.   L5-S1: No canal or foraminal stenosis.     OTHER: Chest: Pacemaker in place. Status post mitral valve replacement. Emphysematous changes throughout the lungs. Abdomen/pelvis: No remarkable findings within the visualized portions of the abdomen and pelvis. Soft tissues: No remarkable soft tissue findings.       CT myelogram 1. Non-diagnostic myelogram of the cervical spine secondary to  non opacification of the thecal sac despite repeat imaging after Trendelenburg positioning. Within the limitations of this non-contrast CT cervical spine, no evidence of high-grade canal stenosis from the craniocervical junction to the C4-5 level. Nondiagnostic evaluation of the spinal canal from the C5-6 level and below secondary to streak artifact from overlying soft tissues. 2. Facet hypertrophy with slight widening of the right facet joint of C3-4. All findings may be degenerative in etiology, joint widening is nonspecific and underlying infectious process can not be excluded. 3. Inconsistent contrast opacification of the thoracic thecal sac. The thoracic spine is well opacified from T3 through T12 without evidence of central spinal canal stenosis, spinal cord compression, or neural foraminal stenosis. The superior portion of the thoracic thecal sac is not well opacified from approximately T1 through T3, as well as heterogeneous filling defects within the dorsal aspect of the thecal sac at the T5-6 and T7-8 levels, possibly due to blood products. 4. Heterogeneous contrast opacification of the lumbar spine thecal sac. Within these limitations, mild multilevel degenerative change without evidence of high-grade spinal canal stenosis or neural foraminal stenosis.   Given limitations of the current myelogram, consider repeat myelogram if patient is amenable.   I, Dr. Marr, supervised and was available throughout this procedure as performed by fellow physician Dr. Ty. This study was performed and interpreted at [facility]. I agree with the procedural description and the findings as stated.   MACRO: None   Signed by: Rico Marr 8/14/2024 9:22 PM Dictation workstation:   [id number]    CT thoracic spine post myelogram    Result Date: 8/14/2024  Interpreted By:  Rico Marr and Hofer [place] STUDY: CT CERVICAL SPINE POST MYELOGRAM; CT LUMBAR SPINE POST MYELOGRAM; [place] MULTIPLE REGIONS  MYELOGRAM WITH LUMBAR PUNCTURE; CT THORACIC SPINE POST MYELOGRAM;  8/14/2024 4:57 pm; 8/14/2024 4:24 pm   INDICATION: Signs/Symptoms:Upper motor neuron symptoms of bilateral upper extremities. Unable to obtain MRI; Signs/Symptoms:Cervical, thoracic, lumbar for UE and LE weakness.   COMPARISON: CT cervical spine, thoracic spine, and lumbar spine without contrast 08/12/2024   ACCESSION NUMBER(S): [id number]; [id number]; [id number]; [id number]   ORDERING CLINICIAN: LILI CARUSO   TECHNIQUE: After discussion of the risks, benefits and alternatives, standard written hospital consent was obtained. The patient was placed prone on the fluoroscopic table.  The lower back was prepped and draped in sterile fashion. One percent lidocaine was used for local anesthesia. Under fluoroscopic guidance, a 22 gauge spinal needle was advanced into the thecal sac at the L3-L4 level. 10 cc of Omnipaque 300 was administered intrathecally under intermittent fluoroscopic imaging. Fluoroscopy time was 1.2 minutes. The patient tolerated the procedure well.   Following the fluoroscopic myelogram, serial axial CT images were obtained through the cervical, thoracic, and lumbar spine, using a bone algorithm. Images were reformatted into sagittal and coronal planes.   FINDINGS: FLUOROSCOPIC MYELOGRAM:  The thecal sac is widely patent.  There is filling bilaterally of nerve root sleeves without cutoff. There is normal alignment.   CT MYELOGRAM: CERVICAL SPINE: No evidence of contrast opacification of the cervical spine thecal sac despite repeat imaging after placing patient in Trendelenburg for several minutes. Nondiagnostic CT myelogram of the cervical spine.   Alignment is unremarkable. Vertebral body heights are maintained. Intervertebral disc space narrowing at C6-C7. Degenerative changes throughout the cervical spine most significant at C6-C7. No evidence of canal stenosis from the craniocervical junction to the C4-5 level.  Nondiagnostic examination of the spinal canal at the C5-6 level and below secondary to streak artifact from overlying soft tissues.   There is hypertrophy of the right facet joint of C3-4 with slight joint space widening (series 205, image 23).   THORACIC SPINE: Contrast is visualized within the thoracic spine from the T5-6 level down. Partial opacification of the canal at the T3-4 and T4-5 levels. No contrast is seen within the thecal sac at the T1-2 and T2-3 levels.   Heterogeneous opacification of the right dorsolateral aspect of the thecal sac at the T5-6 level in the dorsal aspect of the thecal sac at the T7-8 level, which may represent blood products. No high-grade canal stenosis at any level from T5-6 through T12-L1.   The vertebral body heights of the thoracic spine are maintained. The intervertebral disc spaces are maintained. No evidence of significant neural foraminal stenosis.     LUMBAR SPINE: Heterogeneous opacification of the thecal sac within the lumbar spine, with dense cyst contrast seen at the L5-S1 level where the thecal sac was accessed.   Vertebral body heights are maintained. Intervertebral disc spaces are maintained. L1-2: No canal or foraminal stenosis.   L2-3: No canal or foraminal stenosis.   L3-4: Shallow disc bulge with facet hypertrophy results in mild narrowing the spinal canal and bilateral foramina.   L4-5: Shallow partially calcified disc bulge with facet hypertrophy results in mild narrowing of bilateral foramina and minimal narrowing of the spinal canal.   L5-S1: No canal or foraminal stenosis.     OTHER: Chest: Pacemaker in place. Status post mitral valve replacement. Emphysematous changes throughout the lungs. Abdomen/pelvis: No remarkable findings within the visualized portions of the abdomen and pelvis. Soft tissues: No remarkable soft tissue findings.       CT myelogram 1. Non-diagnostic myelogram of the cervical spine secondary to non opacification of the thecal sac despite  repeat imaging after Trendelenburg positioning. Within the limitations of this non-contrast CT cervical spine, no evidence of high-grade canal stenosis from the craniocervical junction to the C4-5 level. Nondiagnostic evaluation of the spinal canal from the C5-6 level and below secondary to streak artifact from overlying soft tissues. 2. Facet hypertrophy with slight widening of the right facet joint of C3-4. All findings may be degenerative in etiology, joint widening is nonspecific and underlying infectious process can not be excluded. 3. Inconsistent contrast opacification of the thoracic thecal sac. The thoracic spine is well opacified from T3 through T12 without evidence of central spinal canal stenosis, spinal cord compression, or neural foraminal stenosis. The superior portion of the thoracic thecal sac is not well opacified from approximately T1 through T3, as well as heterogeneous filling defects within the dorsal aspect of the thecal sac at the T5-6 and T7-8 levels, possibly due to blood products. 4. Heterogeneous contrast opacification of the lumbar spine thecal sac. Within these limitations, mild multilevel degenerative change without evidence of high-grade spinal canal stenosis or neural foraminal stenosis.   Given limitations of the current myelogram, consider repeat myelogram if patient is amenable.   I, Dr. Marr, supervised and was available throughout this procedure as performed by fellow physician Dr. Ty. This study was performed and interpreted at [facility]. I agree with the procedural description and the findings as stated.   MACRO: None   Signed by: Rico Marr 8/14/2024 9:22 PM Dictation workstation:   [id number]    CT lumbar spine post myelogram    Result Date: 8/14/2024  Interpreted By:  Rico Marr and Hofer Elif STUDY: CT CERVICAL SPINE POST MYELOGRAM; CT LUMBAR SPINE POST MYELOGRAM; FL MULTIPLE REGIONS MYELOGRAM WITH LUMBAR PUNCTURE; CT THORACIC  SPINE POST MYELOGRAM;  8/14/2024 4:57 pm; 8/14/2024 4:24 pm   INDICATION: Signs/Symptoms:Upper motor neuron symptoms of bilateral upper extremities. Unable to obtain MRI; Signs/Symptoms:Cervical, thoracic, lumbar for UE and LE weakness.   COMPARISON: CT cervical spine, thoracic spine, and lumbar spine without contrast 08/12/2024   ACCESSION NUMBER(S): [id number]; [id number]; [id number]; [id number]   ORDERING CLINICIAN: LILI CARUSO   TECHNIQUE: After discussion of the risks, benefits and alternatives, standard written hospital consent was obtained. The patient was placed prone on the fluoroscopic table.  The lower back was prepped and draped in sterile fashion. One percent lidocaine was used for local anesthesia. Under fluoroscopic guidance, a 22 gauge spinal needle was advanced into the thecal sac at the L3-L4 level. 10 cc of Omnipaque 300 was administered intrathecally under intermittent fluoroscopic imaging. Fluoroscopy time was 1.2 minutes. The patient tolerated the procedure well.   Following the fluoroscopic myelogram, serial axial CT images were obtained through the cervical, thoracic, and lumbar spine, using a bone algorithm. Images were reformatted into sagittal and coronal planes.   FINDINGS: FLUOROSCOPIC MYELOGRAM:  The thecal sac is widely patent.  There is filling bilaterally of nerve root sleeves without cutoff. There is normal alignment.   CT MYELOGRAM: CERVICAL SPINE: No evidence of contrast opacification of the cervical spine thecal sac despite repeat imaging after placing patient in Trendelenburg for several minutes. Nondiagnostic CT myelogram of the cervical spine.   Alignment is unremarkable. Vertebral body heights are maintained. Intervertebral disc space narrowing at C6-C7. Degenerative changes throughout the cervical spine most significant at C6-C7. No evidence of canal stenosis from the craniocervical junction to the C4-5 level. Nondiagnostic examination of the spinal  canal at the C5-6 level and below secondary to streak artifact from overlying soft tissues.   There is hypertrophy of the right facet joint of C3-4 with slight joint space widening (series 205, image 23).   THORACIC SPINE: Contrast is visualized within the thoracic spine from the T5-6 level down. Partial opacification of the canal at the T3-4 and T4-5 levels. No contrast is seen within the thecal sac at the T1-2 and T2-3 levels.   Heterogeneous opacification of the right dorsolateral aspect of the thecal sac at the T5-6 level in the dorsal aspect of the thecal sac at the T7-8 level, which may represent blood products. No high-grade canal stenosis at any level from T5-6 through T12-L1.   The vertebral body heights of the thoracic spine are maintained. The intervertebral disc spaces are maintained. No evidence of significant neural foraminal stenosis.     LUMBAR SPINE: Heterogeneous opacification of the thecal sac within the lumbar spine, with dense cyst contrast seen at the L5-S1 level where the thecal sac was accessed.   Vertebral body heights are maintained. Intervertebral disc spaces are maintained. L1-2: No canal or foraminal stenosis.   L2-3: No canal or foraminal stenosis.   L3-4: Shallow disc bulge with facet hypertrophy results in mild narrowing the spinal canal and bilateral foramina.   L4-5: Shallow partially calcified disc bulge with facet hypertrophy results in mild narrowing of bilateral foramina and minimal narrowing of the spinal canal.   L5-S1: No canal or foraminal stenosis.     OTHER: Chest: Pacemaker in place. Status post mitral valve replacement. Emphysematous changes throughout the lungs. Abdomen/pelvis: No remarkable findings within the visualized portions of the abdomen and pelvis. Soft tissues: No remarkable soft tissue findings.       CT myelogram 1. Non-diagnostic myelogram of the cervical spine secondary to non opacification of the thecal sac despite repeat imaging after Trendelenburg  positioning. Within the limitations of this non-contrast CT cervical spine, no evidence of high-grade canal stenosis from the craniocervical junction to the C4-5 level. Nondiagnostic evaluation of the spinal canal from the C5-6 level and below secondary to streak artifact from overlying soft tissues. 2. Facet hypertrophy with slight widening of the right facet joint of C3-4. All findings may be degenerative in etiology, joint widening is nonspecific and underlying infectious process can not be excluded. 3. Inconsistent contrast opacification of the thoracic thecal sac. The thoracic spine is well opacified from T3 through T12 without evidence of central spinal canal stenosis, spinal cord compression, or neural foraminal stenosis. The superior portion of the thoracic thecal sac is not well opacified from approximately T1 through T3, as well as heterogeneous filling defects within the dorsal aspect of the thecal sac at the T5-6 and T7-8 levels, possibly due to blood products. 4. Heterogeneous contrast opacification of the lumbar spine thecal sac. Within these limitations, mild multilevel degenerative change without evidence of high-grade spinal canal stenosis or neural foraminal stenosis.   Given limitations of the current myelogram, consider repeat myelogram if patient is amenable.   I, Dr. Marr, supervised and was available throughout this procedure as performed by fellow physician Dr. Ty. This study was performed and interpreted at [facility]. I agree with the procedural description and the findings as stated.   MACRO: None   Signed by: Rico Marr 8/14/2024 9:22 PM Dictation workstation:   [id number]    FL multiple regions myelogram with lumbar puncture    Result Date: 8/14/2024  Interpreted By:  Rico Marr and Hofer Elif STUDY: CT CERVICAL SPINE POST MYELOGRAM; CT LUMBAR SPINE POST MYELOGRAM; FL MULTIPLE REGIONS MYELOGRAM WITH LUMBAR PUNCTURE; CT THORACIC SPINE POST  MYELOGRAM;  8/14/2024 4:57 pm; 8/14/2024 4:24 pm   INDICATION: Signs/Symptoms:Upper motor neuron symptoms of bilateral upper extremities. Unable to obtain MRI; Signs/Symptoms:Cervical, thoracic, lumbar for UE and LE weakness.   COMPARISON: CT cervical spine, thoracic spine, and lumbar spine without contrast 08/12/2024   ACCESSION NUMBER(S): [id number]; [id number]; [id number]; [id number]   ORDERING CLINICIAN: LILI CARUSO   TECHNIQUE: After discussion of the risks, benefits and alternatives, standard written hospital consent was obtained. The patient was placed prone on the fluoroscopic table.  The lower back was prepped and draped in sterile fashion. One percent lidocaine was used for local anesthesia. Under fluoroscopic guidance, a 22 gauge spinal needle was advanced into the thecal sac at the L3-L4 level. 10 cc of Omnipaque 300 was administered intrathecally under intermittent fluoroscopic imaging. Fluoroscopy time was 1.2 minutes. The patient tolerated the procedure well.   Following the fluoroscopic myelogram, serial axial CT images were obtained through the cervical, thoracic, and lumbar spine, using a bone algorithm. Images were reformatted into sagittal and coronal planes.   FINDINGS: FLUOROSCOPIC MYELOGRAM:  The thecal sac is widely patent.  There is filling bilaterally of nerve root sleeves without cutoff. There is normal alignment.   CT MYELOGRAM: CERVICAL SPINE: No evidence of contrast opacification of the cervical spine thecal sac despite repeat imaging after placing patient in Trendelenburg for several minutes. Nondiagnostic CT myelogram of the cervical spine.   Alignment is unremarkable. Vertebral body heights are maintained. Intervertebral disc space narrowing at C6-C7. Degenerative changes throughout the cervical spine most significant at C6-C7. No evidence of canal stenosis from the craniocervical junction to the C4-5 level. Nondiagnostic examination of the spinal canal at the  C5-6 level and below secondary to streak artifact from overlying soft tissues.   There is hypertrophy of the right facet joint of C3-4 with slight joint space widening (series 205, image 23).   THORACIC SPINE: Contrast is visualized within the thoracic spine from the T5-6 level down. Partial opacification of the canal at the T3-4 and T4-5 levels. No contrast is seen within the thecal sac at the T1-2 and T2-3 levels.   Heterogeneous opacification of the right dorsolateral aspect of the thecal sac at the T5-6 level in the dorsal aspect of the thecal sac at the T7-8 level, which may represent blood products. No high-grade canal stenosis at any level from T5-6 through T12-L1.   The vertebral body heights of the thoracic spine are maintained. The intervertebral disc spaces are maintained. No evidence of significant neural foraminal stenosis.     LUMBAR SPINE: Heterogeneous opacification of the thecal sac within the lumbar spine, with dense cyst contrast seen at the L5-S1 level where the thecal sac was accessed.   Vertebral body heights are maintained. Intervertebral disc spaces are maintained. L1-2: No canal or foraminal stenosis.   L2-3: No canal or foraminal stenosis.   L3-4: Shallow disc bulge with facet hypertrophy results in mild narrowing the spinal canal and bilateral foramina.   L4-5: Shallow partially calcified disc bulge with facet hypertrophy results in mild narrowing of bilateral foramina and minimal narrowing of the spinal canal.   L5-S1: No canal or foraminal stenosis.     OTHER: Chest: Pacemaker in place. Status post mitral valve replacement. Emphysematous changes throughout the lungs. Abdomen/pelvis: No remarkable findings within the visualized portions of the abdomen and pelvis. Soft tissues: No remarkable soft tissue findings.       CT myelogram 1. Non-diagnostic myelogram of the cervical spine secondary to non opacification of the thecal sac despite repeat imaging after Trendelenburg positioning.  Within the limitations of this non-contrast CT cervical spine, no evidence of high-grade canal stenosis from the craniocervical junction to the C4-5 level. Nondiagnostic evaluation of the spinal canal from the C5-6 level and below secondary to streak artifact from overlying soft tissues. 2. Facet hypertrophy with slight widening of the right facet joint of C3-4. All findings may be degenerative in etiology, joint widening is nonspecific and underlying infectious process can not be excluded. 3. Inconsistent contrast opacification of the thoracic thecal sac. The thoracic spine is well opacified from T3 through T12 without evidence of central spinal canal stenosis, spinal cord compression, or neural foraminal stenosis. The superior portion of the thoracic thecal sac is not well opacified from approximately T1 through T3, as well as heterogeneous filling defects within the dorsal aspect of the thecal sac at the T5-6 and T7-8 levels, possibly due to blood products. 4. Heterogeneous contrast opacification of the lumbar spine thecal sac. Within these limitations, mild multilevel degenerative change without evidence of high-grade spinal canal stenosis or neural foraminal stenosis.   Given limitations of the current myelogram, consider repeat myelogram if patient is amenable.   I, Dr. Marr, supervised and was available throughout this procedure as performed by fellow physician Dr. Ty. This study was performed and interpreted at [facility]. I agree with the procedural description and the findings as stated.   MACRO: None   Signed by: Rico Marr 8/14/2024 9:22 PM Dictation workstation:   [id number]    CT angio head w and wo IV contrast    Result Date: 8/12/2024  Interpreted By:  Beth Holguin and Beyersdorf Conner STUDY: CT ANGIO HEAD W AND WO IV CONTRAST;  8/12/2024 1:22 am   INDICATION: Signs/Symptoms:bilateral upper extremity weaknes. Hx of aneurysm.   COMPARISON: CT head 08/12/2024    ACCESSION NUMBER(S): [id number]   ORDERING CLINICIAN: LILI FONTANEZ   TECHNIQUE: Unenhanced CT images of the head were obtained. Subsequently, 150 mL of Omnipaque 350 was administered intravenously and axial images of the head were acquired.  Coronal, sagittal, and 3-D reconstructions were provided for review.   FINDINGS: Postsurgical changes of the aneurysm clip new right basilar cistern region motion limits evaluation due to streak artifact. Dedicated head CT is been performed and reported separately.   Anterior circulation: Atherosclerotic calcifications of the right greater than left carotid siphons. Streak artifact partially limits evaluation of the right distal ICA otherwise the bilateral intracranial internal carotid arteries, bilateral carotid terminals, bilateral proximal anterior and middle cerebral arteries are normal.   Posterior circulation: Tapering with diminutive intradural left vertebral artery. Streak artifact limits evaluation of the basilar tip and right greater than left proximal PCA. The mid to distal PCAs appear grossly patent.   Adjacent rounded opacities in the region of the right posterior communicating artery noted on axial projection without corresponding abnormality identified on additional projections and favored to represent volume averaging or artifact. Otherwise bilateral intracranial vertebral arteries, vertebrobasilar junction and basilar artery are normal.       Postsurgical changes of right posterior circulation aneurysmal clipping with associated streak artifact partially limiting evaluation. Otherwise no evidence for significant stenosis or large branch vessel cutoffs of the intracranial vessels.   Opacities noted on axial imaging without corresponding abnormality on additional findings. Findings are favored to represent artifact versus true aneurysm however attention on follow-up recommended.   I personally reviewed the image(s)/study and resident interpretation. I agree with  the findings as stated by resident Fredi Anderson. Data analyzed and images interpreted at [facility], [place], [place].     MACRO: None   Signed by: Beth Holguin 8/12/2024 3:47 AM Dictation workstation:   [id number]    CT abdomen pelvis w IV contrast    Result Date: 8/12/2024  Interpreted By:  Beth Holguin and Beyersdorf Conner STUDY: CT ABDOMEN PELVIS W IV CONTRAST; CT THORACIC SPINE WO IV CONTRAST; CT LUMBAR SPINE WO IV CONTRAST;  8/12/2024 2:12 am; 8/12/2024 2:11 am   INDICATION: Signs/Symptoms:N/V; Signs/Symptoms:BUE and BLE weakness; Signs/Symptoms:BUE and BLE weakness..   COMPARISON: None.   ACCESSION NUMBER(S): [id number]; [id number]; [id number]   ORDERING CLINICIAN: LILI FONTANEZ   TECHNIQUE: CT of the abdomen and pelvis was performed.  Standard contiguous axial images were obtained at 3 mm slice thickness through the abdomen and pelvis. Coronal and sagittal reconstructions at 3 mm slice thickness were performed.   150 ml of contrast omni 350 were administered intravenously without immediate complication.   FINDINGS: LOWER CHEST: Ground-glass attenuation of the lower lungs likely representing aspiration pneumonitis with a component of atelectasis. Emphysematous changes. Please see dedicated CTA chest for further evaluation.   ABDOMEN:   LIVER: The liver is normal in size measuring 15.3 cm in the craniocaudal axis. No focal hepatic lesions.   BILE DUCTS: The intrahepatic and extrahepatic ducts are not dilated.   GALLBLADDER: The gallbladder is nondistended and without evidence of radiopaque stones.   PANCREAS: The pancreas appears unremarkable without evidence of ductal dilatation or masses.   SPLEEN: The spleen is normal in size without focal lesions.   ADRENAL GLANDS: Bilateral adrenal glands appear normal.   KIDNEYS AND URETERS: The kidneys are normal in size and enhance symmetrically. Few bilateral hypodense lesions are too small to characterize, favored to  represent simple cysts.  No hydroureteronephrosis. Contrast material is present within the bilateral renal collecting systems and ureters, limiting evaluation for calculi.   PELVIS:   BLADDER: Bladder is normal in appearance without wall thickening. Layering contrast material within the bladder.   REPRODUCTIVE ORGANS: The uterus is surgically absent.   BOWEL: The stomach is unremarkable.   The small and large bowel are normal in caliber and demonstrate no wall thickening. Colonic diverticulosis without evidence of diverticulitis. The appendix is not definitely visualized. There is however no pericecal stranding or fluid. Moderate stool burden.   VESSELS: There is no aneurysmal dilatation of the abdominal aorta. The IVC appears normal. Moderate atherosclerotic calcification of the abdominal aorta and its branches.   PERITONEUM/RETROPERITONEUM/LYMPH NODES: There is no free or loculated fluid collection, no free intraperitoneal air. The retroperitoneum appears normal.  No abdominopelvic lymphadenopathy is present.   BONES AND ABDOMINAL WALL: Rightward curvature of the lumbar spine. The abdominal wall soft tissues appear normal.     CT THORACIC SPINE:   PARASPINAL SOFT TISSUES: No paravertebral fluid collection or significant edema. ALIGNMENT:  No traumatic spondylolisthesis or traumatic facet widening. VERTEBRAE:  No acute fracture. Vertebral body heights are maintained. SPINAL CANAL/INTERVERTEBRAL DISCS: No high-grade spinal canal stenosis. Moderate multilevel disc height loss, most severe at T11-12 and T12-L1. Multilevel anterior osteophytosis. The there are indeterminate peripheral calcifications present in the expected region of the posterior thecal sac dorsal to the T7, T8 and T9 vertebral bodies, likely chronic possibly related to remote infection, inflammation or hemorrhage. No definite mass effect identified.     CT LUMBAR SPINE:   PARASPINAL SOFT TISSUES: No paravertebral fluid collection or significant edema.  ALIGNMENT: Dextrocurvature of the lower thoracic and lumbar spine. No traumatic spondylolisthesis or traumatic facet widening. VERTEBRAE: Bones appear mildly demineralized. No acute fracture. Vertebral body heights are maintained. SPINAL CANAL/INTERVERTEBRAL DISCS: No high-grade spinal canal stenosis. Moderate multilevel disc height loss, most significant at T12-L1. Moderate bilateral facet joint arthropathy from L2-S1. NEURAL FORAMINA: Disc bulge at L2-L3, L3-L4 and L5-S1 with facet hypertrophy with up to moderate foraminal narrowing. Disc bulge and facet hypertrophy at L4 L spine with up to severe foraminal stenosis.       1.  No acute abnormality within the abdomen or pelvis. 2. No acute fracture of the thoracic or lumbar spine. Degenerative changes of the spine without critical canal stenosis. Foraminal narrowing in the lumbar spine as detailed. 3. Probable chronic calcifications within the posterior fecal sac at the level of the midthoracic spine as detailed. No associated canal stenosis identified..   I personally reviewed the image(s)/study and resident interpretation. I agree with the findings as stated by resident Fredi Anderson. Data analyzed and images interpreted at [facility], [place], [place].   MACRO: None   Signed by: Beth Holguin 8/12/2024 2:55 AM Dictation workstation:   [id number]    CT thoracic spine wo IV contrast    Result Date: 8/12/2024  Interpreted By:  Beth Holguin and Beyersdorf Conner STUDY: CT ABDOMEN PELVIS W IV CONTRAST; CT THORACIC SPINE WO IV CONTRAST; CT LUMBAR SPINE WO IV CONTRAST;  8/12/2024 2:12 am; 8/12/2024 2:11 am   INDICATION: Signs/Symptoms:N/V; Signs/Symptoms:BUE and BLE weakness; Signs/Symptoms:BUE and BLE weakness..   COMPARISON: None.   ACCESSION NUMBER(S): [id number]; [id number]; [id number]   ORDERING CLINICIAN: LILI FONTANEZ   TECHNIQUE: CT of the abdomen and pelvis was performed.  Standard contiguous axial images were  obtained at 3 mm slice thickness through the abdomen and pelvis. Coronal and sagittal reconstructions at 3 mm slice thickness were performed.   150 ml of contrast omni 350 were administered intravenously without immediate complication.   FINDINGS: LOWER CHEST: Ground-glass attenuation of the lower lungs likely representing aspiration pneumonitis with a component of atelectasis. Emphysematous changes. Please see dedicated CTA chest for further evaluation.   ABDOMEN:   LIVER: The liver is normal in size measuring 15.3 cm in the craniocaudal axis. No focal hepatic lesions.   BILE DUCTS: The intrahepatic and extrahepatic ducts are not dilated.   GALLBLADDER: The gallbladder is nondistended and without evidence of radiopaque stones.   PANCREAS: The pancreas appears unremarkable without evidence of ductal dilatation or masses.   SPLEEN: The spleen is normal in size without focal lesions.   ADRENAL GLANDS: Bilateral adrenal glands appear normal.   KIDNEYS AND URETERS: The kidneys are normal in size and enhance symmetrically. Few bilateral hypodense lesions are too small to characterize, favored to represent simple cysts.  No hydroureteronephrosis. Contrast material is present within the bilateral renal collecting systems and ureters, limiting evaluation for calculi.   PELVIS:   BLADDER: Bladder is normal in appearance without wall thickening. Layering contrast material within the bladder.   REPRODUCTIVE ORGANS: The uterus is surgically absent.   BOWEL: The stomach is unremarkable.   The small and large bowel are normal in caliber and demonstrate no wall thickening. Colonic diverticulosis without evidence of diverticulitis. The appendix is not definitely visualized. There is however no pericecal stranding or fluid. Moderate stool burden.   VESSELS: There is no aneurysmal dilatation of the abdominal aorta. The IVC appears normal. Moderate atherosclerotic calcification of the abdominal aorta and its branches.    PERITONEUM/RETROPERITONEUM/LYMPH NODES: There is no free or loculated fluid collection, no free intraperitoneal air. The retroperitoneum appears normal.  No abdominopelvic lymphadenopathy is present.   BONES AND ABDOMINAL WALL: Rightward curvature of the lumbar spine. The abdominal wall soft tissues appear normal.     CT THORACIC SPINE:   PARASPINAL SOFT TISSUES: No paravertebral fluid collection or significant edema. ALIGNMENT:  No traumatic spondylolisthesis or traumatic facet widening. VERTEBRAE:  No acute fracture. Vertebral body heights are maintained. SPINAL CANAL/INTERVERTEBRAL DISCS: No high-grade spinal canal stenosis. Moderate multilevel disc height loss, most severe at T11-12 and T12-L1. Multilevel anterior osteophytosis. The there are indeterminate peripheral calcifications present in the expected region of the posterior thecal sac dorsal to the T7, T8 and T9 vertebral bodies, likely chronic possibly related to remote infection, inflammation or hemorrhage. No definite mass effect identified.     CT LUMBAR SPINE:   PARASPINAL SOFT TISSUES: No paravertebral fluid collection or significant edema. ALIGNMENT: Dextrocurvature of the lower thoracic and lumbar spine. No traumatic spondylolisthesis or traumatic facet widening. VERTEBRAE: Bones appear mildly demineralized. No acute fracture. Vertebral body heights are maintained. SPINAL CANAL/INTERVERTEBRAL DISCS: No high-grade spinal canal stenosis. Moderate multilevel disc height loss, most significant at T12-L1. Moderate bilateral facet joint arthropathy from L2-S1. NEURAL FORAMINA: Disc bulge at L2-L3, L3-L4 and L5-S1 with facet hypertrophy with up to moderate foraminal narrowing. Disc bulge and facet hypertrophy at L4 L spine with up to severe foraminal stenosis.       1.  No acute abnormality within the abdomen or pelvis. 2. No acute fracture of the thoracic or lumbar spine. Degenerative changes of the spine without critical canal stenosis. Foraminal  narrowing in the lumbar spine as detailed. 3. Probable chronic calcifications within the posterior fecal sac at the level of the midthoracic spine as detailed. No associated canal stenosis identified..   I personally reviewed the image(s)/study and resident interpretation. I agree with the findings as stated by resident Fredi Anderson. Data analyzed and images interpreted at [facility], [place], [place].   MACRO: None   Signed by: Beth Holguin 8/12/2024 2:55 AM Dictation workstation:   [id number]    CT lumbar spine wo IV contrast    Result Date: 8/12/2024  Interpreted By:  Beth Holguin,  and Monica Rai STUDY: CT ABDOMEN PELVIS W IV CONTRAST; CT THORACIC SPINE WO IV CONTRAST; CT LUMBAR SPINE WO IV CONTRAST;  8/12/2024 2:12 am; 8/12/2024 2:11 am   INDICATION: Signs/Symptoms:N/V; Signs/Symptoms:BUE and BLE weakness; Signs/Symptoms:BUE and BLE weakness..   COMPARISON: None.   ACCESSION NUMBER(S): [id number]; [id number]; [id number]   ORDERING CLINICIAN: LILI FONTANEZ   TECHNIQUE: CT of the abdomen and pelvis was performed.  Standard contiguous axial images were obtained at 3 mm slice thickness through the abdomen and pelvis. Coronal and sagittal reconstructions at 3 mm slice thickness were performed.   150 ml of contrast omni 350 were administered intravenously without immediate complication.   FINDINGS: LOWER CHEST: Ground-glass attenuation of the lower lungs likely representing aspiration pneumonitis with a component of atelectasis. Emphysematous changes. Please see dedicated CTA chest for further evaluation.   ABDOMEN:   LIVER: The liver is normal in size measuring 15.3 cm in the craniocaudal axis. No focal hepatic lesions.   BILE DUCTS: The intrahepatic and extrahepatic ducts are not dilated.   GALLBLADDER: The gallbladder is nondistended and without evidence of radiopaque stones.   PANCREAS: The pancreas appears unremarkable without evidence of ductal dilatation  or masses.   SPLEEN: The spleen is normal in size without focal lesions.   ADRENAL GLANDS: Bilateral adrenal glands appear normal.   KIDNEYS AND URETERS: The kidneys are normal in size and enhance symmetrically. Few bilateral hypodense lesions are too small to characterize, favored to represent simple cysts.  No hydroureteronephrosis. Contrast material is present within the bilateral renal collecting systems and ureters, limiting evaluation for calculi.   PELVIS:   BLADDER: Bladder is normal in appearance without wall thickening. Layering contrast material within the bladder.   REPRODUCTIVE ORGANS: The uterus is surgically absent.   BOWEL: The stomach is unremarkable.   The small and large bowel are normal in caliber and demonstrate no wall thickening. Colonic diverticulosis without evidence of diverticulitis. The appendix is not definitely visualized. There is however no pericecal stranding or fluid. Moderate stool burden.   VESSELS: There is no aneurysmal dilatation of the abdominal aorta. The IVC appears normal. Moderate atherosclerotic calcification of the abdominal aorta and its branches.   PERITONEUM/RETROPERITONEUM/LYMPH NODES: There is no free or loculated fluid collection, no free intraperitoneal air. The retroperitoneum appears normal.  No abdominopelvic lymphadenopathy is present.   BONES AND ABDOMINAL WALL: Rightward curvature of the lumbar spine. The abdominal wall soft tissues appear normal.     CT THORACIC SPINE:   PARASPINAL SOFT TISSUES: No paravertebral fluid collection or significant edema. ALIGNMENT:  No traumatic spondylolisthesis or traumatic facet widening. VERTEBRAE:  No acute fracture. Vertebral body heights are maintained. SPINAL CANAL/INTERVERTEBRAL DISCS: No high-grade spinal canal stenosis. Moderate multilevel disc height loss, most severe at T11-12 and T12-L1. Multilevel anterior osteophytosis. The there are indeterminate peripheral calcifications present in the expected region of the  posterior thecal sac dorsal to the T7, T8 and T9 vertebral bodies, likely chronic possibly related to remote infection, inflammation or hemorrhage. No definite mass effect identified.     CT LUMBAR SPINE:   PARASPINAL SOFT TISSUES: No paravertebral fluid collection or significant edema. ALIGNMENT: Dextrocurvature of the lower thoracic and lumbar spine. No traumatic spondylolisthesis or traumatic facet widening. VERTEBRAE: Bones appear mildly demineralized. No acute fracture. Vertebral body heights are maintained. SPINAL CANAL/INTERVERTEBRAL DISCS: No high-grade spinal canal stenosis. Moderate multilevel disc height loss, most significant at T12-L1. Moderate bilateral facet joint arthropathy from L2-S1. NEURAL FORAMINA: Disc bulge at L2-L3, L3-L4 and L5-S1 with facet hypertrophy with up to moderate foraminal narrowing. Disc bulge and facet hypertrophy at L4 L spine with up to severe foraminal stenosis.       1.  No acute abnormality within the abdomen or pelvis. 2. No acute fracture of the thoracic or lumbar spine. Degenerative changes of the spine without critical canal stenosis. Foraminal narrowing in the lumbar spine as detailed. 3. Probable chronic calcifications within the posterior fecal sac at the level of the midthoracic spine as detailed. No associated canal stenosis identified..   I personally reviewed the image(s)/study and resident interpretation. I agree with the findings as stated by resident Fredi Anderson. Data analyzed and images interpreted at [facility], [place], [place].   MACRO: None   Signed by: Beth Holguin 8/12/2024 2:55 AM Dictation workstation:   [id number]    CT angio chest for pulmonary embolism    Result Date: 8/12/2024  Interpreted By:  Beth Holguin,  Colt Rai STUDY: CT ANGIO CHEST FOR PULMONARY EMBOLISM;  8/12/2024 2:36 am   INDICATION: Signs/Symptoms:Weakness, fatigue shortness of breath.   COMPARISON: None   ACCESSION NUMBER(S):  [id number]   ORDERING CLINICIAN: ROSSANA CRANE   TECHNIQUE: Helical data acquisition of the chest was obtained after intravenous administration of 150 cc of Omnipaque 350, as per PE protocol. Images were reformatted in coronal and sagittal planes. Axial and coronal maximum intensity projection (MIP) images were created and reviewed.   FINDINGS: POTENTIAL LIMITATIONS OF THE STUDY: None   HEART AND VESSELS: There are no discrete filling defects within main pulmonary artery and its branches to suggest acute pulmonary embolism. Main pulmonary artery and its branches are normal in caliber.   The thoracic aorta normal in course and caliber.There is moderate atherosclerosis present, including calcified and noncalcified plaques.Although, the study is not tailored for evaluation of aorta, there is no definite evidence of acute aortic pathology. Ectasia of the main pulmonary artery measuring 3.3 cm Coronary artery calcifications noted however exam is not optimized for evaluation.   Mild cardiomegaly with enlargement of the left atrium.There are no findings to suggest right heart strain. Left atrial appendage closure device in place. Left chest pacemaker/ICD with leads terminating in the right atrium and right ventricle. There is no pericardial effusion seen.   MEDIASTINUM AND TOÑO, LOWER NECK AND AXILLA: The visualized thyroid gland is within normal limits. No evidence of thoracic lymphadenopathy by CT criteria. Patulous appearance of the esophagus with heterogenous material in the upper esophagus.   LUNGS AND AIRWAYS: There is mucous layering in the distal trachea and right and left main bronchi. Subtle mucous plugging in lower lobe bronchials. Ground-glass consolidation of the dependent portions of the lower lungs. Moderate centrilobular and apically predominant emphysematous changes.  There is mild diffuse bronchial wall thickening. Calcified granuloma noted dot No pleural effusion or pneumothorax.     UPPER ABDOMEN:    Please see dedicated CT abdomen pelvis for further evaluation.   CHEST WALL AND OSSEOUS STRUCTURES: Chest wall is within normal limits. Multilevel degenerative changes with endplate osteophytes in the visualized spine. No acute osseous pathology.There are no suspicious osseous lesions.       1. No evidence of acute pulmonary embolism to the segmental level. 2. Small amount of mucus layering within the main bronchi and mucous plugging of the bronchials. Ground-glass opacities in the bilateral lung bases. Findings are consistent with aspiration pneumonitis and superimposed atelectasis. 3. Patulous esophagus with debris and septations noted to the upper level which could be related to esophagitis. Clinical correlation suggested. Findings also increased aspiration risk. 4. Ectatic main pulmonary artery measuring 3.3 cm. 5. Cardiomegaly with enlargement of the left atrium.   I personally reviewed the image(s)/study and resident interpretation. I agree with the findings as stated by resident Fredi Anderson. Data analyzed and images interpreted at [facility], [place], [place].   MACRO: None   Signed by: Beth Holguin 8/12/2024 2:37 AM Dictation workstation:   [id number]    CT head wo IV contrast    Result Date: 8/12/2024  Interpreted By:  Beth Holguin and Beyersdorf Conner STUDY: CT HEAD WO IV CONTRAST; CT CERVICAL SPINE WO IV CONTRAST;  8/12/2024 1:22 am   INDICATION: Signs/Symptoms:Hx of brain aneurysm. Generalized fatigue and weakness; Signs/Symptoms:BUE and BLE weakness   COMPARISON: CT head 03/28/2017   ACCESSION NUMBER(S): [id number]; [id number]   ORDERING CLINICIAN: ROSSANA FONTANEZ   TECHNIQUE: Axial noncontrast CT images of head with coronal and sagittal reconstructed images. Axial noncontrast CT images of the cervical spine with coronal and sagittal reconstructed images.   FINDINGS: CT HEAD:   Postsurgical changes of right temporal craniotomy and aneurysmal  clipping near the suprasellar and right parasellar region. Associated metallic streak artifact limits evaluation of the region. Findings are similar prior imaging   BRAIN PARENCHYMA: Confluent region of hypoattenuating areas of periventricular and subcortical white matter, which is nonspecific, but favored to represent chronic small-vessel ischemic disease in a patient of this age. Mild encephalomalacia within the bilateral right temporal lobe. No evidence of acute intraparenchymal hemorrhage or parenchymal evidence of acute large territory ischemic infarct. No mass-effect, midline shift or effacement of cerebral sulci. Gray-white matter distinction is preserved.   VENTRICLES and EXTRA-AXIAL SPACES: No acute extra-axial or intraventricular hemorrhage. Ventricles and sulci are age-concordant.   PARANASAL SINUSES/MASTOIDS: No hemorrhage or air-fluid levels within the visualized paranasal sinuses. The mastoid air cells are well-aerated.   CALVARIUM/ORBITS: No skull fracture. Bilateral lens replacements. The orbits and globes are intact to the extent visualized.   EXTRACRANIAL SOFT TISSUES: No discernible abnormality. Postsurgical changes of the lungs is   CT CERVICAL SPINE:   PREVERTEBRAL SOFT TISSUES: Within normal limits.   CRANIOCERVICAL JUNCTION: Intact.   ALIGNMENT: Loss of the normal cervical lordosis. There is grade 1 retrolisthesis of C3-C4 measuring 3.3 mm, and grade 1 retrolisthesis of C5-6 measuring 2.7 mm. Facet joint alignment is maintained. No traumatic malalignment or traumatic facet widening.   VERTEBRAE: No acute fracture. Vertebral body heights are maintained.   SPINAL CANAL/INTERVERTEBRAL DISCS: No high-grade spinal canal stenosis. Multilevel variable disc space height loss, most severe at C6-7. Prominent osteophytosis at this level along with subchondral sclerosis and cystic changes..   NEURAL FORAMINA: Mild uncovertebral hypertrophy at C4-5 with a mild bilateral neural foraminal narrowing. Moderate  neural foraminal narrowing and right facet hypertrophy at C5-6 with moderate right neural foraminal narrowing. Severe uncovertebral hypertrophy at C6-7 with moderate bilateral neural foraminal narrowing.   OTHER: Dedicated chest imaging has been performed and will be reported separately..       CT HEAD: 1. Postsurgical changes of right temporal craniotomy and aneurysmal clipping right partially degrades imaging. Otherwise no acute intracranial hemorrhage or mass effect identified. 2. Findings consistent with chronic small-vessel ischemic disease and right temporal lobe encephalomalacia.     CT CERVICAL SPINE: 1. No acute fracture or traumatic malalignment of the cervical spine. 2. Multilevel spondylotic changes of the cervical spine as detailed above, with multilevel neural foraminal narrowing.     I personally reviewed the image(s)/study and resident interpretation. I agree with the findings as stated by resident Fredi Anderson. Data analyzed and images interpreted at [facility], [place], [place].   MACRO: none   Signed by: Beth Holguin 8/12/2024 2:31 AM Dictation workstation:   [id number]    CT cervical spine wo IV contrast    Result Date: 8/12/2024  Interpreted By:  Beth Holguin and [place]yersdorf Conner STUDY: CT HEAD WO IV CONTRAST; CT CERVICAL SPINE WO IV CONTRAST;  8/12/2024 1:22 am   INDICATION: Signs/Symptoms:Hx of brain aneurysm. Generalized fatigue and weakness; Signs/Symptoms:BUE and BLE weakness   COMPARISON: CT head 03/28/2017   ACCESSION NUMBER(S): [id number]; [id number]   ORDERING CLINICIAN: ROSSANA FONTANEZ   TECHNIQUE: Axial noncontrast CT images of head with coronal and sagittal reconstructed images. Axial noncontrast CT images of the cervical spine with coronal and sagittal reconstructed images.   FINDINGS: CT HEAD:   Postsurgical changes of right temporal craniotomy and aneurysmal clipping near the suprasellar and right parasellar region.  Associated metallic streak artifact limits evaluation of the region. Findings are similar prior imaging   BRAIN PARENCHYMA: Confluent region of hypoattenuating areas of periventricular and subcortical white matter, which is nonspecific, but favored to represent chronic small-vessel ischemic disease in a patient of this age. Mild encephalomalacia within the bilateral right temporal lobe. No evidence of acute intraparenchymal hemorrhage or parenchymal evidence of acute large territory ischemic infarct. No mass-effect, midline shift or effacement of cerebral sulci. Gray-white matter distinction is preserved.   VENTRICLES and EXTRA-AXIAL SPACES: No acute extra-axial or intraventricular hemorrhage. Ventricles and sulci are age-concordant.   PARANASAL SINUSES/MASTOIDS: No hemorrhage or air-fluid levels within the visualized paranasal sinuses. The mastoid air cells are well-aerated.   CALVARIUM/ORBITS: No skull fracture. Bilateral lens replacements. The orbits and globes are intact to the extent visualized.   EXTRACRANIAL SOFT TISSUES: No discernible abnormality. Postsurgical changes of the lungs is   CT CERVICAL SPINE:   PREVERTEBRAL SOFT TISSUES: Within normal limits.   CRANIOCERVICAL JUNCTION: Intact.   ALIGNMENT: Loss of the normal cervical lordosis. There is grade 1 retrolisthesis of C3-C4 measuring 3.3 mm, and grade 1 retrolisthesis of C5-6 measuring 2.7 mm. Facet joint alignment is maintained. No traumatic malalignment or traumatic facet widening.   VERTEBRAE: No acute fracture. Vertebral body heights are maintained.   SPINAL CANAL/INTERVERTEBRAL DISCS: No high-grade spinal canal stenosis. Multilevel variable disc space height loss, most severe at C6-7. Prominent osteophytosis at this level along with subchondral sclerosis and cystic changes..   NEURAL FORAMINA: Mild uncovertebral hypertrophy at C4-5 with a mild bilateral neural foraminal narrowing. Moderate neural foraminal narrowing and right facet hypertrophy at  C5-6 with moderate right neural foraminal narrowing. Severe uncovertebral hypertrophy at C6-7 with moderate bilateral neural foraminal narrowing.   OTHER: Dedicated chest imaging has been performed and will be reported separately..       CT HEAD: 1. Postsurgical changes of right temporal craniotomy and aneurysmal clipping right partially degrades imaging. Otherwise no acute intracranial hemorrhage or mass effect identified. 2. Findings consistent with chronic small-vessel ischemic disease and right temporal lobe encephalomalacia.     CT CERVICAL SPINE: 1. No acute fracture or traumatic malalignment of the cervical spine. 2. Multilevel spondylotic changes of the cervical spine as detailed above, with multilevel neural foraminal narrowing.     I personally reviewed the image(s)/study and resident interpretation. I agree with the findings as stated by resident Fredi Anderson. Data analyzed and images interpreted at [facility], [place], [place].   MACRO: none   Signed by: Beth Holguin 8/12/2024 2:31 AM Dictation workstation:   [id number]        Assessment/Plan   Assessment & Plan  Weakness    Generalized weakness  This a 80yo RHF PMHx HTN, SSS (s/p pacer 2019), pAF (s/p watchman 2020), cerebral aneurysm (s/p clipping 1993), ET presenting for subacute progressive ascending weakness and instability.      Patient reported unsteady walking for about 6 weeks. Progressively worsening, but seems to have more-sharply declined in past 2 weeks. Now starting to involve BUE. Now starting to have paresthesias in the RUE and pain in the R shoulder. Occasional electrical shocks of pain radiating down BUE and chest.     Initial neurological examination was significant for some L thenar atrophy, weakness of ECRL/ECU, EDC, ADM, FDI. On lowers, there is diminished LLE vibratory sense at the toe, ankle, and knee compared to left. Questionable C5 sensory level. Normal tone. Reflexes 3+ with spread  throughout, London's & finger flexor reflex present bilaterally, 4bts ankle clonus bilaterally. Toes mute. Trace jaw jerk. Gait narrow-based and unsteady with truncal instability. CTH which shows R temporal cranioplasty, R posterior circulation clip with bloom artifact, and moderate posterior-predominant subcortical microvascular disease.      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 CT myelogram was inconclusive due to poor contrast uptake in the cervical spine. Consider technical vs obstructive cause. Neurosurgery following. Plan for cisterna magna myelogram.    8/16 Updates:  - Plan for cisterna magna myelogram at 11 am   - NSGY following: will have updated recs after myelogram  - PT/OT recommended Acute Rehab      #Cervical myeloradiculopathy  :: Possible structural compression  :: B12, folate, homocysteine, vitamin E, copper, MMA all within normal limits  :: CT myelogram inconclusive d/t incomplete contrast uptake, reach out to IR re: imaging difficulties  - Consult neurosurgery: will continue to follow with updated recs after cisternal myelogram  - IR team approved cisterna magna myelogram, plan for add-on 8/16     #MISC  -Continue home meds: hydrochlorothiazide daily, potassium, propranolol 10 mg QID, rosuvastatin 5 mg HS, Valtrex 500 mg daily    F: prn  E: prn  N: regular - NPO for myelogram 8/16  A: PIV  DVT: lovenox - HELD for now given pre-procedure  GI: N/A    CODE: FULL  NOK: Daughter Racheal, [phone]     Fartun Emmanuel MD  Neurology PGY-2

## 2024-08-16 NOTE — PROGRESS NOTES
Physical Therapy    Physical Therapy Treatment    Patient Name: Linda López  MRN: [id number]  Today's Date: 8/16/2024  Time Calculation  Start Time: 1428  Stop Time: 1454  Time Calculation (min): 26 min    Assessment/Plan   PT Assessment  End of Session Communication: Bedside nurse  Assessment Comment: Pt tolerated session well.  Pt required VCs for decreased gait speed.  Pt with 1 episode of LOB while turning R with Min A from PT to regain balance.  Pt with poor balance during tandem balance this session. Pt scores as a moderate fall risk on the Tinetti Balance Test.  Pt continues to benefit from skilled PT and remains appropriate for high intensity PT upon discharge.  End of Session Patient Position: Bed, 2 rail up, Alarm off, not on at start of session (family in room, RN sitting outside room)  PT Plan  Inpatient/Swing Bed or Outpatient: Inpatient  PT Plan  Treatment/Interventions: Bed mobility, Gait training, Transfer training, Stair training, Balance training, Strengthening, Endurance training, Therapeutic activity, Therapeutic exercise  PT Plan: Ongoing PT  PT Frequency: 5 times per week  PT Discharge Recommendations: High intensity level of continued care  Equipment Recommended upon Discharge:  (TBD)  PT Recommended Transfer Status: Contact guard, Assistive device (RW)  PT - OK to Discharge: Yes (meaning pt seen and dc rec made)      General Visit Information:   PT  Visit  PT Received On: 08/16/24  General  Reason for Referral: 80yo  presenting with progressive BUE >BLE weakness x 6 weeks.  Past Medical History Relevant to Rehab: HTN, SSS (s/p pacer 2019), pAF (s/p watchman 2020), cerebral aneurysm (s/p clipping 1993)  Missed Visit: Yes  Missed Visit Reason: Patient refused (due to having already worked with OT and being too tired.  PT will re-attempt as schedule allows)  Family/Caregiver Present: Yes (supportive daughter and ex- remained in room for entire session)  Caregiver Feedback: all  "questions answered  Prior to Session Communication: Bedside nurse  Patient Position Received: Bed, 2 rail up, Alarm off, not on at start of session (pt sitting EOB)  General Comment: Pt cleared for PT by RN.  Pt alert and agreeable to PT. Broken/stuttered speech throughout session. +PIV    Subjective   Precautions:  Precautions  Medical Precautions: Fall precautions    Objective   Pain:  Pain Assessment  Pain Assessment: 0-10  0-10 (Numeric) Pain Score: 3  Pain Type: Acute pain  Pain Location: Shoulder  Pain Orientation: Right  Pain Interventions: Ambulation/increased activity, Repositioned, Rest  Response to Interventions: no change in pain  Cognition:  Cognition  Overall Cognitive Status: Within Functional Limits  Orientation Level: Oriented X4  Coordination:  Movements are Fluid and Coordinated: Yes  Postural Control:  Postural Control  Postural Control: Within Functional Limits  Static Sitting Balance  Static Sitting-Balance Support: Bilateral upper extremity supported, Feet supported  Static Sitting-Level of Assistance: Independent  Dynamic Sitting Balance  Dynamic Sitting-Balance Support: Bilateral upper extremity supported, Feet supported  Dynamic Sitting-Level of Assistance: Independent  Dynamic Sitting-Balance:  (LE ther ex)  Static Standing Balance  Static Standing-Balance Support: Bilateral upper extremity supported (RW)  Static Standing-Level of Assistance: Contact guard  Dynamic Standing Balance  Dynamic Standing-Balance Support: Bilateral upper extremity supported (RW)  Dynamic Standing-Level of Assistance: Contact guard, Minimum assistance  Dynamic Standing-Balance: Turning  Dynamic Standing-Comments: pt had 1 episode of LOB turning R this session with Min A to recover  Activity Tolerance:  Activity Tolerance  Endurance: Tolerates 10 - 20 min exercise with multiple rests  Treatments:  Therapeutic Exercise  Therapeutic Exercise Performed: Yes  Therapeutic Exercise Activity 1: seated Baldev xie3\" " hold, and ankle DF completed to improve foot clearance during amb    Balance/Neuromuscular Re-Education  Balance/Neuromuscular Re-Education Activity Performed: Yes  Balance/Neuromuscular Re-Education Activity 1: rhomberg balance x30 seconds no LOB, tandem balance 2x15 seconds each LE anterior with multiple episodes of LOB, no fall, Max A from PT to regain balance  Balance/Neuromuscular Re-Education Activity 2: Tinetti Balance Test    Bed Mobility  Bed Mobility: No (pt sitting EOB at start and end of session)    Ambulation/Gait Training  Ambulation/Gait Training Performed: Yes  Ambulation/Gait Training 1  Surface 1: Level tile, Carpet  Device 1: Rolling walker  Assistance 1: Contact guard, Minimum assistance  Quality of Gait 1: Narrow base of support, Diminished heel strike, Inconsistent stride length, Foot sweep  Comments/Distance (ft) 1: 265ft, 1 episode of LOB while turning R with Min A from PT to regain balance, VCs for decreased gait speed  Transfers  Transfer: Yes  Transfer 1  Transfer From 1: Bed to  Transfer to 1: Stand  Technique 1: Sit to stand, Stand to sit  Transfer Device 1: Walker  Transfer Level of Assistance 1: Contact guard  Trials/Comments 1: 2x throughout session  Transfers 2  Transfer From 2: Bed to  Transfer to 2: Stand  Technique 2: Sit to stand, Stand to sit  Transfer Device 2:  (no device)  Transfer Level of Assistance 2: Contact guard  Trials/Comments 2: 2x throughout session    Stairs  Stairs: No    Outcome Measures:  [facility] Basic Mobility  Turning from your back to your side while in a flat bed without using bedrails: A little  Moving from lying on your back to sitting on the side of a flat bed without using bedrails: A little  Moving to and from bed to chair (including a wheelchair): A little  Standing up from a chair using your arms (e.g. wheelchair or bedside chair): A little  To walk in hospital room: A little  Climbing 3-5 steps with railing: A little  Basic Mobility - Total Score:  18    Tinetti  Sitting Balance: Steady, safe  Arises: Able, uses arms to help  Attempts to Arise: Able to arise, one attempt  Immediate Standing Balance (First 5 Seconds): Steady but uses walker or other support  Standing Balance: Narrow stance without support  Nudged: Steady without walker or other support  Eyes Closed: Steady  Turned 360 Degrees: Steadiness: Unsteady (Grabs, staggers)  Turned 360 Degrees: Continuity of Steps: Continuous  Sitting Down: Uses arms or not a smooth motion  Balance Score: 12  Initiation of Gait: No hesitancy  Step Height: R Swing Foot: Right foot does not clear floor completely with step  Step Length: R Swing Foot: Passes left stance foot  Step Height: L Swing Foot: Left foot does not clear floor completely with step  Step Length: L Swing Foot: Passes right stance foot  Step Symmetry: Right and left step appear equal  Step Continuity: Steps appear continuous  Path: Mild/moderate deviation or uses walking aid  Trunk: Marked sway or uses walking aid  Walking Time: Heels almost touching while walking  Gait Score: 7  Total Score: 19    Education Documentation  Home Exercise Program, taught by Coty Gunn V PT at 8/16/2024  3:19 PM.  Learner: Patient  Readiness: Acceptance  Method: Explanation  Response: Verbalizes Understanding    Body Mechanics, taught by Coty GOMEZ PT at 8/16/2024  3:19 PM.  Learner: Patient  Readiness: Acceptance  Method: Explanation  Response: Verbalizes Understanding    Mobility Training, taught by Coty GOMEZ PT at 8/16/2024  3:19 PM.  Learner: Patient  Readiness: Acceptance  Method: Explanation  Response: Verbalizes Understanding    Education Comments  No comments found.        Encounter Problems       Encounter Problems (Active)       PT Problem       Patient will complete supine to sit and sit to supine Independent  (Progressing)       Start:  08/15/24    Expected End:  08/29/24            Patient will ambulate >150' with LRAD and Modified Independent   (Progressing)       Start:  08/15/24    Expected End:  08/29/24            Patient will perform sit<>stand transfer with LRAD, and Modified Independent  (Progressing)       Start:  08/15/24    Expected End:  08/29/24            Patient will ascend/descend 2 steps with Right Rail Ascending and supervision  (Progressing)       Start:  08/15/24    Expected End:  08/29/24            Pt will score >/= to 24/28 on the Tinetti to indicate low fall risk (Progressing)       Start:  08/15/24    Expected End:  08/29/24               Pain - Adult

## 2024-08-16 NOTE — ASSESSMENT & PLAN NOTE
This a 78yo RHF PMHx HTN, SSS (s/p pacer 2019), pAF (s/p watchman 2020), cerebral aneurysm (s/p clipping 1993), ET presenting for subacute progressive ascending weakness and instability.      Patient reported unsteady walking for about 6 weeks. Progressively worsening, but seems to have more-sharply declined in past 2 weeks. Now starting to involve BUE. Now starting to have paresthesias in the RUE and pain in the R shoulder. Occasional electrical shocks of pain radiating down BUE and chest.     Initial neurological examination was significant for some L thenar atrophy, weakness of ECRL/ECU, EDC, ADM, FDI. On lowers, there is diminished LLE vibratory sense at the toe, ankle, and knee compared to left. Questionable C5 sensory level. Normal tone. Reflexes 3+ with spread throughout, London's & finger flexor reflex present bilaterally, 4bts ankle clonus bilaterally. Toes mute. Trace jaw jerk. Gait narrow-based and unsteady with truncal instability. CTH which shows R temporal cranioplasty, R posterior circulation clip with bloom artifact, and moderate posterior-predominant subcortical microvascular disease.      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 CT myelogram was inconclusive due to poor contrast uptake in the cervical spine. Consider technical vs obstructive cause. Neurosurgery following. Plan for cisterna magna myelogram.    8/16 Updates:  - Plan for cisterna magna myelogram at 11 am   - NSGY following: will have updated recs after myelogram  - PT/OT recommended Acute Rehab      #Cervical myeloradiculopathy  :: Possible structural compression  :: B12, folate, homocysteine, vitamin E, copper, MMA all within normal limits  :: CT myelogram inconclusive d/t incomplete contrast uptake, reach out to IR re: imaging difficulties  - Consult neurosurgery: will continue to follow with updated recs after cisternal  myelogram  - IR team approved cisterna magna myelogram, plan for add-on 8/16     #MISC  -Continue home meds: hydrochlorothiazide daily, potassium, propranolol 10 mg QID, rosuvastatin 5 mg HS, Valtrex 500 mg daily    F: prn  E: prn  N: regular - NPO for myelogram 8/16  A: PIV  DVT: lovenox - HELD for now given pre-procedure  GI: N/A

## 2024-08-16 NOTE — POST-PROCEDURE NOTE
Interventional Radiology Brief Postprocedure Note    Attending: Dr. Isabel    Assistant: Dr. Kiran    Diagnosis: Generalized weakness     Description of procedure: The C1-C2 vertebral space marked under fluoroscopy. Patient was prepped and draped in the usual sterile fashion. 1 ml 1% lidocaine injected for local anesthesia. Under direct fluoroscopic guidance, a lumber puncture was performed at the C1-C2 interspace using a 20g spinal needle. A total of 12 mL of intrathecal contrast (omnipaque 240) was subsequently injected. Multiple static fluoroscopic images were then obtained.  The stylet was replaced and needle was removed. A Band-Aid was applied. No immediate complications. The patient was then sent directly to the CT scanner. See PACS for full details.      Anesthesia:  Local    Complications: None    Estimated Blood Loss: minimal    Medications  As of 08/16/24 1047      ondansetron (Zofran) injection 4 mg (mg) Total dose:  4 mg Dosing weight:  76.7      Date/Time Rate/Dose/Volume Action       08/11/24  2231 4 mg Given               iohexol (OMNIPaque) 350 mg iodine/mL solution 150 mL (mL) Total volume:  150 mL Dosing weight:  76.7      Date/Time Rate/Dose/Volume Action       08/12/24  0104 150 mL Given               propranolol (Inderal) tablet 10 mg (mg) Total dose:  150 mg* Dosing weight:  76.7   *Administration not included in total     Date/Time Rate/Dose/Volume Action       08/12/24  1200 10 mg Given      1604 10 mg Given      1815 *Not included in total MAR Hold      1829 *Not included in total MAR Unhold      2212 10 mg Given      2245 *10 mg Missed     08/13/24  0700 *10 mg Missed      1250 10 mg Given      1602 10 mg Given      2208 10 mg Given     08/14/24  0857 10 mg Given      1325 10 mg Given      1705 10 mg Given      2126 10 mg Given     08/15/24  0838 10 mg Given      1316 10 mg Given      1623 10 mg Given      2154 10 mg Given     08/16/24  0838 10 mg Given               valACYclovir (Valtrex)  tablet 500 mg (mg) Total dose:  2,500 mg Dosing weight:  76.7      Date/Time Rate/Dose/Volume Action       08/12/24  1105 500 mg Given      1815 *Not included in total MAR Hold      1829 *Not included in total MAR Unhold     08/13/24  1247 500 mg Given     08/14/24  0856 500 mg Given     08/15/24  1052 500 mg Given     08/16/24  0838 500 mg Given               potassium chloride CR (Klor-Con M20) ER tablet 40 mEq (mEq) Total dose:  40 mEq Dosing weight:  76.7      Date/Time Rate/Dose/Volume Action       08/12/24  1107 40 mEq Given               potassium chloride CR (Klor-Con M20) ER tablet 20 mEq (mEq) Total dose:  200 mEq* Dosing weight:  76.7   *Administration not included in total     Date/Time Rate/Dose/Volume Action       08/12/24  2300 20 mEq Given     08/13/24  1249 20 mEq Given      1500 *20 mEq Missed      2208 20 mEq Given     08/14/24  0856 20 mEq Given      1705 20 mEq Given      2126 20 mEq Given     08/15/24  0837 20 mEq Given      1456 20 mEq Given      2154 20 mEq Given     08/16/24  0838 20 mEq Given               nicotine (Nicoderm CQ) 14 mg/24 hr patch 1 patch (patch) Total dose:  4 patch Dosing weight:  76.7      Date/Time Rate/Dose/Volume Action       08/12/24  2007 1 patch (over 1440 min) Medication Applied     08/13/24  0859  (over 1440 min) Medication Removed      1250 1 patch (over 1440 min) Medication Applied     08/14/24  0856 1 patch (over 1440 min) Medication Applied      0859  (over 1440 min) Medication Removed      2330  (over 1440 min) Medication Removed     08/15/24  0838 1 patch (over 1440 min) Medication Applied      2345  (over 1440 min) Medication Removed               rosuvastatin (Crestor) tablet 5 mg (mg) Total dose:  20 mg Dosing weight:  76.7      Date/Time Rate/Dose/Volume Action       08/12/24  2259 5 mg Given     08/13/24  2208 5 mg Given     08/14/24  2126 5 mg Given     08/15/24  2154 5 mg Given               hydroCHLOROthiazide (HYDRODiuril) tablet 25 mg (mg) Total  dose:  100 mg Dosing weight:  76.7      Date/Time Rate/Dose/Volume Action       08/13/24  1250 25 mg Given     08/14/24  0856 25 mg Given     08/15/24  0839 25 mg Given     08/16/24  0838 25 mg Given               enoxaparin (Lovenox) syringe 40 mg (mg) Total dose:  80 mg Dosing weight:  76.7      Date/Time Rate/Dose/Volume Action       08/13/24  1601 40 mg Given     08/14/24  0910 *Not included in total Held by provider      1145 *Not included in total Automatically Held      1754 *Not included in total Unheld by provider     08/15/24  1052 40 mg Given      1131 *Not included in total Held by provider               lidocaine (Xylocaine) 10 mg/mL (1 %) injection 13 mL (mL) Total volume:  13 mL Dosing weight:  76.7      Date/Time Rate/Dose/Volume Action       08/14/24  1635 13 mL Given               iohexol (OMNIPaque) 300 mg iodine/mL solution 75 mL (mL) Total volume:  10 mL Dosing weight:  76.7      Date/Time Rate/Dose/Volume Action       08/14/24  1626 10 mL Given               magnesium sulfate 2 g in sterile water for injection 50 mL (mL/hr) Total dose:  2 g* Dosing weight:  76.7   *From user-documented volume     Date/Time Rate/Dose/Volume Action       08/15/24  0839 2 g - 25 mL/hr (over 120 min) New Bag      1039  (over 120 min) Stopped               hydrOXYzine HCL (Atarax) tablet 50 mg (mg) Total dose:  0 mg* Dosing weight:  76.7   *Administration not included in total     Date/Time Rate/Dose/Volume Action       08/16/24  0215 *50 mg Missed               acetaminophen (Tylenol) tablet 650 mg (mg) Total dose:  650 mg Dosing weight:  76.7      Date/Time Rate/Dose/Volume Action       08/16/24  0213 650 mg Given                   No specimens collected      See detailed result report with images in PACS.    The patient tolerated the procedure well without incident or complication and is in stable condition.

## 2024-08-16 NOTE — PROGRESS NOTES
Occupational Therapy    Evaluation/Treatment    Patient Name: Linda López  MRN: [id number]  : 1945  Today's Date: 24  Time Calculation  Start Time: 1137  Stop Time: 1200  Time Calculation (min): 23 min     Assessment:  OT Assessment: Pt will benefit from continued skilled OT to increase independence in ADLs, functional mobility, activity tolerance, safety, and cognition.  Prognosis: Good  Barriers to Discharge: None  Evaluation/Treatment Tolerance: Patient tolerated treatment well  Medical Staff Made Aware: Yes  End of Session Communication: Bedside nurse  End of Session Patient Position: Bed, 3 rail up, Alarm off, not on at start of session (family present in room)  OT Assessment Results: Decreased ADL status, Decreased safe judgment during ADL, Decreased cognition, Decreased endurance, Decreased functional mobility, Decreased gross motor control, Decreased IADLs  Prognosis: Good  Barriers to Discharge: None  Evaluation/Treatment Tolerance: Patient tolerated treatment well  Medical Staff Made Aware: Yes  Strengths: Attitude of self, Support of Caregivers  Barriers to Participation: Comorbidities  Plan:  Treatment Interventions: ADL retraining, Functional transfer training, Endurance training, Cognitive reorientation, Patient/family training, Equipment evaluation/education, Compensatory technique education  OT Frequency: 4 times per week  OT Discharge Recommendations: High intensity level of continued care  Equipment Recommended upon Discharge:  (TBD at next level of care)  OT Recommended Transfer Status: Assist of 1  OT - OK to Discharge: Yes (upon medical clearance)  Treatment Interventions: ADL retraining, Functional transfer training, Endurance training, Cognitive reorientation, Patient/family training, Equipment evaluation/education, Compensatory technique education    Subjective   Current Problem:  1. Generalized weakness          General:   OT Received On: 24  General  Reason for  Referral: 80yo  presenting with progressive BUE >BLE weakness x 6 weeks.  Past Medical History Relevant to Rehab: HTN, SSS (s/p pacer 2019), pAF (s/p watchman 2020), cerebral aneurysm (s/p clipping 1993)  Family/Caregiver Present: Yes (family present end of session)  Prior to Session Communication: Bedside nurse  Patient Position Received: Bed, 2 rail up, Alarm off, not on at start of session  Preferred Learning Style: auditory, verbal  General Comment: Pt pleasant and agreeable to therapy. Pt with stuttered/broken speech throughout session  Precautions:  Hearing/Visual Limitations: [place], + glasses  Medical Precautions: Fall precautions    Pain:  Pain Assessment  Pain Assessment: 0-10  0-10 (Numeric) Pain Score:  (did not rate)  Pain Location: Neck  Pain Interventions: Repositioned, Distraction    Objective   Cognition:  Arousal/Alertness: Appropriate responses to stimuli  Orientation Level: Oriented X4  Following Commands: Follows one step commands with repetition  Safety/Judgement: Exceptions to WFL  Complex Functional Tasks: Moderate  Routine Tasks: Minimal  Insight: Moderate  Impulsive: Moderately     Home Living:  Type of Home: Condo  Lives With: Alone  Home Adaptive Equipment: Walker rolling or standard, Cane  Home Layout: One level  Home Access: Stairs to enter with rails  Entrance Stairs-Rails: Right  Entrance Stairs-Number of Steps: 2  Bathroom Shower/Tub: Walk-in shower  Bathroom Equipment: Grab bars in shower, Shower chair without back  Prior Function:  Level of Tri[place]: Independent with ADLs and functional transfers, Independent with homemaking with ambulation  ADL Assistance: Independent  Homemaking Assistance: Independent  Ambulatory Assistance: Independent (no AD)  Prior Function Comments: denied falls  IADL History:  Homemaking Responsibilities: Yes  Meal Prep Responsibility: Primary  Laundry Responsibility: Primary  Cleaning Responsibility: Primary  Bill Paying/Finance Responsibility:  Primary  Shopping Responsibility: Primary  Current License: Yes  Mode of Transportation: Car  ADL:  Eating Assistance: Stand by (anticipated)  Grooming Assistance: Minimal  Bathing Assistance: Moderate (anticipated seated)  UE Dressing Assistance: Stand by (anticipated)  LE Dressing Assistance: Moderate (anticipated)  Toileting Assistance with Device: Minimal  Activities of Daily Living:      Grooming  Grooming Level of Assistance: Minimum assistance, Minimal verbal cues  Grooming Where Assessed: Standing sinkside  Grooming Comments: Pt performed handwashing standing at sink CGA, min A to press soap dispenser    Toileting  Toileting Level of Assistance: Minimum assistance, Minimal verbal cues  Where Assessed: Toilet  Toileting Comments: Pt performed toileting seated, sit <>stand min A FWW, SBA saima care seated, VCs 2/2 pt impulsive  Activity Tolerance:  Endurance: Tolerates 10 - 20 min exercise with multiple rests    Bed Mobility/Transfers: Bed Mobility  Bed Mobility: No    Transfers  Transfer: Yes  Transfer 1  Transfer From 1: Sit to, Stand to  Transfer to 1: Stand, Sit  Technique 1: Sit to stand, Stand to sit  Transfer Device 1: Walker  Transfer Level of Assistance 1: Minimum assistance  Trials/Comments 1: Max VCs for hand placement and positioing, pt attempting to sit before close to bed  Transfers 2  Transfer From 2: Stand to, Toilet to  Transfer to 2: Toilet, Stand  Technique 2: Sit to stand, Stand to sit  Transfer Device 2: Walker  Transfer Level of Assistance 2: Minimum assistance  Trials/Comments 2: Vcs for safety and positioning    Functional Mobility:  Functional Mobility  Functional Mobility Performed: Yes  Functional Mobility 1  Surface 1: Level tile  Device 1: Rolling walker  Assistance 1: Contact guard, Minimum assistance, Moderate verbal cues  Comments 1: fxnl mob min household distance bed <>bathroom CGA FWW, 1x LOB min A to recover, pt impulsive with mobility, leaving walker behind throughout  "mobility, max VCs for safety and positioning throughout  Sitting Balance:  Static Sitting Balance  Static Sitting-Balance Support: Feet supported  Static Sitting-Level of Assistance: Distant supervision  Dynamic Sitting Balance  Dynamic Sitting-Balance Support: Feet supported  Dynamic Sitting-Level of Assistance: Distant supervision  Standing Balance:  Static Standing Balance  Static Standing-Balance Support: Bilateral upper extremity supported  Static Standing-Level of Assistance: Contact guard  Dynamic Standing Balance  Dynamic Standing-Balance Support: Bilateral upper extremity supported  Dynamic Standing-Level of Assistance: Minimum assistance    Modalities:  Modalities Used: No    Vision:Vision - Basic Assessment  Current Vision:  (reports glasses \"sometimes\")   and    Sensation:  Light Touch: No apparent deficits  Sensation Comment: denies N/T  Strength:  Strength Comments: BUE at least 3/5 grossly    Perception:  Inattention/Neglect: Appears intact  Coordination:  Movements are Fluid and Coordinated: Yes   Hand Function:  Hand Function  Gross Grasp: Functional  Coordination: Functional  Extremities: RUE   RUE : Within Functional Limits and LUE   LUE: Within Functional Limits    Outcome Measures: [facility] Daily Activity  Putting on and taking off regular lower body clothing: A lot  Bathing (including washing, rinsing, drying): A lot  Putting on and taking off regular upper body clothing: A little  Toileting, which includes using toilet, bedpan or urinal: A little  Taking care of personal grooming such as brushing teeth: A little  Eating Meals: A little  Daily Activity - Total Score: 16   and OT Adult Other Outcome Measures  4AT: negative    Education Documentation  Body Mechanics, taught by Rey Saini OT at 8/16/2024  1:04 PM.  Learner: Family, Patient  Readiness: Acceptance  Method: Explanation  Response: Verbalizes Understanding, Needs Reinforcement    Precautions, taught by Rey Saini OT at 8/16/2024  " 1:04 PM.  Learner: Family, Patient  Readiness: Acceptance  Method: Explanation  Response: Verbalizes Understanding, Needs Reinforcement    ADL Training, taught by Rey Saini OT at 8/16/2024  1:04 PM.  Learner: Family, Patient  Readiness: Acceptance  Method: Explanation  Response: Verbalizes Understanding, Needs Reinforcement    Education Comments  No comments found.      Goals:  Encounter Problems       Encounter Problems (Active)       ADLs       Patient with complete lower body dressing with mod I level of assistance donning and doffing all LE clothes  with PRN adaptive equipment while supported sitting and standing (Progressing)       Start:  08/16/24    Expected End:  09/06/24            Patient will complete toileting including hygiene clothing management/hygiene with modified independent level of assistance and grab bars. (Progressing)       Start:  08/16/24    Expected End:  09/06/24               BALANCE       Pt will maintain dynamic standing balance during ADL task with modified independent level of assistance in order to demonstrate decreased risk of falling and improved postural control. (Progressing)       Start:  08/16/24    Expected End:  09/06/24               COGNITION/SAFETY       Patient will score WFL on standardized cognitive assessment with verbal cues and within reasonable time frame (Progressing)       Start:  08/16/24    Expected End:  09/06/24               MOBILITY       Patient will perform Functional mobility max Household distances/Community Distances with modified independent level of assistance and least restrictive device in order to improve safety and functional mobility. (Progressing)       Start:  08/16/24    Expected End:  09/06/24               TRANSFERS       Patient will complete sit to stand transfer with modified independent level of assistance and least restrictive device in order to improve safety and prepare for out of bed mobility. (Progressing)       Start:   08/16/24    Expected End:  09/06/24               Rey Saini OTR/L

## 2024-08-16 NOTE — SIGNIFICANT EVENT
CT myelogram with no significant stenosis. Aspirin restart per primary. No follow up needed. Ok to work with PTOT. Thank you for allowing us to participate in the care of this patient. Will sign off at this time. Please page [phone] with any questions or concerns.    Juan Zimmerman MD  PGY-3 Neurosurgery  7:58 PM

## 2024-08-16 NOTE — PROGRESS NOTES
Physical Therapy                 Therapy Communication Note    Patient Name: Linda López  MRN: [id number]  Today's Date: 8/16/2024     Discipline: Physical Therapy    Missed Visit Reason: Missed Visit Reason: Patient refused (due to having already worked with OT and being too tired.  PT will re-attempt as schedule allows)    Missed Time: Attempt 1216     Satisfactory

## 2024-08-16 NOTE — CARE PLAN
The clinical goals for the shift include patient will remain safe throughout the shift    Patients neuro exam unchanged overnight.  She did have a difficult time sleeping through the night.  Has been NPO since midnight for testing planned for today.      Problem: Fall/Injury  Goal: Be free from injury by end of the shift  Outcome: Progressing  Goal: Use assistive devices by end of the shift  Outcome: Progressing     Problem: Safety - Adult  Goal: Free from fall injury  Outcome: Progressing

## 2024-08-16 NOTE — PROGRESS NOTES
Met with patient and introduced myself as Care Coordinator and member of the discharge planning team.  Pt was admitted with complaints of UE weakness and difficulty walking. She lives alone in a condo. Her daughter is visiting from out of town. Attempted to reach her by phone at patient's request to discuss discharge plan but was not successful. Care coordinator will continue to follow for discharge needs.  Myra Gomez RN  Add [phone] Met with patient and her daughter. They would like for patient to discharge to a Rehab Hospital when medically appropriate. They are interested in having her stay close to the hospital. They were given a list of AR's near  and also near patient's zip code. Pt's daughter will research area facilities and let me know her decision. Myra Gomez RN

## 2024-08-17 LAB
ALBUMIN SERPL BCP-MCNC: 3.7 G/DL (ref 3.4–5)
ANION GAP SERPL CALC-SCNC: 13 MMOL/L (ref 10–20)
BASOPHILS # BLD AUTO: 0.06 X10*3/UL (ref 0–0.1)
BASOPHILS NFR BLD AUTO: 1 %
BUN SERPL-MCNC: 23 MG/DL (ref 6–23)
CALCIUM SERPL-MCNC: 9.5 MG/DL (ref 8.6–10.6)
CHLORIDE SERPL-SCNC: 106 MMOL/L (ref 98–107)
CK SERPL-CCNC: 41 U/L (ref 0–215)
CO2 SERPL-SCNC: 25 MMOL/L (ref 21–32)
CREAT SERPL-MCNC: 0.57 MG/DL (ref 0.5–1.05)
EGFRCR SERPLBLD CKD-EPI 2021: >90 ML/MIN/1.73M*2
EOSINOPHIL # BLD AUTO: 0.14 X10*3/UL (ref 0–0.4)
EOSINOPHIL NFR BLD AUTO: 2.2 %
ERYTHROCYTE [DISTWIDTH] IN BLOOD BY AUTOMATED COUNT: 12 % (ref 11.5–14.5)
GLUCOSE SERPL-MCNC: 166 MG/DL (ref 74–99)
HCT VFR BLD AUTO: 45.1 % (ref 36–46)
HGB BLD-MCNC: 15.1 G/DL (ref 12–16)
IMM GRANULOCYTES # BLD AUTO: 0.01 X10*3/UL (ref 0–0.5)
IMM GRANULOCYTES NFR BLD AUTO: 0.2 % (ref 0–0.9)
LYMPHOCYTES # BLD AUTO: 2.1 X10*3/UL (ref 0.8–3)
LYMPHOCYTES NFR BLD AUTO: 33.5 %
MAGNESIUM SERPL-MCNC: 1.9 MG/DL (ref 1.6–2.4)
MCH RBC QN AUTO: 32.6 PG (ref 26–34)
MCHC RBC AUTO-ENTMCNC: 33.5 G/DL (ref 32–36)
MCV RBC AUTO: 97 FL (ref 80–100)
MONOCYTES # BLD AUTO: 0.48 X10*3/UL (ref 0.05–0.8)
MONOCYTES NFR BLD AUTO: 7.7 %
NEUTROPHILS # BLD AUTO: 3.47 X10*3/UL (ref 1.6–5.5)
NEUTROPHILS NFR BLD AUTO: 55.4 %
NRBC BLD-RTO: 0 /100 WBCS (ref 0–0)
PHOSPHATE SERPL-MCNC: 3.4 MG/DL (ref 2.5–4.9)
PLATELET # BLD AUTO: 194 X10*3/UL (ref 150–450)
POTASSIUM SERPL-SCNC: 3.8 MMOL/L (ref 3.5–5.3)
RBC # BLD AUTO: 4.63 X10*6/UL (ref 4–5.2)
SODIUM SERPL-SCNC: 140 MMOL/L (ref 136–145)
WBC # BLD AUTO: 6.3 X10*3/UL (ref 4.4–11.3)

## 2024-08-17 PROCEDURE — 2500000002 HC RX 250 W HCPCS SELF ADMINISTERED DRUGS (ALT 637 FOR MEDICARE OP, ALT 636 FOR OP/ED)

## 2024-08-17 PROCEDURE — 82550 ASSAY OF CK (CPK): CPT | Performed by: STUDENT IN AN ORGANIZED HEALTH CARE EDUCATION/TRAINING PROGRAM

## 2024-08-17 PROCEDURE — 1100000001 HC PRIVATE ROOM DAILY

## 2024-08-17 PROCEDURE — 83735 ASSAY OF MAGNESIUM: CPT

## 2024-08-17 PROCEDURE — 2500000004 HC RX 250 GENERAL PHARMACY W/ HCPCS (ALT 636 FOR OP/ED)

## 2024-08-17 PROCEDURE — 85025 COMPLETE CBC W/AUTO DIFF WBC: CPT

## 2024-08-17 PROCEDURE — 84100 ASSAY OF PHOSPHORUS: CPT

## 2024-08-17 PROCEDURE — 2500000002 HC RX 250 W HCPCS SELF ADMINISTERED DRUGS (ALT 637 FOR MEDICARE OP, ALT 636 FOR OP/ED): Performed by: PHYSICIAN ASSISTANT

## 2024-08-17 PROCEDURE — 36415 COLL VENOUS BLD VENIPUNCTURE: CPT

## 2024-08-17 PROCEDURE — 99231 SBSQ HOSP IP/OBS SF/LOW 25: CPT

## 2024-08-17 PROCEDURE — 2500000001 HC RX 250 WO HCPCS SELF ADMINISTERED DRUGS (ALT 637 FOR MEDICARE OP): Performed by: PHYSICIAN ASSISTANT

## 2024-08-17 PROCEDURE — S4991 NICOTINE PATCH NONLEGEND: HCPCS | Performed by: PHYSICIAN ASSISTANT

## 2024-08-17 RX ORDER — ACETAMINOPHEN 160 MG/5ML
650 SOLUTION ORAL EVERY 4 HOURS PRN
Status: DISCONTINUED | OUTPATIENT
Start: 2024-08-17 | End: 2024-08-26 | Stop reason: HOSPADM

## 2024-08-17 RX ORDER — ACETAMINOPHEN 325 MG/1
650 TABLET ORAL EVERY 4 HOURS PRN
Status: DISCONTINUED | OUTPATIENT
Start: 2024-08-17 | End: 2024-08-26 | Stop reason: HOSPADM

## 2024-08-17 RX ORDER — PAROXETINE HYDROCHLORIDE 20 MG/1
20 TABLET, FILM COATED ORAL DAILY
Status: DISCONTINUED | OUTPATIENT
Start: 2024-08-17 | End: 2024-08-26 | Stop reason: HOSPADM

## 2024-08-17 ASSESSMENT — PAIN SCALES - GENERAL
PAINLEVEL_OUTOF10: 3
PAINLEVEL_OUTOF10: 0 - NO PAIN
PAINLEVEL_OUTOF10: 3
PAINLEVEL_OUTOF10: 5 - MODERATE PAIN

## 2024-08-17 ASSESSMENT — PAIN DESCRIPTION - LOCATION
LOCATION: SHOULDER
LOCATION: SHOULDER

## 2024-08-17 ASSESSMENT — COGNITIVE AND FUNCTIONAL STATUS - GENERAL
DRESSING REGULAR UPPER BODY CLOTHING: A LITTLE
MOBILITY SCORE: 20
CLIMB 3 TO 5 STEPS WITH RAILING: A LITTLE
MOVING TO AND FROM BED TO CHAIR: A LITTLE
STANDING UP FROM CHAIR USING ARMS: A LITTLE
DRESSING REGULAR LOWER BODY CLOTHING: A LITTLE
TOILETING: A LITTLE
HELP NEEDED FOR BATHING: A LITTLE
WALKING IN HOSPITAL ROOM: A LITTLE
DAILY ACTIVITIY SCORE: 20

## 2024-08-17 NOTE — CARE PLAN
The patient's goals for the shift include      The clinical goals for the shift include Pt will remain free from falls during shift.    Over the shift, the patient remained free from falls and ambulated in the [place] a few times during the day.

## 2024-08-17 NOTE — CARE PLAN
The patient's goals for the shift include      The clinical goals for the shift include pt will remain safe and free from falls/injury    Problem: Fall/Injury  Goal: Not fall by end of shift  Outcome: Met  Goal: Be free from injury by end of the shift  Outcome: Met     Problem: Fall/Injury  Goal: Verbalize understanding of personal risk factors for fall in the hospital  Outcome: Progressing     Problem: Pain - Adult  Goal: Verbalizes/displays adequate comfort level or baseline comfort level  Outcome: Progressing     Problem: Safety - Adult  Goal: Free from fall injury  Outcome: Progressing     Problem: Discharge Planning  Goal: Discharge to home or other facility with appropriate resources  Outcome: Progressing

## 2024-08-17 NOTE — ASSESSMENT & PLAN NOTE
This a 80yo RHF PMHx HTN, SSS (s/p pacer 2019), pAF (s/p watchman 2020), cerebral aneurysm (s/p clipping 1993), ET presenting for subacute progressive ascending weakness and instability.      Patient reported unsteady walking for about 6 weeks. Progressively worsening, but seems to have more-sharply declined in past 2 weeks. Now starting to involve BUE. Now starting to have paresthesias in the RUE and pain in the R shoulder. Occasional electrical shocks of pain radiating down BUE and chest.     Initial neurological examination was significant for some L thenar atrophy, weakness of ECRL/ECU, EDC, ADM, FDI. On lowers, there is diminished LLE vibratory sense at the toe, ankle, and knee compared to left. Questionable C5 sensory level. Normal tone. Reflexes 3+ with spread throughout, London's & finger flexor reflex present bilaterally, 4bts ankle clonus bilaterally. Toes mute. Trace jaw jerk. Gait narrow-based and unsteady with truncal instability. CTH which shows R temporal cranioplasty, R posterior circulation clip with bloom artifact, and moderate posterior-predominant subcortical microvascular disease.      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 CT myelogram was inconclusive due to poor contrast uptake in the cervical spine. Consider technical vs obstructive cause. S/p cisternal myelogram on 8/16 with neurosurgery not planning intervention (signed-off).    8/17 Updates:  - Cisternal myelogram shows mild narrowing of c-spine  - NSGY not recommending surgical intervention, signed-off  - CK normal at 41  - PT/OT recommended Acute Rehab, now pending discharge      #Cervical myeloradiculopathy  :: Possible structural compression  :: B12, folate, homocysteine, vitamin E, copper, MMA, CK all within normal limits  :: CT myelogram inconclusive d/t incomplete contrast uptake, reach out to IR re: imaging  difficulties  :: Repeat cisternal myelogram shows no significant stenosis  - Consult neurosurgery: no intervention, signed off  - Pending dispo to AR     #MISC  -Continue home meds: hydrochlorothiazide daily, potassium, propranolol 10 mg QID, rosuvastatin 5 mg HS, Valtrex 500 mg daily    F: prn  E: prn  N: regular   A: PIV  DVT: lovenox   GI: N/A

## 2024-08-17 NOTE — PROGRESS NOTES
Linda López is a 79 y.o. female on day 4 of admission presenting with Weakness. Patient is rec high intensity. The family has selected [facility]    [phone] in [place]. A referral has been placed in [facility].       Roxi Weller RN, [facility].

## 2024-08-17 NOTE — PROGRESS NOTES
"Linda López is a 79 y.o. right-handed female with a history of A-fib, Anxiety and depression, Arthritis, Artificial cardiac pacemaker, Brain aneurysm s/p clipping (told by ED not MRI compatible), Essential tremor, HTN (hypertension), Kidney stones, Lumbar radiculopathy on day 2 of admission after presenting with subacute weakness, ataxia. Neurology was consulted for difficulty walking, upper extremity weakness.    Subjective   NAEON. Patient states she feels she is only getting worse, particularly her in her UE. Very frustrated at her situation and being in the hospital. Denies f/c/n/v.    Objective     Physical Exam  Mental status: A+O x 4, known vocal dystonia (baseline s/p aneurysmal clipping)  Motor exam: 5/5 strength in UE    Patient deferred further examination.     Last Recorded Vitals  Blood pressure 146/79, pulse 60, temperature 36.3 °C (97.3 °F), temperature source Temporal, resp. rate 16, height 1.626 m (5' 4\"), weight 76.7 kg (169 lb), SpO2 98%.  Intake/Output last 3 Shifts:  I/O last 3 completed shifts:  In: 50 (0.7 mL/kg) [I.V.:50 (0.7 mL/kg)]  Out: 1 (0 mL/kg) [Stool:1]  Weight: 76.7 kg     Relevant Results  Scheduled medications  enoxaparin, 40 mg, subcutaneous, q24h  hydroCHLOROthiazide, 25 mg, oral, Daily  hydrOXYzine HCL, 50 mg, oral, Once  nicotine, 1 patch, transdermal, Daily  potassium chloride CR, 20 mEq, oral, TID  propranolol, 10 mg, oral, 4x daily  rosuvastatin, 5 mg, oral, Nightly  valACYclovir, 500 mg, oral, Daily      Continuous medications     PRN medications  PRN medications: acetaminophen **OR** acetaminophen  Results for orders placed or performed during the hospital encounter of 08/11/24 (from the past 24 hour(s))   CBC and Auto Differential   Result Value Ref Range    WBC 5.7 4.4 - 11.3 x10*3/uL    nRBC 0.0 0.0 - 0.0 /100 WBCs    RBC 4.51 4.00 - 5.20 x10*6/uL    Hemoglobin 14.8 12.0 - 16.0 g/dL    Hematocrit 43.9 36.0 - 46.0 %    MCV 97 80 - 100 fL    MCH 32.8 26.0 - 34.0 pg "    MCHC 33.7 32.0 - 36.0 g/dL    RDW 12.1 11.5 - 14.5 %    Platelets 194 150 - 450 x10*3/uL    Neutrophils % 50.0 40.0 - 80.0 %    Immature Granulocytes %, Automated 0.2 0.0 - 0.9 %    Lymphocytes % 37.5 13.0 - 44.0 %    Monocytes % 9.1 2.0 - 10.0 %    Eosinophils % 2.3 0.0 - 6.0 %    Basophils % 0.9 0.0 - 2.0 %    Neutrophils Absolute 2.85 1.60 - 5.50 x10*3/uL    Immature Granulocytes Absolute, Automated 0.01 0.00 - 0.50 x10*3/uL    Lymphocytes Absolute 2.14 0.80 - 3.00 x10*3/uL    Monocytes Absolute 0.52 0.05 - 0.80 x10*3/uL    Eosinophils Absolute 0.13 0.00 - 0.40 x10*3/uL    Basophils Absolute 0.05 0.00 - 0.10 x10*3/uL   Renal Function Panel   Result Value Ref Range    Glucose 136 (H) 74 - 99 mg/dL    Sodium 141 136 - 145 mmol/L    Potassium 3.8 3.5 - 5.3 mmol/L    Chloride 106 98 - 107 mmol/L    Bicarbonate 28 21 - 32 mmol/L    Anion Gap 11 10 - 20 mmol/L    Urea Nitrogen 17 6 - 23 mg/dL    Creatinine 0.63 0.50 - 1.05 mg/dL    eGFR >90 >60 mL/min/1.73m*2    Calcium 9.3 8.6 - 10.6 mg/dL    Phosphorus 3.2 2.5 - 4.9 mg/dL    Albumin 3.7 3.4 - 5.0 g/dL   Magnesium   Result Value Ref Range    Magnesium 2.06 1.60 - 2.40 mg/dL       CT cervical spine post myelogram    Result Date: 8/14/2024  Interpreted By:  Rico Marr,  and Melony Asencio STUDY: CT CERVICAL SPINE POST MYELOGRAM; CT LUMBAR SPINE POST MYELOGRAM; FL MULTIPLE REGIONS MYELOGRAM WITH LUMBAR PUNCTURE; CT THORACIC SPINE POST MYELOGRAM;  8/14/2024 4:57 pm; 8/14/2024 4:24 pm   INDICATION: Signs/Symptoms:Upper motor neuron symptoms of bilateral upper extremities. Unable to obtain MRI; Signs/Symptoms:Cervical, thoracic, lumbar for UE and LE weakness.   COMPARISON: CT cervical spine, thoracic spine, and lumbar spine without contrast 08/12/2024   ACCESSION NUMBER(S): [id number]; [id number]; [id number]; [id number]   ORDERING CLINICIAN: LILI CARUSO   TECHNIQUE: After discussion of the risks, benefits and alternatives, standard written hospital  consent was obtained. The patient was placed prone on the fluoroscopic table.  The lower back was prepped and draped in sterile fashion. One percent lidocaine was used for local anesthesia. Under fluoroscopic guidance, a 22 gauge spinal needle was advanced into the thecal sac at the L3-L4 level. 10 cc of Omnipaque 300 was administered intrathecally under intermittent fluoroscopic imaging. Fluoroscopy time was 1.2 minutes. The patient tolerated the procedure well.   Following the fluoroscopic myelogram, serial axial CT images were obtained through the cervical, thoracic, and lumbar spine, using a bone algorithm. Images were reformatted into sagittal and coronal planes.   FINDINGS: FLUOROSCOPIC MYELOGRAM:  The thecal sac is widely patent.  There is filling bilaterally of nerve root sleeves without cutoff. There is normal alignment.   CT MYELOGRAM: CERVICAL SPINE: No evidence of contrast opacification of the cervical spine thecal sac despite repeat imaging after placing patient in Trendelenburg for several minutes. Nondiagnostic CT myelogram of the cervical spine.   Alignment is unremarkable. Vertebral body heights are maintained. Intervertebral disc space narrowing at C6-C7. Degenerative changes throughout the cervical spine most significant at C6-C7. No evidence of canal stenosis from the craniocervical junction to the C4-5 level. Nondiagnostic examination of the spinal canal at the C5-6 level and below secondary to streak artifact from overlying soft tissues.   There is hypertrophy of the right facet joint of C3-4 with slight joint space widening (series 205, image 23).   THORACIC SPINE: Contrast is visualized within the thoracic spine from the T5-6 level down. Partial opacification of the canal at the T3-4 and T4-5 levels. No contrast is seen within the thecal sac at the T1-2 and T2-3 levels.   Heterogeneous opacification of the right dorsolateral aspect of the thecal sac at the T5-6 level in the dorsal aspect of  the thecal sac at the T7-8 level, which may represent blood products. No high-grade canal stenosis at any level from T5-6 through T12-L1.   The vertebral body heights of the thoracic spine are maintained. The intervertebral disc spaces are maintained. No evidence of significant neural foraminal stenosis.     LUMBAR SPINE: Heterogeneous opacification of the thecal sac within the lumbar spine, with dense cyst contrast seen at the L5-S1 level where the thecal sac was accessed.   Vertebral body heights are maintained. Intervertebral disc spaces are maintained. L1-2: No canal or foraminal stenosis.   L2-3: No canal or foraminal stenosis.   L3-4: Shallow disc bulge with facet hypertrophy results in mild narrowing the spinal canal and bilateral foramina.   L4-5: Shallow partially calcified disc bulge with facet hypertrophy results in mild narrowing of bilateral foramina and minimal narrowing of the spinal canal.   L5-S1: No canal or foraminal stenosis.     OTHER: Chest: Pacemaker in place. Status post mitral valve replacement. Emphysematous changes throughout the lungs. Abdomen/pelvis: No remarkable findings within the visualized portions of the abdomen and pelvis. Soft tissues: No remarkable soft tissue findings.       CT myelogram 1. Non-diagnostic myelogram of the cervical spine secondary to non opacification of the thecal sac despite repeat imaging after Trendelenburg positioning. Within the limitations of this non-contrast CT cervical spine, no evidence of high-grade canal stenosis from the craniocervical junction to the C4-5 level. Nondiagnostic evaluation of the spinal canal from the C5-6 level and below secondary to streak artifact from overlying soft tissues. 2. Facet hypertrophy with slight widening of the right facet joint of C3-4. All findings may be degenerative in etiology, joint widening is nonspecific and underlying infectious process can not be excluded. 3. Inconsistent contrast opacification of the  thoracic thecal sac. The thoracic spine is well opacified from T3 through T12 without evidence of central spinal canal stenosis, spinal cord compression, or neural foraminal stenosis. The superior portion of the thoracic thecal sac is not well opacified from approximately T1 through T3, as well as heterogeneous filling defects within the dorsal aspect of the thecal sac at the T5-6 and T7-8 levels, possibly due to blood products. 4. Heterogeneous contrast opacification of the lumbar spine thecal sac. Within these limitations, mild multilevel degenerative change without evidence of high-grade spinal canal stenosis or neural foraminal stenosis.   Given limitations of the current myelogram, consider repeat myelogram if patient is amenable.   I, Dr. Marr, supervised and was available throughout this procedure as performed by fellow physician Dr. Ty. This study was performed and interpreted at [facility]. I agree with the procedural description and the findings as stated.   MACRO: None   Signed by: Rico Marr 8/14/2024 9:22 PM Dictation workstation:   [id number]    CT thoracic spine post myelogram    Result Date: 8/14/2024  Interpreted By:  Rico Marr and Hofer [place] STUDY: CT CERVICAL SPINE POST MYELOGRAM; CT LUMBAR SPINE POST MYELOGRAM; FL MULTIPLE REGIONS MYELOGRAM WITH LUMBAR PUNCTURE; CT THORACIC SPINE POST MYELOGRAM;  8/14/2024 4:57 pm; 8/14/2024 4:24 pm   INDICATION: Signs/Symptoms:Upper motor neuron symptoms of bilateral upper extremities. Unable to obtain MRI; Signs/Symptoms:Cervical, thoracic, lumbar for UE and LE weakness.   COMPARISON: CT cervical spine, thoracic spine, and lumbar spine without contrast 08/12/2024   ACCESSION NUMBER(S): [id number]; [id number]; [id number]; [id number]   ORDERING CLINICIAN: LILI CARUSO   TECHNIQUE: After discussion of the risks, benefits and alternatives, standard written hospital consent was obtained. The patient was  placed prone on the fluoroscopic table.  The lower back was prepped and draped in sterile fashion. One percent lidocaine was used for local anesthesia. Under fluoroscopic guidance, a 22 gauge spinal needle was advanced into the thecal sac at the L3-L4 level. 10 cc of Omnipaque 300 was administered intrathecally under intermittent fluoroscopic imaging. Fluoroscopy time was 1.2 minutes. The patient tolerated the procedure well.   Following the fluoroscopic myelogram, serial axial CT images were obtained through the cervical, thoracic, and lumbar spine, using a bone algorithm. Images were reformatted into sagittal and coronal planes.   FINDINGS: FLUOROSCOPIC MYELOGRAM:  The thecal sac is widely patent.  There is filling bilaterally of nerve root sleeves without cutoff. There is normal alignment.   CT MYELOGRAM: CERVICAL SPINE: No evidence of contrast opacification of the cervical spine thecal sac despite repeat imaging after placing patient in Trendelenburg for several minutes. Nondiagnostic CT myelogram of the cervical spine.   Alignment is unremarkable. Vertebral body heights are maintained. Intervertebral disc space narrowing at C6-C7. Degenerative changes throughout the cervical spine most significant at C6-C7. No evidence of canal stenosis from the craniocervical junction to the C4-5 level. Nondiagnostic examination of the spinal canal at the C5-6 level and below secondary to streak artifact from overlying soft tissues.   There is hypertrophy of the right facet joint of C3-4 with slight joint space widening (series 205, image 23).   THORACIC SPINE: Contrast is visualized within the thoracic spine from the T5-6 level down. Partial opacification of the canal at the T3-4 and T4-5 levels. No contrast is seen within the thecal sac at the T1-2 and T2-3 levels.   Heterogeneous opacification of the right dorsolateral aspect of the thecal sac at the T5-6 level in the dorsal aspect of the thecal sac at the T7-8 level, which  may represent blood products. No high-grade canal stenosis at any level from T5-6 through T12-L1.   The vertebral body heights of the thoracic spine are maintained. The intervertebral disc spaces are maintained. No evidence of significant neural foraminal stenosis.     LUMBAR SPINE: Heterogeneous opacification of the thecal sac within the lumbar spine, with dense cyst contrast seen at the L5-S1 level where the thecal sac was accessed.   Vertebral body heights are maintained. Intervertebral disc spaces are maintained. L1-2: No canal or foraminal stenosis.   L2-3: No canal or foraminal stenosis.   L3-4: Shallow disc bulge with facet hypertrophy results in mild narrowing the spinal canal and bilateral foramina.   L4-5: Shallow partially calcified disc bulge with facet hypertrophy results in mild narrowing of bilateral foramina and minimal narrowing of the spinal canal.   L5-S1: No canal or foraminal stenosis.     OTHER: Chest: Pacemaker in place. Status post mitral valve replacement. Emphysematous changes throughout the lungs. Abdomen/pelvis: No remarkable findings within the visualized portions of the abdomen and pelvis. Soft tissues: No remarkable soft tissue findings.       CT myelogram 1. Non-diagnostic myelogram of the cervical spine secondary to non opacification of the thecal sac despite repeat imaging after Trendelenburg positioning. Within the limitations of this non-contrast CT cervical spine, no evidence of high-grade canal stenosis from the craniocervical junction to the C4-5 level. Nondiagnostic evaluation of the spinal canal from the C5-6 level and below secondary to streak artifact from overlying soft tissues. 2. Facet hypertrophy with slight widening of the right facet joint of C3-4. All findings may be degenerative in etiology, joint widening is nonspecific and underlying infectious process can not be excluded. 3. Inconsistent contrast opacification of the thoracic thecal sac. The thoracic spine is  well opacified from T3 through T12 without evidence of central spinal canal stenosis, spinal cord compression, or neural foraminal stenosis. The superior portion of the thoracic thecal sac is not well opacified from approximately T1 through T3, as well as heterogeneous filling defects within the dorsal aspect of the thecal sac at the T5-6 and T7-8 levels, possibly due to blood products. 4. Heterogeneous contrast opacification of the lumbar spine thecal sac. Within these limitations, mild multilevel degenerative change without evidence of high-grade spinal canal stenosis or neural foraminal stenosis.   Given limitations of the current myelogram, consider repeat myelogram if patient is amenable.   I, Dr. Marr, supervised and was available throughout this procedure as performed by fellow physician Dr. Ty. This study was performed and interpreted at [facility]. I agree with the procedural description and the findings as stated.   MACRO: None   Signed by: Rico Marr 8/14/2024 9:22 PM Dictation workstation:   [id number]    CT lumbar spine post myelogram    Result Date: 8/14/2024  Interpreted By:  Rico Marr and Hofer [place] STUDY: CT CERVICAL SPINE POST MYELOGRAM; CT LUMBAR SPINE POST MYELOGRAM; FL MULTIPLE REGIONS MYELOGRAM WITH LUMBAR PUNCTURE; CT THORACIC SPINE POST MYELOGRAM;  8/14/2024 4:57 pm; 8/14/2024 4:24 pm   INDICATION: Signs/Symptoms:Upper motor neuron symptoms of bilateral upper extremities. Unable to obtain MRI; Signs/Symptoms:Cervical, thoracic, lumbar for UE and LE weakness.   COMPARISON: CT cervical spine, thoracic spine, and lumbar spine without contrast 08/12/2024   ACCESSION NUMBER(S): [id number]; [id number]; [id number]; [id number]   ORDERING CLINICIAN: LILI CARUSO   TECHNIQUE: After discussion of the risks, benefits and alternatives, standard written hospital consent was obtained. The patient was placed prone on the fluoroscopic table.  The  lower back was prepped and draped in sterile fashion. One percent lidocaine was used for local anesthesia. Under fluoroscopic guidance, a 22 gauge spinal needle was advanced into the thecal sac at the L3-L4 level. 10 cc of Omnipaque 300 was administered intrathecally under intermittent fluoroscopic imaging. Fluoroscopy time was 1.2 minutes. The patient tolerated the procedure well.   Following the fluoroscopic myelogram, serial axial CT images were obtained through the cervical, thoracic, and lumbar spine, using a bone algorithm. Images were reformatted into sagittal and coronal planes.   FINDINGS: FLUOROSCOPIC MYELOGRAM:  The thecal sac is widely patent.  There is filling bilaterally of nerve root sleeves without cutoff. There is normal alignment.   CT MYELOGRAM: CERVICAL SPINE: No evidence of contrast opacification of the cervical spine thecal sac despite repeat imaging after placing patient in Trendelenburg for several minutes. Nondiagnostic CT myelogram of the cervical spine.   Alignment is unremarkable. Vertebral body heights are maintained. Intervertebral disc space narrowing at C6-C7. Degenerative changes throughout the cervical spine most significant at C6-C7. No evidence of canal stenosis from the craniocervical junction to the C4-5 level. Nondiagnostic examination of the spinal canal at the C5-6 level and below secondary to streak artifact from overlying soft tissues.   There is hypertrophy of the right facet joint of C3-4 with slight joint space widening (series 205, image 23).   THORACIC SPINE: Contrast is visualized within the thoracic spine from the T5-6 level down. Partial opacification of the canal at the T3-4 and T4-5 levels. No contrast is seen within the thecal sac at the T1-2 and T2-3 levels.   Heterogeneous opacification of the right dorsolateral aspect of the thecal sac at the T5-6 level in the dorsal aspect of the thecal sac at the T7-8 level, which may represent blood products. No high-grade  canal stenosis at any level from T5-6 through T12-L1.   The vertebral body heights of the thoracic spine are maintained. The intervertebral disc spaces are maintained. No evidence of significant neural foraminal stenosis.     LUMBAR SPINE: Heterogeneous opacification of the thecal sac within the lumbar spine, with dense cyst contrast seen at the L5-S1 level where the thecal sac was accessed.   Vertebral body heights are maintained. Intervertebral disc spaces are maintained. L1-2: No canal or foraminal stenosis.   L2-3: No canal or foraminal stenosis.   L3-4: Shallow disc bulge with facet hypertrophy results in mild narrowing the spinal canal and bilateral foramina.   L4-5: Shallow partially calcified disc bulge with facet hypertrophy results in mild narrowing of bilateral foramina and minimal narrowing of the spinal canal.   L5-S1: No canal or foraminal stenosis.     OTHER: Chest: Pacemaker in place. Status post mitral valve replacement. Emphysematous changes throughout the lungs. Abdomen/pelvis: No remarkable findings within the visualized portions of the abdomen and pelvis. Soft tissues: No remarkable soft tissue findings.       CT myelogram 1. Non-diagnostic myelogram of the cervical spine secondary to non opacification of the thecal sac despite repeat imaging after Trendelenburg positioning. Within the limitations of this non-contrast CT cervical spine, no evidence of high-grade canal stenosis from the craniocervical junction to the C4-5 level. Nondiagnostic evaluation of the spinal canal from the C5-6 level and below secondary to streak artifact from overlying soft tissues. 2. Facet hypertrophy with slight widening of the right facet joint of C3-4. All findings may be degenerative in etiology, joint widening is nonspecific and underlying infectious process can not be excluded. 3. Inconsistent contrast opacification of the thoracic thecal sac. The thoracic spine is well opacified from T3 through T12 without  evidence of central spinal canal stenosis, spinal cord compression, or neural foraminal stenosis. The superior portion of the thoracic thecal sac is not well opacified from approximately T1 through T3, as well as heterogeneous filling defects within the dorsal aspect of the thecal sac at the T5-6 and T7-8 levels, possibly due to blood products. 4. Heterogeneous contrast opacification of the lumbar spine thecal sac. Within these limitations, mild multilevel degenerative change without evidence of high-grade spinal canal stenosis or neural foraminal stenosis.   Given limitations of the current myelogram, consider repeat myelogram if patient is amenable.   I, Dr. Marr, supervised and was available throughout this procedure as performed by fellow physician Dr. Ty. This study was performed and interpreted at [facility]. I agree with the procedural description and the findings as stated.   MACRO: None   Signed by: Rico Marr 8/14/2024 9:22 PM Dictation workstation:   [id number]    FL multiple regions myelogram with lumbar puncture    Result Date: 8/14/2024  Interpreted By:  Rico Marr and Hofer [place] STUDY: CT CERVICAL SPINE POST MYELOGRAM; CT LUMBAR SPINE POST MYELOGRAM; FL MULTIPLE REGIONS MYELOGRAM WITH LUMBAR PUNCTURE; CT THORACIC SPINE POST MYELOGRAM;  8/14/2024 4:57 pm; 8/14/2024 4:24 pm   INDICATION: Signs/Symptoms:Upper motor neuron symptoms of bilateral upper extremities. Unable to obtain MRI; Signs/Symptoms:Cervical, thoracic, lumbar for UE and LE weakness.   COMPARISON: CT cervical spine, thoracic spine, and lumbar spine without contrast 08/12/2024   ACCESSION NUMBER(S): [id number]; [id number]; [id number]; [id number]   ORDERING CLINICIAN: LILI CARUSO   TECHNIQUE: After discussion of the risks, benefits and alternatives, standard written hospital consent was obtained. The patient was placed prone on the fluoroscopic table.  The lower back was prepped  and draped in sterile fashion. One percent lidocaine was used for local anesthesia. Under fluoroscopic guidance, a 22 gauge spinal needle was advanced into the thecal sac at the L3-L4 level. 10 cc of Omnipaque 300 was administered intrathecally under intermittent fluoroscopic imaging. Fluoroscopy time was 1.2 minutes. The patient tolerated the procedure well.   Following the fluoroscopic myelogram, serial axial CT images were obtained through the cervical, thoracic, and lumbar spine, using a bone algorithm. Images were reformatted into sagittal and coronal planes.   FINDINGS: FLUOROSCOPIC MYELOGRAM:  The thecal sac is widely patent.  There is filling bilaterally of nerve root sleeves without cutoff. There is normal alignment.   CT MYELOGRAM: CERVICAL SPINE: No evidence of contrast opacification of the cervical spine thecal sac despite repeat imaging after placing patient in Trendelenburg for several minutes. Nondiagnostic CT myelogram of the cervical spine.   Alignment is unremarkable. Vertebral body heights are maintained. Intervertebral disc space narrowing at C6-C7. Degenerative changes throughout the cervical spine most significant at C6-C7. No evidence of canal stenosis from the craniocervical junction to the C4-5 level. Nondiagnostic examination of the spinal canal at the C5-6 level and below secondary to streak artifact from overlying soft tissues.   There is hypertrophy of the right facet joint of C3-4 with slight joint space widening (series 205, image 23).   THORACIC SPINE: Contrast is visualized within the thoracic spine from the T5-6 level down. Partial opacification of the canal at the T3-4 and T4-5 levels. No contrast is seen within the thecal sac at the T1-2 and T2-3 levels.   Heterogeneous opacification of the right dorsolateral aspect of the thecal sac at the T5-6 level in the dorsal aspect of the thecal sac at the T7-8 level, which may represent blood products. No high-grade canal stenosis at any  level from T5-6 through T12-L1.   The vertebral body heights of the thoracic spine are maintained. The intervertebral disc spaces are maintained. No evidence of significant neural foraminal stenosis.     LUMBAR SPINE: Heterogeneous opacification of the thecal sac within the lumbar spine, with dense cyst contrast seen at the L5-S1 level where the thecal sac was accessed.   Vertebral body heights are maintained. Intervertebral disc spaces are maintained. L1-2: No canal or foraminal stenosis.   L2-3: No canal or foraminal stenosis.   L3-4: Shallow disc bulge with facet hypertrophy results in mild narrowing the spinal canal and bilateral foramina.   L4-5: Shallow partially calcified disc bulge with facet hypertrophy results in mild narrowing of bilateral foramina and minimal narrowing of the spinal canal.   L5-S1: No canal or foraminal stenosis.     OTHER: Chest: Pacemaker in place. Status post mitral valve replacement. Emphysematous changes throughout the lungs. Abdomen/pelvis: No remarkable findings within the visualized portions of the abdomen and pelvis. Soft tissues: No remarkable soft tissue findings.       CT myelogram 1. Non-diagnostic myelogram of the cervical spine secondary to non opacification of the thecal sac despite repeat imaging after Trendelenburg positioning. Within the limitations of this non-contrast CT cervical spine, no evidence of high-grade canal stenosis from the craniocervical junction to the C4-5 level. Nondiagnostic evaluation of the spinal canal from the C5-6 level and below secondary to streak artifact from overlying soft tissues. 2. Facet hypertrophy with slight widening of the right facet joint of C3-4. All findings may be degenerative in etiology, joint widening is nonspecific and underlying infectious process can not be excluded. 3. Inconsistent contrast opacification of the thoracic thecal sac. The thoracic spine is well opacified from T3 through T12 without evidence of central spinal  canal stenosis, spinal cord compression, or neural foraminal stenosis. The superior portion of the thoracic thecal sac is not well opacified from approximately T1 through T3, as well as heterogeneous filling defects within the dorsal aspect of the thecal sac at the T5-6 and T7-8 levels, possibly due to blood products. 4. Heterogeneous contrast opacification of the lumbar spine thecal sac. Within these limitations, mild multilevel degenerative change without evidence of high-grade spinal canal stenosis or neural foraminal stenosis.   Given limitations of the current myelogram, consider repeat myelogram if patient is amenable.   IDr. Marr, supervised and was available throughout this procedure as performed by fellow physician Dr. Ty. This study was performed and interpreted at [facility]. I agree with the procedural description and the findings as stated.   MACRO: None   Signed by: Rico Marr 8/14/2024 9:22 PM Dictation workstation:   [id number]    CT angio head w and wo IV contrast    Result Date: 8/12/2024  Interpreted By:  Beth Holguin and [place]yers[place] Fredi STUDY: CT ANGIO HEAD W AND WO IV CONTRAST;  8/12/2024 1:22 am   INDICATION: Signs/Symptoms:bilateral upper extremity weaknes. Hx of aneurysm.   COMPARISON: CT head 08/12/2024   ACCESSION NUMBER(S): [id number]   ORDERING CLINICIAN: LILI FONTANEZ   TECHNIQUE: Unenhanced CT images of the head were obtained. Subsequently, 150 mL of Omnipaque 350 was administered intravenously and axial images of the head were acquired.  Coronal, sagittal, and 3-D reconstructions were provided for review.   FINDINGS: Postsurgical changes of the aneurysm clip new right basilar cistern region motion limits evaluation due to streak artifact. Dedicated head CT is been performed and reported separately.   Anterior circulation: Atherosclerotic calcifications of the right greater than left carotid siphons. Streak artifact partially limits evaluation  of the right distal ICA otherwise the bilateral intracranial internal carotid arteries, bilateral carotid terminals, bilateral proximal anterior and middle cerebral arteries are normal.   Posterior circulation: Tapering with diminutive intradural left vertebral artery. Streak artifact limits evaluation of the basilar tip and right greater than left proximal PCA. The mid to distal PCAs appear grossly patent.   Adjacent rounded opacities in the region of the right posterior communicating artery noted on axial projection without corresponding abnormality identified on additional projections and favored to represent volume averaging or artifact. Otherwise bilateral intracranial vertebral arteries, vertebrobasilar junction and basilar artery are normal.       Postsurgical changes of right posterior circulation aneurysmal clipping with associated streak artifact partially limiting evaluation. Otherwise no evidence for significant stenosis or large branch vessel cutoffs of the intracranial vessels.   Opacities noted on axial imaging without corresponding abnormality on additional findings. Findings are favored to represent artifact versus true aneurysm however attention on follow-up recommended.   I personally reviewed the image(s)/study and resident interpretation. I agree with the findings as stated by resident Fredi Anderson. Data analyzed and images interpreted at [facility], [place], [place].     MACRO: None   Signed by: Beth Holguin 8/12/2024 3:47 AM Dictation workstation:   [id number]    CT abdomen pelvis w IV contrast    Result Date: 8/12/2024  Interpreted By:  Beth Holguin and Beyersdorf Conner STUDY: CT ABDOMEN PELVIS W IV CONTRAST; CT THORACIC SPINE WO IV CONTRAST; CT LUMBAR SPINE WO IV CONTRAST;  8/12/2024 2:12 am; 8/12/2024 2:11 am   INDICATION: Signs/Symptoms:N/V; Signs/Symptoms:BUE and BLE weakness; Signs/Symptoms:BUE and BLE weakness..   COMPARISON: None.    ACCESSION NUMBER(S): [id number]; [id number]; [id number]   ORDERING CLINICIAN: LILI FONTANEZ   TECHNIQUE: CT of the abdomen and pelvis was performed.  Standard contiguous axial images were obtained at 3 mm slice thickness through the abdomen and pelvis. Coronal and sagittal reconstructions at 3 mm slice thickness were performed.   150 ml of contrast omni 350 were administered intravenously without immediate complication.   FINDINGS: LOWER CHEST: Ground-glass attenuation of the lower lungs likely representing aspiration pneumonitis with a component of atelectasis. Emphysematous changes. Please see dedicated CTA chest for further evaluation.   ABDOMEN:   LIVER: The liver is normal in size measuring 15.3 cm in the craniocaudal axis. No focal hepatic lesions.   BILE DUCTS: The intrahepatic and extrahepatic ducts are not dilated.   GALLBLADDER: The gallbladder is nondistended and without evidence of radiopaque stones.   PANCREAS: The pancreas appears unremarkable without evidence of ductal dilatation or masses.   SPLEEN: The spleen is normal in size without focal lesions.   ADRENAL GLANDS: Bilateral adrenal glands appear normal.   KIDNEYS AND URETERS: The kidneys are normal in size and enhance symmetrically. Few bilateral hypodense lesions are too small to characterize, favored to represent simple cysts.  No hydroureteronephrosis. Contrast material is present within the bilateral renal collecting systems and ureters, limiting evaluation for calculi.   PELVIS:   BLADDER: Bladder is normal in appearance without wall thickening. Layering contrast material within the bladder.   REPRODUCTIVE ORGANS: The uterus is surgically absent.   BOWEL: The stomach is unremarkable.   The small and large bowel are normal in caliber and demonstrate no wall thickening. Colonic diverticulosis without evidence of diverticulitis. The appendix is not definitely visualized. There is however no pericecal stranding or fluid. Moderate stool  burden.   VESSELS: There is no aneurysmal dilatation of the abdominal aorta. The IVC appears normal. Moderate atherosclerotic calcification of the abdominal aorta and its branches.   PERITONEUM/RETROPERITONEUM/LYMPH NODES: There is no free or loculated fluid collection, no free intraperitoneal air. The retroperitoneum appears normal.  No abdominopelvic lymphadenopathy is present.   BONES AND ABDOMINAL WALL: Rightward curvature of the lumbar spine. The abdominal wall soft tissues appear normal.     CT THORACIC SPINE:   PARASPINAL SOFT TISSUES: No paravertebral fluid collection or significant edema. ALIGNMENT:  No traumatic spondylolisthesis or traumatic facet widening. VERTEBRAE:  No acute fracture. Vertebral body heights are maintained. SPINAL CANAL/INTERVERTEBRAL DISCS: No high-grade spinal canal stenosis. Moderate multilevel disc height loss, most severe at T11-12 and T12-L1. Multilevel anterior osteophytosis. The there are indeterminate peripheral calcifications present in the expected region of the posterior thecal sac dorsal to the T7, T8 and T9 vertebral bodies, likely chronic possibly related to remote infection, inflammation or hemorrhage. No definite mass effect identified.     CT LUMBAR SPINE:   PARASPINAL SOFT TISSUES: No paravertebral fluid collection or significant edema. ALIGNMENT: Dextrocurvature of the lower thoracic and lumbar spine. No traumatic spondylolisthesis or traumatic facet widening. VERTEBRAE: Bones appear mildly demineralized. No acute fracture. Vertebral body heights are maintained. SPINAL CANAL/INTERVERTEBRAL DISCS: No high-grade spinal canal stenosis. Moderate multilevel disc height loss, most significant at T12-L1. Moderate bilateral facet joint arthropathy from L2-S1. NEURAL FORAMINA: Disc bulge at L2-L3, L3-L4 and L5-S1 with facet hypertrophy with up to moderate foraminal narrowing. Disc bulge and facet hypertrophy at L4 L spine with up to severe foraminal stenosis.       1.  No  acute abnormality within the abdomen or pelvis. 2. No acute fracture of the thoracic or lumbar spine. Degenerative changes of the spine without critical canal stenosis. Foraminal narrowing in the lumbar spine as detailed. 3. Probable chronic calcifications within the posterior fecal sac at the level of the midthoracic spine as detailed. No associated canal stenosis identified..   I personally reviewed the image(s)/study and resident interpretation. I agree with the findings as stated by resident Fredi Anderson. Data analyzed and images interpreted at [place], [place].   MACRO: None   Signed by: eBth Holguin 8/12/2024 2:55 AM Dictation workstation:   [id number]    CT thoracic spine wo IV contrast    Result Date: 8/12/2024  Interpreted By:  Beth Holguin,  Colt Rai STUDY: CT ABDOMEN PELVIS W IV CONTRAST; CT THORACIC SPINE WO IV CONTRAST; CT LUMBAR SPINE WO IV CONTRAST;  8/12/2024 2:12 am; 8/12/2024 2:11 am   INDICATION: Signs/Symptoms:N/V; Signs/Symptoms:BUE and BLE weakness; Signs/Symptoms:BUE and BLE weakness..   COMPARISON: None.   ACCESSION NUMBER(S): [id number]; [id number]; [id number]   ORDERING CLINICIAN: LILI FONTANEZ   TECHNIQUE: CT of the abdomen and pelvis was performed.  Standard contiguous axial images were obtained at 3 mm slice thickness through the abdomen and pelvis. Coronal and sagittal reconstructions at 3 mm slice thickness were performed.   150 ml of contrast omni 350 were administered intravenously without immediate complication.   FINDINGS: LOWER CHEST: Ground-glass attenuation of the lower lungs likely representing aspiration pneumonitis with a component of atelectasis. Emphysematous changes. Please see dedicated CTA chest for further evaluation.   ABDOMEN:   LIVER: The liver is normal in size measuring 15.3 cm in the craniocaudal axis. No focal hepatic lesions.   BILE DUCTS: The intrahepatic and extrahepatic ducts are not  dilated.   GALLBLADDER: The gallbladder is nondistended and without evidence of radiopaque stones.   PANCREAS: The pancreas appears unremarkable without evidence of ductal dilatation or masses.   SPLEEN: The spleen is normal in size without focal lesions.   ADRENAL GLANDS: Bilateral adrenal glands appear normal.   KIDNEYS AND URETERS: The kidneys are normal in size and enhance symmetrically. Few bilateral hypodense lesions are too small to characterize, favored to represent simple cysts.  No hydroureteronephrosis. Contrast material is present within the bilateral renal collecting systems and ureters, limiting evaluation for calculi.   PELVIS:   BLADDER: Bladder is normal in appearance without wall thickening. Layering contrast material within the bladder.   REPRODUCTIVE ORGANS: The uterus is surgically absent.   BOWEL: The stomach is unremarkable.   The small and large bowel are normal in caliber and demonstrate no wall thickening. Colonic diverticulosis without evidence of diverticulitis. The appendix is not definitely visualized. There is however no pericecal stranding or fluid. Moderate stool burden.   VESSELS: There is no aneurysmal dilatation of the abdominal aorta. The IVC appears normal. Moderate atherosclerotic calcification of the abdominal aorta and its branches.   PERITONEUM/RETROPERITONEUM/LYMPH NODES: There is no free or loculated fluid collection, no free intraperitoneal air. The retroperitoneum appears normal.  No abdominopelvic lymphadenopathy is present.   BONES AND ABDOMINAL WALL: Rightward curvature of the lumbar spine. The abdominal wall soft tissues appear normal.     CT THORACIC SPINE:   PARASPINAL SOFT TISSUES: No paravertebral fluid collection or significant edema. ALIGNMENT:  No traumatic spondylolisthesis or traumatic facet widening. VERTEBRAE:  No acute fracture. Vertebral body heights are maintained. SPINAL CANAL/INTERVERTEBRAL DISCS: No high-grade spinal canal stenosis. Moderate  multilevel disc height loss, most severe at T11-12 and T12-L1. Multilevel anterior osteophytosis. The there are indeterminate peripheral calcifications present in the expected region of the posterior thecal sac dorsal to the T7, T8 and T9 vertebral bodies, likely chronic possibly related to remote infection, inflammation or hemorrhage. No definite mass effect identified.     CT LUMBAR SPINE:   PARASPINAL SOFT TISSUES: No paravertebral fluid collection or significant edema. ALIGNMENT: Dextrocurvature of the lower thoracic and lumbar spine. No traumatic spondylolisthesis or traumatic facet widening. VERTEBRAE: Bones appear mildly demineralized. No acute fracture. Vertebral body heights are maintained. SPINAL CANAL/INTERVERTEBRAL DISCS: No high-grade spinal canal stenosis. Moderate multilevel disc height loss, most significant at T12-L1. Moderate bilateral facet joint arthropathy from L2-S1. NEURAL FORAMINA: Disc bulge at L2-L3, L3-L4 and L5-S1 with facet hypertrophy with up to moderate foraminal narrowing. Disc bulge and facet hypertrophy at L4 L spine with up to severe foraminal stenosis.       1.  No acute abnormality within the abdomen or pelvis. 2. No acute fracture of the thoracic or lumbar spine. Degenerative changes of the spine without critical canal stenosis. Foraminal narrowing in the lumbar spine as detailed. 3. Probable chronic calcifications within the posterior fecal sac at the level of the midthoracic spine as detailed. No associated canal stenosis identified..   I personally reviewed the image(s)/study and resident interpretation. I agree with the findings as stated by resident Fredi Anderson. Data analyzed and images interpreted at [facility], [place], [place].   MACRO: None   Signed by: Beth Holguin 8/12/2024 2:55 AM Dictation workstation:   [id number]    CT lumbar spine wo IV contrast    Result Date: 8/12/2024  Interpreted By:  Beth Holguin,  and Monica  Fredi STUDY: CT ABDOMEN PELVIS W IV CONTRAST; CT THORACIC SPINE WO IV CONTRAST; CT LUMBAR SPINE WO IV CONTRAST;  8/12/2024 2:12 am; 8/12/2024 2:11 am   INDICATION: Signs/Symptoms:N/V; Signs/Symptoms:BUE and BLE weakness; Signs/Symptoms:BUE and BLE weakness..   COMPARISON: None.   ACCESSION NUMBER(S): [id number]; [id number]; [id number]   ORDERING CLINICIAN: LILI FONTANEZ   TECHNIQUE: CT of the abdomen and pelvis was performed.  Standard contiguous axial images were obtained at 3 mm slice thickness through the abdomen and pelvis. Coronal and sagittal reconstructions at 3 mm slice thickness were performed.   150 ml of contrast omni 350 were administered intravenously without immediate complication.   FINDINGS: LOWER CHEST: Ground-glass attenuation of the lower lungs likely representing aspiration pneumonitis with a component of atelectasis. Emphysematous changes. Please see dedicated CTA chest for further evaluation.   ABDOMEN:   LIVER: The liver is normal in size measuring 15.3 cm in the craniocaudal axis. No focal hepatic lesions.   BILE DUCTS: The intrahepatic and extrahepatic ducts are not dilated.   GALLBLADDER: The gallbladder is nondistended and without evidence of radiopaque stones.   PANCREAS: The pancreas appears unremarkable without evidence of ductal dilatation or masses.   SPLEEN: The spleen is normal in size without focal lesions.   ADRENAL GLANDS: Bilateral adrenal glands appear normal.   KIDNEYS AND URETERS: The kidneys are normal in size and enhance symmetrically. Few bilateral hypodense lesions are too small to characterize, favored to represent simple cysts.  No hydroureteronephrosis. Contrast material is present within the bilateral renal collecting systems and ureters, limiting evaluation for calculi.   PELVIS:   BLADDER: Bladder is normal in appearance without wall thickening. Layering contrast material within the bladder.   REPRODUCTIVE ORGANS: The uterus is surgically absent.   BOWEL: The  stomach is unremarkable.   The small and large bowel are normal in caliber and demonstrate no wall thickening. Colonic diverticulosis without evidence of diverticulitis. The appendix is not definitely visualized. There is however no pericecal stranding or fluid. Moderate stool burden.   VESSELS: There is no aneurysmal dilatation of the abdominal aorta. The IVC appears normal. Moderate atherosclerotic calcification of the abdominal aorta and its branches.   PERITONEUM/RETROPERITONEUM/LYMPH NODES: There is no free or loculated fluid collection, no free intraperitoneal air. The retroperitoneum appears normal.  No abdominopelvic lymphadenopathy is present.   BONES AND ABDOMINAL WALL: Rightward curvature of the lumbar spine. The abdominal wall soft tissues appear normal.     CT THORACIC SPINE:   PARASPINAL SOFT TISSUES: No paravertebral fluid collection or significant edema. ALIGNMENT:  No traumatic spondylolisthesis or traumatic facet widening. VERTEBRAE:  No acute fracture. Vertebral body heights are maintained. SPINAL CANAL/INTERVERTEBRAL DISCS: No high-grade spinal canal stenosis. Moderate multilevel disc height loss, most severe at T11-12 and T12-L1. Multilevel anterior osteophytosis. The there are indeterminate peripheral calcifications present in the expected region of the posterior thecal sac dorsal to the T7, T8 and T9 vertebral bodies, likely chronic possibly related to remote infection, inflammation or hemorrhage. No definite mass effect identified.     CT LUMBAR SPINE:   PARASPINAL SOFT TISSUES: No paravertebral fluid collection or significant edema. ALIGNMENT: Dextrocurvature of the lower thoracic and lumbar spine. No traumatic spondylolisthesis or traumatic facet widening. VERTEBRAE: Bones appear mildly demineralized. No acute fracture. Vertebral body heights are maintained. SPINAL CANAL/INTERVERTEBRAL DISCS: No high-grade spinal canal stenosis. Moderate multilevel disc height loss, most significant at  T12-L1. Moderate bilateral facet joint arthropathy from L2-S1. NEURAL FORAMINA: Disc bulge at L2-L3, L3-L4 and L5-S1 with facet hypertrophy with up to moderate foraminal narrowing. Disc bulge and facet hypertrophy at L4 L spine with up to severe foraminal stenosis.       1.  No acute abnormality within the abdomen or pelvis. 2. No acute fracture of the thoracic or lumbar spine. Degenerative changes of the spine without critical canal stenosis. Foraminal narrowing in the lumbar spine as detailed. 3. Probable chronic calcifications within the posterior fecal sac at the level of the midthoracic spine as detailed. No associated canal stenosis identified..   I personally reviewed the image(s)/study and resident interpretation. I agree with the findings as stated by resident Fredi Anderson. Data analyzed and images interpreted at [facility], [place], [place].   MACRO: None   Signed by: Beth Holguin 8/12/2024 2:55 AM Dictation workstation:   [id number]    CT angio chest for pulmonary embolism    Result Date: 8/12/2024  Interpreted By:  Beth Holguin,  Colt Rai STUDY: CT ANGIO CHEST FOR PULMONARY EMBOLISM;  8/12/2024 2:36 am   INDICATION: Signs/Symptoms:Weakness, fatigue shortness of breath.   COMPARISON: None   ACCESSION NUMBER(S): [id number]   ORDERING CLINICIAN: ROSSANA CRANE   TECHNIQUE: Helical data acquisition of the chest was obtained after intravenous administration of 150 cc of Omnipaque 350, as per PE protocol. Images were reformatted in coronal and sagittal planes. Axial and coronal maximum intensity projection (MIP) images were created and reviewed.   FINDINGS: POTENTIAL LIMITATIONS OF THE STUDY: None   HEART AND VESSELS: There are no discrete filling defects within main pulmonary artery and its branches to suggest acute pulmonary embolism. Main pulmonary artery and its branches are normal in caliber.   The thoracic aorta normal in course and caliber.There  is moderate atherosclerosis present, including calcified and noncalcified plaques.Although, the study is not tailored for evaluation of aorta, there is no definite evidence of acute aortic pathology. Ectasia of the main pulmonary artery measuring 3.3 cm Coronary artery calcifications noted however exam is not optimized for evaluation.   Mild cardiomegaly with enlargement of the left atrium.There are no findings to suggest right heart strain. Left atrial appendage closure device in place. Left chest pacemaker/ICD with leads terminating in the right atrium and right ventricle. There is no pericardial effusion seen.   MEDIASTINUM AND TOÑO, LOWER NECK AND AXILLA: The visualized thyroid gland is within normal limits. No evidence of thoracic lymphadenopathy by CT criteria. Patulous appearance of the esophagus with heterogenous material in the upper esophagus.   LUNGS AND AIRWAYS: There is mucous layering in the distal trachea and right and left main bronchi. Subtle mucous plugging in lower lobe bronchials. Ground-glass consolidation of the dependent portions of the lower lungs. Moderate centrilobular and apically predominant emphysematous changes.  There is mild diffuse bronchial wall thickening. Calcified granuloma noted dot No pleural effusion or pneumothorax.     UPPER ABDOMEN:   Please see dedicated CT abdomen pelvis for further evaluation.   CHEST WALL AND OSSEOUS STRUCTURES: Chest wall is within normal limits. Multilevel degenerative changes with endplate osteophytes in the visualized spine. No acute osseous pathology.There are no suspicious osseous lesions.       1. No evidence of acute pulmonary embolism to the segmental level. 2. Small amount of mucus layering within the main bronchi and mucous plugging of the bronchials. Ground-glass opacities in the bilateral lung bases. Findings are consistent with aspiration pneumonitis and superimposed atelectasis. 3. Patulous esophagus with debris and septations noted to  the upper level which could be related to esophagitis. Clinical correlation suggested. Findings also increased aspiration risk. 4. Ectatic main pulmonary artery measuring 3.3 cm. 5. Cardiomegaly with enlargement of the left atrium.   I personally reviewed the image(s)/study and resident interpretation. I agree with the findings as stated by resident Fredi Anderson. Data analyzed and images interpreted at [facility], [place], [place].   MACRO: None   Signed by: Beth Holguin 8/12/2024 2:37 AM Dictation workstation:   [id number]    CT head wo IV contrast    Result Date: 8/12/2024  Interpreted By:  Beth Holguin,  and Monica Rai STUDY: CT HEAD WO IV CONTRAST; CT CERVICAL SPINE WO IV CONTRAST;  8/12/2024 1:22 am   INDICATION: Signs/Symptoms:Hx of brain aneurysm. Generalized fatigue and weakness; Signs/Symptoms:BUE and BLE weakness   COMPARISON: CT head 03/28/2017   ACCESSION NUMBER(S): [id number]; [id number]   ORDERING CLINICIAN: ROSSANA FONTANEZ   TECHNIQUE: Axial noncontrast CT images of head with coronal and sagittal reconstructed images. Axial noncontrast CT images of the cervical spine with coronal and sagittal reconstructed images.   FINDINGS: CT HEAD:   Postsurgical changes of right temporal craniotomy and aneurysmal clipping near the suprasellar and right parasellar region. Associated metallic streak artifact limits evaluation of the region. Findings are similar prior imaging   BRAIN PARENCHYMA: Confluent region of hypoattenuating areas of periventricular and subcortical white matter, which is nonspecific, but favored to represent chronic small-vessel ischemic disease in a patient of this age. Mild encephalomalacia within the bilateral right temporal lobe. No evidence of acute intraparenchymal hemorrhage or parenchymal evidence of acute large territory ischemic infarct. No mass-effect, midline shift or effacement of cerebral sulci. Gray-white matter  distinction is preserved.   VENTRICLES and EXTRA-AXIAL SPACES: No acute extra-axial or intraventricular hemorrhage. Ventricles and sulci are age-concordant.   PARANASAL SINUSES/MASTOIDS: No hemorrhage or air-fluid levels within the visualized paranasal sinuses. The mastoid air cells are well-aerated.   CALVARIUM/ORBITS: No skull fracture. Bilateral lens replacements. The orbits and globes are intact to the extent visualized.   EXTRACRANIAL SOFT TISSUES: No discernible abnormality. Postsurgical changes of the lungs is   CT CERVICAL SPINE:   PREVERTEBRAL SOFT TISSUES: Within normal limits.   CRANIOCERVICAL JUNCTION: Intact.   ALIGNMENT: Loss of the normal cervical lordosis. There is grade 1 retrolisthesis of C3-C4 measuring 3.3 mm, and grade 1 retrolisthesis of C5-6 measuring 2.7 mm. Facet joint alignment is maintained. No traumatic malalignment or traumatic facet widening.   VERTEBRAE: No acute fracture. Vertebral body heights are maintained.   SPINAL CANAL/INTERVERTEBRAL DISCS: No high-grade spinal canal stenosis. Multilevel variable disc space height loss, most severe at C6-7. Prominent osteophytosis at this level along with subchondral sclerosis and cystic changes..   NEURAL FORAMINA: Mild uncovertebral hypertrophy at C4-5 with a mild bilateral neural foraminal narrowing. Moderate neural foraminal narrowing and right facet hypertrophy at C5-6 with moderate right neural foraminal narrowing. Severe uncovertebral hypertrophy at C6-7 with moderate bilateral neural foraminal narrowing.   OTHER: Dedicated chest imaging has been performed and will be reported separately..       CT HEAD: 1. Postsurgical changes of right temporal craniotomy and aneurysmal clipping right partially degrades imaging. Otherwise no acute intracranial hemorrhage or mass effect identified. 2. Findings consistent with chronic small-vessel ischemic disease and right temporal lobe encephalomalacia.     CT CERVICAL SPINE: 1. No acute fracture or  traumatic malalignment of the cervical spine. 2. Multilevel spondylotic changes of the cervical spine as detailed above, with multilevel neural foraminal narrowing.     I personally reviewed the image(s)/study and resident interpretation. I agree with the findings as stated by resident Fredi Anderson. Data analyzed and images interpreted at [facility], [place], [place].   MACRO: none   Signed by: Beth Holguin 8/12/2024 2:31 AM Dictation workstation:   [id number]    CT cervical spine wo IV contrast    Result Date: 8/12/2024  Interpreted By:  Beth Holguin,  and Monica Rai STUDY: CT HEAD WO IV CONTRAST; CT CERVICAL SPINE WO IV CONTRAST;  8/12/2024 1:22 am   INDICATION: Signs/Symptoms:Hx of brain aneurysm. Generalized fatigue and weakness; Signs/Symptoms:BUE and BLE weakness   COMPARISON: CT head 03/28/2017   ACCESSION NUMBER(S): [id number]; [id number]   ORDERING CLINICIAN: ROSSANA FONTANEZ   TECHNIQUE: Axial noncontrast CT images of head with coronal and sagittal reconstructed images. Axial noncontrast CT images of the cervical spine with coronal and sagittal reconstructed images.   FINDINGS: CT HEAD:   Postsurgical changes of right temporal craniotomy and aneurysmal clipping near the suprasellar and right parasellar region. Associated metallic streak artifact limits evaluation of the region. Findings are similar prior imaging   BRAIN PARENCHYMA: Confluent region of hypoattenuating areas of periventricular and subcortical white matter, which is nonspecific, but favored to represent chronic small-vessel ischemic disease in a patient of this age. Mild encephalomalacia within the bilateral right temporal lobe. No evidence of acute intraparenchymal hemorrhage or parenchymal evidence of acute large territory ischemic infarct. No mass-effect, midline shift or effacement of cerebral sulci. Gray-white matter distinction is preserved.   VENTRICLES and EXTRA-AXIAL  SPACES: No acute extra-axial or intraventricular hemorrhage. Ventricles and sulci are age-concordant.   PARANASAL SINUSES/MASTOIDS: No hemorrhage or air-fluid levels within the visualized paranasal sinuses. The mastoid air cells are well-aerated.   CALVARIUM/ORBITS: No skull fracture. Bilateral lens replacements. The orbits and globes are intact to the extent visualized.   EXTRACRANIAL SOFT TISSUES: No discernible abnormality. Postsurgical changes of the lungs is   CT CERVICAL SPINE:   PREVERTEBRAL SOFT TISSUES: Within normal limits.   CRANIOCERVICAL JUNCTION: Intact.   ALIGNMENT: Loss of the normal cervical lordosis. There is grade 1 retrolisthesis of C3-C4 measuring 3.3 mm, and grade 1 retrolisthesis of C5-6 measuring 2.7 mm. Facet joint alignment is maintained. No traumatic malalignment or traumatic facet widening.   VERTEBRAE: No acute fracture. Vertebral body heights are maintained.   SPINAL CANAL/INTERVERTEBRAL DISCS: No high-grade spinal canal stenosis. Multilevel variable disc space height loss, most severe at C6-7. Prominent osteophytosis at this level along with subchondral sclerosis and cystic changes..   NEURAL FORAMINA: Mild uncovertebral hypertrophy at C4-5 with a mild bilateral neural foraminal narrowing. Moderate neural foraminal narrowing and right facet hypertrophy at C5-6 with moderate right neural foraminal narrowing. Severe uncovertebral hypertrophy at C6-7 with moderate bilateral neural foraminal narrowing.   OTHER: Dedicated chest imaging has been performed and will be reported separately..       CT HEAD: 1. Postsurgical changes of right temporal craniotomy and aneurysmal clipping right partially degrades imaging. Otherwise no acute intracranial hemorrhage or mass effect identified. 2. Findings consistent with chronic small-vessel ischemic disease and right temporal lobe encephalomalacia.     CT CERVICAL SPINE: 1. No acute fracture or traumatic malalignment of the cervical spine. 2. Multilevel  spondylotic changes of the cervical spine as detailed above, with multilevel neural foraminal narrowing.     I personally reviewed the image(s)/study and resident interpretation. I agree with the findings as stated by resident Fredi Anderson. Data analyzed and images interpreted at [facility], [place], [place].   MACRO: none   Signed by: Beth Holguin 8/12/2024 2:31 AM Dictation workstation:   [id number]        Assessment/Plan   Assessment & Plan  Weakness    Generalized weakness  This a 80yo RHF PMHx HTN, SSS (s/p pacer 2019), pAF (s/p watchman 2020), cerebral aneurysm (s/p clipping 1993), ET presenting for subacute progressive ascending weakness and instability.      Patient reported unsteady walking for about 6 weeks. Progressively worsening, but seems to have more-sharply declined in past 2 weeks. Now starting to involve BUE. Now starting to have paresthesias in the RUE and pain in the R shoulder. Occasional electrical shocks of pain radiating down BUE and chest.     Initial neurological examination was significant for some L thenar atrophy, weakness of ECRL/ECU, EDC, ADM, FDI. On lowers, there is diminished LLE vibratory sense at the toe, ankle, and knee compared to left. Questionable C5 sensory level. Normal tone. Reflexes 3+ with spread throughout, London's & finger flexor reflex present bilaterally, 4bts ankle clonus bilaterally. Toes mute. Trace jaw jerk. Gait narrow-based and unsteady with truncal instability. CTH which shows R temporal cranioplasty, R posterior circulation clip with bloom artifact, and moderate posterior-predominant subcortical microvascular disease.      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 CT myelogram was inconclusive due to poor contrast uptake in the cervical spine. Consider technical vs obstructive cause. S/p cisternal myelogram on  8/16 with neurosurgery not planning intervention (signed-off).    8/17 Updates:  - Cisternal myelogram shows mild narrowing of c-spine  - NSGY not recommending surgical intervention, signed-off  - CK normal at 41  - PT/OT recommended Acute Rehab, now pending discharge      #Cervical myeloradiculopathy  :: Possible structural compression  :: B12, folate, homocysteine, vitamin E, copper, MMA, CK all within normal limits  :: CT myelogram inconclusive d/t incomplete contrast uptake, reach out to IR re: imaging difficulties  :: Repeat cisternal myelogram shows no significant stenosis  - Consult neurosurgery: no intervention, signed off  - Pending dispo to [facility]     #MISC  -Continue home meds: hydrochlorothiazide daily, potassium, propranolol 10 mg QID, rosuvastatin 5 mg HS, Valtrex 500 mg daily    F: prn  E: prn  N: regular   A: PIV  DVT: lovenox   GI: N/A    CODE: FULL  NOK: Daughter Racheal, [phone]     Fartun Emmanuel MD  Neurology PGY-2

## 2024-08-18 LAB
ALBUMIN SERPL BCP-MCNC: 3.7 G/DL (ref 3.4–5)
ANION GAP SERPL CALC-SCNC: 13 MMOL/L (ref 10–20)
BASOPHILS # BLD AUTO: 0.06 X10*3/UL (ref 0–0.1)
BASOPHILS NFR BLD AUTO: 1 %
BUN SERPL-MCNC: 19 MG/DL (ref 6–23)
CALCIUM SERPL-MCNC: 9.6 MG/DL (ref 8.6–10.6)
CHLORIDE SERPL-SCNC: 102 MMOL/L (ref 98–107)
CO2 SERPL-SCNC: 29 MMOL/L (ref 21–32)
CREAT SERPL-MCNC: 0.63 MG/DL (ref 0.5–1.05)
EGFRCR SERPLBLD CKD-EPI 2021: >90 ML/MIN/1.73M*2
EOSINOPHIL # BLD AUTO: 0.14 X10*3/UL (ref 0–0.4)
EOSINOPHIL NFR BLD AUTO: 2.4 %
ERYTHROCYTE [DISTWIDTH] IN BLOOD BY AUTOMATED COUNT: 12.2 % (ref 11.5–14.5)
GLUCOSE SERPL-MCNC: 175 MG/DL (ref 74–99)
HCT VFR BLD AUTO: 44.2 % (ref 36–46)
HGB BLD-MCNC: 15 G/DL (ref 12–16)
IMM GRANULOCYTES # BLD AUTO: 0.01 X10*3/UL (ref 0–0.5)
IMM GRANULOCYTES NFR BLD AUTO: 0.2 % (ref 0–0.9)
LYMPHOCYTES # BLD AUTO: 2.18 X10*3/UL (ref 0.8–3)
LYMPHOCYTES NFR BLD AUTO: 37.3 %
MAGNESIUM SERPL-MCNC: 2 MG/DL (ref 1.6–2.4)
MCH RBC QN AUTO: 32.8 PG (ref 26–34)
MCHC RBC AUTO-ENTMCNC: 33.9 G/DL (ref 32–36)
MCV RBC AUTO: 97 FL (ref 80–100)
MONOCYTES # BLD AUTO: 0.41 X10*3/UL (ref 0.05–0.8)
MONOCYTES NFR BLD AUTO: 7 %
NEUTROPHILS # BLD AUTO: 3.05 X10*3/UL (ref 1.6–5.5)
NEUTROPHILS NFR BLD AUTO: 52.1 %
NRBC BLD-RTO: 0 /100 WBCS (ref 0–0)
PHOSPHATE SERPL-MCNC: 3.2 MG/DL (ref 2.5–4.9)
PLATELET # BLD AUTO: 191 X10*3/UL (ref 150–450)
POTASSIUM SERPL-SCNC: 3.8 MMOL/L (ref 3.5–5.3)
RBC # BLD AUTO: 4.58 X10*6/UL (ref 4–5.2)
SODIUM SERPL-SCNC: 140 MMOL/L (ref 136–145)
WBC # BLD AUTO: 5.9 X10*3/UL (ref 4.4–11.3)

## 2024-08-18 PROCEDURE — S4991 NICOTINE PATCH NONLEGEND: HCPCS | Performed by: PHYSICIAN ASSISTANT

## 2024-08-18 PROCEDURE — 2500000001 HC RX 250 WO HCPCS SELF ADMINISTERED DRUGS (ALT 637 FOR MEDICARE OP): Performed by: PHYSICIAN ASSISTANT

## 2024-08-18 PROCEDURE — 83735 ASSAY OF MAGNESIUM: CPT

## 2024-08-18 PROCEDURE — 2500000002 HC RX 250 W HCPCS SELF ADMINISTERED DRUGS (ALT 637 FOR MEDICARE OP, ALT 636 FOR OP/ED): Performed by: PHYSICIAN ASSISTANT

## 2024-08-18 PROCEDURE — 2500000002 HC RX 250 W HCPCS SELF ADMINISTERED DRUGS (ALT 637 FOR MEDICARE OP, ALT 636 FOR OP/ED)

## 2024-08-18 PROCEDURE — 1100000001 HC PRIVATE ROOM DAILY

## 2024-08-18 PROCEDURE — 36415 COLL VENOUS BLD VENIPUNCTURE: CPT

## 2024-08-18 PROCEDURE — 99231 SBSQ HOSP IP/OBS SF/LOW 25: CPT

## 2024-08-18 PROCEDURE — 2500000004 HC RX 250 GENERAL PHARMACY W/ HCPCS (ALT 636 FOR OP/ED)

## 2024-08-18 PROCEDURE — 80069 RENAL FUNCTION PANEL: CPT

## 2024-08-18 PROCEDURE — 85025 COMPLETE CBC W/AUTO DIFF WBC: CPT

## 2024-08-18 ASSESSMENT — COGNITIVE AND FUNCTIONAL STATUS - GENERAL
CLIMB 3 TO 5 STEPS WITH RAILING: A LITTLE
TOILETING: A LITTLE
DAILY ACTIVITIY SCORE: 20
DRESSING REGULAR LOWER BODY CLOTHING: A LITTLE
DRESSING REGULAR UPPER BODY CLOTHING: A LITTLE
MOBILITY SCORE: 23
HELP NEEDED FOR BATHING: A LITTLE

## 2024-08-18 ASSESSMENT — PAIN SCALES - GENERAL
PAINLEVEL_OUTOF10: 0 - NO PAIN
PAINLEVEL_OUTOF10: 0 - NO PAIN

## 2024-08-18 ASSESSMENT — PAIN DESCRIPTION - LOCATION: LOCATION: NECK

## 2024-08-18 NOTE — PROGRESS NOTES
"Linda López is a 79 y.o. right-handed female with a history of A-fib, Anxiety and depression, Arthritis, Artificial cardiac pacemaker, Brain aneurysm s/p clipping (told by ED not MRI compatible), Essential tremor, HTN (hypertension), Kidney stones, Lumbar radiculopathy on day 2 of admission after presenting with subacute weakness, ataxia. Neurology was consulted for difficulty walking, upper extremity weakness.    Subjective   NAEON. Patient states she continues to feel weak in her UE and overall unsteady. Eager to work with PT/OT more at acute rehab.    Objective     Physical Exam  Mental status: A+O x 4, known vocal dystonia (baseline s/p aneurysmal clipping)  Motor exam: 5/5 strength in all muscle groups.  Reflexes: 2+ b/l biceps, BR, 3+patellar, achilles reflexes.   Sensory: Sensation to light touch intact in b/l UE and LE. Proprioception intact in b/l LE.  Gait: Deferred.    Last Recorded Vitals  Blood pressure 112/71, pulse 51, temperature 36.3 °C (97.3 °F), resp. rate 18, height 1.626 m (5' 4\"), weight 76.7 kg (169 lb), SpO2 94%.  Intake/Output last 3 Shifts:  I/O last 3 completed shifts:  In: 600 (7.8 mL/kg) [P.O.:600]  Out: 4 (0.1 mL/kg) [Urine:3 (0 mL/kg/hr); Stool:1]  Weight: 76.7 kg     Relevant Results  Scheduled medications  enoxaparin, 40 mg, subcutaneous, q24h  hydroCHLOROthiazide, 25 mg, oral, Daily  hydrOXYzine HCL, 50 mg, oral, Once  nicotine, 1 patch, transdermal, Daily  PARoxetine, 20 mg, oral, Daily  potassium chloride CR, 20 mEq, oral, TID  propranolol, 10 mg, oral, 4x daily  rosuvastatin, 5 mg, oral, Nightly  valACYclovir, 500 mg, oral, Daily      Continuous medications     PRN medications  PRN medications: acetaminophen **OR** acetaminophen  Results for orders placed or performed during the hospital encounter of 08/11/24 (from the past 24 hour(s))   CBC and Auto Differential   Result Value Ref Range    WBC 6.3 4.4 - 11.3 x10*3/uL    nRBC 0.0 0.0 - 0.0 /100 WBCs    RBC 4.63 4.00 - 5.20 " x10*6/uL    Hemoglobin 15.1 12.0 - 16.0 g/dL    Hematocrit 45.1 36.0 - 46.0 %    MCV 97 80 - 100 fL    MCH 32.6 26.0 - 34.0 pg    MCHC 33.5 32.0 - 36.0 g/dL    RDW 12.0 11.5 - 14.5 %    Platelets 194 150 - 450 x10*3/uL    Neutrophils % 55.4 40.0 - 80.0 %    Immature Granulocytes %, Automated 0.2 0.0 - 0.9 %    Lymphocytes % 33.5 13.0 - 44.0 %    Monocytes % 7.7 2.0 - 10.0 %    Eosinophils % 2.2 0.0 - 6.0 %    Basophils % 1.0 0.0 - 2.0 %    Neutrophils Absolute 3.47 1.60 - 5.50 x10*3/uL    Immature Granulocytes Absolute, Automated 0.01 0.00 - 0.50 x10*3/uL    Lymphocytes Absolute 2.10 0.80 - 3.00 x10*3/uL    Monocytes Absolute 0.48 0.05 - 0.80 x10*3/uL    Eosinophils Absolute 0.14 0.00 - 0.40 x10*3/uL    Basophils Absolute 0.06 0.00 - 0.10 x10*3/uL   Renal Function Panel   Result Value Ref Range    Glucose 166 (H) 74 - 99 mg/dL    Sodium 140 136 - 145 mmol/L    Potassium 3.8 3.5 - 5.3 mmol/L    Chloride 106 98 - 107 mmol/L    Bicarbonate 25 21 - 32 mmol/L    Anion Gap 13 10 - 20 mmol/L    Urea Nitrogen 23 6 - 23 mg/dL    Creatinine 0.57 0.50 - 1.05 mg/dL    eGFR >90 >60 mL/min/1.73m*2    Calcium 9.5 8.6 - 10.6 mg/dL    Phosphorus 3.4 2.5 - 4.9 mg/dL    Albumin 3.7 3.4 - 5.0 g/dL   Magnesium   Result Value Ref Range    Magnesium 1.90 1.60 - 2.40 mg/dL   Creatine Kinase   Result Value Ref Range    Creatine Kinase 41 0 - 215 U/L       CT cervical spine post myelogram    Result Date: 8/14/2024  Interpreted By:  Rico Marr,  and Melony Asencio STUDY: CT CERVICAL SPINE POST MYELOGRAM; CT LUMBAR SPINE POST MYELOGRAM; FL MULTIPLE REGIONS MYELOGRAM WITH LUMBAR PUNCTURE; CT THORACIC SPINE POST MYELOGRAM;  8/14/2024 4:57 pm; 8/14/2024 4:24 pm   INDICATION: Signs/Symptoms:Upper motor neuron symptoms of bilateral upper extremities. Unable to obtain MRI; Signs/Symptoms:Cervical, thoracic, lumbar for UE and LE weakness.   COMPARISON: CT cervical spine, thoracic spine, and lumbar spine without contrast 08/12/2024   ACCESSION  NUMBER(S): [id number]; [id number]; [id number]; [id number]   ORDERING CLINICIAN: LILI CARUSO   TECHNIQUE: After discussion of the risks, benefits and alternatives, standard written hospital consent was obtained. The patient was placed prone on the fluoroscopic table.  The lower back was prepped and draped in sterile fashion. One percent lidocaine was used for local anesthesia. Under fluoroscopic guidance, a 22 gauge spinal needle was advanced into the thecal sac at the L3-L4 level. 10 cc of Omnipaque 300 was administered intrathecally under intermittent fluoroscopic imaging. Fluoroscopy time was 1.2 minutes. The patient tolerated the procedure well.   Following the fluoroscopic myelogram, serial axial CT images were obtained through the cervical, thoracic, and lumbar spine, using a bone algorithm. Images were reformatted into sagittal and coronal planes.   FINDINGS: FLUOROSCOPIC MYELOGRAM:  The thecal sac is widely patent.  There is filling bilaterally of nerve root sleeves without cutoff. There is normal alignment.   CT MYELOGRAM: CERVICAL SPINE: No evidence of contrast opacification of the cervical spine thecal sac despite repeat imaging after placing patient in Trendelenburg for several minutes. Nondiagnostic CT myelogram of the cervical spine.   Alignment is unremarkable. Vertebral body heights are maintained. Intervertebral disc space narrowing at C6-C7. Degenerative changes throughout the cervical spine most significant at C6-C7. No evidence of canal stenosis from the craniocervical junction to the C4-5 level. Nondiagnostic examination of the spinal canal at the C5-6 level and below secondary to streak artifact from overlying soft tissues.   There is hypertrophy of the right facet joint of C3-4 with slight joint space widening (series 205, image 23).   THORACIC SPINE: Contrast is visualized within the thoracic spine from the T5-6 level down. Partial opacification of the canal at the T3-4  and T4-5 levels. No contrast is seen within the thecal sac at the T1-2 and T2-3 levels.   Heterogeneous opacification of the right dorsolateral aspect of the thecal sac at the T5-6 level in the dorsal aspect of the thecal sac at the T7-8 level, which may represent blood products. No high-grade canal stenosis at any level from T5-6 through T12-L1.   The vertebral body heights of the thoracic spine are maintained. The intervertebral disc spaces are maintained. No evidence of significant neural foraminal stenosis.     LUMBAR SPINE: Heterogeneous opacification of the thecal sac within the lumbar spine, with dense cyst contrast seen at the L5-S1 level where the thecal sac was accessed.   Vertebral body heights are maintained. Intervertebral disc spaces are maintained. L1-2: No canal or foraminal stenosis.   L2-3: No canal or foraminal stenosis.   L3-4: Shallow disc bulge with facet hypertrophy results in mild narrowing the spinal canal and bilateral foramina.   L4-5: Shallow partially calcified disc bulge with facet hypertrophy results in mild narrowing of bilateral foramina and minimal narrowing of the spinal canal.   L5-S1: No canal or foraminal stenosis.     OTHER: Chest: Pacemaker in place. Status post mitral valve replacement. Emphysematous changes throughout the lungs. Abdomen/pelvis: No remarkable findings within the visualized portions of the abdomen and pelvis. Soft tissues: No remarkable soft tissue findings.       CT myelogram 1. Non-diagnostic myelogram of the cervical spine secondary to non opacification of the thecal sac despite repeat imaging after Trendelenburg positioning. Within the limitations of this non-contrast CT cervical spine, no evidence of high-grade canal stenosis from the craniocervical junction to the C4-5 level. Nondiagnostic evaluation of the spinal canal from the C5-6 level and below secondary to streak artifact from overlying soft tissues. 2. Facet hypertrophy with slight widening of the  right facet joint of C3-4. All findings may be degenerative in etiology, joint widening is nonspecific and underlying infectious process can not be excluded. 3. Inconsistent contrast opacification of the thoracic thecal sac. The thoracic spine is well opacified from T3 through T12 without evidence of central spinal canal stenosis, spinal cord compression, or neural foraminal stenosis. The superior portion of the thoracic thecal sac is not well opacified from approximately T1 through T3, as well as heterogeneous filling defects within the dorsal aspect of the thecal sac at the T5-6 and T7-8 levels, possibly due to blood products. 4. Heterogeneous contrast opacification of the lumbar spine thecal sac. Within these limitations, mild multilevel degenerative change without evidence of high-grade spinal canal stenosis or neural foraminal stenosis.   Given limitations of the current myelogram, consider repeat myelogram if patient is amenable.   I, Dr. Marr, supervised and was available throughout this procedure as performed by fellow physician Dr. Ty. This study was performed and interpreted at [facility]. I agree with the procedural description and the findings as stated.   MACRO: None   Signed by: Rico Marr 8/14/2024 9:22 PM Dictation workstation:   [id number]    CT thoracic spine post myelogram    Result Date: 8/14/2024  Interpreted By:  Rico Marr and Hofer [place] STUDY: CT CERVICAL SPINE POST MYELOGRAM; CT LUMBAR SPINE POST MYELOGRAM; FL MULTIPLE REGIONS MYELOGRAM WITH LUMBAR PUNCTURE; CT THORACIC SPINE POST MYELOGRAM;  8/14/2024 4:57 pm; 8/14/2024 4:24 pm   INDICATION: Signs/Symptoms:Upper motor neuron symptoms of bilateral upper extremities. Unable to obtain MRI; Signs/Symptoms:Cervical, thoracic, lumbar for UE and LE weakness.   COMPARISON: CT cervical spine, thoracic spine, and lumbar spine without contrast 08/12/2024   ACCESSION NUMBER(S): [id number]; [id number];  [id number]; [id number]   ORDERING CLINICIAN: LILI CARUSO   TECHNIQUE: After discussion of the risks, benefits and alternatives, standard written hospital consent was obtained. The patient was placed prone on the fluoroscopic table.  The lower back was prepped and draped in sterile fashion. One percent lidocaine was used for local anesthesia. Under fluoroscopic guidance, a 22 gauge spinal needle was advanced into the thecal sac at the L3-L4 level. 10 cc of Omnipaque 300 was administered intrathecally under intermittent fluoroscopic imaging. Fluoroscopy time was 1.2 minutes. The patient tolerated the procedure well.   Following the fluoroscopic myelogram, serial axial CT images were obtained through the cervical, thoracic, and lumbar spine, using a bone algorithm. Images were reformatted into sagittal and coronal planes.   FINDINGS: FLUOROSCOPIC MYELOGRAM:  The thecal sac is widely patent.  There is filling bilaterally of nerve root sleeves without cutoff. There is normal alignment.   CT MYELOGRAM: CERVICAL SPINE: No evidence of contrast opacification of the cervical spine thecal sac despite repeat imaging after placing patient in Trendelenburg for several minutes. Nondiagnostic CT myelogram of the cervical spine.   Alignment is unremarkable. Vertebral body heights are maintained. Intervertebral disc space narrowing at C6-C7. Degenerative changes throughout the cervical spine most significant at C6-C7. No evidence of canal stenosis from the craniocervical junction to the C4-5 level. Nondiagnostic examination of the spinal canal at the C5-6 level and below secondary to streak artifact from overlying soft tissues.   There is hypertrophy of the right facet joint of C3-4 with slight joint space widening (series 205, image 23).   THORACIC SPINE: Contrast is visualized within the thoracic spine from the T5-6 level down. Partial opacification of the canal at the T3-4 and T4-5 levels. No contrast is seen  within the thecal sac at the T1-2 and T2-3 levels.   Heterogeneous opacification of the right dorsolateral aspect of the thecal sac at the T5-6 level in the dorsal aspect of the thecal sac at the T7-8 level, which may represent blood products. No high-grade canal stenosis at any level from T5-6 through T12-L1.   The vertebral body heights of the thoracic spine are maintained. The intervertebral disc spaces are maintained. No evidence of significant neural foraminal stenosis.     LUMBAR SPINE: Heterogeneous opacification of the thecal sac within the lumbar spine, with dense cyst contrast seen at the L5-S1 level where the thecal sac was accessed.   Vertebral body heights are maintained. Intervertebral disc spaces are maintained. L1-2: No canal or foraminal stenosis.   L2-3: No canal or foraminal stenosis.   L3-4: Shallow disc bulge with facet hypertrophy results in mild narrowing the spinal canal and bilateral foramina.   L4-5: Shallow partially calcified disc bulge with facet hypertrophy results in mild narrowing of bilateral foramina and minimal narrowing of the spinal canal.   L5-S1: No canal or foraminal stenosis.     OTHER: Chest: Pacemaker in place. Status post mitral valve replacement. Emphysematous changes throughout the lungs. Abdomen/pelvis: No remarkable findings within the visualized portions of the abdomen and pelvis. Soft tissues: No remarkable soft tissue findings.       CT myelogram 1. Non-diagnostic myelogram of the cervical spine secondary to non opacification of the thecal sac despite repeat imaging after Trendelenburg positioning. Within the limitations of this non-contrast CT cervical spine, no evidence of high-grade canal stenosis from the craniocervical junction to the C4-5 level. Nondiagnostic evaluation of the spinal canal from the C5-6 level and below secondary to streak artifact from overlying soft tissues. 2. Facet hypertrophy with slight widening of the right facet joint of C3-4. All  findings may be degenerative in etiology, joint widening is nonspecific and underlying infectious process can not be excluded. 3. Inconsistent contrast opacification of the thoracic thecal sac. The thoracic spine is well opacified from T3 through T12 without evidence of central spinal canal stenosis, spinal cord compression, or neural foraminal stenosis. The superior portion of the thoracic thecal sac is not well opacified from approximately T1 through T3, as well as heterogeneous filling defects within the dorsal aspect of the thecal sac at the T5-6 and T7-8 levels, possibly due to blood products. 4. Heterogeneous contrast opacification of the lumbar spine thecal sac. Within these limitations, mild multilevel degenerative change without evidence of high-grade spinal canal stenosis or neural foraminal stenosis.   Given limitations of the current myelogram, consider repeat myelogram if patient is amenable.   I, Dr. Marr, supervised and was available throughout this procedure as performed by fellow physician Dr. Ty. This study was performed and interpreted at [facility]. I agree with the procedural description and the findings as stated.   MACRO: None   Signed by: Rico Marr 8/14/2024 9:22 PM Dictation workstation:   [id number]    CT lumbar spine post myelogram    Result Date: 8/14/2024  Interpreted By:  Rico Marr and Hofer [place] STUDY: CT CERVICAL SPINE POST MYELOGRAM; CT LUMBAR SPINE POST MYELOGRAM; FL MULTIPLE REGIONS MYELOGRAM WITH LUMBAR PUNCTURE; CT THORACIC SPINE POST MYELOGRAM;  8/14/2024 4:57 pm; 8/14/2024 4:24 pm   INDICATION: Signs/Symptoms:Upper motor neuron symptoms of bilateral upper extremities. Unable to obtain MRI; Signs/Symptoms:Cervical, thoracic, lumbar for UE and LE weakness.   COMPARISON: CT cervical spine, thoracic spine, and lumbar spine without contrast 08/12/2024   ACCESSION NUMBER(S): [id number]; [id number]; [id number]; [id number]   ORDERING  CLINICIAN: LILI CARUSO   TECHNIQUE: After discussion of the risks, benefits and alternatives, standard written hospital consent was obtained. The patient was placed prone on the fluoroscopic table.  The lower back was prepped and draped in sterile fashion. One percent lidocaine was used for local anesthesia. Under fluoroscopic guidance, a 22 gauge spinal needle was advanced into the thecal sac at the L3-L4 level. 10 cc of Omnipaque 300 was administered intrathecally under intermittent fluoroscopic imaging. Fluoroscopy time was 1.2 minutes. The patient tolerated the procedure well.   Following the fluoroscopic myelogram, serial axial CT images were obtained through the cervical, thoracic, and lumbar spine, using a bone algorithm. Images were reformatted into sagittal and coronal planes.   FINDINGS: FLUOROSCOPIC MYELOGRAM:  The thecal sac is widely patent.  There is filling bilaterally of nerve root sleeves without cutoff. There is normal alignment.   CT MYELOGRAM: CERVICAL SPINE: No evidence of contrast opacification of the cervical spine thecal sac despite repeat imaging after placing patient in Trendelenburg for several minutes. Nondiagnostic CT myelogram of the cervical spine.   Alignment is unremarkable. Vertebral body heights are maintained. Intervertebral disc space narrowing at C6-C7. Degenerative changes throughout the cervical spine most significant at C6-C7. No evidence of canal stenosis from the craniocervical junction to the C4-5 level. Nondiagnostic examination of the spinal canal at the C5-6 level and below secondary to streak artifact from overlying soft tissues.   There is hypertrophy of the right facet joint of C3-4 with slight joint space widening (series 205, image 23).   THORACIC SPINE: Contrast is visualized within the thoracic spine from the T5-6 level down. Partial opacification of the canal at the T3-4 and T4-5 levels. No contrast is seen within the thecal sac at the T1-2 and T2-3  levels.   Heterogeneous opacification of the right dorsolateral aspect of the thecal sac at the T5-6 level in the dorsal aspect of the thecal sac at the T7-8 level, which may represent blood products. No high-grade canal stenosis at any level from T5-6 through T12-L1.   The vertebral body heights of the thoracic spine are maintained. The intervertebral disc spaces are maintained. No evidence of significant neural foraminal stenosis.     LUMBAR SPINE: Heterogeneous opacification of the thecal sac within the lumbar spine, with dense cyst contrast seen at the L5-S1 level where the thecal sac was accessed.   Vertebral body heights are maintained. Intervertebral disc spaces are maintained. L1-2: No canal or foraminal stenosis.   L2-3: No canal or foraminal stenosis.   L3-4: Shallow disc bulge with facet hypertrophy results in mild narrowing the spinal canal and bilateral foramina.   L4-5: Shallow partially calcified disc bulge with facet hypertrophy results in mild narrowing of bilateral foramina and minimal narrowing of the spinal canal.   L5-S1: No canal or foraminal stenosis.     OTHER: Chest: Pacemaker in place. Status post mitral valve replacement. Emphysematous changes throughout the lungs. Abdomen/pelvis: No remarkable findings within the visualized portions of the abdomen and pelvis. Soft tissues: No remarkable soft tissue findings.       CT myelogram 1. Non-diagnostic myelogram of the cervical spine secondary to non opacification of the thecal sac despite repeat imaging after Trendelenburg positioning. Within the limitations of this non-contrast CT cervical spine, no evidence of high-grade canal stenosis from the craniocervical junction to the C4-5 level. Nondiagnostic evaluation of the spinal canal from the C5-6 level and below secondary to streak artifact from overlying soft tissues. 2. Facet hypertrophy with slight widening of the right facet joint of C3-4. All findings may be degenerative in etiology, joint  widening is nonspecific and underlying infectious process can not be excluded. 3. Inconsistent contrast opacification of the thoracic thecal sac. The thoracic spine is well opacified from T3 through T12 without evidence of central spinal canal stenosis, spinal cord compression, or neural foraminal stenosis. The superior portion of the thoracic thecal sac is not well opacified from approximately T1 through T3, as well as heterogeneous filling defects within the dorsal aspect of the thecal sac at the T5-6 and T7-8 levels, possibly due to blood products. 4. Heterogeneous contrast opacification of the lumbar spine thecal sac. Within these limitations, mild multilevel degenerative change without evidence of high-grade spinal canal stenosis or neural foraminal stenosis.   Given limitations of the current myelogram, consider repeat myelogram if patient is amenable.   I, Dr. Marr, supervised and was available throughout this procedure as performed by fellow physician Dr. Ty. This study was performed and interpreted at [facility]. I agree with the procedural description and the findings as stated.   MACRO: None   Signed by: Rico Marr 8/14/2024 9:22 PM Dictation workstation:   [id number]    FL multiple regions myelogram with lumbar puncture    Result Date: 8/14/2024  Interpreted By:  Rico Marr and Hofer [place] STUDY: CT CERVICAL SPINE POST MYELOGRAM; CT LUMBAR SPINE POST MYELOGRAM; FL MULTIPLE REGIONS MYELOGRAM WITH LUMBAR PUNCTURE; CT THORACIC SPINE POST MYELOGRAM;  8/14/2024 4:57 pm; 8/14/2024 4:24 pm   INDICATION: Signs/Symptoms:Upper motor neuron symptoms of bilateral upper extremities. Unable to obtain MRI; Signs/Symptoms:Cervical, thoracic, lumbar for UE and LE weakness.   COMPARISON: CT cervical spine, thoracic spine, and lumbar spine without contrast 08/12/2024   ACCESSION NUMBER(S): [id number]; [id number]; [id number]; [id number]   ORDERING CLINICIAN: LILI FONTANEZ;  SHANNAN CARUSO   TECHNIQUE: After discussion of the risks, benefits and alternatives, standard written hospital consent was obtained. The patient was placed prone on the fluoroscopic table.  The lower back was prepped and draped in sterile fashion. One percent lidocaine was used for local anesthesia. Under fluoroscopic guidance, a 22 gauge spinal needle was advanced into the thecal sac at the L3-L4 level. 10 cc of Omnipaque 300 was administered intrathecally under intermittent fluoroscopic imaging. Fluoroscopy time was 1.2 minutes. The patient tolerated the procedure well.   Following the fluoroscopic myelogram, serial axial CT images were obtained through the cervical, thoracic, and lumbar spine, using a bone algorithm. Images were reformatted into sagittal and coronal planes.   FINDINGS: FLUOROSCOPIC MYELOGRAM:  The thecal sac is widely patent.  There is filling bilaterally of nerve root sleeves without cutoff. There is normal alignment.   CT MYELOGRAM: CERVICAL SPINE: No evidence of contrast opacification of the cervical spine thecal sac despite repeat imaging after placing patient in Trendelenburg for several minutes. Nondiagnostic CT myelogram of the cervical spine.   Alignment is unremarkable. Vertebral body heights are maintained. Intervertebral disc space narrowing at C6-C7. Degenerative changes throughout the cervical spine most significant at C6-C7. No evidence of canal stenosis from the craniocervical junction to the C4-5 level. Nondiagnostic examination of the spinal canal at the C5-6 level and below secondary to streak artifact from overlying soft tissues.   There is hypertrophy of the right facet joint of C3-4 with slight joint space widening (series 205, image 23).   THORACIC SPINE: Contrast is visualized within the thoracic spine from the T5-6 level down. Partial opacification of the canal at the T3-4 and T4-5 levels. No contrast is seen within the thecal sac at the T1-2 and T2-3 levels.   Heterogeneous  opacification of the right dorsolateral aspect of the thecal sac at the T5-6 level in the dorsal aspect of the thecal sac at the T7-8 level, which may represent blood products. No high-grade canal stenosis at any level from T5-6 through T12-L1.   The vertebral body heights of the thoracic spine are maintained. The intervertebral disc spaces are maintained. No evidence of significant neural foraminal stenosis.     LUMBAR SPINE: Heterogeneous opacification of the thecal sac within the lumbar spine, with dense cyst contrast seen at the L5-S1 level where the thecal sac was accessed.   Vertebral body heights are maintained. Intervertebral disc spaces are maintained. L1-2: No canal or foraminal stenosis.   L2-3: No canal or foraminal stenosis.   L3-4: Shallow disc bulge with facet hypertrophy results in mild narrowing the spinal canal and bilateral foramina.   L4-5: Shallow partially calcified disc bulge with facet hypertrophy results in mild narrowing of bilateral foramina and minimal narrowing of the spinal canal.   L5-S1: No canal or foraminal stenosis.     OTHER: Chest: Pacemaker in place. Status post mitral valve replacement. Emphysematous changes throughout the lungs. Abdomen/pelvis: No remarkable findings within the visualized portions of the abdomen and pelvis. Soft tissues: No remarkable soft tissue findings.       CT myelogram 1. Non-diagnostic myelogram of the cervical spine secondary to non opacification of the thecal sac despite repeat imaging after Trendelenburg positioning. Within the limitations of this non-contrast CT cervical spine, no evidence of high-grade canal stenosis from the craniocervical junction to the C4-5 level. Nondiagnostic evaluation of the spinal canal from the C5-6 level and below secondary to streak artifact from overlying soft tissues. 2. Facet hypertrophy with slight widening of the right facet joint of C3-4. All findings may be degenerative in etiology, joint widening is nonspecific  and underlying infectious process can not be excluded. 3. Inconsistent contrast opacification of the thoracic thecal sac. The thoracic spine is well opacified from T3 through T12 without evidence of central spinal canal stenosis, spinal cord compression, or neural foraminal stenosis. The superior portion of the thoracic thecal sac is not well opacified from approximately T1 through T3, as well as heterogeneous filling defects within the dorsal aspect of the thecal sac at the T5-6 and T7-8 levels, possibly due to blood products. 4. Heterogeneous contrast opacification of the lumbar spine thecal sac. Within these limitations, mild multilevel degenerative change without evidence of high-grade spinal canal stenosis or neural foraminal stenosis.   Given limitations of the current myelogram, consider repeat myelogram if patient is amenable.   I, Dr. Marr, supervised and was available throughout this procedure as performed by fellow physician Dr. Ty. This study was performed and interpreted at [facility]. I agree with the procedural description and the findings as stated.   MACRO: None   Signed by: Rico Marr 8/14/2024 9:22 PM Dictation workstation:   [id number]    CT angio head w and wo IV contrast    Result Date: 8/12/2024  Interpreted By:  Beth Holguin and Beyersdorf Conner STUDY: CT ANGIO HEAD W AND WO IV CONTRAST;  8/12/2024 1:22 am   INDICATION: Signs/Symptoms:bilateral upper extremity weaknes. Hx of aneurysm.   COMPARISON: CT head 08/12/2024   ACCESSION NUMBER(S): [id number]   ORDERING CLINICIAN: LILI FONTANEZ   TECHNIQUE: Unenhanced CT images of the head were obtained. Subsequently, 150 mL of Omnipaque 350 was administered intravenously and axial images of the head were acquired.  Coronal, sagittal, and 3-D reconstructions were provided for review.   FINDINGS: Postsurgical changes of the aneurysm clip new right basilar cistern region motion limits evaluation due to streak  artifact. Dedicated head CT is been performed and reported separately.   Anterior circulation: Atherosclerotic calcifications of the right greater than left carotid siphons. Streak artifact partially limits evaluation of the right distal ICA otherwise the bilateral intracranial internal carotid arteries, bilateral carotid terminals, bilateral proximal anterior and middle cerebral arteries are normal.   Posterior circulation: Tapering with diminutive intradural left vertebral artery. Streak artifact limits evaluation of the basilar tip and right greater than left proximal PCA. The mid to distal PCAs appear grossly patent.   Adjacent rounded opacities in the region of the right posterior communicating artery noted on axial projection without corresponding abnormality identified on additional projections and favored to represent volume averaging or artifact. Otherwise bilateral intracranial vertebral arteries, vertebrobasilar junction and basilar artery are normal.       Postsurgical changes of right posterior circulation aneurysmal clipping with associated streak artifact partially limiting evaluation. Otherwise no evidence for significant stenosis or large branch vessel cutoffs of the intracranial vessels.   Opacities noted on axial imaging without corresponding abnormality on additional findings. Findings are favored to represent artifact versus true aneurysm however attention on follow-up recommended.   I personally reviewed the image(s)/study and resident interpretation. I agree with the findings as stated by resident Fredi Anderson. Data analyzed and images interpreted at [facility], [place], [place].     MACRO: None   Signed by: Beth Holguin 8/12/2024 3:47 AM Dictation workstation:   [id number]    CT abdomen pelvis w IV contrast    Result Date: 8/12/2024  Interpreted By:  Beth Holguin and Beyersdorf Conner STUDY: CT ABDOMEN PELVIS W IV CONTRAST; CT THORACIC SPINE WO IV  CONTRAST; CT LUMBAR SPINE WO IV CONTRAST;  8/12/2024 2:12 am; 8/12/2024 2:11 am   INDICATION: Signs/Symptoms:N/V; Signs/Symptoms:BUE and BLE weakness; Signs/Symptoms:BUE and BLE weakness..   COMPARISON: None.   ACCESSION NUMBER(S): [id number]; [id number]; [id number]   ORDERING CLINICIAN: LILI FONTANEZ   TECHNIQUE: CT of the abdomen and pelvis was performed.  Standard contiguous axial images were obtained at 3 mm slice thickness through the abdomen and pelvis. Coronal and sagittal reconstructions at 3 mm slice thickness were performed.   150 ml of contrast omni 350 were administered intravenously without immediate complication.   FINDINGS: LOWER CHEST: Ground-glass attenuation of the lower lungs likely representing aspiration pneumonitis with a component of atelectasis. Emphysematous changes. Please see dedicated CTA chest for further evaluation.   ABDOMEN:   LIVER: The liver is normal in size measuring 15.3 cm in the craniocaudal axis. No focal hepatic lesions.   BILE DUCTS: The intrahepatic and extrahepatic ducts are not dilated.   GALLBLADDER: The gallbladder is nondistended and without evidence of radiopaque stones.   PANCREAS: The pancreas appears unremarkable without evidence of ductal dilatation or masses.   SPLEEN: The spleen is normal in size without focal lesions.   ADRENAL GLANDS: Bilateral adrenal glands appear normal.   KIDNEYS AND URETERS: The kidneys are normal in size and enhance symmetrically. Few bilateral hypodense lesions are too small to characterize, favored to represent simple cysts.  No hydroureteronephrosis. Contrast material is present within the bilateral renal collecting systems and ureters, limiting evaluation for calculi.   PELVIS:   BLADDER: Bladder is normal in appearance without wall thickening. Layering contrast material within the bladder.   REPRODUCTIVE ORGANS: The uterus is surgically absent.   BOWEL: The stomach is unremarkable.   The small and large bowel are normal in  caliber and demonstrate no wall thickening. Colonic diverticulosis without evidence of diverticulitis. The appendix is not definitely visualized. There is however no pericecal stranding or fluid. Moderate stool burden.   VESSELS: There is no aneurysmal dilatation of the abdominal aorta. The IVC appears normal. Moderate atherosclerotic calcification of the abdominal aorta and its branches.   PERITONEUM/RETROPERITONEUM/LYMPH NODES: There is no free or loculated fluid collection, no free intraperitoneal air. The retroperitoneum appears normal.  No abdominopelvic lymphadenopathy is present.   BONES AND ABDOMINAL WALL: Rightward curvature of the lumbar spine. The abdominal wall soft tissues appear normal.     CT THORACIC SPINE:   PARASPINAL SOFT TISSUES: No paravertebral fluid collection or significant edema. ALIGNMENT:  No traumatic spondylolisthesis or traumatic facet widening. VERTEBRAE:  No acute fracture. Vertebral body heights are maintained. SPINAL CANAL/INTERVERTEBRAL DISCS: No high-grade spinal canal stenosis. Moderate multilevel disc height loss, most severe at T11-12 and T12-L1. Multilevel anterior osteophytosis. The there are indeterminate peripheral calcifications present in the expected region of the posterior thecal sac dorsal to the T7, T8 and T9 vertebral bodies, likely chronic possibly related to remote infection, inflammation or hemorrhage. No definite mass effect identified.     CT LUMBAR SPINE:   PARASPINAL SOFT TISSUES: No paravertebral fluid collection or significant edema. ALIGNMENT: Dextrocurvature of the lower thoracic and lumbar spine. No traumatic spondylolisthesis or traumatic facet widening. VERTEBRAE: Bones appear mildly demineralized. No acute fracture. Vertebral body heights are maintained. SPINAL CANAL/INTERVERTEBRAL DISCS: No high-grade spinal canal stenosis. Moderate multilevel disc height loss, most significant at T12-L1. Moderate bilateral facet joint arthropathy from L2-S1. NEURAL  FORAMINA: Disc bulge at L2-L3, L3-L4 and L5-S1 with facet hypertrophy with up to moderate foraminal narrowing. Disc bulge and facet hypertrophy at L4 L spine with up to severe foraminal stenosis.       1.  No acute abnormality within the abdomen or pelvis. 2. No acute fracture of the thoracic or lumbar spine. Degenerative changes of the spine without critical canal stenosis. Foraminal narrowing in the lumbar spine as detailed. 3. Probable chronic calcifications within the posterior fecal sac at the level of the midthoracic spine as detailed. No associated canal stenosis identified..   I personally reviewed the image(s)/study and resident interpretation. I agree with the findings as stated by resident Fredi Anderson. Data analyzed and images interpreted at [facility], [place], [place].   MACRO: None   Signed by: Beth Holguin 8/12/2024 2:55 AM Dictation workstation:   [id number]    CT thoracic spine wo IV contrast    Result Date: 8/12/2024  Interpreted By:  Beth Holguin,  Colt Rai STUDY: CT ABDOMEN PELVIS W IV CONTRAST; CT THORACIC SPINE WO IV CONTRAST; CT LUMBAR SPINE WO IV CONTRAST;  8/12/2024 2:12 am; 8/12/2024 2:11 am   INDICATION: Signs/Symptoms:N/V; Signs/Symptoms:BUE and BLE weakness; Signs/Symptoms:BUE and BLE weakness..   COMPARISON: None.   ACCESSION NUMBER(S): [id number]; [id number]; [id number]   ORDERING CLINICIAN: LILI FONTANEZ   TECHNIQUE: CT of the abdomen and pelvis was performed.  Standard contiguous axial images were obtained at 3 mm slice thickness through the abdomen and pelvis. Coronal and sagittal reconstructions at 3 mm slice thickness were performed.   150 ml of contrast omni 350 were administered intravenously without immediate complication.   FINDINGS: LOWER CHEST: Ground-glass attenuation of the lower lungs likely representing aspiration pneumonitis with a component of atelectasis. Emphysematous changes. Please see dedicated CTA  chest for further evaluation.   ABDOMEN:   LIVER: The liver is normal in size measuring 15.3 cm in the craniocaudal axis. No focal hepatic lesions.   BILE DUCTS: The intrahepatic and extrahepatic ducts are not dilated.   GALLBLADDER: The gallbladder is nondistended and without evidence of radiopaque stones.   PANCREAS: The pancreas appears unremarkable without evidence of ductal dilatation or masses.   SPLEEN: The spleen is normal in size without focal lesions.   ADRENAL GLANDS: Bilateral adrenal glands appear normal.   KIDNEYS AND URETERS: The kidneys are normal in size and enhance symmetrically. Few bilateral hypodense lesions are too small to characterize, favored to represent simple cysts.  No hydroureteronephrosis. Contrast material is present within the bilateral renal collecting systems and ureters, limiting evaluation for calculi.   PELVIS:   BLADDER: Bladder is normal in appearance without wall thickening. Layering contrast material within the bladder.   REPRODUCTIVE ORGANS: The uterus is surgically absent.   BOWEL: The stomach is unremarkable.   The small and large bowel are normal in caliber and demonstrate no wall thickening. Colonic diverticulosis without evidence of diverticulitis. The appendix is not definitely visualized. There is however no pericecal stranding or fluid. Moderate stool burden.   VESSELS: There is no aneurysmal dilatation of the abdominal aorta. The IVC appears normal. Moderate atherosclerotic calcification of the abdominal aorta and its branches.   PERITONEUM/RETROPERITONEUM/LYMPH NODES: There is no free or loculated fluid collection, no free intraperitoneal air. The retroperitoneum appears normal.  No abdominopelvic lymphadenopathy is present.   BONES AND ABDOMINAL WALL: Rightward curvature of the lumbar spine. The abdominal wall soft tissues appear normal.     CT THORACIC SPINE:   PARASPINAL SOFT TISSUES: No paravertebral fluid collection or significant edema. ALIGNMENT:  No  traumatic spondylolisthesis or traumatic facet widening. VERTEBRAE:  No acute fracture. Vertebral body heights are maintained. SPINAL CANAL/INTERVERTEBRAL DISCS: No high-grade spinal canal stenosis. Moderate multilevel disc height loss, most severe at T11-12 and T12-L1. Multilevel anterior osteophytosis. The there are indeterminate peripheral calcifications present in the expected region of the posterior thecal sac dorsal to the T7, T8 and T9 vertebral bodies, likely chronic possibly related to remote infection, inflammation or hemorrhage. No definite mass effect identified.     CT LUMBAR SPINE:   PARASPINAL SOFT TISSUES: No paravertebral fluid collection or significant edema. ALIGNMENT: Dextrocurvature of the lower thoracic and lumbar spine. No traumatic spondylolisthesis or traumatic facet widening. VERTEBRAE: Bones appear mildly demineralized. No acute fracture. Vertebral body heights are maintained. SPINAL CANAL/INTERVERTEBRAL DISCS: No high-grade spinal canal stenosis. Moderate multilevel disc height loss, most significant at T12-L1. Moderate bilateral facet joint arthropathy from L2-S1. NEURAL FORAMINA: Disc bulge at L2-L3, L3-L4 and L5-S1 with facet hypertrophy with up to moderate foraminal narrowing. Disc bulge and facet hypertrophy at L4 L spine with up to severe foraminal stenosis.       1.  No acute abnormality within the abdomen or pelvis. 2. No acute fracture of the thoracic or lumbar spine. Degenerative changes of the spine without critical canal stenosis. Foraminal narrowing in the lumbar spine as detailed. 3. Probable chronic calcifications within the posterior fecal sac at the level of the midthoracic spine as detailed. No associated canal stenosis identified..   I personally reviewed the image(s)/study and resident interpretation. I agree with the findings as stated by resident Fredi Anderson. Data analyzed and images interpreted at [facility], [place], [place].    MACRO: None   Signed by: Beth Holguin 8/12/2024 2:55 AM Dictation workstation:   [id number]    CT lumbar spine wo IV contrast    Result Date: 8/12/2024  Interpreted By:  Beth Holguin and Beyersdorf Conner STUDY: CT ABDOMEN PELVIS W IV CONTRAST; CT THORACIC SPINE WO IV CONTRAST; CT LUMBAR SPINE WO IV CONTRAST;  8/12/2024 2:12 am; 8/12/2024 2:11 am   INDICATION: Signs/Symptoms:N/V; Signs/Symptoms:BUE and BLE weakness; Signs/Symptoms:BUE and BLE weakness..   COMPARISON: None.   ACCESSION NUMBER(S): [id number]; [id number]; [id number]   ORDERING CLINICIAN: LILI FONTANEZ   TECHNIQUE: CT of the abdomen and pelvis was performed.  Standard contiguous axial images were obtained at 3 mm slice thickness through the abdomen and pelvis. Coronal and sagittal reconstructions at 3 mm slice thickness were performed.   150 ml of contrast omni 350 were administered intravenously without immediate complication.   FINDINGS: LOWER CHEST: Ground-glass attenuation of the lower lungs likely representing aspiration pneumonitis with a component of atelectasis. Emphysematous changes. Please see dedicated CTA chest for further evaluation.   ABDOMEN:   LIVER: The liver is normal in size measuring 15.3 cm in the craniocaudal axis. No focal hepatic lesions.   BILE DUCTS: The intrahepatic and extrahepatic ducts are not dilated.   GALLBLADDER: The gallbladder is nondistended and without evidence of radiopaque stones.   PANCREAS: The pancreas appears unremarkable without evidence of ductal dilatation or masses.   SPLEEN: The spleen is normal in size without focal lesions.   ADRENAL GLANDS: Bilateral adrenal glands appear normal.   KIDNEYS AND URETERS: The kidneys are normal in size and enhance symmetrically. Few bilateral hypodense lesions are too small to characterize, favored to represent simple cysts.  No hydroureteronephrosis. Contrast material is present within the bilateral renal collecting systems and ureters, limiting evaluation  for calculi.   PELVIS:   BLADDER: Bladder is normal in appearance without wall thickening. Layering contrast material within the bladder.   REPRODUCTIVE ORGANS: The uterus is surgically absent.   BOWEL: The stomach is unremarkable.   The small and large bowel are normal in caliber and demonstrate no wall thickening. Colonic diverticulosis without evidence of diverticulitis. The appendix is not definitely visualized. There is however no pericecal stranding or fluid. Moderate stool burden.   VESSELS: There is no aneurysmal dilatation of the abdominal aorta. The IVC appears normal. Moderate atherosclerotic calcification of the abdominal aorta and its branches.   PERITONEUM/RETROPERITONEUM/LYMPH NODES: There is no free or loculated fluid collection, no free intraperitoneal air. The retroperitoneum appears normal.  No abdominopelvic lymphadenopathy is present.   BONES AND ABDOMINAL WALL: Rightward curvature of the lumbar spine. The abdominal wall soft tissues appear normal.     CT THORACIC SPINE:   PARASPINAL SOFT TISSUES: No paravertebral fluid collection or significant edema. ALIGNMENT:  No traumatic spondylolisthesis or traumatic facet widening. VERTEBRAE:  No acute fracture. Vertebral body heights are maintained. SPINAL CANAL/INTERVERTEBRAL DISCS: No high-grade spinal canal stenosis. Moderate multilevel disc height loss, most severe at T11-12 and T12-L1. Multilevel anterior osteophytosis. The there are indeterminate peripheral calcifications present in the expected region of the posterior thecal sac dorsal to the T7, T8 and T9 vertebral bodies, likely chronic possibly related to remote infection, inflammation or hemorrhage. No definite mass effect identified.     CT LUMBAR SPINE:   PARASPINAL SOFT TISSUES: No paravertebral fluid collection or significant edema. ALIGNMENT: Dextrocurvature of the lower thoracic and lumbar spine. No traumatic spondylolisthesis or traumatic facet widening. VERTEBRAE: Bones appear mildly  demineralized. No acute fracture. Vertebral body heights are maintained. SPINAL CANAL/INTERVERTEBRAL DISCS: No high-grade spinal canal stenosis. Moderate multilevel disc height loss, most significant at T12-L1. Moderate bilateral facet joint arthropathy from L2-S1. NEURAL FORAMINA: Disc bulge at L2-L3, L3-L4 and L5-S1 with facet hypertrophy with up to moderate foraminal narrowing. Disc bulge and facet hypertrophy at L4 L spine with up to severe foraminal stenosis.       1.  No acute abnormality within the abdomen or pelvis. 2. No acute fracture of the thoracic or lumbar spine. Degenerative changes of the spine without critical canal stenosis. Foraminal narrowing in the lumbar spine as detailed. 3. Probable chronic calcifications within the posterior fecal sac at the level of the midthoracic spine as detailed. No associated canal stenosis identified..   I personally reviewed the image(s)/study and resident interpretation. I agree with the findings as stated by resident Fredi Anderson. Data analyzed and images interpreted at [place], [place].   MACRO: None   Signed by: Beth Holguin 8/12/2024 2:55 AM Dictation workstation:   [id number]    CT angio chest for pulmonary embolism    Result Date: 8/12/2024  Interpreted By:  Beth Holguin and Beyersdorf Conner STUDY: CT ANGIO CHEST FOR PULMONARY EMBOLISM;  8/12/2024 2:36 am   INDICATION: Signs/Symptoms:Weakness, fatigue shortness of breath.   COMPARISON: None   ACCESSION NUMBER(S): [id number]   ORDERING CLINICIAN: ROSSANA CRANE   TECHNIQUE: Helical data acquisition of the chest was obtained after intravenous administration of 150 cc of Omnipaque 350, as per PE protocol. Images were reformatted in coronal and sagittal planes. Axial and coronal maximum intensity projection (MIP) images were created and reviewed.   FINDINGS: POTENTIAL LIMITATIONS OF THE STUDY: None   HEART AND VESSELS: There are no discrete filling defects  within main pulmonary artery and its branches to suggest acute pulmonary embolism. Main pulmonary artery and its branches are normal in caliber.   The thoracic aorta normal in course and caliber.There is moderate atherosclerosis present, including calcified and noncalcified plaques.Although, the study is not tailored for evaluation of aorta, there is no definite evidence of acute aortic pathology. Ectasia of the main pulmonary artery measuring 3.3 cm Coronary artery calcifications noted however exam is not optimized for evaluation.   Mild cardiomegaly with enlargement of the left atrium.There are no findings to suggest right heart strain. Left atrial appendage closure device in place. Left chest pacemaker/ICD with leads terminating in the right atrium and right ventricle. There is no pericardial effusion seen.   MEDIASTINUM AND TOÑO, LOWER NECK AND AXILLA: The visualized thyroid gland is within normal limits. No evidence of thoracic lymphadenopathy by CT criteria. Patulous appearance of the esophagus with heterogenous material in the upper esophagus.   LUNGS AND AIRWAYS: There is mucous layering in the distal trachea and right and left main bronchi. Subtle mucous plugging in lower lobe bronchials. Ground-glass consolidation of the dependent portions of the lower lungs. Moderate centrilobular and apically predominant emphysematous changes.  There is mild diffuse bronchial wall thickening. Calcified granuloma noted dot No pleural effusion or pneumothorax.     UPPER ABDOMEN:   Please see dedicated CT abdomen pelvis for further evaluation.   CHEST WALL AND OSSEOUS STRUCTURES: Chest wall is within normal limits. Multilevel degenerative changes with endplate osteophytes in the visualized spine. No acute osseous pathology.There are no suspicious osseous lesions.       1. No evidence of acute pulmonary embolism to the segmental level. 2. Small amount of mucus layering within the main bronchi and mucous plugging of the  bronchials. Ground-glass opacities in the bilateral lung bases. Findings are consistent with aspiration pneumonitis and superimposed atelectasis. 3. Patulous esophagus with debris and septations noted to the upper level which could be related to esophagitis. Clinical correlation suggested. Findings also increased aspiration risk. 4. Ectatic main pulmonary artery measuring 3.3 cm. 5. Cardiomegaly with enlargement of the left atrium.   I personally reviewed the image(s)/study and resident interpretation. I agree with the findings as stated by resident Fredi Anderson. Data analyzed and images interpreted at [facility], [place], [place].   MACRO: None   Signed by: Beth Holguin 8/12/2024 2:37 AM Dictation workstation:   [id number]    CT head wo IV contrast    Result Date: 8/12/2024  Interpreted By:  Beth Holguin and Beyersdorf Conner STUDY: CT HEAD WO IV CONTRAST; CT CERVICAL SPINE WO IV CONTRAST;  8/12/2024 1:22 am   INDICATION: Signs/Symptoms:Hx of brain aneurysm. Generalized fatigue and weakness; Signs/Symptoms:BUE and BLE weakness   COMPARISON: CT head 03/28/2017   ACCESSION NUMBER(S): [id number]; [id number]   ORDERING CLINICIAN: ROSSANA FONTANEZ   TECHNIQUE: Axial noncontrast CT images of head with coronal and sagittal reconstructed images. Axial noncontrast CT images of the cervical spine with coronal and sagittal reconstructed images.   FINDINGS: CT HEAD:   Postsurgical changes of right temporal craniotomy and aneurysmal clipping near the suprasellar and right parasellar region. Associated metallic streak artifact limits evaluation of the region. Findings are similar prior imaging   BRAIN PARENCHYMA: Confluent region of hypoattenuating areas of periventricular and subcortical white matter, which is nonspecific, but favored to represent chronic small-vessel ischemic disease in a patient of this age. Mild encephalomalacia within the bilateral right temporal  lobe. No evidence of acute intraparenchymal hemorrhage or parenchymal evidence of acute large territory ischemic infarct. No mass-effect, midline shift or effacement of cerebral sulci. Gray-white matter distinction is preserved.   VENTRICLES and EXTRA-AXIAL SPACES: No acute extra-axial or intraventricular hemorrhage. Ventricles and sulci are age-concordant.   PARANASAL SINUSES/MASTOIDS: No hemorrhage or air-fluid levels within the visualized paranasal sinuses. The mastoid air cells are well-aerated.   CALVARIUM/ORBITS: No skull fracture. Bilateral lens replacements. The orbits and globes are intact to the extent visualized.   EXTRACRANIAL SOFT TISSUES: No discernible abnormality. Postsurgical changes of the lungs is   CT CERVICAL SPINE:   PREVERTEBRAL SOFT TISSUES: Within normal limits.   CRANIOCERVICAL JUNCTION: Intact.   ALIGNMENT: Loss of the normal cervical lordosis. There is grade 1 retrolisthesis of C3-C4 measuring 3.3 mm, and grade 1 retrolisthesis of C5-6 measuring 2.7 mm. Facet joint alignment is maintained. No traumatic malalignment or traumatic facet widening.   VERTEBRAE: No acute fracture. Vertebral body heights are maintained.   SPINAL CANAL/INTERVERTEBRAL DISCS: No high-grade spinal canal stenosis. Multilevel variable disc space height loss, most severe at C6-7. Prominent osteophytosis at this level along with subchondral sclerosis and cystic changes..   NEURAL FORAMINA: Mild uncovertebral hypertrophy at C4-5 with a mild bilateral neural foraminal narrowing. Moderate neural foraminal narrowing and right facet hypertrophy at C5-6 with moderate right neural foraminal narrowing. Severe uncovertebral hypertrophy at C6-7 with moderate bilateral neural foraminal narrowing.   OTHER: Dedicated chest imaging has been performed and will be reported separately..       CT HEAD: 1. Postsurgical changes of right temporal craniotomy and aneurysmal clipping right partially degrades imaging. Otherwise no acute  intracranial hemorrhage or mass effect identified. 2. Findings consistent with chronic small-vessel ischemic disease and right temporal lobe encephalomalacia.     CT CERVICAL SPINE: 1. No acute fracture or traumatic malalignment of the cervical spine. 2. Multilevel spondylotic changes of the cervical spine as detailed above, with multilevel neural foraminal narrowing.     I personally reviewed the image(s)/study and resident interpretation. I agree with the findings as stated by resident Fredi Andesron. Data analyzed and images interpreted at [facility], [place], [place].   MACRO: none   Signed by: Beth Holguin 8/12/2024 2:31 AM Dictation workstation:   [id number]    CT cervical spine wo IV contrast    Result Date: 8/12/2024  Interpreted By:  Beth Holguin and Beyersdorf Conner STUDY: CT HEAD WO IV CONTRAST; CT CERVICAL SPINE WO IV CONTRAST;  8/12/2024 1:22 am   INDICATION: Signs/Symptoms:Hx of brain aneurysm. Generalized fatigue and weakness; Signs/Symptoms:BUE and BLE weakness   COMPARISON: CT head 03/28/2017   ACCESSION NUMBER(S): [id number]; [id number]   ORDERING CLINICIAN: ROSSANA FONTANEZ   TECHNIQUE: Axial noncontrast CT images of head with coronal and sagittal reconstructed images. Axial noncontrast CT images of the cervical spine with coronal and sagittal reconstructed images.   FINDINGS: CT HEAD:   Postsurgical changes of right temporal craniotomy and aneurysmal clipping near the suprasellar and right parasellar region. Associated metallic streak artifact limits evaluation of the region. Findings are similar prior imaging   BRAIN PARENCHYMA: Confluent region of hypoattenuating areas of periventricular and subcortical white matter, which is nonspecific, but favored to represent chronic small-vessel ischemic disease in a patient of this age. Mild encephalomalacia within the bilateral right temporal lobe. No evidence of acute intraparenchymal hemorrhage  or parenchymal evidence of acute large territory ischemic infarct. No mass-effect, midline shift or effacement of cerebral sulci. Gray-white matter distinction is preserved.   VENTRICLES and EXTRA-AXIAL SPACES: No acute extra-axial or intraventricular hemorrhage. Ventricles and sulci are age-concordant.   PARANASAL SINUSES/MASTOIDS: No hemorrhage or air-fluid levels within the visualized paranasal sinuses. The mastoid air cells are well-aerated.   CALVARIUM/ORBITS: No skull fracture. Bilateral lens replacements. The orbits and globes are intact to the extent visualized.   EXTRACRANIAL SOFT TISSUES: No discernible abnormality. Postsurgical changes of the lungs is   CT CERVICAL SPINE:   PREVERTEBRAL SOFT TISSUES: Within normal limits.   CRANIOCERVICAL JUNCTION: Intact.   ALIGNMENT: Loss of the normal cervical lordosis. There is grade 1 retrolisthesis of C3-C4 measuring 3.3 mm, and grade 1 retrolisthesis of C5-6 measuring 2.7 mm. Facet joint alignment is maintained. No traumatic malalignment or traumatic facet widening.   VERTEBRAE: No acute fracture. Vertebral body heights are maintained.   SPINAL CANAL/INTERVERTEBRAL DISCS: No high-grade spinal canal stenosis. Multilevel variable disc space height loss, most severe at C6-7. Prominent osteophytosis at this level along with subchondral sclerosis and cystic changes..   NEURAL FORAMINA: Mild uncovertebral hypertrophy at C4-5 with a mild bilateral neural foraminal narrowing. Moderate neural foraminal narrowing and right facet hypertrophy at C5-6 with moderate right neural foraminal narrowing. Severe uncovertebral hypertrophy at C6-7 with moderate bilateral neural foraminal narrowing.   OTHER: Dedicated chest imaging has been performed and will be reported separately..       CT HEAD: 1. Postsurgical changes of right temporal craniotomy and aneurysmal clipping right partially degrades imaging. Otherwise no acute intracranial hemorrhage or mass effect identified. 2. Findings  consistent with chronic small-vessel ischemic disease and right temporal lobe encephalomalacia.     CT CERVICAL SPINE: 1. No acute fracture or traumatic malalignment of the cervical spine. 2. Multilevel spondylotic changes of the cervical spine as detailed above, with multilevel neural foraminal narrowing.     I personally reviewed the image(s)/study and resident interpretation. I agree with the findings as stated by resident Fredi Anderson. Data analyzed and images interpreted at [facility], [place], [place].   MACRO: none   Signed by: Beth Holguin 8/12/2024 2:31 AM Dictation workstation:   [id number]        Assessment/Plan   Assessment & Plan  Weakness    Generalized weakness  This a 78yo RHF PMHx HTN, SSS (s/p pacer 2019), pAF (s/p watchman 2020), cerebral aneurysm (s/p clipping 1993), ET presenting for subacute progressive ascending weakness and instability.      Patient reported unsteady walking for about 6 weeks. Progressively worsening, but seems to have more-sharply declined in past 2 weeks. Now starting to involve BUE. Now starting to have paresthesias in the RUE and pain in the R shoulder. Occasional electrical shocks of pain radiating down BUE and chest.     Initial neurological examination was significant for some L thenar atrophy, weakness of ECRL/ECU, EDC, ADM, FDI. On lowers, there is diminished LLE vibratory sense at the toe, ankle, and knee compared to left. Questionable C5 sensory level. Normal tone. Reflexes 3+ with spread throughout, London's & finger flexor reflex present bilaterally, 4bts ankle clonus bilaterally. Toes mute. Trace jaw jerk. Gait narrow-based and unsteady with truncal instability. CTH which shows R temporal cranioplasty, R posterior circulation clip with bloom artifact, and moderate posterior-predominant subcortical microvascular disease.      Overall manifestation is most concerning for cervical myeloradiculopathy, of which most likely in  this demographic would be structural d/t compression. CT myelogram was inconclusive due to poor contrast uptake in the cervical spine. Consider technical vs obstructive cause. S/p cisternal myelogram on 8/16 with neurosurgery not planning intervention (signed-off).    8/18 Updates:  - CK normal at 41  - Pending discharge to [facility]      #Cervical myeloradiculopathy  :: Possible structural compression  :: B12, folate, homocysteine, vitamin E, copper, MMA, CK all within normal limits  :: CT myelogram inconclusive d/t incomplete contrast uptake, reach out to IR re: imaging difficulties  :: Repeat cisternal myelogram shows no significant stenosis  - Consult neurosurgery: no intervention, signed off  - Pending dispo to [facility]     #MISC  -Continue home meds: hydrochlorothiazide daily, potassium, propranolol 10 mg QID, rosuvastatin 5 mg HS, Valtrex 500 mg daily    F: prn  E: prn  N: regular   A: PIV  DVT: lovenox   GI: N/A    CODE: FULL  NOK: Daughter Racheal, [phone]     Fartun Emmanuel MD  Neurology PGY-2

## 2024-08-18 NOTE — ASSESSMENT & PLAN NOTE
This a 78yo RHF PMHx HTN, SSS (s/p pacer 2019), pAF (s/p watchman 2020), cerebral aneurysm (s/p clipping 1993), ET presenting for subacute progressive ascending weakness and instability.      Patient reported unsteady walking for about 6 weeks. Progressively worsening, but seems to have more-sharply declined in past 2 weeks. Now starting to involve BUE. Now starting to have paresthesias in the RUE and pain in the R shoulder. Occasional electrical shocks of pain radiating down BUE and chest.     Initial neurological examination was significant for some L thenar atrophy, weakness of ECRL/ECU, EDC, ADM, FDI. On lowers, there is diminished LLE vibratory sense at the toe, ankle, and knee compared to left. Questionable C5 sensory level. Normal tone. Reflexes 3+ with spread throughout, London's & finger flexor reflex present bilaterally, 4bts ankle clonus bilaterally. Toes mute. Trace jaw jerk. Gait narrow-based and unsteady with truncal instability. CTH which shows R temporal cranioplasty, R posterior circulation clip with bloom artifact, and moderate posterior-predominant subcortical microvascular disease.      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 CT myelogram was inconclusive due to poor contrast uptake in the cervical spine. Consider technical vs obstructive cause. S/p cisternal myelogram on 8/16 with neurosurgery not planning intervention (signed-off).    8/18 Updates:  - CK normal at 41  - Pending discharge to [facility]      #Cervical myeloradiculopathy  :: Possible structural compression  :: B12, folate, homocysteine, vitamin E, copper, MMA, CK all within normal limits  :: CT myelogram inconclusive d/t incomplete contrast uptake, reach out to IR re: imaging difficulties  :: Repeat cisternal myelogram shows no significant stenosis  - Consult neurosurgery: no intervention, signed off  - Pending dispo to [facility]      #MISC  -Continue home meds: hydrochlorothiazide daily, potassium, propranolol 10 mg QID, rosuvastatin 5 mg HS, Valtrex 500 mg daily    F: prn  E: prn  N: regular   A: PIV  DVT: lovenox   GI: N/A

## 2024-08-18 NOTE — CARE PLAN
The clinical goals for the shift include patient will ambulate at least two times in the halls this shift    Patients neuro exam unchanged overnight.  Up in halls with family twice on night shift and up in room multiple times before midnight.  Using walker appropriately.      Problem: Fall/Injury  Goal: Use assistive devices by end of the shift  Outcome: Met  Goal: Pace activities to prevent fatigue by end of the shift  Outcome: Met     Problem: Pain - Adult  Goal: Verbalizes/displays adequate comfort level or baseline comfort level  Outcome: Progressing     Problem: Safety - Adult  Goal: Free from fall injury  Outcome: Progressing

## 2024-08-18 NOTE — CARE PLAN
The patient's goals for the shift include      The clinical goals for the shift include Pt will remain free from falls during shift    Over the shift, the patient remained free from falls during the shift. Pt walking in the hallway.

## 2024-08-19 VITALS
HEIGHT: 64 IN | DIASTOLIC BLOOD PRESSURE: 72 MMHG | RESPIRATION RATE: 17 BRPM | TEMPERATURE: 97.5 F | WEIGHT: 169 LBS | HEART RATE: 60 BPM | BODY MASS INDEX: 28.85 KG/M2 | OXYGEN SATURATION: 97 % | SYSTOLIC BLOOD PRESSURE: 139 MMHG

## 2024-08-19 LAB
ALBUMIN SERPL BCP-MCNC: 3.5 G/DL (ref 3.4–5)
ANION GAP SERPL CALC-SCNC: 11 MMOL/L (ref 10–20)
BASOPHILS # BLD AUTO: 0.06 X10*3/UL (ref 0–0.1)
BASOPHILS NFR BLD AUTO: 1.1 %
BUN SERPL-MCNC: 21 MG/DL (ref 6–23)
CALCIUM SERPL-MCNC: 9.2 MG/DL (ref 8.6–10.6)
CHLORIDE SERPL-SCNC: 105 MMOL/L (ref 98–107)
CO2 SERPL-SCNC: 30 MMOL/L (ref 21–32)
CREAT SERPL-MCNC: 0.6 MG/DL (ref 0.5–1.05)
EGFRCR SERPLBLD CKD-EPI 2021: >90 ML/MIN/1.73M*2
EOSINOPHIL # BLD AUTO: 0.14 X10*3/UL (ref 0–0.4)
EOSINOPHIL NFR BLD AUTO: 2.5 %
ERYTHROCYTE [DISTWIDTH] IN BLOOD BY AUTOMATED COUNT: 12.1 % (ref 11.5–14.5)
GLUCOSE SERPL-MCNC: 130 MG/DL (ref 74–99)
HCT VFR BLD AUTO: 43.5 % (ref 36–46)
HGB BLD-MCNC: 15 G/DL (ref 12–16)
IMM GRANULOCYTES # BLD AUTO: 0.02 X10*3/UL (ref 0–0.5)
IMM GRANULOCYTES NFR BLD AUTO: 0.4 % (ref 0–0.9)
LYMPHOCYTES # BLD AUTO: 2.03 X10*3/UL (ref 0.8–3)
LYMPHOCYTES NFR BLD AUTO: 36.8 %
MAGNESIUM SERPL-MCNC: 1.96 MG/DL (ref 1.6–2.4)
MCH RBC QN AUTO: 33.9 PG (ref 26–34)
MCHC RBC AUTO-ENTMCNC: 34.5 G/DL (ref 32–36)
MCV RBC AUTO: 98 FL (ref 80–100)
MONOCYTES # BLD AUTO: 0.5 X10*3/UL (ref 0.05–0.8)
MONOCYTES NFR BLD AUTO: 9.1 %
NEUTROPHILS # BLD AUTO: 2.76 X10*3/UL (ref 1.6–5.5)
NEUTROPHILS NFR BLD AUTO: 50.1 %
NRBC BLD-RTO: 0 /100 WBCS (ref 0–0)
PHOSPHATE SERPL-MCNC: 3.3 MG/DL (ref 2.5–4.9)
PLATELET # BLD AUTO: 182 X10*3/UL (ref 150–450)
POTASSIUM SERPL-SCNC: 4.1 MMOL/L (ref 3.5–5.3)
RBC # BLD AUTO: 4.43 X10*6/UL (ref 4–5.2)
SODIUM SERPL-SCNC: 142 MMOL/L (ref 136–145)
WBC # BLD AUTO: 5.5 X10*3/UL (ref 4.4–11.3)

## 2024-08-19 PROCEDURE — 2500000002 HC RX 250 W HCPCS SELF ADMINISTERED DRUGS (ALT 637 FOR MEDICARE OP, ALT 636 FOR OP/ED)

## 2024-08-19 PROCEDURE — 85025 COMPLETE CBC W/AUTO DIFF WBC: CPT

## 2024-08-19 PROCEDURE — 2500000002 HC RX 250 W HCPCS SELF ADMINISTERED DRUGS (ALT 637 FOR MEDICARE OP, ALT 636 FOR OP/ED): Performed by: PHYSICIAN ASSISTANT

## 2024-08-19 PROCEDURE — 80069 RENAL FUNCTION PANEL: CPT

## 2024-08-19 PROCEDURE — 2500000001 HC RX 250 WO HCPCS SELF ADMINISTERED DRUGS (ALT 637 FOR MEDICARE OP): Performed by: PHYSICIAN ASSISTANT

## 2024-08-19 PROCEDURE — 1100000001 HC PRIVATE ROOM DAILY

## 2024-08-19 PROCEDURE — 2500000004 HC RX 250 GENERAL PHARMACY W/ HCPCS (ALT 636 FOR OP/ED)

## 2024-08-19 PROCEDURE — 97112 NEUROMUSCULAR REEDUCATION: CPT | Mod: GP

## 2024-08-19 PROCEDURE — 97116 GAIT TRAINING THERAPY: CPT | Mod: GP

## 2024-08-19 PROCEDURE — 83735 ASSAY OF MAGNESIUM: CPT

## 2024-08-19 PROCEDURE — 99232 SBSQ HOSP IP/OBS MODERATE 35: CPT

## 2024-08-19 PROCEDURE — 36415 COLL VENOUS BLD VENIPUNCTURE: CPT

## 2024-08-19 PROCEDURE — S4991 NICOTINE PATCH NONLEGEND: HCPCS | Performed by: PHYSICIAN ASSISTANT

## 2024-08-19 ASSESSMENT — COGNITIVE AND FUNCTIONAL STATUS - GENERAL
TOILETING: A LITTLE
WALKING IN HOSPITAL ROOM: A LITTLE
DRESSING REGULAR LOWER BODY CLOTHING: A LITTLE
MOVING TO AND FROM BED TO CHAIR: A LITTLE
STANDING UP FROM CHAIR USING ARMS: A LITTLE
MOVING TO AND FROM BED TO CHAIR: A LITTLE
MOVING FROM LYING ON BACK TO SITTING ON SIDE OF FLAT BED WITH BEDRAILS: A LOT
CLIMB 3 TO 5 STEPS WITH RAILING: A LITTLE
DRESSING REGULAR UPPER BODY CLOTHING: A LITTLE
TOILETING: A LITTLE
MOBILITY SCORE: 21
WALKING IN HOSPITAL ROOM: A LITTLE
WALKING IN HOSPITAL ROOM: A LITTLE
DRESSING REGULAR LOWER BODY CLOTHING: A LITTLE
HELP NEEDED FOR BATHING: A LITTLE
DAILY ACTIVITIY SCORE: 20
CLIMB 3 TO 5 STEPS WITH RAILING: A LITTLE
MOBILITY SCORE: 22
TURNING FROM BACK TO SIDE WHILE IN FLAT BAD: A LOT
MOBILITY SCORE: 16
DRESSING REGULAR UPPER BODY CLOTHING: A LITTLE
HELP NEEDED FOR BATHING: A LITTLE
DAILY ACTIVITIY SCORE: 20
CLIMB 3 TO 5 STEPS WITH RAILING: A LITTLE

## 2024-08-19 ASSESSMENT — PAIN - FUNCTIONAL ASSESSMENT
PAIN_FUNCTIONAL_ASSESSMENT: 0-10
PAIN_FUNCTIONAL_ASSESSMENT: 0-10

## 2024-08-19 ASSESSMENT — PAIN SCALES - GENERAL
PAINLEVEL_OUTOF10: 2
PAINLEVEL_OUTOF10: 4
PAINLEVEL_OUTOF10: 0 - NO PAIN
PAINLEVEL_OUTOF10: 0 - NO PAIN
PAINLEVEL_OUTOF10: 5 - MODERATE PAIN

## 2024-08-19 NOTE — ASSESSMENT & PLAN NOTE
This a 80yo RHF PMHx HTN, SSS (s/p pacer 2019), pAF (s/p watchman 2020), cerebral aneurysm (s/p clipping 1993), ET presenting for subacute progressive ascending weakness and instability.      Patient reported unsteady walking for about 6 weeks. Progressively worsening, but seems to have more-sharply declined in past 2 weeks. Now starting to involve BUE. Now starting to have paresthesias in the RUE and pain in the R shoulder. Occasional electrical shocks of pain radiating down BUE and chest.     Initial neurological examination was significant for some L thenar atrophy, weakness of ECRL/ECU, EDC, ADM, FDI. On lowers, there is diminished LLE vibratory sense at the toe, ankle, and knee compared to left. Questionable C5 sensory level. Normal tone. Reflexes 3+ with spread throughout, London's & finger flexor reflex present bilaterally, 4bts ankle clonus bilaterally. Toes mute. Trace jaw jerk. Gait narrow-based and unsteady with truncal instability. CTH which shows R temporal cranioplasty, R posterior circulation clip with bloom artifact, and moderate posterior-predominant subcortical microvascular disease.      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 CT myelogram was inconclusive due to poor contrast uptake in the cervical spine. Consider technical vs obstructive cause. S/p cisternal myelogram on 8/16 which showed mild to moderate stenosis without obstruction. Neurosurgery not planning intervention (signed-off). Pending dispo to acute rehab.    8/19 Updates:  - Pending discharge to [facility]      #Cervical myeloradiculopathy  :: Possible structural compression  :: B12, folate, homocysteine, vitamin E, copper, MMA, CK all within normal limits  :: CT myelogram inconclusive d/t incomplete contrast uptake, reach out to IR re: imaging difficulties  :: Repeat cisternal myelogram shows no significant stenosis  - Consult  neurosurgery: no intervention, signed off  - Pending dispo to [facility]  - Follow-up outpatient with Dr. Leslie     #MISC  -Continue home meds: hydrochlorothiazide daily, potassium, propranolol 10 mg QID, rosuvastatin 5 mg HS, Valtrex 500 mg daily    F: prn  E: prn  N: regular   A: PIV  DVT: lovenox   GI: N/A

## 2024-08-19 NOTE — PROGRESS NOTES
"Linda López is a 79 y.o. right-handed female with a history of A-fib, Anxiety and depression, Arthritis, Artificial cardiac pacemaker, Brain aneurysm s/p clipping (told by ED not MRI compatible), Essential tremor, HTN (hypertension), Kidney stones, Lumbar radiculopathy on day 2 of admission after presenting with subacute weakness, ataxia. Neurology was consulted for difficulty walking, upper extremity weakness.    Subjective   NAEON. Patient continues to report b/l UE weakness, unchanged since admission. Denies any acute worsening of her symptoms.    Objective     Physical Exam  Mental status: A+O x 4, known vocal dystonia (baseline s/p aneurysmal clipping)  Motor exam: 5/5 strength in all muscle groups  Reflexes: 2+ b/l biceps, triceps, BR, 3+patellar, achilles reflexes. No ankle clonus.  Sensory: Sensation to light touch intact in b/l UE and LE. Mild ataxia on FTN when approaching her nose only (able to reach finger normally), likely more sensory ataxia given loss of visual input.  Gait: Unsteady, wide-based but not ataxic.    Last Recorded Vitals  Blood pressure 147/73, pulse 63, temperature 36.2 °C (97.2 °F), resp. rate 18, height 1.626 m (5' 4\"), weight 76.7 kg (169 lb), SpO2 99%.  Intake/Output last 3 Shifts:  I/O last 3 completed shifts:  In: 1600 (20.9 mL/kg) [P.O.:1600]  Out: - (0 mL/kg)   Weight: 76.7 kg     Relevant Results  Scheduled medications  enoxaparin, 40 mg, subcutaneous, q24h  hydroCHLOROthiazide, 25 mg, oral, Daily  hydrOXYzine HCL, 50 mg, oral, Once  nicotine, 1 patch, transdermal, Daily  PARoxetine, 20 mg, oral, Daily  potassium chloride CR, 20 mEq, oral, TID  propranolol, 10 mg, oral, 4x daily  rosuvastatin, 5 mg, oral, Nightly  valACYclovir, 500 mg, oral, Daily      Continuous medications     PRN medications  PRN medications: acetaminophen **OR** acetaminophen  Results for orders placed or performed during the hospital encounter of 08/11/24 (from the past 24 hour(s))   CBC and Auto " Differential   Result Value Ref Range    WBC 5.9 4.4 - 11.3 x10*3/uL    nRBC 0.0 0.0 - 0.0 /100 WBCs    RBC 4.58 4.00 - 5.20 x10*6/uL    Hemoglobin 15.0 12.0 - 16.0 g/dL    Hematocrit 44.2 36.0 - 46.0 %    MCV 97 80 - 100 fL    MCH 32.8 26.0 - 34.0 pg    MCHC 33.9 32.0 - 36.0 g/dL    RDW 12.2 11.5 - 14.5 %    Platelets 191 150 - 450 x10*3/uL    Neutrophils % 52.1 40.0 - 80.0 %    Immature Granulocytes %, Automated 0.2 0.0 - 0.9 %    Lymphocytes % 37.3 13.0 - 44.0 %    Monocytes % 7.0 2.0 - 10.0 %    Eosinophils % 2.4 0.0 - 6.0 %    Basophils % 1.0 0.0 - 2.0 %    Neutrophils Absolute 3.05 1.60 - 5.50 x10*3/uL    Immature Granulocytes Absolute, Automated 0.01 0.00 - 0.50 x10*3/uL    Lymphocytes Absolute 2.18 0.80 - 3.00 x10*3/uL    Monocytes Absolute 0.41 0.05 - 0.80 x10*3/uL    Eosinophils Absolute 0.14 0.00 - 0.40 x10*3/uL    Basophils Absolute 0.06 0.00 - 0.10 x10*3/uL   Renal Function Panel   Result Value Ref Range    Glucose 175 (H) 74 - 99 mg/dL    Sodium 140 136 - 145 mmol/L    Potassium 3.8 3.5 - 5.3 mmol/L    Chloride 102 98 - 107 mmol/L    Bicarbonate 29 21 - 32 mmol/L    Anion Gap 13 10 - 20 mmol/L    Urea Nitrogen 19 6 - 23 mg/dL    Creatinine 0.63 0.50 - 1.05 mg/dL    eGFR >90 >60 mL/min/1.73m*2    Calcium 9.6 8.6 - 10.6 mg/dL    Phosphorus 3.2 2.5 - 4.9 mg/dL    Albumin 3.7 3.4 - 5.0 g/dL   Magnesium   Result Value Ref Range    Magnesium 2.00 1.60 - 2.40 mg/dL       CT cervical spine post myelogram    Result Date: 8/14/2024  Interpreted By:  Rico Marr and Hofer Lindsay STUDY: CT CERVICAL SPINE POST MYELOGRAM; CT LUMBAR SPINE POST MYELOGRAM; FL MULTIPLE REGIONS MYELOGRAM WITH LUMBAR PUNCTURE; CT THORACIC SPINE POST MYELOGRAM;  8/14/2024 4:57 pm; 8/14/2024 4:24 pm   INDICATION: Signs/Symptoms:Upper motor neuron symptoms of bilateral upper extremities. Unable to obtain MRI; Signs/Symptoms:Cervical, thoracic, lumbar for UE and LE weakness.   COMPARISON: CT cervical spine, thoracic spine, and lumbar  spine without contrast 08/12/2024   ACCESSION NUMBER(S): [id number]; [id number]; [id number]; [id number]   ORDERING CLINICIAN: LILI CARUSO   TECHNIQUE: After discussion of the risks, benefits and alternatives, standard written hospital consent was obtained. The patient was placed prone on the fluoroscopic table.  The lower back was prepped and draped in sterile fashion. One percent lidocaine was used for local anesthesia. Under fluoroscopic guidance, a 22 gauge spinal needle was advanced into the thecal sac at the L3-L4 level. 10 cc of Omnipaque 300 was administered intrathecally under intermittent fluoroscopic imaging. Fluoroscopy time was 1.2 minutes. The patient tolerated the procedure well.   Following the fluoroscopic myelogram, serial axial CT images were obtained through the cervical, thoracic, and lumbar spine, using a bone algorithm. Images were reformatted into sagittal and coronal planes.   FINDINGS: FLUOROSCOPIC MYELOGRAM:  The thecal sac is widely patent.  There is filling bilaterally of nerve root sleeves without cutoff. There is normal alignment.   CT MYELOGRAM: CERVICAL SPINE: No evidence of contrast opacification of the cervical spine thecal sac despite repeat imaging after placing patient in Trendelenburg for several minutes. Nondiagnostic CT myelogram of the cervical spine.   Alignment is unremarkable. Vertebral body heights are maintained. Intervertebral disc space narrowing at C6-C7. Degenerative changes throughout the cervical spine most significant at C6-C7. No evidence of canal stenosis from the craniocervical junction to the C4-5 level. Nondiagnostic examination of the spinal canal at the C5-6 level and below secondary to streak artifact from overlying soft tissues.   There is hypertrophy of the right facet joint of C3-4 with slight joint space widening (series 205, image 23).   THORACIC SPINE: Contrast is visualized within the thoracic spine from the T5-6 level down.  Partial opacification of the canal at the T3-4 and T4-5 levels. No contrast is seen within the thecal sac at the T1-2 and T2-3 levels.   Heterogeneous opacification of the right dorsolateral aspect of the thecal sac at the T5-6 level in the dorsal aspect of the thecal sac at the T7-8 level, which may represent blood products. No high-grade canal stenosis at any level from T5-6 through T12-L1.   The vertebral body heights of the thoracic spine are maintained. The intervertebral disc spaces are maintained. No evidence of significant neural foraminal stenosis.     LUMBAR SPINE: Heterogeneous opacification of the thecal sac within the lumbar spine, with dense cyst contrast seen at the L5-S1 level where the thecal sac was accessed.   Vertebral body heights are maintained. Intervertebral disc spaces are maintained. L1-2: No canal or foraminal stenosis.   L2-3: No canal or foraminal stenosis.   L3-4: Shallow disc bulge with facet hypertrophy results in mild narrowing the spinal canal and bilateral foramina.   L4-5: Shallow partially calcified disc bulge with facet hypertrophy results in mild narrowing of bilateral foramina and minimal narrowing of the spinal canal.   L5-S1: No canal or foraminal stenosis.     OTHER: Chest: Pacemaker in place. Status post mitral valve replacement. Emphysematous changes throughout the lungs. Abdomen/pelvis: No remarkable findings within the visualized portions of the abdomen and pelvis. Soft tissues: No remarkable soft tissue findings.       CT myelogram 1. Non-diagnostic myelogram of the cervical spine secondary to non opacification of the thecal sac despite repeat imaging after Trendelenburg positioning. Within the limitations of this non-contrast CT cervical spine, no evidence of high-grade canal stenosis from the craniocervical junction to the C4-5 level. Nondiagnostic evaluation of the spinal canal from the C5-6 level and below secondary to streak artifact from overlying soft tissues.  2. Facet hypertrophy with slight widening of the right facet joint of C3-4. All findings may be degenerative in etiology, joint widening is nonspecific and underlying infectious process can not be excluded. 3. Inconsistent contrast opacification of the thoracic thecal sac. The thoracic spine is well opacified from T3 through T12 without evidence of central spinal canal stenosis, spinal cord compression, or neural foraminal stenosis. The superior portion of the thoracic thecal sac is not well opacified from approximately T1 through T3, as well as heterogeneous filling defects within the dorsal aspect of the thecal sac at the T5-6 and T7-8 levels, possibly due to blood products. 4. Heterogeneous contrast opacification of the lumbar spine thecal sac. Within these limitations, mild multilevel degenerative change without evidence of high-grade spinal canal stenosis or neural foraminal stenosis.   Given limitations of the current myelogram, consider repeat myelogram if patient is amenable.   I, Dr. Marr, supervised and was available throughout this procedure as performed by fellow physician Dr. Ty. This study was performed and interpreted at [facility]. I agree with the procedural description and the findings as stated.   MACRO: None   Signed by: Rico Marr 8/14/2024 9:22 PM Dictation workstation:   [id number]    CT thoracic spine post myelogram    Result Date: 8/14/2024  Interpreted By:  Rico Marr and Hofer Eilf STUDY: CT CERVICAL SPINE POST MYELOGRAM; CT LUMBAR SPINE POST MYELOGRAM; FL MULTIPLE REGIONS MYELOGRAM WITH LUMBAR PUNCTURE; CT THORACIC SPINE POST MYELOGRAM;  8/14/2024 4:57 pm; 8/14/2024 4:24 pm   INDICATION: Signs/Symptoms:Upper motor neuron symptoms of bilateral upper extremities. Unable to obtain MRI; Signs/Symptoms:Cervical, thoracic, lumbar for UE and LE weakness.   COMPARISON: CT cervical spine, thoracic spine, and lumbar spine without contrast 08/12/2024    ACCESSION NUMBER(S): [id number]; [id number]; [id number]; [id number]   ORDERING CLINICIAN: LILI CARUSO   TECHNIQUE: After discussion of the risks, benefits and alternatives, standard written hospital consent was obtained. The patient was placed prone on the fluoroscopic table.  The lower back was prepped and draped in sterile fashion. One percent lidocaine was used for local anesthesia. Under fluoroscopic guidance, a 22 gauge spinal needle was advanced into the thecal sac at the L3-L4 level. 10 cc of Omnipaque 300 was administered intrathecally under intermittent fluoroscopic imaging. Fluoroscopy time was 1.2 minutes. The patient tolerated the procedure well.   Following the fluoroscopic myelogram, serial axial CT images were obtained through the cervical, thoracic, and lumbar spine, using a bone algorithm. Images were reformatted into sagittal and coronal planes.   FINDINGS: FLUOROSCOPIC MYELOGRAM:  The thecal sac is widely patent.  There is filling bilaterally of nerve root sleeves without cutoff. There is normal alignment.   CT MYELOGRAM: CERVICAL SPINE: No evidence of contrast opacification of the cervical spine thecal sac despite repeat imaging after placing patient in Trendelenburg for several minutes. Nondiagnostic CT myelogram of the cervical spine.   Alignment is unremarkable. Vertebral body heights are maintained. Intervertebral disc space narrowing at C6-C7. Degenerative changes throughout the cervical spine most significant at C6-C7. No evidence of canal stenosis from the craniocervical junction to the C4-5 level. Nondiagnostic examination of the spinal canal at the C5-6 level and below secondary to streak artifact from overlying soft tissues.   There is hypertrophy of the right facet joint of C3-4 with slight joint space widening (series 205, image 23).   THORACIC SPINE: Contrast is visualized within the thoracic spine from the T5-6 level down. Partial opacification of the canal at  the T3-4 and T4-5 levels. No contrast is seen within the thecal sac at the T1-2 and T2-3 levels.   Heterogeneous opacification of the right dorsolateral aspect of the thecal sac at the T5-6 level in the dorsal aspect of the thecal sac at the T7-8 level, which may represent blood products. No high-grade canal stenosis at any level from T5-6 through T12-L1.   The vertebral body heights of the thoracic spine are maintained. The intervertebral disc spaces are maintained. No evidence of significant neural foraminal stenosis.     LUMBAR SPINE: Heterogeneous opacification of the thecal sac within the lumbar spine, with dense cyst contrast seen at the L5-S1 level where the thecal sac was accessed.   Vertebral body heights are maintained. Intervertebral disc spaces are maintained. L1-2: No canal or foraminal stenosis.   L2-3: No canal or foraminal stenosis.   L3-4: Shallow disc bulge with facet hypertrophy results in mild narrowing the spinal canal and bilateral foramina.   L4-5: Shallow partially calcified disc bulge with facet hypertrophy results in mild narrowing of bilateral foramina and minimal narrowing of the spinal canal.   L5-S1: No canal or foraminal stenosis.     OTHER: Chest: Pacemaker in place. Status post mitral valve replacement. Emphysematous changes throughout the lungs. Abdomen/pelvis: No remarkable findings within the visualized portions of the abdomen and pelvis. Soft tissues: No remarkable soft tissue findings.       CT myelogram 1. Non-diagnostic myelogram of the cervical spine secondary to non opacification of the thecal sac despite repeat imaging after Trendelenburg positioning. Within the limitations of this non-contrast CT cervical spine, no evidence of high-grade canal stenosis from the craniocervical junction to the C4-5 level. Nondiagnostic evaluation of the spinal canal from the C5-6 level and below secondary to streak artifact from overlying soft tissues. 2. Facet hypertrophy with slight  widening of the right facet joint of C3-4. All findings may be degenerative in etiology, joint widening is nonspecific and underlying infectious process can not be excluded. 3. Inconsistent contrast opacification of the thoracic thecal sac. The thoracic spine is well opacified from T3 through T12 without evidence of central spinal canal stenosis, spinal cord compression, or neural foraminal stenosis. The superior portion of the thoracic thecal sac is not well opacified from approximately T1 through T3, as well as heterogeneous filling defects within the dorsal aspect of the thecal sac at the T5-6 and T7-8 levels, possibly due to blood products. 4. Heterogeneous contrast opacification of the lumbar spine thecal sac. Within these limitations, mild multilevel degenerative change without evidence of high-grade spinal canal stenosis or neural foraminal stenosis.   Given limitations of the current myelogram, consider repeat myelogram if patient is amenable.   I, Dr. Marr, supervised and was available throughout this procedure as performed by fellow physician Dr. Ty. This study was performed and interpreted at [facility]. I agree with the procedural description and the findings as stated.   MACRO: None   Signed by: Rico Marr 8/14/2024 9:22 PM Dictation workstation:   [id number]    CT lumbar spine post myelogram    Result Date: 8/14/2024  Interpreted By:  Rico Marr and Hofer [place] STUDY: CT CERVICAL SPINE POST MYELOGRAM; CT LUMBAR SPINE POST MYELOGRAM; FL MULTIPLE REGIONS MYELOGRAM WITH LUMBAR PUNCTURE; CT THORACIC SPINE POST MYELOGRAM;  8/14/2024 4:57 pm; 8/14/2024 4:24 pm   INDICATION: Signs/Symptoms:Upper motor neuron symptoms of bilateral upper extremities. Unable to obtain MRI; Signs/Symptoms:Cervical, thoracic, lumbar for UE and LE weakness.   COMPARISON: CT cervical spine, thoracic spine, and lumbar spine without contrast 08/12/2024   ACCESSION NUMBER(S): [id number];  [id number]; [id number]; [id number]   ORDERING CLINICIAN: LILI CARUSO   TECHNIQUE: After discussion of the risks, benefits and alternatives, standard written hospital consent was obtained. The patient was placed prone on the fluoroscopic table.  The lower back was prepped and draped in sterile fashion. One percent lidocaine was used for local anesthesia. Under fluoroscopic guidance, a 22 gauge spinal needle was advanced into the thecal sac at the L3-L4 level. 10 cc of Omnipaque 300 was administered intrathecally under intermittent fluoroscopic imaging. Fluoroscopy time was 1.2 minutes. The patient tolerated the procedure well.   Following the fluoroscopic myelogram, serial axial CT images were obtained through the cervical, thoracic, and lumbar spine, using a bone algorithm. Images were reformatted into sagittal and coronal planes.   FINDINGS: FLUOROSCOPIC MYELOGRAM:  The thecal sac is widely patent.  There is filling bilaterally of nerve root sleeves without cutoff. There is normal alignment.   CT MYELOGRAM: CERVICAL SPINE: No evidence of contrast opacification of the cervical spine thecal sac despite repeat imaging after placing patient in Trendelenburg for several minutes. Nondiagnostic CT myelogram of the cervical spine.   Alignment is unremarkable. Vertebral body heights are maintained. Intervertebral disc space narrowing at C6-C7. Degenerative changes throughout the cervical spine most significant at C6-C7. No evidence of canal stenosis from the craniocervical junction to the C4-5 level. Nondiagnostic examination of the spinal canal at the C5-6 level and below secondary to streak artifact from overlying soft tissues.   There is hypertrophy of the right facet joint of C3-4 with slight joint space widening (series 205, image 23).   THORACIC SPINE: Contrast is visualized within the thoracic spine from the T5-6 level down. Partial opacification of the canal at the T3-4 and T4-5 levels. No  contrast is seen within the thecal sac at the T1-2 and T2-3 levels.   Heterogeneous opacification of the right dorsolateral aspect of the thecal sac at the T5-6 level in the dorsal aspect of the thecal sac at the T7-8 level, which may represent blood products. No high-grade canal stenosis at any level from T5-6 through T12-L1.   The vertebral body heights of the thoracic spine are maintained. The intervertebral disc spaces are maintained. No evidence of significant neural foraminal stenosis.     LUMBAR SPINE: Heterogeneous opacification of the thecal sac within the lumbar spine, with dense cyst contrast seen at the L5-S1 level where the thecal sac was accessed.   Vertebral body heights are maintained. Intervertebral disc spaces are maintained. L1-2: No canal or foraminal stenosis.   L2-3: No canal or foraminal stenosis.   L3-4: Shallow disc bulge with facet hypertrophy results in mild narrowing the spinal canal and bilateral foramina.   L4-5: Shallow partially calcified disc bulge with facet hypertrophy results in mild narrowing of bilateral foramina and minimal narrowing of the spinal canal.   L5-S1: No canal or foraminal stenosis.     OTHER: Chest: Pacemaker in place. Status post mitral valve replacement. Emphysematous changes throughout the lungs. Abdomen/pelvis: No remarkable findings within the visualized portions of the abdomen and pelvis. Soft tissues: No remarkable soft tissue findings.       CT myelogram 1. Non-diagnostic myelogram of the cervical spine secondary to non opacification of the thecal sac despite repeat imaging after Trendelenburg positioning. Within the limitations of this non-contrast CT cervical spine, no evidence of high-grade canal stenosis from the craniocervical junction to the C4-5 level. Nondiagnostic evaluation of the spinal canal from the C5-6 level and below secondary to streak artifact from overlying soft tissues. 2. Facet hypertrophy with slight widening of the right facet joint  of C3-4. All findings may be degenerative in etiology, joint widening is nonspecific and underlying infectious process can not be excluded. 3. Inconsistent contrast opacification of the thoracic thecal sac. The thoracic spine is well opacified from T3 through T12 without evidence of central spinal canal stenosis, spinal cord compression, or neural foraminal stenosis. The superior portion of the thoracic thecal sac is not well opacified from approximately T1 through T3, as well as heterogeneous filling defects within the dorsal aspect of the thecal sac at the T5-6 and T7-8 levels, possibly due to blood products. 4. Heterogeneous contrast opacification of the lumbar spine thecal sac. Within these limitations, mild multilevel degenerative change without evidence of high-grade spinal canal stenosis or neural foraminal stenosis.   Given limitations of the current myelogram, consider repeat myelogram if patient is amenable.   I, Dr. Marr, supervised and was available throughout this procedure as performed by fellow physician Dr. Ty. This study was performed and interpreted at [facility]. I agree with the procedural description and the findings as stated.   MACRO: None   Signed by: Rico Marr 8/14/2024 9:22 PM Dictation workstation:   [id number]    FL multiple regions myelogram with lumbar puncture    Result Date: 8/14/2024  Interpreted By:  Rico Marr and Hofer Bishop STUDY: CT CERVICAL SPINE POST MYELOGRAM; CT LUMBAR SPINE POST MYELOGRAM; FL MULTIPLE REGIONS MYELOGRAM WITH LUMBAR PUNCTURE; CT THORACIC SPINE POST MYELOGRAM;  8/14/2024 4:57 pm; 8/14/2024 4:24 pm   INDICATION: Signs/Symptoms:Upper motor neuron symptoms of bilateral upper extremities. Unable to obtain MRI; Signs/Symptoms:Cervical, thoracic, lumbar for UE and LE weakness.   COMPARISON: CT cervical spine, thoracic spine, and lumbar spine without contrast 08/12/2024   ACCESSION NUMBER(S): [id number]; [id number];  [id number]; [id number]   ORDERING CLINICIAN: LILI CARUSO   TECHNIQUE: After discussion of the risks, benefits and alternatives, standard written hospital consent was obtained. The patient was placed prone on the fluoroscopic table.  The lower back was prepped and draped in sterile fashion. One percent lidocaine was used for local anesthesia. Under fluoroscopic guidance, a 22 gauge spinal needle was advanced into the thecal sac at the L3-L4 level. 10 cc of Omnipaque 300 was administered intrathecally under intermittent fluoroscopic imaging. Fluoroscopy time was 1.2 minutes. The patient tolerated the procedure well.   Following the fluoroscopic myelogram, serial axial CT images were obtained through the cervical, thoracic, and lumbar spine, using a bone algorithm. Images were reformatted into sagittal and coronal planes.   FINDINGS: FLUOROSCOPIC MYELOGRAM:  The thecal sac is widely patent.  There is filling bilaterally of nerve root sleeves without cutoff. There is normal alignment.   CT MYELOGRAM: CERVICAL SPINE: No evidence of contrast opacification of the cervical spine thecal sac despite repeat imaging after placing patient in Trendelenburg for several minutes. Nondiagnostic CT myelogram of the cervical spine.   Alignment is unremarkable. Vertebral body heights are maintained. Intervertebral disc space narrowing at C6-C7. Degenerative changes throughout the cervical spine most significant at C6-C7. No evidence of canal stenosis from the craniocervical junction to the C4-5 level. Nondiagnostic examination of the spinal canal at the C5-6 level and below secondary to streak artifact from overlying soft tissues.   There is hypertrophy of the right facet joint of C3-4 with slight joint space widening (series 205, image 23).   THORACIC SPINE: Contrast is visualized within the thoracic spine from the T5-6 level down. Partial opacification of the canal at the T3-4 and T4-5 levels. No contrast is seen  within the thecal sac at the T1-2 and T2-3 levels.   Heterogeneous opacification of the right dorsolateral aspect of the thecal sac at the T5-6 level in the dorsal aspect of the thecal sac at the T7-8 level, which may represent blood products. No high-grade canal stenosis at any level from T5-6 through T12-L1.   The vertebral body heights of the thoracic spine are maintained. The intervertebral disc spaces are maintained. No evidence of significant neural foraminal stenosis.     LUMBAR SPINE: Heterogeneous opacification of the thecal sac within the lumbar spine, with dense cyst contrast seen at the L5-S1 level where the thecal sac was accessed.   Vertebral body heights are maintained. Intervertebral disc spaces are maintained. L1-2: No canal or foraminal stenosis.   L2-3: No canal or foraminal stenosis.   L3-4: Shallow disc bulge with facet hypertrophy results in mild narrowing the spinal canal and bilateral foramina.   L4-5: Shallow partially calcified disc bulge with facet hypertrophy results in mild narrowing of bilateral foramina and minimal narrowing of the spinal canal.   L5-S1: No canal or foraminal stenosis.     OTHER: Chest: Pacemaker in place. Status post mitral valve replacement. Emphysematous changes throughout the lungs. Abdomen/pelvis: No remarkable findings within the visualized portions of the abdomen and pelvis. Soft tissues: No remarkable soft tissue findings.       CT myelogram 1. Non-diagnostic myelogram of the cervical spine secondary to non opacification of the thecal sac despite repeat imaging after Trendelenburg positioning. Within the limitations of this non-contrast CT cervical spine, no evidence of high-grade canal stenosis from the craniocervical junction to the C4-5 level. Nondiagnostic evaluation of the spinal canal from the C5-6 level and below secondary to streak artifact from overlying soft tissues. 2. Facet hypertrophy with slight widening of the right facet joint of C3-4. All  findings may be degenerative in etiology, joint widening is nonspecific and underlying infectious process can not be excluded. 3. Inconsistent contrast opacification of the thoracic thecal sac. The thoracic spine is well opacified from T3 through T12 without evidence of central spinal canal stenosis, spinal cord compression, or neural foraminal stenosis. The superior portion of the thoracic thecal sac is not well opacified from approximately T1 through T3, as well as heterogeneous filling defects within the dorsal aspect of the thecal sac at the T5-6 and T7-8 levels, possibly due to blood products. 4. Heterogeneous contrast opacification of the lumbar spine thecal sac. Within these limitations, mild multilevel degenerative change without evidence of high-grade spinal canal stenosis or neural foraminal stenosis.   Given limitations of the current myelogram, consider repeat myelogram if patient is amenable.   I, Dr. Marr, supervised and was available throughout this procedure as performed by fellow physician Dr. Ty. This study was performed and interpreted at [facility]. I agree with the procedural description and the findings as stated.   MACRO: None   Signed by: Rico Marr 8/14/2024 9:22 PM Dictation workstation:   [id number]    CT angio head w and wo IV contrast    Result Date: 8/12/2024  Interpreted By:  Beth Holguin and Beyersdorf Conner STUDY: CT ANGIO HEAD W AND WO IV CONTRAST;  8/12/2024 1:22 am   INDICATION: Signs/Symptoms:bilateral upper extremity weaknes. Hx of aneurysm.   COMPARISON: CT head 08/12/2024   ACCESSION NUMBER(S): [id number]   ORDERING CLINICIAN: LILI FONTANEZ   TECHNIQUE: Unenhanced CT images of the head were obtained. Subsequently, 150 mL of Omnipaque 350 was administered intravenously and axial images of the head were acquired.  Coronal, sagittal, and 3-D reconstructions were provided for review.   FINDINGS: Postsurgical changes of the aneurysm clip new  right basilar cistern region motion limits evaluation due to streak artifact. Dedicated head CT is been performed and reported separately.   Anterior circulation: Atherosclerotic calcifications of the right greater than left carotid siphons. Streak artifact partially limits evaluation of the right distal ICA otherwise the bilateral intracranial internal carotid arteries, bilateral carotid terminals, bilateral proximal anterior and middle cerebral arteries are normal.   Posterior circulation: Tapering with diminutive intradural left vertebral artery. Streak artifact limits evaluation of the basilar tip and right greater than left proximal PCA. The mid to distal PCAs appear grossly patent.   Adjacent rounded opacities in the region of the right posterior communicating artery noted on axial projection without corresponding abnormality identified on additional projections and favored to represent volume averaging or artifact. Otherwise bilateral intracranial vertebral arteries, vertebrobasilar junction and basilar artery are normal.       Postsurgical changes of right posterior circulation aneurysmal clipping with associated streak artifact partially limiting evaluation. Otherwise no evidence for significant stenosis or large branch vessel cutoffs of the intracranial vessels.   Opacities noted on axial imaging without corresponding abnormality on additional findings. Findings are favored to represent artifact versus true aneurysm however attention on follow-up recommended.   I personally reviewed the image(s)/study and resident interpretation. I agree with the findings as stated by resident Fredi Anderson. Data analyzed and images interpreted at [facility], [place], [place].     MACRO: None   Signed by: Beth Holguin 8/12/2024 3:47 AM Dictation workstation:   [id number]    CT abdomen pelvis w IV contrast    Result Date: 8/12/2024  Interpreted By:  Beth Holguin,  and Monica  Fredi STUDY: CT ABDOMEN PELVIS W IV CONTRAST; CT THORACIC SPINE WO IV CONTRAST; CT LUMBAR SPINE WO IV CONTRAST;  8/12/2024 2:12 am; 8/12/2024 2:11 am   INDICATION: Signs/Symptoms:N/V; Signs/Symptoms:BUE and BLE weakness; Signs/Symptoms:BUE and BLE weakness..   COMPARISON: None.   ACCESSION NUMBER(S): [id number]; [id number]; [id number]   ORDERING CLINICIAN: LILI FONTANEZ   TECHNIQUE: CT of the abdomen and pelvis was performed.  Standard contiguous axial images were obtained at 3 mm slice thickness through the abdomen and pelvis. Coronal and sagittal reconstructions at 3 mm slice thickness were performed.   150 ml of contrast omni 350 were administered intravenously without immediate complication.   FINDINGS: LOWER CHEST: Ground-glass attenuation of the lower lungs likely representing aspiration pneumonitis with a component of atelectasis. Emphysematous changes. Please see dedicated CTA chest for further evaluation.   ABDOMEN:   LIVER: The liver is normal in size measuring 15.3 cm in the craniocaudal axis. No focal hepatic lesions.   BILE DUCTS: The intrahepatic and extrahepatic ducts are not dilated.   GALLBLADDER: The gallbladder is nondistended and without evidence of radiopaque stones.   PANCREAS: The pancreas appears unremarkable without evidence of ductal dilatation or masses.   SPLEEN: The spleen is normal in size without focal lesions.   ADRENAL GLANDS: Bilateral adrenal glands appear normal.   KIDNEYS AND URETERS: The kidneys are normal in size and enhance symmetrically. Few bilateral hypodense lesions are too small to characterize, favored to represent simple cysts.  No hydroureteronephrosis. Contrast material is present within the bilateral renal collecting systems and ureters, limiting evaluation for calculi.   PELVIS:   BLADDER: Bladder is normal in appearance without wall thickening. Layering contrast material within the bladder.   REPRODUCTIVE ORGANS: The uterus is surgically absent.   BOWEL: The  stomach is unremarkable.   The small and large bowel are normal in caliber and demonstrate no wall thickening. Colonic diverticulosis without evidence of diverticulitis. The appendix is not definitely visualized. There is however no pericecal stranding or fluid. Moderate stool burden.   VESSELS: There is no aneurysmal dilatation of the abdominal aorta. The IVC appears normal. Moderate atherosclerotic calcification of the abdominal aorta and its branches.   PERITONEUM/RETROPERITONEUM/LYMPH NODES: There is no free or loculated fluid collection, no free intraperitoneal air. The retroperitoneum appears normal.  No abdominopelvic lymphadenopathy is present.   BONES AND ABDOMINAL WALL: Rightward curvature of the lumbar spine. The abdominal wall soft tissues appear normal.     CT THORACIC SPINE:   PARASPINAL SOFT TISSUES: No paravertebral fluid collection or significant edema. ALIGNMENT:  No traumatic spondylolisthesis or traumatic facet widening. VERTEBRAE:  No acute fracture. Vertebral body heights are maintained. SPINAL CANAL/INTERVERTEBRAL DISCS: No high-grade spinal canal stenosis. Moderate multilevel disc height loss, most severe at T11-12 and T12-L1. Multilevel anterior osteophytosis. The there are indeterminate peripheral calcifications present in the expected region of the posterior thecal sac dorsal to the T7, T8 and T9 vertebral bodies, likely chronic possibly related to remote infection, inflammation or hemorrhage. No definite mass effect identified.     CT LUMBAR SPINE:   PARASPINAL SOFT TISSUES: No paravertebral fluid collection or significant edema. ALIGNMENT: Dextrocurvature of the lower thoracic and lumbar spine. No traumatic spondylolisthesis or traumatic facet widening. VERTEBRAE: Bones appear mildly demineralized. No acute fracture. Vertebral body heights are maintained. SPINAL CANAL/INTERVERTEBRAL DISCS: No high-grade spinal canal stenosis. Moderate multilevel disc height loss, most significant at  T12-L1. Moderate bilateral facet joint arthropathy from L2-S1. NEURAL FORAMINA: Disc bulge at L2-L3, L3-L4 and L5-S1 with facet hypertrophy with up to moderate foraminal narrowing. Disc bulge and facet hypertrophy at L4 L spine with up to severe foraminal stenosis.       1.  No acute abnormality within the abdomen or pelvis. 2. No acute fracture of the thoracic or lumbar spine. Degenerative changes of the spine without critical canal stenosis. Foraminal narrowing in the lumbar spine as detailed. 3. Probable chronic calcifications within the posterior fecal sac at the level of the midthoracic spine as detailed. No associated canal stenosis identified..   I personally reviewed the image(s)/study and resident interpretation. I agree with the findings as stated by resident Fredi Anderson. Data analyzed and images interpreted at [place], [place].   MACRO: None   Signed by: Beth Holguin 8/12/2024 2:55 AM Dictation workstation:   [id number]    CT thoracic spine wo IV contrast    Result Date: 8/12/2024  Interpreted By:  Beth Holguin,  Colt Rai STUDY: CT ABDOMEN PELVIS W IV CONTRAST; CT THORACIC SPINE WO IV CONTRAST; CT LUMBAR SPINE WO IV CONTRAST;  8/12/2024 2:12 am; 8/12/2024 2:11 am   INDICATION: Signs/Symptoms:N/V; Signs/Symptoms:BUE and BLE weakness; Signs/Symptoms:BUE and BLE weakness..   COMPARISON: None.   ACCESSION NUMBER(S): [id number]; [id number]; [id number]   ORDERING CLINICIAN: LILI FONTANEZ   TECHNIQUE: CT of the abdomen and pelvis was performed.  Standard contiguous axial images were obtained at 3 mm slice thickness through the abdomen and pelvis. Coronal and sagittal reconstructions at 3 mm slice thickness were performed.   150 ml of contrast omni 350 were administered intravenously without immediate complication.   FINDINGS: LOWER CHEST: Ground-glass attenuation of the lower lungs likely representing aspiration pneumonitis with a  component of atelectasis. Emphysematous changes. Please see dedicated CTA chest for further evaluation.   ABDOMEN:   LIVER: The liver is normal in size measuring 15.3 cm in the craniocaudal axis. No focal hepatic lesions.   BILE DUCTS: The intrahepatic and extrahepatic ducts are not dilated.   GALLBLADDER: The gallbladder is nondistended and without evidence of radiopaque stones.   PANCREAS: The pancreas appears unremarkable without evidence of ductal dilatation or masses.   SPLEEN: The spleen is normal in size without focal lesions.   ADRENAL GLANDS: Bilateral adrenal glands appear normal.   KIDNEYS AND URETERS: The kidneys are normal in size and enhance symmetrically. Few bilateral hypodense lesions are too small to characterize, favored to represent simple cysts.  No hydroureteronephrosis. Contrast material is present within the bilateral renal collecting systems and ureters, limiting evaluation for calculi.   PELVIS:   BLADDER: Bladder is normal in appearance without wall thickening. Layering contrast material within the bladder.   REPRODUCTIVE ORGANS: The uterus is surgically absent.   BOWEL: The stomach is unremarkable.   The small and large bowel are normal in caliber and demonstrate no wall thickening. Colonic diverticulosis without evidence of diverticulitis. The appendix is not definitely visualized. There is however no pericecal stranding or fluid. Moderate stool burden.   VESSELS: There is no aneurysmal dilatation of the abdominal aorta. The IVC appears normal. Moderate atherosclerotic calcification of the abdominal aorta and its branches.   PERITONEUM/RETROPERITONEUM/LYMPH NODES: There is no free or loculated fluid collection, no free intraperitoneal air. The retroperitoneum appears normal.  No abdominopelvic lymphadenopathy is present.   BONES AND ABDOMINAL WALL: Rightward curvature of the lumbar spine. The abdominal wall soft tissues appear normal.     CT THORACIC SPINE:   PARASPINAL SOFT TISSUES: No  paravertebral fluid collection or significant edema. ALIGNMENT:  No traumatic spondylolisthesis or traumatic facet widening. VERTEBRAE:  No acute fracture. Vertebral body heights are maintained. SPINAL CANAL/INTERVERTEBRAL DISCS: No high-grade spinal canal stenosis. Moderate multilevel disc height loss, most severe at T11-12 and T12-L1. Multilevel anterior osteophytosis. The there are indeterminate peripheral calcifications present in the expected region of the posterior thecal sac dorsal to the T7, T8 and T9 vertebral bodies, likely chronic possibly related to remote infection, inflammation or hemorrhage. No definite mass effect identified.     CT LUMBAR SPINE:   PARASPINAL SOFT TISSUES: No paravertebral fluid collection or significant edema. ALIGNMENT: Dextrocurvature of the lower thoracic and lumbar spine. No traumatic spondylolisthesis or traumatic facet widening. VERTEBRAE: Bones appear mildly demineralized. No acute fracture. Vertebral body heights are maintained. SPINAL CANAL/INTERVERTEBRAL DISCS: No high-grade spinal canal stenosis. Moderate multilevel disc height loss, most significant at T12-L1. Moderate bilateral facet joint arthropathy from L2-S1. NEURAL FORAMINA: Disc bulge at L2-L3, L3-L4 and L5-S1 with facet hypertrophy with up to moderate foraminal narrowing. Disc bulge and facet hypertrophy at L4 L spine with up to severe foraminal stenosis.       1.  No acute abnormality within the abdomen or pelvis. 2. No acute fracture of the thoracic or lumbar spine. Degenerative changes of the spine without critical canal stenosis. Foraminal narrowing in the lumbar spine as detailed. 3. Probable chronic calcifications within the posterior fecal sac at the level of the midthoracic spine as detailed. No associated canal stenosis identified..   I personally reviewed the image(s)/study and resident interpretation. I agree with the findings as stated by resident Fredi Anderson. Data analyzed and images  interpreted at [facility], [place], [place].   MACRO: None   Signed by: Beth Holguin 8/12/2024 2:55 AM Dictation workstation:   [id number]    CT lumbar spine wo IV contrast    Result Date: 8/12/2024  Interpreted By:  Beth Holguin and Beyersdorf Conner STUDY: CT ABDOMEN PELVIS W IV CONTRAST; CT THORACIC SPINE WO IV CONTRAST; CT LUMBAR SPINE WO IV CONTRAST;  8/12/2024 2:12 am; 8/12/2024 2:11 am   INDICATION: Signs/Symptoms:N/V; Signs/Symptoms:BUE and BLE weakness; Signs/Symptoms:BUE and BLE weakness..   COMPARISON: None.   ACCESSION NUMBER(S): [id number]; [id number]; [id number]   ORDERING CLINICIAN: LIIL FONTANEZ   TECHNIQUE: CT of the abdomen and pelvis was performed.  Standard contiguous axial images were obtained at 3 mm slice thickness through the abdomen and pelvis. Coronal and sagittal reconstructions at 3 mm slice thickness were performed.   150 ml of contrast omni 350 were administered intravenously without immediate complication.   FINDINGS: LOWER CHEST: Ground-glass attenuation of the lower lungs likely representing aspiration pneumonitis with a component of atelectasis. Emphysematous changes. Please see dedicated CTA chest for further evaluation.   ABDOMEN:   LIVER: The liver is normal in size measuring 15.3 cm in the craniocaudal axis. No focal hepatic lesions.   BILE DUCTS: The intrahepatic and extrahepatic ducts are not dilated.   GALLBLADDER: The gallbladder is nondistended and without evidence of radiopaque stones.   PANCREAS: The pancreas appears unremarkable without evidence of ductal dilatation or masses.   SPLEEN: The spleen is normal in size without focal lesions.   ADRENAL GLANDS: Bilateral adrenal glands appear normal.   KIDNEYS AND URETERS: The kidneys are normal in size and enhance symmetrically. Few bilateral hypodense lesions are too small to characterize, favored to represent simple cysts.  No hydroureteronephrosis. Contrast material is present  within the bilateral renal collecting systems and ureters, limiting evaluation for calculi.   PELVIS:   BLADDER: Bladder is normal in appearance without wall thickening. Layering contrast material within the bladder.   REPRODUCTIVE ORGANS: The uterus is surgically absent.   BOWEL: The stomach is unremarkable.   The small and large bowel are normal in caliber and demonstrate no wall thickening. Colonic diverticulosis without evidence of diverticulitis. The appendix is not definitely visualized. There is however no pericecal stranding or fluid. Moderate stool burden.   VESSELS: There is no aneurysmal dilatation of the abdominal aorta. The IVC appears normal. Moderate atherosclerotic calcification of the abdominal aorta and its branches.   PERITONEUM/RETROPERITONEUM/LYMPH NODES: There is no free or loculated fluid collection, no free intraperitoneal air. The retroperitoneum appears normal.  No abdominopelvic lymphadenopathy is present.   BONES AND ABDOMINAL WALL: Rightward curvature of the lumbar spine. The abdominal wall soft tissues appear normal.     CT THORACIC SPINE:   PARASPINAL SOFT TISSUES: No paravertebral fluid collection or significant edema. ALIGNMENT:  No traumatic spondylolisthesis or traumatic facet widening. VERTEBRAE:  No acute fracture. Vertebral body heights are maintained. SPINAL CANAL/INTERVERTEBRAL DISCS: No high-grade spinal canal stenosis. Moderate multilevel disc height loss, most severe at T11-12 and T12-L1. Multilevel anterior osteophytosis. The there are indeterminate peripheral calcifications present in the expected region of the posterior thecal sac dorsal to the T7, T8 and T9 vertebral bodies, likely chronic possibly related to remote infection, inflammation or hemorrhage. No definite mass effect identified.     CT LUMBAR SPINE:   PARASPINAL SOFT TISSUES: No paravertebral fluid collection or significant edema. ALIGNMENT: Dextrocurvature of the lower thoracic and lumbar spine. No traumatic  spondylolisthesis or traumatic facet widening. VERTEBRAE: Bones appear mildly demineralized. No acute fracture. Vertebral body heights are maintained. SPINAL CANAL/INTERVERTEBRAL DISCS: No high-grade spinal canal stenosis. Moderate multilevel disc height loss, most significant at T12-L1. Moderate bilateral facet joint arthropathy from L2-S1. NEURAL FORAMINA: Disc bulge at L2-L3, L3-L4 and L5-S1 with facet hypertrophy with up to moderate foraminal narrowing. Disc bulge and facet hypertrophy at L4 L spine with up to severe foraminal stenosis.       1.  No acute abnormality within the abdomen or pelvis. 2. No acute fracture of the thoracic or lumbar spine. Degenerative changes of the spine without critical canal stenosis. Foraminal narrowing in the lumbar spine as detailed. 3. Probable chronic calcifications within the posterior fecal sac at the level of the midthoracic spine as detailed. No associated canal stenosis identified..   I personally reviewed the image(s)/study and resident interpretation. I agree with the findings as stated by resident Fredi Anderson. Data analyzed and images interpreted at [facility], [place], [place].   MACRO: None   Signed by: Beth Holguin 8/12/2024 2:55 AM Dictation workstation:   [id number]    CT angio chest for pulmonary embolism    Result Date: 8/12/2024  Interpreted By:  Beth Holguin,  Colt Rai STUDY: CT ANGIO CHEST FOR PULMONARY EMBOLISM;  8/12/2024 2:36 am   INDICATION: Signs/Symptoms:Weakness, fatigue shortness of breath.   COMPARISON: None   ACCESSION NUMBER(S): [id number]   ORDERING CLINICIAN: ROSSANA CRANE   TECHNIQUE: Helical data acquisition of the chest was obtained after intravenous administration of 150 cc of Omnipaque 350, as per PE protocol. Images were reformatted in coronal and sagittal planes. Axial and coronal maximum intensity projection (MIP) images were created and reviewed.   FINDINGS: POTENTIAL LIMITATIONS  OF THE STUDY: None   HEART AND VESSELS: There are no discrete filling defects within main pulmonary artery and its branches to suggest acute pulmonary embolism. Main pulmonary artery and its branches are normal in caliber.   The thoracic aorta normal in course and caliber.There is moderate atherosclerosis present, including calcified and noncalcified plaques.Although, the study is not tailored for evaluation of aorta, there is no definite evidence of acute aortic pathology. Ectasia of the main pulmonary artery measuring 3.3 cm Coronary artery calcifications noted however exam is not optimized for evaluation.   Mild cardiomegaly with enlargement of the left atrium.There are no findings to suggest right heart strain. Left atrial appendage closure device in place. Left chest pacemaker/ICD with leads terminating in the right atrium and right ventricle. There is no pericardial effusion seen.   MEDIASTINUM AND TOÑO, LOWER NECK AND AXILLA: The visualized thyroid gland is within normal limits. No evidence of thoracic lymphadenopathy by CT criteria. Patulous appearance of the esophagus with heterogenous material in the upper esophagus.   LUNGS AND AIRWAYS: There is mucous layering in the distal trachea and right and left main bronchi. Subtle mucous plugging in lower lobe bronchials. Ground-glass consolidation of the dependent portions of the lower lungs. Moderate centrilobular and apically predominant emphysematous changes.  There is mild diffuse bronchial wall thickening. Calcified granuloma noted dot No pleural effusion or pneumothorax.     UPPER ABDOMEN:   Please see dedicated CT abdomen pelvis for further evaluation.   CHEST WALL AND OSSEOUS STRUCTURES: Chest wall is within normal limits. Multilevel degenerative changes with endplate osteophytes in the visualized spine. No acute osseous pathology.There are no suspicious osseous lesions.       1. No evidence of acute pulmonary embolism to the segmental level. 2. Small  amount of mucus layering within the main bronchi and mucous plugging of the bronchials. Ground-glass opacities in the bilateral lung bases. Findings are consistent with aspiration pneumonitis and superimposed atelectasis. 3. Patulous esophagus with debris and septations noted to the upper level which could be related to esophagitis. Clinical correlation suggested. Findings also increased aspiration risk. 4. Ectatic main pulmonary artery measuring 3.3 cm. 5. Cardiomegaly with enlargement of the left atrium.   I personally reviewed the image(s)/study and resident interpretation. I agree with the findings as stated by resident Fredi Anderson. Data analyzed and images interpreted at [facility], [place], [place].   MACRO: None   Signed by: Beth Holguin 8/12/2024 2:37 AM Dictation workstation:   [id number]    CT head wo IV contrast    Result Date: 8/12/2024  Interpreted By:  Beth Holguin and Beyersdorf Conner STUDY: CT HEAD WO IV CONTRAST; CT CERVICAL SPINE WO IV CONTRAST;  8/12/2024 1:22 am   INDICATION: Signs/Symptoms:Hx of brain aneurysm. Generalized fatigue and weakness; Signs/Symptoms:BUE and BLE weakness   COMPARISON: CT head 03/28/2017   ACCESSION NUMBER(S): [id number]; [id number]   ORDERING CLINICIAN: ROSSANA FONTANEZ   TECHNIQUE: Axial noncontrast CT images of head with coronal and sagittal reconstructed images. Axial noncontrast CT images of the cervical spine with coronal and sagittal reconstructed images.   FINDINGS: CT HEAD:   Postsurgical changes of right temporal craniotomy and aneurysmal clipping near the suprasellar and right parasellar region. Associated metallic streak artifact limits evaluation of the region. Findings are similar prior imaging   BRAIN PARENCHYMA: Confluent region of hypoattenuating areas of periventricular and subcortical white matter, which is nonspecific, but favored to represent chronic small-vessel ischemic disease in a  patient of this age. Mild encephalomalacia within the bilateral right temporal lobe. No evidence of acute intraparenchymal hemorrhage or parenchymal evidence of acute large territory ischemic infarct. No mass-effect, midline shift or effacement of cerebral sulci. Gray-white matter distinction is preserved.   VENTRICLES and EXTRA-AXIAL SPACES: No acute extra-axial or intraventricular hemorrhage. Ventricles and sulci are age-concordant.   PARANASAL SINUSES/MASTOIDS: No hemorrhage or air-fluid levels within the visualized paranasal sinuses. The mastoid air cells are well-aerated.   CALVARIUM/ORBITS: No skull fracture. Bilateral lens replacements. The orbits and globes are intact to the extent visualized.   EXTRACRANIAL SOFT TISSUES: No discernible abnormality. Postsurgical changes of the lungs is   CT CERVICAL SPINE:   PREVERTEBRAL SOFT TISSUES: Within normal limits.   CRANIOCERVICAL JUNCTION: Intact.   ALIGNMENT: Loss of the normal cervical lordosis. There is grade 1 retrolisthesis of C3-C4 measuring 3.3 mm, and grade 1 retrolisthesis of C5-6 measuring 2.7 mm. Facet joint alignment is maintained. No traumatic malalignment or traumatic facet widening.   VERTEBRAE: No acute fracture. Vertebral body heights are maintained.   SPINAL CANAL/INTERVERTEBRAL DISCS: No high-grade spinal canal stenosis. Multilevel variable disc space height loss, most severe at C6-7. Prominent osteophytosis at this level along with subchondral sclerosis and cystic changes..   NEURAL FORAMINA: Mild uncovertebral hypertrophy at C4-5 with a mild bilateral neural foraminal narrowing. Moderate neural foraminal narrowing and right facet hypertrophy at C5-6 with moderate right neural foraminal narrowing. Severe uncovertebral hypertrophy at C6-7 with moderate bilateral neural foraminal narrowing.   OTHER: Dedicated chest imaging has been performed and will be reported separately..       CT HEAD: 1. Postsurgical changes of right temporal craniotomy and  aneurysmal clipping right partially degrades imaging. Otherwise no acute intracranial hemorrhage or mass effect identified. 2. Findings consistent with chronic small-vessel ischemic disease and right temporal lobe encephalomalacia.     CT CERVICAL SPINE: 1. No acute fracture or traumatic malalignment of the cervical spine. 2. Multilevel spondylotic changes of the cervical spine as detailed above, with multilevel neural foraminal narrowing.     I personally reviewed the image(s)/study and resident interpretation. I agree with the findings as stated by resident Fredi Anderson. Data analyzed and images interpreted at [facility], [place], [place].   MACRO: none   Signed by: Beth Holguin 8/12/2024 2:31 AM Dictation workstation:   [id number]    CT cervical spine wo IV contrast    Result Date: 8/12/2024  Interpreted By:  Beth Holguin and Beyersdorf Conner STUDY: CT HEAD WO IV CONTRAST; CT CERVICAL SPINE WO IV CONTRAST;  8/12/2024 1:22 am   INDICATION: Signs/Symptoms:Hx of brain aneurysm. Generalized fatigue and weakness; Signs/Symptoms:BUE and BLE weakness   COMPARISON: CT head 03/28/2017   ACCESSION NUMBER(S): [id number]; [id number]   ORDERING CLINICIAN: ROSSANA FONTANEZ   TECHNIQUE: Axial noncontrast CT images of head with coronal and sagittal reconstructed images. Axial noncontrast CT images of the cervical spine with coronal and sagittal reconstructed images.   FINDINGS: CT HEAD:   Postsurgical changes of right temporal craniotomy and aneurysmal clipping near the suprasellar and right parasellar region. Associated metallic streak artifact limits evaluation of the region. Findings are similar prior imaging   BRAIN PARENCHYMA: Confluent region of hypoattenuating areas of periventricular and subcortical white matter, which is nonspecific, but favored to represent chronic small-vessel ischemic disease in a patient of this age. Mild encephalomalacia within the  bilateral right temporal lobe. No evidence of acute intraparenchymal hemorrhage or parenchymal evidence of acute large territory ischemic infarct. No mass-effect, midline shift or effacement of cerebral sulci. Gray-white matter distinction is preserved.   VENTRICLES and EXTRA-AXIAL SPACES: No acute extra-axial or intraventricular hemorrhage. Ventricles and sulci are age-concordant.   PARANASAL SINUSES/MASTOIDS: No hemorrhage or air-fluid levels within the visualized paranasal sinuses. The mastoid air cells are well-aerated.   CALVARIUM/ORBITS: No skull fracture. Bilateral lens replacements. The orbits and globes are intact to the extent visualized.   EXTRACRANIAL SOFT TISSUES: No discernible abnormality. Postsurgical changes of the lungs is   CT CERVICAL SPINE:   PREVERTEBRAL SOFT TISSUES: Within normal limits.   CRANIOCERVICAL JUNCTION: Intact.   ALIGNMENT: Loss of the normal cervical lordosis. There is grade 1 retrolisthesis of C3-C4 measuring 3.3 mm, and grade 1 retrolisthesis of C5-6 measuring 2.7 mm. Facet joint alignment is maintained. No traumatic malalignment or traumatic facet widening.   VERTEBRAE: No acute fracture. Vertebral body heights are maintained.   SPINAL CANAL/INTERVERTEBRAL DISCS: No high-grade spinal canal stenosis. Multilevel variable disc space height loss, most severe at C6-7. Prominent osteophytosis at this level along with subchondral sclerosis and cystic changes..   NEURAL FORAMINA: Mild uncovertebral hypertrophy at C4-5 with a mild bilateral neural foraminal narrowing. Moderate neural foraminal narrowing and right facet hypertrophy at C5-6 with moderate right neural foraminal narrowing. Severe uncovertebral hypertrophy at C6-7 with moderate bilateral neural foraminal narrowing.   OTHER: Dedicated chest imaging has been performed and will be reported separately..       CT HEAD: 1. Postsurgical changes of right temporal craniotomy and aneurysmal clipping right partially degrades imaging.  Otherwise no acute intracranial hemorrhage or mass effect identified. 2. Findings consistent with chronic small-vessel ischemic disease and right temporal lobe encephalomalacia.     CT CERVICAL SPINE: 1. No acute fracture or traumatic malalignment of the cervical spine. 2. Multilevel spondylotic changes of the cervical spine as detailed above, with multilevel neural foraminal narrowing.     I personally reviewed the image(s)/study and resident interpretation. I agree with the findings as stated by resident Fredi Anderson. Data analyzed and images interpreted at [facility], [place], [place].   MACRO: none   Signed by: Beth Holguin 8/12/2024 2:31 AM Dictation workstation:   [id number]        Assessment/Plan   Assessment & Plan  Weakness    Generalized weakness  This a 80yo RHF PMHx HTN, SSS (s/p pacer 2019), pAF (s/p watchman 2020), cerebral aneurysm (s/p clipping 1993), ET presenting for subacute progressive ascending weakness and instability.      Patient reported unsteady walking for about 6 weeks. Progressively worsening, but seems to have more-sharply declined in past 2 weeks. Now starting to involve BUE. Now starting to have paresthesias in the RUE and pain in the R shoulder. Occasional electrical shocks of pain radiating down BUE and chest.     Initial neurological examination was significant for some L thenar atrophy, weakness of ECRL/ECU, EDC, ADM, FDI. On lowers, there is diminished LLE vibratory sense at the toe, ankle, and knee compared to left. Questionable C5 sensory level. Normal tone. Reflexes 3+ with spread throughout, London's & finger flexor reflex present bilaterally, 4bts ankle clonus bilaterally. Toes mute. Trace jaw jerk. Gait narrow-based and unsteady with truncal instability. CTH which shows R temporal cranioplasty, R posterior circulation clip with bloom artifact, and moderate posterior-predominant subcortical microvascular disease.      Overall  manifestation is most concerning for cervical myeloradiculopathy, of which most likely in this demographic would be structural d/t compression. CT myelogram was inconclusive due to poor contrast uptake in the cervical spine. Consider technical vs obstructive cause. S/p cisternal myelogram on 8/16 which showed mild to moderate stenosis without obstruction. Neurosurgery not planning intervention (signed-off). Pending dispo to acute rehab.    8/19 Updates:  - Pending discharge to [facility]      #Cervical myeloradiculopathy  :: Possible structural compression  :: B12, folate, homocysteine, vitamin E, copper, MMA, CK all within normal limits  :: CT myelogram inconclusive d/t incomplete contrast uptake, reach out to IR re: imaging difficulties  :: Repeat cisternal myelogram shows no significant stenosis  - Consult neurosurgery: no intervention, signed off  - Pending dispo to [facility]  - Follow-up outpatient with Dr. Leslie     #MISC  -Continue home meds: hydrochlorothiazide daily, potassium, propranolol 10 mg QID, rosuvastatin 5 mg HS, Valtrex 500 mg daily    F: prn  E: prn  N: regular   A: PIV  DVT: lovenox   GI: N/A    CODE: FULL  NOK: Daughter Racheal, [phone]     Fartun Emmanuel MD  Neurology PGY-2

## 2024-08-19 NOTE — PROGRESS NOTES
Physical Therapy    Physical Therapy Treatment    Patient Name: Linda López  MRN: [id number]  Today's Date: 8/19/2024  Time Calculation  Start Time: 1449  Stop Time: 1513  Time Calculation (min): 24 min    Assessment/Plan   PT Assessment  End of Session Communication: Bedside nurse  Assessment Comment: Pt tolerated session well.  Continues to demonstrate shuffling gait this session however shuffling improved to intermittent foot sweep with shoes donned compared to hospital  socks.  Pt demonstrates improved balance with balance tasks this session however continues to have multiple episodes of LOB requiring Max A  from PT to regain balance, no fall.  Pt continues to benefit from skilled PT and remains appropriate for high intensity PT upon discharge.  End of Session Patient Position: Up in chair, Alarm off, not on at start of session (alarm off okay per RN)  PT Plan  Inpatient/Swing Bed or Outpatient: Inpatient  PT Plan  Treatment/Interventions: Bed mobility, Gait training, Transfer training, Stair training, Balance training, Strengthening, Endurance training, Therapeutic activity, Therapeutic exercise  PT Plan: Ongoing PT  PT Frequency: 5 times per week  PT Discharge Recommendations: High intensity level of continued care  Equipment Recommended upon Discharge:  (TBD)  PT Recommended Transfer Status: Stand by assist, Assistive device (RW)  PT - OK to Discharge: Yes (meaning pt seen and dc rec made)      General Visit Information:   PT  Visit  PT Received On: 08/19/24  General  Reason for Referral: 78yo  presenting with progressive BUE >BLE weakness x 6 weeks.  Past Medical History Relevant to Rehab: HTN, SSS (s/p pacer 2019), pAF (s/p watchman 2020), cerebral aneurysm (s/p clipping 1993)  Prior to Session Communication: Bedside nurse  Patient Position Received: Bed, 3 rail up, Alarm off, not on at start of session  General Comment: Pt cleared for PT by RN.  Pt alert and agreeable to PT. Broken/stuttered  "speech throughout session. +PIV    Subjective   Precautions:  Precautions  Medical Precautions: Fall precautions    Objective   Pain:  Pain Assessment  Pain Assessment: 0-10  0-10 (Numeric) Pain Score: 0 - No pain  Pain Interventions: Ambulation/increased activity, Repositioned, Rest  Response to Interventions: pain increased to 2/10 in posterior neck with amb  Cognition:  Cognition  Overall Cognitive Status: Within Functional Limits  Coordination:  Movements are Fluid and Coordinated: Yes  Postural Control:  Postural Control  Postural Control: Within Functional Limits  Static Sitting Balance  Static Sitting-Balance Support: Bilateral upper extremity supported, Feet supported  Static Sitting-Level of Assistance: Independent  Dynamic Sitting Balance  Dynamic Sitting-Balance Support: Bilateral upper extremity supported, Feet supported  Dynamic Sitting-Level of Assistance: Independent  Dynamic Sitting-Balance:  (LE ther ex)  Static Standing Balance  Static Standing-Balance Support: Bilateral upper extremity supported (RW)  Static Standing-Level of Assistance: Close supervision  Dynamic Standing Balance  Dynamic Standing-Balance Support: Bilateral upper extremity supported (RW)  Dynamic Standing-Level of Assistance: Close supervision  Dynamic Standing-Balance: Turning  Activity Tolerance:  Activity Tolerance  Endurance: Endurance does not limit participation in activity  Treatments:  Therapeutic Exercise  Therapeutic Exercise Performed: Yes  Therapeutic Exercise Activity 1: seated Baldev xie3\" hold, and ankle DF completed to improve foot clearance during amb    Balance/Neuromuscular Re-Education  Balance/Neuromuscular Re-Education Activity Performed: Yes  Balance/Neuromuscular Re-Education Activity 1: rhomberg balance 3x30 seconds  Balance/Neuromuscular Re-Education Activity 2: rhomberg balance eyes closed 3x10 seconds with multiple episodes of LOB, no fall but Max A from PT to regain balance  Balance/Neuromuscular " Re-Education Activity 3: tandem balance 3x15 seconds each LE anterior with multiple episodes of LOB with R LE anterior only, no fall but Max A from PT to regain balance    Bed Mobility  Bed Mobility: Yes  Bed Mobility 1  Bed Mobility 1: Supine to sitting (pt rolling L grabbing bed rail before sitting up to the R)  Level of Assistance 1: Close supervision    Ambulation/Gait Training  Ambulation/Gait Training Performed: Yes  Ambulation/Gait Training 1  Surface 1: Level tile, Carpet  Device 1: Rolling walker  Assistance 1: Close supervision  Quality of Gait 1: Narrow base of support, Diminished heel strike, Inconsistent stride length, Shuffling gait  Comments/Distance (ft) 1: 415ft with hospital  socks donned  Ambulation/Gait Training 2  Surface 2: Level tile, Carpet  Device 2: Rolling walker  Assistance 2: Close supervision  Quality of Gait 2: Narrow base of support, Inconsistent stride length, Diminished heel strike, Foot sweep  Comments/Distance (ft) 2: 265ft, improved shuffling with shoes donned this trial compared to hospital socks donned  Transfers  Transfer: Yes  Transfer 1  Transfer From 1: Bed to  Transfer to 1: Stand  Technique 1: Sit to stand, Stand to sit  Transfer Device 1: Walker  Transfer Level of Assistance 1: Close supervision  Trials/Comments 1: VCs for hand placement x2 trials with pt pulling up on walker each trial despite edu for hand placement    Stairs  Stairs: Yes  Stairs  Rails 1: Right  Device 1: Railing  Assistance 1: Close supervision  Comment/Number of Steps 1: de/ascending 4 steps with R rail.  Descending with step-to gait pattern leading with R LE, ascending with step-to gait pattern leading with L LE, B UEs on 1 rail both de/ascending.  Pt unsteady with descending but improved steadiness with ascending    Outcome Measures:  [facility] Basic Mobility  Turning from your back to your side while in a flat bed without using bedrails: A lot  Moving from lying on your back to sitting on the side  of a flat bed without using bedrails: A lot  Moving to and from bed to chair (including a wheelchair): A little  Standing up from a chair using your arms (e.g. wheelchair or bedside chair): A little  To walk in hospital room: A little  Climbing 3-5 steps with railing: A little  Basic Mobility - Total Score: 16    Education Documentation  Home Exercise Program, taught by Coty Gunn V PT at 8/19/2024  3:29 PM.  Learner: Patient  Readiness: Acceptance  Method: Explanation  Response: Verbalizes Understanding    Body Mechanics, taught by Coty Gunn V PT at 8/19/2024  3:29 PM.  Learner: Patient  Readiness: Acceptance  Method: Explanation  Response: Verbalizes Understanding    Mobility Training, taught by Coty Gunn V PT at 8/19/2024  3:29 PM.  Learner: Patient  Readiness: Acceptance  Method: Explanation  Response: Verbalizes Understanding    Education Comments  No comments found.        Encounter Problems       Encounter Problems (Active)       PT Problem       Patient will complete supine to sit and sit to supine Independent  (Progressing)       Start:  08/15/24    Expected End:  08/29/24            Patient will ambulate >150' with LRAD and Modified Independent  (Progressing)       Start:  08/15/24    Expected End:  08/29/24            Patient will perform sit<>stand transfer with LRAD, and Modified Independent  (Progressing)       Start:  08/15/24    Expected End:  08/29/24            Patient will ascend/descend 2 steps with Right Rail Ascending and supervision  (Progressing)       Start:  08/15/24    Expected End:  08/29/24            Pt will score >/= to 24/28 on the Tinetti to indicate low fall risk (Progressing)       Start:  08/15/24    Expected End:  08/29/24               Pain - Adult

## 2024-08-19 NOTE — CARE PLAN
The clinical goals for the shift include patient will maintain neuro exam through shift    Patients neuro exam unchanged overnight.  Up in the room and in the halls overnight.  Worried about going to rehab and if it will help her get better.  Reassured patient its in her best interest.      Problem: Fall/Injury  Goal: Verbalize understanding of personal risk factors for fall in the hospital  Outcome: Progressing     Problem: Pain - Adult  Goal: Verbalizes/displays adequate comfort level or baseline comfort level  Outcome: Progressing     Problem: Safety - Adult  Goal: Free from fall injury  Outcome: Progressing

## 2024-08-19 NOTE — PROGRESS NOTES
Spoke to Admissions at [facility]. Referral is with their doctor and a determination has not been made yet re acceptance.  Myra Gomez RN

## 2024-08-20 PROCEDURE — 1100000001 HC PRIVATE ROOM DAILY

## 2024-08-20 PROCEDURE — 2500000005 HC RX 250 GENERAL PHARMACY W/O HCPCS

## 2024-08-20 PROCEDURE — S4991 NICOTINE PATCH NONLEGEND: HCPCS | Performed by: PHYSICIAN ASSISTANT

## 2024-08-20 PROCEDURE — 2500000004 HC RX 250 GENERAL PHARMACY W/ HCPCS (ALT 636 FOR OP/ED)

## 2024-08-20 PROCEDURE — 2500000002 HC RX 250 W HCPCS SELF ADMINISTERED DRUGS (ALT 637 FOR MEDICARE OP, ALT 636 FOR OP/ED): Performed by: PHYSICIAN ASSISTANT

## 2024-08-20 PROCEDURE — 97116 GAIT TRAINING THERAPY: CPT | Mod: GP

## 2024-08-20 PROCEDURE — 99231 SBSQ HOSP IP/OBS SF/LOW 25: CPT

## 2024-08-20 PROCEDURE — 97535 SELF CARE MNGMENT TRAINING: CPT | Mod: GO

## 2024-08-20 PROCEDURE — 97112 NEUROMUSCULAR REEDUCATION: CPT | Mod: GP

## 2024-08-20 PROCEDURE — 2500000001 HC RX 250 WO HCPCS SELF ADMINISTERED DRUGS (ALT 637 FOR MEDICARE OP): Performed by: PHYSICIAN ASSISTANT

## 2024-08-20 PROCEDURE — 2500000002 HC RX 250 W HCPCS SELF ADMINISTERED DRUGS (ALT 637 FOR MEDICARE OP, ALT 636 FOR OP/ED)

## 2024-08-20 RX ORDER — LIDOCAINE 560 MG/1
1 PATCH PERCUTANEOUS; TOPICAL; TRANSDERMAL EVERY 24 HOURS
Status: DISCONTINUED | OUTPATIENT
Start: 2024-08-20 | End: 2024-08-26 | Stop reason: HOSPADM

## 2024-08-20 ASSESSMENT — PAIN SCALES - GENERAL
PAINLEVEL_OUTOF10: 0 - NO PAIN
PAINLEVEL_OUTOF10: 4

## 2024-08-20 ASSESSMENT — COGNITIVE AND FUNCTIONAL STATUS - GENERAL
DAILY ACTIVITIY SCORE: 20
MOBILITY SCORE: 16
TOILETING: A LITTLE
WALKING IN HOSPITAL ROOM: A LITTLE
DRESSING REGULAR LOWER BODY CLOTHING: A LITTLE
HELP NEEDED FOR BATHING: A LITTLE
STANDING UP FROM CHAIR USING ARMS: A LITTLE
MOVING FROM LYING ON BACK TO SITTING ON SIDE OF FLAT BED WITH BEDRAILS: A LOT
TURNING FROM BACK TO SIDE WHILE IN FLAT BAD: A LOT
MOVING TO AND FROM BED TO CHAIR: A LITTLE
DRESSING REGULAR UPPER BODY CLOTHING: A LITTLE
CLIMB 3 TO 5 STEPS WITH RAILING: A LITTLE

## 2024-08-20 ASSESSMENT — ACTIVITIES OF DAILY LIVING (ADL): HOME_MANAGEMENT_TIME_ENTRY: 16

## 2024-08-20 ASSESSMENT — PAIN DESCRIPTION - LOCATION: LOCATION: SHOULDER

## 2024-08-20 ASSESSMENT — PAIN DESCRIPTION - ORIENTATION: ORIENTATION: RIGHT

## 2024-08-20 ASSESSMENT — PAIN - FUNCTIONAL ASSESSMENT
PAIN_FUNCTIONAL_ASSESSMENT: 0-10

## 2024-08-20 NOTE — PROGRESS NOTES
Occupational Therapy    Occupational Therapy Treatment    Name: Linda López  MRN: [id number]  : 1945  Date: 24  Room: [address] [place] [address]      Time Calculation  Start Time: 1106  Stop Time: 1122  Time Calculation (min): 16 min    Assessment:  OT Assessment: Pt will benefit from continued skilled OT at High intensity to increase independence in ADLs, functional mobility, activity tolerance, safety, and cognition.  Plan:  Treatment Interventions: ADL retraining, Functional transfer training, Endurance training, Cognitive reorientation, Patient/family training, Equipment evaluation/education, Compensatory technique education  OT Frequency: 4 times per week  OT Discharge Recommendations: High intensity level of continued care  Equipment Recommended upon Discharge:  (TBD at next level of care)  OT Recommended Transfer Status: Assist of 1  OT - OK to Discharge: Yes (upon medical clearance)    Subjective   General:  OT Last Visit  OT Received On: 24  Reason for Referral: 78yo  presenting with progressive BUE >BLE weakness x 6 weeks.  Past Medical History Relevant to Rehab: HTN, SSS (s/p pacer ), pAF (s/p watchman ), cerebral aneurysm (s/p clipping )  Prior to Session Communication: Bedside nurse  Patient Position Received: Bed, 3 rail up, Alarm off, not on at start of session  General Comment: Pt resting in bed on approach. Pleasant and agreeable to OT session.   Precautions:  Medical Precautions: Fall precautions  Cognition:  Overall Cognitive Status: Within Functional Limits  Orientation Level: Oriented X4    Pain Assessment:  Pain Assessment  Pain Assessment: 0-10  0-10 (Numeric) Pain Score: 0 - No pain     Objective   Activities of Daily Living:     Grooming  Grooming Level of Assistance: Contact guard  Grooming Where Assessed: Edge of bed, Standing sinkside  Grooming Comments: Combing hair seated in bed MI, washing hands standing at sink CGA    UE Dressing  UE Dressing Level of Assistance:  Setup  UE Dressing Where Assessed: Edge of bed  UE Dressing Comments: Donning/doffing hosptial gown    LE Dressing  LE Dressing: Yes  Adult Briefs Level of Assistance: Moderate assistance  LE Dressing Where Assessed: Toilet  LE Dressing Comments: Assist threading BLE    Toileting  Toileting Level of Assistance: Contact guard  Where Assessed: Toilet    Functional Standing Tolerance:  Functional Mobility  Functional Mobility Performed: Yes  Functional Mobility 1  Surface 1: Level tile, Carpet  Device 1: Rolling walker  Assistance 1: Contact guard  Comments 1: Mod household distance around unit w/ noted impulsivity and deficits in safety awareness w/ pt ambulating very quickly, then stopping for standing rest break. Pt not receptive to verbal cues.  Bed Mobility/Transfers:   Bed Mobility  Bed Mobility: Yes  Bed Mobility 1  Bed Mobility 1: Supine to sitting  Level of Assistance 1: Close supervision    Transfers  Transfer: Yes  Transfer 1  Transfer From 1: Bed to, Stand to  Transfer to 1: Stand, Bed  Technique 1: Sit to stand, Stand to sit  Transfer Device 1: Walker  Transfer Level of Assistance 1: Close supervision  Trials/Comments 1: VC for hand placement  Transfers 2  Transfer From 2: Stand to  Transfer to 2: Toilet  Technique 2: Sit to stand, Stand to sit  Transfer Device 2: Walker  Transfer Level of Assistance 2: Contact guard    Outcome Measures:  [facility] Daily Activity  Putting on and taking off regular lower body clothing: A little  Bathing (including washing, rinsing, drying): A little  Putting on and taking off regular upper body clothing: A little  Toileting, which includes using toilet, bedpan or urinal: A little  Taking care of personal grooming such as brushing teeth: None  Eating Meals: None  Daily Activity - Total Score: 20     Education Documentation  Body Mechanics, taught by Lori Lynn OT at 8/20/2024 12:43 PM.  Learner: Patient  Readiness: Acceptance  Method: Explanation  Response: Verbalizes  Understanding    Precautions, taught by Lori Lynn OT at 8/20/2024 12:43 PM.  Learner: Patient  Readiness: Acceptance  Method: Explanation  Response: Verbalizes Understanding    ADL Training, taught by Lori Lynn OT at 8/20/2024 12:43 PM.  Learner: Patient  Readiness: Acceptance  Method: Explanation  Response: Verbalizes Understanding    Education Comments  No comments found.        Goals:  Encounter Problems       Encounter Problems (Active)       ADLs       Patient with complete lower body dressing with mod I level of assistance donning and doffing all LE clothes  with PRN adaptive equipment while supported sitting and standing (Progressing)       Start:  08/16/24    Expected End:  09/06/24            Patient will complete toileting including hygiene clothing management/hygiene with modified independent level of assistance and grab bars. (Progressing)       Start:  08/16/24    Expected End:  09/06/24               BALANCE       Pt will maintain dynamic standing balance during ADL task with modified independent level of assistance in order to demonstrate decreased risk of falling and improved postural control. (Progressing)       Start:  08/16/24    Expected End:  09/06/24               COGNITION/SAFETY       Patient will score WFL on standardized cognitive assessment with verbal cues and within reasonable time frame (Progressing)       Start:  08/16/24    Expected End:  09/06/24               MOBILITY       Patient will perform Functional mobility max Household distances/Community Distances with modified independent level of assistance and least restrictive device in order to improve safety and functional mobility. (Progressing)       Start:  08/16/24    Expected End:  09/06/24               TRANSFERS       Patient will complete sit to stand transfer with modified independent level of assistance and least restrictive device in order to improve safety and prepare for out of bed mobility. (Progressing)        Start:  08/16/24    Expected End:  09/06/24 08/20/24 at 12:44 PM   DOMONIQUE SOUTH, OT   [phone]

## 2024-08-20 NOTE — ASSESSMENT & PLAN NOTE
This a 80yo RHF PMHx HTN, SSS (s/p pacer 2019), pAF (s/p watchman 2020), cerebral aneurysm (s/p clipping 1993), ET presenting for subacute progressive ascending weakness and instability.      Patient reported unsteady walking for about 6 weeks. Progressively worsening, but seems to have more-sharply declined in past 2 weeks. Now starting to involve BUE. Now starting to have paresthesias in the RUE and pain in the R shoulder. Occasional electrical shocks of pain radiating down BUE and chest.     Initial neurological examination was significant for some L thenar atrophy, weakness of ECRL/ECU, EDC, ADM, FDI. On lowers, there is diminished LLE vibratory sense at the toe, ankle, and knee compared to left. Questionable C5 sensory level. Normal tone. Reflexes 3+ with spread throughout, London's & finger flexor reflex present bilaterally, 4bts ankle clonus bilaterally. Toes mute. Trace jaw jerk. Gait narrow-based and unsteady with truncal instability. CTH which shows R temporal cranioplasty, R posterior circulation clip with bloom artifact, and moderate posterior-predominant subcortical microvascular disease.      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 CT myelogram was inconclusive due to poor contrast uptake in the cervical spine. Consider technical vs obstructive cause. S/p cisternal myelogram on 8/16 which showed mild to moderate stenosis without obstruction. Neurosurgery not planning intervention (signed-off). Pending dispo to acute rehab.    8/20 Updates:  - Pending discharge to [facility]  - Appointments scheduled with [facility] attendings: Dr. Ga on 9/13 and Dr. Leslie on 2/3  - Ordered lidocaine patch for neck pain      #Cervical myeloradiculopathy  :: Possible structural compression  :: B12, folate, homocysteine, vitamin E, copper, MMA, CK all within normal limits  :: CT myelogram inconclusive d/t incomplete  contrast uptake, reach out to IR re: imaging difficulties  :: Repeat cisternal myelogram shows no significant stenosis  - Consult neurosurgery: no intervention, signed off  - Pending dispo to [facility]  - Follow-up outpatient with [facility]     #MISC  -Continue home meds: hydrochlorothiazide daily, potassium, propranolol 10 mg QID, rosuvastatin 5 mg HS, Valtrex 500 mg daily    F: prn  E: prn  N: regular   A: PIV  DVT: lovenox   GI: N/A

## 2024-08-20 NOTE — CARE PLAN
The clinical goals for the shift include patient will ambulate in halls this shift    Patients neuro exam unchanged overnight.  Ambulated in halls and room this shift,      Problem: Fall/Injury  Goal: Verbalize understanding of personal risk factors for fall in the hospital  Outcome: Progressing  Goal: Verbalize understanding of risk factor reduction measures to prevent injury from fall in the home  Outcome: Progressing     Problem: Pain - Adult  Goal: Verbalizes/displays adequate comfort level or baseline comfort level  Outcome: Progressing     Problem: Safety - Adult  Goal: Free from fall injury  Outcome: Progressing

## 2024-08-20 NOTE — CARE PLAN
The patient's goals for the shift include      The clinical goals for the shift include patient will have a pain level of 8 or below by end of shift      Problem: Fall/Injury  Goal: Verbalize understanding of personal risk factors for fall in the hospital  Outcome: Met  Goal: Verbalize understanding of risk factor reduction measures to prevent injury from fall in the home  Outcome: Met     Problem: Safety - Adult  Goal: Free from fall injury  Outcome: Met

## 2024-08-20 NOTE — PROGRESS NOTES
"Linda López is a 79 y.o. right-handed female with a history of A-fib, Anxiety and depression, Arthritis, Artificial cardiac pacemaker, Brain aneurysm s/p clipping (told by ED not MRI compatible), Essential tremor, HTN (hypertension), Kidney stones, Lumbar radiculopathy on day 2 of admission after presenting with subacute weakness, ataxia. Neurology was consulted for difficulty walking, upper extremity weakness.    Subjective   NAEON. Patient continues to report bilateral UE weakness without acute worsening. She has neck tension and pain over her lower neck without radiation to her arms.    Objective     Physical Exam  Mental status: A+O x 4, known vocal dystonia (baseline s/p aneurysmal clipping)  Motor exam: 5/5 strength in all muscle groups  Reflexes: 2+ b/l biceps, triceps, BR, 3+patellar, achilles reflexes. No ankle clonus.  Sensory: Sensation to light touch intact in b/l UE and LE. Mild ataxia on FTN when approaching her nose only (able to reach finger normally), likely more sensory ataxia given loss of visual input.  Gait: Unsteady, narrow-based with lateral swaying to left more than right.     Last Recorded Vitals  Blood pressure 140/75, pulse 60, temperature 36.3 °C (97.3 °F), resp. rate 18, height 1.626 m (5' 4\"), weight 76.7 kg (169 lb), SpO2 93%.  Intake/Output last 3 Shifts:  I/O last 3 completed shifts:  In: 640 (8.3 mL/kg) [P.O.:640]  Out: - (0 mL/kg)   Weight: 76.7 kg     Relevant Results  Scheduled medications  enoxaparin, 40 mg, subcutaneous, q24h  hydroCHLOROthiazide, 25 mg, oral, Daily  hydrOXYzine HCL, 50 mg, oral, Once  nicotine, 1 patch, transdermal, Daily  PARoxetine, 20 mg, oral, Daily  potassium chloride CR, 20 mEq, oral, TID  propranolol, 10 mg, oral, 4x daily  rosuvastatin, 5 mg, oral, Nightly  valACYclovir, 500 mg, oral, Daily      Continuous medications     PRN medications  PRN medications: acetaminophen **OR** acetaminophen  Results for orders placed or performed during the " hospital encounter of 08/11/24 (from the past 24 hour(s))   CBC and Auto Differential   Result Value Ref Range    WBC 5.5 4.4 - 11.3 x10*3/uL    nRBC 0.0 0.0 - 0.0 /100 WBCs    RBC 4.43 4.00 - 5.20 x10*6/uL    Hemoglobin 15.0 12.0 - 16.0 g/dL    Hematocrit 43.5 36.0 - 46.0 %    MCV 98 80 - 100 fL    MCH 33.9 26.0 - 34.0 pg    MCHC 34.5 32.0 - 36.0 g/dL    RDW 12.1 11.5 - 14.5 %    Platelets 182 150 - 450 x10*3/uL    Neutrophils % 50.1 40.0 - 80.0 %    Immature Granulocytes %, Automated 0.4 0.0 - 0.9 %    Lymphocytes % 36.8 13.0 - 44.0 %    Monocytes % 9.1 2.0 - 10.0 %    Eosinophils % 2.5 0.0 - 6.0 %    Basophils % 1.1 0.0 - 2.0 %    Neutrophils Absolute 2.76 1.60 - 5.50 x10*3/uL    Immature Granulocytes Absolute, Automated 0.02 0.00 - 0.50 x10*3/uL    Lymphocytes Absolute 2.03 0.80 - 3.00 x10*3/uL    Monocytes Absolute 0.50 0.05 - 0.80 x10*3/uL    Eosinophils Absolute 0.14 0.00 - 0.40 x10*3/uL    Basophils Absolute 0.06 0.00 - 0.10 x10*3/uL   Renal Function Panel   Result Value Ref Range    Glucose 130 (H) 74 - 99 mg/dL    Sodium 142 136 - 145 mmol/L    Potassium 4.1 3.5 - 5.3 mmol/L    Chloride 105 98 - 107 mmol/L    Bicarbonate 30 21 - 32 mmol/L    Anion Gap 11 10 - 20 mmol/L    Urea Nitrogen 21 6 - 23 mg/dL    Creatinine 0.60 0.50 - 1.05 mg/dL    eGFR >90 >60 mL/min/1.73m*2    Calcium 9.2 8.6 - 10.6 mg/dL    Phosphorus 3.3 2.5 - 4.9 mg/dL    Albumin 3.5 3.4 - 5.0 g/dL   Magnesium   Result Value Ref Range    Magnesium 1.96 1.60 - 2.40 mg/dL       CT cervical spine post myelogram    Result Date: 8/14/2024  Interpreted By:  Rico Marr and Hofer Lindsay STUDY: CT CERVICAL SPINE POST MYELOGRAM; CT LUMBAR SPINE POST MYELOGRAM; FL MULTIPLE REGIONS MYELOGRAM WITH LUMBAR PUNCTURE; CT THORACIC SPINE POST MYELOGRAM;  8/14/2024 4:57 pm; 8/14/2024 4:24 pm   INDICATION: Signs/Symptoms:Upper motor neuron symptoms of bilateral upper extremities. Unable to obtain MRI; Signs/Symptoms:Cervical, thoracic, lumbar for UE and LE  weakness.   COMPARISON: CT cervical spine, thoracic spine, and lumbar spine without contrast 08/12/2024   ACCESSION NUMBER(S): [id number]; [id number]; [id number]; [id number]   ORDERING CLINICIAN: LILI CARUSO   TECHNIQUE: After discussion of the risks, benefits and alternatives, standard written hospital consent was obtained. The patient was placed prone on the fluoroscopic table.  The lower back was prepped and draped in sterile fashion. One percent lidocaine was used for local anesthesia. Under fluoroscopic guidance, a 22 gauge spinal needle was advanced into the thecal sac at the L3-L4 level. 10 cc of Omnipaque 300 was administered intrathecally under intermittent fluoroscopic imaging. Fluoroscopy time was 1.2 minutes. The patient tolerated the procedure well.   Following the fluoroscopic myelogram, serial axial CT images were obtained through the cervical, thoracic, and lumbar spine, using a bone algorithm. Images were reformatted into sagittal and coronal planes.   FINDINGS: FLUOROSCOPIC MYELOGRAM:  The thecal sac is widely patent.  There is filling bilaterally of nerve root sleeves without cutoff. There is normal alignment.   CT MYELOGRAM: CERVICAL SPINE: No evidence of contrast opacification of the cervical spine thecal sac despite repeat imaging after placing patient in Trendelenburg for several minutes. Nondiagnostic CT myelogram of the cervical spine.   Alignment is unremarkable. Vertebral body heights are maintained. Intervertebral disc space narrowing at C6-C7. Degenerative changes throughout the cervical spine most significant at C6-C7. No evidence of canal stenosis from the craniocervical junction to the C4-5 level. Nondiagnostic examination of the spinal canal at the C5-6 level and below secondary to streak artifact from overlying soft tissues.   There is hypertrophy of the right facet joint of C3-4 with slight joint space widening (series 205, image 23).   THORACIC SPINE: Contrast  is visualized within the thoracic spine from the T5-6 level down. Partial opacification of the canal at the T3-4 and T4-5 levels. No contrast is seen within the thecal sac at the T1-2 and T2-3 levels.   Heterogeneous opacification of the right dorsolateral aspect of the thecal sac at the T5-6 level in the dorsal aspect of the thecal sac at the T7-8 level, which may represent blood products. No high-grade canal stenosis at any level from T5-6 through T12-L1.   The vertebral body heights of the thoracic spine are maintained. The intervertebral disc spaces are maintained. No evidence of significant neural foraminal stenosis.     LUMBAR SPINE: Heterogeneous opacification of the thecal sac within the lumbar spine, with dense cyst contrast seen at the L5-S1 level where the thecal sac was accessed.   Vertebral body heights are maintained. Intervertebral disc spaces are maintained. L1-2: No canal or foraminal stenosis.   L2-3: No canal or foraminal stenosis.   L3-4: Shallow disc bulge with facet hypertrophy results in mild narrowing the spinal canal and bilateral foramina.   L4-5: Shallow partially calcified disc bulge with facet hypertrophy results in mild narrowing of bilateral foramina and minimal narrowing of the spinal canal.   L5-S1: No canal or foraminal stenosis.     OTHER: Chest: Pacemaker in place. Status post mitral valve replacement. Emphysematous changes throughout the lungs. Abdomen/pelvis: No remarkable findings within the visualized portions of the abdomen and pelvis. Soft tissues: No remarkable soft tissue findings.       CT myelogram 1. Non-diagnostic myelogram of the cervical spine secondary to non opacification of the thecal sac despite repeat imaging after Trendelenburg positioning. Within the limitations of this non-contrast CT cervical spine, no evidence of high-grade canal stenosis from the craniocervical junction to the C4-5 level. Nondiagnostic evaluation of the spinal canal from the C5-6 level and  below secondary to streak artifact from overlying soft tissues. 2. Facet hypertrophy with slight widening of the right facet joint of C3-4. All findings may be degenerative in etiology, joint widening is nonspecific and underlying infectious process can not be excluded. 3. Inconsistent contrast opacification of the thoracic thecal sac. The thoracic spine is well opacified from T3 through T12 without evidence of central spinal canal stenosis, spinal cord compression, or neural foraminal stenosis. The superior portion of the thoracic thecal sac is not well opacified from approximately T1 through T3, as well as heterogeneous filling defects within the dorsal aspect of the thecal sac at the T5-6 and T7-8 levels, possibly due to blood products. 4. Heterogeneous contrast opacification of the lumbar spine thecal sac. Within these limitations, mild multilevel degenerative change without evidence of high-grade spinal canal stenosis or neural foraminal stenosis.   Given limitations of the current myelogram, consider repeat myelogram if patient is amenable.   I, Dr. Marr, supervised and was available throughout this procedure as performed by fellow physician Dr. Ty. This study was performed and interpreted at [facility]. I agree with the procedural description and the findings as stated.   MACRO: None   Signed by: Rico Marr 8/14/2024 9:22 PM Dictation workstation:   [id number]    CT thoracic spine post myelogram    Result Date: 8/14/2024  Interpreted By:  Rico Marr and Hofer [place] STUDY: CT CERVICAL SPINE POST MYELOGRAM; CT LUMBAR SPINE POST MYELOGRAM; FL MULTIPLE REGIONS MYELOGRAM WITH LUMBAR PUNCTURE; CT THORACIC SPINE POST MYELOGRAM;  8/14/2024 4:57 pm; 8/14/2024 4:24 pm   INDICATION: Signs/Symptoms:Upper motor neuron symptoms of bilateral upper extremities. Unable to obtain MRI; Signs/Symptoms:Cervical, thoracic, lumbar for UE and LE weakness.   COMPARISON: CT cervical spine,  thoracic spine, and lumbar spine without contrast 08/12/2024   ACCESSION NUMBER(S): [id number]; [id number]; [id number]; [id number]   ORDERING CLINICIAN: LILI CARUSO   TECHNIQUE: After discussion of the risks, benefits and alternatives, standard written hospital consent was obtained. The patient was placed prone on the fluoroscopic table.  The lower back was prepped and draped in sterile fashion. One percent lidocaine was used for local anesthesia. Under fluoroscopic guidance, a 22 gauge spinal needle was advanced into the thecal sac at the L3-L4 level. 10 cc of Omnipaque 300 was administered intrathecally under intermittent fluoroscopic imaging. Fluoroscopy time was 1.2 minutes. The patient tolerated the procedure well.   Following the fluoroscopic myelogram, serial axial CT images were obtained through the cervical, thoracic, and lumbar spine, using a bone algorithm. Images were reformatted into sagittal and coronal planes.   FINDINGS: FLUOROSCOPIC MYELOGRAM:  The thecal sac is widely patent.  There is filling bilaterally of nerve root sleeves without cutoff. There is normal alignment.   CT MYELOGRAM: CERVICAL SPINE: No evidence of contrast opacification of the cervical spine thecal sac despite repeat imaging after placing patient in Trendelenburg for several minutes. Nondiagnostic CT myelogram of the cervical spine.   Alignment is unremarkable. Vertebral body heights are maintained. Intervertebral disc space narrowing at C6-C7. Degenerative changes throughout the cervical spine most significant at C6-C7. No evidence of canal stenosis from the craniocervical junction to the C4-5 level. Nondiagnostic examination of the spinal canal at the C5-6 level and below secondary to streak artifact from overlying soft tissues.   There is hypertrophy of the right facet joint of C3-4 with slight joint space widening (series 205, image 23).   THORACIC SPINE: Contrast is visualized within the thoracic spine  from the T5-6 level down. Partial opacification of the canal at the T3-4 and T4-5 levels. No contrast is seen within the thecal sac at the T1-2 and T2-3 levels.   Heterogeneous opacification of the right dorsolateral aspect of the thecal sac at the T5-6 level in the dorsal aspect of the thecal sac at the T7-8 level, which may represent blood products. No high-grade canal stenosis at any level from T5-6 through T12-L1.   The vertebral body heights of the thoracic spine are maintained. The intervertebral disc spaces are maintained. No evidence of significant neural foraminal stenosis.     LUMBAR SPINE: Heterogeneous opacification of the thecal sac within the lumbar spine, with dense cyst contrast seen at the L5-S1 level where the thecal sac was accessed.   Vertebral body heights are maintained. Intervertebral disc spaces are maintained. L1-2: No canal or foraminal stenosis.   L2-3: No canal or foraminal stenosis.   L3-4: Shallow disc bulge with facet hypertrophy results in mild narrowing the spinal canal and bilateral foramina.   L4-5: Shallow partially calcified disc bulge with facet hypertrophy results in mild narrowing of bilateral foramina and minimal narrowing of the spinal canal.   L5-S1: No canal or foraminal stenosis.     OTHER: Chest: Pacemaker in place. Status post mitral valve replacement. Emphysematous changes throughout the lungs. Abdomen/pelvis: No remarkable findings within the visualized portions of the abdomen and pelvis. Soft tissues: No remarkable soft tissue findings.       CT myelogram 1. Non-diagnostic myelogram of the cervical spine secondary to non opacification of the thecal sac despite repeat imaging after Trendelenburg positioning. Within the limitations of this non-contrast CT cervical spine, no evidence of high-grade canal stenosis from the craniocervical junction to the C4-5 level. Nondiagnostic evaluation of the spinal canal from the C5-6 level and below secondary to streak artifact from  overlying soft tissues. 2. Facet hypertrophy with slight widening of the right facet joint of C3-4. All findings may be degenerative in etiology, joint widening is nonspecific and underlying infectious process can not be excluded. 3. Inconsistent contrast opacification of the thoracic thecal sac. The thoracic spine is well opacified from T3 through T12 without evidence of central spinal canal stenosis, spinal cord compression, or neural foraminal stenosis. The superior portion of the thoracic thecal sac is not well opacified from approximately T1 through T3, as well as heterogeneous filling defects within the dorsal aspect of the thecal sac at the T5-6 and T7-8 levels, possibly due to blood products. 4. Heterogeneous contrast opacification of the lumbar spine thecal sac. Within these limitations, mild multilevel degenerative change without evidence of high-grade spinal canal stenosis or neural foraminal stenosis.   Given limitations of the current myelogram, consider repeat myelogram if patient is amenable.   I, Dr. Marr, supervised and was available throughout this procedure as performed by fellow physician Dr. Ty. This study was performed and interpreted at [facility]. I agree with the procedural description and the findings as stated.   MACRO: None   Signed by: iRco Marr 8/14/2024 9:22 PM Dictation workstation:   [id number]    CT lumbar spine post myelogram    Result Date: 8/14/2024  Interpreted By:  Rico Marr and Hofer Manson STUDY: CT CERVICAL SPINE POST MYELOGRAM; CT LUMBAR SPINE POST MYELOGRAM; FL MULTIPLE REGIONS MYELOGRAM WITH LUMBAR PUNCTURE; CT THORACIC SPINE POST MYELOGRAM;  8/14/2024 4:57 pm; 8/14/2024 4:24 pm   INDICATION: Signs/Symptoms:Upper motor neuron symptoms of bilateral upper extremities. Unable to obtain MRI; Signs/Symptoms:Cervical, thoracic, lumbar for UE and LE weakness.   COMPARISON: CT cervical spine, thoracic spine, and lumbar spine without  contrast 08/12/2024   ACCESSION NUMBER(S): [id number]; [id number]; [id number]; [id number]   ORDERING CLINICIAN: LILI CARUSO   TECHNIQUE: After discussion of the risks, benefits and alternatives, standard written hospital consent was obtained. The patient was placed prone on the fluoroscopic table.  The lower back was prepped and draped in sterile fashion. One percent lidocaine was used for local anesthesia. Under fluoroscopic guidance, a 22 gauge spinal needle was advanced into the thecal sac at the L3-L4 level. 10 cc of Omnipaque 300 was administered intrathecally under intermittent fluoroscopic imaging. Fluoroscopy time was 1.2 minutes. The patient tolerated the procedure well.   Following the fluoroscopic myelogram, serial axial CT images were obtained through the cervical, thoracic, and lumbar spine, using a bone algorithm. Images were reformatted into sagittal and coronal planes.   FINDINGS: FLUOROSCOPIC MYELOGRAM:  The thecal sac is widely patent.  There is filling bilaterally of nerve root sleeves without cutoff. There is normal alignment.   CT MYELOGRAM: CERVICAL SPINE: No evidence of contrast opacification of the cervical spine thecal sac despite repeat imaging after placing patient in Trendelenburg for several minutes. Nondiagnostic CT myelogram of the cervical spine.   Alignment is unremarkable. Vertebral body heights are maintained. Intervertebral disc space narrowing at C6-C7. Degenerative changes throughout the cervical spine most significant at C6-C7. No evidence of canal stenosis from the craniocervical junction to the C4-5 level. Nondiagnostic examination of the spinal canal at the C5-6 level and below secondary to streak artifact from overlying soft tissues.   There is hypertrophy of the right facet joint of C3-4 with slight joint space widening (series 205, image 23).   THORACIC SPINE: Contrast is visualized within the thoracic spine from the T5-6 level down. Partial  opacification of the canal at the T3-4 and T4-5 levels. No contrast is seen within the thecal sac at the T1-2 and T2-3 levels.   Heterogeneous opacification of the right dorsolateral aspect of the thecal sac at the T5-6 level in the dorsal aspect of the thecal sac at the T7-8 level, which may represent blood products. No high-grade canal stenosis at any level from T5-6 through T12-L1.   The vertebral body heights of the thoracic spine are maintained. The intervertebral disc spaces are maintained. No evidence of significant neural foraminal stenosis.     LUMBAR SPINE: Heterogeneous opacification of the thecal sac within the lumbar spine, with dense cyst contrast seen at the L5-S1 level where the thecal sac was accessed.   Vertebral body heights are maintained. Intervertebral disc spaces are maintained. L1-2: No canal or foraminal stenosis.   L2-3: No canal or foraminal stenosis.   L3-4: Shallow disc bulge with facet hypertrophy results in mild narrowing the spinal canal and bilateral foramina.   L4-5: Shallow partially calcified disc bulge with facet hypertrophy results in mild narrowing of bilateral foramina and minimal narrowing of the spinal canal.   L5-S1: No canal or foraminal stenosis.     OTHER: Chest: Pacemaker in place. Status post mitral valve replacement. Emphysematous changes throughout the lungs. Abdomen/pelvis: No remarkable findings within the visualized portions of the abdomen and pelvis. Soft tissues: No remarkable soft tissue findings.       CT myelogram 1. Non-diagnostic myelogram of the cervical spine secondary to non opacification of the thecal sac despite repeat imaging after Trendelenburg positioning. Within the limitations of this non-contrast CT cervical spine, no evidence of high-grade canal stenosis from the craniocervical junction to the C4-5 level. Nondiagnostic evaluation of the spinal canal from the C5-6 level and below secondary to streak artifact from overlying soft tissues. 2. Facet  hypertrophy with slight widening of the right facet joint of C3-4. All findings may be degenerative in etiology, joint widening is nonspecific and underlying infectious process can not be excluded. 3. Inconsistent contrast opacification of the thoracic thecal sac. The thoracic spine is well opacified from T3 through T12 without evidence of central spinal canal stenosis, spinal cord compression, or neural foraminal stenosis. The superior portion of the thoracic thecal sac is not well opacified from approximately T1 through T3, as well as heterogeneous filling defects within the dorsal aspect of the thecal sac at the T5-6 and T7-8 levels, possibly due to blood products. 4. Heterogeneous contrast opacification of the lumbar spine thecal sac. Within these limitations, mild multilevel degenerative change without evidence of high-grade spinal canal stenosis or neural foraminal stenosis.   Given limitations of the current myelogram, consider repeat myelogram if patient is amenable.   I, Dr. Marr, supervised and was available throughout this procedure as performed by fellow physician Dr. Ty. This study was performed and interpreted at [facility]. I agree with the procedural description and the findings as stated.   MACRO: None   Signed by: Rico Marr 8/14/2024 9:22 PM Dictation workstation:   [id number]    FL multiple regions myelogram with lumbar puncture    Result Date: 8/14/2024  Interpreted By:  Rico Marr and Hofer [place] STUDY: CT CERVICAL SPINE POST MYELOGRAM; CT LUMBAR SPINE POST MYELOGRAM; FL MULTIPLE REGIONS MYELOGRAM WITH LUMBAR PUNCTURE; CT THORACIC SPINE POST MYELOGRAM;  8/14/2024 4:57 pm; 8/14/2024 4:24 pm   INDICATION: Signs/Symptoms:Upper motor neuron symptoms of bilateral upper extremities. Unable to obtain MRI; Signs/Symptoms:Cervical, thoracic, lumbar for UE and LE weakness.   COMPARISON: CT cervical spine, thoracic spine, and lumbar spine without contrast  08/12/2024   ACCESSION NUMBER(S): [id number]; [id number]; [id number]; [id number]   ORDERING CLINICIAN: LILI CARUSO   TECHNIQUE: After discussion of the risks, benefits and alternatives, standard written hospital consent was obtained. The patient was placed prone on the fluoroscopic table.  The lower back was prepped and draped in sterile fashion. One percent lidocaine was used for local anesthesia. Under fluoroscopic guidance, a 22 gauge spinal needle was advanced into the thecal sac at the L3-L4 level. 10 cc of Omnipaque 300 was administered intrathecally under intermittent fluoroscopic imaging. Fluoroscopy time was 1.2 minutes. The patient tolerated the procedure well.   Following the fluoroscopic myelogram, serial axial CT images were obtained through the cervical, thoracic, and lumbar spine, using a bone algorithm. Images were reformatted into sagittal and coronal planes.   FINDINGS: FLUOROSCOPIC MYELOGRAM:  The thecal sac is widely patent.  There is filling bilaterally of nerve root sleeves without cutoff. There is normal alignment.   CT MYELOGRAM: CERVICAL SPINE: No evidence of contrast opacification of the cervical spine thecal sac despite repeat imaging after placing patient in Trendelenburg for several minutes. Nondiagnostic CT myelogram of the cervical spine.   Alignment is unremarkable. Vertebral body heights are maintained. Intervertebral disc space narrowing at C6-C7. Degenerative changes throughout the cervical spine most significant at C6-C7. No evidence of canal stenosis from the craniocervical junction to the C4-5 level. Nondiagnostic examination of the spinal canal at the C5-6 level and below secondary to streak artifact from overlying soft tissues.   There is hypertrophy of the right facet joint of C3-4 with slight joint space widening (series 205, image 23).   THORACIC SPINE: Contrast is visualized within the thoracic spine from the T5-6 level down. Partial opacification of  the canal at the T3-4 and T4-5 levels. No contrast is seen within the thecal sac at the T1-2 and T2-3 levels.   Heterogeneous opacification of the right dorsolateral aspect of the thecal sac at the T5-6 level in the dorsal aspect of the thecal sac at the T7-8 level, which may represent blood products. No high-grade canal stenosis at any level from T5-6 through T12-L1.   The vertebral body heights of the thoracic spine are maintained. The intervertebral disc spaces are maintained. No evidence of significant neural foraminal stenosis.     LUMBAR SPINE: Heterogeneous opacification of the thecal sac within the lumbar spine, with dense cyst contrast seen at the L5-S1 level where the thecal sac was accessed.   Vertebral body heights are maintained. Intervertebral disc spaces are maintained. L1-2: No canal or foraminal stenosis.   L2-3: No canal or foraminal stenosis.   L3-4: Shallow disc bulge with facet hypertrophy results in mild narrowing the spinal canal and bilateral foramina.   L4-5: Shallow partially calcified disc bulge with facet hypertrophy results in mild narrowing of bilateral foramina and minimal narrowing of the spinal canal.   L5-S1: No canal or foraminal stenosis.     OTHER: Chest: Pacemaker in place. Status post mitral valve replacement. Emphysematous changes throughout the lungs. Abdomen/pelvis: No remarkable findings within the visualized portions of the abdomen and pelvis. Soft tissues: No remarkable soft tissue findings.       CT myelogram 1. Non-diagnostic myelogram of the cervical spine secondary to non opacification of the thecal sac despite repeat imaging after Trendelenburg positioning. Within the limitations of this non-contrast CT cervical spine, no evidence of high-grade canal stenosis from the craniocervical junction to the C4-5 level. Nondiagnostic evaluation of the spinal canal from the C5-6 level and below secondary to streak artifact from overlying soft tissues. 2. Facet hypertrophy with  slight widening of the right facet joint of C3-4. All findings may be degenerative in etiology, joint widening is nonspecific and underlying infectious process can not be excluded. 3. Inconsistent contrast opacification of the thoracic thecal sac. The thoracic spine is well opacified from T3 through T12 without evidence of central spinal canal stenosis, spinal cord compression, or neural foraminal stenosis. The superior portion of the thoracic thecal sac is not well opacified from approximately T1 through T3, as well as heterogeneous filling defects within the dorsal aspect of the thecal sac at the T5-6 and T7-8 levels, possibly due to blood products. 4. Heterogeneous contrast opacification of the lumbar spine thecal sac. Within these limitations, mild multilevel degenerative change without evidence of high-grade spinal canal stenosis or neural foraminal stenosis.   Given limitations of the current myelogram, consider repeat myelogram if patient is amenable.   I, Dr. Marr, supervised and was available throughout this procedure as performed by fellow physician Dr. Ty. This study was performed and interpreted at [facility]. I agree with the procedural description and the findings as stated.   MACRO: None   Signed by: Rico Marr 8/14/2024 9:22 PM Dictation workstation:   [id number]    CT angio head w and wo IV contrast    Result Date: 8/12/2024  Interpreted By:  Beth Holguin and Beyersdorf Conner STUDY: CT ANGIO HEAD W AND WO IV CONTRAST;  8/12/2024 1:22 am   INDICATION: Signs/Symptoms:bilateral upper extremity weaknes. Hx of aneurysm.   COMPARISON: CT head 08/12/2024   ACCESSION NUMBER(S): [id number]   ORDERING CLINICIAN: LILI FONTANEZ   TECHNIQUE: Unenhanced CT images of the head were obtained. Subsequently, 150 mL of Omnipaque 350 was administered intravenously and axial images of the head were acquired.  Coronal, sagittal, and 3-D reconstructions were provided for review.    FINDINGS: Postsurgical changes of the aneurysm clip new right basilar cistern region motion limits evaluation due to streak artifact. Dedicated head CT is been performed and reported separately.   Anterior circulation: Atherosclerotic calcifications of the right greater than left carotid siphons. Streak artifact partially limits evaluation of the right distal ICA otherwise the bilateral intracranial internal carotid arteries, bilateral carotid terminals, bilateral proximal anterior and middle cerebral arteries are normal.   Posterior circulation: Tapering with diminutive intradural left vertebral artery. Streak artifact limits evaluation of the basilar tip and right greater than left proximal PCA. The mid to distal PCAs appear grossly patent.   Adjacent rounded opacities in the region of the right posterior communicating artery noted on axial projection without corresponding abnormality identified on additional projections and favored to represent volume averaging or artifact. Otherwise bilateral intracranial vertebral arteries, vertebrobasilar junction and basilar artery are normal.       Postsurgical changes of right posterior circulation aneurysmal clipping with associated streak artifact partially limiting evaluation. Otherwise no evidence for significant stenosis or large branch vessel cutoffs of the intracranial vessels.   Opacities noted on axial imaging without corresponding abnormality on additional findings. Findings are favored to represent artifact versus true aneurysm however attention on follow-up recommended.   I personally reviewed the image(s)/study and resident interpretation. I agree with the findings as stated by resident Fredi Anderson. Data analyzed and images interpreted at [facility], [place], [place].     MACRO: None   Signed by: Beth Holguin 8/12/2024 3:47 AM Dictation workstation:   [id number]    CT abdomen pelvis w IV contrast    Result Date:  8/12/2024  Interpreted By:  Beth Holguin and Beyersdorf Conner STUDY: CT ABDOMEN PELVIS W IV CONTRAST; CT THORACIC SPINE WO IV CONTRAST; CT LUMBAR SPINE WO IV CONTRAST;  8/12/2024 2:12 am; 8/12/2024 2:11 am   INDICATION: Signs/Symptoms:N/V; Signs/Symptoms:BUE and BLE weakness; Signs/Symptoms:BUE and BLE weakness..   COMPARISON: None.   ACCESSION NUMBER(S): [id number]; [id number]; [id number]   ORDERING CLINICIAN: LILI FONTANEZ   TECHNIQUE: CT of the abdomen and pelvis was performed.  Standard contiguous axial images were obtained at 3 mm slice thickness through the abdomen and pelvis. Coronal and sagittal reconstructions at 3 mm slice thickness were performed.   150 ml of contrast omni 350 were administered intravenously without immediate complication.   FINDINGS: LOWER CHEST: Ground-glass attenuation of the lower lungs likely representing aspiration pneumonitis with a component of atelectasis. Emphysematous changes. Please see dedicated CTA chest for further evaluation.   ABDOMEN:   LIVER: The liver is normal in size measuring 15.3 cm in the craniocaudal axis. No focal hepatic lesions.   BILE DUCTS: The intrahepatic and extrahepatic ducts are not dilated.   GALLBLADDER: The gallbladder is nondistended and without evidence of radiopaque stones.   PANCREAS: The pancreas appears unremarkable without evidence of ductal dilatation or masses.   SPLEEN: The spleen is normal in size without focal lesions.   ADRENAL GLANDS: Bilateral adrenal glands appear normal.   KIDNEYS AND URETERS: The kidneys are normal in size and enhance symmetrically. Few bilateral hypodense lesions are too small to characterize, favored to represent simple cysts.  No hydroureteronephrosis. Contrast material is present within the bilateral renal collecting systems and ureters, limiting evaluation for calculi.   PELVIS:   BLADDER: Bladder is normal in appearance without wall thickening. Layering contrast material within the bladder.    REPRODUCTIVE ORGANS: The uterus is surgically absent.   BOWEL: The stomach is unremarkable.   The small and large bowel are normal in caliber and demonstrate no wall thickening. Colonic diverticulosis without evidence of diverticulitis. The appendix is not definitely visualized. There is however no pericecal stranding or fluid. Moderate stool burden.   VESSELS: There is no aneurysmal dilatation of the abdominal aorta. The IVC appears normal. Moderate atherosclerotic calcification of the abdominal aorta and its branches.   PERITONEUM/RETROPERITONEUM/LYMPH NODES: There is no free or loculated fluid collection, no free intraperitoneal air. The retroperitoneum appears normal.  No abdominopelvic lymphadenopathy is present.   BONES AND ABDOMINAL WALL: Rightward curvature of the lumbar spine. The abdominal wall soft tissues appear normal.     CT THORACIC SPINE:   PARASPINAL SOFT TISSUES: No paravertebral fluid collection or significant edema. ALIGNMENT:  No traumatic spondylolisthesis or traumatic facet widening. VERTEBRAE:  No acute fracture. Vertebral body heights are maintained. SPINAL CANAL/INTERVERTEBRAL DISCS: No high-grade spinal canal stenosis. Moderate multilevel disc height loss, most severe at T11-12 and T12-L1. Multilevel anterior osteophytosis. The there are indeterminate peripheral calcifications present in the expected region of the posterior thecal sac dorsal to the T7, T8 and T9 vertebral bodies, likely chronic possibly related to remote infection, inflammation or hemorrhage. No definite mass effect identified.     CT LUMBAR SPINE:   PARASPINAL SOFT TISSUES: No paravertebral fluid collection or significant edema. ALIGNMENT: Dextrocurvature of the lower thoracic and lumbar spine. No traumatic spondylolisthesis or traumatic facet widening. VERTEBRAE: Bones appear mildly demineralized. No acute fracture. Vertebral body heights are maintained. SPINAL CANAL/INTERVERTEBRAL DISCS: No high-grade spinal canal  stenosis. Moderate multilevel disc height loss, most significant at T12-L1. Moderate bilateral facet joint arthropathy from L2-S1. NEURAL FORAMINA: Disc bulge at L2-L3, L3-L4 and L5-S1 with facet hypertrophy with up to moderate foraminal narrowing. Disc bulge and facet hypertrophy at L4 L spine with up to severe foraminal stenosis.       1.  No acute abnormality within the abdomen or pelvis. 2. No acute fracture of the thoracic or lumbar spine. Degenerative changes of the spine without critical canal stenosis. Foraminal narrowing in the lumbar spine as detailed. 3. Probable chronic calcifications within the posterior fecal sac at the level of the midthoracic spine as detailed. No associated canal stenosis identified..   I personally reviewed the image(s)/study and resident interpretation. I agree with the findings as stated by resident Fredi Anderson. Data analyzed and images interpreted at [place], [place].   MACRO: None   Signed by: Beth Holguin 8/12/2024 2:55 AM Dictation workstation:   [id number]    CT thoracic spine wo IV contrast    Result Date: 8/12/2024  Interpreted By:  Beth Holguin,  Colt Rai STUDY: CT ABDOMEN PELVIS W IV CONTRAST; CT THORACIC SPINE WO IV CONTRAST; CT LUMBAR SPINE WO IV CONTRAST;  8/12/2024 2:12 am; 8/12/2024 2:11 am   INDICATION: Signs/Symptoms:N/V; Signs/Symptoms:BUE and BLE weakness; Signs/Symptoms:BUE and BLE weakness..   COMPARISON: None.   ACCESSION NUMBER(S): [id number]; [id number]; [id number]   ORDERING CLINICIAN: LILI FONTANEZ   TECHNIQUE: CT of the abdomen and pelvis was performed.  Standard contiguous axial images were obtained at 3 mm slice thickness through the abdomen and pelvis. Coronal and sagittal reconstructions at 3 mm slice thickness were performed.   150 ml of contrast omni 350 were administered intravenously without immediate complication.   FINDINGS: LOWER CHEST: Ground-glass attenuation of the  lower lungs likely representing aspiration pneumonitis with a component of atelectasis. Emphysematous changes. Please see dedicated CTA chest for further evaluation.   ABDOMEN:   LIVER: The liver is normal in size measuring 15.3 cm in the craniocaudal axis. No focal hepatic lesions.   BILE DUCTS: The intrahepatic and extrahepatic ducts are not dilated.   GALLBLADDER: The gallbladder is nondistended and without evidence of radiopaque stones.   PANCREAS: The pancreas appears unremarkable without evidence of ductal dilatation or masses.   SPLEEN: The spleen is normal in size without focal lesions.   ADRENAL GLANDS: Bilateral adrenal glands appear normal.   KIDNEYS AND URETERS: The kidneys are normal in size and enhance symmetrically. Few bilateral hypodense lesions are too small to characterize, favored to represent simple cysts.  No hydroureteronephrosis. Contrast material is present within the bilateral renal collecting systems and ureters, limiting evaluation for calculi.   PELVIS:   BLADDER: Bladder is normal in appearance without wall thickening. Layering contrast material within the bladder.   REPRODUCTIVE ORGANS: The uterus is surgically absent.   BOWEL: The stomach is unremarkable.   The small and large bowel are normal in caliber and demonstrate no wall thickening. Colonic diverticulosis without evidence of diverticulitis. The appendix is not definitely visualized. There is however no pericecal stranding or fluid. Moderate stool burden.   VESSELS: There is no aneurysmal dilatation of the abdominal aorta. The IVC appears normal. Moderate atherosclerotic calcification of the abdominal aorta and its branches.   PERITONEUM/RETROPERITONEUM/LYMPH NODES: There is no free or loculated fluid collection, no free intraperitoneal air. The retroperitoneum appears normal.  No abdominopelvic lymphadenopathy is present.   BONES AND ABDOMINAL WALL: Rightward curvature of the lumbar spine. The abdominal wall soft tissues appear  normal.     CT THORACIC SPINE:   PARASPINAL SOFT TISSUES: No paravertebral fluid collection or significant edema. ALIGNMENT:  No traumatic spondylolisthesis or traumatic facet widening. VERTEBRAE:  No acute fracture. Vertebral body heights are maintained. SPINAL CANAL/INTERVERTEBRAL DISCS: No high-grade spinal canal stenosis. Moderate multilevel disc height loss, most severe at T11-12 and T12-L1. Multilevel anterior osteophytosis. The there are indeterminate peripheral calcifications present in the expected region of the posterior thecal sac dorsal to the T7, T8 and T9 vertebral bodies, likely chronic possibly related to remote infection, inflammation or hemorrhage. No definite mass effect identified.     CT LUMBAR SPINE:   PARASPINAL SOFT TISSUES: No paravertebral fluid collection or significant edema. ALIGNMENT: Dextrocurvature of the lower thoracic and lumbar spine. No traumatic spondylolisthesis or traumatic facet widening. VERTEBRAE: Bones appear mildly demineralized. No acute fracture. Vertebral body heights are maintained. SPINAL CANAL/INTERVERTEBRAL DISCS: No high-grade spinal canal stenosis. Moderate multilevel disc height loss, most significant at T12-L1. Moderate bilateral facet joint arthropathy from L2-S1. NEURAL FORAMINA: Disc bulge at L2-L3, L3-L4 and L5-S1 with facet hypertrophy with up to moderate foraminal narrowing. Disc bulge and facet hypertrophy at L4 L spine with up to severe foraminal stenosis.       1.  No acute abnormality within the abdomen or pelvis. 2. No acute fracture of the thoracic or lumbar spine. Degenerative changes of the spine without critical canal stenosis. Foraminal narrowing in the lumbar spine as detailed. 3. Probable chronic calcifications within the posterior fecal sac at the level of the midthoracic spine as detailed. No associated canal stenosis identified..   I personally reviewed the image(s)/study and resident interpretation. I agree with the findings as stated by  resident Fredi Anderson. Data analyzed and images interpreted at [facility], [place], [place].   MACRO: None   Signed by: Beth Holguin 8/12/2024 2:55 AM Dictation workstation:   [id number]    CT lumbar spine wo IV contrast    Result Date: 8/12/2024  Interpreted By:  Beth Holguin and Beyersdorf Conner STUDY: CT ABDOMEN PELVIS W IV CONTRAST; CT THORACIC SPINE WO IV CONTRAST; CT LUMBAR SPINE WO IV CONTRAST;  8/12/2024 2:12 am; 8/12/2024 2:11 am   INDICATION: Signs/Symptoms:N/V; Signs/Symptoms:BUE and BLE weakness; Signs/Symptoms:BUE and BLE weakness..   COMPARISON: None.   ACCESSION NUMBER(S): [id number]; [id number]; [id number]   ORDERING CLINICIAN: LILI FONTANEZ   TECHNIQUE: CT of the abdomen and pelvis was performed.  Standard contiguous axial images were obtained at 3 mm slice thickness through the abdomen and pelvis. Coronal and sagittal reconstructions at 3 mm slice thickness were performed.   150 ml of contrast omni 350 were administered intravenously without immediate complication.   FINDINGS: LOWER CHEST: Ground-glass attenuation of the lower lungs likely representing aspiration pneumonitis with a component of atelectasis. Emphysematous changes. Please see dedicated CTA chest for further evaluation.   ABDOMEN:   LIVER: The liver is normal in size measuring 15.3 cm in the craniocaudal axis. No focal hepatic lesions.   BILE DUCTS: The intrahepatic and extrahepatic ducts are not dilated.   GALLBLADDER: The gallbladder is nondistended and without evidence of radiopaque stones.   PANCREAS: The pancreas appears unremarkable without evidence of ductal dilatation or masses.   SPLEEN: The spleen is normal in size without focal lesions.   ADRENAL GLANDS: Bilateral adrenal glands appear normal.   KIDNEYS AND URETERS: The kidneys are normal in size and enhance symmetrically. Few bilateral hypodense lesions are too small to characterize, favored to represent simple cysts.  No  hydroureteronephrosis. Contrast material is present within the bilateral renal collecting systems and ureters, limiting evaluation for calculi.   PELVIS:   BLADDER: Bladder is normal in appearance without wall thickening. Layering contrast material within the bladder.   REPRODUCTIVE ORGANS: The uterus is surgically absent.   BOWEL: The stomach is unremarkable.   The small and large bowel are normal in caliber and demonstrate no wall thickening. Colonic diverticulosis without evidence of diverticulitis. The appendix is not definitely visualized. There is however no pericecal stranding or fluid. Moderate stool burden.   VESSELS: There is no aneurysmal dilatation of the abdominal aorta. The IVC appears normal. Moderate atherosclerotic calcification of the abdominal aorta and its branches.   PERITONEUM/RETROPERITONEUM/LYMPH NODES: There is no free or loculated fluid collection, no free intraperitoneal air. The retroperitoneum appears normal.  No abdominopelvic lymphadenopathy is present.   BONES AND ABDOMINAL WALL: Rightward curvature of the lumbar spine. The abdominal wall soft tissues appear normal.     CT THORACIC SPINE:   PARASPINAL SOFT TISSUES: No paravertebral fluid collection or significant edema. ALIGNMENT:  No traumatic spondylolisthesis or traumatic facet widening. VERTEBRAE:  No acute fracture. Vertebral body heights are maintained. SPINAL CANAL/INTERVERTEBRAL DISCS: No high-grade spinal canal stenosis. Moderate multilevel disc height loss, most severe at T11-12 and T12-L1. Multilevel anterior osteophytosis. The there are indeterminate peripheral calcifications present in the expected region of the posterior thecal sac dorsal to the T7, T8 and T9 vertebral bodies, likely chronic possibly related to remote infection, inflammation or hemorrhage. No definite mass effect identified.     CT LUMBAR SPINE:   PARASPINAL SOFT TISSUES: No paravertebral fluid collection or significant edema. ALIGNMENT: Dextrocurvature  of the lower thoracic and lumbar spine. No traumatic spondylolisthesis or traumatic facet widening. VERTEBRAE: Bones appear mildly demineralized. No acute fracture. Vertebral body heights are maintained. SPINAL CANAL/INTERVERTEBRAL DISCS: No high-grade spinal canal stenosis. Moderate multilevel disc height loss, most significant at T12-L1. Moderate bilateral facet joint arthropathy from L2-S1. NEURAL FORAMINA: Disc bulge at L2-L3, L3-L4 and L5-S1 with facet hypertrophy with up to moderate foraminal narrowing. Disc bulge and facet hypertrophy at L4 L spine with up to severe foraminal stenosis.       1.  No acute abnormality within the abdomen or pelvis. 2. No acute fracture of the thoracic or lumbar spine. Degenerative changes of the spine without critical canal stenosis. Foraminal narrowing in the lumbar spine as detailed. 3. Probable chronic calcifications within the posterior fecal sac at the level of the midthoracic spine as detailed. No associated canal stenosis identified..   I personally reviewed the image(s)/study and resident interpretation. I agree with the findings as stated by resident Fredi Anderson. Data analyzed and images interpreted at [facility], [place], [place].   MACRO: None   Signed by: Beth Holguin 8/12/2024 2:55 AM Dictation workstation:   [id number]    CT angio chest for pulmonary embolism    Result Date: 8/12/2024  Interpreted By:  Beth Holguin and Beyersdorf Conner STUDY: CT ANGIO CHEST FOR PULMONARY EMBOLISM;  8/12/2024 2:36 am   INDICATION: Signs/Symptoms:Weakness, fatigue shortness of breath.   COMPARISON: None   ACCESSION NUMBER(S): [id number]   ORDERING CLINICIAN: ROSSANA CRANE   TECHNIQUE: Helical data acquisition of the chest was obtained after intravenous administration of 150 cc of Omnipaque 350, as per PE protocol. Images were reformatted in coronal and sagittal planes. Axial and coronal maximum intensity projection (MIP) images were  created and reviewed.   FINDINGS: POTENTIAL LIMITATIONS OF THE STUDY: None   HEART AND VESSELS: There are no discrete filling defects within main pulmonary artery and its branches to suggest acute pulmonary embolism. Main pulmonary artery and its branches are normal in caliber.   The thoracic aorta normal in course and caliber.There is moderate atherosclerosis present, including calcified and noncalcified plaques.Although, the study is not tailored for evaluation of aorta, there is no definite evidence of acute aortic pathology. Ectasia of the main pulmonary artery measuring 3.3 cm Coronary artery calcifications noted however exam is not optimized for evaluation.   Mild cardiomegaly with enlargement of the left atrium.There are no findings to suggest right heart strain. Left atrial appendage closure device in place. Left chest pacemaker/ICD with leads terminating in the right atrium and right ventricle. There is no pericardial effusion seen.   MEDIASTINUM AND TOÑO, LOWER NECK AND AXILLA: The visualized thyroid gland is within normal limits. No evidence of thoracic lymphadenopathy by CT criteria. Patulous appearance of the esophagus with heterogenous material in the upper esophagus.   LUNGS AND AIRWAYS: There is mucous layering in the distal trachea and right and left main bronchi. Subtle mucous plugging in lower lobe bronchials. Ground-glass consolidation of the dependent portions of the lower lungs. Moderate centrilobular and apically predominant emphysematous changes.  There is mild diffuse bronchial wall thickening. Calcified granuloma noted dot No pleural effusion or pneumothorax.     UPPER ABDOMEN:   Please see dedicated CT abdomen pelvis for further evaluation.   CHEST WALL AND OSSEOUS STRUCTURES: Chest wall is within normal limits. Multilevel degenerative changes with endplate osteophytes in the visualized spine. No acute osseous pathology.There are no suspicious osseous lesions.       1. No evidence of acute  pulmonary embolism to the segmental level. 2. Small amount of mucus layering within the main bronchi and mucous plugging of the bronchials. Ground-glass opacities in the bilateral lung bases. Findings are consistent with aspiration pneumonitis and superimposed atelectasis. 3. Patulous esophagus with debris and septations noted to the upper level which could be related to esophagitis. Clinical correlation suggested. Findings also increased aspiration risk. 4. Ectatic main pulmonary artery measuring 3.3 cm. 5. Cardiomegaly with enlargement of the left atrium.   I personally reviewed the image(s)/study and resident interpretation. I agree with the findings as stated by resident Fredi Anderson. Data analyzed and images interpreted at [facility], [place], [place].   MACRO: None   Signed by: Beth Holguin 8/12/2024 2:37 AM Dictation workstation:   [id number]    CT head wo IV contrast    Result Date: 8/12/2024  Interpreted By:  Beth Holguin and Beyersdorf Conner STUDY: CT HEAD WO IV CONTRAST; CT CERVICAL SPINE WO IV CONTRAST;  8/12/2024 1:22 am   INDICATION: Signs/Symptoms:Hx of brain aneurysm. Generalized fatigue and weakness; Signs/Symptoms:BUE and BLE weakness   COMPARISON: CT head 03/28/2017   ACCESSION NUMBER(S): [id number]; [id number]   ORDERING CLINICIAN: ROSSANA FONTANEZ   TECHNIQUE: Axial noncontrast CT images of head with coronal and sagittal reconstructed images. Axial noncontrast CT images of the cervical spine with coronal and sagittal reconstructed images.   FINDINGS: CT HEAD:   Postsurgical changes of right temporal craniotomy and aneurysmal clipping near the suprasellar and right parasellar region. Associated metallic streak artifact limits evaluation of the region. Findings are similar prior imaging   BRAIN PARENCHYMA: Confluent region of hypoattenuating areas of periventricular and subcortical white matter, which is nonspecific, but favored to  represent chronic small-vessel ischemic disease in a patient of this age. Mild encephalomalacia within the bilateral right temporal lobe. No evidence of acute intraparenchymal hemorrhage or parenchymal evidence of acute large territory ischemic infarct. No mass-effect, midline shift or effacement of cerebral sulci. Gray-white matter distinction is preserved.   VENTRICLES and EXTRA-AXIAL SPACES: No acute extra-axial or intraventricular hemorrhage. Ventricles and sulci are age-concordant.   PARANASAL SINUSES/MASTOIDS: No hemorrhage or air-fluid levels within the visualized paranasal sinuses. The mastoid air cells are well-aerated.   CALVARIUM/ORBITS: No skull fracture. Bilateral lens replacements. The orbits and globes are intact to the extent visualized.   EXTRACRANIAL SOFT TISSUES: No discernible abnormality. Postsurgical changes of the lungs is   CT CERVICAL SPINE:   PREVERTEBRAL SOFT TISSUES: Within normal limits.   CRANIOCERVICAL JUNCTION: Intact.   ALIGNMENT: Loss of the normal cervical lordosis. There is grade 1 retrolisthesis of C3-C4 measuring 3.3 mm, and grade 1 retrolisthesis of C5-6 measuring 2.7 mm. Facet joint alignment is maintained. No traumatic malalignment or traumatic facet widening.   VERTEBRAE: No acute fracture. Vertebral body heights are maintained.   SPINAL CANAL/INTERVERTEBRAL DISCS: No high-grade spinal canal stenosis. Multilevel variable disc space height loss, most severe at C6-7. Prominent osteophytosis at this level along with subchondral sclerosis and cystic changes..   NEURAL FORAMINA: Mild uncovertebral hypertrophy at C4-5 with a mild bilateral neural foraminal narrowing. Moderate neural foraminal narrowing and right facet hypertrophy at C5-6 with moderate right neural foraminal narrowing. Severe uncovertebral hypertrophy at C6-7 with moderate bilateral neural foraminal narrowing.   OTHER: Dedicated chest imaging has been performed and will be reported separately..       CT HEAD: 1.  Postsurgical changes of right temporal craniotomy and aneurysmal clipping right partially degrades imaging. Otherwise no acute intracranial hemorrhage or mass effect identified. 2. Findings consistent with chronic small-vessel ischemic disease and right temporal lobe encephalomalacia.     CT CERVICAL SPINE: 1. No acute fracture or traumatic malalignment of the cervical spine. 2. Multilevel spondylotic changes of the cervical spine as detailed above, with multilevel neural foraminal narrowing.     I personally reviewed the image(s)/study and resident interpretation. I agree with the findings as stated by resident Fredi Anderson. Data analyzed and images interpreted at [facility], [place], [place].   MACRO: none   Signed by: Beth Holguin 8/12/2024 2:31 AM Dictation workstation:   [id number]    CT cervical spine wo IV contrast    Result Date: 8/12/2024  Interpreted By:  Beth Holguin and [place]yersdorf Conner STUDY: CT HEAD WO IV CONTRAST; CT CERVICAL SPINE WO IV CONTRAST;  8/12/2024 1:22 am   INDICATION: Signs/Symptoms:Hx of brain aneurysm. Generalized fatigue and weakness; Signs/Symptoms:BUE and BLE weakness   COMPARISON: CT head 03/28/2017   ACCESSION NUMBER(S): [id number]; [id number]   ORDERING CLINICIAN: ROSSANA FONTANEZ   TECHNIQUE: Axial noncontrast CT images of head with coronal and sagittal reconstructed images. Axial noncontrast CT images of the cervical spine with coronal and sagittal reconstructed images.   FINDINGS: CT HEAD:   Postsurgical changes of right temporal craniotomy and aneurysmal clipping near the suprasellar and right parasellar region. Associated metallic streak artifact limits evaluation of the region. Findings are similar prior imaging   BRAIN PARENCHYMA: Confluent region of hypoattenuating areas of periventricular and subcortical white matter, which is nonspecific, but favored to represent chronic small-vessel ischemic disease in a patient  of this age. Mild encephalomalacia within the bilateral right temporal lobe. No evidence of acute intraparenchymal hemorrhage or parenchymal evidence of acute large territory ischemic infarct. No mass-effect, midline shift or effacement of cerebral sulci. Gray-white matter distinction is preserved.   VENTRICLES and EXTRA-AXIAL SPACES: No acute extra-axial or intraventricular hemorrhage. Ventricles and sulci are age-concordant.   PARANASAL SINUSES/MASTOIDS: No hemorrhage or air-fluid levels within the visualized paranasal sinuses. The mastoid air cells are well-aerated.   CALVARIUM/ORBITS: No skull fracture. Bilateral lens replacements. The orbits and globes are intact to the extent visualized.   EXTRACRANIAL SOFT TISSUES: No discernible abnormality. Postsurgical changes of the lungs is   CT CERVICAL SPINE:   PREVERTEBRAL SOFT TISSUES: Within normal limits.   CRANIOCERVICAL JUNCTION: Intact.   ALIGNMENT: Loss of the normal cervical lordosis. There is grade 1 retrolisthesis of C3-C4 measuring 3.3 mm, and grade 1 retrolisthesis of C5-6 measuring 2.7 mm. Facet joint alignment is maintained. No traumatic malalignment or traumatic facet widening.   VERTEBRAE: No acute fracture. Vertebral body heights are maintained.   SPINAL CANAL/INTERVERTEBRAL DISCS: No high-grade spinal canal stenosis. Multilevel variable disc space height loss, most severe at C6-7. Prominent osteophytosis at this level along with subchondral sclerosis and cystic changes..   NEURAL FORAMINA: Mild uncovertebral hypertrophy at C4-5 with a mild bilateral neural foraminal narrowing. Moderate neural foraminal narrowing and right facet hypertrophy at C5-6 with moderate right neural foraminal narrowing. Severe uncovertebral hypertrophy at C6-7 with moderate bilateral neural foraminal narrowing.   OTHER: Dedicated chest imaging has been performed and will be reported separately..       CT HEAD: 1. Postsurgical changes of right temporal craniotomy and  aneurysmal clipping right partially degrades imaging. Otherwise no acute intracranial hemorrhage or mass effect identified. 2. Findings consistent with chronic small-vessel ischemic disease and right temporal lobe encephalomalacia.     CT CERVICAL SPINE: 1. No acute fracture or traumatic malalignment of the cervical spine. 2. Multilevel spondylotic changes of the cervical spine as detailed above, with multilevel neural foraminal narrowing.     I personally reviewed the image(s)/study and resident interpretation. I agree with the findings as stated by resident Fredi Anderson. Data analyzed and images interpreted at [facility], [place], [place].   MACRO: none   Signed by: Beth Holguin 8/12/2024 2:31 AM Dictation workstation:   [id number]        Assessment/Plan   Assessment & Plan  Weakness    Generalized weakness  This a 78yo RHF PMHx HTN, SSS (s/p pacer 2019), pAF (s/p watchman 2020), cerebral aneurysm (s/p clipping 1993), ET presenting for subacute progressive ascending weakness and instability.      Patient reported unsteady walking for about 6 weeks. Progressively worsening, but seems to have more-sharply declined in past 2 weeks. Now starting to involve BUE. Now starting to have paresthesias in the RUE and pain in the R shoulder. Occasional electrical shocks of pain radiating down BUE and chest.     Initial neurological examination was significant for some L thenar atrophy, weakness of ECRL/ECU, EDC, ADM, FDI. On lowers, there is diminished LLE vibratory sense at the toe, ankle, and knee compared to left. Questionable C5 sensory level. Normal tone. Reflexes 3+ with spread throughout, London's & finger flexor reflex present bilaterally, 4bts ankle clonus bilaterally. Toes mute. Trace jaw jerk. Gait narrow-based and unsteady with truncal instability. CTH which shows R temporal cranioplasty, R posterior circulation clip with bloom artifact, and moderate posterior-predominant  subcortical microvascular disease.      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 CT myelogram was inconclusive due to poor contrast uptake in the cervical spine. Consider technical vs obstructive cause. S/p cisternal myelogram on 8/16 which showed mild to moderate stenosis without obstruction. Neurosurgery not planning intervention (signed-off). Pending dispo to acute rehab.    8/20 Updates:  - Pending discharge to [facility]  - Appointments scheduled with [facility] attendings: Dr. Ga on 9/13 and Dr. Leslie on 2/3  - Ordered lidocaine patch for neck pain      #Cervical myeloradiculopathy  :: Possible structural compression  :: B12, folate, homocysteine, vitamin E, copper, MMA, CK all within normal limits  :: CT myelogram inconclusive d/t incomplete contrast uptake, reach out to IR re: imaging difficulties  :: Repeat cisternal myelogram shows no significant stenosis  - Consult neurosurgery: no intervention, signed off  - Pending dispo to [facility]  - Follow-up outpatient with [facility] clinic     #MISC  -Continue home meds: hydrochlorothiazide daily, potassium, propranolol 10 mg QID, rosuvastatin 5 mg HS, Valtrex 500 mg daily    F: prn  E: prn  N: regular   A: PIV  DVT: lovenox   GI: N/A    CODE: FULL  NOK: Daughter Racheal, [phone]     Fartun Emmanuel MD  Neurology PGY-2

## 2024-08-20 NOTE — PROGRESS NOTES
Physical Therapy    Physical Therapy Treatment    Patient Name: Linda López  MRN: [id number]  Today's Date: 8/20/2024  Time Calculation  Start Time: 1304  Stop Time: 1328  Time Calculation (min): 24 min    Assessment/Plan   PT Assessment  End of Session Communication: Bedside nurse  Assessment Comment: Pt with fatigue throughout session.  Pt slightly more unsteady with amb this session, increased path deviation while ambulating.  After descending 4 steps, pt rolled L ankle and started to buckle her knees, Mod A from PT to maintain standing.  Pt regained balance and had no further episodes of ankle rolling or knee buckling.  Pt denied pain in ankle, no swelling present.  RN notified and aware.  Pt completed standing balance exercises with fewer episodes of LOB but continues to require Mod-Max A from PT with LOB.  Pt continues to benefit from skilled PT and remains appropriate for high intensity PT upon discharge.  End of Session Patient Position: Bed, 3 rail up, Alarm off, not on at start of session (RN present in room)  PT Plan  Inpatient/Swing Bed or Outpatient: Inpatient  PT Plan  Treatment/Interventions: Bed mobility, Gait training, Transfer training, Stair training, Balance training, Strengthening, Endurance training, Therapeutic activity, Therapeutic exercise  PT Plan: Ongoing PT  PT Frequency: 5 times per week  PT Discharge Recommendations: High intensity level of continued care  Equipment Recommended upon Discharge:  (TBD)  PT Recommended Transfer Status: Stand by assist, Assistive device (RW)  PT - OK to Discharge: Yes (meaning pt seen and dc rec made)      General Visit Information:   PT  Visit  PT Received On: 08/20/24  General  Reason for Referral: 78yo  presenting with progressive BUE >BLE weakness x 6 weeks.  Past Medical History Relevant to Rehab: HTN, SSS (s/p pacer 2019), pAF (s/p watchman 2020), cerebral aneurysm (s/p clipping 1993)  Prior to Session Communication: Bedside nurse  Patient  "Position Received: Bed, 3 rail up, Alarm off, not on at start of session  General Comment: Pt cleared for PT by RN.  Pt alert and agreeable to PT. Broken/stuttered speech throughout session. +PIV    Subjective   Precautions:  Precautions  Medical Precautions: Fall precautions    Objective   Pain:  Pain Assessment  Pain Assessment: 0-10  0-10 (Numeric) Pain Score:  (unable to rate)  Pain Type: Acute pain  Pain Location: Neck (and R>L shoulder)  Pain Interventions: Ambulation/increased activity, Repositioned, Rest  Response to Interventions: amb increased neck pain  Cognition:  Cognition  Overall Cognitive Status: Within Functional Limits  Orientation Level: Oriented X4  Coordination:  Movements are Fluid and Coordinated: Yes  Postural Control:  Postural Control  Postural Control: Within Functional Limits  Static Sitting Balance  Static Sitting-Balance Support: Bilateral upper extremity supported, Feet supported  Static Sitting-Level of Assistance: Independent  Dynamic Sitting Balance  Dynamic Sitting-Balance Support: Bilateral upper extremity supported, Feet supported  Dynamic Sitting-Level of Assistance: Independent  Dynamic Sitting-Balance:  (LE ther ex)  Static Standing Balance  Static Standing-Balance Support: Bilateral upper extremity supported (RW)  Static Standing-Level of Assistance: Close supervision  Dynamic Standing Balance  Dynamic Standing-Balance Support: Bilateral upper extremity supported (RW)  Dynamic Standing-Level of Assistance: Close supervision  Dynamic Standing-Balance: Turning  Activity Tolerance:  Activity Tolerance  Endurance: Tolerates 10 - 20 min exercise with multiple rests  Treatments:  Therapeutic Exercise  Therapeutic Exercise Performed: Yes  Therapeutic Exercise Activity 1: seated Uzair xie\" hold, and ankle DF completed to improve foot clearance during amb    Balance/Neuromuscular Re-Education  Balance/Neuromuscular Re-Education Activity Performed: Yes  Balance/Neuromuscular " Re-Education Activity 1: rhomberg balance eyes closed 3x10 seconds with multiple episodes of LOB, no fall but Max A from PT to regain balance  Balance/Neuromuscular Re-Education Activity 2: tandem balance 3x15 seconds each LE anterior with multiple episodes of LOB with R LE anterior only, no fall but Mod-Max A from PT to regain balance  Balance/Neuromuscular Re-Education Activity 3: tandem balance 3x15 seconds each LE anterior with multiple episodes of LOB with R LE anterior only, no fall but Max A from PT to regain balance    Bed Mobility  Bed Mobility: No (pt sitting EOB at start and end of session)  Bed Mobility 1  Bed Mobility 1: Supine to sitting (pt rolling L grabbing bed rail before sitting up to the R)  Level of Assistance 1: Close supervision    Ambulation/Gait Training  Ambulation/Gait Training Performed: Yes  Ambulation/Gait Training 1  Surface 1: Level tile, Carpet  Device 1: Rolling walker  Assistance 1: Close supervision  Quality of Gait 1: Narrow base of support, Diminished heel strike, Inconsistent stride length, Foot sweep  Comments/Distance (ft) 1: 415ft with shoes donned  Transfers  Transfer: Yes  Transfer 1  Transfer From 1: Bed to  Transfer to 1: Stand  Technique 1: Sit to stand, Stand to sit  Transfer Device 1: Walker  Transfer Level of Assistance 1: Close supervision  Trials/Comments 1: x3 trials, VCs for hand placement each trial    Stairs  Stairs: Yes  Stairs  Rails 1: Right  Device 1: Railing  Assistance 1: Close supervision, Contact guard  Comment/Number of Steps 1: descending 4 steps with close supervision and B UEs on 1 rail, step to gait pattern leading with R LE.  At bottom of stairs while turning around pt rolled L ankle and started to buckle in her knees.  PT Mod assist to maintain standing.  CGA to ascend 4 steps with B UE on R rail, step to gait pattern leading with L LE.  No further episodes of buckling or ankle rolling.  RN notified and aware.    Outcome Measures:  [facility] Basic  Mobility  Turning from your back to your side while in a flat bed without using bedrails: A lot  Moving from lying on your back to sitting on the side of a flat bed without using bedrails: A lot  Moving to and from bed to chair (including a wheelchair): A little  Standing up from a chair using your arms (e.g. wheelchair or bedside chair): A little  To walk in hospital room: A little  Climbing 3-5 steps with railing: A little  Basic Mobility - Total Score: 16    Education Documentation  Home Exercise Program, taught by Coty Gunn V PT at 8/20/2024  1:52 PM.  Learner: Patient  Readiness: Acceptance  Method: Explanation  Response: Verbalizes Understanding    Body Mechanics, taught by Coty Gunn V PT at 8/20/2024  1:52 PM.  Learner: Patient  Readiness: Acceptance  Method: Explanation  Response: Verbalizes Understanding    Mobility Training, taught by Coty Gunn V PT at 8/20/2024  1:52 PM.  Learner: Patient  Readiness: Acceptance  Method: Explanation  Response: Verbalizes Understanding    Education Comments  No comments found.        Encounter Problems       Encounter Problems (Active)       PT Problem       Patient will complete supine to sit and sit to supine Independent  (Progressing)       Start:  08/15/24    Expected End:  08/29/24            Patient will ambulate >150' with LRAD and Modified Independent  (Progressing)       Start:  08/15/24    Expected End:  08/29/24            Patient will perform sit<>stand transfer with LRAD, and Modified Independent  (Progressing)       Start:  08/15/24    Expected End:  08/29/24            Patient will ascend/descend 2 steps with Right Rail Ascending and supervision  (Progressing)       Start:  08/15/24    Expected End:  08/29/24            Pt will score >/= to 24/28 on the Tinetti to indicate low fall risk (Progressing)       Start:  08/15/24    Expected End:  08/29/24               Pain - Adult

## 2024-08-21 PROCEDURE — 2500000002 HC RX 250 W HCPCS SELF ADMINISTERED DRUGS (ALT 637 FOR MEDICARE OP, ALT 636 FOR OP/ED)

## 2024-08-21 PROCEDURE — 2500000001 HC RX 250 WO HCPCS SELF ADMINISTERED DRUGS (ALT 637 FOR MEDICARE OP)

## 2024-08-21 PROCEDURE — 2500000001 HC RX 250 WO HCPCS SELF ADMINISTERED DRUGS (ALT 637 FOR MEDICARE OP): Performed by: PHYSICIAN ASSISTANT

## 2024-08-21 PROCEDURE — 1100000001 HC PRIVATE ROOM DAILY

## 2024-08-21 PROCEDURE — S4991 NICOTINE PATCH NONLEGEND: HCPCS | Performed by: PHYSICIAN ASSISTANT

## 2024-08-21 PROCEDURE — 99231 SBSQ HOSP IP/OBS SF/LOW 25: CPT

## 2024-08-21 PROCEDURE — 2500000002 HC RX 250 W HCPCS SELF ADMINISTERED DRUGS (ALT 637 FOR MEDICARE OP, ALT 636 FOR OP/ED): Performed by: PHYSICIAN ASSISTANT

## 2024-08-21 PROCEDURE — 2500000004 HC RX 250 GENERAL PHARMACY W/ HCPCS (ALT 636 FOR OP/ED)

## 2024-08-21 PROCEDURE — 97116 GAIT TRAINING THERAPY: CPT | Mod: GP

## 2024-08-21 PROCEDURE — 99222 1ST HOSP IP/OBS MODERATE 55: CPT | Performed by: PHYSICAL MEDICINE & REHABILITATION

## 2024-08-21 PROCEDURE — 97112 NEUROMUSCULAR REEDUCATION: CPT | Mod: GP

## 2024-08-21 RX ORDER — GABAPENTIN 300 MG/1
300 CAPSULE ORAL NIGHTLY
Status: DISCONTINUED | OUTPATIENT
Start: 2024-08-21 | End: 2024-08-26 | Stop reason: HOSPADM

## 2024-08-21 SDOH — ECONOMIC STABILITY: FOOD INSECURITY: WITHIN THE PAST 12 MONTHS, YOU WORRIED THAT YOUR FOOD WOULD RUN OUT BEFORE YOU GOT THE MONEY TO BUY MORE.: NEVER TRUE

## 2024-08-21 SDOH — ECONOMIC STABILITY: TRANSPORTATION INSECURITY: IN THE PAST 12 MONTHS, HAS LACK OF TRANSPORTATION KEPT YOU FROM MEDICAL APPOINTMENTS OR FROM GETTING MEDICATIONS?: YES

## 2024-08-21 SDOH — ECONOMIC STABILITY: HOUSING INSECURITY: IN THE PAST 12 MONTHS HAS THE ELECTRIC, GAS, OIL, OR WATER COMPANY THREATENED TO SHUT OFF SERVICES IN YOUR HOME?: NO

## 2024-08-21 SDOH — ECONOMIC STABILITY: FOOD INSECURITY

## 2024-08-21 SDOH — ECONOMIC STABILITY: INCOME INSECURITY: IN THE LAST 12 MONTHS, WAS THERE A TIME WHEN YOU WERE NOT ABLE TO PAY THE MORTGAGE OR RENT ON TIME?: NO

## 2024-08-21 SDOH — ECONOMIC STABILITY: HOUSING INSECURITY: IN THE LAST 12 MONTHS, WAS THERE A TIME WHEN YOU WERE NOT ABLE TO PAY THE MORTGAGE OR RENT ON TIME?: NO

## 2024-08-21 SDOH — ECONOMIC STABILITY: HOUSING INSECURITY

## 2024-08-21 SDOH — ECONOMIC STABILITY: FOOD INSECURITY: WITHIN THE PAST 12 MONTHS, THE FOOD YOU BOUGHT JUST DIDN'T LAST AND YOU DIDN'T HAVE MONEY TO GET MORE.: NEVER TRUE

## 2024-08-21 SDOH — ECONOMIC STABILITY: TRANSPORTATION INSECURITY
IN THE PAST 12 MONTHS, HAS THE LACK OF TRANSPORTATION KEPT YOU FROM MEDICAL APPOINTMENTS OR FROM GETTING MEDICATIONS?: YES

## 2024-08-21 SDOH — ECONOMIC STABILITY: FOOD INSECURITY: WITHIN THE PAST 12 MONTHS, YOU WORRIED THAT YOUR FOOD WOULD RUN OUT BEFORE YOU GOT MONEY TO BUY MORE.: NEVER TRUE

## 2024-08-21 SDOH — ECONOMIC STABILITY: HOUSING INSECURITY: IN THE LAST 12 MONTHS, HOW MANY PLACES HAVE YOU LIVED?: 1

## 2024-08-21 SDOH — ECONOMIC STABILITY: TRANSPORTATION INSECURITY

## 2024-08-21 SDOH — ECONOMIC STABILITY: HOUSING INSECURITY
IN THE LAST 12 MONTHS, WAS THERE A TIME WHEN YOU DID NOT HAVE A STEADY PLACE TO SLEEP OR SLEPT IN A SHELTER (INCLUDING NOW)?: NO

## 2024-08-21 SDOH — ECONOMIC STABILITY: TRANSPORTATION INSECURITY
IN THE PAST 12 MONTHS, HAS LACK OF TRANSPORTATION KEPT YOU FROM MEETINGS, WORK, OR FROM GETTING THINGS NEEDED FOR DAILY LIVING?: YES

## 2024-08-21 SDOH — ECONOMIC STABILITY: GENERAL

## 2024-08-21 ASSESSMENT — COGNITIVE AND FUNCTIONAL STATUS - GENERAL
STANDING UP FROM CHAIR USING ARMS: A LITTLE
TURNING FROM BACK TO SIDE WHILE IN FLAT BAD: A LOT
WALKING IN HOSPITAL ROOM: A LITTLE
MOVING TO AND FROM BED TO CHAIR: A LITTLE
TOILETING: A LITTLE
MOBILITY SCORE: 18
CLIMB 3 TO 5 STEPS WITH RAILING: A LITTLE
MOVING FROM LYING ON BACK TO SITTING ON SIDE OF FLAT BED WITH BEDRAILS: A LOT
WALKING IN HOSPITAL ROOM: A LITTLE
DRESSING REGULAR UPPER BODY CLOTHING: A LITTLE
DRESSING REGULAR LOWER BODY CLOTHING: A LITTLE
DAILY ACTIVITIY SCORE: 20
MOVING TO AND FROM BED TO CHAIR: A LITTLE
TURNING FROM BACK TO SIDE WHILE IN FLAT BAD: A LITTLE
MOVING FROM LYING ON BACK TO SITTING ON SIDE OF FLAT BED WITH BEDRAILS: A LITTLE
STANDING UP FROM CHAIR USING ARMS: A LITTLE
HELP NEEDED FOR BATHING: A LITTLE
CLIMB 3 TO 5 STEPS WITH RAILING: A LITTLE
MOBILITY SCORE: 16

## 2024-08-21 ASSESSMENT — PAIN - FUNCTIONAL ASSESSMENT
PAIN_FUNCTIONAL_ASSESSMENT: 0-10

## 2024-08-21 ASSESSMENT — PAIN DESCRIPTION - ORIENTATION: ORIENTATION: RIGHT

## 2024-08-21 ASSESSMENT — PAIN DESCRIPTION - LOCATION: LOCATION: SHOULDER

## 2024-08-21 ASSESSMENT — PAIN SCALES - GENERAL
PAINLEVEL_OUTOF10: 2
PAINLEVEL_OUTOF10: 2
PAINLEVEL_OUTOF10: 3
PAINLEVEL_OUTOF10: 0 - NO PAIN
PAINLEVEL_OUTOF10: 3
PAINLEVEL_OUTOF10: 0 - NO PAIN
PAINLEVEL_OUTOF10: 3
PAINLEVEL_OUTOF10: 3
PAINLEVEL_OUTOF10: 8
PAINLEVEL_OUTOF10: 3

## 2024-08-21 ASSESSMENT — SOCIAL DETERMINANTS OF HEALTH (SDOH): IN THE PAST 12 MONTHS, HAS THE ELECTRIC, GAS, OIL, OR WATER COMPANY THREATENED TO SHUT OFF SERVICE IN YOUR HOME?: NO

## 2024-08-21 ASSESSMENT — ACTIVITIES OF DAILY LIVING (ADL): LACK_OF_TRANSPORTATION: YES

## 2024-08-21 NOTE — SIGNIFICANT EVENT
Spoke to patient's daughter at her request. Discussed importance of rehabilitation for ongoing neurologic recovery. Updated her on her mother's condition, the management plan and discussed the documentation from our colleagues in PM&R. She was appreciative.     Shelton Crockett DO  Attending Physician  Department of Neurology

## 2024-08-21 NOTE — CARE PLAN
The clinical goals for the shift include safety, comfort, dc planning    Patient oriented x4, vitals stable. Reports mild back/shoulder pain, tylenol administered as indicated. x1 assist with walker, tolerates ambulation well. Awaiting acceptance to rehab prior to discharge.      Problem: Skin  Goal: Participates in plan/prevention/treatment measures  Outcome: Progressing  Goal: Prevent/minimize sheer/friction injuries  Outcome: Progressing  Goal: Promote skin healing  Outcome: Progressing     Problem: Fall/Injury  Goal: Not fall by end of shift  Outcome: Progressing  Goal: Be free from injury by end of the shift  Outcome: Progressing  Goal: Verbalize understanding of personal risk factors for fall in the hospital  Outcome: Progressing

## 2024-08-21 NOTE — PROGRESS NOTES
Physical Therapy    Physical Therapy Treatment    Patient Name: iLnda López  MRN: [id number]  Today's Date: 8/21/2024  Time Calculation  Start Time: 1313  Stop Time: 1336  Time Calculation (min): 23 min    Assessment/Plan   PT Assessment  End of Session Communication: Bedside nurse  Assessment Comment: Pt reports continued weakness and fatigue.  Pt amb with increased shuffling gait and increased path deviation while amb requiring frequent VCs to  her feet while walking and to keep the walker pointed straight ahead.  Pt with decreased episodes of LOB with rhomberg balance eyes closed but still had a few episodes of LOB.  Pt with continued multiple episodes of Max LOB with tandem stance this session.  No fall throughout session.  Pt continues to benefit from skilled PT and remains appropriate for high intensity PT upon discharge.  End of Session Patient Position: Bed, 3 rail up, Alarm off, not on at start of session (Pt sitting EOB)  PT Plan  Inpatient/Swing Bed or Outpatient: Inpatient  PT Plan  Treatment/Interventions: Bed mobility, Gait training, Transfer training, Stair training, Balance training, Strengthening, Endurance training, Therapeutic activity, Therapeutic exercise  PT Plan: Ongoing PT  PT Frequency: 5 times per week  PT Discharge Recommendations: High intensity level of continued care  Equipment Recommended upon Discharge:  (TBD)  PT Recommended Transfer Status: Assistive device, Stand by assist (RW)  PT - OK to Discharge: Yes (meaning pt seen and dc rec made)      General Visit Information:   PT  Visit  PT Received On: 08/21/24  General  Reason for Referral: 78yo  presenting with progressive BUE >BLE weakness x 6 weeks.  Past Medical History Relevant to Rehab: HTN, SSS (s/p pacer 2019), pAF (s/p watchman 2020), cerebral aneurysm (s/p clipping 1993)  Prior to Session Communication: Bedside nurse  Patient Position Received: Bed, 3 rail up, Alarm off, not on at start of session (pt sitting  "EOB)  General Comment: Pt cleared for PT by RN.  Pt alert and agreeable to PT. Stuttered speech throughout session. +PIV    Subjective   Precautions:  Precautions  Medical Precautions: Fall precautions    Objective   Pain:  Pain Assessment  Pain Assessment: 0-10  0-10 (Numeric) Pain Score: 3  Pain Type: Acute pain  Pain Location: Neck (and B shoulders)  Pain Interventions: Ambulation/increased activity, Repositioned, Rest  Response to Interventions: no change in pain  Cognition:  Cognition  Overall Cognitive Status: Within Functional Limits  Impulsive: Moderately  Coordination:  Movements are Fluid and Coordinated: Yes  Postural Control:  Postural Control  Postural Control: Within Functional Limits  Static Sitting Balance  Static Sitting-Balance Support: Bilateral upper extremity supported, Feet supported  Static Sitting-Level of Assistance: Independent  Dynamic Sitting Balance  Dynamic Sitting-Balance Support: Bilateral upper extremity supported, Feet supported  Dynamic Sitting-Level of Assistance: Independent  Dynamic Sitting-Balance:  (LE ther ex)  Static Standing Balance  Static Standing-Balance Support: Bilateral upper extremity supported (RW)  Static Standing-Level of Assistance: Close supervision  Dynamic Standing Balance  Dynamic Standing-Balance Support: Bilateral upper extremity supported (RW)  Dynamic Standing-Level of Assistance: Close supervision  Dynamic Standing-Balance: Turning  Activity Tolerance:  Activity Tolerance  Endurance: Tolerates 10 - 20 min exercise with multiple rests  Treatments:  Therapeutic Exercise  Therapeutic Exercise Performed: Yes  Therapeutic Exercise Activity 1: seated marches, LAQx3\" hold, and ankle DF 2x10 completed to improve foot clearance during amb    Balance/Neuromuscular Re-Education  Balance/Neuromuscular Re-Education Activity Performed: Yes  Balance/Neuromuscular Re-Education Activity 1: rhomberg balance eyes closed 3x15 seconds with one episode of " LOB  Balance/Neuromuscular Re-Education Activity 2: tandem balance 3x15 seconds each LE anterior with multiple episodes of LOB, no fall but Max A from PT to regain balance    Bed Mobility  Bed Mobility: No (pt sitting EOB at start and end of session)    Ambulation/Gait Training  Ambulation/Gait Training Performed: Yes  Ambulation/Gait Training 1  Surface 1: Level tile, Carpet  Device 1: Rolling walker  Assistance 1: Close supervision  Quality of Gait 1: Narrow base of support, Diminished heel strike, Inconsistent stride length, Shuffling gait (decreased gait speed, moderate path deviation)  Comments/Distance (ft) 1: 415ft with moderate path deviation bilaterally while walking, multiple brief standing rest breaks this session  Transfers  Transfer: Yes  Transfer 1  Transfer From 1: Bed to  Transfer to 1: Stand  Technique 1: Sit to stand, Stand to sit  Transfer Device 1: Walker  Transfer Level of Assistance 1: Close supervision  Trials/Comments 1: VCs for hand placement sit to stand and stand to sit, VCs for slow, controlled descent to sit down.  2x throughout session    Stairs  Stairs: Yes  Stairs  Rails 1: Right  Device 1: Railing  Assistance 1: Close supervision, Contact guard  Comment/Number of Steps 1: de/ascending 4 steps with B UEs on R rail,  descending leading with R LE, ascending leading with L LE    Outcome Measures:  [facility] Basic Mobility  Turning from your back to your side while in a flat bed without using bedrails: A lot  Moving from lying on your back to sitting on the side of a flat bed without using bedrails: A lot  Moving to and from bed to chair (including a wheelchair): A little  Standing up from a chair using your arms (e.g. wheelchair or bedside chair): A little  To walk in hospital room: A little  Climbing 3-5 steps with railing: A little  Basic Mobility - Total Score: 16    Education Documentation  Home Exercise Program, taught by Coty Gunn V, PT at 8/21/2024  2:46 PM.  Learner:  Patient  Readiness: Acceptance  Method: Explanation  Response: Verbalizes Understanding    Body Mechanics, taught by Coty Gunn V PT at 8/21/2024  2:46 PM.  Learner: Patient  Readiness: Acceptance  Method: Explanation  Response: Verbalizes Understanding    Mobility Training, taught by Coty Gunn V PT at 8/21/2024  2:46 PM.  Learner: Patient  Readiness: Acceptance  Method: Explanation  Response: Verbalizes Understanding    Education Comments  No comments found.        Encounter Problems       Encounter Problems (Active)       PT Problem       Patient will complete supine to sit and sit to supine Independent  (Progressing)       Start:  08/15/24    Expected End:  08/29/24            Patient will ambulate >150' with LRAD and Modified Independent  (Progressing)       Start:  08/15/24    Expected End:  08/29/24            Patient will perform sit<>stand transfer with LRAD, and Modified Independent  (Progressing)       Start:  08/15/24    Expected End:  08/29/24            Patient will ascend/descend 2 steps with Right Rail Ascending and supervision  (Progressing)       Start:  08/15/24    Expected End:  08/29/24            Pt will score >/= to 24/28 on the Tinetti to indicate low fall risk (Progressing)       Start:  08/15/24    Expected End:  08/29/24               Pain - Adult

## 2024-08-21 NOTE — ASSESSMENT & PLAN NOTE
This a 78yo RHF PMHx HTN, SSS (s/p pacer 2019), pAF (s/p watchman 2020), cerebral aneurysm (s/p clipping 1993), ET presenting for subacute progressive ascending weakness and instability.      Patient reported unsteady walking for about 6 weeks. Progressively worsening, but seems to have more-sharply declined in past 2 weeks. Now starting to involve BUE. Now starting to have paresthesias in the RUE and pain in the R shoulder. Occasional electrical shocks of pain radiating down BUE and chest.     Initial neurological examination was significant for some L thenar atrophy, weakness of ECRL/ECU, EDC, ADM, FDI. On lowers, there is diminished LLE vibratory sense at the toe, ankle, and knee compared to left. Questionable C5 sensory level. Normal tone. Reflexes 3+ with spread throughout, London's & finger flexor reflex present bilaterally, 4bts ankle clonus bilaterally. Toes mute. Trace jaw jerk. Gait narrow-based and unsteady with truncal instability. CTH which shows R temporal cranioplasty, R posterior circulation clip with bloom artifact, and moderate posterior-predominant subcortical microvascular disease.      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 CT myelogram was inconclusive due to poor contrast uptake in the cervical spine. Consider technical vs obstructive cause. S/p cisternal myelogram on 8/16 which showed mild to moderate stenosis without obstruction. Neurosurgery not planning intervention (signed-off). Pending dispo to acute rehab.    8/22 Updates:  - Pending discharge to [facility]  - Appointments scheduled with [facility] attendings: Dr. Ga on 9/13 and Dr. Leslie on 2/3      #Cervical myeloradiculopathy  :: Possible structural compression  :: B12, folate, homocysteine, vitamin E, copper, MMA, CK all within normal limits  :: CT myelogram inconclusive d/t incomplete contrast uptake, reach out to IR re: imaging  difficulties  :: Repeat cisternal myelogram shows no significant stenosis  - Consult neurosurgery: no intervention, signed off  - Pending dispo to [facility]  - Follow-up outpatient with [facility]     #MISC  -Continue home meds: hydrochlorothiazide daily, potassium, propranolol 10 mg QID, rosuvastatin 5 mg HS, Valtrex 500 mg daily    F: prn  E: prn  N: regular   A: PIV  DVT: lovenox   GI: N/A

## 2024-08-21 NOTE — PROGRESS NOTES
I spoke to patient's daughter Racheal by phone earlier to let her know that [facility] was not able to accept because their medical director thought she was doing too well for that level of care. She allowed a referral to [facility] and to [facility]. She will not consider sending her mom to a SNF. She considers them to be a nursing home. I just spoke to Racheal again to let her know that [facility] was unable to accept for the same reason. [facility] did not reply. Racheal has concerns about the wording being used in the documentation and would like to speak to Dr. Crockett. The team was informed of patient's concerns and her request to speak to Dr. Crockett. Myra Gomez RN

## 2024-08-21 NOTE — HOSPITAL COURSE
Linda López is a 79 y.o. right-handed female with a history of A-fib, Anxiety and depression, Arthritis, Artificial cardiac pacemaker, Brain aneurysm s/p clipping (told by ED not MRI compatible), Essential tremor, HTN, Kidney stones, and Lumbar radiculopathy presenting with subacute weakness and ataxia. Patient reported unsteady walking for about 6 weeks, progressively worsening, but seems to have more-sharply declined in past 2 weeks. Now starting to involve BUE. Now starting to have paresthesias in the RUE and pain in the R shoulder. Occasional electrical shocks of pain radiating down BUE and chest.    Neurological examination was significant for some L thenar atrophy, weakness of ECRL/ECU, EDC, ADM, FDI. On lowers, there is diminished LLE vibratory sense at the toe, ankle, and knee compared to left. Questionable C5 sensory level. Normal tone. Reflexes 3+ with spread throughout, London's & finger flexor reflex present bilaterally, 4bts ankle clonus bilaterally. Toes mute. Trace jaw jerk. Initial CT spine imaging showed severe foraminal narrowing at around C7 and less severe narrowing throughout rest of C-spine. CTH which shows R temporal cranioplasty, R posterior circulation clip with bloom artifact, and moderate posterior-predominant subcortical microvascular disease. CTA H shows the same plus moderate R>L cavernous+clinoid+supraclinoid ICA calcification. 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 Would also evaluate for metabolic etiologies. Gold standard of imaging would be MRI, but her aneurysm clip is non-compatible.    Patient was scheduled for CT myelogram on 8/14 which was completed but ultimately non-diagnostic due to non-opacification of the thecal sac. IR was consulted for possible cisternal myelogram which was approved and planned for 8/16. Cervical myelogram showed mild narrowing of  c-spine. Neurosurgery was consulted and did not recommend surgical intervention. CK normal at 41 to assess for myositis. Her examination remained stable throughout her stay with 5/5 strength in her UE and LE, hyperreflexia of LE, sensory ataxia on FTN, and an unsteady narrow-based gait. PT/OT recommended Acute Rehab initially but this recommendation was changed after patient was found to be doing well with PT/OT. PM&R was consulted, initially supporting the decision for discharge to Acute Rehab, but after further discussion she was exceeding expectations and able to walk over 400 feet with walker, so Acute Rehab was no longer recommended. In discussions with patient's daughter, patient, the PM&R team, and Social Work, it was ultimately decided that she will be discharged with home PT/OT and home care.

## 2024-08-21 NOTE — PROGRESS NOTES
"Linda López is a 79 y.o. right-handed female with a history of A-fib, Anxiety and depression, Arthritis, Artificial cardiac pacemaker, Brain aneurysm s/p clipping (told by ED not MRI compatible), Essential tremor, HTN (hypertension), Kidney stones, Lumbar radiculopathy on day 2 of admission after presenting with subacute weakness, ataxia. Neurology was consulted for difficulty walking, upper extremity weakness.    Subjective   NAEON. Patient was frustrated this morning. Upset with lack of improvement in her symptoms. Lidocaine patch did not help her neck pain. Denies acute change in her symptoms.     Objective     Physical Exam  Mental status: A+O x 4, known vocal dystonia (baseline s/p aneurysmal clipping)  Motor exam: 5/5 strength in all muscle groups  Reflexes: 2+ b/l biceps, triceps, BR, 3+patellar, achilles reflexes. No ankle clonus.  Sensory: Sensation to light touch intact in b/l UE and LE. Mild ataxia on FTN when approaching her nose only (able to reach finger normally), likely more sensory ataxia given loss of visual input.  Gait: Unsteady, narrow-based with lateral swaying to left more than right.     Last Recorded Vitals  Blood pressure 156/78, pulse 62, temperature 36.1 °C (97 °F), temperature source Temporal, resp. rate 18, height 1.626 m (5' 4\"), weight 76.7 kg (169 lb), SpO2 98%.  Intake/Output last 3 Shifts:  No intake/output data recorded.    Relevant Results  Scheduled medications  enoxaparin, 40 mg, subcutaneous, q24h  hydroCHLOROthiazide, 25 mg, oral, Daily  hydrOXYzine HCL, 50 mg, oral, Once  lidocaine, 1 patch, transdermal, q24h  nicotine, 1 patch, transdermal, Daily  PARoxetine, 20 mg, oral, Daily  potassium chloride CR, 20 mEq, oral, TID  propranolol, 10 mg, oral, 4x daily  rosuvastatin, 5 mg, oral, Nightly  valACYclovir, 500 mg, oral, Daily      Continuous medications     PRN medications  PRN medications: acetaminophen **OR** acetaminophen  No results found for this or any previous " visit (from the past 24 hour(s)).      CT cervical spine post myelogram    Result Date: 8/14/2024  Interpreted By:  Rico Marr and Hofer Lindsay STUDY: CT CERVICAL SPINE POST MYELOGRAM; CT LUMBAR SPINE POST MYELOGRAM; FL MULTIPLE REGIONS MYELOGRAM WITH LUMBAR PUNCTURE; CT THORACIC SPINE POST MYELOGRAM;  8/14/2024 4:57 pm; 8/14/2024 4:24 pm   INDICATION: Signs/Symptoms:Upper motor neuron symptoms of bilateral upper extremities. Unable to obtain MRI; Signs/Symptoms:Cervical, thoracic, lumbar for UE and LE weakness.   COMPARISON: CT cervical spine, thoracic spine, and lumbar spine without contrast 08/12/2024   ACCESSION NUMBER(S): [id number]; [id number]; [id number]; [id number]   ORDERING CLINICIAN: LILI CARUSO   TECHNIQUE: After discussion of the risks, benefits and alternatives, standard written hospital consent was obtained. The patient was placed prone on the fluoroscopic table.  The lower back was prepped and draped in sterile fashion. One percent lidocaine was used for local anesthesia. Under fluoroscopic guidance, a 22 gauge spinal needle was advanced into the thecal sac at the L3-L4 level. 10 cc of Omnipaque 300 was administered intrathecally under intermittent fluoroscopic imaging. Fluoroscopy time was 1.2 minutes. The patient tolerated the procedure well.   Following the fluoroscopic myelogram, serial axial CT images were obtained through the cervical, thoracic, and lumbar spine, using a bone algorithm. Images were reformatted into sagittal and coronal planes.   FINDINGS: FLUOROSCOPIC MYELOGRAM:  The thecal sac is widely patent.  There is filling bilaterally of nerve root sleeves without cutoff. There is normal alignment.   CT MYELOGRAM: CERVICAL SPINE: No evidence of contrast opacification of the cervical spine thecal sac despite repeat imaging after placing patient in Trendelenburg for several minutes. Nondiagnostic CT myelogram of the cervical spine.   Alignment is unremarkable.  Vertebral body heights are maintained. Intervertebral disc space narrowing at C6-C7. Degenerative changes throughout the cervical spine most significant at C6-C7. No evidence of canal stenosis from the craniocervical junction to the C4-5 level. Nondiagnostic examination of the spinal canal at the C5-6 level and below secondary to streak artifact from overlying soft tissues.   There is hypertrophy of the right facet joint of C3-4 with slight joint space widening (series 205, image 23).   THORACIC SPINE: Contrast is visualized within the thoracic spine from the T5-6 level down. Partial opacification of the canal at the T3-4 and T4-5 levels. No contrast is seen within the thecal sac at the T1-2 and T2-3 levels.   Heterogeneous opacification of the right dorsolateral aspect of the thecal sac at the T5-6 level in the dorsal aspect of the thecal sac at the T7-8 level, which may represent blood products. No high-grade canal stenosis at any level from T5-6 through T12-L1.   The vertebral body heights of the thoracic spine are maintained. The intervertebral disc spaces are maintained. No evidence of significant neural foraminal stenosis.     LUMBAR SPINE: Heterogeneous opacification of the thecal sac within the lumbar spine, with dense cyst contrast seen at the L5-S1 level where the thecal sac was accessed.   Vertebral body heights are maintained. Intervertebral disc spaces are maintained. L1-2: No canal or foraminal stenosis.   L2-3: No canal or foraminal stenosis.   L3-4: Shallow disc bulge with facet hypertrophy results in mild narrowing the spinal canal and bilateral foramina.   L4-5: Shallow partially calcified disc bulge with facet hypertrophy results in mild narrowing of bilateral foramina and minimal narrowing of the spinal canal.   L5-S1: No canal or foraminal stenosis.     OTHER: Chest: Pacemaker in place. Status post mitral valve replacement. Emphysematous changes throughout the lungs. Abdomen/pelvis: No  remarkable findings within the visualized portions of the abdomen and pelvis. Soft tissues: No remarkable soft tissue findings.       CT myelogram 1. Non-diagnostic myelogram of the cervical spine secondary to non opacification of the thecal sac despite repeat imaging after Trendelenburg positioning. Within the limitations of this non-contrast CT cervical spine, no evidence of high-grade canal stenosis from the craniocervical junction to the C4-5 level. Nondiagnostic evaluation of the spinal canal from the C5-6 level and below secondary to streak artifact from overlying soft tissues. 2. Facet hypertrophy with slight widening of the right facet joint of C3-4. All findings may be degenerative in etiology, joint widening is nonspecific and underlying infectious process can not be excluded. 3. Inconsistent contrast opacification of the thoracic thecal sac. The thoracic spine is well opacified from T3 through T12 without evidence of central spinal canal stenosis, spinal cord compression, or neural foraminal stenosis. The superior portion of the thoracic thecal sac is not well opacified from approximately T1 through T3, as well as heterogeneous filling defects within the dorsal aspect of the thecal sac at the T5-6 and T7-8 levels, possibly due to blood products. 4. Heterogeneous contrast opacification of the lumbar spine thecal sac. Within these limitations, mild multilevel degenerative change without evidence of high-grade spinal canal stenosis or neural foraminal stenosis.   Given limitations of the current myelogram, consider repeat myelogram if patient is amenable.   IDr. Marr, supervised and was available throughout this procedure as performed by fellow physician Dr. Ty. This study was performed and interpreted at [facility]. I agree with the procedural description and the findings as stated.   MACRO: None   Signed by: Rico Marr 8/14/2024 9:22 PM Dictation workstation:    [id number]    CT thoracic spine post myelogram    Result Date: 8/14/2024  Interpreted By:  Rico Marr and Hofer Lindsay STUDY: CT CERVICAL SPINE POST MYELOGRAM; CT LUMBAR SPINE POST MYELOGRAM; [place] MULTIPLE REGIONS MYELOGRAM WITH LUMBAR PUNCTURE; CT THORACIC SPINE POST MYELOGRAM;  8/14/2024 4:57 pm; 8/14/2024 4:24 pm   INDICATION: Signs/Symptoms:Upper motor neuron symptoms of bilateral upper extremities. Unable to obtain MRI; Signs/Symptoms:Cervical, thoracic, lumbar for UE and LE weakness.   COMPARISON: CT cervical spine, thoracic spine, and lumbar spine without contrast 08/12/2024   ACCESSION NUMBER(S): [id number]; [id number]; [id number]; [id number]   ORDERING CLINICIAN: LILI FONTANEZ; SHANNAN CARUSO   TECHNIQUE: After discussion of the risks, benefits and alternatives, standard written hospital consent was obtained. The patient was placed prone on the fluoroscopic table.  The lower back was prepped and draped in sterile fashion. One percent lidocaine was used for local anesthesia. Under fluoroscopic guidance, a 22 gauge spinal needle was advanced into the thecal sac at the L3-L4 level. 10 cc of Omnipaque 300 was administered intrathecally under intermittent fluoroscopic imaging. Fluoroscopy time was 1.2 minutes. The patient tolerated the procedure well.   Following the fluoroscopic myelogram, serial axial CT images were obtained through the cervical, thoracic, and lumbar spine, using a bone algorithm. Images were reformatted into sagittal and coronal planes.   FINDINGS: FLUOROSCOPIC MYELOGRAM:  The thecal sac is widely patent.  There is filling bilaterally of nerve root sleeves without cutoff. There is normal alignment.   CT MYELOGRAM: CERVICAL SPINE: No evidence of contrast opacification of the cervical spine thecal sac despite repeat imaging after placing patient in Trendelenburg for several minutes. Nondiagnostic CT myelogram of the cervical spine.   Alignment is unremarkable. Vertebral body heights  are maintained. Intervertebral disc space narrowing at C6-C7. Degenerative changes throughout the cervical spine most significant at C6-C7. No evidence of canal stenosis from the craniocervical junction to the C4-5 level. Nondiagnostic examination of the spinal canal at the C5-6 level and below secondary to streak artifact from overlying soft tissues.   There is hypertrophy of the right facet joint of C3-4 with slight joint space widening (series 205, image 23).   THORACIC SPINE: Contrast is visualized within the thoracic spine from the T5-6 level down. Partial opacification of the canal at the T3-4 and T4-5 levels. No contrast is seen within the thecal sac at the T1-2 and T2-3 levels.   Heterogeneous opacification of the right dorsolateral aspect of the thecal sac at the T5-6 level in the dorsal aspect of the thecal sac at the T7-8 level, which may represent blood products. No high-grade canal stenosis at any level from T5-6 through T12-L1.   The vertebral body heights of the thoracic spine are maintained. The intervertebral disc spaces are maintained. No evidence of significant neural foraminal stenosis.     LUMBAR SPINE: Heterogeneous opacification of the thecal sac within the lumbar spine, with dense cyst contrast seen at the L5-S1 level where the thecal sac was accessed.   Vertebral body heights are maintained. Intervertebral disc spaces are maintained. L1-2: No canal or foraminal stenosis.   L2-3: No canal or foraminal stenosis.   L3-4: Shallow disc bulge with facet hypertrophy results in mild narrowing the spinal canal and bilateral foramina.   L4-5: Shallow partially calcified disc bulge with facet hypertrophy results in mild narrowing of bilateral foramina and minimal narrowing of the spinal canal.   L5-S1: No canal or foraminal stenosis.     OTHER: Chest: Pacemaker in place. Status post mitral valve replacement. Emphysematous changes throughout the lungs. Abdomen/pelvis: No remarkable findings within the  visualized portions of the abdomen and pelvis. Soft tissues: No remarkable soft tissue findings.       CT myelogram 1. Non-diagnostic myelogram of the cervical spine secondary to non opacification of the thecal sac despite repeat imaging after Trendelenburg positioning. Within the limitations of this non-contrast CT cervical spine, no evidence of high-grade canal stenosis from the craniocervical junction to the C4-5 level. Nondiagnostic evaluation of the spinal canal from the C5-6 level and below secondary to streak artifact from overlying soft tissues. 2. Facet hypertrophy with slight widening of the right facet joint of C3-4. All findings may be degenerative in etiology, joint widening is nonspecific and underlying infectious process can not be excluded. 3. Inconsistent contrast opacification of the thoracic thecal sac. The thoracic spine is well opacified from T3 through T12 without evidence of central spinal canal stenosis, spinal cord compression, or neural foraminal stenosis. The superior portion of the thoracic thecal sac is not well opacified from approximately T1 through T3, as well as heterogeneous filling defects within the dorsal aspect of the thecal sac at the T5-6 and T7-8 levels, possibly due to blood products. 4. Heterogeneous contrast opacification of the lumbar spine thecal sac. Within these limitations, mild multilevel degenerative change without evidence of high-grade spinal canal stenosis or neural foraminal stenosis.   Given limitations of the current myelogram, consider repeat myelogram if patient is amenable.   IDr. Marr, supervised and was available throughout this procedure as performed by fellow physician Dr. Ty. This study was performed and interpreted at [facility]. I agree with the procedural description and the findings as stated.   MACRO: None   Signed by: Rico Marr 8/14/2024 9:22 PM Dictation workstation:   [id number]    CT lumbar spine post  myelogram    Result Date: 8/14/2024  Interpreted By:  Rico Marr and Hofer Lindsay STUDY: CT CERVICAL SPINE POST MYELOGRAM; CT LUMBAR SPINE POST MYELOGRAM; FL MULTIPLE REGIONS MYELOGRAM WITH LUMBAR PUNCTURE; CT THORACIC SPINE POST MYELOGRAM;  8/14/2024 4:57 pm; 8/14/2024 4:24 pm   INDICATION: Signs/Symptoms:Upper motor neuron symptoms of bilateral upper extremities. Unable to obtain MRI; Signs/Symptoms:Cervical, thoracic, lumbar for UE and LE weakness.   COMPARISON: CT cervical spine, thoracic spine, and lumbar spine without contrast 08/12/2024   ACCESSION NUMBER(S): [id number]; [id number]; [id number]; [id number]   ORDERING CLINICIAN: LILI CARUSO   TECHNIQUE: After discussion of the risks, benefits and alternatives, standard written hospital consent was obtained. The patient was placed prone on the fluoroscopic table.  The lower back was prepped and draped in sterile fashion. One percent lidocaine was used for local anesthesia. Under fluoroscopic guidance, a 22 gauge spinal needle was advanced into the thecal sac at the L3-L4 level. 10 cc of Omnipaque 300 was administered intrathecally under intermittent fluoroscopic imaging. Fluoroscopy time was 1.2 minutes. The patient tolerated the procedure well.   Following the fluoroscopic myelogram, serial axial CT images were obtained through the cervical, thoracic, and lumbar spine, using a bone algorithm. Images were reformatted into sagittal and coronal planes.   FINDINGS: FLUOROSCOPIC MYELOGRAM:  The thecal sac is widely patent.  There is filling bilaterally of nerve root sleeves without cutoff. There is normal alignment.   CT MYELOGRAM: CERVICAL SPINE: No evidence of contrast opacification of the cervical spine thecal sac despite repeat imaging after placing patient in Trendelenburg for several minutes. Nondiagnostic CT myelogram of the cervical spine.   Alignment is unremarkable. Vertebral body heights are maintained. Intervertebral disc space  narrowing at C6-C7. Degenerative changes throughout the cervical spine most significant at C6-C7. No evidence of canal stenosis from the craniocervical junction to the C4-5 level. Nondiagnostic examination of the spinal canal at the C5-6 level and below secondary to streak artifact from overlying soft tissues.   There is hypertrophy of the right facet joint of C3-4 with slight joint space widening (series 205, image 23).   THORACIC SPINE: Contrast is visualized within the thoracic spine from the T5-6 level down. Partial opacification of the canal at the T3-4 and T4-5 levels. No contrast is seen within the thecal sac at the T1-2 and T2-3 levels.   Heterogeneous opacification of the right dorsolateral aspect of the thecal sac at the T5-6 level in the dorsal aspect of the thecal sac at the T7-8 level, which may represent blood products. No high-grade canal stenosis at any level from T5-6 through T12-L1.   The vertebral body heights of the thoracic spine are maintained. The intervertebral disc spaces are maintained. No evidence of significant neural foraminal stenosis.     LUMBAR SPINE: Heterogeneous opacification of the thecal sac within the lumbar spine, with dense cyst contrast seen at the L5-S1 level where the thecal sac was accessed.   Vertebral body heights are maintained. Intervertebral disc spaces are maintained. L1-2: No canal or foraminal stenosis.   L2-3: No canal or foraminal stenosis.   L3-4: Shallow disc bulge with facet hypertrophy results in mild narrowing the spinal canal and bilateral foramina.   L4-5: Shallow partially calcified disc bulge with facet hypertrophy results in mild narrowing of bilateral foramina and minimal narrowing of the spinal canal.   L5-S1: No canal or foraminal stenosis.     OTHER: Chest: Pacemaker in place. Status post mitral valve replacement. Emphysematous changes throughout the lungs. Abdomen/pelvis: No remarkable findings within the visualized portions of the abdomen and  pelvis. Soft tissues: No remarkable soft tissue findings.       CT myelogram 1. Non-diagnostic myelogram of the cervical spine secondary to non opacification of the thecal sac despite repeat imaging after Trendelenburg positioning. Within the limitations of this non-contrast CT cervical spine, no evidence of high-grade canal stenosis from the craniocervical junction to the C4-5 level. Nondiagnostic evaluation of the spinal canal from the C5-6 level and below secondary to streak artifact from overlying soft tissues. 2. Facet hypertrophy with slight widening of the right facet joint of C3-4. All findings may be degenerative in etiology, joint widening is nonspecific and underlying infectious process can not be excluded. 3. Inconsistent contrast opacification of the thoracic thecal sac. The thoracic spine is well opacified from T3 through T12 without evidence of central spinal canal stenosis, spinal cord compression, or neural foraminal stenosis. The superior portion of the thoracic thecal sac is not well opacified from approximately T1 through T3, as well as heterogeneous filling defects within the dorsal aspect of the thecal sac at the T5-6 and T7-8 levels, possibly due to blood products. 4. Heterogeneous contrast opacification of the lumbar spine thecal sac. Within these limitations, mild multilevel degenerative change without evidence of high-grade spinal canal stenosis or neural foraminal stenosis.   Given limitations of the current myelogram, consider repeat myelogram if patient is amenable.   IDr. Marr, supervised and was available throughout this procedure as performed by fellow physician Dr. Ty. This study was performed and interpreted at [facility]. I agree with the procedural description and the findings as stated.   MACRO: None   Signed by: Rico Marr 8/14/2024 9:22 PM Dictation workstation:   [id number]    FL multiple regions myelogram with lumbar puncture    Result  Date: 8/14/2024  Interpreted By:  Rico Marr and Hofer Lindsay STUDY: CT CERVICAL SPINE POST MYELOGRAM; CT LUMBAR SPINE POST MYELOGRAM; FL MULTIPLE REGIONS MYELOGRAM WITH LUMBAR PUNCTURE; CT THORACIC SPINE POST MYELOGRAM;  8/14/2024 4:57 pm; 8/14/2024 4:24 pm   INDICATION: Signs/Symptoms:Upper motor neuron symptoms of bilateral upper extremities. Unable to obtain MRI; Signs/Symptoms:Cervical, thoracic, lumbar for UE and LE weakness.   COMPARISON: CT cervical spine, thoracic spine, and lumbar spine without contrast 08/12/2024   ACCESSION NUMBER(S): [id number]; [id number]; [id number]; [id number]   ORDERING CLINICIAN: LILI CARUSO   TECHNIQUE: After discussion of the risks, benefits and alternatives, standard written hospital consent was obtained. The patient was placed prone on the fluoroscopic table.  The lower back was prepped and draped in sterile fashion. One percent lidocaine was used for local anesthesia. Under fluoroscopic guidance, a 22 gauge spinal needle was advanced into the thecal sac at the L3-L4 level. 10 cc of Omnipaque 300 was administered intrathecally under intermittent fluoroscopic imaging. Fluoroscopy time was 1.2 minutes. The patient tolerated the procedure well.   Following the fluoroscopic myelogram, serial axial CT images were obtained through the cervical, thoracic, and lumbar spine, using a bone algorithm. Images were reformatted into sagittal and coronal planes.   FINDINGS: FLUOROSCOPIC MYELOGRAM:  The thecal sac is widely patent.  There is filling bilaterally of nerve root sleeves without cutoff. There is normal alignment.   CT MYELOGRAM: CERVICAL SPINE: No evidence of contrast opacification of the cervical spine thecal sac despite repeat imaging after placing patient in Trendelenburg for several minutes. Nondiagnostic CT myelogram of the cervical spine.   Alignment is unremarkable. Vertebral body heights are maintained. Intervertebral disc space narrowing at C6-C7.  Degenerative changes throughout the cervical spine most significant at C6-C7. No evidence of canal stenosis from the craniocervical junction to the C4-5 level. Nondiagnostic examination of the spinal canal at the C5-6 level and below secondary to streak artifact from overlying soft tissues.   There is hypertrophy of the right facet joint of C3-4 with slight joint space widening (series 205, image 23).   THORACIC SPINE: Contrast is visualized within the thoracic spine from the T5-6 level down. Partial opacification of the canal at the T3-4 and T4-5 levels. No contrast is seen within the thecal sac at the T1-2 and T2-3 levels.   Heterogeneous opacification of the right dorsolateral aspect of the thecal sac at the T5-6 level in the dorsal aspect of the thecal sac at the T7-8 level, which may represent blood products. No high-grade canal stenosis at any level from T5-6 through T12-L1.   The vertebral body heights of the thoracic spine are maintained. The intervertebral disc spaces are maintained. No evidence of significant neural foraminal stenosis.     LUMBAR SPINE: Heterogeneous opacification of the thecal sac within the lumbar spine, with dense cyst contrast seen at the L5-S1 level where the thecal sac was accessed.   Vertebral body heights are maintained. Intervertebral disc spaces are maintained. L1-2: No canal or foraminal stenosis.   L2-3: No canal or foraminal stenosis.   L3-4: Shallow disc bulge with facet hypertrophy results in mild narrowing the spinal canal and bilateral foramina.   L4-5: Shallow partially calcified disc bulge with facet hypertrophy results in mild narrowing of bilateral foramina and minimal narrowing of the spinal canal.   L5-S1: No canal or foraminal stenosis.     OTHER: Chest: Pacemaker in place. Status post mitral valve replacement. Emphysematous changes throughout the lungs. Abdomen/pelvis: No remarkable findings within the visualized portions of the abdomen and pelvis. Soft tissues: No  remarkable soft tissue findings.       CT myelogram 1. Non-diagnostic myelogram of the cervical spine secondary to non opacification of the thecal sac despite repeat imaging after Trendelenburg positioning. Within the limitations of this non-contrast CT cervical spine, no evidence of high-grade canal stenosis from the craniocervical junction to the C4-5 level. Nondiagnostic evaluation of the spinal canal from the C5-6 level and below secondary to streak artifact from overlying soft tissues. 2. Facet hypertrophy with slight widening of the right facet joint of C3-4. All findings may be degenerative in etiology, joint widening is nonspecific and underlying infectious process can not be excluded. 3. Inconsistent contrast opacification of the thoracic thecal sac. The thoracic spine is well opacified from T3 through T12 without evidence of central spinal canal stenosis, spinal cord compression, or neural foraminal stenosis. The superior portion of the thoracic thecal sac is not well opacified from approximately T1 through T3, as well as heterogeneous filling defects within the dorsal aspect of the thecal sac at the T5-6 and T7-8 levels, possibly due to blood products. 4. Heterogeneous contrast opacification of the lumbar spine thecal sac. Within these limitations, mild multilevel degenerative change without evidence of high-grade spinal canal stenosis or neural foraminal stenosis.   Given limitations of the current myelogram, consider repeat myelogram if patient is amenable.   I, Dr. Marr, supervised and was available throughout this procedure as performed by fellow physician Dr. Ty. This study was performed and interpreted at [facility]. I agree with the procedural description and the findings as stated.   MACRO: None   Signed by: Rico Marr 8/14/2024 9:22 PM Dictation workstation:   [id number]    CT angio head w and wo IV contrast    Result Date: 8/12/2024  Interpreted By:  Ezio  Beth  and Monica Rai STUDY: CT ANGIO HEAD W AND WO IV CONTRAST;  8/12/2024 1:22 am   INDICATION: Signs/Symptoms:bilateral upper extremity weaknes. Hx of aneurysm.   COMPARISON: CT head 08/12/2024   ACCESSION NUMBER(S): [id number]   ORDERING CLINICIAN: LILI FONTANEZ   TECHNIQUE: Unenhanced CT images of the head were obtained. Subsequently, 150 mL of Omnipaque 350 was administered intravenously and axial images of the head were acquired.  Coronal, sagittal, and 3-D reconstructions were provided for review.   FINDINGS: Postsurgical changes of the aneurysm clip new right basilar cistern region motion limits evaluation due to streak artifact. Dedicated head CT is been performed and reported separately.   Anterior circulation: Atherosclerotic calcifications of the right greater than left carotid siphons. Streak artifact partially limits evaluation of the right distal ICA otherwise the bilateral intracranial internal carotid arteries, bilateral carotid terminals, bilateral proximal anterior and middle cerebral arteries are normal.   Posterior circulation: Tapering with diminutive intradural left vertebral artery. Streak artifact limits evaluation of the basilar tip and right greater than left proximal PCA. The mid to distal PCAs appear grossly patent.   Adjacent rounded opacities in the region of the right posterior communicating artery noted on axial projection without corresponding abnormality identified on additional projections and favored to represent volume averaging or artifact. Otherwise bilateral intracranial vertebral arteries, vertebrobasilar junction and basilar artery are normal.       Postsurgical changes of right posterior circulation aneurysmal clipping with associated streak artifact partially limiting evaluation. Otherwise no evidence for significant stenosis or large branch vessel cutoffs of the intracranial vessels.   Opacities noted on axial imaging without corresponding abnormality on  additional findings. Findings are favored to represent artifact versus true aneurysm however attention on follow-up recommended.   I personally reviewed the image(s)/study and resident interpretation. I agree with the findings as stated by resident Fredi Anderson. Data analyzed and images interpreted at [facility], [place], [place].     MACRO: None   Signed by: Beth Holguin 8/12/2024 3:47 AM Dictation workstation:   [id number]    CT abdomen pelvis w IV contrast    Result Date: 8/12/2024  Interpreted By:  Beth Holguin,  and Monica Rai STUDY: CT ABDOMEN PELVIS W IV CONTRAST; CT THORACIC SPINE WO IV CONTRAST; CT LUMBAR SPINE WO IV CONTRAST;  8/12/2024 2:12 am; 8/12/2024 2:11 am   INDICATION: Signs/Symptoms:N/V; Signs/Symptoms:BUE and BLE weakness; Signs/Symptoms:BUE and BLE weakness..   COMPARISON: None.   ACCESSION NUMBER(S): [id number]; [id number]; [id number]   ORDERING CLINICIAN: LILI FONTANEZ   TECHNIQUE: CT of the abdomen and pelvis was performed.  Standard contiguous axial images were obtained at 3 mm slice thickness through the abdomen and pelvis. Coronal and sagittal reconstructions at 3 mm slice thickness were performed.   150 ml of contrast omni 350 were administered intravenously without immediate complication.   FINDINGS: LOWER CHEST: Ground-glass attenuation of the lower lungs likely representing aspiration pneumonitis with a component of atelectasis. Emphysematous changes. Please see dedicated CTA chest for further evaluation.   ABDOMEN:   LIVER: The liver is normal in size measuring 15.3 cm in the craniocaudal axis. No focal hepatic lesions.   BILE DUCTS: The intrahepatic and extrahepatic ducts are not dilated.   GALLBLADDER: The gallbladder is nondistended and without evidence of radiopaque stones.   PANCREAS: The pancreas appears unremarkable without evidence of ductal dilatation or masses.   SPLEEN: The spleen is normal in size without focal  lesions.   ADRENAL GLANDS: Bilateral adrenal glands appear normal.   KIDNEYS AND URETERS: The kidneys are normal in size and enhance symmetrically. Few bilateral hypodense lesions are too small to characterize, favored to represent simple cysts.  No hydroureteronephrosis. Contrast material is present within the bilateral renal collecting systems and ureters, limiting evaluation for calculi.   PELVIS:   BLADDER: Bladder is normal in appearance without wall thickening. Layering contrast material within the bladder.   REPRODUCTIVE ORGANS: The uterus is surgically absent.   BOWEL: The stomach is unremarkable.   The small and large bowel are normal in caliber and demonstrate no wall thickening. Colonic diverticulosis without evidence of diverticulitis. The appendix is not definitely visualized. There is however no pericecal stranding or fluid. Moderate stool burden.   VESSELS: There is no aneurysmal dilatation of the abdominal aorta. The IVC appears normal. Moderate atherosclerotic calcification of the abdominal aorta and its branches.   PERITONEUM/RETROPERITONEUM/LYMPH NODES: There is no free or loculated fluid collection, no free intraperitoneal air. The retroperitoneum appears normal.  No abdominopelvic lymphadenopathy is present.   BONES AND ABDOMINAL WALL: Rightward curvature of the lumbar spine. The abdominal wall soft tissues appear normal.     CT THORACIC SPINE:   PARASPINAL SOFT TISSUES: No paravertebral fluid collection or significant edema. ALIGNMENT:  No traumatic spondylolisthesis or traumatic facet widening. VERTEBRAE:  No acute fracture. Vertebral body heights are maintained. SPINAL CANAL/INTERVERTEBRAL DISCS: No high-grade spinal canal stenosis. Moderate multilevel disc height loss, most severe at T11-12 and T12-L1. Multilevel anterior osteophytosis. The there are indeterminate peripheral calcifications present in the expected region of the posterior thecal sac dorsal to the T7, T8 and T9 vertebral  bodies, likely chronic possibly related to remote infection, inflammation or hemorrhage. No definite mass effect identified.     CT LUMBAR SPINE:   PARASPINAL SOFT TISSUES: No paravertebral fluid collection or significant edema. ALIGNMENT: Dextrocurvature of the lower thoracic and lumbar spine. No traumatic spondylolisthesis or traumatic facet widening. VERTEBRAE: Bones appear mildly demineralized. No acute fracture. Vertebral body heights are maintained. SPINAL CANAL/INTERVERTEBRAL DISCS: No high-grade spinal canal stenosis. Moderate multilevel disc height loss, most significant at T12-L1. Moderate bilateral facet joint arthropathy from L2-S1. NEURAL FORAMINA: Disc bulge at L2-L3, L3-L4 and L5-S1 with facet hypertrophy with up to moderate foraminal narrowing. Disc bulge and facet hypertrophy at L4 L spine with up to severe foraminal stenosis.       1.  No acute abnormality within the abdomen or pelvis. 2. No acute fracture of the thoracic or lumbar spine. Degenerative changes of the spine without critical canal stenosis. Foraminal narrowing in the lumbar spine as detailed. 3. Probable chronic calcifications within the posterior fecal sac at the level of the midthoracic spine as detailed. No associated canal stenosis identified..   I personally reviewed the image(s)/study and resident interpretation. I agree with the findings as stated by resident Fredi Anderson. Data analyzed and images interpreted at [facility], [place], [place].   MACRO: None   Signed by: Beth Holguin 8/12/2024 2:55 AM Dictation workstation:   [id number]    CT thoracic spine wo IV contrast    Result Date: 8/12/2024  Interpreted By:  Beth Holguin,  Colt Rai STUDY: CT ABDOMEN PELVIS W IV CONTRAST; CT THORACIC SPINE WO IV CONTRAST; CT LUMBAR SPINE WO IV CONTRAST;  8/12/2024 2:12 am; 8/12/2024 2:11 am   INDICATION: Signs/Symptoms:N/V; Signs/Symptoms:BUE and BLE weakness; Signs/Symptoms:BUE and  BLE weakness..   COMPARISON: None.   ACCESSION NUMBER(S): [id number]; [id number]; [id number]   ORDERING CLINICIAN: LILI FONTANEZ   TECHNIQUE: CT of the abdomen and pelvis was performed.  Standard contiguous axial images were obtained at 3 mm slice thickness through the abdomen and pelvis. Coronal and sagittal reconstructions at 3 mm slice thickness were performed.   150 ml of contrast omni 350 were administered intravenously without immediate complication.   FINDINGS: LOWER CHEST: Ground-glass attenuation of the lower lungs likely representing aspiration pneumonitis with a component of atelectasis. Emphysematous changes. Please see dedicated CTA chest for further evaluation.   ABDOMEN:   LIVER: The liver is normal in size measuring 15.3 cm in the craniocaudal axis. No focal hepatic lesions.   BILE DUCTS: The intrahepatic and extrahepatic ducts are not dilated.   GALLBLADDER: The gallbladder is nondistended and without evidence of radiopaque stones.   PANCREAS: The pancreas appears unremarkable without evidence of ductal dilatation or masses.   SPLEEN: The spleen is normal in size without focal lesions.   ADRENAL GLANDS: Bilateral adrenal glands appear normal.   KIDNEYS AND URETERS: The kidneys are normal in size and enhance symmetrically. Few bilateral hypodense lesions are too small to characterize, favored to represent simple cysts.  No hydroureteronephrosis. Contrast material is present within the bilateral renal collecting systems and ureters, limiting evaluation for calculi.   PELVIS:   BLADDER: Bladder is normal in appearance without wall thickening. Layering contrast material within the bladder.   REPRODUCTIVE ORGANS: The uterus is surgically absent.   BOWEL: The stomach is unremarkable.   The small and large bowel are normal in caliber and demonstrate no wall thickening. Colonic diverticulosis without evidence of diverticulitis. The appendix is not definitely visualized. There is however no pericecal  stranding or fluid. Moderate stool burden.   VESSELS: There is no aneurysmal dilatation of the abdominal aorta. The IVC appears normal. Moderate atherosclerotic calcification of the abdominal aorta and its branches.   PERITONEUM/RETROPERITONEUM/LYMPH NODES: There is no free or loculated fluid collection, no free intraperitoneal air. The retroperitoneum appears normal.  No abdominopelvic lymphadenopathy is present.   BONES AND ABDOMINAL WALL: Rightward curvature of the lumbar spine. The abdominal wall soft tissues appear normal.     CT THORACIC SPINE:   PARASPINAL SOFT TISSUES: No paravertebral fluid collection or significant edema. ALIGNMENT:  No traumatic spondylolisthesis or traumatic facet widening. VERTEBRAE:  No acute fracture. Vertebral body heights are maintained. SPINAL CANAL/INTERVERTEBRAL DISCS: No high-grade spinal canal stenosis. Moderate multilevel disc height loss, most severe at T11-12 and T12-L1. Multilevel anterior osteophytosis. The there are indeterminate peripheral calcifications present in the expected region of the posterior thecal sac dorsal to the T7, T8 and T9 vertebral bodies, likely chronic possibly related to remote infection, inflammation or hemorrhage. No definite mass effect identified.     CT LUMBAR SPINE:   PARASPINAL SOFT TISSUES: No paravertebral fluid collection or significant edema. ALIGNMENT: Dextrocurvature of the lower thoracic and lumbar spine. No traumatic spondylolisthesis or traumatic facet widening. VERTEBRAE: Bones appear mildly demineralized. No acute fracture. Vertebral body heights are maintained. SPINAL CANAL/INTERVERTEBRAL DISCS: No high-grade spinal canal stenosis. Moderate multilevel disc height loss, most significant at T12-L1. Moderate bilateral facet joint arthropathy from L2-S1. NEURAL FORAMINA: Disc bulge at L2-L3, L3-L4 and L5-S1 with facet hypertrophy with up to moderate foraminal narrowing. Disc bulge and facet hypertrophy at L4 L spine with up to severe  foraminal stenosis.       1.  No acute abnormality within the abdomen or pelvis. 2. No acute fracture of the thoracic or lumbar spine. Degenerative changes of the spine without critical canal stenosis. Foraminal narrowing in the lumbar spine as detailed. 3. Probable chronic calcifications within the posterior fecal sac at the level of the midthoracic spine as detailed. No associated canal stenosis identified..   I personally reviewed the image(s)/study and resident interpretation. I agree with the findings as stated by resident Fredi Anderson. Data analyzed and images interpreted at [facility], [place], [place].   MACRO: None   Signed by: Beth Holguin 8/12/2024 2:55 AM Dictation workstation:   [id number]    CT lumbar spine wo IV contrast    Result Date: 8/12/2024  Interpreted By:  Beth Holguin and Beyersdorf Conner STUDY: CT ABDOMEN PELVIS W IV CONTRAST; CT THORACIC SPINE WO IV CONTRAST; CT LUMBAR SPINE WO IV CONTRAST;  8/12/2024 2:12 am; 8/12/2024 2:11 am   INDICATION: Signs/Symptoms:N/V; Signs/Symptoms:BUE and BLE weakness; Signs/Symptoms:BUE and BLE weakness..   COMPARISON: None.   ACCESSION NUMBER(S): [id number]; [id number]; [id number]   ORDERING CLINICIAN: LILI FONTANEZ   TECHNIQUE: CT of the abdomen and pelvis was performed.  Standard contiguous axial images were obtained at 3 mm slice thickness through the abdomen and pelvis. Coronal and sagittal reconstructions at 3 mm slice thickness were performed.   150 ml of contrast omni 350 were administered intravenously without immediate complication.   FINDINGS: LOWER CHEST: Ground-glass attenuation of the lower lungs likely representing aspiration pneumonitis with a component of atelectasis. Emphysematous changes. Please see dedicated CTA chest for further evaluation.   ABDOMEN:   LIVER: The liver is normal in size measuring 15.3 cm in the craniocaudal axis. No focal hepatic lesions.   BILE DUCTS: The intrahepatic and  extrahepatic ducts are not dilated.   GALLBLADDER: The gallbladder is nondistended and without evidence of radiopaque stones.   PANCREAS: The pancreas appears unremarkable without evidence of ductal dilatation or masses.   SPLEEN: The spleen is normal in size without focal lesions.   ADRENAL GLANDS: Bilateral adrenal glands appear normal.   KIDNEYS AND URETERS: The kidneys are normal in size and enhance symmetrically. Few bilateral hypodense lesions are too small to characterize, favored to represent simple cysts.  No hydroureteronephrosis. Contrast material is present within the bilateral renal collecting systems and ureters, limiting evaluation for calculi.   PELVIS:   BLADDER: Bladder is normal in appearance without wall thickening. Layering contrast material within the bladder.   REPRODUCTIVE ORGANS: The uterus is surgically absent.   BOWEL: The stomach is unremarkable.   The small and large bowel are normal in caliber and demonstrate no wall thickening. Colonic diverticulosis without evidence of diverticulitis. The appendix is not definitely visualized. There is however no pericecal stranding or fluid. Moderate stool burden.   VESSELS: There is no aneurysmal dilatation of the abdominal aorta. The IVC appears normal. Moderate atherosclerotic calcification of the abdominal aorta and its branches.   PERITONEUM/RETROPERITONEUM/LYMPH NODES: There is no free or loculated fluid collection, no free intraperitoneal air. The retroperitoneum appears normal.  No abdominopelvic lymphadenopathy is present.   BONES AND ABDOMINAL WALL: Rightward curvature of the lumbar spine. The abdominal wall soft tissues appear normal.     CT THORACIC SPINE:   PARASPINAL SOFT TISSUES: No paravertebral fluid collection or significant edema. ALIGNMENT:  No traumatic spondylolisthesis or traumatic facet widening. VERTEBRAE:  No acute fracture. Vertebral body heights are maintained. SPINAL CANAL/INTERVERTEBRAL DISCS: No high-grade spinal canal  stenosis. Moderate multilevel disc height loss, most severe at T11-12 and T12-L1. Multilevel anterior osteophytosis. The there are indeterminate peripheral calcifications present in the expected region of the posterior thecal sac dorsal to the T7, T8 and T9 vertebral bodies, likely chronic possibly related to remote infection, inflammation or hemorrhage. No definite mass effect identified.     CT LUMBAR SPINE:   PARASPINAL SOFT TISSUES: No paravertebral fluid collection or significant edema. ALIGNMENT: Dextrocurvature of the lower thoracic and lumbar spine. No traumatic spondylolisthesis or traumatic facet widening. VERTEBRAE: Bones appear mildly demineralized. No acute fracture. Vertebral body heights are maintained. SPINAL CANAL/INTERVERTEBRAL DISCS: No high-grade spinal canal stenosis. Moderate multilevel disc height loss, most significant at T12-L1. Moderate bilateral facet joint arthropathy from L2-S1. NEURAL FORAMINA: Disc bulge at L2-L3, L3-L4 and L5-S1 with facet hypertrophy with up to moderate foraminal narrowing. Disc bulge and facet hypertrophy at L4 L spine with up to severe foraminal stenosis.       1.  No acute abnormality within the abdomen or pelvis. 2. No acute fracture of the thoracic or lumbar spine. Degenerative changes of the spine without critical canal stenosis. Foraminal narrowing in the lumbar spine as detailed. 3. Probable chronic calcifications within the posterior fecal sac at the level of the midthoracic spine as detailed. No associated canal stenosis identified..   I personally reviewed the image(s)/study and resident interpretation. I agree with the findings as stated by resident Fredi Anderson. Data analyzed and images interpreted at [facility], [place], [place].   MACRO: None   Signed by: Beth Holguin 8/12/2024 2:55 AM Dictation workstation:   [id number]    CT angio chest for pulmonary embolism    Result Date: 8/12/2024  Interpreted By:   Beth Holguin  and Monica Rai STUDY: CT ANGIO CHEST FOR PULMONARY EMBOLISM;  8/12/2024 2:36 am   INDICATION: Signs/Symptoms:Weakness, fatigue shortness of breath.   COMPARISON: None   ACCESSION NUMBER(S): [id number]   ORDERING CLINICIAN: ROSSANA CRANE   TECHNIQUE: Helical data acquisition of the chest was obtained after intravenous administration of 150 cc of Omnipaque 350, as per PE protocol. Images were reformatted in coronal and sagittal planes. Axial and coronal maximum intensity projection (MIP) images were created and reviewed.   FINDINGS: POTENTIAL LIMITATIONS OF THE STUDY: None   HEART AND VESSELS: There are no discrete filling defects within main pulmonary artery and its branches to suggest acute pulmonary embolism. Main pulmonary artery and its branches are normal in caliber.   The thoracic aorta normal in course and caliber.There is moderate atherosclerosis present, including calcified and noncalcified plaques.Although, the study is not tailored for evaluation of aorta, there is no definite evidence of acute aortic pathology. Ectasia of the main pulmonary artery measuring 3.3 cm Coronary artery calcifications noted however exam is not optimized for evaluation.   Mild cardiomegaly with enlargement of the left atrium.There are no findings to suggest right heart strain. Left atrial appendage closure device in place. Left chest pacemaker/ICD with leads terminating in the right atrium and right ventricle. There is no pericardial effusion seen.   MEDIASTINUM AND TOÑO, LOWER NECK AND AXILLA: The visualized thyroid gland is within normal limits. No evidence of thoracic lymphadenopathy by CT criteria. Patulous appearance of the esophagus with heterogenous material in the upper esophagus.   LUNGS AND AIRWAYS: There is mucous layering in the distal trachea and right and left main bronchi. Subtle mucous plugging in lower lobe bronchials. Ground-glass consolidation of the dependent portions of the lower  lungs. Moderate centrilobular and apically predominant emphysematous changes.  There is mild diffuse bronchial wall thickening. Calcified granuloma noted dot No pleural effusion or pneumothorax.     UPPER ABDOMEN:   Please see dedicated CT abdomen pelvis for further evaluation.   CHEST WALL AND OSSEOUS STRUCTURES: Chest wall is within normal limits. Multilevel degenerative changes with endplate osteophytes in the visualized spine. No acute osseous pathology.There are no suspicious osseous lesions.       1. No evidence of acute pulmonary embolism to the segmental level. 2. Small amount of mucus layering within the main bronchi and mucous plugging of the bronchials. Ground-glass opacities in the bilateral lung bases. Findings are consistent with aspiration pneumonitis and superimposed atelectasis. 3. Patulous esophagus with debris and septations noted to the upper level which could be related to esophagitis. Clinical correlation suggested. Findings also increased aspiration risk. 4. Ectatic main pulmonary artery measuring 3.3 cm. 5. Cardiomegaly with enlargement of the left atrium.   I personally reviewed the image(s)/study and resident interpretation. I agree with the findings as stated by resident Fredi Anderson. Data analyzed and images interpreted at [facility], [place], [place].   MACRO: None   Signed by: Beth Holguin 8/12/2024 2:37 AM Dictation workstation:   [id number]    CT head wo IV contrast    Result Date: 8/12/2024  Interpreted By:  Beth Holguin and Beyersdorf Conner STUDY: CT HEAD WO IV CONTRAST; CT CERVICAL SPINE WO IV CONTRAST;  8/12/2024 1:22 am   INDICATION: Signs/Symptoms:Hx of brain aneurysm. Generalized fatigue and weakness; Signs/Symptoms:BUE and BLE weakness   COMPARISON: CT head 03/28/2017   ACCESSION NUMBER(S): [id number]; [id number]   ORDERING CLINICIAN: ROSSANA FONTANEZ   TECHNIQUE: Axial noncontrast CT images of head with coronal and  sagittal reconstructed images. Axial noncontrast CT images of the cervical spine with coronal and sagittal reconstructed images.   FINDINGS: CT HEAD:   Postsurgical changes of right temporal craniotomy and aneurysmal clipping near the suprasellar and right parasellar region. Associated metallic streak artifact limits evaluation of the region. Findings are similar prior imaging   BRAIN PARENCHYMA: Confluent region of hypoattenuating areas of periventricular and subcortical white matter, which is nonspecific, but favored to represent chronic small-vessel ischemic disease in a patient of this age. Mild encephalomalacia within the bilateral right temporal lobe. No evidence of acute intraparenchymal hemorrhage or parenchymal evidence of acute large territory ischemic infarct. No mass-effect, midline shift or effacement of cerebral sulci. Gray-white matter distinction is preserved.   VENTRICLES and EXTRA-AXIAL SPACES: No acute extra-axial or intraventricular hemorrhage. Ventricles and sulci are age-concordant.   PARANASAL SINUSES/MASTOIDS: No hemorrhage or air-fluid levels within the visualized paranasal sinuses. The mastoid air cells are well-aerated.   CALVARIUM/ORBITS: No skull fracture. Bilateral lens replacements. The orbits and globes are intact to the extent visualized.   EXTRACRANIAL SOFT TISSUES: No discernible abnormality. Postsurgical changes of the lungs is   CT CERVICAL SPINE:   PREVERTEBRAL SOFT TISSUES: Within normal limits.   CRANIOCERVICAL JUNCTION: Intact.   ALIGNMENT: Loss of the normal cervical lordosis. There is grade 1 retrolisthesis of C3-C4 measuring 3.3 mm, and grade 1 retrolisthesis of C5-6 measuring 2.7 mm. Facet joint alignment is maintained. No traumatic malalignment or traumatic facet widening.   VERTEBRAE: No acute fracture. Vertebral body heights are maintained.   SPINAL CANAL/INTERVERTEBRAL DISCS: No high-grade spinal canal stenosis. Multilevel variable disc space height loss, most severe  at C6-7. Prominent osteophytosis at this level along with subchondral sclerosis and cystic changes..   NEURAL FORAMINA: Mild uncovertebral hypertrophy at C4-5 with a mild bilateral neural foraminal narrowing. Moderate neural foraminal narrowing and right facet hypertrophy at C5-6 with moderate right neural foraminal narrowing. Severe uncovertebral hypertrophy at C6-7 with moderate bilateral neural foraminal narrowing.   OTHER: Dedicated chest imaging has been performed and will be reported separately..       CT HEAD: 1. Postsurgical changes of right temporal craniotomy and aneurysmal clipping right partially degrades imaging. Otherwise no acute intracranial hemorrhage or mass effect identified. 2. Findings consistent with chronic small-vessel ischemic disease and right temporal lobe encephalomalacia.     CT CERVICAL SPINE: 1. No acute fracture or traumatic malalignment of the cervical spine. 2. Multilevel spondylotic changes of the cervical spine as detailed above, with multilevel neural foraminal narrowing.     I personally reviewed the image(s)/study and resident interpretation. I agree with the findings as stated by resident Fredi Anderson. Data analyzed and images interpreted at [facility], [place], [place].   MACRO: none   Signed by: Beth Holguin 8/12/2024 2:31 AM Dictation workstation:   [id number]    CT cervical spine wo IV contrast    Result Date: 8/12/2024  Interpreted By:  Beth Holguin and Beyersdorf Conner STUDY: CT HEAD WO IV CONTRAST; CT CERVICAL SPINE WO IV CONTRAST;  8/12/2024 1:22 am   INDICATION: Signs/Symptoms:Hx of brain aneurysm. Generalized fatigue and weakness; Signs/Symptoms:BUE and BLE weakness   COMPARISON: CT head 03/28/2017   ACCESSION NUMBER(S): [id number]; [id number]   ORDERING CLINICIAN: ROSSANA FONTANEZ   TECHNIQUE: Axial noncontrast CT images of head with coronal and sagittal reconstructed images. Axial noncontrast CT images of  the cervical spine with coronal and sagittal reconstructed images.   FINDINGS: CT HEAD:   Postsurgical changes of right temporal craniotomy and aneurysmal clipping near the suprasellar and right parasellar region. Associated metallic streak artifact limits evaluation of the region. Findings are similar prior imaging   BRAIN PARENCHYMA: Confluent region of hypoattenuating areas of periventricular and subcortical white matter, which is nonspecific, but favored to represent chronic small-vessel ischemic disease in a patient of this age. Mild encephalomalacia within the bilateral right temporal lobe. No evidence of acute intraparenchymal hemorrhage or parenchymal evidence of acute large territory ischemic infarct. No mass-effect, midline shift or effacement of cerebral sulci. Gray-white matter distinction is preserved.   VENTRICLES and EXTRA-AXIAL SPACES: No acute extra-axial or intraventricular hemorrhage. Ventricles and sulci are age-concordant.   PARANASAL SINUSES/MASTOIDS: No hemorrhage or air-fluid levels within the visualized paranasal sinuses. The mastoid air cells are well-aerated.   CALVARIUM/ORBITS: No skull fracture. Bilateral lens replacements. The orbits and globes are intact to the extent visualized.   EXTRACRANIAL SOFT TISSUES: No discernible abnormality. Postsurgical changes of the lungs is   CT CERVICAL SPINE:   PREVERTEBRAL SOFT TISSUES: Within normal limits.   CRANIOCERVICAL JUNCTION: Intact.   ALIGNMENT: Loss of the normal cervical lordosis. There is grade 1 retrolisthesis of C3-C4 measuring 3.3 mm, and grade 1 retrolisthesis of C5-6 measuring 2.7 mm. Facet joint alignment is maintained. No traumatic malalignment or traumatic facet widening.   VERTEBRAE: No acute fracture. Vertebral body heights are maintained.   SPINAL CANAL/INTERVERTEBRAL DISCS: No high-grade spinal canal stenosis. Multilevel variable disc space height loss, most severe at C6-7. Prominent osteophytosis at this level along with  subchondral sclerosis and cystic changes..   NEURAL FORAMINA: Mild uncovertebral hypertrophy at C4-5 with a mild bilateral neural foraminal narrowing. Moderate neural foraminal narrowing and right facet hypertrophy at C5-6 with moderate right neural foraminal narrowing. Severe uncovertebral hypertrophy at C6-7 with moderate bilateral neural foraminal narrowing.   OTHER: Dedicated chest imaging has been performed and will be reported separately..       CT HEAD: 1. Postsurgical changes of right temporal craniotomy and aneurysmal clipping right partially degrades imaging. Otherwise no acute intracranial hemorrhage or mass effect identified. 2. Findings consistent with chronic small-vessel ischemic disease and right temporal lobe encephalomalacia.     CT CERVICAL SPINE: 1. No acute fracture or traumatic malalignment of the cervical spine. 2. Multilevel spondylotic changes of the cervical spine as detailed above, with multilevel neural foraminal narrowing.     I personally reviewed the image(s)/study and resident interpretation. I agree with the findings as stated by resident Fredi Anderson. Data analyzed and images interpreted at [facility], [place], [place].   MACRO: none   Signed by: Beth Holguin 8/12/2024 2:31 AM Dictation workstation:   [id number]        Assessment/Plan   Assessment & Plan  Weakness    Generalized weakness  This a 78yo RHF PMHx HTN, SSS (s/p pacer 2019), pAF (s/p watchman 2020), cerebral aneurysm (s/p clipping 1993), ET presenting for subacute progressive ascending weakness and instability.      Patient reported unsteady walking for about 6 weeks. Progressively worsening, but seems to have more-sharply declined in past 2 weeks. Now starting to involve BUE. Now starting to have paresthesias in the RUE and pain in the R shoulder. Occasional electrical shocks of pain radiating down BUE and chest.     Initial neurological examination was significant for some L  thenar atrophy, weakness of ECRL/ECU, EDC, ADM, FDI. On lowers, there is diminished LLE vibratory sense at the toe, ankle, and knee compared to left. Questionable C5 sensory level. Normal tone. Reflexes 3+ with spread throughout, London's & finger flexor reflex present bilaterally, 4bts ankle clonus bilaterally. Toes mute. Trace jaw jerk. Gait narrow-based and unsteady with truncal instability. CTH which shows R temporal cranioplasty, R posterior circulation clip with bloom artifact, and moderate posterior-predominant subcortical microvascular disease.      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 CT myelogram was inconclusive due to poor contrast uptake in the cervical spine. Consider technical vs obstructive cause. S/p cisternal myelogram on 8/16 which showed mild to moderate stenosis without obstruction. Neurosurgery not planning intervention (signed-off). Pending dispo to acute rehab.    8/22 Updates:  - Pending discharge to [facility]  - Appointments scheduled with [facility] attendings: Dr. Ga on 9/13 and Dr. Leslie on 2/3      #Cervical myeloradiculopathy  :: Possible structural compression  :: B12, folate, homocysteine, vitamin E, copper, MMA, CK all within normal limits  :: CT myelogram inconclusive d/t incomplete contrast uptake, reach out to IR re: imaging difficulties  :: Repeat cisternal myelogram shows no significant stenosis  - Consult neurosurgery: no intervention, signed off  - Pending dispo to [facility]  - Follow-up outpatient with [facility]     #MISC  -Continue home meds: hydrochlorothiazide daily, potassium, propranolol 10 mg QID, rosuvastatin 5 mg HS, Valtrex 500 mg daily    F: prn  E: prn  N: regular   A: PIV  DVT: lovenox   GI: N/A    CODE: FULL  NOK: Daughter Racheal, [phone]     Fartun Emmanuel MD  Neurology PGY-2

## 2024-08-21 NOTE — CONSULTS
PM&R Consult Note  Patient: Linda López   Age/sex: 79 y.o.   Medical Record #: [id number]       Referring physician: Shelton Crockett DO  Consulting physician: Brendan Staley M.D.      Procedures performed on/related to this admission:  na    Chief complaint:   Impairments and acitivities limitations in ADLs, mobility, functional communication, and cognition secondary to suspected cervical myelopathy    HPI:   Linda López is a 79 y.o. year old female patient with PMHx HTN, SSS (s/p pacer 2019), pAF (s/p watchman 2020), cerebral aneurysm (s/p clipping 1993), ET presenting for subacute progressive ascending weakness and instability.      Patient reported unsteady walking for about 6 weeks. Progressively worsening, but seems to have more-sharply declined in past 2 weeks. Now starting to involve BUE. Now starting to have paresthesias in the RUE and pain in the R shoulder. Occasional electrical shocks of pain radiating down BUE and chest.     Initial neurological examination was significant for some L thenar atrophy, weakness of ECRL/ECU, EDC, ADM, FDI. On lowers, there is diminished LLE vibratory sense at the toe, ankle, and knee compared to left. Questionable C5 sensory level. Normal tone. Reflexes 3+ with spread throughout, London's & finger flexor reflex present bilaterally, 4bts ankle clonus bilaterally. Toes mute. Trace jaw jerk. Gait narrow-based and unsteady with truncal instability. CTH which shows R temporal cranioplasty, R posterior circulation clip with bloom artifact, and moderate posterior-predominant subcortical microvascular disease.      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 CT myelogram was inconclusive due to poor contrast uptake in the cervical spine. Consider technical vs obstructive cause. S/p cisternal myelogram on 8/16 which showed mild to moderate stenosis without  obstruction. Neurosurgery not planning intervention (signed-off). Pending dispo to acute rehab..       PM&R was consulted for recommendations and for IRF appropriateness.    Present Status: Stable  PO: rebg  Pain: minimal/improving?  Bowel: intact  Bladder: intact  DVT prophylaxis with enoxaparin.   Weight bearing status: full    Precautions: na    Past Medical History:   Diagnosis Date    Epilepsy, unspecified, not intractable, without status epilepticus (Multi) 11/18/2014    Epilepsy    Other conditions influencing health status     Ulcer    Personal history of other diseases of the circulatory system     History of hypertension    Personal history of other diseases of the digestive system     History of esophageal reflux    Personal history of other diseases of the musculoskeletal system and connective tissue     History of arthritis    Personal history of other diseases of the nervous system and sense organs     History of cataract    Personal history of other mental and behavioral disorders     History of depression    Personal history of other specified conditions     History of heartburn    Pure hypercholesterolemia, unspecified     High cholesterol    REM sleep behavior disorder 03/17/2017    REM behavioral disorder    Snoring     Snoring        Past Surgical History:   Procedure Laterality Date    CT HEAD ANGIO W AND WO IV CONTRAST  5/25/2017    CT HEAD ANGIO W AND WO IV CONTRAST 5/25/2017 [facility]        No family history on file.     No Known Allergies     enoxaparin, 40 mg, subcutaneous, q24h  hydroCHLOROthiazide, 25 mg, oral, Daily  hydrOXYzine HCL, 50 mg, oral, Once  lidocaine, 1 patch, transdermal, q24h  nicotine, 1 patch, transdermal, Daily  PARoxetine, 20 mg, oral, Daily  potassium chloride CR, 20 mEq, oral, TID  propranolol, 10 mg, oral, 4x daily  rosuvastatin, 5 mg, oral, Nightly  valACYclovir, 500 mg, oral, Daily         PRN medications: acetaminophen **OR** acetaminophen     Social  History:  Tobacco/EtOh/Illicits:na  Working History:retired  Social supports: , 24 hour assistance is not be available  Home situation: Lives in [facility], with 2 stairs to enter (with  rails), and 0 stairs to B/B    Functional History:  Home DME: none   Premorbid ADL's: independent   Premorbid Mobility: independent   Present:   Physical Therapy: walking >300ft with walker and SBA but unsteady with balance drills - rec'd High intensity    Occupational Therapy: CGA for ADLs    Speech Therapy: na    Review of Systems  reviewed w/ pt - non-contributory -reports recent loss of taste/smell  -premorbid speech issues from old stroke/aneurysm    Physical Exam   Cervical ROM mod reduced, not tested aggressively  Neuro: Normal Sensation, strength, bulk and tone of upper and  lower limbs bilaterally except mild proximal muscle weakness,   Reflexes: 2+ b/l biceps, triceps, BR, 3+patellar, achilles reflexes. No ankle clonus.  POS wilkins  Sensory: Sensation to light touch intact in b/l UE and LE. Mild ataxia on FTN when approaching her nose only (able to reach finger normally), likely more sensory ataxia given loss of visual input.  Gait: Unsteady, narrow-based with lateral swaying to left more than right.   Vascular trace pedal edema, warm    Lab Results   Component Value Date    WBC 5.5 08/19/2024    HGB 15.0 08/19/2024    HCT 43.5 08/19/2024    MCV 98 08/19/2024     08/19/2024        Lab Results   Component Value Date    CREATININE 0.60 08/19/2024    BUN 21 08/19/2024     08/19/2024    K 4.1 08/19/2024     08/19/2024    CO2 30 08/19/2024        IMPRESSION:  Linda López is a 79 y.o. year old female patient with presumed cervical myelopathy but I suspect there may be residual deficits from prior stroke/aneurysm with unmasking with age/acute illness.  Less likely PN/AIDP given hyperreflexia    PM&R was consulted for recommendations and for IRF appropriateness.    Recommendations:  # Neuro - still awaiting  definitive diagnosis with outpatient neuromuscular followup    # Neurogenic bowel/bladder  - Voiding volitionally   - Recommend daily bowel program of Miralax BID and Docusate 100mg BID  - Goal of one BM daily, at risk for constipation while on opioids for pain management    # Immobility  - Prevent secondary complications  - NA she's mobile enough     # Impaired mobility and Impaired independence with ADLs and I/ADLs  - Continue PT, working on bed mobility, transfers, balance, endurance, strength, gait, eval for appropriate assistive device  - Continue OT, working on functional cognition, functional mobility, upper limb strength/coordination, balance, endurance, eval for appropriate adaptive equipment, ADLs, and I/ADLs.    -# Pain Management NA    # Dispo  - Patient would benefit from an acute inpatient rehab stay to improve functional outcome of impaired mobility, ADL's, transfers, and self-care. Patient would be an excellent candidate for acute rehabilitation now that she's clinically improving and she'll need to d/c home alone at discharge.   She is able and motivated to participate in 3 hours/day of therapy.   - Patient would benefit from medical follow up at least three times a week to address and monitor pain, BP, complications and other comorbidities , and we have internal medicine consultation available daily if needed at rehab.   Patient was independent in mobility and ADLs at baseline prior to this incident. Acute rehab is appropriate to assist in returning to PLOF and living situation.  - Post rehab destination: anticipated home   - For questions, please contact via Haiku    Estimated length of stay is 10 days with discharge home at Mod independent level.       [ADDENDUM 8/22/2024 -our medical director reviewed and denied admission because she's doing so well with PT - walking > 400 ft and standby assist with ADLs.   This is just too functional so would have limited goals.  I'd recommend d/c home with  walker for gait.   If she's not comfortable with d/c home she'll need to go to SNF]     We will follow along with you.  Thank you for the consult.  Dr FERMIN Norton  will be covering on Friday and Dr Jonathan Garcia on Monday , Dr Joel Clinton Resident will be on service this week then Griselda Loyd next week.    Seen with Dr Joel Clinton, trainee, the note and evaulation above is all mine but the trainee was involved in all aspects of the patient's care.     Brendan Staley M.D, FAAPMR, R-MSK  Chief, Division of PM&R  Board Certified in PM&R, Sports Medicine and Musculoskeletal Ultrasound  Available via Haik

## 2024-08-22 LAB
ALBUMIN SERPL BCP-MCNC: 3.7 G/DL (ref 3.4–5)
ANION GAP SERPL CALC-SCNC: 12 MMOL/L (ref 10–20)
BASOPHILS # BLD AUTO: 0.06 X10*3/UL (ref 0–0.1)
BASOPHILS NFR BLD AUTO: 1 %
BUN SERPL-MCNC: 26 MG/DL (ref 6–23)
CALCIUM SERPL-MCNC: 9.4 MG/DL (ref 8.6–10.6)
CHLORIDE SERPL-SCNC: 102 MMOL/L (ref 98–107)
CO2 SERPL-SCNC: 28 MMOL/L (ref 21–32)
CREAT SERPL-MCNC: 0.64 MG/DL (ref 0.5–1.05)
EGFRCR SERPLBLD CKD-EPI 2021: >90 ML/MIN/1.73M*2
EOSINOPHIL # BLD AUTO: 0.15 X10*3/UL (ref 0–0.4)
EOSINOPHIL NFR BLD AUTO: 2.4 %
ERYTHROCYTE [DISTWIDTH] IN BLOOD BY AUTOMATED COUNT: 12.2 % (ref 11.5–14.5)
GLUCOSE SERPL-MCNC: 196 MG/DL (ref 74–99)
HCT VFR BLD AUTO: 42.9 % (ref 36–46)
HGB BLD-MCNC: 14.7 G/DL (ref 12–16)
IMM GRANULOCYTES # BLD AUTO: 0.02 X10*3/UL (ref 0–0.5)
IMM GRANULOCYTES NFR BLD AUTO: 0.3 % (ref 0–0.9)
LYMPHOCYTES # BLD AUTO: 1.66 X10*3/UL (ref 0.8–3)
LYMPHOCYTES NFR BLD AUTO: 27 %
MAGNESIUM SERPL-MCNC: 1.95 MG/DL (ref 1.6–2.4)
MCH RBC QN AUTO: 32.7 PG (ref 26–34)
MCHC RBC AUTO-ENTMCNC: 34.3 G/DL (ref 32–36)
MCV RBC AUTO: 96 FL (ref 80–100)
MONOCYTES # BLD AUTO: 0.44 X10*3/UL (ref 0.05–0.8)
MONOCYTES NFR BLD AUTO: 7.2 %
NEUTROPHILS # BLD AUTO: 3.81 X10*3/UL (ref 1.6–5.5)
NEUTROPHILS NFR BLD AUTO: 62.1 %
NRBC BLD-RTO: 0 /100 WBCS (ref 0–0)
PHOSPHATE SERPL-MCNC: 3.5 MG/DL (ref 2.5–4.9)
PLATELET # BLD AUTO: 214 X10*3/UL (ref 150–450)
POTASSIUM SERPL-SCNC: 4.1 MMOL/L (ref 3.5–5.3)
RBC # BLD AUTO: 4.49 X10*6/UL (ref 4–5.2)
SODIUM SERPL-SCNC: 138 MMOL/L (ref 136–145)
WBC # BLD AUTO: 6.1 X10*3/UL (ref 4.4–11.3)

## 2024-08-22 PROCEDURE — 2500000004 HC RX 250 GENERAL PHARMACY W/ HCPCS (ALT 636 FOR OP/ED)

## 2024-08-22 PROCEDURE — 1100000001 HC PRIVATE ROOM DAILY

## 2024-08-22 PROCEDURE — 2500000002 HC RX 250 W HCPCS SELF ADMINISTERED DRUGS (ALT 637 FOR MEDICARE OP, ALT 636 FOR OP/ED): Performed by: PHYSICIAN ASSISTANT

## 2024-08-22 PROCEDURE — 36415 COLL VENOUS BLD VENIPUNCTURE: CPT

## 2024-08-22 PROCEDURE — 2500000005 HC RX 250 GENERAL PHARMACY W/O HCPCS

## 2024-08-22 PROCEDURE — 2500000001 HC RX 250 WO HCPCS SELF ADMINISTERED DRUGS (ALT 637 FOR MEDICARE OP): Performed by: PHYSICIAN ASSISTANT

## 2024-08-22 PROCEDURE — 99231 SBSQ HOSP IP/OBS SF/LOW 25: CPT

## 2024-08-22 PROCEDURE — 83735 ASSAY OF MAGNESIUM: CPT

## 2024-08-22 PROCEDURE — 97535 SELF CARE MNGMENT TRAINING: CPT | Mod: GO

## 2024-08-22 PROCEDURE — 85025 COMPLETE CBC W/AUTO DIFF WBC: CPT

## 2024-08-22 PROCEDURE — 2500000002 HC RX 250 W HCPCS SELF ADMINISTERED DRUGS (ALT 637 FOR MEDICARE OP, ALT 636 FOR OP/ED)

## 2024-08-22 PROCEDURE — 97530 THERAPEUTIC ACTIVITIES: CPT | Mod: GO

## 2024-08-22 PROCEDURE — S4991 NICOTINE PATCH NONLEGEND: HCPCS | Performed by: PHYSICIAN ASSISTANT

## 2024-08-22 PROCEDURE — 80069 RENAL FUNCTION PANEL: CPT

## 2024-08-22 ASSESSMENT — COGNITIVE AND FUNCTIONAL STATUS - GENERAL
HELP NEEDED FOR BATHING: A LITTLE
DAILY ACTIVITIY SCORE: 19
DRESSING REGULAR UPPER BODY CLOTHING: A LITTLE
DRESSING REGULAR LOWER BODY CLOTHING: A LITTLE
WALKING IN HOSPITAL ROOM: A LITTLE
PERSONAL GROOMING: A LITTLE
TOILETING: A LITTLE
CLIMB 3 TO 5 STEPS WITH RAILING: A LITTLE
MOBILITY SCORE: 22

## 2024-08-22 ASSESSMENT — PAIN - FUNCTIONAL ASSESSMENT
PAIN_FUNCTIONAL_ASSESSMENT: 0-10
PAIN_FUNCTIONAL_ASSESSMENT: 0-10

## 2024-08-22 ASSESSMENT — PAIN DESCRIPTION - LOCATION: LOCATION: SHOULDER

## 2024-08-22 ASSESSMENT — PAIN SCALES - GENERAL
PAINLEVEL_OUTOF10: 2
PAINLEVEL_OUTOF10: 3

## 2024-08-22 ASSESSMENT — PAIN DESCRIPTION - ORIENTATION: ORIENTATION: RIGHT

## 2024-08-22 ASSESSMENT — ACTIVITIES OF DAILY LIVING (ADL): HOME_MANAGEMENT_TIME_ENTRY: 16

## 2024-08-22 NOTE — ASSESSMENT & PLAN NOTE
This a 78yo RHF PMHx HTN, SSS (s/p pacer 2019), pAF (s/p watchman 2020), cerebral aneurysm (s/p clipping 1993), ET presenting for subacute progressive ascending weakness and instability.      Patient reported unsteady walking for about 6 weeks. Progressively worsening, but seems to have more-sharply declined in past 2 weeks. Now starting to involve BUE. Now starting to have paresthesias in the RUE and pain in the R shoulder. Occasional electrical shocks of pain radiating down BUE and chest.     Initial neurological examination was significant for some L thenar atrophy, weakness of ECRL/ECU, EDC, ADM, FDI. On lowers, there is diminished LLE vibratory sense at the toe, ankle, and knee compared to left. Questionable C5 sensory level. Normal tone. Reflexes 3+ with spread throughout, London's & finger flexor reflex present bilaterally, 4bts ankle clonus bilaterally. Toes mute. Trace jaw jerk. Gait narrow-based and unsteady with truncal instability. CTH which shows R temporal cranioplasty, R posterior circulation clip with bloom artifact, and moderate posterior-predominant subcortical microvascular disease.      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 CT myelogram was inconclusive due to poor contrast uptake in the cervical spine. Consider technical vs obstructive cause. S/p cisternal myelogram on 8/16 which showed mild to moderate stenosis without obstruction. Neurosurgery not planning intervention (signed-off). Pending dispo to acute rehab.    8/23 Updates:  - Pending discharge  - PM&R consulted to assist with acceptance to [facility]: addended note recommended home with PT/OT  - Appointments scheduled with [facility] attendings: Dr. Ga on 9/13 and Dr. Leslie on 2/3      #Cervical myeloradiculopathy  :: Possible structural compression  :: B12, folate, homocysteine, vitamin E, copper, MMA, CK all within normal limits  ::  CT myelogram inconclusive d/t incomplete contrast uptake, reach out to IR re: imaging difficulties  :: Repeat cisternal myelogram shows no significant stenosis  - Consult neurosurgery: no intervention, signed off  - Pending dispo   - Follow-up outpatient with [facility]     #MISC  -Continue home meds: hydrochlorothiazide daily, potassium, propranolol 10 mg QID, rosuvastatin 5 mg HS, Valtrex 500 mg daily    F: prn  E: prn  N: regular   A: PIV  DVT: lovenox   GI: N/A

## 2024-08-22 NOTE — PROGRESS NOTES
"Linda López is a 79 y.o. right-handed female with a history of A-fib, Anxiety and depression, Arthritis, Artificial cardiac pacemaker, Brain aneurysm s/p clipping (told by ED not MRI compatible), Essential tremor, HTN (hypertension), Kidney stones, Lumbar radiculopathy on day 2 of admission after presenting with subacute weakness, ataxia. Neurology was consulted for difficulty walking, upper extremity weakness.    Subjective   NAEON. Patient states the gabapentin helped her sleep last night and improved her neck pain. Discussed that it may make her sleepy so she would like to keep this as an evening dose only.    Objective     Physical Exam  Mental status: A+O x 4, known vocal dystonia (baseline s/p aneurysmal clipping).  Motor exam: 5/5 strength in all muscle groups.  Reflexes: 2+ b/l biceps, triceps, BR, 3+patellar, achilles reflexes. No ankle clonus.  Sensory: Sensation to light touch intact in b/l UE and LE. Mild ataxia on FTN when approaching her nose only (able to reach finger normally), likely more sensory ataxia given loss of visual input.  Gait: Unsteady, narrow-based with lateral swaying to left more than right.     Last Recorded Vitals  Blood pressure 113/73, pulse 60, temperature 36.3 °C (97.3 °F), temperature source Temporal, resp. rate 17, height 1.626 m (5' 4\"), weight 76.7 kg (169 lb), SpO2 98%.  Intake/Output last 3 Shifts:  No intake/output data recorded.    Relevant Results  Scheduled medications  enoxaparin, 40 mg, subcutaneous, q24h  gabapentin, 300 mg, oral, Nightly  hydroCHLOROthiazide, 25 mg, oral, Daily  hydrOXYzine HCL, 50 mg, oral, Once  lidocaine, 1 patch, transdermal, q24h  nicotine, 1 patch, transdermal, Daily  PARoxetine, 20 mg, oral, Daily  potassium chloride CR, 20 mEq, oral, TID  propranolol, 10 mg, oral, 4x daily  rosuvastatin, 5 mg, oral, Nightly  valACYclovir, 500 mg, oral, Daily      Continuous medications     PRN medications  PRN medications: acetaminophen **OR** " acetaminophen  No results found for this or any previous visit (from the past 24 hour(s)).      CT cervical spine post myelogram    Result Date: 8/14/2024  Interpreted By:  Rico Marr and Hofer Lindsay STUDY: CT CERVICAL SPINE POST MYELOGRAM; CT LUMBAR SPINE POST MYELOGRAM; FL MULTIPLE REGIONS MYELOGRAM WITH LUMBAR PUNCTURE; CT THORACIC SPINE POST MYELOGRAM;  8/14/2024 4:57 pm; 8/14/2024 4:24 pm   INDICATION: Signs/Symptoms:Upper motor neuron symptoms of bilateral upper extremities. Unable to obtain MRI; Signs/Symptoms:Cervical, thoracic, lumbar for UE and LE weakness.   COMPARISON: CT cervical spine, thoracic spine, and lumbar spine without contrast 08/12/2024   ACCESSION NUMBER(S): [id number]; [id number]; [id number]; [id number]   ORDERING CLINICIAN: LILI CARUSO   TECHNIQUE: After discussion of the risks, benefits and alternatives, standard written hospital consent was obtained. The patient was placed prone on the fluoroscopic table.  The lower back was prepped and draped in sterile fashion. One percent lidocaine was used for local anesthesia. Under fluoroscopic guidance, a 22 gauge spinal needle was advanced into the thecal sac at the L3-L4 level. 10 cc of Omnipaque 300 was administered intrathecally under intermittent fluoroscopic imaging. Fluoroscopy time was 1.2 minutes. The patient tolerated the procedure well.   Following the fluoroscopic myelogram, serial axial CT images were obtained through the cervical, thoracic, and lumbar spine, using a bone algorithm. Images were reformatted into sagittal and coronal planes.   FINDINGS: FLUOROSCOPIC MYELOGRAM:  The thecal sac is widely patent.  There is filling bilaterally of nerve root sleeves without cutoff. There is normal alignment.   CT MYELOGRAM: CERVICAL SPINE: No evidence of contrast opacification of the cervical spine thecal sac despite repeat imaging after placing patient in Trendelenburg for several minutes. Nondiagnostic CT  myelogram of the cervical spine.   Alignment is unremarkable. Vertebral body heights are maintained. Intervertebral disc space narrowing at C6-C7. Degenerative changes throughout the cervical spine most significant at C6-C7. No evidence of canal stenosis from the craniocervical junction to the C4-5 level. Nondiagnostic examination of the spinal canal at the C5-6 level and below secondary to streak artifact from overlying soft tissues.   There is hypertrophy of the right facet joint of C3-4 with slight joint space widening (series 205, image 23).   THORACIC SPINE: Contrast is visualized within the thoracic spine from the T5-6 level down. Partial opacification of the canal at the T3-4 and T4-5 levels. No contrast is seen within the thecal sac at the T1-2 and T2-3 levels.   Heterogeneous opacification of the right dorsolateral aspect of the thecal sac at the T5-6 level in the dorsal aspect of the thecal sac at the T7-8 level, which may represent blood products. No high-grade canal stenosis at any level from T5-6 through T12-L1.   The vertebral body heights of the thoracic spine are maintained. The intervertebral disc spaces are maintained. No evidence of significant neural foraminal stenosis.     LUMBAR SPINE: Heterogeneous opacification of the thecal sac within the lumbar spine, with dense cyst contrast seen at the L5-S1 level where the thecal sac was accessed.   Vertebral body heights are maintained. Intervertebral disc spaces are maintained. L1-2: No canal or foraminal stenosis.   L2-3: No canal or foraminal stenosis.   L3-4: Shallow disc bulge with facet hypertrophy results in mild narrowing the spinal canal and bilateral foramina.   L4-5: Shallow partially calcified disc bulge with facet hypertrophy results in mild narrowing of bilateral foramina and minimal narrowing of the spinal canal.   L5-S1: No canal or foraminal stenosis.     OTHER: Chest: Pacemaker in place. Status post mitral valve replacement.  Emphysematous changes throughout the lungs. Abdomen/pelvis: No remarkable findings within the visualized portions of the abdomen and pelvis. Soft tissues: No remarkable soft tissue findings.       CT myelogram 1. Non-diagnostic myelogram of the cervical spine secondary to non opacification of the thecal sac despite repeat imaging after Trendelenburg positioning. Within the limitations of this non-contrast CT cervical spine, no evidence of high-grade canal stenosis from the craniocervical junction to the C4-5 level. Nondiagnostic evaluation of the spinal canal from the C5-6 level and below secondary to streak artifact from overlying soft tissues. 2. Facet hypertrophy with slight widening of the right facet joint of C3-4. All findings may be degenerative in etiology, joint widening is nonspecific and underlying infectious process can not be excluded. 3. Inconsistent contrast opacification of the thoracic thecal sac. The thoracic spine is well opacified from T3 through T12 without evidence of central spinal canal stenosis, spinal cord compression, or neural foraminal stenosis. The superior portion of the thoracic thecal sac is not well opacified from approximately T1 through T3, as well as heterogeneous filling defects within the dorsal aspect of the thecal sac at the T5-6 and T7-8 levels, possibly due to blood products. 4. Heterogeneous contrast opacification of the lumbar spine thecal sac. Within these limitations, mild multilevel degenerative change without evidence of high-grade spinal canal stenosis or neural foraminal stenosis.   Given limitations of the current myelogram, consider repeat myelogram if patient is amenable.   I, Dr. Marr, supervised and was available throughout this procedure as performed by fellow physician Dr. Ty. This study was performed and interpreted at [facility]. I agree with the procedural description and the findings as stated.   MACRO: None   Signed by:  Rico Marr 8/14/2024 9:22 PM Dictation workstation:   [id number]    CT thoracic spine post myelogram    Result Date: 8/14/2024  Interpreted By:  Rico Marr and Hofer Lindsay STUDY: CT CERVICAL SPINE POST MYELOGRAM; CT LUMBAR SPINE POST MYELOGRAM; FL MULTIPLE REGIONS MYELOGRAM WITH LUMBAR PUNCTURE; CT THORACIC SPINE POST MYELOGRAM;  8/14/2024 4:57 pm; 8/14/2024 4:24 pm   INDICATION: Signs/Symptoms:Upper motor neuron symptoms of bilateral upper extremities. Unable to obtain MRI; Signs/Symptoms:Cervical, thoracic, lumbar for UE and LE weakness.   COMPARISON: CT cervical spine, thoracic spine, and lumbar spine without contrast 08/12/2024   ACCESSION NUMBER(S): [id number]; [id number]; [id number]; [id number]   ORDERING CLINICIAN: LILI CARUSO   TECHNIQUE: After discussion of the risks, benefits and alternatives, standard written hospital consent was obtained. The patient was placed prone on the fluoroscopic table.  The lower back was prepped and draped in sterile fashion. One percent lidocaine was used for local anesthesia. Under fluoroscopic guidance, a 22 gauge spinal needle was advanced into the thecal sac at the L3-L4 level. 10 cc of Omnipaque 300 was administered intrathecally under intermittent fluoroscopic imaging. Fluoroscopy time was 1.2 minutes. The patient tolerated the procedure well.   Following the fluoroscopic myelogram, serial axial CT images were obtained through the cervical, thoracic, and lumbar spine, using a bone algorithm. Images were reformatted into sagittal and coronal planes.   FINDINGS: FLUOROSCOPIC MYELOGRAM:  The thecal sac is widely patent.  There is filling bilaterally of nerve root sleeves without cutoff. There is normal alignment.   CT MYELOGRAM: CERVICAL SPINE: No evidence of contrast opacification of the cervical spine thecal sac despite repeat imaging after placing patient in Trendelenburg for several minutes. Nondiagnostic CT myelogram of the cervical  spine.   Alignment is unremarkable. Vertebral body heights are maintained. Intervertebral disc space narrowing at C6-C7. Degenerative changes throughout the cervical spine most significant at C6-C7. No evidence of canal stenosis from the craniocervical junction to the C4-5 level. Nondiagnostic examination of the spinal canal at the C5-6 level and below secondary to streak artifact from overlying soft tissues.   There is hypertrophy of the right facet joint of C3-4 with slight joint space widening (series 205, image 23).   THORACIC SPINE: Contrast is visualized within the thoracic spine from the T5-6 level down. Partial opacification of the canal at the T3-4 and T4-5 levels. No contrast is seen within the thecal sac at the T1-2 and T2-3 levels.   Heterogeneous opacification of the right dorsolateral aspect of the thecal sac at the T5-6 level in the dorsal aspect of the thecal sac at the T7-8 level, which may represent blood products. No high-grade canal stenosis at any level from T5-6 through T12-L1.   The vertebral body heights of the thoracic spine are maintained. The intervertebral disc spaces are maintained. No evidence of significant neural foraminal stenosis.     LUMBAR SPINE: Heterogeneous opacification of the thecal sac within the lumbar spine, with dense cyst contrast seen at the L5-S1 level where the thecal sac was accessed.   Vertebral body heights are maintained. Intervertebral disc spaces are maintained. L1-2: No canal or foraminal stenosis.   L2-3: No canal or foraminal stenosis.   L3-4: Shallow disc bulge with facet hypertrophy results in mild narrowing the spinal canal and bilateral foramina.   L4-5: Shallow partially calcified disc bulge with facet hypertrophy results in mild narrowing of bilateral foramina and minimal narrowing of the spinal canal.   L5-S1: No canal or foraminal stenosis.     OTHER: Chest: Pacemaker in place. Status post mitral valve replacement. Emphysematous changes throughout the  lungs. Abdomen/pelvis: No remarkable findings within the visualized portions of the abdomen and pelvis. Soft tissues: No remarkable soft tissue findings.       CT myelogram 1. Non-diagnostic myelogram of the cervical spine secondary to non opacification of the thecal sac despite repeat imaging after Trendelenburg positioning. Within the limitations of this non-contrast CT cervical spine, no evidence of high-grade canal stenosis from the craniocervical junction to the C4-5 level. Nondiagnostic evaluation of the spinal canal from the C5-6 level and below secondary to streak artifact from overlying soft tissues. 2. Facet hypertrophy with slight widening of the right facet joint of C3-4. All findings may be degenerative in etiology, joint widening is nonspecific and underlying infectious process can not be excluded. 3. Inconsistent contrast opacification of the thoracic thecal sac. The thoracic spine is well opacified from T3 through T12 without evidence of central spinal canal stenosis, spinal cord compression, or neural foraminal stenosis. The superior portion of the thoracic thecal sac is not well opacified from approximately T1 through T3, as well as heterogeneous filling defects within the dorsal aspect of the thecal sac at the T5-6 and T7-8 levels, possibly due to blood products. 4. Heterogeneous contrast opacification of the lumbar spine thecal sac. Within these limitations, mild multilevel degenerative change without evidence of high-grade spinal canal stenosis or neural foraminal stenosis.   Given limitations of the current myelogram, consider repeat myelogram if patient is amenable.   IDr. Marr, supervised and was available throughout this procedure as performed by fellow physician Dr. Ty. This study was performed and interpreted at [facility]. I agree with the procedural description and the findings as stated.   MACRO: None   Signed by: Rico Marr 8/14/2024 9:22 PM Dictation  workstation:   [id number]    CT lumbar spine post myelogram    Result Date: 8/14/2024  Interpreted By:  Rico Marr and Hofer Lindsay STUDY: CT CERVICAL SPINE POST MYELOGRAM; CT LUMBAR SPINE POST MYELOGRAM; FL MULTIPLE REGIONS MYELOGRAM WITH LUMBAR PUNCTURE; CT THORACIC SPINE POST MYELOGRAM;  8/14/2024 4:57 pm; 8/14/2024 4:24 pm   INDICATION: Signs/Symptoms:Upper motor neuron symptoms of bilateral upper extremities. Unable to obtain MRI; Signs/Symptoms:Cervical, thoracic, lumbar for UE and LE weakness.   COMPARISON: CT cervical spine, thoracic spine, and lumbar spine without contrast 08/12/2024   ACCESSION NUMBER(S): [id number]; [id number]; [id number]; [id number]   ORDERING CLINICIAN: LILI FONTANEZ; SHANNAN CARUSO   TECHNIQUE: After discussion of the risks, benefits and alternatives, standard written hospital consent was obtained. The patient was placed prone on the fluoroscopic table.  The lower back was prepped and draped in sterile fashion. One percent lidocaine was used for local anesthesia. Under fluoroscopic guidance, a 22 gauge spinal needle was advanced into the thecal sac at the L3-L4 level. 10 cc of Omnipaque 300 was administered intrathecally under intermittent fluoroscopic imaging. Fluoroscopy time was 1.2 minutes. The patient tolerated the procedure well.   Following the fluoroscopic myelogram, serial axial CT images were obtained through the cervical, thoracic, and lumbar spine, using a bone algorithm. Images were reformatted into sagittal and coronal planes.   FINDINGS: FLUOROSCOPIC MYELOGRAM:  The thecal sac is widely patent.  There is filling bilaterally of nerve root sleeves without cutoff. There is normal alignment.   CT MYELOGRAM: CERVICAL SPINE: No evidence of contrast opacification of the cervical spine thecal sac despite repeat imaging after placing patient in Trendelenburg for several minutes. Nondiagnostic CT myelogram of the cervical spine.   Alignment is unremarkable. Vertebral  body heights are maintained. Intervertebral disc space narrowing at C6-C7. Degenerative changes throughout the cervical spine most significant at C6-C7. No evidence of canal stenosis from the craniocervical junction to the C4-5 level. Nondiagnostic examination of the spinal canal at the C5-6 level and below secondary to streak artifact from overlying soft tissues.   There is hypertrophy of the right facet joint of C3-4 with slight joint space widening (series 205, image 23).   THORACIC SPINE: Contrast is visualized within the thoracic spine from the T5-6 level down. Partial opacification of the canal at the T3-4 and T4-5 levels. No contrast is seen within the thecal sac at the T1-2 and T2-3 levels.   Heterogeneous opacification of the right dorsolateral aspect of the thecal sac at the T5-6 level in the dorsal aspect of the thecal sac at the T7-8 level, which may represent blood products. No high-grade canal stenosis at any level from T5-6 through T12-L1.   The vertebral body heights of the thoracic spine are maintained. The intervertebral disc spaces are maintained. No evidence of significant neural foraminal stenosis.     LUMBAR SPINE: Heterogeneous opacification of the thecal sac within the lumbar spine, with dense cyst contrast seen at the L5-S1 level where the thecal sac was accessed.   Vertebral body heights are maintained. Intervertebral disc spaces are maintained. L1-2: No canal or foraminal stenosis.   L2-3: No canal or foraminal stenosis.   L3-4: Shallow disc bulge with facet hypertrophy results in mild narrowing the spinal canal and bilateral foramina.   L4-5: Shallow partially calcified disc bulge with facet hypertrophy results in mild narrowing of bilateral foramina and minimal narrowing of the spinal canal.   L5-S1: No canal or foraminal stenosis.     OTHER: Chest: Pacemaker in place. Status post mitral valve replacement. Emphysematous changes throughout the lungs. Abdomen/pelvis: No remarkable findings  within the visualized portions of the abdomen and pelvis. Soft tissues: No remarkable soft tissue findings.       CT myelogram 1. Non-diagnostic myelogram of the cervical spine secondary to non opacification of the thecal sac despite repeat imaging after Trendelenburg positioning. Within the limitations of this non-contrast CT cervical spine, no evidence of high-grade canal stenosis from the craniocervical junction to the C4-5 level. Nondiagnostic evaluation of the spinal canal from the C5-6 level and below secondary to streak artifact from overlying soft tissues. 2. Facet hypertrophy with slight widening of the right facet joint of C3-4. All findings may be degenerative in etiology, joint widening is nonspecific and underlying infectious process can not be excluded. 3. Inconsistent contrast opacification of the thoracic thecal sac. The thoracic spine is well opacified from T3 through T12 without evidence of central spinal canal stenosis, spinal cord compression, or neural foraminal stenosis. The superior portion of the thoracic thecal sac is not well opacified from approximately T1 through T3, as well as heterogeneous filling defects within the dorsal aspect of the thecal sac at the T5-6 and T7-8 levels, possibly due to blood products. 4. Heterogeneous contrast opacification of the lumbar spine thecal sac. Within these limitations, mild multilevel degenerative change without evidence of high-grade spinal canal stenosis or neural foraminal stenosis.   Given limitations of the current myelogram, consider repeat myelogram if patient is amenable.   IDr. Marr, supervised and was available throughout this procedure as performed by fellow physician Dr. Ty. This study was performed and interpreted at [facility]. I agree with the procedural description and the findings as stated.   MACRO: None   Signed by: Rico Marr 8/14/2024 9:22 PM Dictation workstation:   [id number]    [facility]  regions myelogram with lumbar puncture    Result Date: 8/14/2024  Interpreted By:  Rico Marr and Hofer Lindsay STUDY: CT CERVICAL SPINE POST MYELOGRAM; CT LUMBAR SPINE POST MYELOGRAM; FL MULTIPLE REGIONS MYELOGRAM WITH LUMBAR PUNCTURE; CT THORACIC SPINE POST MYELOGRAM;  8/14/2024 4:57 pm; 8/14/2024 4:24 pm   INDICATION: Signs/Symptoms:Upper motor neuron symptoms of bilateral upper extremities. Unable to obtain MRI; Signs/Symptoms:Cervical, thoracic, lumbar for UE and LE weakness.   COMPARISON: CT cervical spine, thoracic spine, and lumbar spine without contrast 08/12/2024   ACCESSION NUMBER(S): [id number]; [id number]; [id number]; [id number]   ORDERING CLINICIAN: LILI CARUSO   TECHNIQUE: After discussion of the risks, benefits and alternatives, standard written hospital consent was obtained. The patient was placed prone on the fluoroscopic table.  The lower back was prepped and draped in sterile fashion. One percent lidocaine was used for local anesthesia. Under fluoroscopic guidance, a 22 gauge spinal needle was advanced into the thecal sac at the L3-L4 level. 10 cc of Omnipaque 300 was administered intrathecally under intermittent fluoroscopic imaging. Fluoroscopy time was 1.2 minutes. The patient tolerated the procedure well.   Following the fluoroscopic myelogram, serial axial CT images were obtained through the cervical, thoracic, and lumbar spine, using a bone algorithm. Images were reformatted into sagittal and coronal planes.   FINDINGS: FLUOROSCOPIC MYELOGRAM:  The thecal sac is widely patent.  There is filling bilaterally of nerve root sleeves without cutoff. There is normal alignment.   CT MYELOGRAM: CERVICAL SPINE: No evidence of contrast opacification of the cervical spine thecal sac despite repeat imaging after placing patient in Trendelenburg for several minutes. Nondiagnostic CT myelogram of the cervical spine.   Alignment is unremarkable. Vertebral body heights are  maintained. Intervertebral disc space narrowing at C6-C7. Degenerative changes throughout the cervical spine most significant at C6-C7. No evidence of canal stenosis from the craniocervical junction to the C4-5 level. Nondiagnostic examination of the spinal canal at the C5-6 level and below secondary to streak artifact from overlying soft tissues.   There is hypertrophy of the right facet joint of C3-4 with slight joint space widening (series 205, image 23).   THORACIC SPINE: Contrast is visualized within the thoracic spine from the T5-6 level down. Partial opacification of the canal at the T3-4 and T4-5 levels. No contrast is seen within the thecal sac at the T1-2 and T2-3 levels.   Heterogeneous opacification of the right dorsolateral aspect of the thecal sac at the T5-6 level in the dorsal aspect of the thecal sac at the T7-8 level, which may represent blood products. No high-grade canal stenosis at any level from T5-6 through T12-L1.   The vertebral body heights of the thoracic spine are maintained. The intervertebral disc spaces are maintained. No evidence of significant neural foraminal stenosis.     LUMBAR SPINE: Heterogeneous opacification of the thecal sac within the lumbar spine, with dense cyst contrast seen at the L5-S1 level where the thecal sac was accessed.   Vertebral body heights are maintained. Intervertebral disc spaces are maintained. L1-2: No canal or foraminal stenosis.   L2-3: No canal or foraminal stenosis.   L3-4: Shallow disc bulge with facet hypertrophy results in mild narrowing the spinal canal and bilateral foramina.   L4-5: Shallow partially calcified disc bulge with facet hypertrophy results in mild narrowing of bilateral foramina and minimal narrowing of the spinal canal.   L5-S1: No canal or foraminal stenosis.     OTHER: Chest: Pacemaker in place. Status post mitral valve replacement. Emphysematous changes throughout the lungs. Abdomen/pelvis: No remarkable findings within the  visualized portions of the abdomen and pelvis. Soft tissues: No remarkable soft tissue findings.       CT myelogram 1. Non-diagnostic myelogram of the cervical spine secondary to non opacification of the thecal sac despite repeat imaging after Trendelenburg positioning. Within the limitations of this non-contrast CT cervical spine, no evidence of high-grade canal stenosis from the craniocervical junction to the C4-5 level. Nondiagnostic evaluation of the spinal canal from the C5-6 level and below secondary to streak artifact from overlying soft tissues. 2. Facet hypertrophy with slight widening of the right facet joint of C3-4. All findings may be degenerative in etiology, joint widening is nonspecific and underlying infectious process can not be excluded. 3. Inconsistent contrast opacification of the thoracic thecal sac. The thoracic spine is well opacified from T3 through T12 without evidence of central spinal canal stenosis, spinal cord compression, or neural foraminal stenosis. The superior portion of the thoracic thecal sac is not well opacified from approximately T1 through T3, as well as heterogeneous filling defects within the dorsal aspect of the thecal sac at the T5-6 and T7-8 levels, possibly due to blood products. 4. Heterogeneous contrast opacification of the lumbar spine thecal sac. Within these limitations, mild multilevel degenerative change without evidence of high-grade spinal canal stenosis or neural foraminal stenosis.   Given limitations of the current myelogram, consider repeat myelogram if patient is amenable.   IDr. Marr, supervised and was available throughout this procedure as performed by fellow physician Dr. Ty. This study was performed and interpreted at [facility]. I agree with the procedural description and the findings as stated.   MACRO: None   Signed by: Rico Marr 8/14/2024 9:22 PM Dictation workstation:   [id number]    CT angio head w and wo IV  contrast    Result Date: 8/12/2024  Interpreted By:  Beth Holguin  and Monica Rai STUDY: CT ANGIO HEAD W AND WO IV CONTRAST;  8/12/2024 1:22 am   INDICATION: Signs/Symptoms:bilateral upper extremity weaknes. Hx of aneurysm.   COMPARISON: CT head 08/12/2024   ACCESSION NUMBER(S): [id number]   ORDERING CLINICIAN: LILI FONTANEZ   TECHNIQUE: Unenhanced CT images of the head were obtained. Subsequently, 150 mL of Omnipaque 350 was administered intravenously and axial images of the head were acquired.  Coronal, sagittal, and 3-D reconstructions were provided for review.   FINDINGS: Postsurgical changes of the aneurysm clip new right basilar cistern region motion limits evaluation due to streak artifact. Dedicated head CT is been performed and reported separately.   Anterior circulation: Atherosclerotic calcifications of the right greater than left carotid siphons. Streak artifact partially limits evaluation of the right distal ICA otherwise the bilateral intracranial internal carotid arteries, bilateral carotid terminals, bilateral proximal anterior and middle cerebral arteries are normal.   Posterior circulation: Tapering with diminutive intradural left vertebral artery. Streak artifact limits evaluation of the basilar tip and right greater than left proximal PCA. The mid to distal PCAs appear grossly patent.   Adjacent rounded opacities in the region of the right posterior communicating artery noted on axial projection without corresponding abnormality identified on additional projections and favored to represent volume averaging or artifact. Otherwise bilateral intracranial vertebral arteries, vertebrobasilar junction and basilar artery are normal.       Postsurgical changes of right posterior circulation aneurysmal clipping with associated streak artifact partially limiting evaluation. Otherwise no evidence for significant stenosis or large branch vessel cutoffs of the intracranial vessels.   Opacities  noted on axial imaging without corresponding abnormality on additional findings. Findings are favored to represent artifact versus true aneurysm however attention on follow-up recommended.   I personally reviewed the image(s)/study and resident interpretation. I agree with the findings as stated by resident Fredi Anderson. Data analyzed and images interpreted at [facility], [place], [place].     MACRO: None   Signed by: Beth Holguin 8/12/2024 3:47 AM Dictation workstation:   [id number]    CT abdomen pelvis w IV contrast    Result Date: 8/12/2024  Interpreted By:  Beth Holguin,  and Monica Rai STUDY: CT ABDOMEN PELVIS W IV CONTRAST; CT THORACIC SPINE WO IV CONTRAST; CT LUMBAR SPINE WO IV CONTRAST;  8/12/2024 2:12 am; 8/12/2024 2:11 am   INDICATION: Signs/Symptoms:N/V; Signs/Symptoms:BUE and BLE weakness; Signs/Symptoms:BUE and BLE weakness..   COMPARISON: None.   ACCESSION NUMBER(S): [id number]; [id number]; [id number]   ORDERING CLINICIAN: LILI FONTANEZ   TECHNIQUE: CT of the abdomen and pelvis was performed.  Standard contiguous axial images were obtained at 3 mm slice thickness through the abdomen and pelvis. Coronal and sagittal reconstructions at 3 mm slice thickness were performed.   150 ml of contrast omni 350 were administered intravenously without immediate complication.   FINDINGS: LOWER CHEST: Ground-glass attenuation of the lower lungs likely representing aspiration pneumonitis with a component of atelectasis. Emphysematous changes. Please see dedicated CTA chest for further evaluation.   ABDOMEN:   LIVER: The liver is normal in size measuring 15.3 cm in the craniocaudal axis. No focal hepatic lesions.   BILE DUCTS: The intrahepatic and extrahepatic ducts are not dilated.   GALLBLADDER: The gallbladder is nondistended and without evidence of radiopaque stones.   PANCREAS: The pancreas appears unremarkable without evidence of ductal dilatation or masses.    SPLEEN: The spleen is normal in size without focal lesions.   ADRENAL GLANDS: Bilateral adrenal glands appear normal.   KIDNEYS AND URETERS: The kidneys are normal in size and enhance symmetrically. Few bilateral hypodense lesions are too small to characterize, favored to represent simple cysts.  No hydroureteronephrosis. Contrast material is present within the bilateral renal collecting systems and ureters, limiting evaluation for calculi.   PELVIS:   BLADDER: Bladder is normal in appearance without wall thickening. Layering contrast material within the bladder.   REPRODUCTIVE ORGANS: The uterus is surgically absent.   BOWEL: The stomach is unremarkable.   The small and large bowel are normal in caliber and demonstrate no wall thickening. Colonic diverticulosis without evidence of diverticulitis. The appendix is not definitely visualized. There is however no pericecal stranding or fluid. Moderate stool burden.   VESSELS: There is no aneurysmal dilatation of the abdominal aorta. The IVC appears normal. Moderate atherosclerotic calcification of the abdominal aorta and its branches.   PERITONEUM/RETROPERITONEUM/LYMPH NODES: There is no free or loculated fluid collection, no free intraperitoneal air. The retroperitoneum appears normal.  No abdominopelvic lymphadenopathy is present.   BONES AND ABDOMINAL WALL: Rightward curvature of the lumbar spine. The abdominal wall soft tissues appear normal.     CT THORACIC SPINE:   PARASPINAL SOFT TISSUES: No paravertebral fluid collection or significant edema. ALIGNMENT:  No traumatic spondylolisthesis or traumatic facet widening. VERTEBRAE:  No acute fracture. Vertebral body heights are maintained. SPINAL CANAL/INTERVERTEBRAL DISCS: No high-grade spinal canal stenosis. Moderate multilevel disc height loss, most severe at T11-12 and T12-L1. Multilevel anterior osteophytosis. The there are indeterminate peripheral calcifications present in the expected region of the posterior  thecal sac dorsal to the T7, T8 and T9 vertebral bodies, likely chronic possibly related to remote infection, inflammation or hemorrhage. No definite mass effect identified.     CT LUMBAR SPINE:   PARASPINAL SOFT TISSUES: No paravertebral fluid collection or significant edema. ALIGNMENT: Dextrocurvature of the lower thoracic and lumbar spine. No traumatic spondylolisthesis or traumatic facet widening. VERTEBRAE: Bones appear mildly demineralized. No acute fracture. Vertebral body heights are maintained. SPINAL CANAL/INTERVERTEBRAL DISCS: No high-grade spinal canal stenosis. Moderate multilevel disc height loss, most significant at T12-L1. Moderate bilateral facet joint arthropathy from L2-S1. NEURAL FORAMINA: Disc bulge at L2-L3, L3-L4 and L5-S1 with facet hypertrophy with up to moderate foraminal narrowing. Disc bulge and facet hypertrophy at L4 L spine with up to severe foraminal stenosis.       1.  No acute abnormality within the abdomen or pelvis. 2. No acute fracture of the thoracic or lumbar spine. Degenerative changes of the spine without critical canal stenosis. Foraminal narrowing in the lumbar spine as detailed. 3. Probable chronic calcifications within the posterior fecal sac at the level of the midthoracic spine as detailed. No associated canal stenosis identified..   I personally reviewed the image(s)/study and resident interpretation. I agree with the findings as stated by resident Fredi Anderson. Data analyzed and images interpreted at [facility], [place], [place].   MACRO: None   Signed by: Beth Holguin 8/12/2024 2:55 AM Dictation workstation:   [id number]    CT thoracic spine wo IV contrast    Result Date: 8/12/2024  Interpreted By:  Beth Holguin,  Colt Rai STUDY: CT ABDOMEN PELVIS W IV CONTRAST; CT THORACIC SPINE WO IV CONTRAST; CT LUMBAR SPINE WO IV CONTRAST;  8/12/2024 2:12 am; 8/12/2024 2:11 am   INDICATION: Signs/Symptoms:N/V;  Signs/Symptoms:BUE and BLE weakness; Signs/Symptoms:BUE and BLE weakness..   COMPARISON: None.   ACCESSION NUMBER(S): [id number]; [id number]; [id number]   ORDERING CLINICIAN: LILI FONTANEZ   TECHNIQUE: CT of the abdomen and pelvis was performed.  Standard contiguous axial images were obtained at 3 mm slice thickness through the abdomen and pelvis. Coronal and sagittal reconstructions at 3 mm slice thickness were performed.   150 ml of contrast omni 350 were administered intravenously without immediate complication.   FINDINGS: LOWER CHEST: Ground-glass attenuation of the lower lungs likely representing aspiration pneumonitis with a component of atelectasis. Emphysematous changes. Please see dedicated CTA chest for further evaluation.   ABDOMEN:   LIVER: The liver is normal in size measuring 15.3 cm in the craniocaudal axis. No focal hepatic lesions.   BILE DUCTS: The intrahepatic and extrahepatic ducts are not dilated.   GALLBLADDER: The gallbladder is nondistended and without evidence of radiopaque stones.   PANCREAS: The pancreas appears unremarkable without evidence of ductal dilatation or masses.   SPLEEN: The spleen is normal in size without focal lesions.   ADRENAL GLANDS: Bilateral adrenal glands appear normal.   KIDNEYS AND URETERS: The kidneys are normal in size and enhance symmetrically. Few bilateral hypodense lesions are too small to characterize, favored to represent simple cysts.  No hydroureteronephrosis. Contrast material is present within the bilateral renal collecting systems and ureters, limiting evaluation for calculi.   PELVIS:   BLADDER: Bladder is normal in appearance without wall thickening. Layering contrast material within the bladder.   REPRODUCTIVE ORGANS: The uterus is surgically absent.   BOWEL: The stomach is unremarkable.   The small and large bowel are normal in caliber and demonstrate no wall thickening. Colonic diverticulosis without evidence of diverticulitis. The appendix is  not definitely visualized. There is however no pericecal stranding or fluid. Moderate stool burden.   VESSELS: There is no aneurysmal dilatation of the abdominal aorta. The IVC appears normal. Moderate atherosclerotic calcification of the abdominal aorta and its branches.   PERITONEUM/RETROPERITONEUM/LYMPH NODES: There is no free or loculated fluid collection, no free intraperitoneal air. The retroperitoneum appears normal.  No abdominopelvic lymphadenopathy is present.   BONES AND ABDOMINAL WALL: Rightward curvature of the lumbar spine. The abdominal wall soft tissues appear normal.     CT THORACIC SPINE:   PARASPINAL SOFT TISSUES: No paravertebral fluid collection or significant edema. ALIGNMENT:  No traumatic spondylolisthesis or traumatic facet widening. VERTEBRAE:  No acute fracture. Vertebral body heights are maintained. SPINAL CANAL/INTERVERTEBRAL DISCS: No high-grade spinal canal stenosis. Moderate multilevel disc height loss, most severe at T11-12 and T12-L1. Multilevel anterior osteophytosis. The there are indeterminate peripheral calcifications present in the expected region of the posterior thecal sac dorsal to the T7, T8 and T9 vertebral bodies, likely chronic possibly related to remote infection, inflammation or hemorrhage. No definite mass effect identified.     CT LUMBAR SPINE:   PARASPINAL SOFT TISSUES: No paravertebral fluid collection or significant edema. ALIGNMENT: Dextrocurvature of the lower thoracic and lumbar spine. No traumatic spondylolisthesis or traumatic facet widening. VERTEBRAE: Bones appear mildly demineralized. No acute fracture. Vertebral body heights are maintained. SPINAL CANAL/INTERVERTEBRAL DISCS: No high-grade spinal canal stenosis. Moderate multilevel disc height loss, most significant at T12-L1. Moderate bilateral facet joint arthropathy from L2-S1. NEURAL FORAMINA: Disc bulge at L2-L3, L3-L4 and L5-S1 with facet hypertrophy with up to moderate foraminal narrowing. Disc  bulge and facet hypertrophy at L4 L spine with up to severe foraminal stenosis.       1.  No acute abnormality within the abdomen or pelvis. 2. No acute fracture of the thoracic or lumbar spine. Degenerative changes of the spine without critical canal stenosis. Foraminal narrowing in the lumbar spine as detailed. 3. Probable chronic calcifications within the posterior fecal sac at the level of the midthoracic spine as detailed. No associated canal stenosis identified..   I personally reviewed the image(s)/study and resident interpretation. I agree with the findings as stated by resident Fredi Anderson. Data analyzed and images interpreted at [place], [place].   MACRO: None   Signed by: Beth Holguin 8/12/2024 2:55 AM Dictation workstation:   [id number]    CT lumbar spine wo IV contrast    Result Date: 8/12/2024  Interpreted By:  Beth Holguin,  Colt Rai STUDY: CT ABDOMEN PELVIS W IV CONTRAST; CT THORACIC SPINE WO IV CONTRAST; CT LUMBAR SPINE WO IV CONTRAST;  8/12/2024 2:12 am; 8/12/2024 2:11 am   INDICATION: Signs/Symptoms:N/V; Signs/Symptoms:BUE and BLE weakness; Signs/Symptoms:BUE and BLE weakness..   COMPARISON: None.   ACCESSION NUMBER(S): [id number]; [id number]; [id number]   ORDERING CLINICIAN: LILI FONTANEZ   TECHNIQUE: CT of the abdomen and pelvis was performed.  Standard contiguous axial images were obtained at 3 mm slice thickness through the abdomen and pelvis. Coronal and sagittal reconstructions at 3 mm slice thickness were performed.   150 ml of contrast omni 350 were administered intravenously without immediate complication.   FINDINGS: LOWER CHEST: Ground-glass attenuation of the lower lungs likely representing aspiration pneumonitis with a component of atelectasis. Emphysematous changes. Please see dedicated CTA chest for further evaluation.   ABDOMEN:   LIVER: The liver is normal in size measuring 15.3 cm in the craniocaudal axis.  No focal hepatic lesions.   BILE DUCTS: The intrahepatic and extrahepatic ducts are not dilated.   GALLBLADDER: The gallbladder is nondistended and without evidence of radiopaque stones.   PANCREAS: The pancreas appears unremarkable without evidence of ductal dilatation or masses.   SPLEEN: The spleen is normal in size without focal lesions.   ADRENAL GLANDS: Bilateral adrenal glands appear normal.   KIDNEYS AND URETERS: The kidneys are normal in size and enhance symmetrically. Few bilateral hypodense lesions are too small to characterize, favored to represent simple cysts.  No hydroureteronephrosis. Contrast material is present within the bilateral renal collecting systems and ureters, limiting evaluation for calculi.   PELVIS:   BLADDER: Bladder is normal in appearance without wall thickening. Layering contrast material within the bladder.   REPRODUCTIVE ORGANS: The uterus is surgically absent.   BOWEL: The stomach is unremarkable.   The small and large bowel are normal in caliber and demonstrate no wall thickening. Colonic diverticulosis without evidence of diverticulitis. The appendix is not definitely visualized. There is however no pericecal stranding or fluid. Moderate stool burden.   VESSELS: There is no aneurysmal dilatation of the abdominal aorta. The IVC appears normal. Moderate atherosclerotic calcification of the abdominal aorta and its branches.   PERITONEUM/RETROPERITONEUM/LYMPH NODES: There is no free or loculated fluid collection, no free intraperitoneal air. The retroperitoneum appears normal.  No abdominopelvic lymphadenopathy is present.   BONES AND ABDOMINAL WALL: Rightward curvature of the lumbar spine. The abdominal wall soft tissues appear normal.     CT THORACIC SPINE:   PARASPINAL SOFT TISSUES: No paravertebral fluid collection or significant edema. ALIGNMENT:  No traumatic spondylolisthesis or traumatic facet widening. VERTEBRAE:  No acute fracture. Vertebral body heights are maintained.  SPINAL CANAL/INTERVERTEBRAL DISCS: No high-grade spinal canal stenosis. Moderate multilevel disc height loss, most severe at T11-12 and T12-L1. Multilevel anterior osteophytosis. The there are indeterminate peripheral calcifications present in the expected region of the posterior thecal sac dorsal to the T7, T8 and T9 vertebral bodies, likely chronic possibly related to remote infection, inflammation or hemorrhage. No definite mass effect identified.     CT LUMBAR SPINE:   PARASPINAL SOFT TISSUES: No paravertebral fluid collection or significant edema. ALIGNMENT: Dextrocurvature of the lower thoracic and lumbar spine. No traumatic spondylolisthesis or traumatic facet widening. VERTEBRAE: Bones appear mildly demineralized. No acute fracture. Vertebral body heights are maintained. SPINAL CANAL/INTERVERTEBRAL DISCS: No high-grade spinal canal stenosis. Moderate multilevel disc height loss, most significant at T12-L1. Moderate bilateral facet joint arthropathy from L2-S1. NEURAL FORAMINA: Disc bulge at L2-L3, L3-L4 and L5-S1 with facet hypertrophy with up to moderate foraminal narrowing. Disc bulge and facet hypertrophy at L4 L spine with up to severe foraminal stenosis.       1.  No acute abnormality within the abdomen or pelvis. 2. No acute fracture of the thoracic or lumbar spine. Degenerative changes of the spine without critical canal stenosis. Foraminal narrowing in the lumbar spine as detailed. 3. Probable chronic calcifications within the posterior fecal sac at the level of the midthoracic spine as detailed. No associated canal stenosis identified..   I personally reviewed the image(s)/study and resident interpretation. I agree with the findings as stated by resident Fredi Anderson. Data analyzed and images interpreted at [facility], [place], [place].   MACRO: None   Signed by: Beth Holguin 8/12/2024 2:55 AM Dictation workstation:   [id number]    CT angio chest for  pulmonary embolism    Result Date: 8/12/2024  Interpreted By:  Beth Holguin and Beyersdorf Conner STUDY: CT ANGIO CHEST FOR PULMONARY EMBOLISM;  8/12/2024 2:36 am   INDICATION: Signs/Symptoms:Weakness, fatigue shortness of breath.   COMPARISON: None   ACCESSION NUMBER(S): [id number]   ORDERING CLINICIAN: ROSSANA CRANE   TECHNIQUE: Helical data acquisition of the chest was obtained after intravenous administration of 150 cc of Omnipaque 350, as per PE protocol. Images were reformatted in coronal and sagittal planes. Axial and coronal maximum intensity projection (MIP) images were created and reviewed.   FINDINGS: POTENTIAL LIMITATIONS OF THE STUDY: None   HEART AND VESSELS: There are no discrete filling defects within main pulmonary artery and its branches to suggest acute pulmonary embolism. Main pulmonary artery and its branches are normal in caliber.   The thoracic aorta normal in course and caliber.There is moderate atherosclerosis present, including calcified and noncalcified plaques.Although, the study is not tailored for evaluation of aorta, there is no definite evidence of acute aortic pathology. Ectasia of the main pulmonary artery measuring 3.3 cm Coronary artery calcifications noted however exam is not optimized for evaluation.   Mild cardiomegaly with enlargement of the left atrium.There are no findings to suggest right heart strain. Left atrial appendage closure device in place. Left chest pacemaker/ICD with leads terminating in the right atrium and right ventricle. There is no pericardial effusion seen.   MEDIASTINUM AND TOÑO, LOWER NECK AND AXILLA: The visualized thyroid gland is within normal limits. No evidence of thoracic lymphadenopathy by CT criteria. Patulous appearance of the esophagus with heterogenous material in the upper esophagus.   LUNGS AND AIRWAYS: There is mucous layering in the distal trachea and right and left main bronchi. Subtle mucous plugging in lower lobe bronchials.  Ground-glass consolidation of the dependent portions of the lower lungs. Moderate centrilobular and apically predominant emphysematous changes.  There is mild diffuse bronchial wall thickening. Calcified granuloma noted dot No pleural effusion or pneumothorax.     UPPER ABDOMEN:   Please see dedicated CT abdomen pelvis for further evaluation.   CHEST WALL AND OSSEOUS STRUCTURES: Chest wall is within normal limits. Multilevel degenerative changes with endplate osteophytes in the visualized spine. No acute osseous pathology.There are no suspicious osseous lesions.       1. No evidence of acute pulmonary embolism to the segmental level. 2. Small amount of mucus layering within the main bronchi and mucous plugging of the bronchials. Ground-glass opacities in the bilateral lung bases. Findings are consistent with aspiration pneumonitis and superimposed atelectasis. 3. Patulous esophagus with debris and septations noted to the upper level which could be related to esophagitis. Clinical correlation suggested. Findings also increased aspiration risk. 4. Ectatic main pulmonary artery measuring 3.3 cm. 5. Cardiomegaly with enlargement of the left atrium.   I personally reviewed the image(s)/study and resident interpretation. I agree with the findings as stated by resident Fredi Anderson. Data analyzed and images interpreted at [facility], [place], [place].   MACRO: None   Signed by: Beth Holguin 8/12/2024 2:37 AM Dictation workstation:   [id number]    CT head wo IV contrast    Result Date: 8/12/2024  Interpreted By:  Beth Holguin and Beyersdorf Conner STUDY: CT HEAD WO IV CONTRAST; CT CERVICAL SPINE WO IV CONTRAST;  8/12/2024 1:22 am   INDICATION: Signs/Symptoms:Hx of brain aneurysm. Generalized fatigue and weakness; Signs/Symptoms:BUE and BLE weakness   COMPARISON: CT head 03/28/2017   ACCESSION NUMBER(S): [id number]; [id number]   ORDERING CLINICIAN: ROSSANA FONTANEZ    TECHNIQUE: Axial noncontrast CT images of head with coronal and sagittal reconstructed images. Axial noncontrast CT images of the cervical spine with coronal and sagittal reconstructed images.   FINDINGS: CT HEAD:   Postsurgical changes of right temporal craniotomy and aneurysmal clipping near the suprasellar and right parasellar region. Associated metallic streak artifact limits evaluation of the region. Findings are similar prior imaging   BRAIN PARENCHYMA: Confluent region of hypoattenuating areas of periventricular and subcortical white matter, which is nonspecific, but favored to represent chronic small-vessel ischemic disease in a patient of this age. Mild encephalomalacia within the bilateral right temporal lobe. No evidence of acute intraparenchymal hemorrhage or parenchymal evidence of acute large territory ischemic infarct. No mass-effect, midline shift or effacement of cerebral sulci. Gray-white matter distinction is preserved.   VENTRICLES and EXTRA-AXIAL SPACES: No acute extra-axial or intraventricular hemorrhage. Ventricles and sulci are age-concordant.   PARANASAL SINUSES/MASTOIDS: No hemorrhage or air-fluid levels within the visualized paranasal sinuses. The mastoid air cells are well-aerated.   CALVARIUM/ORBITS: No skull fracture. Bilateral lens replacements. The orbits and globes are intact to the extent visualized.   EXTRACRANIAL SOFT TISSUES: No discernible abnormality. Postsurgical changes of the lungs is   CT CERVICAL SPINE:   PREVERTEBRAL SOFT TISSUES: Within normal limits.   CRANIOCERVICAL JUNCTION: Intact.   ALIGNMENT: Loss of the normal cervical lordosis. There is grade 1 retrolisthesis of C3-C4 measuring 3.3 mm, and grade 1 retrolisthesis of C5-6 measuring 2.7 mm. Facet joint alignment is maintained. No traumatic malalignment or traumatic facet widening.   VERTEBRAE: No acute fracture. Vertebral body heights are maintained.   SPINAL CANAL/INTERVERTEBRAL DISCS: No high-grade spinal canal  stenosis. Multilevel variable disc space height loss, most severe at C6-7. Prominent osteophytosis at this level along with subchondral sclerosis and cystic changes..   NEURAL FORAMINA: Mild uncovertebral hypertrophy at C4-5 with a mild bilateral neural foraminal narrowing. Moderate neural foraminal narrowing and right facet hypertrophy at C5-6 with moderate right neural foraminal narrowing. Severe uncovertebral hypertrophy at C6-7 with moderate bilateral neural foraminal narrowing.   OTHER: Dedicated chest imaging has been performed and will be reported separately..       CT HEAD: 1. Postsurgical changes of right temporal craniotomy and aneurysmal clipping right partially degrades imaging. Otherwise no acute intracranial hemorrhage or mass effect identified. 2. Findings consistent with chronic small-vessel ischemic disease and right temporal lobe encephalomalacia.     CT CERVICAL SPINE: 1. No acute fracture or traumatic malalignment of the cervical spine. 2. Multilevel spondylotic changes of the cervical spine as detailed above, with multilevel neural foraminal narrowing.     I personally reviewed the image(s)/study and resident interpretation. I agree with the findings as stated by resident Fredi Anderson. Data analyzed and images interpreted at [facility], [place], [place].   MACRO: none   Signed by: Beth Holguin 8/12/2024 2:31 AM Dictation workstation:   [id number]    CT cervical spine wo IV contrast    Result Date: 8/12/2024  Interpreted By:  Beth Holguin and [place]yersdorf Conner STUDY: CT HEAD WO IV CONTRAST; CT CERVICAL SPINE WO IV CONTRAST;  8/12/2024 1:22 am   INDICATION: Signs/Symptoms:Hx of brain aneurysm. Generalized fatigue and weakness; Signs/Symptoms:BUE and BLE weakness   COMPARISON: CT head 03/28/2017   ACCESSION NUMBER(S): [id number]; [id number]   ORDERING CLINICIAN: ROSSANA FONTANEZ   TECHNIQUE: Axial noncontrast CT images of head with coronal  and sagittal reconstructed images. Axial noncontrast CT images of the cervical spine with coronal and sagittal reconstructed images.   FINDINGS: CT HEAD:   Postsurgical changes of right temporal craniotomy and aneurysmal clipping near the suprasellar and right parasellar region. Associated metallic streak artifact limits evaluation of the region. Findings are similar prior imaging   BRAIN PARENCHYMA: Confluent region of hypoattenuating areas of periventricular and subcortical white matter, which is nonspecific, but favored to represent chronic small-vessel ischemic disease in a patient of this age. Mild encephalomalacia within the bilateral right temporal lobe. No evidence of acute intraparenchymal hemorrhage or parenchymal evidence of acute large territory ischemic infarct. No mass-effect, midline shift or effacement of cerebral sulci. Gray-white matter distinction is preserved.   VENTRICLES and EXTRA-AXIAL SPACES: No acute extra-axial or intraventricular hemorrhage. Ventricles and sulci are age-concordant.   PARANASAL SINUSES/MASTOIDS: No hemorrhage or air-fluid levels within the visualized paranasal sinuses. The mastoid air cells are well-aerated.   CALVARIUM/ORBITS: No skull fracture. Bilateral lens replacements. The orbits and globes are intact to the extent visualized.   EXTRACRANIAL SOFT TISSUES: No discernible abnormality. Postsurgical changes of the lungs is   CT CERVICAL SPINE:   PREVERTEBRAL SOFT TISSUES: Within normal limits.   CRANIOCERVICAL JUNCTION: Intact.   ALIGNMENT: Loss of the normal cervical lordosis. There is grade 1 retrolisthesis of C3-C4 measuring 3.3 mm, and grade 1 retrolisthesis of C5-6 measuring 2.7 mm. Facet joint alignment is maintained. No traumatic malalignment or traumatic facet widening.   VERTEBRAE: No acute fracture. Vertebral body heights are maintained.   SPINAL CANAL/INTERVERTEBRAL DISCS: No high-grade spinal canal stenosis. Multilevel variable disc space height loss, most  severe at C6-7. Prominent osteophytosis at this level along with subchondral sclerosis and cystic changes..   NEURAL FORAMINA: Mild uncovertebral hypertrophy at C4-5 with a mild bilateral neural foraminal narrowing. Moderate neural foraminal narrowing and right facet hypertrophy at C5-6 with moderate right neural foraminal narrowing. Severe uncovertebral hypertrophy at C6-7 with moderate bilateral neural foraminal narrowing.   OTHER: Dedicated chest imaging has been performed and will be reported separately..       CT HEAD: 1. Postsurgical changes of right temporal craniotomy and aneurysmal clipping right partially degrades imaging. Otherwise no acute intracranial hemorrhage or mass effect identified. 2. Findings consistent with chronic small-vessel ischemic disease and right temporal lobe encephalomalacia.     CT CERVICAL SPINE: 1. No acute fracture or traumatic malalignment of the cervical spine. 2. Multilevel spondylotic changes of the cervical spine as detailed above, with multilevel neural foraminal narrowing.     I personally reviewed the image(s)/study and resident interpretation. I agree with the findings as stated by resident Fredi Anderson. Data analyzed and images interpreted at [facility], [place], [place].   MACRO: none   Signed by: Beth Holguin 8/12/2024 2:31 AM Dictation workstation:   [id number]        Assessment/Plan   Assessment & Plan  Weakness    Generalized weakness  This a 78yo RHF PMHx HTN, SSS (s/p pacer 2019), pAF (s/p watchman 2020), cerebral aneurysm (s/p clipping 1993), ET presenting for subacute progressive ascending weakness and instability.      Patient reported unsteady walking for about 6 weeks. Progressively worsening, but seems to have more-sharply declined in past 2 weeks. Now starting to involve BUE. Now starting to have paresthesias in the RUE and pain in the R shoulder. Occasional electrical shocks of pain radiating down BUE and chest.      Initial neurological examination was significant for some L thenar atrophy, weakness of ECRL/ECU, EDC, ADM, FDI. On lowers, there is diminished LLE vibratory sense at the toe, ankle, and knee compared to left. Questionable C5 sensory level. Normal tone. Reflexes 3+ with spread throughout, London's & finger flexor reflex present bilaterally, 4bts ankle clonus bilaterally. Toes mute. Trace jaw jerk. Gait narrow-based and unsteady with truncal instability. CTH which shows R temporal cranioplasty, R posterior circulation clip with bloom artifact, and moderate posterior-predominant subcortical microvascular disease.      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 CT myelogram was inconclusive due to poor contrast uptake in the cervical spine. Consider technical vs obstructive cause. S/p cisternal myelogram on 8/16 which showed mild to moderate stenosis without obstruction. Neurosurgery not planning intervention (signed-off). Pending dispo to acute rehab.    8/23 Updates:  - Pending discharge  - PM&R consulted to assist with acceptance to [facility]: addended note recommended home with PT/OT  - Appointments scheduled with [facility] attendings: Dr. Ga on 9/13 and Dr. Leslie on 2/3      #Cervical myeloradiculopathy  :: Possible structural compression  :: B12, folate, homocysteine, vitamin E, copper, MMA, CK all within normal limits  :: CT myelogram inconclusive d/t incomplete contrast uptake, reach out to IR re: imaging difficulties  :: Repeat cisternal myelogram shows no significant stenosis  - Consult neurosurgery: no intervention, signed off  - Pending dispo   - Follow-up outpatient with [facility] clinic     #MISC  -Continue home meds: hydrochlorothiazide daily, potassium, propranolol 10 mg QID, rosuvastatin 5 mg HS, Valtrex 500 mg daily    F: prn  E: prn  N: regular   A: PIV  DVT: lovenox   GI: N/A    CODE: FULL  NOK: Daughter  Racheal, [phone]     Fartun Emmanuel MD  Neurology PGY-2

## 2024-08-22 NOTE — PROGRESS NOTES
Occupational Therapy    OT Treatment    Patient Name: Linda López  MRN: [id number]  Today's Date: 8/22/2024  Time Calculation  Start Time: 0928  Stop Time: 1000  Time Calculation (min): 32 min      Assessment:  OT Assessment: Pt will benefit from continued skilled OT to increase independence in ADLs, functional mobility, activity tolerance, safety, and cognition.  Prognosis: Good  Barriers to Discharge: None  Evaluation/Treatment Tolerance: Patient tolerated treatment well  Medical Staff Made Aware: Yes  End of Session Communication: Bedside nurse  End of Session Patient Position: Bed, 3 rail up, Alarm off, not on at start of session (sitting EOB)  OT Assessment Results: Decreased ADL status, Decreased safe judgment during ADL, Decreased cognition, Decreased endurance, Decreased functional mobility, Decreased gross motor control, Decreased IADLs  Prognosis: Good  Barriers to Discharge: None  Evaluation/Treatment Tolerance: Patient tolerated treatment well  Medical Staff Made Aware: Yes  Strengths: Attitude of self  Barriers to Participation: Comorbidities  Plan:  Treatment Interventions: ADL retraining, Functional transfer training, UE strengthening/ROM, Endurance training, Cognitive reorientation, Patient/family training, Equipment evaluation/education, Compensatory technique education  OT Frequency: 4 times per week  OT Discharge Recommendations: High intensity level of continued care  Equipment Recommended upon Discharge:  (TBD)  OT Recommended Transfer Status: Assist of 1  OT - OK to Discharge: Yes  Treatment Interventions: ADL retraining, Functional transfer training, UE strengthening/ROM, Endurance training, Cognitive reorientation, Patient/family training, Equipment evaluation/education, Compensatory technique education    Subjective   Previous Visit Info:  OT Last Visit  OT Received On: 08/22/24  General:  General  Reason for Referral: 78yo  presenting with progressive BUE >BLE weakness x 6 weeks.  Past  Medical History Relevant to Rehab: HTN, SSS (s/p pacer 2019), pAF (s/p watchman 2020), cerebral aneurysm (s/p clipping 1993)  Family/Caregiver Present: No  Prior to Session Communication: Bedside nurse  Patient Position Received: Bed, 3 rail up, Alarm off, not on at start of session (sitting EOB)  Preferred Learning Style: auditory, verbal  General Comment: Pt seated EOB upon arrival, agreeable to OT, pt reporting feeling weak this date  Precautions:  Hearing/Visual Limitations: [place], + glasses  Medical Precautions: Fall precautions    Pain:  Pain Assessment  Pain Assessment: 0-10  0-10 (Numeric) Pain Score: 2  Pain Type: Acute pain  Pain Location: Shoulder  Pain Orientation: Right  Pain Interventions: Repositioned, Distraction    Objective    Cognition:  Cognition  Orientation Level: Oriented X4  Insight: Mild  Impulsive: Moderately    Activities of Daily Living:      Grooming  Grooming Level of Assistance: Contact guard, Moderate verbal cues  Grooming Where Assessed: Standing sinkside  Grooming Comments: Pt performed hairbrushing and teethbrushing standing at sink CGA, VCs for sequencing and safety, pt with 1x LOB reaching for paper towels, min A to recover.    UE Dressing  UE Dressing Level of Assistance: Contact guard, Minimum assistance  UE Dressing Where Assessed: Edge of bed  UE Dressing Comments: Pt doffed/donned gown over from CGA seated EOB, min A to tie/untie over back    Bed Mobility/Transfers: Bed Mobility  Bed Mobility: No    Transfers  Transfer: Yes  Transfer 1  Transfer From 1: Sit to, Stand to  Transfer to 1: Stand, Sit  Technique 1: Sit to stand, Stand to sit  Transfer Device 1: Walker  Transfer Level of Assistance 1: Contact guard, Moderate verbal cues  Trials/Comments 1: VCs for positioning 2/2 pt impulsive with mobility and sitting when not close to bed    Functional Mobility:  Functional Mobility  Functional Mobility Performed: Yes  Functional Mobility 1  Surface 1: Level tile, Carpet  Device 1:  "Rolling walker  Assistance 1: Contact guard  Comments 1: fxnl mob bed <>bathroom CGA FWW, fxnl mob mod household distance in hallway CGA FWW, 2x LOB with mobility min A to recover, VCs for safety throughout, pt leaving walker multiple times throughout session, 3-4 standing rest breaks required, pt reporting \"feeling weaker\" with mobility  Sitting Balance:  Static Sitting Balance  Static Sitting-Balance Support: Feet supported  Static Sitting-Level of Assistance: Independent  Dynamic Sitting Balance  Dynamic Sitting-Balance Support: Feet supported  Dynamic Sitting-Level of Assistance: Independent  Standing Balance:  Static Standing Balance  Static Standing-Balance Support: Bilateral upper extremity supported  Static Standing-Level of Assistance: Contact guard  Dynamic Standing Balance  Dynamic Standing-Balance Support: Bilateral upper extremity supported  Dynamic Standing-Level of Assistance: Contact guard  Modalities:  Modalities Used: No    Therapy/Activity:      Therapeutic Activity  Therapeutic Activity Performed: Yes  Therapeutic Activity 1: bed mob, transfers, fxnl mob  Therapeutic Activity 2: Pt provided with sponge and theraputty and education on use throughout the day    Outcome Measures:[facility] Daily Activity  Putting on and taking off regular lower body clothing: A little  Bathing (including washing, rinsing, drying): A little  Putting on and taking off regular upper body clothing: A little  Toileting, which includes using toilet, bedpan or urinal: A little  Taking care of personal grooming such as brushing teeth: A little  Eating Meals: None  Daily Activity - Total Score: 19      Education Documentation  Body Mechanics, taught by Rey Saini OT at 8/22/2024  1:06 PM.  Learner: Patient  Readiness: Acceptance  Method: Explanation  Response: Verbalizes Understanding, Needs Reinforcement    Precautions, taught by Rey Saini OT at 8/22/2024  1:06 PM.  Learner: Patient  Readiness: Acceptance  Method: " Explanation  Response: Verbalizes Understanding, Needs Reinforcement    ADL Training, taught by Rey Saini OT at 8/22/2024  1:06 PM.  Learner: Patient  Readiness: Acceptance  Method: Explanation  Response: Verbalizes Understanding, Needs Reinforcement    Education Comments  No comments found.      Goals:  Encounter Problems       Encounter Problems (Active)       ADLs       Patient with complete lower body dressing with mod I level of assistance donning and doffing all LE clothes  with PRN adaptive equipment while supported sitting and standing (Progressing)       Start:  08/16/24    Expected End:  09/06/24            Patient will complete toileting including hygiene clothing management/hygiene with modified independent level of assistance and grab bars. (Progressing)       Start:  08/16/24    Expected End:  09/06/24               BALANCE       Pt will maintain dynamic standing balance during ADL task with modified independent level of assistance in order to demonstrate decreased risk of falling and improved postural control. (Progressing)       Start:  08/16/24    Expected End:  09/06/24               COGNITION/SAFETY       Patient will score WFL on standardized cognitive assessment with verbal cues and within reasonable time frame (Progressing)       Start:  08/16/24    Expected End:  09/06/24               MOBILITY       Patient will perform Functional mobility max Household distances/Community Distances with modified independent level of assistance and least restrictive device in order to improve safety and functional mobility. (Progressing)       Start:  08/16/24    Expected End:  09/06/24               TRANSFERS       Patient will complete sit to stand transfer with modified independent level of assistance and least restrictive device in order to improve safety and prepare for out of bed mobility. (Progressing)       Start:  08/16/24    Expected End:  09/06/24                 Rey Saini OTR/ARNALDO

## 2024-08-22 NOTE — PROGRESS NOTES
Physical Therapy                 Therapy Communication Note    Patient Name: Linda López  MRN: [id number]  Today's Date: 8/22/2024     Discipline: Physical Therapy    Missed Visit Reason: Missed Visit Reason: Patient refused (despite encouragement and edu on importance of mobility)    Missed Time: Attempt 0780

## 2024-08-23 PROCEDURE — 99231 SBSQ HOSP IP/OBS SF/LOW 25: CPT

## 2024-08-23 PROCEDURE — 2500000001 HC RX 250 WO HCPCS SELF ADMINISTERED DRUGS (ALT 637 FOR MEDICARE OP): Performed by: PHYSICIAN ASSISTANT

## 2024-08-23 PROCEDURE — 2500000004 HC RX 250 GENERAL PHARMACY W/ HCPCS (ALT 636 FOR OP/ED)

## 2024-08-23 PROCEDURE — 1100000001 HC PRIVATE ROOM DAILY

## 2024-08-23 PROCEDURE — 2500000002 HC RX 250 W HCPCS SELF ADMINISTERED DRUGS (ALT 637 FOR MEDICARE OP, ALT 636 FOR OP/ED)

## 2024-08-23 PROCEDURE — 2500000002 HC RX 250 W HCPCS SELF ADMINISTERED DRUGS (ALT 637 FOR MEDICARE OP, ALT 636 FOR OP/ED): Performed by: PHYSICIAN ASSISTANT

## 2024-08-23 PROCEDURE — 2500000001 HC RX 250 WO HCPCS SELF ADMINISTERED DRUGS (ALT 637 FOR MEDICARE OP)

## 2024-08-23 PROCEDURE — S4991 NICOTINE PATCH NONLEGEND: HCPCS | Performed by: PHYSICIAN ASSISTANT

## 2024-08-23 PROCEDURE — 2500000005 HC RX 250 GENERAL PHARMACY W/O HCPCS

## 2024-08-23 ASSESSMENT — PAIN SCALES - GENERAL
PAINLEVEL_OUTOF10: 2
PAINLEVEL_OUTOF10: 4
PAINLEVEL_OUTOF10: 0 - NO PAIN
PAINLEVEL_OUTOF10: 2

## 2024-08-23 ASSESSMENT — COGNITIVE AND FUNCTIONAL STATUS - GENERAL
DRESSING REGULAR LOWER BODY CLOTHING: A LITTLE
HELP NEEDED FOR BATHING: A LITTLE
MOBILITY SCORE: 21
CLIMB 3 TO 5 STEPS WITH RAILING: A LITTLE
DAILY ACTIVITIY SCORE: 21
WALKING IN HOSPITAL ROOM: A LITTLE
STANDING UP FROM CHAIR USING ARMS: A LITTLE
DRESSING REGULAR UPPER BODY CLOTHING: A LITTLE

## 2024-08-23 ASSESSMENT — PAIN DESCRIPTION - LOCATION
LOCATION: NECK
LOCATION: NECK

## 2024-08-23 ASSESSMENT — PAIN - FUNCTIONAL ASSESSMENT
PAIN_FUNCTIONAL_ASSESSMENT: 0-10

## 2024-08-23 ASSESSMENT — PAIN DESCRIPTION - ORIENTATION: ORIENTATION: RIGHT

## 2024-08-23 NOTE — DISCHARGE INSTRUCTIONS
Ms. Jacob,     You were admitted due to weakness and trouble walking. While admitted we conducted a CT myelogram to examine your spinal cord, but the contrast with this study did not reach your cervical spinal cord. Because of this, we had a to do a cisternal approach and conduct a cisterna magna myelogram, which was able to show us your cervical cord in detail. We had the neurosurgery team evaluate your images, and ultimately they did not recommend surgical intervention. You worked with the PT and OT teams to help improve your strength and mobility. Initially, Acute Rehab was recommended, but after further evaluation by the rehab teams, it was determined that you would not qualify for Acute Rehab per their standards. Your other options for discharge were to a skilled nursing facility or home with PT/OT. After discussions with the rehab team, Racheal, our team, and the , you decided on home with home care and PT/OT.    Please be sure to follow-up with the neuromuscular team (Dr. Leslie's team) as scheduled on 9/13. We also submitted appointment requests for orthopedic spine and pain management. You should receive a call to schedule these, but if you do not, please call [phone] to do so.    Take Care,   Your  Care Team

## 2024-08-23 NOTE — PROGRESS NOTES
"Physical Therapy                 Therapy Communication Note    Patient Name: Linda López  MRN: [id number]  Today's Date: 8/23/2024     Discipline: Physical Therapy    Missed Visit Reason: Missed Visit Reason: Patient refused (pt tired and wanting to nap.  appears very down stating \"I don't know what they want from me anymore\")    Missed Time: Attempt 1504    "

## 2024-08-23 NOTE — PROGRESS NOTES
"Linda López is a 79 y.o. right-handed female with a history of A-fib, Anxiety and depression, Arthritis, Artificial cardiac pacemaker, Brain aneurysm s/p clipping (told by ED not MRI compatible), Essential tremor, HTN (hypertension), Kidney stones, Lumbar radiculopathy on day 2 of admission after presenting with subacute weakness, ataxia. Neurology was consulted for difficulty walking, upper extremity weakness.    Subjective   NAEON. Patient again states the gabapentin helped her pain but did make her drowsy and feel \"heavy\" still. Denies any changes to her strength, continues to feel weak.    Objective     Physical Exam  Mental status: A+O x 4, known vocal dystonia (baseline s/p aneurysmal clipping).  Motor exam: 5/5 strength in all muscle groups.  Reflexes: 2+ b/l biceps, triceps, BR, 3+patellar, achilles reflexes. No ankle clonus.  Sensory: Sensation to light touch intact in b/l UE and LE. Mild ataxia on FTN when approaching her nose only (able to reach finger normally), likely more sensory ataxia given loss of visual input.  Gait: Unsteady, narrow-based with lateral swaying to left more than right.     Last Recorded Vitals  Blood pressure 119/73, pulse 59, temperature 36.4 °C (97.5 °F), temperature source Temporal, resp. rate 17, height 1.626 m (5' 4\"), weight 76.7 kg (169 lb), SpO2 97%.  Intake/Output last 3 Shifts:  No intake/output data recorded.    Relevant Results  Scheduled medications  enoxaparin, 40 mg, subcutaneous, q24h  gabapentin, 300 mg, oral, Nightly  hydroCHLOROthiazide, 25 mg, oral, Daily  lidocaine, 1 patch, transdermal, q24h  nicotine, 1 patch, transdermal, Daily  PARoxetine, 20 mg, oral, Daily  potassium chloride CR, 20 mEq, oral, TID  propranolol, 10 mg, oral, 4x daily  rosuvastatin, 5 mg, oral, Nightly  valACYclovir, 500 mg, oral, Daily      Continuous medications     PRN medications  PRN medications: acetaminophen **OR** acetaminophen  Results for orders placed or performed during " the hospital encounter of 08/11/24 (from the past 24 hour(s))   CBC and Auto Differential   Result Value Ref Range    WBC 6.1 4.4 - 11.3 x10*3/uL    nRBC 0.0 0.0 - 0.0 /100 WBCs    RBC 4.49 4.00 - 5.20 x10*6/uL    Hemoglobin 14.7 12.0 - 16.0 g/dL    Hematocrit 42.9 36.0 - 46.0 %    MCV 96 80 - 100 fL    MCH 32.7 26.0 - 34.0 pg    MCHC 34.3 32.0 - 36.0 g/dL    RDW 12.2 11.5 - 14.5 %    Platelets 214 150 - 450 x10*3/uL    Neutrophils % 62.1 40.0 - 80.0 %    Immature Granulocytes %, Automated 0.3 0.0 - 0.9 %    Lymphocytes % 27.0 13.0 - 44.0 %    Monocytes % 7.2 2.0 - 10.0 %    Eosinophils % 2.4 0.0 - 6.0 %    Basophils % 1.0 0.0 - 2.0 %    Neutrophils Absolute 3.81 1.60 - 5.50 x10*3/uL    Immature Granulocytes Absolute, Automated 0.02 0.00 - 0.50 x10*3/uL    Lymphocytes Absolute 1.66 0.80 - 3.00 x10*3/uL    Monocytes Absolute 0.44 0.05 - 0.80 x10*3/uL    Eosinophils Absolute 0.15 0.00 - 0.40 x10*3/uL    Basophils Absolute 0.06 0.00 - 0.10 x10*3/uL   Magnesium   Result Value Ref Range    Magnesium 1.95 1.60 - 2.40 mg/dL   Renal function panel   Result Value Ref Range    Glucose 196 (H) 74 - 99 mg/dL    Sodium 138 136 - 145 mmol/L    Potassium 4.1 3.5 - 5.3 mmol/L    Chloride 102 98 - 107 mmol/L    Bicarbonate 28 21 - 32 mmol/L    Anion Gap 12 10 - 20 mmol/L    Urea Nitrogen 26 (H) 6 - 23 mg/dL    Creatinine 0.64 0.50 - 1.05 mg/dL    eGFR >90 >60 mL/min/1.73m*2    Calcium 9.4 8.6 - 10.6 mg/dL    Phosphorus 3.5 2.5 - 4.9 mg/dL    Albumin 3.7 3.4 - 5.0 g/dL         CT cervical spine post myelogram    Result Date: 8/14/2024  Interpreted By:  Rico Marr and [facility] [place] STUDY: CT CERVICAL SPINE POST MYELOGRAM; CT LUMBAR SPINE POST MYELOGRAM; [place] MULTIPLE REGIONS MYELOGRAM WITH LUMBAR PUNCTURE; CT THORACIC SPINE POST MYELOGRAM;  8/14/2024 4:57 pm; 8/14/2024 4:24 pm   INDICATION: Signs/Symptoms:Upper motor neuron symptoms of bilateral upper extremities. Unable to obtain MRI; Signs/Symptoms:Cervical, thoracic, lumbar for  UE and LE weakness.   COMPARISON: CT cervical spine, thoracic spine, and lumbar spine without contrast 08/12/2024   ACCESSION NUMBER(S): [id number]; [id number]; [id number]; [id number]   ORDERING CLINICIAN: LILI CARUSO   TECHNIQUE: After discussion of the risks, benefits and alternatives, standard written hospital consent was obtained. The patient was placed prone on the fluoroscopic table.  The lower back was prepped and draped in sterile fashion. One percent lidocaine was used for local anesthesia. Under fluoroscopic guidance, a 22 gauge spinal needle was advanced into the thecal sac at the L3-L4 level. 10 cc of Omnipaque 300 was administered intrathecally under intermittent fluoroscopic imaging. Fluoroscopy time was 1.2 minutes. The patient tolerated the procedure well.   Following the fluoroscopic myelogram, serial axial CT images were obtained through the cervical, thoracic, and lumbar spine, using a bone algorithm. Images were reformatted into sagittal and coronal planes.   FINDINGS: FLUOROSCOPIC MYELOGRAM:  The thecal sac is widely patent.  There is filling bilaterally of nerve root sleeves without cutoff. There is normal alignment.   CT MYELOGRAM: CERVICAL SPINE: No evidence of contrast opacification of the cervical spine thecal sac despite repeat imaging after placing patient in Trendelenburg for several minutes. Nondiagnostic CT myelogram of the cervical spine.   Alignment is unremarkable. Vertebral body heights are maintained. Intervertebral disc space narrowing at C6-C7. Degenerative changes throughout the cervical spine most significant at C6-C7. No evidence of canal stenosis from the craniocervical junction to the C4-5 level. Nondiagnostic examination of the spinal canal at the C5-6 level and below secondary to streak artifact from overlying soft tissues.   There is hypertrophy of the right facet joint of C3-4 with slight joint space widening (series 205, image 23).   THORACIC  SPINE: Contrast is visualized within the thoracic spine from the T5-6 level down. Partial opacification of the canal at the T3-4 and T4-5 levels. No contrast is seen within the thecal sac at the T1-2 and T2-3 levels.   Heterogeneous opacification of the right dorsolateral aspect of the thecal sac at the T5-6 level in the dorsal aspect of the thecal sac at the T7-8 level, which may represent blood products. No high-grade canal stenosis at any level from T5-6 through T12-L1.   The vertebral body heights of the thoracic spine are maintained. The intervertebral disc spaces are maintained. No evidence of significant neural foraminal stenosis.     LUMBAR SPINE: Heterogeneous opacification of the thecal sac within the lumbar spine, with dense cyst contrast seen at the L5-S1 level where the thecal sac was accessed.   Vertebral body heights are maintained. Intervertebral disc spaces are maintained. L1-2: No canal or foraminal stenosis.   L2-3: No canal or foraminal stenosis.   L3-4: Shallow disc bulge with facet hypertrophy results in mild narrowing the spinal canal and bilateral foramina.   L4-5: Shallow partially calcified disc bulge with facet hypertrophy results in mild narrowing of bilateral foramina and minimal narrowing of the spinal canal.   L5-S1: No canal or foraminal stenosis.     OTHER: Chest: Pacemaker in place. Status post mitral valve replacement. Emphysematous changes throughout the lungs. Abdomen/pelvis: No remarkable findings within the visualized portions of the abdomen and pelvis. Soft tissues: No remarkable soft tissue findings.       CT myelogram 1. Non-diagnostic myelogram of the cervical spine secondary to non opacification of the thecal sac despite repeat imaging after Trendelenburg positioning. Within the limitations of this non-contrast CT cervical spine, no evidence of high-grade canal stenosis from the craniocervical junction to the C4-5 level. Nondiagnostic evaluation of the spinal canal from  the C5-6 level and below secondary to streak artifact from overlying soft tissues. 2. Facet hypertrophy with slight widening of the right facet joint of C3-4. All findings may be degenerative in etiology, joint widening is nonspecific and underlying infectious process can not be excluded. 3. Inconsistent contrast opacification of the thoracic thecal sac. The thoracic spine is well opacified from T3 through T12 without evidence of central spinal canal stenosis, spinal cord compression, or neural foraminal stenosis. The superior portion of the thoracic thecal sac is not well opacified from approximately T1 through T3, as well as heterogeneous filling defects within the dorsal aspect of the thecal sac at the T5-6 and T7-8 levels, possibly due to blood products. 4. Heterogeneous contrast opacification of the lumbar spine thecal sac. Within these limitations, mild multilevel degenerative change without evidence of high-grade spinal canal stenosis or neural foraminal stenosis.   Given limitations of the current myelogram, consider repeat myelogram if patient is amenable.   I, Dr. Marr, supervised and was available throughout this procedure as performed by fellow physician Dr. Ty. This study was performed and interpreted at [facility]. I agree with the procedural description and the findings as stated.   MACRO: None   Signed by: Rico Marr 8/14/2024 9:22 PM Dictation workstation:   [id number]    CT thoracic spine post myelogram    Result Date: 8/14/2024  Interpreted By:  Rico Marr and Hofer [place] STUDY: CT CERVICAL SPINE POST MYELOGRAM; CT LUMBAR SPINE POST MYELOGRAM; [place] MULTIPLE REGIONS MYELOGRAM WITH LUMBAR PUNCTURE; CT THORACIC SPINE POST MYELOGRAM;  8/14/2024 4:57 pm; 8/14/2024 4:24 pm   INDICATION: Signs/Symptoms:Upper motor neuron symptoms of bilateral upper extremities. Unable to obtain MRI; Signs/Symptoms:Cervical, thoracic, lumbar for UE and LE weakness.   COMPARISON: CT  cervical spine, thoracic spine, and lumbar spine without contrast 08/12/2024   ACCESSION NUMBER(S): [id number]; [id number]; [id number]; [id number]   ORDERING CLINICIAN: LILI CARUSO   TECHNIQUE: After discussion of the risks, benefits and alternatives, standard written hospital consent was obtained. The patient was placed prone on the fluoroscopic table.  The lower back was prepped and draped in sterile fashion. One percent lidocaine was used for local anesthesia. Under fluoroscopic guidance, a 22 gauge spinal needle was advanced into the thecal sac at the L3-L4 level. 10 cc of Omnipaque 300 was administered intrathecally under intermittent fluoroscopic imaging. Fluoroscopy time was 1.2 minutes. The patient tolerated the procedure well.   Following the fluoroscopic myelogram, serial axial CT images were obtained through the cervical, thoracic, and lumbar spine, using a bone algorithm. Images were reformatted into sagittal and coronal planes.   FINDINGS: FLUOROSCOPIC MYELOGRAM:  The thecal sac is widely patent.  There is filling bilaterally of nerve root sleeves without cutoff. There is normal alignment.   CT MYELOGRAM: CERVICAL SPINE: No evidence of contrast opacification of the cervical spine thecal sac despite repeat imaging after placing patient in Trendelenburg for several minutes. Nondiagnostic CT myelogram of the cervical spine.   Alignment is unremarkable. Vertebral body heights are maintained. Intervertebral disc space narrowing at C6-C7. Degenerative changes throughout the cervical spine most significant at C6-C7. No evidence of canal stenosis from the craniocervical junction to the C4-5 level. Nondiagnostic examination of the spinal canal at the C5-6 level and below secondary to streak artifact from overlying soft tissues.   There is hypertrophy of the right facet joint of C3-4 with slight joint space widening (series 205, image 23).   THORACIC SPINE: Contrast is visualized within the  thoracic spine from the T5-6 level down. Partial opacification of the canal at the T3-4 and T4-5 levels. No contrast is seen within the thecal sac at the T1-2 and T2-3 levels.   Heterogeneous opacification of the right dorsolateral aspect of the thecal sac at the T5-6 level in the dorsal aspect of the thecal sac at the T7-8 level, which may represent blood products. No high-grade canal stenosis at any level from T5-6 through T12-L1.   The vertebral body heights of the thoracic spine are maintained. The intervertebral disc spaces are maintained. No evidence of significant neural foraminal stenosis.     LUMBAR SPINE: Heterogeneous opacification of the thecal sac within the lumbar spine, with dense cyst contrast seen at the L5-S1 level where the thecal sac was accessed.   Vertebral body heights are maintained. Intervertebral disc spaces are maintained. L1-2: No canal or foraminal stenosis.   L2-3: No canal or foraminal stenosis.   L3-4: Shallow disc bulge with facet hypertrophy results in mild narrowing the spinal canal and bilateral foramina.   L4-5: Shallow partially calcified disc bulge with facet hypertrophy results in mild narrowing of bilateral foramina and minimal narrowing of the spinal canal.   L5-S1: No canal or foraminal stenosis.     OTHER: Chest: Pacemaker in place. Status post mitral valve replacement. Emphysematous changes throughout the lungs. Abdomen/pelvis: No remarkable findings within the visualized portions of the abdomen and pelvis. Soft tissues: No remarkable soft tissue findings.       CT myelogram 1. Non-diagnostic myelogram of the cervical spine secondary to non opacification of the thecal sac despite repeat imaging after Trendelenburg positioning. Within the limitations of this non-contrast CT cervical spine, no evidence of high-grade canal stenosis from the craniocervical junction to the C4-5 level. Nondiagnostic evaluation of the spinal canal from the C5-6 level and below secondary to  streak artifact from overlying soft tissues. 2. Facet hypertrophy with slight widening of the right facet joint of C3-4. All findings may be degenerative in etiology, joint widening is nonspecific and underlying infectious process can not be excluded. 3. Inconsistent contrast opacification of the thoracic thecal sac. The thoracic spine is well opacified from T3 through T12 without evidence of central spinal canal stenosis, spinal cord compression, or neural foraminal stenosis. The superior portion of the thoracic thecal sac is not well opacified from approximately T1 through T3, as well as heterogeneous filling defects within the dorsal aspect of the thecal sac at the T5-6 and T7-8 levels, possibly due to blood products. 4. Heterogeneous contrast opacification of the lumbar spine thecal sac. Within these limitations, mild multilevel degenerative change without evidence of high-grade spinal canal stenosis or neural foraminal stenosis.   Given limitations of the current myelogram, consider repeat myelogram if patient is amenable.   I, Dr. Marr, supervised and was available throughout this procedure as performed by fellow physician Dr. Ty. This study was performed and interpreted at [facility]. I agree with the procedural description and the findings as stated.   MACRO: None   Signed by: Rico Marr 8/14/2024 9:22 PM Dictation workstation:   [id number]    CT lumbar spine post myelogram    Result Date: 8/14/2024  Interpreted By:  Rico Marr and Hofer [place] STUDY: CT CERVICAL SPINE POST MYELOGRAM; CT LUMBAR SPINE POST MYELOGRAM; FL MULTIPLE REGIONS MYELOGRAM WITH LUMBAR PUNCTURE; CT THORACIC SPINE POST MYELOGRAM;  8/14/2024 4:57 pm; 8/14/2024 4:24 pm   INDICATION: Signs/Symptoms:Upper motor neuron symptoms of bilateral upper extremities. Unable to obtain MRI; Signs/Symptoms:Cervical, thoracic, lumbar for UE and LE weakness.   COMPARISON: CT cervical spine, thoracic spine, and  lumbar spine without contrast 08/12/2024   ACCESSION NUMBER(S): [id number]; [id number]; [id number]; [id number]   ORDERING CLINICIAN: LILI CARUSO   TECHNIQUE: After discussion of the risks, benefits and alternatives, standard written hospital consent was obtained. The patient was placed prone on the fluoroscopic table.  The lower back was prepped and draped in sterile fashion. One percent lidocaine was used for local anesthesia. Under fluoroscopic guidance, a 22 gauge spinal needle was advanced into the thecal sac at the L3-L4 level. 10 cc of Omnipaque 300 was administered intrathecally under intermittent fluoroscopic imaging. Fluoroscopy time was 1.2 minutes. The patient tolerated the procedure well.   Following the fluoroscopic myelogram, serial axial CT images were obtained through the cervical, thoracic, and lumbar spine, using a bone algorithm. Images were reformatted into sagittal and coronal planes.   FINDINGS: FLUOROSCOPIC MYELOGRAM:  The thecal sac is widely patent.  There is filling bilaterally of nerve root sleeves without cutoff. There is normal alignment.   CT MYELOGRAM: CERVICAL SPINE: No evidence of contrast opacification of the cervical spine thecal sac despite repeat imaging after placing patient in Trendelenburg for several minutes. Nondiagnostic CT myelogram of the cervical spine.   Alignment is unremarkable. Vertebral body heights are maintained. Intervertebral disc space narrowing at C6-C7. Degenerative changes throughout the cervical spine most significant at C6-C7. No evidence of canal stenosis from the craniocervical junction to the C4-5 level. Nondiagnostic examination of the spinal canal at the C5-6 level and below secondary to streak artifact from overlying soft tissues.   There is hypertrophy of the right facet joint of C3-4 with slight joint space widening (series 205, image 23).   THORACIC SPINE: Contrast is visualized within the thoracic spine from the T5-6 level  down. Partial opacification of the canal at the T3-4 and T4-5 levels. No contrast is seen within the thecal sac at the T1-2 and T2-3 levels.   Heterogeneous opacification of the right dorsolateral aspect of the thecal sac at the T5-6 level in the dorsal aspect of the thecal sac at the T7-8 level, which may represent blood products. No high-grade canal stenosis at any level from T5-6 through T12-L1.   The vertebral body heights of the thoracic spine are maintained. The intervertebral disc spaces are maintained. No evidence of significant neural foraminal stenosis.     LUMBAR SPINE: Heterogeneous opacification of the thecal sac within the lumbar spine, with dense cyst contrast seen at the L5-S1 level where the thecal sac was accessed.   Vertebral body heights are maintained. Intervertebral disc spaces are maintained. L1-2: No canal or foraminal stenosis.   L2-3: No canal or foraminal stenosis.   L3-4: Shallow disc bulge with facet hypertrophy results in mild narrowing the spinal canal and bilateral foramina.   L4-5: Shallow partially calcified disc bulge with facet hypertrophy results in mild narrowing of bilateral foramina and minimal narrowing of the spinal canal.   L5-S1: No canal or foraminal stenosis.     OTHER: Chest: Pacemaker in place. Status post mitral valve replacement. Emphysematous changes throughout the lungs. Abdomen/pelvis: No remarkable findings within the visualized portions of the abdomen and pelvis. Soft tissues: No remarkable soft tissue findings.       CT myelogram 1. Non-diagnostic myelogram of the cervical spine secondary to non opacification of the thecal sac despite repeat imaging after Trendelenburg positioning. Within the limitations of this non-contrast CT cervical spine, no evidence of high-grade canal stenosis from the craniocervical junction to the C4-5 level. Nondiagnostic evaluation of the spinal canal from the C5-6 level and below secondary to streak artifact from overlying soft  tissues. 2. Facet hypertrophy with slight widening of the right facet joint of C3-4. All findings may be degenerative in etiology, joint widening is nonspecific and underlying infectious process can not be excluded. 3. Inconsistent contrast opacification of the thoracic thecal sac. The thoracic spine is well opacified from T3 through T12 without evidence of central spinal canal stenosis, spinal cord compression, or neural foraminal stenosis. The superior portion of the thoracic thecal sac is not well opacified from approximately T1 through T3, as well as heterogeneous filling defects within the dorsal aspect of the thecal sac at the T5-6 and T7-8 levels, possibly due to blood products. 4. Heterogeneous contrast opacification of the lumbar spine thecal sac. Within these limitations, mild multilevel degenerative change without evidence of high-grade spinal canal stenosis or neural foraminal stenosis.   Given limitations of the current myelogram, consider repeat myelogram if patient is amenable.   I, Dr. Marr, supervised and was available throughout this procedure as performed by fellow physician Dr. Ty. This study was performed and interpreted at [facility]. I agree with the procedural description and the findings as stated.   MACRO: None   Signed by: Rico Marr 8/14/2024 9:22 PM Dictation workstation:   [id number]    FL multiple regions myelogram with lumbar puncture    Result Date: 8/14/2024  Interpreted By:  Rico Marr and Hofer [place] STUDY: CT CERVICAL SPINE POST MYELOGRAM; CT LUMBAR SPINE POST MYELOGRAM; FL MULTIPLE REGIONS MYELOGRAM WITH LUMBAR PUNCTURE; CT THORACIC SPINE POST MYELOGRAM;  8/14/2024 4:57 pm; 8/14/2024 4:24 pm   INDICATION: Signs/Symptoms:Upper motor neuron symptoms of bilateral upper extremities. Unable to obtain MRI; Signs/Symptoms:Cervical, thoracic, lumbar for UE and LE weakness.   COMPARISON: CT cervical spine, thoracic spine, and lumbar spine without  contrast 08/12/2024   ACCESSION NUMBER(S): [id number]; [id number]; [id number]; [id number]   ORDERING CLINICIAN: LILI CARUSO   TECHNIQUE: After discussion of the risks, benefits and alternatives, standard written hospital consent was obtained. The patient was placed prone on the fluoroscopic table.  The lower back was prepped and draped in sterile fashion. One percent lidocaine was used for local anesthesia. Under fluoroscopic guidance, a 22 gauge spinal needle was advanced into the thecal sac at the L3-L4 level. 10 cc of Omnipaque 300 was administered intrathecally under intermittent fluoroscopic imaging. Fluoroscopy time was 1.2 minutes. The patient tolerated the procedure well.   Following the fluoroscopic myelogram, serial axial CT images were obtained through the cervical, thoracic, and lumbar spine, using a bone algorithm. Images were reformatted into sagittal and coronal planes.   FINDINGS: FLUOROSCOPIC MYELOGRAM:  The thecal sac is widely patent.  There is filling bilaterally of nerve root sleeves without cutoff. There is normal alignment.   CT MYELOGRAM: CERVICAL SPINE: No evidence of contrast opacification of the cervical spine thecal sac despite repeat imaging after placing patient in Trendelenburg for several minutes. Nondiagnostic CT myelogram of the cervical spine.   Alignment is unremarkable. Vertebral body heights are maintained. Intervertebral disc space narrowing at C6-C7. Degenerative changes throughout the cervical spine most significant at C6-C7. No evidence of canal stenosis from the craniocervical junction to the C4-5 level. Nondiagnostic examination of the spinal canal at the C5-6 level and below secondary to streak artifact from overlying soft tissues.   There is hypertrophy of the right facet joint of C3-4 with slight joint space widening (series 205, image 23).   THORACIC SPINE: Contrast is visualized within the thoracic spine from the T5-6 level down. Partial  opacification of the canal at the T3-4 and T4-5 levels. No contrast is seen within the thecal sac at the T1-2 and T2-3 levels.   Heterogeneous opacification of the right dorsolateral aspect of the thecal sac at the T5-6 level in the dorsal aspect of the thecal sac at the T7-8 level, which may represent blood products. No high-grade canal stenosis at any level from T5-6 through T12-L1.   The vertebral body heights of the thoracic spine are maintained. The intervertebral disc spaces are maintained. No evidence of significant neural foraminal stenosis.     LUMBAR SPINE: Heterogeneous opacification of the thecal sac within the lumbar spine, with dense cyst contrast seen at the L5-S1 level where the thecal sac was accessed.   Vertebral body heights are maintained. Intervertebral disc spaces are maintained. L1-2: No canal or foraminal stenosis.   L2-3: No canal or foraminal stenosis.   L3-4: Shallow disc bulge with facet hypertrophy results in mild narrowing the spinal canal and bilateral foramina.   L4-5: Shallow partially calcified disc bulge with facet hypertrophy results in mild narrowing of bilateral foramina and minimal narrowing of the spinal canal.   L5-S1: No canal or foraminal stenosis.     OTHER: Chest: Pacemaker in place. Status post mitral valve replacement. Emphysematous changes throughout the lungs. Abdomen/pelvis: No remarkable findings within the visualized portions of the abdomen and pelvis. Soft tissues: No remarkable soft tissue findings.       CT myelogram 1. Non-diagnostic myelogram of the cervical spine secondary to non opacification of the thecal sac despite repeat imaging after Trendelenburg positioning. Within the limitations of this non-contrast CT cervical spine, no evidence of high-grade canal stenosis from the craniocervical junction to the C4-5 level. Nondiagnostic evaluation of the spinal canal from the C5-6 level and below secondary to streak artifact from overlying soft tissues. 2. Facet  hypertrophy with slight widening of the right facet joint of C3-4. All findings may be degenerative in etiology, joint widening is nonspecific and underlying infectious process can not be excluded. 3. Inconsistent contrast opacification of the thoracic thecal sac. The thoracic spine is well opacified from T3 through T12 without evidence of central spinal canal stenosis, spinal cord compression, or neural foraminal stenosis. The superior portion of the thoracic thecal sac is not well opacified from approximately T1 through T3, as well as heterogeneous filling defects within the dorsal aspect of the thecal sac at the T5-6 and T7-8 levels, possibly due to blood products. 4. Heterogeneous contrast opacification of the lumbar spine thecal sac. Within these limitations, mild multilevel degenerative change without evidence of high-grade spinal canal stenosis or neural foraminal stenosis.   Given limitations of the current myelogram, consider repeat myelogram if patient is amenable.   I, Dr. Marr, supervised and was available throughout this procedure as performed by fellow physician Dr. Ty. This study was performed and interpreted at [facility]. I agree with the procedural description and the findings as stated.   MACRO: None   Signed by: Rico Marr 8/14/2024 9:22 PM Dictation workstation:   [id number]    CT angio head w and wo IV contrast    Result Date: 8/12/2024  Interpreted By:  Beth Holguin and Beyersdorf Conner STUDY: CT ANGIO HEAD W AND WO IV CONTRAST;  8/12/2024 1:22 am   INDICATION: Signs/Symptoms:bilateral upper extremity weaknes. Hx of aneurysm.   COMPARISON: CT head 08/12/2024   ACCESSION NUMBER(S): [id number]   ORDERING CLINICIAN: LILI FONTANEZ   TECHNIQUE: Unenhanced CT images of the head were obtained. Subsequently, 150 mL of Omnipaque 350 was administered intravenously and axial images of the head were acquired.  Coronal, sagittal, and 3-D reconstructions were  provided for review.   FINDINGS: Postsurgical changes of the aneurysm clip new right basilar cistern region motion limits evaluation due to streak artifact. Dedicated head CT is been performed and reported separately.   Anterior circulation: Atherosclerotic calcifications of the right greater than left carotid siphons. Streak artifact partially limits evaluation of the right distal ICA otherwise the bilateral intracranial internal carotid arteries, bilateral carotid terminals, bilateral proximal anterior and middle cerebral arteries are normal.   Posterior circulation: Tapering with diminutive intradural left vertebral artery. Streak artifact limits evaluation of the basilar tip and right greater than left proximal PCA. The mid to distal PCAs appear grossly patent.   Adjacent rounded opacities in the region of the right posterior communicating artery noted on axial projection without corresponding abnormality identified on additional projections and favored to represent volume averaging or artifact. Otherwise bilateral intracranial vertebral arteries, vertebrobasilar junction and basilar artery are normal.       Postsurgical changes of right posterior circulation aneurysmal clipping with associated streak artifact partially limiting evaluation. Otherwise no evidence for significant stenosis or large branch vessel cutoffs of the intracranial vessels.   Opacities noted on axial imaging without corresponding abnormality on additional findings. Findings are favored to represent artifact versus true aneurysm however attention on follow-up recommended.   I personally reviewed the image(s)/study and resident interpretation. I agree with the findings as stated by resident Fredi Anderson. Data analyzed and images interpreted at [facility], [place], [place].     MACRO: None   Signed by: Beth Holguin 8/12/2024 3:47 AM Dictation workstation:   [id number]    CT abdomen pelvis w IV  contrast    Result Date: 8/12/2024  Interpreted By:  Beth Holguin and Beyersdorf Conner STUDY: CT ABDOMEN PELVIS W IV CONTRAST; CT THORACIC SPINE WO IV CONTRAST; CT LUMBAR SPINE WO IV CONTRAST;  8/12/2024 2:12 am; 8/12/2024 2:11 am   INDICATION: Signs/Symptoms:N/V; Signs/Symptoms:BUE and BLE weakness; Signs/Symptoms:BUE and BLE weakness..   COMPARISON: None.   ACCESSION NUMBER(S): [id number]; [id number]; [id number]   ORDERING CLINICIAN: LILI FONTANEZ   TECHNIQUE: CT of the abdomen and pelvis was performed.  Standard contiguous axial images were obtained at 3 mm slice thickness through the abdomen and pelvis. Coronal and sagittal reconstructions at 3 mm slice thickness were performed.   150 ml of contrast omni 350 were administered intravenously without immediate complication.   FINDINGS: LOWER CHEST: Ground-glass attenuation of the lower lungs likely representing aspiration pneumonitis with a component of atelectasis. Emphysematous changes. Please see dedicated CTA chest for further evaluation.   ABDOMEN:   LIVER: The liver is normal in size measuring 15.3 cm in the craniocaudal axis. No focal hepatic lesions.   BILE DUCTS: The intrahepatic and extrahepatic ducts are not dilated.   GALLBLADDER: The gallbladder is nondistended and without evidence of radiopaque stones.   PANCREAS: The pancreas appears unremarkable without evidence of ductal dilatation or masses.   SPLEEN: The spleen is normal in size without focal lesions.   ADRENAL GLANDS: Bilateral adrenal glands appear normal.   KIDNEYS AND URETERS: The kidneys are normal in size and enhance symmetrically. Few bilateral hypodense lesions are too small to characterize, favored to represent simple cysts.  No hydroureteronephrosis. Contrast material is present within the bilateral renal collecting systems and ureters, limiting evaluation for calculi.   PELVIS:   BLADDER: Bladder is normal in appearance without wall thickening. Layering contrast material  within the bladder.   REPRODUCTIVE ORGANS: The uterus is surgically absent.   BOWEL: The stomach is unremarkable.   The small and large bowel are normal in caliber and demonstrate no wall thickening. Colonic diverticulosis without evidence of diverticulitis. The appendix is not definitely visualized. There is however no pericecal stranding or fluid. Moderate stool burden.   VESSELS: There is no aneurysmal dilatation of the abdominal aorta. The IVC appears normal. Moderate atherosclerotic calcification of the abdominal aorta and its branches.   PERITONEUM/RETROPERITONEUM/LYMPH NODES: There is no free or loculated fluid collection, no free intraperitoneal air. The retroperitoneum appears normal.  No abdominopelvic lymphadenopathy is present.   BONES AND ABDOMINAL WALL: Rightward curvature of the lumbar spine. The abdominal wall soft tissues appear normal.     CT THORACIC SPINE:   PARASPINAL SOFT TISSUES: No paravertebral fluid collection or significant edema. ALIGNMENT:  No traumatic spondylolisthesis or traumatic facet widening. VERTEBRAE:  No acute fracture. Vertebral body heights are maintained. SPINAL CANAL/INTERVERTEBRAL DISCS: No high-grade spinal canal stenosis. Moderate multilevel disc height loss, most severe at T11-12 and T12-L1. Multilevel anterior osteophytosis. The there are indeterminate peripheral calcifications present in the expected region of the posterior thecal sac dorsal to the T7, T8 and T9 vertebral bodies, likely chronic possibly related to remote infection, inflammation or hemorrhage. No definite mass effect identified.     CT LUMBAR SPINE:   PARASPINAL SOFT TISSUES: No paravertebral fluid collection or significant edema. ALIGNMENT: Dextrocurvature of the lower thoracic and lumbar spine. No traumatic spondylolisthesis or traumatic facet widening. VERTEBRAE: Bones appear mildly demineralized. No acute fracture. Vertebral body heights are maintained. SPINAL CANAL/INTERVERTEBRAL DISCS: No  high-grade spinal canal stenosis. Moderate multilevel disc height loss, most significant at T12-L1. Moderate bilateral facet joint arthropathy from L2-S1. NEURAL FORAMINA: Disc bulge at L2-L3, L3-L4 and L5-S1 with facet hypertrophy with up to moderate foraminal narrowing. Disc bulge and facet hypertrophy at L4 L spine with up to severe foraminal stenosis.       1.  No acute abnormality within the abdomen or pelvis. 2. No acute fracture of the thoracic or lumbar spine. Degenerative changes of the spine without critical canal stenosis. Foraminal narrowing in the lumbar spine as detailed. 3. Probable chronic calcifications within the posterior fecal sac at the level of the midthoracic spine as detailed. No associated canal stenosis identified..   I personally reviewed the image(s)/study and resident interpretation. I agree with the findings as stated by resident Fredi Anderson. Data analyzed and images interpreted at [place], [place].   MACRO: None   Signed by: Beth Holguin 8/12/2024 2:55 AM Dictation workstation:   [id number]    CT thoracic spine wo IV contrast    Result Date: 8/12/2024  Interpreted By:  Beth Holguin,  Colt Rai STUDY: CT ABDOMEN PELVIS W IV CONTRAST; CT THORACIC SPINE WO IV CONTRAST; CT LUMBAR SPINE WO IV CONTRAST;  8/12/2024 2:12 am; 8/12/2024 2:11 am   INDICATION: Signs/Symptoms:N/V; Signs/Symptoms:BUE and BLE weakness; Signs/Symptoms:BUE and BLE weakness..   COMPARISON: None.   ACCESSION NUMBER(S): [id number]; [id number]; [id number]   ORDERING CLINICIAN: LILI FONTANEZ   TECHNIQUE: CT of the abdomen and pelvis was performed.  Standard contiguous axial images were obtained at 3 mm slice thickness through the abdomen and pelvis. Coronal and sagittal reconstructions at 3 mm slice thickness were performed.   150 ml of contrast omni 350 were administered intravenously without immediate complication.   FINDINGS: LOWER CHEST:  Ground-glass attenuation of the lower lungs likely representing aspiration pneumonitis with a component of atelectasis. Emphysematous changes. Please see dedicated CTA chest for further evaluation.   ABDOMEN:   LIVER: The liver is normal in size measuring 15.3 cm in the craniocaudal axis. No focal hepatic lesions.   BILE DUCTS: The intrahepatic and extrahepatic ducts are not dilated.   GALLBLADDER: The gallbladder is nondistended and without evidence of radiopaque stones.   PANCREAS: The pancreas appears unremarkable without evidence of ductal dilatation or masses.   SPLEEN: The spleen is normal in size without focal lesions.   ADRENAL GLANDS: Bilateral adrenal glands appear normal.   KIDNEYS AND URETERS: The kidneys are normal in size and enhance symmetrically. Few bilateral hypodense lesions are too small to characterize, favored to represent simple cysts.  No hydroureteronephrosis. Contrast material is present within the bilateral renal collecting systems and ureters, limiting evaluation for calculi.   PELVIS:   BLADDER: Bladder is normal in appearance without wall thickening. Layering contrast material within the bladder.   REPRODUCTIVE ORGANS: The uterus is surgically absent.   BOWEL: The stomach is unremarkable.   The small and large bowel are normal in caliber and demonstrate no wall thickening. Colonic diverticulosis without evidence of diverticulitis. The appendix is not definitely visualized. There is however no pericecal stranding or fluid. Moderate stool burden.   VESSELS: There is no aneurysmal dilatation of the abdominal aorta. The IVC appears normal. Moderate atherosclerotic calcification of the abdominal aorta and its branches.   PERITONEUM/RETROPERITONEUM/LYMPH NODES: There is no free or loculated fluid collection, no free intraperitoneal air. The retroperitoneum appears normal.  No abdominopelvic lymphadenopathy is present.   BONES AND ABDOMINAL WALL: Rightward curvature of the lumbar spine. The  abdominal wall soft tissues appear normal.     CT THORACIC SPINE:   PARASPINAL SOFT TISSUES: No paravertebral fluid collection or significant edema. ALIGNMENT:  No traumatic spondylolisthesis or traumatic facet widening. VERTEBRAE:  No acute fracture. Vertebral body heights are maintained. SPINAL CANAL/INTERVERTEBRAL DISCS: No high-grade spinal canal stenosis. Moderate multilevel disc height loss, most severe at T11-12 and T12-L1. Multilevel anterior osteophytosis. The there are indeterminate peripheral calcifications present in the expected region of the posterior thecal sac dorsal to the T7, T8 and T9 vertebral bodies, likely chronic possibly related to remote infection, inflammation or hemorrhage. No definite mass effect identified.     CT LUMBAR SPINE:   PARASPINAL SOFT TISSUES: No paravertebral fluid collection or significant edema. ALIGNMENT: Dextrocurvature of the lower thoracic and lumbar spine. No traumatic spondylolisthesis or traumatic facet widening. VERTEBRAE: Bones appear mildly demineralized. No acute fracture. Vertebral body heights are maintained. SPINAL CANAL/INTERVERTEBRAL DISCS: No high-grade spinal canal stenosis. Moderate multilevel disc height loss, most significant at T12-L1. Moderate bilateral facet joint arthropathy from L2-S1. NEURAL FORAMINA: Disc bulge at L2-L3, L3-L4 and L5-S1 with facet hypertrophy with up to moderate foraminal narrowing. Disc bulge and facet hypertrophy at L4 L spine with up to severe foraminal stenosis.       1.  No acute abnormality within the abdomen or pelvis. 2. No acute fracture of the thoracic or lumbar spine. Degenerative changes of the spine without critical canal stenosis. Foraminal narrowing in the lumbar spine as detailed. 3. Probable chronic calcifications within the posterior fecal sac at the level of the midthoracic spine as detailed. No associated canal stenosis identified..   I personally reviewed the image(s)/study and resident interpretation. I  agree with the findings as stated by resident Fredi Anderson. Data analyzed and images interpreted at [facility], [place], [place].   MACRO: None   Signed by: Beth Holguin 8/12/2024 2:55 AM Dictation workstation:   [id number]    CT lumbar spine wo IV contrast    Result Date: 8/12/2024  Interpreted By:  Beth Holguin and Beyersdorf Conner STUDY: CT ABDOMEN PELVIS W IV CONTRAST; CT THORACIC SPINE WO IV CONTRAST; CT LUMBAR SPINE WO IV CONTRAST;  8/12/2024 2:12 am; 8/12/2024 2:11 am   INDICATION: Signs/Symptoms:N/V; Signs/Symptoms:BUE and BLE weakness; Signs/Symptoms:BUE and BLE weakness..   COMPARISON: None.   ACCESSION NUMBER(S): [id number]; [id number]; [id number]   ORDERING CLINICIAN: LILI FONTANEZ   TECHNIQUE: CT of the abdomen and pelvis was performed.  Standard contiguous axial images were obtained at 3 mm slice thickness through the abdomen and pelvis. Coronal and sagittal reconstructions at 3 mm slice thickness were performed.   150 ml of contrast omni 350 were administered intravenously without immediate complication.   FINDINGS: LOWER CHEST: Ground-glass attenuation of the lower lungs likely representing aspiration pneumonitis with a component of atelectasis. Emphysematous changes. Please see dedicated CTA chest for further evaluation.   ABDOMEN:   LIVER: The liver is normal in size measuring 15.3 cm in the craniocaudal axis. No focal hepatic lesions.   BILE DUCTS: The intrahepatic and extrahepatic ducts are not dilated.   GALLBLADDER: The gallbladder is nondistended and without evidence of radiopaque stones.   PANCREAS: The pancreas appears unremarkable without evidence of ductal dilatation or masses.   SPLEEN: The spleen is normal in size without focal lesions.   ADRENAL GLANDS: Bilateral adrenal glands appear normal.   KIDNEYS AND URETERS: The kidneys are normal in size and enhance symmetrically. Few bilateral hypodense lesions are too small to characterize,  favored to represent simple cysts.  No hydroureteronephrosis. Contrast material is present within the bilateral renal collecting systems and ureters, limiting evaluation for calculi.   PELVIS:   BLADDER: Bladder is normal in appearance without wall thickening. Layering contrast material within the bladder.   REPRODUCTIVE ORGANS: The uterus is surgically absent.   BOWEL: The stomach is unremarkable.   The small and large bowel are normal in caliber and demonstrate no wall thickening. Colonic diverticulosis without evidence of diverticulitis. The appendix is not definitely visualized. There is however no pericecal stranding or fluid. Moderate stool burden.   VESSELS: There is no aneurysmal dilatation of the abdominal aorta. The IVC appears normal. Moderate atherosclerotic calcification of the abdominal aorta and its branches.   PERITONEUM/RETROPERITONEUM/LYMPH NODES: There is no free or loculated fluid collection, no free intraperitoneal air. The retroperitoneum appears normal.  No abdominopelvic lymphadenopathy is present.   BONES AND ABDOMINAL WALL: Rightward curvature of the lumbar spine. The abdominal wall soft tissues appear normal.     CT THORACIC SPINE:   PARASPINAL SOFT TISSUES: No paravertebral fluid collection or significant edema. ALIGNMENT:  No traumatic spondylolisthesis or traumatic facet widening. VERTEBRAE:  No acute fracture. Vertebral body heights are maintained. SPINAL CANAL/INTERVERTEBRAL DISCS: No high-grade spinal canal stenosis. Moderate multilevel disc height loss, most severe at T11-12 and T12-L1. Multilevel anterior osteophytosis. The there are indeterminate peripheral calcifications present in the expected region of the posterior thecal sac dorsal to the T7, T8 and T9 vertebral bodies, likely chronic possibly related to remote infection, inflammation or hemorrhage. No definite mass effect identified.     CT LUMBAR SPINE:   PARASPINAL SOFT TISSUES: No paravertebral fluid collection or  significant edema. ALIGNMENT: Dextrocurvature of the lower thoracic and lumbar spine. No traumatic spondylolisthesis or traumatic facet widening. VERTEBRAE: Bones appear mildly demineralized. No acute fracture. Vertebral body heights are maintained. SPINAL CANAL/INTERVERTEBRAL DISCS: No high-grade spinal canal stenosis. Moderate multilevel disc height loss, most significant at T12-L1. Moderate bilateral facet joint arthropathy from L2-S1. NEURAL FORAMINA: Disc bulge at L2-L3, L3-L4 and L5-S1 with facet hypertrophy with up to moderate foraminal narrowing. Disc bulge and facet hypertrophy at L4 L spine with up to severe foraminal stenosis.       1.  No acute abnormality within the abdomen or pelvis. 2. No acute fracture of the thoracic or lumbar spine. Degenerative changes of the spine without critical canal stenosis. Foraminal narrowing in the lumbar spine as detailed. 3. Probable chronic calcifications within the posterior fecal sac at the level of the midthoracic spine as detailed. No associated canal stenosis identified..   I personally reviewed the image(s)/study and resident interpretation. I agree with the findings as stated by resident Fredi Anderson. Data analyzed and images interpreted at [facility], [place], [place].   MACRO: None   Signed by: Beth Holguin 8/12/2024 2:55 AM Dictation workstation:   [id number]    CT angio chest for pulmonary embolism    Result Date: 8/12/2024  Interpreted By:  Beth Holguin and Beyersdorf Conner STUDY: CT ANGIO CHEST FOR PULMONARY EMBOLISM;  8/12/2024 2:36 am   INDICATION: Signs/Symptoms:Weakness, fatigue shortness of breath.   COMPARISON: None   ACCESSION NUMBER(S): [id number]   ORDERING CLINICIAN: ROSSANA CRANE   TECHNIQUE: Helical data acquisition of the chest was obtained after intravenous administration of 150 cc of Omnipaque 350, as per PE protocol. Images were reformatted in coronal and sagittal planes. Axial and coronal  maximum intensity projection (MIP) images were created and reviewed.   FINDINGS: POTENTIAL LIMITATIONS OF THE STUDY: None   HEART AND VESSELS: There are no discrete filling defects within main pulmonary artery and its branches to suggest acute pulmonary embolism. Main pulmonary artery and its branches are normal in caliber.   The thoracic aorta normal in course and caliber.There is moderate atherosclerosis present, including calcified and noncalcified plaques.Although, the study is not tailored for evaluation of aorta, there is no definite evidence of acute aortic pathology. Ectasia of the main pulmonary artery measuring 3.3 cm Coronary artery calcifications noted however exam is not optimized for evaluation.   Mild cardiomegaly with enlargement of the left atrium.There are no findings to suggest right heart strain. Left atrial appendage closure device in place. Left chest pacemaker/ICD with leads terminating in the right atrium and right ventricle. There is no pericardial effusion seen.   MEDIASTINUM AND TOÑO, LOWER NECK AND AXILLA: The visualized thyroid gland is within normal limits. No evidence of thoracic lymphadenopathy by CT criteria. Patulous appearance of the esophagus with heterogenous material in the upper esophagus.   LUNGS AND AIRWAYS: There is mucous layering in the distal trachea and right and left main bronchi. Subtle mucous plugging in lower lobe bronchials. Ground-glass consolidation of the dependent portions of the lower lungs. Moderate centrilobular and apically predominant emphysematous changes.  There is mild diffuse bronchial wall thickening. Calcified granuloma noted dot No pleural effusion or pneumothorax.     UPPER ABDOMEN:   Please see dedicated CT abdomen pelvis for further evaluation.   CHEST WALL AND OSSEOUS STRUCTURES: Chest wall is within normal limits. Multilevel degenerative changes with endplate osteophytes in the visualized spine. No acute osseous pathology.There are no suspicious  osseous lesions.       1. No evidence of acute pulmonary embolism to the segmental level. 2. Small amount of mucus layering within the main bronchi and mucous plugging of the bronchials. Ground-glass opacities in the bilateral lung bases. Findings are consistent with aspiration pneumonitis and superimposed atelectasis. 3. Patulous esophagus with debris and septations noted to the upper level which could be related to esophagitis. Clinical correlation suggested. Findings also increased aspiration risk. 4. Ectatic main pulmonary artery measuring 3.3 cm. 5. Cardiomegaly with enlargement of the left atrium.   I personally reviewed the image(s)/study and resident interpretation. I agree with the findings as stated by resident Fredi Anderson. Data analyzed and images interpreted at [facility], [place], [place].   MACRO: None   Signed by: Beth Holguin 8/12/2024 2:37 AM Dictation workstation:   [id number]    CT head wo IV contrast    Result Date: 8/12/2024  Interpreted By:  Beth Holguin and Beyersdorf Conner STUDY: CT HEAD WO IV CONTRAST; CT CERVICAL SPINE WO IV CONTRAST;  8/12/2024 1:22 am   INDICATION: Signs/Symptoms:Hx of brain aneurysm. Generalized fatigue and weakness; Signs/Symptoms:BUE and BLE weakness   COMPARISON: CT head 03/28/2017   ACCESSION NUMBER(S): [id number]; [id number]   ORDERING CLINICIAN: ROSSANA FONTANEZ   TECHNIQUE: Axial noncontrast CT images of head with coronal and sagittal reconstructed images. Axial noncontrast CT images of the cervical spine with coronal and sagittal reconstructed images.   FINDINGS: CT HEAD:   Postsurgical changes of right temporal craniotomy and aneurysmal clipping near the suprasellar and right parasellar region. Associated metallic streak artifact limits evaluation of the region. Findings are similar prior imaging   BRAIN PARENCHYMA: Confluent region of hypoattenuating areas of periventricular and subcortical white  matter, which is nonspecific, but favored to represent chronic small-vessel ischemic disease in a patient of this age. Mild encephalomalacia within the bilateral right temporal lobe. No evidence of acute intraparenchymal hemorrhage or parenchymal evidence of acute large territory ischemic infarct. No mass-effect, midline shift or effacement of cerebral sulci. Gray-white matter distinction is preserved.   VENTRICLES and EXTRA-AXIAL SPACES: No acute extra-axial or intraventricular hemorrhage. Ventricles and sulci are age-concordant.   PARANASAL SINUSES/MASTOIDS: No hemorrhage or air-fluid levels within the visualized paranasal sinuses. The mastoid air cells are well-aerated.   CALVARIUM/ORBITS: No skull fracture. Bilateral lens replacements. The orbits and globes are intact to the extent visualized.   EXTRACRANIAL SOFT TISSUES: No discernible abnormality. Postsurgical changes of the lungs is   CT CERVICAL SPINE:   PREVERTEBRAL SOFT TISSUES: Within normal limits.   CRANIOCERVICAL JUNCTION: Intact.   ALIGNMENT: Loss of the normal cervical lordosis. There is grade 1 retrolisthesis of C3-C4 measuring 3.3 mm, and grade 1 retrolisthesis of C5-6 measuring 2.7 mm. Facet joint alignment is maintained. No traumatic malalignment or traumatic facet widening.   VERTEBRAE: No acute fracture. Vertebral body heights are maintained.   SPINAL CANAL/INTERVERTEBRAL DISCS: No high-grade spinal canal stenosis. Multilevel variable disc space height loss, most severe at C6-7. Prominent osteophytosis at this level along with subchondral sclerosis and cystic changes..   NEURAL FORAMINA: Mild uncovertebral hypertrophy at C4-5 with a mild bilateral neural foraminal narrowing. Moderate neural foraminal narrowing and right facet hypertrophy at C5-6 with moderate right neural foraminal narrowing. Severe uncovertebral hypertrophy at C6-7 with moderate bilateral neural foraminal narrowing.   OTHER: Dedicated chest imaging has been performed and will  be reported separately..       CT HEAD: 1. Postsurgical changes of right temporal craniotomy and aneurysmal clipping right partially degrades imaging. Otherwise no acute intracranial hemorrhage or mass effect identified. 2. Findings consistent with chronic small-vessel ischemic disease and right temporal lobe encephalomalacia.     CT CERVICAL SPINE: 1. No acute fracture or traumatic malalignment of the cervical spine. 2. Multilevel spondylotic changes of the cervical spine as detailed above, with multilevel neural foraminal narrowing.     I personally reviewed the image(s)/study and resident interpretation. I agree with the findings as stated by resident Fredi Anderson. Data analyzed and images interpreted at [facility], [place], [place].   MACRO: none   Signed by: Beth Holguin 8/12/2024 2:31 AM Dictation workstation:   [id number]    CT cervical spine wo IV contrast    Result Date: 8/12/2024  Interpreted By:  Beth Holguin and Beyersdorf Conner STUDY: CT HEAD WO IV CONTRAST; CT CERVICAL SPINE WO IV CONTRAST;  8/12/2024 1:22 am   INDICATION: Signs/Symptoms:Hx of brain aneurysm. Generalized fatigue and weakness; Signs/Symptoms:BUE and BLE weakness   COMPARISON: CT head 03/28/2017   ACCESSION NUMBER(S): [id number]; [id number]   ORDERING CLINICIAN: ROSSANA FONTANEZ   TECHNIQUE: Axial noncontrast CT images of head with coronal and sagittal reconstructed images. Axial noncontrast CT images of the cervical spine with coronal and sagittal reconstructed images.   FINDINGS: CT HEAD:   Postsurgical changes of right temporal craniotomy and aneurysmal clipping near the suprasellar and right parasellar region. Associated metallic streak artifact limits evaluation of the region. Findings are similar prior imaging   BRAIN PARENCHYMA: Confluent region of hypoattenuating areas of periventricular and subcortical white matter, which is nonspecific, but favored to represent chronic  small-vessel ischemic disease in a patient of this age. Mild encephalomalacia within the bilateral right temporal lobe. No evidence of acute intraparenchymal hemorrhage or parenchymal evidence of acute large territory ischemic infarct. No mass-effect, midline shift or effacement of cerebral sulci. Gray-white matter distinction is preserved.   VENTRICLES and EXTRA-AXIAL SPACES: No acute extra-axial or intraventricular hemorrhage. Ventricles and sulci are age-concordant.   PARANASAL SINUSES/MASTOIDS: No hemorrhage or air-fluid levels within the visualized paranasal sinuses. The mastoid air cells are well-aerated.   CALVARIUM/ORBITS: No skull fracture. Bilateral lens replacements. The orbits and globes are intact to the extent visualized.   EXTRACRANIAL SOFT TISSUES: No discernible abnormality. Postsurgical changes of the lungs is   CT CERVICAL SPINE:   PREVERTEBRAL SOFT TISSUES: Within normal limits.   CRANIOCERVICAL JUNCTION: Intact.   ALIGNMENT: Loss of the normal cervical lordosis. There is grade 1 retrolisthesis of C3-C4 measuring 3.3 mm, and grade 1 retrolisthesis of C5-6 measuring 2.7 mm. Facet joint alignment is maintained. No traumatic malalignment or traumatic facet widening.   VERTEBRAE: No acute fracture. Vertebral body heights are maintained.   SPINAL CANAL/INTERVERTEBRAL DISCS: No high-grade spinal canal stenosis. Multilevel variable disc space height loss, most severe at C6-7. Prominent osteophytosis at this level along with subchondral sclerosis and cystic changes..   NEURAL FORAMINA: Mild uncovertebral hypertrophy at C4-5 with a mild bilateral neural foraminal narrowing. Moderate neural foraminal narrowing and right facet hypertrophy at C5-6 with moderate right neural foraminal narrowing. Severe uncovertebral hypertrophy at C6-7 with moderate bilateral neural foraminal narrowing.   OTHER: Dedicated chest imaging has been performed and will be reported separately..       CT HEAD: 1. Postsurgical changes  of right temporal craniotomy and aneurysmal clipping right partially degrades imaging. Otherwise no acute intracranial hemorrhage or mass effect identified. 2. Findings consistent with chronic small-vessel ischemic disease and right temporal lobe encephalomalacia.     CT CERVICAL SPINE: 1. No acute fracture or traumatic malalignment of the cervical spine. 2. Multilevel spondylotic changes of the cervical spine as detailed above, with multilevel neural foraminal narrowing.     I personally reviewed the image(s)/study and resident interpretation. I agree with the findings as stated by resident Fredi Anderson. Data analyzed and images interpreted at [facility], [place], [place].   MACRO: none   Signed by: Beth Holguin 8/12/2024 2:31 AM Dictation workstation:   [id number]        Assessment/Plan   Assessment & Plan  Weakness    Generalized weakness  This a 78yo RHF PMHx HTN, SSS (s/p pacer 2019), pAF (s/p watchman 2020), cerebral aneurysm (s/p clipping 1993), ET presenting for subacute progressive ascending weakness and instability.      Patient reported unsteady walking for about 6 weeks. Progressively worsening, but seems to have more-sharply declined in past 2 weeks. Now starting to involve BUE. Now starting to have paresthesias in the RUE and pain in the R shoulder. Occasional electrical shocks of pain radiating down BUE and chest.     Initial neurological examination was significant for some L thenar atrophy, weakness of ECRL/ECU, EDC, ADM, FDI. On lowers, there is diminished LLE vibratory sense at the toe, ankle, and knee compared to left. Questionable C5 sensory level. Normal tone. Reflexes 3+ with spread throughout, London's & finger flexor reflex present bilaterally, 4bts ankle clonus bilaterally. Toes mute. Trace jaw jerk. Gait narrow-based and unsteady with truncal instability. CTH which shows R temporal cranioplasty, R posterior circulation clip with bloom artifact, and  moderate posterior-predominant subcortical microvascular disease.      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 CT myelogram was inconclusive due to poor contrast uptake in the cervical spine. Consider technical vs obstructive cause. S/p cisternal myelogram on 8/16 which showed mild to moderate stenosis without obstruction. Neurosurgery not planning intervention (signed-off). Pending dispo to home with PT/OT.    8/23 Updates:  - Pending discharge to home with PT/OT, plan for Sunday discharge with home care to start on Monday  - Appointments scheduled with [facility] attendings: Dr. Ga on 9/13 and Dr. Leslie on 2/3      #Cervical myeloradiculopathy  :: Possible structural compression  :: B12, folate, homocysteine, vitamin E, copper, MMA, CK all within normal limits  :: CT myelogram inconclusive d/t incomplete contrast uptake, reach out to IR re: imaging difficulties  :: Repeat cisternal myelogram shows no significant stenosis  - Consult neurosurgery: no intervention, signed off  - Pending dispo   - Follow-up outpatient with [facility]     #MISC  -Continue home meds: hydrochlorothiazide daily, potassium, propranolol 10 mg QID, rosuvastatin 5 mg HS, Valtrex 500 mg daily    F: prn  E: prn  N: regular   A: PIV  DVT: lovenox   GI: N/A    CODE: FULL  NOK: Daughter Racheal, [phone]     Fartun Emmanuel MD  Neurology PGY-2

## 2024-08-23 NOTE — ASSESSMENT & PLAN NOTE
This a 80yo RHF PMHx HTN, SSS (s/p pacer 2019), pAF (s/p watchman 2020), cerebral aneurysm (s/p clipping 1993), ET presenting for subacute progressive ascending weakness and instability.      Patient reported unsteady walking for about 6 weeks. Progressively worsening, but seems to have more-sharply declined in past 2 weeks. Now starting to involve BUE. Now starting to have paresthesias in the RUE and pain in the R shoulder. Occasional electrical shocks of pain radiating down BUE and chest.     Initial neurological examination was significant for some L thenar atrophy, weakness of ECRL/ECU, EDC, ADM, FDI. On lowers, there is diminished LLE vibratory sense at the toe, ankle, and knee compared to left. Questionable C5 sensory level. Normal tone. Reflexes 3+ with spread throughout, London's & finger flexor reflex present bilaterally, 4bts ankle clonus bilaterally. Toes mute. Trace jaw jerk. Gait narrow-based and unsteady with truncal instability. CTH which shows R temporal cranioplasty, R posterior circulation clip with bloom artifact, and moderate posterior-predominant subcortical microvascular disease.      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 CT myelogram was inconclusive due to poor contrast uptake in the cervical spine. Consider technical vs obstructive cause. S/p cisternal myelogram on 8/16 which showed mild to moderate stenosis without obstruction. Neurosurgery not planning intervention (signed-off). Pending dispo to home with PT/OT.    8/23 Updates:  - Pending discharge to home with PT/OT, plan for Sunday discharge with home care to start on Monday  - Appointments scheduled with [facility] attendings: Dr. Ga on 9/13 and Dr. Leslie on 2/3      #Cervical myeloradiculopathy  :: Possible structural compression  :: B12, folate, homocysteine, vitamin E, copper, MMA, CK all within normal limits  :: CT  myelogram inconclusive d/t incomplete contrast uptake, reach out to IR re: imaging difficulties  :: Repeat cisternal myelogram shows no significant stenosis  - Consult neurosurgery: no intervention, signed off  - Pending dispo   - Follow-up outpatient with [facility]     #MISC  -Continue home meds: hydrochlorothiazide daily, potassium, propranolol 10 mg QID, rosuvastatin 5 mg HS, Valtrex 500 mg daily    F: prn  E: prn  N: regular   A: PIV  DVT: lovenox   GI: N/A

## 2024-08-23 NOTE — PROGRESS NOTES
There is no accepting [place]. Pt's daughter is not interested in SNF level of care. [facility] is able to accept with a confirmed start of care with PT on Monday. Pt was offered a FWW but she says she has two at home. Spoke to Racheal by phone re home care plans. She is planning to fly in this weekend to take her mom home.  Myra Gomez RN

## 2024-08-24 PROCEDURE — 2500000002 HC RX 250 W HCPCS SELF ADMINISTERED DRUGS (ALT 637 FOR MEDICARE OP, ALT 636 FOR OP/ED): Performed by: PHYSICIAN ASSISTANT

## 2024-08-24 PROCEDURE — 2500000001 HC RX 250 WO HCPCS SELF ADMINISTERED DRUGS (ALT 637 FOR MEDICARE OP): Performed by: PHYSICIAN ASSISTANT

## 2024-08-24 PROCEDURE — 2500000002 HC RX 250 W HCPCS SELF ADMINISTERED DRUGS (ALT 637 FOR MEDICARE OP, ALT 636 FOR OP/ED)

## 2024-08-24 PROCEDURE — 2500000004 HC RX 250 GENERAL PHARMACY W/ HCPCS (ALT 636 FOR OP/ED)

## 2024-08-24 PROCEDURE — 2500000005 HC RX 250 GENERAL PHARMACY W/O HCPCS

## 2024-08-24 PROCEDURE — S4991 NICOTINE PATCH NONLEGEND: HCPCS | Performed by: PHYSICIAN ASSISTANT

## 2024-08-24 PROCEDURE — 1100000001 HC PRIVATE ROOM DAILY

## 2024-08-24 PROCEDURE — 99231 SBSQ HOSP IP/OBS SF/LOW 25: CPT

## 2024-08-24 ASSESSMENT — COGNITIVE AND FUNCTIONAL STATUS - GENERAL
MOBILITY SCORE: 21
TOILETING: A LITTLE
HELP NEEDED FOR BATHING: A LITTLE
CLIMB 3 TO 5 STEPS WITH RAILING: A LITTLE
STANDING UP FROM CHAIR USING ARMS: A LITTLE
WALKING IN HOSPITAL ROOM: A LITTLE
DRESSING REGULAR UPPER BODY CLOTHING: A LITTLE
DAILY ACTIVITIY SCORE: 21

## 2024-08-24 ASSESSMENT — PAIN - FUNCTIONAL ASSESSMENT
PAIN_FUNCTIONAL_ASSESSMENT: 0-10

## 2024-08-24 ASSESSMENT — PAIN SCALES - GENERAL
PAINLEVEL_OUTOF10: 3
PAINLEVEL_OUTOF10: 4
PAINLEVEL_OUTOF10: 0 - NO PAIN
PAINLEVEL_OUTOF10: 4
PAINLEVEL_OUTOF10: 0 - NO PAIN

## 2024-08-24 ASSESSMENT — PAIN DESCRIPTION - LOCATION
LOCATION: NECK
LOCATION: NECK

## 2024-08-24 NOTE — CARE PLAN
The clinical goals for the shift include safety, comfort, mobility    Patient oriented x4, intermittent neck/leg pain. Scheduled lidocaine patch and PRN tylenol administered as indicated. Patient appears fatigued and withdrawn, although still motivated to ambulate around unit with walker. Tolerates ambulation well. Discharge plan for home health.       Problem: Pain - Adult  Goal: Verbalizes/displays adequate comfort level or baseline comfort level  Outcome: Progressing     Problem: Discharge Planning  Goal: Discharge to home or other facility with appropriate resources  Outcome: Progressing     Problem: Pain  Goal: Walks with improved pain control throughout the shift  Outcome: Progressing  Goal: Performs ADL's with improved pain control throughout shift  Outcome: Progressing     Problem: Skin  Goal: Participates in plan/prevention/treatment measures  Outcome: Progressing  Goal: Promote/optimize nutrition  Outcome: Progressing     Problem: Fall/Injury  Goal: Not fall by end of shift  Outcome: Progressing  Goal: Be free from injury by end of the shift  Outcome: Progressing  Goal: Pace activities to prevent fatigue by end of the shift  Outcome: Progressing

## 2024-08-24 NOTE — ASSESSMENT & PLAN NOTE
This a 78yo RHF PMHx HTN, SSS (s/p pacer 2019), pAF (s/p watchman 2020), cerebral aneurysm (s/p clipping 1993), ET presenting for subacute progressive ascending weakness and instability.      Patient reported unsteady walking for about 6 weeks. Progressively worsening, but seems to have more-sharply declined in past 2 weeks. Now starting to involve BUE. Now starting to have paresthesias in the RUE and pain in the R shoulder. Occasional electrical shocks of pain radiating down BUE and chest.     Initial neurological examination was significant for some L thenar atrophy, weakness of ECRL/ECU, EDC, ADM, FDI. On lowers, there is diminished LLE vibratory sense at the toe, ankle, and knee compared to left. Questionable C5 sensory level. Normal tone. Reflexes 3+ with spread throughout, London's & finger flexor reflex present bilaterally, 4bts ankle clonus bilaterally. Toes mute. Trace jaw jerk. Gait narrow-based and unsteady with truncal instability. CTH which shows R temporal cranioplasty, R posterior circulation clip with bloom artifact, and moderate posterior-predominant subcortical microvascular disease.      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 CT myelogram was inconclusive due to poor contrast uptake in the cervical spine. Consider technical vs obstructive cause. S/p cisternal myelogram on 8/16 which showed mild to moderate stenosis without obstruction. Neurosurgery not planning intervention (signed-off). Pending dispo to home with PT/OT.    8/24 Updates:  - Pending discharge to home with PT/OT, plan for Sunday 8/25 discharge with home care to start on Monday  - Appointments scheduled with [facility] attendings: Dr. Ga on 9/13 and Dr. Leslie on 2/3      #Cervical myeloradiculopathy  :: Possible structural compression  :: B12, folate, homocysteine, vitamin E, copper, MMA, CK all within normal limits  :: CT  myelogram inconclusive d/t incomplete contrast uptake, reach out to IR re: imaging difficulties  :: Repeat cisternal myelogram shows no significant stenosis  - Consult neurosurgery: no intervention, signed off  - Pending dispo   - Follow-up outpatient with [facility]     #MISC  -Continue home meds: hydrochlorothiazide daily, potassium, propranolol 10 mg QID, rosuvastatin 5 mg HS, Valtrex 500 mg daily    F: prn  E: prn  N: regular   A: PIV  DVT: lovenox   GI: N/A   (2) good, crying

## 2024-08-24 NOTE — PROGRESS NOTES
"Linda López is a 79 y.o. right-handed female with a history of A-fib, Anxiety and depression, Arthritis, Artificial cardiac pacemaker, Brain aneurysm s/p clipping (told by ED not MRI compatible), Essential tremor, HTN (hypertension), Kidney stones, Lumbar radiculopathy on day 2 of admission after presenting with subacute weakness, ataxia. Neurology was consulted for difficulty walking, upper extremity weakness.    Subjective   NAEON. Endorsing neck and shoulder pain similar to prior.     Objective     Physical Exam  Mental status: A+O x 4, known vocal dystonia (baseline s/p aneurysmal clipping).  Motor exam: 5/5 strength in all muscle groups.  Sensory: Sensation to light touch intact in b/l UE and LE.     Last Recorded Vitals  Blood pressure 104/67, pulse 60, temperature 36.2 °C (97.2 °F), temperature source Temporal, resp. rate 16, height 1.626 m (5' 4\"), weight 76.7 kg (169 lb), SpO2 96%.  Intake/Output last 3 Shifts:  No intake/output data recorded.    Relevant Results  Scheduled medications  enoxaparin, 40 mg, subcutaneous, q24h  gabapentin, 300 mg, oral, Nightly  hydroCHLOROthiazide, 25 mg, oral, Daily  lidocaine, 1 patch, transdermal, q24h  nicotine, 1 patch, transdermal, Daily  PARoxetine, 20 mg, oral, Daily  potassium chloride CR, 20 mEq, oral, TID  propranolol, 10 mg, oral, 4x daily  rosuvastatin, 5 mg, oral, Nightly  valACYclovir, 500 mg, oral, Daily      Continuous medications     PRN medications  PRN medications: acetaminophen **OR** acetaminophen  No results found for this or any previous visit (from the past 24 hour(s)).      Assessment/Plan   Assessment & Plan  Weakness    Generalized weakness  This a 78yo RHF PMHx HTN, SSS (s/p pacer 2019), pAF (s/p watchman 2020), cerebral aneurysm (s/p clipping 1993), ET presenting for subacute progressive ascending weakness and instability.      Patient reported unsteady walking for about 6 weeks. Progressively worsening, but seems to have more-sharply " declined in past 2 weeks. Now starting to involve BUE. Now starting to have paresthesias in the RUE and pain in the R shoulder. Occasional electrical shocks of pain radiating down BUE and chest.     Initial neurological examination was significant for some L thenar atrophy, weakness of ECRL/ECU, EDC, ADM, FDI. On lowers, there is diminished LLE vibratory sense at the toe, ankle, and knee compared to left. Questionable C5 sensory level. Normal tone. Reflexes 3+ with spread throughout, London's & finger flexor reflex present bilaterally, 4bts ankle clonus bilaterally. Toes mute. Trace jaw jerk. Gait narrow-based and unsteady with truncal instability. CTH which shows R temporal cranioplasty, R posterior circulation clip with bloom artifact, and moderate posterior-predominant subcortical microvascular disease.      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 CT myelogram was inconclusive due to poor contrast uptake in the cervical spine. Consider technical vs obstructive cause. S/p cisternal myelogram on 8/16 which showed mild to moderate stenosis without obstruction. Neurosurgery not planning intervention (signed-off). Pending dispo to home with PT/OT.    8/24 Updates:  - Pending discharge to home with PT/OT, plan for Sunday 8/25 discharge with home care to start on Monday  - Appointments scheduled with [facility] attendings: Dr. Ga on 9/13 and Dr. Leslie on 2/3      #Cervical myeloradiculopathy  :: Possible structural compression  :: B12, folate, homocysteine, vitamin E, copper, MMA, CK all within normal limits  :: CT myelogram inconclusive d/t incomplete contrast uptake, reach out to IR re: imaging difficulties  :: Repeat cisternal myelogram shows no significant stenosis  - Consult neurosurgery: no intervention, signed off  - Pending dispo   - Follow-up outpatient with [facility]     #MISC  -Continue home meds:  hydrochlorothiazide daily, potassium, propranolol 10 mg QID, rosuvastatin 5 mg HS, Valtrex 500 mg daily    F: prn  E: prn  N: regular   A: PIV  DVT: lovenox   GI: N/A    CODE: FULL  NOK: Daughter Racheal, [phone]     Patient was seen, discussed and examined with Dr. Crockett who agrees with the assessment and plan.    Padmini Gomes MD  Neurology PGY-2

## 2024-08-25 VITALS
DIASTOLIC BLOOD PRESSURE: 71 MMHG | OXYGEN SATURATION: 95 % | BODY MASS INDEX: 28.85 KG/M2 | RESPIRATION RATE: 17 BRPM | WEIGHT: 169 LBS | HEART RATE: 62 BPM | SYSTOLIC BLOOD PRESSURE: 112 MMHG | HEIGHT: 64 IN | TEMPERATURE: 98.1 F

## 2024-08-25 PROCEDURE — 2500000001 HC RX 250 WO HCPCS SELF ADMINISTERED DRUGS (ALT 637 FOR MEDICARE OP)

## 2024-08-25 PROCEDURE — 2500000002 HC RX 250 W HCPCS SELF ADMINISTERED DRUGS (ALT 637 FOR MEDICARE OP, ALT 636 FOR OP/ED): Performed by: PHYSICIAN ASSISTANT

## 2024-08-25 PROCEDURE — 99231 SBSQ HOSP IP/OBS SF/LOW 25: CPT

## 2024-08-25 PROCEDURE — S4991 NICOTINE PATCH NONLEGEND: HCPCS | Performed by: PHYSICIAN ASSISTANT

## 2024-08-25 PROCEDURE — 2500000004 HC RX 250 GENERAL PHARMACY W/ HCPCS (ALT 636 FOR OP/ED)

## 2024-08-25 PROCEDURE — RXMED WILLOW AMBULATORY MEDICATION CHARGE

## 2024-08-25 PROCEDURE — 2500000002 HC RX 250 W HCPCS SELF ADMINISTERED DRUGS (ALT 637 FOR MEDICARE OP, ALT 636 FOR OP/ED)

## 2024-08-25 PROCEDURE — 2500000005 HC RX 250 GENERAL PHARMACY W/O HCPCS

## 2024-08-25 PROCEDURE — 2500000001 HC RX 250 WO HCPCS SELF ADMINISTERED DRUGS (ALT 637 FOR MEDICARE OP): Performed by: PHYSICIAN ASSISTANT

## 2024-08-25 PROCEDURE — 1100000001 HC PRIVATE ROOM DAILY

## 2024-08-25 RX ORDER — ACETAMINOPHEN 325 MG/1
650 TABLET ORAL EVERY 6 HOURS PRN
Start: 2024-08-25

## 2024-08-25 RX ORDER — PROPRANOLOL HYDROCHLORIDE 10 MG/1
10 TABLET ORAL 4 TIMES DAILY
Start: 2024-08-25

## 2024-08-25 RX ORDER — VALACYCLOVIR HYDROCHLORIDE 500 MG/1
500 TABLET, FILM COATED ORAL DAILY
Start: 2024-08-25

## 2024-08-25 RX ORDER — IBUPROFEN 200 MG
1 TABLET ORAL DAILY
Start: 2024-08-25

## 2024-08-25 RX ORDER — GABAPENTIN 300 MG/1
300 CAPSULE ORAL NIGHTLY
Qty: 90 CAPSULE | Refills: 1 | Status: SHIPPED | OUTPATIENT
Start: 2024-08-25 | End: 2025-02-21

## 2024-08-25 RX ORDER — PAROXETINE HYDROCHLORIDE 20 MG/1
20 TABLET, FILM COATED ORAL DAILY
Start: 2024-08-25

## 2024-08-25 RX ORDER — ROSUVASTATIN CALCIUM 5 MG/1
5 TABLET, COATED ORAL NIGHTLY
Start: 2024-08-25

## 2024-08-25 RX ORDER — ACETAMINOPHEN 500 MG
5 TABLET ORAL NIGHTLY PRN
Status: DISCONTINUED | OUTPATIENT
Start: 2024-08-25 | End: 2024-08-26 | Stop reason: HOSPADM

## 2024-08-25 RX ORDER — POTASSIUM CHLORIDE 20 MEQ/1
20 TABLET, EXTENDED RELEASE ORAL 3 TIMES DAILY
Start: 2024-08-25

## 2024-08-25 RX ORDER — HYDROCHLOROTHIAZIDE 25 MG/1
25 TABLET ORAL DAILY
Start: 2024-08-25

## 2024-08-25 ASSESSMENT — PAIN SCALES - PAIN ASSESSMENT IN ADVANCED DEMENTIA (PAINAD)
TOTALSCORE: MEDICATION (SEE MAR)
TOTALSCORE: MEDICATION (SEE MAR);REST;REPOSITIONED

## 2024-08-25 ASSESSMENT — COGNITIVE AND FUNCTIONAL STATUS - GENERAL
WALKING IN HOSPITAL ROOM: A LITTLE
HELP NEEDED FOR BATHING: A LITTLE
STANDING UP FROM CHAIR USING ARMS: A LITTLE
TOILETING: A LITTLE
MOVING TO AND FROM BED TO CHAIR: A LITTLE
CLIMB 3 TO 5 STEPS WITH RAILING: A LOT
DRESSING REGULAR LOWER BODY CLOTHING: A LITTLE
MOBILITY SCORE: 19
DAILY ACTIVITIY SCORE: 21

## 2024-08-25 ASSESSMENT — PAIN - FUNCTIONAL ASSESSMENT
PAIN_FUNCTIONAL_ASSESSMENT: 0-10

## 2024-08-25 ASSESSMENT — PAIN SCALES - GENERAL
PAINLEVEL_OUTOF10: 3
PAINLEVEL_OUTOF10: 4
PAINLEVEL_OUTOF10: 5 - MODERATE PAIN
PAINLEVEL_OUTOF10: 4
PAINLEVEL_OUTOF10: 0 - NO PAIN
PAINLEVEL_OUTOF10: 5 - MODERATE PAIN
PAINLEVEL_OUTOF10: 4
PAINLEVEL_OUTOF10: 0 - NO PAIN
PAINLEVEL_OUTOF10: 5 - MODERATE PAIN

## 2024-08-25 ASSESSMENT — PAIN DESCRIPTION - LOCATION
LOCATION: NECK
LOCATION: NECK

## 2024-08-25 NOTE — ASSESSMENT & PLAN NOTE
This a 80yo RHF PMHx HTN, SSS (s/p pacer 2019), pAF (s/p watchman 2020), cerebral aneurysm (s/p clipping 1993), ET presenting for subacute progressive ascending weakness and instability.      Patient reported unsteady walking for about 6 weeks. Progressively worsening, but seems to have more-sharply declined in past 2 weeks. Now starting to involve BUE. Now starting to have paresthesias in the RUE and pain in the R shoulder. Occasional electrical shocks of pain radiating down BUE and chest.     Initial neurological examination was significant for some L thenar atrophy, weakness of ECRL/ECU, EDC, ADM, FDI. On lowers, there is diminished LLE vibratory sense at the toe, ankle, and knee compared to left. Questionable C5 sensory level. Normal tone. Reflexes 3+ with spread throughout, London's & finger flexor reflex present bilaterally, 4bts ankle clonus bilaterally. Toes mute. Trace jaw jerk. Gait narrow-based and unsteady with truncal instability. CTH which shows R temporal cranioplasty, R posterior circulation clip with bloom artifact, and moderate posterior-predominant subcortical microvascular disease.      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 CT myelogram was inconclusive due to poor contrast uptake in the cervical spine. Consider technical vs obstructive cause. S/p cisternal myelogram on 8/16 which showed mild to moderate stenosis without obstruction. Neurosurgery not planning intervention (signed-off). Pending dispo to home with PT/OT.    8/25 Updates:  - Pending discharge to home with PT/OT, now plan for Monday, 8/26 discharge with home care to start on same day. Originally planned for discharge home today but daughter's flight is delayed.   - Appointments scheduled with [facility] attendings: Dr. Ga on 9/13 and Dr. Leslie on 2/3      #Cervical myeloradiculopathy  :: Possible structural compression  ::  B12, folate, homocysteine, vitamin E, copper, MMA, CK all within normal limits  :: CT myelogram inconclusive d/t incomplete contrast uptake, reach out to IR re: imaging difficulties  :: Repeat cisternal myelogram shows no significant stenosis  - Consult neurosurgery: no intervention, signed off  - Pending dispo   - Follow-up outpatient with [facility] clinic     #MISC  -Continue home meds: hydrochlorothiazide daily, potassium, propranolol 10 mg QID, rosuvastatin 5 mg HS, Valtrex 500 mg daily    F: prn  E: prn  N: regular   A: PIV  DVT: lovenox   GI: N/A

## 2024-08-25 NOTE — PROGRESS NOTES
"Linda López is a 79 y.o. right-handed female with a history of A-fib, Anxiety and depression, Arthritis, Artificial cardiac pacemaker, Brain aneurysm s/p clipping (told by ED not MRI compatible), Essential tremor, HTN (hypertension), Kidney stones, Lumbar radiculopathy on day 2 of admission after presenting with subacute weakness, ataxia. Neurology was consulted for difficulty walking, upper extremity weakness.    Subjective   NAEON. Frustrated about hospital stay. She mentioned her daughter will be in town to pick her up today from [place] - informed later by RN that her flight is delayed and hence will be staying overnight until able to initiate home care tomorrow.    Objective     Physical exam:   Mental status: A+O x 4, known vocal dystonia (baseline s/p aneurysmal clipping).  CN: EOMI. Face symmetric.   Motor exam: Moving all extremities spontaneously    Last Recorded Vitals  Blood pressure 157/85, pulse 58, temperature 36.3 °C (97.3 °F), temperature source Temporal, resp. rate 17, height 1.626 m (5' 4\"), weight 76.7 kg (169 lb), SpO2 99%.  Intake/Output last 3 Shifts:  No intake/output data recorded.    Relevant Results  Scheduled medications  enoxaparin, 40 mg, subcutaneous, q24h  gabapentin, 300 mg, oral, Nightly  hydroCHLOROthiazide, 25 mg, oral, Daily  lidocaine, 1 patch, transdermal, q24h  nicotine, 1 patch, transdermal, Daily  PARoxetine, 20 mg, oral, Daily  potassium chloride CR, 20 mEq, oral, TID  propranolol, 10 mg, oral, 4x daily  rosuvastatin, 5 mg, oral, Nightly  valACYclovir, 500 mg, oral, Daily      Continuous medications     PRN medications  PRN medications: acetaminophen **OR** acetaminophen, melatonin  No results found for this or any previous visit (from the past 24 hour(s)).      Assessment/Plan   Assessment & Plan  Weakness    Generalized weakness  This a 80yo RHF PMHx HTN, SSS (s/p pacer 2019), pAF (s/p watchman 2020), cerebral aneurysm (s/p clipping 1993), ET presenting for " subacute progressive ascending weakness and instability.      Patient reported unsteady walking for about 6 weeks. Progressively worsening, but seems to have more-sharply declined in past 2 weeks. Now starting to involve BUE. Now starting to have paresthesias in the RUE and pain in the R shoulder. Occasional electrical shocks of pain radiating down BUE and chest.     Initial neurological examination was significant for some L thenar atrophy, weakness of ECRL/ECU, EDC, ADM, FDI. On lowers, there is diminished LLE vibratory sense at the toe, ankle, and knee compared to left. Questionable C5 sensory level. Normal tone. Reflexes 3+ with spread throughout, London's & finger flexor reflex present bilaterally, 4bts ankle clonus bilaterally. Toes mute. Trace jaw jerk. Gait narrow-based and unsteady with truncal instability. CTH which shows R temporal cranioplasty, R posterior circulation clip with bloom artifact, and moderate posterior-predominant subcortical microvascular disease.      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 CT myelogram was inconclusive due to poor contrast uptake in the cervical spine. Consider technical vs obstructive cause. S/p cisternal myelogram on 8/16 which showed mild to moderate stenosis without obstruction. Neurosurgery not planning intervention (signed-off). Pending dispo to home with PT/OT.    8/25 Updates:  - Pending discharge to home with PT/OT, now plan for Monday, 8/26 discharge with home care to start on same day. Originally planned for discharge home today but daughter's flight is delayed.   - Appointments scheduled with [facility] attendings: Dr. Ga on 9/13 and Dr. Leslie on 2/3      #Cervical myeloradiculopathy  :: Possible structural compression  :: B12, folate, homocysteine, vitamin E, copper, MMA, CK all within normal limits  :: CT myelogram inconclusive d/t incomplete  contrast uptake, reach out to IR re: imaging difficulties  :: Repeat cisternal myelogram shows no significant stenosis  - Consult neurosurgery: no intervention, signed off  - Pending dispo   - Follow-up outpatient with [facility]     #MISC  -Continue home meds: hydrochlorothiazide daily, potassium, propranolol 10 mg QID, rosuvastatin 5 mg HS, Valtrex 500 mg daily    F: prn  E: prn  N: regular   A: PIV  DVT: lovenox   GI: N/A    CODE: FULL  NOK: Daughter Racheal, [phone]     Patient was seen, discussed and examined with Dr. Crockett who agrees with the assessment and plan.    Padmini Gomes MD  Neurology PGY-2

## 2024-08-26 ENCOUNTER — PHARMACY VISIT (OUTPATIENT)
Dept: PHARMACY | Facility: CLINIC | Age: 79
End: 2024-08-26
Payer: MEDICARE

## 2024-08-26 PROCEDURE — 2500000002 HC RX 250 W HCPCS SELF ADMINISTERED DRUGS (ALT 637 FOR MEDICARE OP, ALT 636 FOR OP/ED)

## 2024-08-26 PROCEDURE — 2500000002 HC RX 250 W HCPCS SELF ADMINISTERED DRUGS (ALT 637 FOR MEDICARE OP, ALT 636 FOR OP/ED): Performed by: PHYSICIAN ASSISTANT

## 2024-08-26 PROCEDURE — 2500000004 HC RX 250 GENERAL PHARMACY W/ HCPCS (ALT 636 FOR OP/ED)

## 2024-08-26 PROCEDURE — 2500000001 HC RX 250 WO HCPCS SELF ADMINISTERED DRUGS (ALT 637 FOR MEDICARE OP): Performed by: PHYSICIAN ASSISTANT

## 2024-08-26 PROCEDURE — 2500000005 HC RX 250 GENERAL PHARMACY W/O HCPCS

## 2024-08-26 PROCEDURE — S4991 NICOTINE PATCH NONLEGEND: HCPCS | Performed by: PHYSICIAN ASSISTANT

## 2024-08-26 ASSESSMENT — COGNITIVE AND FUNCTIONAL STATUS - GENERAL
MOBILITY SCORE: 20
MOVING TO AND FROM BED TO CHAIR: A LITTLE
CLIMB 3 TO 5 STEPS WITH RAILING: A LITTLE
STANDING UP FROM CHAIR USING ARMS: A LITTLE
WALKING IN HOSPITAL ROOM: A LITTLE
HELP NEEDED FOR BATHING: A LITTLE
DRESSING REGULAR LOWER BODY CLOTHING: A LITTLE
DAILY ACTIVITIY SCORE: 21
TOILETING: A LITTLE

## 2024-08-26 ASSESSMENT — PAIN - FUNCTIONAL ASSESSMENT
PAIN_FUNCTIONAL_ASSESSMENT: 0-10

## 2024-08-26 ASSESSMENT — PAIN SCALES - GENERAL
PAINLEVEL_OUTOF10: 0 - NO PAIN
PAINLEVEL_OUTOF10: 4
PAINLEVEL_OUTOF10: 4

## 2024-08-26 NOTE — DISCHARGE SUMMARY
Discharge Diagnosis  Weakness    Issues Requiring Follow-Up  [ ] follow up with Dr. Ga 9/13   [ ] consider EMG/NCS as outpatient     Test Results Pending At Discharge  Pending Labs       Order Current Status    Extra Urine Gray Tube Collected (08/12/24 1350)    Urinalysis with Reflex Culture and Microscopic Collected (08/12/24 1350)    Urinalysis with Reflex Culture and Microscopic Collected (08/12/24 1350)            Hospital Course  Linda López is a 79 y.o. right-handed female with a history of A-fib, Anxiety and depression, Arthritis, Artificial cardiac pacemaker, Brain aneurysm s/p clipping (told by ED not MRI compatible), Essential tremor, HTN, Kidney stones, and Lumbar radiculopathy presenting with subacute weakness and ataxia. Patient reported unsteady walking for about 6 weeks, progressively worsening, but seems to have more-sharply declined in past 2 weeks. Now starting to involve BUE. Now starting to have paresthesias in the RUE and pain in the R shoulder. Occasional electrical shocks of pain radiating down BUE and chest.    Neurological examination was significant for some L thenar atrophy, weakness of ECRL/ECU, EDC, ADM, FDI. On lowers, there is diminished LLE vibratory sense at the toe, ankle, and knee compared to left. Questionable C5 sensory level. Normal tone. Reflexes 3+ with spread throughout, London's & finger flexor reflex present bilaterally, 4bts ankle clonus bilaterally. Toes mute. Trace jaw jerk. Initial CT spine imaging showed severe foraminal narrowing at around C7 and less severe narrowing throughout rest of C-spine. CTH which shows R temporal cranioplasty, R posterior circulation clip with bloom artifact, and moderate posterior-predominant subcortical microvascular disease. CTA H shows the same plus moderate R>L cavernous+clinoid+supraclinoid ICA calcification. Overall manifestation is most concerning for cervical myeloradiculopathy, of which most likely in this demographic  would be structural d/t compression. Would also evaluate for metabolic etiologies. Gold standard of imaging would be MRI, but her aneurysm clip is non-compatible.    Patient was scheduled for CT myelogram on 8/14 which was completed but ultimately non-diagnostic due to non-opacification of the thecal sac. IR was consulted for possible cisternal myelogram which was approved and planned for 8/16. Cervical myelogram showed mild narrowing of c-spine. Neurosurgery was consulted and did not recommend surgical intervention. CK normal at 41 to assess for myositis. Her examination remained stable throughout her stay with 5/5 strength in her UE and LE, hyperreflexia of LE, sensory ataxia on FTN, and an unsteady narrow-based gait. PT/OT recommended Acute Rehab initially but this recommendation was changed after patient was found to be doing well with PT/OT. PM&R was consulted, initially supporting the decision for discharge to Acute Rehab, but after further discussion she was exceeding expectations and able to walk over 400 feet with walker, so Acute Rehab was no longer recommended. In discussions with patient's daughter, patient, the PM&R team, and Social Work, it was ultimately decided that she will be discharged with home PT/OT and home care.     Pertinent Physical Exam At Time of Discharge  Physical Exam    Awake, alert, and conversant.   EOM full, no nystagmus.   No facial asymmetry.   Spastic dysarthria (baseline after aneurysm clipping (as per pt)   Tone is normal throughout.   Strength is 5/5 throughout.   Reflexes 2+ in bilateral UE and 3+ in LE  Gait is unsteady and cautious, narrow based. Romberg positive for swaying.      Home Medications     Medication List      START taking these medications     acetaminophen 325 mg tablet; Commonly known as: Tylenol; Take 2 tablets   (650 mg) by mouth every 6 hours if needed for mild pain (1 - 3) or   moderate pain (4 - 6).   gabapentin 300 mg capsule; Commonly known as:  Neurontin; Take 1 capsule   (300 mg) by mouth once daily at bedtime.   hydroCHLOROthiazide 25 mg tablet; Commonly known as: HYDRODiuril; Take 1   tablet (25 mg) by mouth once daily.   nicotine 14 mg/24 hr patch; Commonly known as: Nicoderm CQ; Place 1   patch over 24 hours on the skin once daily.   PARoxetine 20 mg tablet; Commonly known as: Paxil; Take 1 tablet (20 mg)   by mouth once daily.   potassium chloride CR 20 mEq ER tablet; Commonly known as: Klor-Con M20;   Take 1 tablet (20 mEq) by mouth 3 times a day. Do not crush or chew.   propranolol 10 mg tablet; Commonly known as: Inderal; Take 1 tablet (10   mg) by mouth 4 times a day.   rosuvastatin 5 mg tablet; Commonly known as: Crestor; Take 1 tablet (5   mg) by mouth once daily at bedtime.   valACYclovir 500 mg tablet; Commonly known as: Valtrex; Take 1 tablet   (500 mg) by mouth once daily.       Outpatient Follow-Up  Future Appointments   Date Time Provider Department Center   9/13/2024  4:30 PM Mj Ga MD [id number] [facility]   2/3/2025  9:00 AM Reg Leslie DO [id number] [facility]       Tejinder Carey MD

## 2024-08-30 ENCOUNTER — SCHEDULED TELEPHONE ENCOUNTER (OUTPATIENT)
Dept: NEUROLOGY | Age: 79
End: 2024-08-30

## 2024-08-30 DIAGNOSIS — G99.2 STENOSIS OF CERVICAL SPINE WITH MYELOPATHY (HCC): Primary | ICD-10-CM

## 2024-08-30 DIAGNOSIS — R53.1 GENERALIZED WEAKNESS: ICD-10-CM

## 2024-08-30 DIAGNOSIS — M48.02 STENOSIS OF CERVICAL SPINE WITH MYELOPATHY (HCC): Primary | ICD-10-CM

## 2024-08-30 PROBLEM — M54.2 NECK PAIN: Status: RESOLVED | Noted: 2024-06-11 | Resolved: 2024-08-30

## 2024-08-30 RX ORDER — ONDANSETRON 4 MG/1
TABLET, FILM COATED ORAL
COMMUNITY

## 2024-08-30 NOTE — PROGRESS NOTES
Gloria Schuler was read the following message “We want to confirm that, for purposes of billing, this is a virtual visit with your provider for which we will submit a claim for reimbursement with your insurance company. You will be responsible for any copays, coinsurance amounts or other amounts not covered by your insurance company. If you do not accept this, unfortunately we will not be able to schedule or proceed with a virtual visit with the provider. Do you accept? Gloria responded Yes .

## 2024-08-30 NOTE — PROGRESS NOTES
[facility] NEUROLOGY   Hardik Dobson MSN, APRN-CNP, [facility]    [address] [place] [address]      [phone]                                  Office Follow Up--Phone Visit    Gloria Schuler is a 79 y.o. woman    Total Time: minutes: 11-20 minutes    Gloria Schuler was evaluated through a synchronous (real-time) audio encounter. Patient identification was verified at the start of the visit. She (or guardian if applicable) is aware that this is a billable service, which includes applicable co-pays. This visit was conducted with the patient's (and/or legal guardian's) verbal consent. She has not had a related appointment within my department in the past 7 days or scheduled within the next 24 hours.   The patient was located at Home: [address] [place] [address] [place] [address].  The provider was located at Facility (Appt Dept): [address] [place] [address].    Note: not billable if this call serves to triage the patient into an appointment for the relevant concern  Yes, I confirm.     FRANK Gonzalez CNP     HPI:     We are speaking with her today as a hospital follow up for weakness--she follows with me routinely for essential tremors    Her daughter is also on the call. Gloria was unable to make her appt in person today and asked for a phone visit.    After her visit in June, she has CT c-spine which showed mod stenosis and degenerative changes at various levels. She started developing increased weakness and difficulty walking in July.  She develops sudden generalized weakness causing her to collapse. She went to the ER a few times and then ended up admitted to [facility] earlier this month. She had a cisternal myelogram which showed mod cervical stenosis without obstruction.  There was concern for cervical myelopathy with intermittent nerve compression. NSGY saw her there and plans no intervention.    She apparently did not qualify for inpatient rehab and was sent home with home PT/OT, however she  limiting evaluation. Otherwise no evidence for significant stenosis or large branch vessel cutoffs of the intracranial vessels. Opacities noted on axial imaging without corresponding abnormality on additional findings. Findings are favored to represent artifact versus true aneurysm however attention on follow-up recommended.     CT HEAD [facility] Aug 2024  1. Postsurgical changes of right temporal craniotomy and aneurysmal clipping right partially degrades imaging. Otherwise no acute intracranial hemorrhage or mass effect identified. 2. Findings consistent with chronic small-vessel ischemic disease and right temporal lobe encephalomalacia.     CT CERVICAL SPINE [facility] Aug 2024:   1. No acute fracture or traumatic malalignment of the cervical spine. 2. Multilevel spondylotic changes of the cervical spine as detailed above, with multilevel neural foraminal narrowing.     CT thoracic spine wo IV contrast [facility] Aug 2024  Final Result   1. No acute abnormality within the abdomen or pelvis.   2. No acute fracture of the thoracic or lumbar spine. Degenerative   changes of the spine without critical canal stenosis. Foraminal   narrowing in the lumbar spine as detailed.   3. Probable chronic calcifications within the posterior fecal sac at   the level of the midthoracic spine as detailed. No associated canal   stenosis identified..     CT cervical and thoracic spine myelogram w cervical puncture Aug 2024  Mild-to-moderate stenosis of the central spinal canal from C2-C3   and C6-C7. Moderate stenosis of the central spinal canal with mild   compression of the cervical spinal cord at C3-C4.. No evidence of   cervical neural foraminal stenosis..   2. No evidence of significant central spinal canal or neural   foraminal stenosis of the thoracic spine    All pertinent labs and images personally reviewed today    Assessment:     Cervical stenosis with radiculopathy and myelopathy: evaluated by [facility] with possible intermittent nerve compression

## 2024-09-13 ENCOUNTER — APPOINTMENT (OUTPATIENT)
Dept: NEUROLOGY | Facility: CLINIC | Age: 79
End: 2024-09-13
Payer: MEDICARE

## 2024-09-16 ENCOUNTER — TELEPHONE (OUTPATIENT)
Age: 79
End: 2024-09-16

## 2024-09-17 DIAGNOSIS — G99.2 STENOSIS OF CERVICAL SPINE WITH MYELOPATHY (HCC): Primary | ICD-10-CM

## 2024-09-17 DIAGNOSIS — M48.02 STENOSIS OF CERVICAL SPINE WITH MYELOPATHY (HCC): Primary | ICD-10-CM

## 2024-09-17 DIAGNOSIS — M54.2 NECK PAIN: ICD-10-CM

## 2024-09-18 DIAGNOSIS — G99.2 STENOSIS OF CERVICAL SPINE WITH MYELOPATHY (HCC): Primary | ICD-10-CM

## 2024-09-18 DIAGNOSIS — M48.02 STENOSIS OF CERVICAL SPINE WITH MYELOPATHY (HCC): Primary | ICD-10-CM

## 2024-09-18 DIAGNOSIS — R53.1 GENERALIZED WEAKNESS: ICD-10-CM

## 2024-09-20 ENCOUNTER — TRANSCRIBE ORDERS (OUTPATIENT)
Age: 79
End: 2024-09-20

## 2024-09-20 DIAGNOSIS — I73.9 PERIPHERAL VASCULAR DISEASE, UNSPECIFIED (HCC): Primary | ICD-10-CM

## 2024-09-24 ENCOUNTER — HOSPITAL ENCOUNTER (OUTPATIENT)
Dept: INTERVENTIONAL RADIOLOGY/VASCULAR | Age: 79
Discharge: HOME | End: 2024-09-26
Payer: MEDICARE

## 2024-09-24 DIAGNOSIS — I73.9 PERIPHERAL VASCULAR DISEASE, UNSPECIFIED (HCC): ICD-10-CM

## 2024-09-24 PROCEDURE — 93923 UPR/LXTR ART STDY 3+ LVLS: CPT

## 2024-11-15 ENCOUNTER — APPOINTMENT (OUTPATIENT)
Dept: GENERAL RADIOLOGY | Age: 79
DRG: 563 | End: 2024-11-15
Payer: MEDICARE

## 2024-11-15 ENCOUNTER — HOSPITAL ENCOUNTER (INPATIENT)
Age: 79
LOS: 6 days | Discharge: HOME | DRG: 563 | End: 2024-11-21
Attending: EMERGENCY MEDICINE | Admitting: INTERNAL MEDICINE
Payer: MEDICARE

## 2024-11-15 DIAGNOSIS — S82.891A CLOSED RIGHT ANKLE FRACTURE, INITIAL ENCOUNTER: Primary | ICD-10-CM

## 2024-11-15 DIAGNOSIS — S82.891A CLOSED FRACTURE OF RIGHT ANKLE, INITIAL ENCOUNTER: ICD-10-CM

## 2024-11-15 DIAGNOSIS — R26.2 AMBULATORY DYSFUNCTION: ICD-10-CM

## 2024-11-15 LAB
ANION GAP SERPL CALCULATED.3IONS-SCNC: 8 MMOL/L (ref 7–16)
BASOPHILS # BLD: 0.06 K/UL (ref 0–0.2)
BASOPHILS NFR BLD: 1 % (ref 0–2)
BUN SERPL-MCNC: 29 MG/DL (ref 6–23)
CALCIUM SERPL-MCNC: 10.4 MG/DL (ref 8.6–10.2)
CHLORIDE SERPL-SCNC: 100 MMOL/L (ref 98–107)
CO2 SERPL-SCNC: 33 MMOL/L (ref 22–29)
CREAT SERPL-MCNC: 0.7 MG/DL (ref 0.5–1)
EOSINOPHIL # BLD: 0.15 K/UL (ref 0.05–0.5)
EOSINOPHILS RELATIVE PERCENT: 2 % (ref 0–6)
ERYTHROCYTE [DISTWIDTH] IN BLOOD BY AUTOMATED COUNT: 12.6 % (ref 11.5–15)
GFR, ESTIMATED: 89 ML/MIN/1.73M2
GLUCOSE SERPL-MCNC: 172 MG/DL (ref 74–99)
HCT VFR BLD AUTO: 44.3 % (ref 34–48)
HGB BLD-MCNC: 15 G/DL (ref 11.5–15.5)
IMM GRANULOCYTES # BLD AUTO: <0.03 K/UL (ref 0–0.58)
IMM GRANULOCYTES NFR BLD: 0 % (ref 0–5)
INR PPP: 1.1
LYMPHOCYTES NFR BLD: 2.09 K/UL (ref 1.5–4)
LYMPHOCYTES RELATIVE PERCENT: 29 % (ref 20–42)
MAGNESIUM SERPL-MCNC: 2.2 MG/DL (ref 1.6–2.6)
MCH RBC QN AUTO: 33.8 PG (ref 26–35)
MCHC RBC AUTO-ENTMCNC: 33.9 G/DL (ref 32–34.5)
MCV RBC AUTO: 99.8 FL (ref 80–99.9)
MONOCYTES NFR BLD: 0.54 K/UL (ref 0.1–0.95)
MONOCYTES NFR BLD: 7 % (ref 2–12)
NEUTROPHILS NFR BLD: 61 % (ref 43–80)
NEUTS SEG NFR BLD: 4.46 K/UL (ref 1.8–7.3)
PLATELET # BLD AUTO: 294 K/UL (ref 130–450)
PMV BLD AUTO: 10.9 FL (ref 7–12)
POTASSIUM SERPL-SCNC: 3.8 MMOL/L (ref 3.5–5)
PROTHROMBIN TIME: 11.7 SEC (ref 9.3–12.4)
RBC # BLD AUTO: 4.44 M/UL (ref 3.5–5.5)
SODIUM SERPL-SCNC: 141 MMOL/L (ref 132–146)
WBC OTHER # BLD: 7.3 K/UL (ref 4.5–11.5)

## 2024-11-15 PROCEDURE — 1200000000 HC SEMI PRIVATE

## 2024-11-15 PROCEDURE — 96375 TX/PRO/DX INJ NEW DRUG ADDON: CPT

## 2024-11-15 PROCEDURE — 85025 COMPLETE CBC W/AUTO DIFF WBC: CPT

## 2024-11-15 PROCEDURE — 6360000002 HC RX W HCPCS: Performed by: EMERGENCY MEDICINE

## 2024-11-15 PROCEDURE — 96374 THER/PROPH/DIAG INJ IV PUSH: CPT

## 2024-11-15 PROCEDURE — 73562 X-RAY EXAM OF KNEE 3: CPT

## 2024-11-15 PROCEDURE — 85610 PROTHROMBIN TIME: CPT

## 2024-11-15 PROCEDURE — 80048 BASIC METABOLIC PNL TOTAL CA: CPT

## 2024-11-15 PROCEDURE — 99285 EMERGENCY DEPT VISIT HI MDM: CPT

## 2024-11-15 PROCEDURE — 93005 ELECTROCARDIOGRAM TRACING: CPT

## 2024-11-15 PROCEDURE — 96376 TX/PRO/DX INJ SAME DRUG ADON: CPT

## 2024-11-15 PROCEDURE — 73610 X-RAY EXAM OF ANKLE: CPT

## 2024-11-15 PROCEDURE — 73502 X-RAY EXAM HIP UNI 2-3 VIEWS: CPT

## 2024-11-15 PROCEDURE — 83735 ASSAY OF MAGNESIUM: CPT

## 2024-11-15 RX ORDER — 0.9 % SODIUM CHLORIDE 0.9 %
500 INTRAVENOUS SOLUTION INTRAVENOUS ONCE
Status: DISCONTINUED | OUTPATIENT
Start: 2024-11-15 | End: 2024-11-21 | Stop reason: HOSPADM

## 2024-11-15 RX ORDER — MORPHINE SULFATE 4 MG/ML
4 INJECTION, SOLUTION INTRAMUSCULAR; INTRAVENOUS ONCE
Status: COMPLETED | OUTPATIENT
Start: 2024-11-15 | End: 2024-11-15

## 2024-11-15 RX ORDER — POTASSIUM CHLORIDE 1500 MG/1
40 TABLET, EXTENDED RELEASE ORAL PRN
Status: DISCONTINUED | OUTPATIENT
Start: 2024-11-15 | End: 2024-11-21 | Stop reason: HOSPADM

## 2024-11-15 RX ORDER — POTASSIUM CHLORIDE 1500 MG/1
20 TABLET, EXTENDED RELEASE ORAL 3 TIMES DAILY
Status: DISCONTINUED | OUTPATIENT
Start: 2024-11-16 | End: 2024-11-21 | Stop reason: HOSPADM

## 2024-11-15 RX ORDER — ASPIRIN 81 MG/1
81 TABLET ORAL DAILY
Status: DISCONTINUED | OUTPATIENT
Start: 2024-11-16 | End: 2024-11-21 | Stop reason: HOSPADM

## 2024-11-15 RX ORDER — POTASSIUM CHLORIDE 7.45 MG/ML
10 INJECTION INTRAVENOUS PRN
Status: DISCONTINUED | OUTPATIENT
Start: 2024-11-15 | End: 2024-11-21 | Stop reason: HOSPADM

## 2024-11-15 RX ORDER — SODIUM CHLORIDE 9 MG/ML
INJECTION, SOLUTION INTRAVENOUS PRN
Status: DISCONTINUED | OUTPATIENT
Start: 2024-11-15 | End: 2024-11-21 | Stop reason: HOSPADM

## 2024-11-15 RX ORDER — POLYETHYLENE GLYCOL 3350 17 G/17G
17 POWDER, FOR SOLUTION ORAL DAILY PRN
Status: DISCONTINUED | OUTPATIENT
Start: 2024-11-15 | End: 2024-11-21 | Stop reason: HOSPADM

## 2024-11-15 RX ORDER — ONDANSETRON 4 MG/1
4 TABLET, ORALLY DISINTEGRATING ORAL EVERY 8 HOURS PRN
Status: DISCONTINUED | OUTPATIENT
Start: 2024-11-15 | End: 2024-11-21 | Stop reason: HOSPADM

## 2024-11-15 RX ORDER — ACETAMINOPHEN 325 MG/1
650 TABLET ORAL EVERY 6 HOURS PRN
Status: DISCONTINUED | OUTPATIENT
Start: 2024-11-15 | End: 2024-11-21 | Stop reason: HOSPADM

## 2024-11-15 RX ORDER — MAGNESIUM SULFATE IN WATER 40 MG/ML
2000 INJECTION, SOLUTION INTRAVENOUS PRN
Status: DISCONTINUED | OUTPATIENT
Start: 2024-11-15 | End: 2024-11-21 | Stop reason: HOSPADM

## 2024-11-15 RX ORDER — CLONIDINE HYDROCHLORIDE 0.1 MG/1
0.1 TABLET ORAL EVERY 8 HOURS PRN
Status: DISCONTINUED | OUTPATIENT
Start: 2024-11-15 | End: 2024-11-21 | Stop reason: HOSPADM

## 2024-11-15 RX ORDER — OXYCODONE HYDROCHLORIDE 5 MG/1
5 TABLET ORAL EVERY 4 HOURS PRN
Status: DISCONTINUED | OUTPATIENT
Start: 2024-11-15 | End: 2024-11-21 | Stop reason: HOSPADM

## 2024-11-15 RX ORDER — SODIUM CHLORIDE 0.9 % (FLUSH) 0.9 %
5-40 SYRINGE (ML) INJECTION EVERY 12 HOURS SCHEDULED
Status: DISCONTINUED | OUTPATIENT
Start: 2024-11-15 | End: 2024-11-21 | Stop reason: HOSPADM

## 2024-11-15 RX ORDER — ONDANSETRON 2 MG/ML
4 INJECTION INTRAMUSCULAR; INTRAVENOUS ONCE
Status: COMPLETED | OUTPATIENT
Start: 2024-11-15 | End: 2024-11-15

## 2024-11-15 RX ORDER — ROSUVASTATIN CALCIUM 10 MG/1
5 TABLET, COATED ORAL DAILY
Status: DISCONTINUED | OUTPATIENT
Start: 2024-11-16 | End: 2024-11-21 | Stop reason: HOSPADM

## 2024-11-15 RX ORDER — ACETAMINOPHEN 650 MG/1
650 SUPPOSITORY RECTAL EVERY 6 HOURS PRN
Status: DISCONTINUED | OUTPATIENT
Start: 2024-11-15 | End: 2024-11-21 | Stop reason: HOSPADM

## 2024-11-15 RX ORDER — NICOTINE 21 MG/24HR
1 PATCH, TRANSDERMAL 24 HOURS TRANSDERMAL DAILY
Status: DISCONTINUED | OUTPATIENT
Start: 2024-11-15 | End: 2024-11-21 | Stop reason: HOSPADM

## 2024-11-15 RX ORDER — FOLIC ACID 1 MG/1
1000 TABLET ORAL DAILY
Status: DISCONTINUED | OUTPATIENT
Start: 2024-11-16 | End: 2024-11-21 | Stop reason: HOSPADM

## 2024-11-15 RX ORDER — HYDROCHLOROTHIAZIDE 25 MG/1
25 TABLET ORAL DAILY
Status: DISCONTINUED | OUTPATIENT
Start: 2024-11-16 | End: 2024-11-21 | Stop reason: HOSPADM

## 2024-11-15 RX ORDER — VITAMIN B COMPLEX
1000 TABLET ORAL DAILY
Status: DISCONTINUED | OUTPATIENT
Start: 2024-11-16 | End: 2024-11-21 | Stop reason: HOSPADM

## 2024-11-15 RX ORDER — PAROXETINE 10 MG/1
10 TABLET, FILM COATED ORAL DAILY
Status: DISCONTINUED | OUTPATIENT
Start: 2024-11-16 | End: 2024-11-21 | Stop reason: HOSPADM

## 2024-11-15 RX ORDER — PROPRANOLOL HYDROCHLORIDE 10 MG/1
10 TABLET ORAL DAILY
Status: DISCONTINUED | OUTPATIENT
Start: 2024-11-16 | End: 2024-11-21 | Stop reason: HOSPADM

## 2024-11-15 RX ORDER — ONDANSETRON 2 MG/ML
4 INJECTION INTRAMUSCULAR; INTRAVENOUS EVERY 6 HOURS PRN
Status: DISCONTINUED | OUTPATIENT
Start: 2024-11-15 | End: 2024-11-21 | Stop reason: HOSPADM

## 2024-11-15 RX ORDER — SODIUM CHLORIDE 0.9 % (FLUSH) 0.9 %
5-40 SYRINGE (ML) INJECTION PRN
Status: DISCONTINUED | OUTPATIENT
Start: 2024-11-15 | End: 2024-11-21 | Stop reason: HOSPADM

## 2024-11-15 RX ORDER — ENOXAPARIN SODIUM 100 MG/ML
40 INJECTION SUBCUTANEOUS DAILY
Status: DISCONTINUED | OUTPATIENT
Start: 2024-11-16 | End: 2024-11-21 | Stop reason: HOSPADM

## 2024-11-15 RX ADMIN — MORPHINE SULFATE 4 MG: 4 INJECTION, SOLUTION INTRAMUSCULAR; INTRAVENOUS at 20:35

## 2024-11-15 RX ADMIN — MORPHINE SULFATE 4 MG: 4 INJECTION, SOLUTION INTRAMUSCULAR; INTRAVENOUS at 18:25

## 2024-11-15 RX ADMIN — ONDANSETRON 4 MG: 2 INJECTION INTRAMUSCULAR; INTRAVENOUS at 18:25

## 2024-11-15 ASSESSMENT — PAIN DESCRIPTION - ORIENTATION: ORIENTATION: RIGHT

## 2024-11-15 ASSESSMENT — PAIN DESCRIPTION - FREQUENCY: FREQUENCY: INTERMITTENT

## 2024-11-15 ASSESSMENT — PAIN DESCRIPTION - DESCRIPTORS: DESCRIPTORS: DISCOMFORT

## 2024-11-15 ASSESSMENT — PAIN - FUNCTIONAL ASSESSMENT
PAIN_FUNCTIONAL_ASSESSMENT: 0-10
PAIN_FUNCTIONAL_ASSESSMENT: PREVENTS OR INTERFERES SOME ACTIVE ACTIVITIES AND ADLS

## 2024-11-15 ASSESSMENT — PAIN DESCRIPTION - LOCATION: LOCATION: LEG

## 2024-11-15 NOTE — ED PROVIDER NOTES
HPI:  11/15/24,   Time: 6:14 PM EST       Gloria Schuler is a 79 y.o. female presenting to the ED for fall/rle pain, beginning 1 hr ago.  The complaint has been persistent, moderate in severity, and worsened by movement of rle.  Mechanical fall.  Feels due to chronic back pain for several months.  No incontinence or saddle esthesia.  Complains of right leg ankle knee and hip pain.  Brought in by EMS.    Review of Systems:   Pertinent positives and negatives are stated within HPI, all other systems reviewed and are negative.          --------------------------------------------- PAST HISTORY ---------------------------------------------  Past Medical History:  has a past medical history of A-fib (HCC), Anxiety and depression, Arthritis, Artificial cardiac pacemaker, Brain aneurysm, Elevated hemoglobin A1c, Essential tremor, HTN (hypertension), Kidney stones, Lumbar radiculopathy, Shingles, and Ulcer.    Past Surgical History:  has a past surgical history that includes Hysterectomy; Brain aneurysm surgery (1993); Carpal tunnel release; Endoscopy, colon, diagnostic; Cataract removal with implant (Right, 11/22/2016); Pacemaker insertion (N/A, 05/08/2019); Cardiac assist device insertion (08/03/2020); Hemorrhoid surgery; Lithotripsy; Intracapsular cataract extraction (Left, 10/12/2021); and Colonoscopy w/ polypectomy.    Social History:  reports that she has been smoking cigarettes. She started smoking about 62 years ago. She has a 31.4 pack-year smoking history. She has never used smokeless tobacco. She reports that she does not drink alcohol and does not use drugs.    Family History: family history includes Dementia in her mother; Heart Disease in her father; Ovarian Cancer in her paternal aunt; Stomach Cancer in her paternal uncle.     The patient’s home medications have been reviewed.    Allergies: Acyclovir, Methocarbamol, Mirtazapine, Phenytoin, Primidone, Sulfa antibiotics, Corticosteroids, Dilantin [phenytoin  sodium extended], Famciclovir, Famotidine, Fentanyl, Mobic [meloxicam], Omeprazole, and Phenobarbital        ---------------------------------------------------PHYSICAL EXAM--------------------------------------    Constitutional/General: Alert and oriented x3, well appearing, non toxic in NAD  Head: Normocephalic and atraumatic  Eyes: PERRL, EOMI, conjunctive normal, sclera non icteric  Mouth: Oropharynx clear, handling secretions, no trismus, no asymmetry of the posterior oropharynx or uvular edema  Neck: Supple, full ROM, non tender to palpation in the midline, no stridor, no crepitus, no meningeal signs  Respiratory: Lungs clear to auscultation bilaterally, no wheezes, rales, or rhonchi. Not in respiratory distress  Cardiovascular:  Regular rate. Regular rhythm. No murmurs, gallops, or rubs. 2+ distal pulses  Chest: No chest wall tenderness  GI:  Abdomen Soft, Non tender, Non distended.   Musculoskeletal: Moves all extremities x 4. Warm and well perfused, right lateral ankle tenderness.  No obvious deformity.  Neuro vas intact.  Right knee diffusely tender but no ligament instability.  Right hip tender but no deformity or rotation  Integument: skin warm and dry. No rashes.   Lymphatic: no lymphadenopathy noted  Neurologic: GCS 15, no focal deficits, symmetric strength 5/5 in the upper and lower extremities bilaterally  Psychiatric: Normal Affect      Medical Decision Making:    Fall with amatory dysfunction for consideration of her Mounce anemia psychiatry dehydration.  Ankle fracture.  Note Ortho consult.  Will Mitt for likely rehab placement      -------------------------------------------------- RESULTS -------------------------------------------------  I have personally reviewed all laboratory and imaging results for this patient. Results are listed below.     LABS:  Results for orders placed or performed during the hospital encounter of 11/15/24   CBC with Auto Differential   Result Value Ref Range    WBC  tablet 25 mg (25 mg Oral Given 11/18/24 0818)   folic acid (FOLVITE) tablet 1,000 mcg (1,000 mcg Oral Given 11/18/24 0819)   cloNIDine (CATAPRES) tablet 0.1 mg (has no administration in time range)   Vitamin D (CHOLECALCIFEROL) tablet 1,000 Units (1,000 Units Oral Given 11/18/24 0818)   aspirin EC tablet 81 mg (81 mg Oral Given 11/18/24 0818)   nicotine (NICODERM CQ) 14 MG/24HR 1 patch (1 patch TransDERmal Patch Applied 11/18/24 0841)   oxyCODONE (ROXICODONE) immediate release tablet 5 mg (5 mg Oral Given 11/19/24 0519)   ibuprofen (ADVIL;MOTRIN) tablet 400 mg (400 mg Oral Given 11/19/24 0308)   lidocaine 4 % external patch 2 patch (2 patches TransDERmal Not Given 11/18/24 0851)   ondansetron (ZOFRAN) injection 4 mg (4 mg IntraVENous Given 11/15/24 1825)   morphine sulfate (PF) injection 4 mg (4 mg IntraVENous Given 11/15/24 1825)   morphine sulfate (PF) injection 4 mg (4 mg IntraVENous Given 11/15/24 2035)   iopamidol (ISOVUE-370) 76 % injection 70 mL (70 mLs IntraVENous Given 11/17/24 1049)         ED COURSE:             This patient's ED course included: a personal history and physicial examination    This patient has remained hemodynamically stable during their ED course.      Re-Evaluations:             Re-evaluation.  Patient’s symptoms show no change    Re-examination  11/15/24   6:14 PM EST          Vital Signs:   Vitals:    11/18/24 1945 11/18/24 2119 11/19/24 0147 11/19/24 0549   BP: (!) 150/64      Pulse: 78      Resp: 16 18 18 18   Temp: 98.3 °F (36.8 °C)      TempSrc: Temporal      SpO2: 100%      Weight:       Height:             Consultations:             Ortho  hospitalist    Critical Care:         Counseling:   The emergency provider has spoken with the patient and discussed today’s results, in addition to providing specific details for the plan of care and counseling regarding the diagnosis and prognosis.  Questions are answered at this time and they are agreeable with the plan.

## 2024-11-16 ENCOUNTER — APPOINTMENT (OUTPATIENT)
Dept: ULTRASOUND IMAGING | Age: 79
DRG: 563 | End: 2024-11-16
Payer: MEDICARE

## 2024-11-16 PROBLEM — M54.9 CHRONIC BACK PAIN: Status: ACTIVE | Noted: 2024-11-16

## 2024-11-16 PROBLEM — M54.12 CERVICAL RADICULOPATHY: Status: ACTIVE | Noted: 2024-11-16

## 2024-11-16 PROBLEM — G89.29 CHRONIC BACK PAIN: Status: ACTIVE | Noted: 2024-11-16

## 2024-11-16 LAB
ANION GAP SERPL CALCULATED.3IONS-SCNC: 8 MMOL/L (ref 7–16)
BASOPHILS # BLD: 0.07 K/UL (ref 0–0.2)
BASOPHILS NFR BLD: 1 % (ref 0–2)
BUN SERPL-MCNC: 21 MG/DL (ref 6–23)
CALCIUM SERPL-MCNC: 9.4 MG/DL (ref 8.6–10.2)
CHLORIDE SERPL-SCNC: 105 MMOL/L (ref 98–107)
CO2 SERPL-SCNC: 28 MMOL/L (ref 22–29)
CREAT SERPL-MCNC: 0.6 MG/DL (ref 0.5–1)
EOSINOPHIL # BLD: 0.2 K/UL (ref 0.05–0.5)
EOSINOPHILS RELATIVE PERCENT: 3 % (ref 0–6)
ERYTHROCYTE [DISTWIDTH] IN BLOOD BY AUTOMATED COUNT: 12.6 % (ref 11.5–15)
GFR, ESTIMATED: >90 ML/MIN/1.73M2
GLUCOSE SERPL-MCNC: 153 MG/DL (ref 74–99)
HCT VFR BLD AUTO: 43.1 % (ref 34–48)
HGB BLD-MCNC: 14.3 G/DL (ref 11.5–15.5)
IMM GRANULOCYTES # BLD AUTO: <0.03 K/UL (ref 0–0.58)
IMM GRANULOCYTES NFR BLD: 0 % (ref 0–5)
INR PPP: 1.1
LYMPHOCYTES NFR BLD: 2.14 K/UL (ref 1.5–4)
LYMPHOCYTES RELATIVE PERCENT: 30 % (ref 20–42)
MCH RBC QN AUTO: 33.3 PG (ref 26–35)
MCHC RBC AUTO-ENTMCNC: 33.2 G/DL (ref 32–34.5)
MCV RBC AUTO: 100.5 FL (ref 80–99.9)
MONOCYTES NFR BLD: 0.62 K/UL (ref 0.1–0.95)
MONOCYTES NFR BLD: 9 % (ref 2–12)
NEUTROPHILS NFR BLD: 58 % (ref 43–80)
NEUTS SEG NFR BLD: 4.19 K/UL (ref 1.8–7.3)
PLATELET # BLD AUTO: 224 K/UL (ref 130–450)
PMV BLD AUTO: 10.4 FL (ref 7–12)
POTASSIUM SERPL-SCNC: 4 MMOL/L (ref 3.5–5)
PROTHROMBIN TIME: 11.6 SEC (ref 9.3–12.4)
RBC # BLD AUTO: 4.29 M/UL (ref 3.5–5.5)
SODIUM SERPL-SCNC: 141 MMOL/L (ref 132–146)
WBC OTHER # BLD: 7.2 K/UL (ref 4.5–11.5)

## 2024-11-16 PROCEDURE — 97530 THERAPEUTIC ACTIVITIES: CPT

## 2024-11-16 PROCEDURE — 85610 PROTHROMBIN TIME: CPT

## 2024-11-16 PROCEDURE — 1200000000 HC SEMI PRIVATE

## 2024-11-16 PROCEDURE — 97166 OT EVAL MOD COMPLEX 45 MIN: CPT

## 2024-11-16 PROCEDURE — 6360000002 HC RX W HCPCS: Performed by: INTERNAL MEDICINE

## 2024-11-16 PROCEDURE — 93971 EXTREMITY STUDY: CPT

## 2024-11-16 PROCEDURE — 97535 SELF CARE MNGMENT TRAINING: CPT

## 2024-11-16 PROCEDURE — 97161 PT EVAL LOW COMPLEX 20 MIN: CPT

## 2024-11-16 PROCEDURE — 80048 BASIC METABOLIC PNL TOTAL CA: CPT

## 2024-11-16 PROCEDURE — 51798 US URINE CAPACITY MEASURE: CPT

## 2024-11-16 PROCEDURE — 36415 COLL VENOUS BLD VENIPUNCTURE: CPT

## 2024-11-16 PROCEDURE — 6370000000 HC RX 637 (ALT 250 FOR IP): Performed by: INTERNAL MEDICINE

## 2024-11-16 PROCEDURE — 2580000003 HC RX 258: Performed by: INTERNAL MEDICINE

## 2024-11-16 PROCEDURE — 99222 1ST HOSP IP/OBS MODERATE 55: CPT | Performed by: STUDENT IN AN ORGANIZED HEALTH CARE EDUCATION/TRAINING PROGRAM

## 2024-11-16 PROCEDURE — 6370000000 HC RX 637 (ALT 250 FOR IP)

## 2024-11-16 PROCEDURE — 85025 COMPLETE CBC W/AUTO DIFF WBC: CPT

## 2024-11-16 PROCEDURE — 99232 SBSQ HOSP IP/OBS MODERATE 35: CPT

## 2024-11-16 RX ORDER — IBUPROFEN 400 MG/1
400 TABLET, FILM COATED ORAL EVERY 6 HOURS PRN
Status: DISCONTINUED | OUTPATIENT
Start: 2024-11-16 | End: 2024-11-21 | Stop reason: HOSPADM

## 2024-11-16 RX ADMIN — ENOXAPARIN SODIUM 40 MG: 100 INJECTION SUBCUTANEOUS at 08:29

## 2024-11-16 RX ADMIN — POTASSIUM CHLORIDE 20 MEQ: 1500 TABLET, EXTENDED RELEASE ORAL at 13:47

## 2024-11-16 RX ADMIN — ACETAMINOPHEN 650 MG: 325 TABLET ORAL at 12:20

## 2024-11-16 RX ADMIN — ASPIRIN 81 MG: 81 TABLET, COATED ORAL at 08:31

## 2024-11-16 RX ADMIN — SODIUM CHLORIDE, PRESERVATIVE FREE 10 ML: 5 INJECTION INTRAVENOUS at 20:34

## 2024-11-16 RX ADMIN — OXYCODONE HYDROCHLORIDE 5 MG: 5 TABLET ORAL at 16:32

## 2024-11-16 RX ADMIN — SODIUM CHLORIDE, PRESERVATIVE FREE 10 ML: 5 INJECTION INTRAVENOUS at 08:33

## 2024-11-16 RX ADMIN — PAROXETINE 10 MG: 10 TABLET, FILM COATED ORAL at 08:33

## 2024-11-16 RX ADMIN — IBUPROFEN 400 MG: 400 TABLET, FILM COATED ORAL at 13:47

## 2024-11-16 RX ADMIN — HYDROCHLOROTHIAZIDE 25 MG: 25 TABLET ORAL at 08:30

## 2024-11-16 RX ADMIN — Medication 1000 UNITS: at 08:32

## 2024-11-16 RX ADMIN — FOLIC ACID 1000 MCG: 1 TABLET ORAL at 08:31

## 2024-11-16 RX ADMIN — ROSUVASTATIN 5 MG: 10 TABLET, FILM COATED ORAL at 08:30

## 2024-11-16 RX ADMIN — POTASSIUM CHLORIDE 20 MEQ: 1500 TABLET, EXTENDED RELEASE ORAL at 20:33

## 2024-11-16 RX ADMIN — OXYCODONE HYDROCHLORIDE 5 MG: 5 TABLET ORAL at 20:33

## 2024-11-16 RX ADMIN — POTASSIUM CHLORIDE 20 MEQ: 1500 TABLET, EXTENDED RELEASE ORAL at 08:30

## 2024-11-16 RX ADMIN — ACETAMINOPHEN 650 MG: 325 TABLET ORAL at 20:34

## 2024-11-16 RX ADMIN — SODIUM CHLORIDE, PRESERVATIVE FREE 10 ML: 5 INJECTION INTRAVENOUS at 05:24

## 2024-11-16 RX ADMIN — PROPRANOLOL HYDROCHLORIDE 10 MG: 10 TABLET ORAL at 08:33

## 2024-11-16 ASSESSMENT — PAIN SCALES - GENERAL
PAINLEVEL_OUTOF10: 3
PAINLEVEL_OUTOF10: 2
PAINLEVEL_OUTOF10: 5
PAINLEVEL_OUTOF10: 2
PAINLEVEL_OUTOF10: 4
PAINLEVEL_OUTOF10: 0
PAINLEVEL_OUTOF10: 2
PAINLEVEL_OUTOF10: 5
PAINLEVEL_OUTOF10: 3
PAINLEVEL_OUTOF10: 2

## 2024-11-16 ASSESSMENT — PAIN DESCRIPTION - DESCRIPTORS
DESCRIPTORS: ACHING;THROBBING;DISCOMFORT
DESCRIPTORS: ACHING;THROBBING;DISCOMFORT
DESCRIPTORS: ACHING;THROBBING;PINS AND NEEDLES
DESCRIPTORS: ACHING;DULL
DESCRIPTORS: ACHING;HEAVINESS

## 2024-11-16 ASSESSMENT — PAIN DESCRIPTION - LOCATION
LOCATION: LEG
LOCATION: ANKLE;ARM;SHOULDER
LOCATION: ANKLE;ARM;SHOULDER
LOCATION: ARM;SHOULDER
LOCATION: SHOULDER
LOCATION: ARM;SHOULDER

## 2024-11-16 ASSESSMENT — PAIN DESCRIPTION - ORIENTATION
ORIENTATION: RIGHT
ORIENTATION: RIGHT;LEFT
ORIENTATION: RIGHT
ORIENTATION: RIGHT

## 2024-11-16 ASSESSMENT — PAIN DESCRIPTION - ONSET
ONSET: ON-GOING

## 2024-11-16 ASSESSMENT — PAIN DESCRIPTION - PAIN TYPE
TYPE: ACUTE PAIN

## 2024-11-16 ASSESSMENT — PAIN DESCRIPTION - FREQUENCY
FREQUENCY: INTERMITTENT

## 2024-11-16 NOTE — CONSULTS
Department of Orthopedic Trauma Surgery  Resident consult note      CHIEF COMPLAINT:   Chief Complaint   Patient presents with    Leg Pain    Leg Swelling     Patient from home, having pain in right leg and unable to ambulate. Right leg with edema, +3 pitting, and warm to the touch. Denies any injury/falls.        HISTORY OF PRESENT ILLNESS:                Patient is a 79 y.o. female who presents with right ankle pain.  Patient is accompanied by her daughter.  They presented today for neurologic symptoms involving whole body paresthesias and pain.  They did note a fall approximately 1 week ago that occurred while she was ambulating using her walker at home she tripped and fell.  She had ankle pain since that fall however has not been able to ambulate on it but she has also had difficulty with ambulation secondary to her neurologic symptoms so they did not get evaluated for the ankle pain.  Because she came in for neurologic symptoms today the x-ray was ankle demonstrating a lateral malleolus fracture. Denies any other orthopedic complaints at this time.         Past Medical History:        Diagnosis Date    A-fib (HCC)     PACEMAKER AND WATCHMAN    Anxiety and depression     Arthritis     Artificial cardiac pacemaker     Brain aneurysm     s/p surgery    Elevated hemoglobin A1c     Essential tremor     HTN (hypertension)     Kidney stones     Lumbar radiculopathy     Shingles     recurrent    Ulcer      Past Surgical History:        Procedure Laterality Date    BRAIN ANEURYSM SURGERY  1993    clipping    CARDIAC ASSIST DEVICE INSERTION  08/03/2020    Watchman inserted     CARPAL TUNNEL RELEASE      pinched nerve in left arm, had surgery    CATARACT REMOVAL WITH IMPLANT Right 11/22/2016    COLONOSCOPY W/ POLYPECTOMY      ENDOSCOPY, COLON, DIAGNOSTIC      HEMORRHOID SURGERY      HYSTERECTOMY (CERVIX STATUS UNKNOWN)      INTRACAPSULAR CATARACT EXTRACTION Left 10/12/2021    LEFT CATARACT EXTRACTION WITH IOL performed by  alert and oriented in three planes. No gross deficits   MUSCULOSKELETAL:  Right lower Extremity:  Skin intact circumferentially about the ankle.  There is edema and ecchymosis overlying the lateral aspect of the ankle.  She is tender to palpation over the lateral malleolus.  Nontender to palpation of the medial malleolus.  Maintains plantarflexion dorsiflexion without significant pain or difficulty.  Maintains flexion extension of all digits and toes.  Subjectively states sensation intact to light touch in superficial and deep peroneal, sural, tibial nerve distributional to the foot and ankle.  Toes are warm and well-perfused with brisk capillary refill    Secondary Exam:   bilateralUE: No obvious signs of trauma.  -TTP to fingers, hand, wrist, forearm, elbow, humerus, shoulder or clavicle.-- Patient able to flex/extend fingers, wrist, elbow and shoulder with active and passive ROM without pain, +2/4 Radial pulse, compartments soft and compressible.    leftLE: No obvious signs of trauma.   -TTP to foot, ankle, leg, knee, thigh, hip.-- Patient able to flex/extend toes, ankle, knee and hip with active and passive ROM without pain,+2/4 DP & PT pulses, compartments soft and compressible.    Pelvis: -TTP, -Log roll, -Heel strike         DATA:    CBC:   Lab Results   Component Value Date/Time    WBC 7.3 11/15/2024 08:20 PM    RBC 4.44 11/15/2024 08:20 PM    HGB 15.0 11/15/2024 08:20 PM    HCT 44.3 11/15/2024 08:20 PM    MCV 99.8 11/15/2024 08:20 PM    MCH 33.8 11/15/2024 08:20 PM    MCHC 33.9 11/15/2024 08:20 PM    RDW 12.6 11/15/2024 08:20 PM     11/15/2024 08:20 PM    MPV 10.9 11/15/2024 08:20 PM     PT/INR:    Lab Results   Component Value Date/Time    PROTIME 11.7 11/15/2024 08:20 PM    INR 1.1 11/15/2024 08:20 PM       Radiology Review:  X-ray right ankle demonstrates a lateral malleolus fracture oblique in nature about the level of the syndesmosis with displacement.  Tibiotalar joint is well aligned without

## 2024-11-16 NOTE — PROGRESS NOTES
4 Eyes Skin Assessment     NAME:  Gloria Schuler  YOB: 1945  MEDICAL RECORD NUMBER:  [id number]    The patient is being assessed for  Admission    I agree that at least one RN has performed a thorough Head to Toe Skin Assessment on the patient. ALL assessment sites listed below have been assessed.      Areas assessed by both nurses:    Head, Face, Ears, Shoulders, Back, Chest, Arms, Elbows, Hands, Sacrum. Buttock, Coccyx, Ischium, and Legs. Feet and Heels        Does the Patient have a Wound? No noted wound(s)       Gautam Prevention initiated by RN: Yes  Wound Care Orders initiated by RN: No    Pressure Injury (Stage 3,4, Unstageable, DTI, NWPT, and Complex wounds) if present, place Wound referral order by RN under : No    New Ostomies, if present place, Ostomy referral order under : No     Nurse 1 eSignature: Electronically signed by Stacy Holder RN on 11/16/24 at 12:46 AM EST    **SHARE this note so that the co-signing nurse can place an eSignature**    Nurse 2 eSignature: Electronically signed by Mary Ellen Vega RN on 11/16/24 at 2:16 AM EST

## 2024-11-16 NOTE — DISCHARGE INSTRUCTIONS
Orthopedic surgery instructions:  Nonweightbearing right lower extremity  Maintain cam boot in place  Follow-up outpatient 2 weeks

## 2024-11-16 NOTE — PROGRESS NOTES
Physical Therapy  Initial Assessment       Name: Gloria Schuler  : 1945  MRN: [id number]      Date of Service: 2024    Evaluating PT:  Shane Lara PT, DPT  [id number]    Room #:  5405/5405-A  Diagnosis:  Ambulatory dysfunction [R26.2]  Closed right ankle fracture, initial encounter [E65.945G]  PMHx/PSHx:   has a past medical history of A-fib (HCC), Anxiety and depression, Arthritis, Artificial cardiac pacemaker, Brain aneurysm, Elevated hemoglobin A1c, Essential tremor, HTN (hypertension), Kidney stones, Lumbar radiculopathy, Shingles, and Ulcer.  Procedure/Surgery:    Precautions:  Falls, NWB RLE, Cam Boot  Equipment Needs: TBD      SUBJECTIVE:    Pt lives alone has caregiver daily 7:30 AM to 11:00 PM. Pt ambulated with assist from aid using WW or utilized transport chair PTA.   Equipment Owned:     OBJECTIVE:   Initial Evaluation  Date: 24 Treatment Short Term/ Long Term   Goals   AM-PAC 6 Clicks 10/24     Was pt agreeable to Eval/treatment? yes     Does pt have pain? Mild pain in R ankle     Bed Mobility  Rolling: MaxA  Supine to sit: ModA  Sit to supine: ModA  Scooting: ModA  Rolling: Independent    Supine to sit: Independent    Sit to supine: Independent    Scooting: Independent     Transfers Sit to stand: ModA x 2  Stand to sit: modA x 2  Stand pivot: MaxA x 2  With WW and without    Abides by NWB better without WW  Sit to stand: supervision  Stand to sit: supervision  Stand pivot: supervision  Slideboard transfer: Supervision   Ambulation    3' MaxA x 2 WW  25 feet with Yoly WW   Stair negotiation: ascended and descended  NT     ROM BUE:  Defer to OT  BLE:  WFL     Strength BUE:  Defer to OT  R ankle 2-/5   R knee and hip 2+/5  LLE: 3/5  Improve 1 MMT   Balance Sitting EOB:  SBA  Dynamic Standing:  MaxA x 2 WW  Sitting EOB:  Independent    Dynamic Standing:  Supervision     Pt is A & O x 4  Sensation:  WNL  Edema:  Mild RLE    Vitals:    HR 78  Spo2 %96       Therapeutic Exercises:   Treatment Recommendations:     [x] Strengthening to improve independence with functional mobility   [x] ROM to improve independence with functional mobility   [x] Balance Training to improve static/dynamic balance and to reduce fall risk  [x] Endurance Training to improve activity tolerance during functional mobility   [x] Transfer Training to improve safety and independence with all functional transfers   [x] Gait Training to improve gait mechanics, endurance and assess need for appropriate assistive device  [x] Stair Training in preparation for safe discharge home and/or into the community   [x] Positioning to prevent skin breakdown and contractures  [x] Safety and Education Training   [x] Patient/Caregiver Education   [x] HEP  [] Other     PT long term treatment goals are located in above grid    Frequency of treatments: 5-7x/week x 1-2 weeks.    Time in  0800  Time out  0825    Total Treatment Time  8 minutes     Evaluation Time includes thorough review of current medical information, gathering information on past medical history/social history and prior level of function, completion of standardized testing/informal observation of tasks, assessment of data and education on plan of care and goals.    CPT codes:  [x] Low Complexity PT evaluation 63032  [] Moderate Complexity PT evaluation 63129  [] High Complexity PT evaluation 20020  [] PT Re-evaluation 42369  [] Gait training 27910 0 minutes  [] Manual therapy 86820 0 minutes  [x] Therapeutic activities 21007 8 minutes  [] Therapeutic exercises 63888 0 minutes  [] Neuromuscular reeducation 00401 0 minutes       Shane Lara PT, DPT   [id number]

## 2024-11-16 NOTE — H&P
[facility] Hospitalist Group History and Physical          PCP: Saurav Peña MD    Date of Admission: 11/15/2024    Date of Service: Pt seen/examined on 11/15/2024 and is admitted to Inpatient with expected LOS greater than two midnights due to medical therapy. Placed in Inpatient     Chief Complaint:  had concerns including Leg Pain and Leg Swelling (Patient from home, having pain in right leg and unable to ambulate. Right leg with edema, +3 pitting, and warm to the touch. Denies any injury/falls. ).    History Of Present Illness:    Ms. Gloria Schuler, a 79 y.o. year old female  who  has a past medical history of A-fib (HCC), Anxiety and depression, Arthritis, Artificial cardiac pacemaker, Brain aneurysm, Elevated hemoglobin A1c, Essential tremor, HTN (hypertension), Kidney stones, Lumbar radiculopathy, Shingles, and Ulcer.     This is a 79-year-old female with past medical history of osteoarthritis, brain aneurysm s/p clipping, hypertension, hyperlipidemia, nephrolithiasis, chronic low back pain, cervical spine myelopathy, generalized weakness, recurrent falls, who presented from home with a fall.  Patient stated she was trying to use her walker, slipped, rolled her right ankle.  Unable to bear weight due to pain, denies any falls, head trauma, lightheadedness, dizziness, syncopal episode.  She is accompanied by her daughter, who stated she has been having cervical spine issues and has seen neurology and was being evaluated for possible cervical spine surgery.  Patient denies any acute pains from spine, nonfocal neurological exam.  Right ankle x-ray did show a oblique nondisplaced fracture of distal fibula, placed on right ankle boot, admitted for close observation.  CODE STATUS discussed wishes to be full code.      Past Medical History:        Diagnosis Date    A-fib (HCC)     PACEMAKER AND WATCHMAN    Anxiety and depression     Arthritis     Artificial cardiac pacemaker     Brain aneurysm      COMPARISON: CT of the abdomen and pelvis from 07/21/2024. HISTORY: ORDERING SYSTEM PROVIDED HISTORY: fall/pain TECHNOLOGIST PROVIDED HISTORY: Reason for exam:->fall/pain FINDINGS: There is no sign of fracture or dislocation of the right hip. There is no sign of osteoarthritis of the hip. The left hip is normal in appearance with no sign of dislocation or osteoarthritis. The rest of the visualized bony pelvis is normal in appearance. The visualized inferior lumbar spine is normal in appearance. The bowel gas pattern is unremarkable.     Normal study.     XR ANKLE RIGHT (MIN 3 VIEWS)    Result Date: 11/15/2024  EXAMINATION: THREE XRAY VIEWS OF THE RIGHT ANKLE 11/15/2024 6:46 pm COMPARISON: None. HISTORY: ORDERING SYSTEM PROVIDED HISTORY: fall/pain TECHNOLOGIST PROVIDED HISTORY: Reason for exam:->fall/pain FINDINGS: There is and acute, nondisplaced, oblique fracture of the distal fibula. There is no sign of any associated fracture of the medial malleolus. There is moderate lateral and prominent medial ankle soft tissue swelling with mild swelling throughout the rest of the ankle. The ankle mortise is intact. The talar dome is intact. There is no sign of a joint effusion. There are small plantar and posterior calcaneal spurs. There is mild primary osteoarthritis of the intertarsal articulations.     1. Acute, nondisplaced, oblique fracture of the distal fibula. 2. Moderate lateral and prominent medial ankle soft tissue swelling. 3. Small plantar and posterior calcaneal spurs.     XR KNEE RIGHT (3 VIEWS)    Result Date: 11/15/2024  EXAMINATION: THREE XRAY VIEWS OF THE RIGHT KNEE 11/15/2024 6:46 pm COMPARISON: None. HISTORY: ORDERING SYSTEM PROVIDED HISTORY: fall/pain TECHNOLOGIST PROVIDED HISTORY: Reason for exam:->fall/pain FINDINGS: There are no signs of fracture or dislocation.  The knee joint space and alignment appears preserved.  There signs of chondrocalcinosis.  No significant effusions are identified.  The  position and alignment of the patella appears within normal range.     1. No acute fracture or dislocation. 2. Signs of chondrocalcinosis.       ASSESSMENT and PLAN:      Right distal fibula fracture  Generalized weakness  Recurrent mechanical fall  Cervical myeloradiculopathy  Chronic low back pain  Chronic ambulatory dysfunction  P/w mechanical fall, progressive weakness, denies any head trauma, syncope  Has been awaiting and evaluated by neurosurgery for possible cervical spine Sx  Right ankle fracture showed acute nondisplaced oblique fracture distal fibula  Moderate lateral prominent medial ankle soft tissue swelling.  No cellulitis  Hip x-ray, right knee x-ray was unremarkable. Check RLE ultrasound r/o DVT   Evaluated by orthopedic surgery for the ankle fracture, placed on ankle boot  Fall precautions, PT/OT, pain control, follow-up outpatient neurosurgery    PAF  HTN  HLD  S/p PM, not anticoagulation, asymptomatic,  Resume propranolol, Crestor, aspirin, clonidine, hctz    COPD w/o exacerbation  Active tobacco use  Denies shortness of breath, nicotine replacement, bronchodilators.    Diet: ADULT DIET; Regular; Low Fat/Low Chol/High Fiber/MICHAEL  Code Status: Full Code    Surrogate decision maker confirmed with patient:  Primary Emergency Contact: Milligan,Sherri, Home Phone: [phone]    DVT Prophylaxis: []Lovenox []Heparin [x]PCD [] Warfarin/NOAC []Encouraged ambulation    Disposition: [x]Med/Surg [] Intermediate [] ICU/CCU  Admit status: [] Observation [x] Inpatient     +++++++++++++++++++++++++++++++++++++++++++++++++  Dr. LOVE [facility] - Hospitalist  [place], [place]  +++++++++++++++++++++++++++++++++++++++++++++++++  NOTE: This report was transcribed using voice recognition software. Every effort was made to ensure accuracy; however, inadvertent computerized transcription errors may be present.

## 2024-11-16 NOTE — PROGRESS NOTES
OCCUPATIONAL THERAPY INITIAL EVALUATION    [facility] [address] [place], [place]       Date:2024                                                  Patient Name: Gloria Schuler  MRN: [id number]  : 1945  Room: [address] [place] [address]-A    Evaluating OT: Kodi Kee OTR/L [id number]    Referring Provider: Teressa Modi DO      Specific Provider Orders/Date: OT evaluation and treatment 11/15/24 6717    Diagnosis:  Ambulatory dysfunction [R26.2]  Closed right ankle fracture, initial encounter [S82.553Q]      Pertinent Medical History:  has a past medical history of A-fib (HCC), Anxiety and depression, Arthritis, Artificial cardiac pacemaker, Brain aneurysm, Elevated hemoglobin A1c, Essential tremor, HTN (hypertension), Kidney stones, Lumbar radiculopathy, Shingles, and Ulcer.   Past Surgical History:   Procedure Laterality Date    BRAIN ANEURYSM SURGERY      clipping    CARDIAC ASSIST DEVICE INSERTION  2020    Watchman inserted     CARPAL TUNNEL RELEASE      pinched nerve in left arm, had surgery    CATARACT REMOVAL WITH IMPLANT Right 2016    COLONOSCOPY W/ POLYPECTOMY      ENDOSCOPY, COLON, DIAGNOSTIC      HEMORRHOID SURGERY      HYSTERECTOMY (CERVIX STATUS UNKNOWN)      INTRACAPSULAR CATARACT EXTRACTION Left 10/12/2021    LEFT CATARACT EXTRACTION WITH IOL performed by Wally TSANG MD at [facility] OR    LITHOTRIPSY      PACEMAKER INSERTION N/A 2019    PACEMAKER INSERTION ([facility]) performed by Brooks Hahn MD at [facility] OR       Pt admitted to the hospital 11/15 with R ankle pain   Per ortho : Closed, right Gaytan B lateral malleolus fracture -nonweightbearing status in a cam boot to the right lower extremity.   Orders received, chart reviewed, eval complete.     Precautions:  Fall Risk, Nwbing RLE, cam boot     Assessment of current deficits   [x] Functional mobility   [x]ADLs  [x] Strength               []Cognition      Comments:   RN cleared patient for OT.  Upon arrival, patient was long sitting in bed  and agreeable to OT session. no visitors present throughout session . At end of session, patient  laying on R side, alarm activated ; with call light and phone within reach; all lines and tubes intact.   Patient presents with decreased safety awareness, activity tolerance , balance , and strength . Pt demonstrated decreased independence during ADLs, bed mobility , functional transfers, and functional mobility. Pt would benefit from continued skilled OT to increase safety and independence with completion of ADL tasks and functional mobility for improved quality of life and return to OF.       Treatment: OT treatment provided this date includes:  OT edu pt/family on role of OT in the acute care setting. Pt  verbalized understanding   Therapist facilitated and instructed pt on adapted techniques & compensatory strategies to improve safety and independence with ADLs, bed mobility , functional transfers, and functional mobility as noted above to allow pt to achieve highest level of independence and safely. Pt provided  min cuing , verbal instructions , extended time , and encouragement  in order to promote maximum level of independence.   Pt edu on OT recommendation for continued skilled therapy in the inpatient setting in order to promote safety, strength, and participation in daily routines.   Pt edu on use of call light and waiting until staff present to attempt any functional mobility. Pt verbalized understanding and demonstrated ability to locate and press call button.     Rehab Potential: Good  for established goals     Patient / Family Goal: to get better /go home     Patient and/or family were instructed on functional diagnosis, prognosis/goals and OT plan of care. Pt/family demonstrated understanding.      Eval Complexity:      Description  Performance deficits  Clinical decision making  Co-morbidities affecting  occupational performance  Modification or assistance to complete eval    Low Complexity   1 to 3 []  Low []  None []  None []   Moderate Complexity   3 to 5 [x]  Mod []  Maybe []  Min to Mod [x]   High Complexity   5 or more []  High [x]  Yes [x]  Max []     The above evaluation is classified as moderate  complexity based off the noted performance deficits, personal factors, co-morbidities, assistance required, and other factors as noted in the clinical evaluation and functional testing.     Time In: 0756  Time Out: 0820  Total Treatment Time: 9 minutes    Min Units   OT Eval Low 18142       OT Eval Moderate 97166  x 1   OT Eval High 69124       OT Re-Eval 65942       Therapeutic Ex 03979       Therapeutic Activities 98190      ADL/Self Care 76685  9 1   Orthotic Management 52957       Neuro Re-Ed 78861       Non-Billable Time          Evaluation Time includes thorough review of current medical information, gathering information on past medical history/social history and prior level of function, completion of standardized testing/informal observation of tasks, assessment of data and education on plan of care and goals.          Kodi Kee OTR/L [id number]

## 2024-11-16 NOTE — PROGRESS NOTES
[facility] Hospitalist Progress Note    Admitting Date and Time: 11/15/2024  5:51 PM  Admit Dx: Ambulatory dysfunction [R26.2]  Closed right ankle fracture, initial encounter [S82.585R]    Subjective:  Patient is being followed for Ambulatory dysfunction [R26.2]  Closed right ankle fracture, initial encounter [S82.582I]     Patient was seen at bedside.  Patient daughter was present at the bedside as well.   She is complaining of whole body pain mostly on the upper back, associated with numbness and tingling.   Since last 2 months they said it is getting worse.  She is using a walker    ROS: denies fever, chills, cp, sob, n/v, HA unless stated above.      sodium chloride  500 mL IntraVENous Once    sodium chloride flush  5-40 mL IntraVENous 2 times per day    enoxaparin  40 mg SubCUTAneous Daily    rosuvastatin  5 mg Oral Daily    propranolol  10 mg Oral Daily    PARoxetine  10 mg Oral Daily    potassium chloride  20 mEq Oral TID    hydroCHLOROthiazide  25 mg Oral Daily    folic acid  1,000 mcg Oral Daily    Vitamin D  1,000 Units Oral Daily    aspirin  81 mg Oral Daily    nicotine  1 patch TransDERmal Daily     ibuprofen, 400 mg, Q6H PRN  sodium chloride flush, 5-40 mL, PRN  sodium chloride, , PRN  potassium chloride, 40 mEq, PRN   Or  potassium alternative oral replacement, 40 mEq, PRN   Or  potassium chloride, 10 mEq, PRN  magnesium sulfate, 2,000 mg, PRN  ondansetron, 4 mg, Q8H PRN   Or  ondansetron, 4 mg, Q6H PRN  polyethylene glycol, 17 g, Daily PRN  acetaminophen, 650 mg, Q6H PRN   Or  acetaminophen, 650 mg, Q6H PRN  cloNIDine, 0.1 mg, Q8H PRN  oxyCODONE, 5 mg, Q4H PRN         Objective:    /64   Pulse 63   Temp (!) 96.2 °F (35.7 °C) (Temporal)   Resp 18   Ht 1.626 m (5' 4\")   Wt 79.4 kg (175 lb)   SpO2 96%   BMI 30.04 kg/m²     General Appearance: alert and oriented to person, place and time and in no acute distress  Skin: warm and dry  Head: normocephalic and atraumatic  Eyes: pupils  equal, round, and reactive to light, extraocular eye movements intact, conjunctivae normal  Neck: neck supple and non tender without mass   Pulmonary/Chest: clear to auscultation bilaterally- no wheezes, rales or rhonchi, normal air movement, no respiratory distress  Cardiovascular: normal rate, normal S1 and S2 and no carotid bruits  Abdomen: soft, non-tender, non-distended, normal bowel sounds, no masses or organomegaly  Extremities: no cyanosis, no clubbing and +2 pitting edema on right lower extremity.  Neurologic: no cranial nerve deficit and speech normal        Recent Labs     11/15/24  2020 11/16/24  0649    141   K 3.8 4.0    105   CO2 33* 28   BUN 29* 21   CREATININE 0.7 0.6   GLUCOSE 172* 153*   CALCIUM 10.4* 9.4       Recent Labs     11/15/24  2020 11/16/24  0649   WBC 7.3 7.2   RBC 4.44 4.29   HGB 15.0 14.3   HCT 44.3 43.1   MCV 99.8 100.5*   MCH 33.8 33.3   MCHC 33.9 33.2   RDW 12.6 12.6    224   MPV 10.9 10.4       Radiology: Reviewed    Assessment:    Principal Problem:    Ambulatory dysfunction  Active Problems:    Closed right ankle fracture, initial encounter  Resolved Problems:    * No resolved hospital problems. *    Assessment and plan:    Cervical myeloradiculopathy  Chronic low back pain  Chronic ambulatory dysfunction, current fall  Right distal fibular fracture  Permanent A-fib  Hypertension hyperlipidemia  COPD    -CT thoracic and lumbar spine reviewed  -Patient was following in  for back pain  -Neurosurgery is consulted as patient has appointment with them on end of this month  -On pain meds as needed  -Right distal fibula fracture, orthopedic consulted and plan for conservative management.  Need to follow-up in 2 weeks with x-ray  -PT OT consulted  -Continue with home medication    NOTE: This report was transcribed using voice recognition software. Every effort was made to ensure accuracy; however, inadvertent computerized transcription errors may be

## 2024-11-17 ENCOUNTER — APPOINTMENT (OUTPATIENT)
Dept: CT IMAGING | Age: 79
DRG: 563 | End: 2024-11-17
Attending: NEUROLOGICAL SURGERY
Payer: MEDICARE

## 2024-11-17 PROBLEM — R26.81 GAIT INSTABILITY: Status: ACTIVE | Noted: 2024-11-17

## 2024-11-17 PROCEDURE — 6360000002 HC RX W HCPCS: Performed by: INTERNAL MEDICINE

## 2024-11-17 PROCEDURE — 99222 1ST HOSP IP/OBS MODERATE 55: CPT | Performed by: INTERNAL MEDICINE

## 2024-11-17 PROCEDURE — 6360000004 HC RX CONTRAST MEDICATION: Performed by: RADIOLOGY

## 2024-11-17 PROCEDURE — 6370000000 HC RX 637 (ALT 250 FOR IP)

## 2024-11-17 PROCEDURE — 2580000003 HC RX 258: Performed by: INTERNAL MEDICINE

## 2024-11-17 PROCEDURE — 1200000000 HC SEMI PRIVATE

## 2024-11-17 PROCEDURE — 6370000000 HC RX 637 (ALT 250 FOR IP): Performed by: INTERNAL MEDICINE

## 2024-11-17 PROCEDURE — 99222 1ST HOSP IP/OBS MODERATE 55: CPT | Performed by: NEUROLOGICAL SURGERY

## 2024-11-17 PROCEDURE — 72127 CT NECK SPINE W/O & W/DYE: CPT

## 2024-11-17 RX ORDER — IOPAMIDOL 755 MG/ML
70 INJECTION, SOLUTION INTRAVASCULAR
Status: COMPLETED | OUTPATIENT
Start: 2024-11-17 | End: 2024-11-17

## 2024-11-17 RX ADMIN — SODIUM CHLORIDE, PRESERVATIVE FREE 10 ML: 5 INJECTION INTRAVENOUS at 08:54

## 2024-11-17 RX ADMIN — ACETAMINOPHEN 650 MG: 325 TABLET ORAL at 14:35

## 2024-11-17 RX ADMIN — ENOXAPARIN SODIUM 40 MG: 100 INJECTION SUBCUTANEOUS at 08:50

## 2024-11-17 RX ADMIN — SODIUM CHLORIDE, PRESERVATIVE FREE 10 ML: 5 INJECTION INTRAVENOUS at 20:08

## 2024-11-17 RX ADMIN — Medication 1000 UNITS: at 08:52

## 2024-11-17 RX ADMIN — HYDROCHLOROTHIAZIDE 25 MG: 25 TABLET ORAL at 08:52

## 2024-11-17 RX ADMIN — POTASSIUM CHLORIDE 20 MEQ: 1500 TABLET, EXTENDED RELEASE ORAL at 08:58

## 2024-11-17 RX ADMIN — IBUPROFEN 400 MG: 400 TABLET, FILM COATED ORAL at 03:20

## 2024-11-17 RX ADMIN — OXYCODONE HYDROCHLORIDE 5 MG: 5 TABLET ORAL at 19:38

## 2024-11-17 RX ADMIN — ACETAMINOPHEN 650 MG: 325 TABLET ORAL at 08:51

## 2024-11-17 RX ADMIN — FOLIC ACID 1000 MCG: 1 TABLET ORAL at 08:53

## 2024-11-17 RX ADMIN — IOPAMIDOL 70 ML: 755 INJECTION, SOLUTION INTRAVENOUS at 10:49

## 2024-11-17 RX ADMIN — OXYCODONE HYDROCHLORIDE 5 MG: 5 TABLET ORAL at 00:50

## 2024-11-17 RX ADMIN — PAROXETINE 10 MG: 10 TABLET, FILM COATED ORAL at 08:53

## 2024-11-17 RX ADMIN — PROPRANOLOL HYDROCHLORIDE 10 MG: 10 TABLET ORAL at 08:53

## 2024-11-17 RX ADMIN — ROSUVASTATIN 5 MG: 10 TABLET, FILM COATED ORAL at 08:53

## 2024-11-17 RX ADMIN — POTASSIUM CHLORIDE 20 MEQ: 1500 TABLET, EXTENDED RELEASE ORAL at 14:31

## 2024-11-17 RX ADMIN — POTASSIUM CHLORIDE 20 MEQ: 1500 TABLET, EXTENDED RELEASE ORAL at 20:08

## 2024-11-17 RX ADMIN — ASPIRIN 81 MG: 81 TABLET, COATED ORAL at 08:52

## 2024-11-17 RX ADMIN — ACETAMINOPHEN 650 MG: 325 TABLET ORAL at 03:20

## 2024-11-17 RX ADMIN — OXYCODONE HYDROCHLORIDE 5 MG: 5 TABLET ORAL at 08:52

## 2024-11-17 RX ADMIN — OXYCODONE HYDROCHLORIDE 5 MG: 5 TABLET ORAL at 14:31

## 2024-11-17 ASSESSMENT — PAIN SCALES - GENERAL
PAINLEVEL_OUTOF10: 8
PAINLEVEL_OUTOF10: 7
PAINLEVEL_OUTOF10: 5
PAINLEVEL_OUTOF10: 6
PAINLEVEL_OUTOF10: 9
PAINLEVEL_OUTOF10: 2
PAINLEVEL_OUTOF10: 0
PAINLEVEL_OUTOF10: 2
PAINLEVEL_OUTOF10: 4
PAINLEVEL_OUTOF10: 2
PAINLEVEL_OUTOF10: 7
PAINLEVEL_OUTOF10: 2

## 2024-11-17 ASSESSMENT — PAIN - FUNCTIONAL ASSESSMENT
PAIN_FUNCTIONAL_ASSESSMENT: PREVENTS OR INTERFERES SOME ACTIVE ACTIVITIES AND ADLS
PAIN_FUNCTIONAL_ASSESSMENT: PREVENTS OR INTERFERES SOME ACTIVE ACTIVITIES AND ADLS
PAIN_FUNCTIONAL_ASSESSMENT: PREVENTS OR INTERFERES WITH MANY ACTIVE NOT PASSIVE ACTIVITIES

## 2024-11-17 ASSESSMENT — PAIN DESCRIPTION - ORIENTATION
ORIENTATION: RIGHT

## 2024-11-17 ASSESSMENT — PAIN DESCRIPTION - DESCRIPTORS
DESCRIPTORS: ACHING
DESCRIPTORS: ACHING
DESCRIPTORS: ACHING;THROBBING;SHARP
DESCRIPTORS: ACHING
DESCRIPTORS: ACHING;THROBBING;SPASM

## 2024-11-17 ASSESSMENT — PAIN DESCRIPTION - PAIN TYPE
TYPE: ACUTE PAIN

## 2024-11-17 ASSESSMENT — PAIN DESCRIPTION - LOCATION
LOCATION: LEG
LOCATION: ANKLE
LOCATION: LEG

## 2024-11-17 ASSESSMENT — PAIN DESCRIPTION - FREQUENCY
FREQUENCY: INTERMITTENT
FREQUENCY: INTERMITTENT
FREQUENCY: CONTINUOUS

## 2024-11-17 ASSESSMENT — PAIN DESCRIPTION - ONSET
ONSET: ON-GOING

## 2024-11-17 NOTE — PLAN OF CARE
Problem: Discharge Planning  Goal: Discharge to home or other facility with appropriate resources  Outcome: Progressing     Problem: Pain  Goal: Verbalizes/displays adequate comfort level or baseline comfort level  Outcome: Progressing  Flowsheets (Taken 11/16/2024 2015)  Verbalizes/displays adequate comfort level or baseline comfort level: Encourage patient to monitor pain and request assistance     Problem: Safety - Adult  Goal: Free from fall injury  Outcome: Progressing     Problem: ABCDS Injury Assessment  Goal: Absence of physical injury  Outcome: Progressing

## 2024-11-17 NOTE — PLAN OF CARE
Problem: Discharge Planning  Goal: Discharge to home or other facility with appropriate resources  11/17/2024 1003 by Angela Tenorio RN  Outcome: Progressing  11/17/2024 0126 by Rosendo Tidwell RN  Outcome: Progressing     Problem: Pain  Goal: Verbalizes/displays adequate comfort level or baseline comfort level  11/17/2024 1003 by Angela Tenorio RN  Outcome: Progressing  11/17/2024 0126 by Rosendo Tidwell RN  Outcome: Progressing  Flowsheets (Taken 11/16/2024 2015)  Verbalizes/displays adequate comfort level or baseline comfort level: Encourage patient to monitor pain and request assistance     Problem: Safety - Adult  Goal: Free from fall injury  11/17/2024 1003 by Angela Tenorio RN  Outcome: Progressing  11/17/2024 0126 by Rosendo Tidwell RN  Outcome: Progressing     Problem: ABCDS Injury Assessment  Goal: Absence of physical injury  11/17/2024 1003 by Angela Tenorio RN  Outcome: Progressing  11/17/2024 0126 by Rosendo Tidwell RN  Outcome: Progressing

## 2024-11-17 NOTE — PROGRESS NOTES
[facility] Hospitalist Progress Note    Admitting Date and Time: 11/15/2024  5:51 PM  Admit Dx: Ambulatory dysfunction [R26.2]  Closed right ankle fracture, initial encounter [S82.262K]    Subjective:  Patient is being followed for Ambulatory dysfunction [R26.2]  Closed right ankle fracture, initial encounter [S82.309K]   Pt feels having pain in number both of her upper extremity, denies chest pain or shortness of breath.    ROS: denies fever, chills, cp, sob, n/v, HA unless stated above.      sodium chloride  500 mL IntraVENous Once    sodium chloride flush  5-40 mL IntraVENous 2 times per day    enoxaparin  40 mg SubCUTAneous Daily    rosuvastatin  5 mg Oral Daily    propranolol  10 mg Oral Daily    PARoxetine  10 mg Oral Daily    potassium chloride  20 mEq Oral TID    hydroCHLOROthiazide  25 mg Oral Daily    folic acid  1,000 mcg Oral Daily    Vitamin D  1,000 Units Oral Daily    aspirin  81 mg Oral Daily    nicotine  1 patch TransDERmal Daily     ibuprofen, 400 mg, Q6H PRN  sodium chloride flush, 5-40 mL, PRN  sodium chloride, , PRN  potassium chloride, 40 mEq, PRN   Or  potassium alternative oral replacement, 40 mEq, PRN   Or  potassium chloride, 10 mEq, PRN  magnesium sulfate, 2,000 mg, PRN  ondansetron, 4 mg, Q8H PRN   Or  ondansetron, 4 mg, Q6H PRN  polyethylene glycol, 17 g, Daily PRN  acetaminophen, 650 mg, Q6H PRN   Or  acetaminophen, 650 mg, Q6H PRN  cloNIDine, 0.1 mg, Q8H PRN  oxyCODONE, 5 mg, Q4H PRN         Objective:    /60   Pulse 64   Temp (!) 96.4 °F (35.8 °C) (Temporal)   Resp 16   Ht 1.626 m (5' 4\")   Wt 79.4 kg (175 lb)   SpO2 98%   BMI 30.04 kg/m²     General Appearance: alert and oriented to person, place and time and in no acute distress  Skin: warm and dry  Head: normocephalic and atraumatic  Eyes: pupils equal, round, and reactive to light, extraocular eye movements intact, conjunctivae normal  Neck: neck supple and non tender without mass   Pulmonary/Chest: clear to  auscultation bilaterally- no wheezes, rales or rhonchi, normal air movement, no respiratory distress  Cardiovascular: normal rate, normal S1 and S2 and no carotid bruits  Abdomen: soft, non-tender, non-distended, normal bowel sounds, no masses or organomegaly  Extremities: no cyanosis, no clubbing and no edema  Neurologic: no cranial nerve deficit and speech normal        Recent Labs     11/15/24  2020 11/16/24  0649    141   K 3.8 4.0    105   CO2 33* 28   BUN 29* 21   CREATININE 0.7 0.6   GLUCOSE 172* 153*   CALCIUM 10.4* 9.4       Recent Labs     11/15/24  2020 11/16/24  0649   WBC 7.3 7.2   RBC 4.44 4.29   HGB 15.0 14.3   HCT 44.3 43.1   MCV 99.8 100.5*   MCH 33.8 33.3   MCHC 33.9 33.2   RDW 12.6 12.6    224   MPV 10.9 10.4       Radiology:     Assessment:    Principal Problem:    Ambulatory dysfunction  Active Problems:    Closed right ankle fracture, initial encounter    Cervical radiculopathy    Chronic back pain    Gait instability  Resolved Problems:    * No resolved hospital problems. *      Plan:  1.  Cervical myeloradiculopathy: Neurosurgery on the case, CT cervical spine done today shows moderate to severe central canal stenosis and foraminal stenosis.  2.  Chronic low back pain: Pain medication as needed.  3.  Ambulatory dysfunction: PT OT  4.  Right distal fibular fracture: Orthopedic consulted and conservative management, pain medication as needed      NOTE: This report was transcribed using voice recognition software. Every effort was made to ensure accuracy; however, inadvertent computerized transcription errors may be present.  Electronically signed by BRITTANY HELMS MD on 11/17/2024 at 2:26 PM

## 2024-11-17 NOTE — CONSULTS
[facility]              [address] [place] [address]                              CONSULTATION      PATIENT NAME: CHANDA HUNTER           : 1945  MED REC NO: [id number]                        ROOM: 5405  ACCOUNT NO: [id number]                       ADMIT DATE: 11/15/2024  PROVIDER: Floyd Briggs MD    NEUROSURGERY CONSULT    CONSULT DATE: 2024      REASON FOR CONSULT:    1. Gait instability.  2. Possible cervical stenosis.    HISTORY OF PRESENT ILLNESS:  The patient is a 79-year-old lady who presented to the hospital with generalized pain and whole-body numbness and weakness in her bilateral upper and lower extremities.  It has been going on since approximately , and she also complains of loss of balance.  She was seen at [facility], where a CT myelogram of her cervical spine was obtained, but did not show any significant cervical stenosis.  She continues to have the same symptoms at this time and states that she had 3 myelograms at [facility] and one of them was a cervical tap with that did make her symptoms worse.    PAST MEDICAL HISTORY:  Positive for atrial fibrillation, anxiety and depression, arthritis, pacemaker placement, essential tremors, and hypertension.    PAST SURGICAL HISTORY:  Positive for aneurysm clipping in , Watchman insertion, cataract removal, hemorrhoid surgery, hysterectomy, lithotripsy, and pacemaker insertion.    FAMILY HISTORY:  Positive for dementia in her mother and heart disease in her father.    SOCIAL HISTORY:  Positive for tobacco use.    ALLERGIES:  INCLUDE ACYCLOVIR, METHOCARBAMOL, PHENYTOIN, PRIMIDONE, SULFA, CORTICOSTEROIDS, DILANTIN.      REVIEW OF SYSTEMS:  HEENT:  Positive for headache, but negative for double vision or blurred vision.  CARDIOVASCULAR:  Positive for irregular heart rate.  RESPIRATORY:  Negative for shortness of breath, asthma, bronchitis, or  did not identify any severe stenosis.  At this point in time, she is unable to get an MRI and it is unclear that if fourth myelogram will be beneficial.  I would probably just order a CT scan of her cervical spine just to make sure that no new developments have occurred since her last myelogram 3 months ago.          SAMMI PARISI MD      D:  11/17/2024 08:02:13     T:  11/17/2024 08:30:11     NAVEEN/WILLIAM  Job #:  [id number]     Doc#:  [id number]

## 2024-11-18 ENCOUNTER — TELEPHONE (OUTPATIENT)
Age: 79
End: 2024-11-18

## 2024-11-18 PROCEDURE — 1200000000 HC SEMI PRIVATE

## 2024-11-18 PROCEDURE — 97112 NEUROMUSCULAR REEDUCATION: CPT

## 2024-11-18 PROCEDURE — 6370000000 HC RX 637 (ALT 250 FOR IP)

## 2024-11-18 PROCEDURE — 97535 SELF CARE MNGMENT TRAINING: CPT

## 2024-11-18 PROCEDURE — 6370000000 HC RX 637 (ALT 250 FOR IP): Performed by: INTERNAL MEDICINE

## 2024-11-18 PROCEDURE — 2580000003 HC RX 258: Performed by: INTERNAL MEDICINE

## 2024-11-18 PROCEDURE — 99232 SBSQ HOSP IP/OBS MODERATE 35: CPT | Performed by: NEUROLOGICAL SURGERY

## 2024-11-18 PROCEDURE — 99232 SBSQ HOSP IP/OBS MODERATE 35: CPT | Performed by: INTERNAL MEDICINE

## 2024-11-18 PROCEDURE — 6360000002 HC RX W HCPCS: Performed by: INTERNAL MEDICINE

## 2024-11-18 PROCEDURE — 99222 1ST HOSP IP/OBS MODERATE 55: CPT | Performed by: INTERNAL MEDICINE

## 2024-11-18 RX ORDER — LIDOCAINE 4 G/G
2 PATCH TOPICAL DAILY
Status: DISCONTINUED | OUTPATIENT
Start: 2024-11-18 | End: 2024-11-20

## 2024-11-18 RX ADMIN — IBUPROFEN 400 MG: 400 TABLET, FILM COATED ORAL at 15:56

## 2024-11-18 RX ADMIN — OXYCODONE HYDROCHLORIDE 5 MG: 5 TABLET ORAL at 04:51

## 2024-11-18 RX ADMIN — OXYCODONE HYDROCHLORIDE 5 MG: 5 TABLET ORAL at 00:45

## 2024-11-18 RX ADMIN — IBUPROFEN 400 MG: 400 TABLET, FILM COATED ORAL at 08:47

## 2024-11-18 RX ADMIN — ACETAMINOPHEN 650 MG: 325 TABLET ORAL at 15:56

## 2024-11-18 RX ADMIN — OXYCODONE HYDROCHLORIDE 5 MG: 5 TABLET ORAL at 08:47

## 2024-11-18 RX ADMIN — IBUPROFEN 400 MG: 400 TABLET, FILM COATED ORAL at 22:05

## 2024-11-18 RX ADMIN — ROSUVASTATIN 5 MG: 10 TABLET, FILM COATED ORAL at 08:19

## 2024-11-18 RX ADMIN — SODIUM CHLORIDE, PRESERVATIVE FREE 10 ML: 5 INJECTION INTRAVENOUS at 21:18

## 2024-11-18 RX ADMIN — SODIUM CHLORIDE, PRESERVATIVE FREE 10 ML: 5 INJECTION INTRAVENOUS at 08:35

## 2024-11-18 RX ADMIN — POTASSIUM CHLORIDE 20 MEQ: 1500 TABLET, EXTENDED RELEASE ORAL at 08:18

## 2024-11-18 RX ADMIN — ENOXAPARIN SODIUM 40 MG: 100 INJECTION SUBCUTANEOUS at 08:34

## 2024-11-18 RX ADMIN — IBUPROFEN 400 MG: 400 TABLET, FILM COATED ORAL at 01:49

## 2024-11-18 RX ADMIN — POTASSIUM CHLORIDE 20 MEQ: 1500 TABLET, EXTENDED RELEASE ORAL at 21:17

## 2024-11-18 RX ADMIN — OXYCODONE HYDROCHLORIDE 5 MG: 5 TABLET ORAL at 17:08

## 2024-11-18 RX ADMIN — HYDROCHLOROTHIAZIDE 25 MG: 25 TABLET ORAL at 08:18

## 2024-11-18 RX ADMIN — POTASSIUM CHLORIDE 20 MEQ: 1500 TABLET, EXTENDED RELEASE ORAL at 13:06

## 2024-11-18 RX ADMIN — ACETAMINOPHEN 650 MG: 325 TABLET ORAL at 08:47

## 2024-11-18 RX ADMIN — ACETAMINOPHEN 650 MG: 325 TABLET ORAL at 22:04

## 2024-11-18 RX ADMIN — OXYCODONE HYDROCHLORIDE 5 MG: 5 TABLET ORAL at 13:07

## 2024-11-18 RX ADMIN — PROPRANOLOL HYDROCHLORIDE 10 MG: 10 TABLET ORAL at 08:35

## 2024-11-18 RX ADMIN — OXYCODONE HYDROCHLORIDE 5 MG: 5 TABLET ORAL at 21:19

## 2024-11-18 RX ADMIN — PAROXETINE 10 MG: 10 TABLET, FILM COATED ORAL at 08:35

## 2024-11-18 RX ADMIN — Medication 1000 UNITS: at 08:18

## 2024-11-18 RX ADMIN — ACETAMINOPHEN 650 MG: 325 TABLET ORAL at 01:49

## 2024-11-18 RX ADMIN — ASPIRIN 81 MG: 81 TABLET, COATED ORAL at 08:18

## 2024-11-18 RX ADMIN — FOLIC ACID 1000 MCG: 1 TABLET ORAL at 08:19

## 2024-11-18 ASSESSMENT — PAIN - FUNCTIONAL ASSESSMENT
PAIN_FUNCTIONAL_ASSESSMENT: INTOLERABLE, UNABLE TO DO ANY ACTIVE OR PASSIVE ACTIVITIES
PAIN_FUNCTIONAL_ASSESSMENT: PREVENTS OR INTERFERES SOME ACTIVE ACTIVITIES AND ADLS
PAIN_FUNCTIONAL_ASSESSMENT: PREVENTS OR INTERFERES SOME ACTIVE ACTIVITIES AND ADLS
PAIN_FUNCTIONAL_ASSESSMENT: INTOLERABLE, UNABLE TO DO ANY ACTIVE OR PASSIVE ACTIVITIES
PAIN_FUNCTIONAL_ASSESSMENT: PREVENTS OR INTERFERES SOME ACTIVE ACTIVITIES AND ADLS
PAIN_FUNCTIONAL_ASSESSMENT: PREVENTS OR INTERFERES WITH MANY ACTIVE NOT PASSIVE ACTIVITIES
PAIN_FUNCTIONAL_ASSESSMENT: INTOLERABLE, UNABLE TO DO ANY ACTIVE OR PASSIVE ACTIVITIES
PAIN_FUNCTIONAL_ASSESSMENT: PREVENTS OR INTERFERES SOME ACTIVE ACTIVITIES AND ADLS

## 2024-11-18 ASSESSMENT — PAIN DESCRIPTION - DESCRIPTORS
DESCRIPTORS: ACHING;DULL;DISCOMFORT
DESCRIPTORS: ACHING;PRESSURE;DULL
DESCRIPTORS: ACHING
DESCRIPTORS: ACHING;DULL;BURNING
DESCRIPTORS: BURNING;ACHING;CRUSHING
DESCRIPTORS: ACHING;DULL;JABBING
DESCRIPTORS: ACHING;DISCOMFORT;SORE;SPASM
DESCRIPTORS: ACHING;DULL;JABBING

## 2024-11-18 ASSESSMENT — PAIN SCALES - GENERAL
PAINLEVEL_OUTOF10: 5
PAINLEVEL_OUTOF10: 8
PAINLEVEL_OUTOF10: 6
PAINLEVEL_OUTOF10: 7
PAINLEVEL_OUTOF10: 10
PAINLEVEL_OUTOF10: 10
PAINLEVEL_OUTOF10: 0
PAINLEVEL_OUTOF10: 8
PAINLEVEL_OUTOF10: 9
PAINLEVEL_OUTOF10: 5
PAINLEVEL_OUTOF10: 8
PAINLEVEL_OUTOF10: 0
PAINLEVEL_OUTOF10: 8
PAINLEVEL_OUTOF10: 5
PAINLEVEL_OUTOF10: 8

## 2024-11-18 ASSESSMENT — PAIN DESCRIPTION - LOCATION
LOCATION: GENERALIZED
LOCATION: BACK;LEG
LOCATION: GENERALIZED
LOCATION: SHOULDER;BACK
LOCATION: GENERALIZED
LOCATION: BACK;LEG
LOCATION: BACK;LEG
LOCATION: BACK;FOOT;NECK
LOCATION: KNEE

## 2024-11-18 ASSESSMENT — PAIN DESCRIPTION - ONSET
ONSET: ON-GOING

## 2024-11-18 ASSESSMENT — PAIN DESCRIPTION - FREQUENCY
FREQUENCY: CONTINUOUS

## 2024-11-18 ASSESSMENT — PAIN DESCRIPTION - PAIN TYPE
TYPE: ACUTE PAIN
TYPE: CHRONIC PAIN

## 2024-11-18 ASSESSMENT — PAIN DESCRIPTION - ORIENTATION
ORIENTATION: LEFT;RIGHT
ORIENTATION: RIGHT;LEFT
ORIENTATION: RIGHT
ORIENTATION: RIGHT;LEFT
ORIENTATION: LEFT;RIGHT
ORIENTATION: RIGHT;LEFT

## 2024-11-18 ASSESSMENT — PAIN SCALES - WONG BAKER
WONGBAKER_NUMERICALRESPONSE: HURTS A LITTLE BIT

## 2024-11-18 NOTE — TELEPHONE ENCOUNTER
Pt's daughter called in stating wanting mother to transfer care from [facility] to [facility], which you ok'd but wanted EMG first. Pt had EMG scheduled w/ you 11/1/24 but cancelled because she was scheduled w/ [facility] 10/31/24 for EMG.     Pt's daughter states they did not keep that appt due to it being difficult to get pt out of house.     Daughter states pt finally got EMG through [facility] and was advised to see Dr Briggs because it was a neurosurgery problem.  reviewed pt's chart w/ daughter and stated that it is not a neurosurgery problem, needs to see neurologist.     Daughter is now asking if you will accept EMG report from [facility] and will see pt on 12/26/24

## 2024-11-18 NOTE — PROGRESS NOTES
Occupational Therapy  OT BEDSIDE TREATMENT NOTE   [facility]  [address], [place]       Date:2024  Patient Name: Gloria Schuler  MRN: [id number]  : 1945  Room: [address] [place] [address]A     Per OT Eval:  Evaluating OT: Kodi Kee OTR/L [id number]     Referring Provider: Teressa Modi DO                                    Specific Provider Orders/Date: OT evaluation and treatment 11/15/24 6009     Diagnosis:  Ambulatory dysfunction [R26.2]  Closed right ankle fracture, initial encounter [K41.044W]       Pertinent Medical History:  has a past medical history of A-fib (HCC), Anxiety and depression, Arthritis, Artificial cardiac pacemaker, Brain aneurysm, Elevated hemoglobin A1c, Essential tremor, HTN (hypertension), Kidney stones, Lumbar radiculopathy, Shingles, and Ulcer.   Past Surgical History         Past Surgical History:   Procedure Laterality Date    BRAIN ANEURYSM SURGERY        clipping    CARDIAC ASSIST DEVICE INSERTION   2020     Watchman inserted     CARPAL TUNNEL RELEASE         pinched nerve in left arm, had surgery    CATARACT REMOVAL WITH IMPLANT Right 2016    COLONOSCOPY W/ POLYPECTOMY        ENDOSCOPY, COLON, DIAGNOSTIC        HEMORRHOID SURGERY        HYSTERECTOMY (CERVIX STATUS UNKNOWN)        INTRACAPSULAR CATARACT EXTRACTION Left 10/12/2021     LEFT CATARACT EXTRACTION WITH IOL performed by Wally TSANG MD at [facility] OR    LITHOTRIPSY        PACEMAKER INSERTION N/A 2019     PACEMAKER INSERTION ([facility]) performed by Brooks Hahn MD at [facility] OR            Pt admitted to the hospital 11/15 with R ankle pain   Per ortho : Closed, right Gaytan B lateral malleolus fracture -nonweightbearing status in a cam boot to the right lower extremity.   Orders received, chart reviewed, eval complete.      Precautions:  Fall Risk, Nwbing RLE, cam boot      Assessment of current deficits   [x]  functional mobility with standard walker vs transport chair   Falls: yes   Driving: no   Occupation/leisure: did not state      Pleasant and cooperative throughout session        Pain Level: did not rate pain when questioned  and tolerable for treatment   Location: R ankle   OT provided: repositioning, diversion , and edu on compensatory strategies      Cognition: A&O: 4/4;               Follows single step directions: Good              Memory: Good              Sequencing: Fair+              Problem solving: Fair              Judgement/safety: Fair              Attention:  Good      Functional Assessment: AM-PAC Inpatient Daily Activity Raw Score: 14 /24       Initial Eval Status  Date: 11/16/2024   Treatment Status  Date:11/18/24 STG=LTG  Time frame: 10-14 days   Feeding Set up    Seated at EOB with family  Independent   Grooming Min A     Min A  Seated at EOB Set up   UB Dressing Min A   Gown   CGA  Gown management from seated position Set up   LB Dressing Total/Dependent  Socks and boot  LB dressing task to don/cam boot /sock with total A /education on use of cam boot when up Max A   Bathing Max A  simulated Max A  simulated  Mod A    Toileting Max A  Clothing management and sanjay care at commode  Max A   BSC Mod A    Bed Mobility  Rolling L/R: Max A   Supine to sit: Mod A    Sit to supine: Mod A   Bed mobility tasks with mod A +1/education on proper UE placement and tech/^ time to complete/  sup to sit to EOB Supine to sit: Stand by assist   Sit to supine: Stand by assist    Functional Transfers Sit to stand: Mod x2   Stand to sit: Mod x2   Stand pivot: Max x2 with and without FWW EOB><commode   Pt with difficulty maintaining NWBing with FWW    mod a+2   Prior session Mod A    Functional Mobility NT  NT     Balance Sitting: Min A       Standing: Max x2   seated EOB   SBA  Standing   Mod A +2 Sitting: Mod I       Standing: Mod A    Activity Tolerance Fair    F  Good   Visual/  Perceptual wears glasses

## 2024-11-18 NOTE — PROGRESS NOTES
Department of Neurosurgery  Progress Note    CHIEF COMPLAINT: gait dysfunction, numbness, weakness    SUBJECTIVE:  no new concerns    REVIEW OF SYSTEMS :  Constitutional: Negative for chills and fever.    Neurological: Negative for dizziness, tremors and speech change.     OBJECTIVE:   VITALS:  /78   Pulse 78   Temp 97.2 °F (36.2 °C) (Temporal)   Resp 15   Ht 1.626 m (5' 4\")   Wt 79.4 kg (175 lb)   SpO2 100%   BMI 30.04 kg/m²   PHYSICAL:  CONSTITUTIONAL:  awake, alert, cooperative, no apparent distress, and appears stated age    DATA:  CBC:   Lab Results   Component Value Date/Time    WBC 7.2 11/16/2024 06:49 AM    RBC 4.29 11/16/2024 06:49 AM    HGB 14.3 11/16/2024 06:49 AM    HCT 43.1 11/16/2024 06:49 AM    .5 11/16/2024 06:49 AM    MCH 33.3 11/16/2024 06:49 AM    MCHC 33.2 11/16/2024 06:49 AM    RDW 12.6 11/16/2024 06:49 AM     11/16/2024 06:49 AM    MPV 10.4 11/16/2024 06:49 AM     BMP:    Lab Results   Component Value Date/Time     11/16/2024 06:49 AM    K 4.0 11/16/2024 06:49 AM    K 3.7 05/22/2022 11:44 PM     11/16/2024 06:49 AM    CO2 28 11/16/2024 06:49 AM    BUN 21 11/16/2024 06:49 AM    CREATININE 0.6 11/16/2024 06:49 AM    CALCIUM 9.4 11/16/2024 06:49 AM    GFRAA >60 07/14/2022 08:32 AM    LABGLOM >90 11/16/2024 06:49 AM    LABGLOM >60 02/21/2024 09:07 AM    GLUCOSE 153 11/16/2024 06:49 AM     PT/INR:    Lab Results   Component Value Date/Time    PROTIME 11.6 11/16/2024 06:49 AM    INR 1.1 11/16/2024 06:49 AM     PTT:  No results found for: \"APTT\"[APTT}    Current Inpatient Medications  Current Facility-Administered Medications: lidocaine 4 % external patch 2 patch, 2 patch, TransDERmal, Daily  ibuprofen (ADVIL;MOTRIN) tablet 400 mg, 400 mg, Oral, Q6H PRN  sodium chloride 0.9 % bolus 500 mL, 500 mL, IntraVENous, Once  sodium chloride flush 0.9 % injection 5-40 mL, 5-40 mL, IntraVENous, 2 times per day  sodium chloride flush 0.9 % injection 5-40 mL, 5-40 mL,

## 2024-11-18 NOTE — PLAN OF CARE
Problem: Discharge Planning  Goal: Discharge to home or other facility with appropriate resources  11/17/2024 2203 by Melonie Bales RN  Outcome: Progressing  11/17/2024 1003 by Angela Tenorio RN  Outcome: Progressing     Problem: Pain  Goal: Verbalizes/displays adequate comfort level or baseline comfort level  11/17/2024 2203 by Melonie Bales RN  Outcome: Progressing  11/17/2024 1003 by Angela Tenorio RN  Outcome: Progressing     Problem: Safety - Adult  Goal: Free from fall injury  11/17/2024 2203 by Melonie Bales RN  Outcome: Progressing  11/17/2024 1003 by Angela Tenorio RN  Outcome: Progressing     Problem: ABCDS Injury Assessment  Goal: Absence of physical injury  11/17/2024 2203 by Melonie Bales RN  Outcome: Progressing  Flowsheets (Taken 11/17/2024 2202)  Absence of Physical Injury: Implement safety measures based on patient assessment  11/17/2024 1003 by Angela Tenorio RN  Outcome: Progressing     Problem: Skin/Tissue Integrity  Goal: Absence of new skin breakdown  Description: 1.  Monitor for areas of redness and/or skin breakdown  2.  Assess vascular access sites hourly  3.  Every 4-6 hours minimum:  Change oxygen saturation probe site  4.  Every 4-6 hours:  If on nasal continuous positive airway pressure, respiratory therapy assess nares and determine need for appliance change or resting period.  Outcome: Progressing

## 2024-11-18 NOTE — CARE COORDINATION
11/18. Met with the pt and her daughter, Radha, at the bedside. The pt has been having increasing pain and weakness, throughout her entire body. She has been to [facility] and did not get any answers. NS consulted. TLSO brace ordered. The pt lives alone and has a caregiver daily. She has a walker, FWW, lift chair and elevate commode seat. She had PT in her home in the past. Avril Platt RN    Case Management Assessment  Initial Evaluation    Date/Time of Evaluation: 11/18/2024 11:27 AM  Assessment Completed by: Avril Platt RN    If patient is discharged prior to next notation, then this note serves as note for discharge by case management.    Patient Name: Gloria Schuler                   YOB: 1945  Diagnosis: Ambulatory dysfunction [R26.2]  Closed right ankle fracture, initial encounter [S82.891A]                   Date / Time: 11/15/2024  5:51 PM    Patient Admission Status: Inpatient   Readmission Risk (Low < 19, Mod (19-27), High > 27): Readmission Risk Score: 11.7    Current PCP: Saurav Peña MD  PCP verified by ? Yes (Dr Peña)    Chart Reviewed: Yes      History Provided by: Patient, Child/Family  Patient Orientation: Alert and Oriented    Patient Cognition: Alert    Hospitalization in the last 30 days (Readmission):  No    If yes, Readmission Assessment in  Navigator will be completed.    Advance Directives:      Code Status: Full Code   Patient's Primary Decision Maker is: Legal Next of Kin      Discharge Planning:    Patient lives with: Alone Type of Home: House  Primary Care Giver: Self  Patient Support Systems include: Children, Family Members   Current Financial resources:    Current community resources:    Current services prior to admission: Meals On Wheels, Private Duty Homecare            Current DME:              Type of Home Care services:  OT, PT, Meals on Wheels, Nursing Services    ADLS  Prior functional level: Assistance with the following:,  Housework, Cooking, Shopping, Mobility  Current functional level: Assistance with the following:, Cooking, Housework, Shopping, Mobility    PT AM-PAC: 10 /24  OT AM-PAC: 14 /24    Family can provide assistance at DC: Yes  Would you like Case Management to discuss the discharge plan with any other family members/significant others, and if so, who? Yes (daughter)  Plans to Return to Present Housing: Unknown at present  Other Identified Issues/Barriers to RETURNING to current housing: gait dysfunction  Potential Assistance needed at discharge: N/A            Potential DME:    Patient expects to discharge to: Skilled nursing facility  Plan for transportation at discharge:      Financial    Payor: [employer] MEDICARE / Plan: VAL MEDIBLUE ESSENTIAL/PLUS / Product Type: *No Product type* /     Does insurance require precert for SNF: Yes    Potential assistance Purchasing Medications: No  Meds-to-Beds request:        [id number] - [place], [place] - [address] [facility] [phone] -  [phone]  [address] [place] [address]  Phone: [phone] Fax: [phone]      Notes:    Factors facilitating achievement of predicted outcomes: Family support, Caregiver support, Cooperative, and Pleasant    Barriers to discharge: Pain, Decreased endurance, Decreased sensation, Decreased proprioception, Lower extremity weakness, and Long standing deficits    Additional Case Management Notes: see above    The Plan for Transition of Care is related to the following treatment goals of Ambulatory dysfunction [R26.2]  Closed right ankle fracture, initial encounter [W21.979J]    IF APPLICABLE: The Patient and/or patient representative Gloria and her family were provided with a choice of provider and agrees with the discharge plan. Freedom of choice list with basic dialogue that supports the patient's individualized plan of care/goals and shares the quality data associated with the providers was provided to:     Patient Representative Name:        The Patient and/or Patient Representative Agree with the Discharge Plan?      Avril Platt RN  Case Management Department  Ph: [phone]

## 2024-11-18 NOTE — PROGRESS NOTES
[facility] Hospitalist Progress Note    Admitting Date and Time: 11/15/2024  5:51 PM  Admit Dx: Ambulatory dysfunction [R26.2]  Closed right ankle fracture, initial encounter [S82.181A]    Subjective:  Patient is being followed for Ambulatory dysfunction [R26.2]  Closed right ankle fracture, initial encounter [S82.928V]   Pt feels having pain in number both of her upper extremity, denies chest pain or shortness of breath.    ROS: denies fever, chills, cp, sob, n/v, HA unless stated above.      lidocaine  2 patch TransDERmal Daily    sodium chloride  500 mL IntraVENous Once    sodium chloride flush  5-40 mL IntraVENous 2 times per day    enoxaparin  40 mg SubCUTAneous Daily    rosuvastatin  5 mg Oral Daily    propranolol  10 mg Oral Daily    PARoxetine  10 mg Oral Daily    potassium chloride  20 mEq Oral TID    hydroCHLOROthiazide  25 mg Oral Daily    folic acid  1,000 mcg Oral Daily    Vitamin D  1,000 Units Oral Daily    aspirin  81 mg Oral Daily    nicotine  1 patch TransDERmal Daily     ibuprofen, 400 mg, Q6H PRN  sodium chloride flush, 5-40 mL, PRN  sodium chloride, , PRN  potassium chloride, 40 mEq, PRN   Or  potassium alternative oral replacement, 40 mEq, PRN   Or  potassium chloride, 10 mEq, PRN  magnesium sulfate, 2,000 mg, PRN  ondansetron, 4 mg, Q8H PRN   Or  ondansetron, 4 mg, Q6H PRN  polyethylene glycol, 17 g, Daily PRN  acetaminophen, 650 mg, Q6H PRN   Or  acetaminophen, 650 mg, Q6H PRN  cloNIDine, 0.1 mg, Q8H PRN  oxyCODONE, 5 mg, Q4H PRN         Objective:    /78   Pulse 78   Temp 97.2 °F (36.2 °C) (Temporal)   Resp 15   Ht 1.626 m (5' 4\")   Wt 79.4 kg (175 lb)   SpO2 100%   BMI 30.04 kg/m²     General Appearance: alert and oriented to person, place and time and in no acute distress  Skin: warm and dry  Head: normocephalic and atraumatic  Eyes: pupils equal, round, and reactive to light, extraocular eye movements intact, conjunctivae normal  Neck: neck supple and non tender  without mass   Pulmonary/Chest: clear to auscultation bilaterally- no wheezes, rales or rhonchi, normal air movement, no respiratory distress  Cardiovascular: normal rate, normal S1 and S2 and no carotid bruits  Abdomen: soft, non-tender, non-distended, normal bowel sounds, no masses or organomegaly  Extremities: no cyanosis, no clubbing and no edema  Neurologic: no cranial nerve deficit and speech normal        Recent Labs     11/15/24  2020 11/16/24  0649    141   K 3.8 4.0    105   CO2 33* 28   BUN 29* 21   CREATININE 0.7 0.6   GLUCOSE 172* 153*   CALCIUM 10.4* 9.4       Recent Labs     11/15/24  2020 11/16/24  0649   WBC 7.3 7.2   RBC 4.44 4.29   HGB 15.0 14.3   HCT 44.3 43.1   MCV 99.8 100.5*   MCH 33.8 33.3   MCHC 33.9 33.2   RDW 12.6 12.6    224   MPV 10.9 10.4       Radiology:     Assessment:    Principal Problem:    Ambulatory dysfunction  Active Problems:    Closed right ankle fracture, initial encounter    Cervical radiculopathy    Chronic back pain    Gait instability  Resolved Problems:    * No resolved hospital problems. *      Plan:  1.  Cervical myeloradiculopathy: Neurosurgery on the case, CT cervical spine done today shows moderate to severe central canal stenosis and foraminal stenosis.  Daughter was all the time and she spoke with neurosurgery and no intervention now.  She wants to see the recommendation from neurology as the consult placed yesterday probably neurologist will see her today.  Daughter mentioned that it is difficult to take her to [facility] because unable to move her in with transportation.  Talk to  and she will give information to arrange A-ride whenever needed.  2.  Chronic low back pain: Pain medication as needed.  3.  Ambulatory dysfunction: Patient was receiving physical therapy at home which was discontinued due to insurance issue.  Talked to  regarding that.  4.  Right distal fibular fracture: Orthopedic consulted and  conservative management, pain medication as needed      NOTE: This report was transcribed using voice recognition software. Every effort was made to ensure accuracy; however, inadvertent computerized transcription errors may be present.  Electronically signed by BRITTANY HELMS MD on 11/18/2024 at 1:42 PM

## 2024-11-18 NOTE — PROGRESS NOTES
Occupational Therapy  OT BEDSIDE TREATMENT NOTE   [facility]  [address], [place]       Date:2024  Patient Name: Gloria Schuler  MRN: [id number]  : 1945  Room: [address] [place] [address]A     Per OT Eval:  Evaluating OT: Kodi Kee OTR/L [id number]     Referring Provider: Teressa Modi DO                                    Specific Provider Orders/Date: OT evaluation and treatment 11/15/24 2788     Diagnosis:  Ambulatory dysfunction [R26.2]  Closed right ankle fracture, initial encounter [Q24.242U]       Pertinent Medical History:  has a past medical history of A-fib (HCC), Anxiety and depression, Arthritis, Artificial cardiac pacemaker, Brain aneurysm, Elevated hemoglobin A1c, Essential tremor, HTN (hypertension), Kidney stones, Lumbar radiculopathy, Shingles, and Ulcer.   Past Surgical History         Past Surgical History:   Procedure Laterality Date    BRAIN ANEURYSM SURGERY        clipping    CARDIAC ASSIST DEVICE INSERTION   2020     Watchman inserted     CARPAL TUNNEL RELEASE         pinched nerve in left arm, had surgery    CATARACT REMOVAL WITH IMPLANT Right 2016    COLONOSCOPY W/ POLYPECTOMY        ENDOSCOPY, COLON, DIAGNOSTIC        HEMORRHOID SURGERY        HYSTERECTOMY (CERVIX STATUS UNKNOWN)        INTRACAPSULAR CATARACT EXTRACTION Left 10/12/2021     LEFT CATARACT EXTRACTION WITH IOL performed by Wally TSANG MD at [facility] OR    LITHOTRIPSY        PACEMAKER INSERTION N/A 2019     PACEMAKER INSERTION ([facility]) performed by Brooks Hahn MD at [facility] OR            Pt admitted to the hospital 11/15 with R ankle pain   Per ortho : Closed, right Gaytan B lateral malleolus fracture -nonweightbearing status in a cam boot to the right lower extremity.   Orders received, chart reviewed, eval complete.      Precautions:  Fall Risk, Nwbing RLE, cam boot      Assessment of current deficits   [x]  functional mobility with standard walker vs transport chair   Falls: yes   Driving: no   Occupation/leisure: did not state      Pleasant and cooperative throughout session        Pain Level: did not rate pain when questioned  and tolerable for treatment   Location: R ankle   OT provided: repositioning, diversion , and edu on compensatory strategies      Cognition: A&O: 4/4;               Follows single step directions: Good              Memory: Good              Sequencing: Fair+              Problem solving: Fair              Judgement/safety: Fair              Attention:  Good      Functional Assessment: AM-PAC Inpatient Daily Activity Raw Score: 14 /24       Initial Eval Status  Date: 11/16/2024   Treatment Status  Date:11/18/24 STG=LTG  Time frame: 10-14 days   Feeding Set up    Seated at EOB with family  Independent   Grooming Min A     Min A  Seated at EOB Set up   UB Dressing Min A   Gown   CGA  Gown management from seated position Set up   LB Dressing Total/Dependent  Socks and boot  LB dressing task to doff/cam boot /sock with total A / Max A   Bathing Max A  simulated Max A  simulated  Mod A    Toileting Max A  Clothing management and sanjay care at commode  Max A   BSC transfer   Total A for posterior pericare after toileting  Mod A    Bed Mobility  Rolling L/R: Max A   Supine to sit: Mod A    Sit to supine: Mod A   Bed mobility tasks with max A +1 for sit to sup/education on proper UE placement and tech/^ time to complete/   Supine to sit: Stand by assist   Sit to supine: Stand by assist    Functional Transfers Sit to stand: Mod x2   Stand to sit: Mod x2   Stand pivot: Max x2 with and without FWW EOB><commode   Pt with difficulty maintaining NWBing with FWW    mod a+2   STS from BSC/transfer training from BSC to EOB with use of FWW/ mod a +2/ NWB for RLE in cam boot maintained.   Mod A    Functional Mobility NT  NT     Balance Sitting: Min A       Standing: Max x2   seated EOB   SBA  Standing   Mod A +2

## 2024-11-19 PROCEDURE — 6360000002 HC RX W HCPCS: Performed by: INTERNAL MEDICINE

## 2024-11-19 PROCEDURE — 6370000000 HC RX 637 (ALT 250 FOR IP)

## 2024-11-19 PROCEDURE — 97535 SELF CARE MNGMENT TRAINING: CPT

## 2024-11-19 PROCEDURE — 1200000000 HC SEMI PRIVATE

## 2024-11-19 PROCEDURE — 97530 THERAPEUTIC ACTIVITIES: CPT

## 2024-11-19 PROCEDURE — 99232 SBSQ HOSP IP/OBS MODERATE 35: CPT | Performed by: INTERNAL MEDICINE

## 2024-11-19 PROCEDURE — 97112 NEUROMUSCULAR REEDUCATION: CPT

## 2024-11-19 PROCEDURE — 99222 1ST HOSP IP/OBS MODERATE 55: CPT | Performed by: INTERNAL MEDICINE

## 2024-11-19 PROCEDURE — 6370000000 HC RX 637 (ALT 250 FOR IP): Performed by: INTERNAL MEDICINE

## 2024-11-19 PROCEDURE — 2700000000 HC OXYGEN THERAPY PER DAY

## 2024-11-19 PROCEDURE — 2580000003 HC RX 258: Performed by: INTERNAL MEDICINE

## 2024-11-19 RX ORDER — LIDOCAINE 4 G/G
1 PATCH TOPICAL DAILY
Status: DISCONTINUED | OUTPATIENT
Start: 2024-11-19 | End: 2024-11-19

## 2024-11-19 RX ORDER — LIDOCAINE 4 G/G
1 PATCH TOPICAL ONCE
Status: COMPLETED | OUTPATIENT
Start: 2024-11-19 | End: 2024-11-20

## 2024-11-19 RX ADMIN — ROSUVASTATIN 5 MG: 10 TABLET, FILM COATED ORAL at 09:11

## 2024-11-19 RX ADMIN — ACETAMINOPHEN 650 MG: 325 TABLET ORAL at 03:08

## 2024-11-19 RX ADMIN — OXYCODONE HYDROCHLORIDE 5 MG: 5 TABLET ORAL at 14:20

## 2024-11-19 RX ADMIN — FOLIC ACID 1000 MCG: 1 TABLET ORAL at 09:12

## 2024-11-19 RX ADMIN — OXYCODONE HYDROCHLORIDE 5 MG: 5 TABLET ORAL at 01:17

## 2024-11-19 RX ADMIN — OXYCODONE HYDROCHLORIDE 5 MG: 5 TABLET ORAL at 22:34

## 2024-11-19 RX ADMIN — OXYCODONE HYDROCHLORIDE 5 MG: 5 TABLET ORAL at 05:19

## 2024-11-19 RX ADMIN — HYDROCHLOROTHIAZIDE 25 MG: 25 TABLET ORAL at 09:12

## 2024-11-19 RX ADMIN — OXYCODONE HYDROCHLORIDE 5 MG: 5 TABLET ORAL at 09:14

## 2024-11-19 RX ADMIN — OXYCODONE HYDROCHLORIDE 5 MG: 5 TABLET ORAL at 18:27

## 2024-11-19 RX ADMIN — POTASSIUM CHLORIDE 20 MEQ: 1500 TABLET, EXTENDED RELEASE ORAL at 09:12

## 2024-11-19 RX ADMIN — PROPRANOLOL HYDROCHLORIDE 10 MG: 10 TABLET ORAL at 09:13

## 2024-11-19 RX ADMIN — IBUPROFEN 400 MG: 400 TABLET, FILM COATED ORAL at 21:18

## 2024-11-19 RX ADMIN — IBUPROFEN 400 MG: 400 TABLET, FILM COATED ORAL at 14:54

## 2024-11-19 RX ADMIN — SODIUM CHLORIDE, PRESERVATIVE FREE 10 ML: 5 INJECTION INTRAVENOUS at 09:13

## 2024-11-19 RX ADMIN — ENOXAPARIN SODIUM 40 MG: 100 INJECTION SUBCUTANEOUS at 09:11

## 2024-11-19 RX ADMIN — SODIUM CHLORIDE, PRESERVATIVE FREE 10 ML: 5 INJECTION INTRAVENOUS at 20:09

## 2024-11-19 RX ADMIN — ACETAMINOPHEN 650 MG: 325 TABLET ORAL at 21:17

## 2024-11-19 RX ADMIN — ASPIRIN 81 MG: 81 TABLET, COATED ORAL at 09:11

## 2024-11-19 RX ADMIN — IBUPROFEN 400 MG: 400 TABLET, FILM COATED ORAL at 03:08

## 2024-11-19 RX ADMIN — POTASSIUM CHLORIDE 20 MEQ: 1500 TABLET, EXTENDED RELEASE ORAL at 14:20

## 2024-11-19 RX ADMIN — PAROXETINE 10 MG: 10 TABLET, FILM COATED ORAL at 09:13

## 2024-11-19 RX ADMIN — POTASSIUM CHLORIDE 20 MEQ: 1500 TABLET, EXTENDED RELEASE ORAL at 20:09

## 2024-11-19 RX ADMIN — ACETAMINOPHEN 650 MG: 325 TABLET ORAL at 14:54

## 2024-11-19 RX ADMIN — Medication 1000 UNITS: at 09:12

## 2024-11-19 ASSESSMENT — PAIN SCALES - GENERAL
PAINLEVEL_OUTOF10: 8
PAINLEVEL_OUTOF10: 8
PAINLEVEL_OUTOF10: 7
PAINLEVEL_OUTOF10: 8
PAINLEVEL_OUTOF10: 10
PAINLEVEL_OUTOF10: 5
PAINLEVEL_OUTOF10: 10
PAINLEVEL_OUTOF10: 5

## 2024-11-19 ASSESSMENT — PAIN - FUNCTIONAL ASSESSMENT
PAIN_FUNCTIONAL_ASSESSMENT: PREVENTS OR INTERFERES SOME ACTIVE ACTIVITIES AND ADLS

## 2024-11-19 ASSESSMENT — PAIN DESCRIPTION - DESCRIPTORS
DESCRIPTORS: ACHING;DISCOMFORT;SPASM;SORE
DESCRIPTORS: ACHING;DISCOMFORT;BURNING
DESCRIPTORS: CRAMPING;BURNING;ACHING
DESCRIPTORS: ACHING;DISCOMFORT;SORE
DESCRIPTORS: ACHING;DISCOMFORT;SORE;SPASM
DESCRIPTORS: ACHING;BURNING;SORE;SPASM
DESCRIPTORS: ACHING;DISCOMFORT;SORE;SPASM
DESCRIPTORS: ACHING;BURNING;GNAWING

## 2024-11-19 ASSESSMENT — PAIN DESCRIPTION - FREQUENCY
FREQUENCY: CONTINUOUS

## 2024-11-19 ASSESSMENT — PAIN DESCRIPTION - ORIENTATION
ORIENTATION: RIGHT;LEFT
ORIENTATION: RIGHT;LEFT
ORIENTATION: RIGHT
ORIENTATION: LEFT;RIGHT;MID;LOWER

## 2024-11-19 ASSESSMENT — PAIN DESCRIPTION - PAIN TYPE
TYPE: CHRONIC PAIN

## 2024-11-19 ASSESSMENT — PAIN DESCRIPTION - LOCATION
LOCATION: GENERALIZED
LOCATION: BACK
LOCATION: BACK
LOCATION: GENERALIZED
LOCATION: GENERALIZED
LOCATION: GENERALIZED;KNEE
LOCATION: BACK;LEG
LOCATION: BACK;KNEE
LOCATION: BACK;LEG

## 2024-11-19 ASSESSMENT — PAIN DESCRIPTION - ONSET
ONSET: ON-GOING
ONSET: ON-GOING

## 2024-11-19 NOTE — PROGRESS NOTES
Physical Therapy  Treatment      Name: Gloria Schuler  : 1945  MRN: [id number]      Date of Service: 2024    Evaluating PT:  Shane Lara PT, DPT  [id number]    Room #:  5405/5405-A  Diagnosis:  Ambulatory dysfunction [R26.2]  Closed right ankle fracture, initial encounter [V03.375B]  PMHx/PSHx:   has a past medical history of A-fib (HCC), Anxiety and depression, Arthritis, Artificial cardiac pacemaker, Brain aneurysm, Elevated hemoglobin A1c, Essential tremor, HTN (hypertension), Kidney stones, Lumbar radiculopathy, Shingles, and Ulcer.  Procedure/Surgery:    Precautions:  Falls, NWB RLE, Cam Boot  Equipment Needs: TBD      SUBJECTIVE:    Pt lives alone has caregiver daily 7:30 AM to 11:00 PM. Pt ambulated with assist from aid using WW or utilized transport chair PTA.   Equipment Owned:     OBJECTIVE:   Initial Evaluation  Date: 24 Treatment  24 Short Term/ Long Term   Goals   AM-PAC 6 Clicks 10/24 10/24    Was pt agreeable to Eval/treatment? yes yes    Does pt have pain? Mild pain in R ankle RLE pain with mobility    Bed Mobility  Rolling: MaxA  Supine to sit: ModA  Sit to supine: ModA  Scooting: ModA Rolling: MaxA  Supine to sit: MaxA  Sit to supine: NT  Scooting: MaxA Rolling: Independent  Supine to sit: Independent  Sit to supine: Independent  Scooting: Independent   Transfers Sit to stand: ModA x 2  Stand to sit: modA x 2  Stand pivot: MaxA x 2  With WW and without    Abides by NWB better without WW Sit to stand: ModA x 2  Stand to sit: modA x 2  Stand pivot: ModA x 2 with WW Sit to stand: supervision  Stand to sit: supervision  Stand pivot: supervision  Slideboard transfer: Supervision   Ambulation    3' MaxA x 2 WW NT d/t pain and questionable adherence to WB restrictions 25 feet with Yoly WW   Stair negotiation: ascended and descended  NT NT    ROM BUE:  Defer to OT  BLE:  WFL     Strength BUE:  Defer to OT  R ankle 2-/5   R knee and hip 2+/5  LLE: 3/5  Improve 1 MMT   Balance  Sitting EOB:  SBA  Dynamic Standing:  MaxA x 2 WW Sitting EOB:  SBA  Dynamic Standing:  ModA x 2 WW Sitting EOB:  Independent    Dynamic Standing:  Supervision     Pt is A & O x 3 grossly  Sensation:  NT  Edema:  none noted    Patient education  Pt educated on role of PT, safety during mobility    Patient response to education:   Pt verbalized understanding Pt demonstrated skill Pt requires further education in this area   yes yes yes     ASSESSMENT:    Comments:  pt semi-supine in bed upon entry and agreeable to PT treatment. Pt requested to use restroom. Heavy assist to trunk and BLEs required for bed mobility. Pt very self limiting requiring increased cues and assist for participation. Pt able to stand and pivot to bsc with WW and assist of two. Extended time required for pt to void in seated. STS from commode with similar assist. Pt positioned in chair with chair alarm at end of session.   All needs met and call light in reach. All lines remained intact.     Treatment:  Patient practiced and was instructed in the following treatment:    Bed Mobility: VCs provided for sequencing and safety during mobility. Manual assist provided for completion of task.  Transfer Training: Verbal and tactile cueing provided for sequencing and safety during mobility. Manual assist provided for completion of task    PLAN:    Patient is making fair progress towards established goals.  Will continue with current POC.      Time in  1000  Time out  1038    Total Treatment Time  38 minutes     CPT codes:  [] Gait training 30077 - minutes  [] Manual therapy 00791 - minutes  [x] Therapeutic activities 43735 38 minutes  [] Therapeutic exercises 85939 - minutes  [] Neuromuscular reeducation 91886 - minutes    Gardenia Cheng PT, DPT  [id number]

## 2024-11-19 NOTE — PROGRESS NOTES
[facility] Hospitalist Progress Note    Admitting Date and Time: 11/15/2024  5:51 PM  Admit Dx: Ambulatory dysfunction [R26.2]  Closed right ankle fracture, initial encounter [S82.841A]    Subjective:  Patient is being followed for Ambulatory dysfunction [R26.2]  Closed right ankle fracture, initial encounter [S82.630O]   Pt feels having pain in number both of her upper extremity, denies chest pain or shortness of breath.  She was sitting on the couch and took good breakfast.    ROS: denies fever, chills, cp, sob, n/v, HA unless stated above.      lidocaine  2 patch TransDERmal Daily    sodium chloride  500 mL IntraVENous Once    sodium chloride flush  5-40 mL IntraVENous 2 times per day    enoxaparin  40 mg SubCUTAneous Daily    rosuvastatin  5 mg Oral Daily    propranolol  10 mg Oral Daily    PARoxetine  10 mg Oral Daily    potassium chloride  20 mEq Oral TID    hydroCHLOROthiazide  25 mg Oral Daily    folic acid  1,000 mcg Oral Daily    Vitamin D  1,000 Units Oral Daily    aspirin  81 mg Oral Daily    nicotine  1 patch TransDERmal Daily     ibuprofen, 400 mg, Q6H PRN  sodium chloride flush, 5-40 mL, PRN  sodium chloride, , PRN  potassium chloride, 40 mEq, PRN   Or  potassium alternative oral replacement, 40 mEq, PRN   Or  potassium chloride, 10 mEq, PRN  magnesium sulfate, 2,000 mg, PRN  ondansetron, 4 mg, Q8H PRN   Or  ondansetron, 4 mg, Q6H PRN  polyethylene glycol, 17 g, Daily PRN  acetaminophen, 650 mg, Q6H PRN   Or  acetaminophen, 650 mg, Q6H PRN  cloNIDine, 0.1 mg, Q8H PRN  oxyCODONE, 5 mg, Q4H PRN         Objective:    BP (!) 163/60   Pulse 62   Temp 97.4 °F (36.3 °C) (Temporal)   Resp 18   Ht 1.626 m (5' 4\")   Wt 79.4 kg (175 lb)   SpO2 97%   BMI 30.04 kg/m²     General Appearance: alert and oriented to person, place and time and in no acute distress  Skin: warm and dry  Head: normocephalic and atraumatic  Eyes: pupils equal, round, and reactive to light, extraocular eye movements intact,  Reason For Visit  [facility] is here today for a nurse visit for TB reading.      Current Meds   1. No Reported Medications Recorded    Allergies  No Known Drug Allergies    Plan   1. Tubersol 5 UNIT/0.1ML Intradermal Solution    Patient Instructions 5/19/17 @ 4:00pm, 0x0mm, neg PPD.      Signatures   Electronically signed by : NAHOMY TONEY; May 19 2017  3:57PM CST

## 2024-11-19 NOTE — PROGRESS NOTES
Occupational Therapy  OT BEDSIDE TREATMENT NOTE   [facility]  [address] [place], [place]       Date:2024  Patient Name: Gloria Schuler  MRN: [id number]  : 1945  Room: [address] [place] [address]A     Per OT Eval:  Evaluating OT: Kodi Kee OTR/L [id number]     Referring Provider: Teressa Modi DO                                    Specific Provider Orders/Date: OT evaluation and treatment 11/15/24 2504     Diagnosis:  Ambulatory dysfunction [R26.2]  Closed right ankle fracture, initial encounter [J39.880I]       Pertinent Medical History:  has a past medical history of A-fib (HCC), Anxiety and depression, Arthritis, Artificial cardiac pacemaker, Brain aneurysm, Elevated hemoglobin A1c, Essential tremor, HTN (hypertension), Kidney stones, Lumbar radiculopathy, Shingles, and Ulcer.   Past Surgical History         Past Surgical History:   Procedure Laterality Date    BRAIN ANEURYSM SURGERY        clipping    CARDIAC ASSIST DEVICE INSERTION   2020     Watchman inserted     CARPAL TUNNEL RELEASE         pinched nerve in left arm, had surgery    CATARACT REMOVAL WITH IMPLANT Right 2016    COLONOSCOPY W/ POLYPECTOMY        ENDOSCOPY, COLON, DIAGNOSTIC        HEMORRHOID SURGERY        HYSTERECTOMY (CERVIX STATUS UNKNOWN)        INTRACAPSULAR CATARACT EXTRACTION Left 10/12/2021     LEFT CATARACT EXTRACTION WITH IOL performed by Wally TSANG MD at [facility] OR    LITHOTRIPSY        PACEMAKER INSERTION N/A 2019     PACEMAKER INSERTION ([facility]) performed by Brooks Hahn MD at [facility] OR            Pt admitted to the hospital 11/15 with R ankle pain   Per ortho : Closed, right Gaytan B lateral malleolus fracture -nonweightbearing status in a cam boot to the right lower extremity.   Orders received, chart reviewed, eval complete.      Precautions:  Fall Risk, Nwbing RLE, cam boot      Assessment of current deficits   [x]  of FWW/ mod a +2/ NWB for RLE in cam boot maintained.   Mod A    Functional Mobility NT  NT     Balance Sitting: Min A       Standing: Max x2   seated EOB   SBA  Standing   Mod A +2 Sitting: Mod I       Standing: Mod A    Activity Tolerance Fair    F  Good   Visual/  Perceptual wears glasses          WFL         BUE  ROM/Strength/  Fine motor Coordination RUE: ROM WFL      Strength: grossly 3/5      Strength: WFL      Coordination:  impaired     LUE: ROM WFL      Strength: grossly 3/5      Strength: WFL      Coordination: impaired   Pt will participate in BUE exercise with supervision to increase strength, ROM, and coordination for increased independence in functional mobility and ADLs    Safety   Fair  fair Good during functional activity while maintaining wbing restrictions           Education:  Pt /family education on use of cam boot when up on RLE/ NWB for RLE     Comments: Upon arrival pt sup in bed . At end of session pt seated in chair /Pt c/o pain 10/10 back decreased after sitting in chair  . all lines and tubes intact, call light within reach. All NWB for RLE maintained. PT spoke with doctor no TLSO for patient.  ^ time for all toileting tasks    Pt has made fair progress towards set goals.   Continue with current plan of care      Treatment Time In:10:00         Treatment Time Out: 10:40      Treatment Charges: Mins Units   Ther Ex  28441     Manual Therapy 91939     Thera Activities 64858     ADL/Home Mgt 89765 15 1   Neuro Re-ed 98683 25 2   Group Therapy      Orthotic manage/training  52776     Non-Billable Time     Total Timed Treatment 40 3     Kenneth KRISHNAMURTHY/EDELMIRA [id number]

## 2024-11-19 NOTE — PLAN OF CARE
Problem: Pain  Goal: Verbalizes/displays adequate comfort level or baseline comfort level  11/19/2024 0107 by Vikas Anderson RN  Outcome: Progressing

## 2024-11-19 NOTE — CARE COORDINATION
11/19. No NS intervention. For neurology consult today. Plan is home when medically stable. Avril Platt RN

## 2024-11-19 NOTE — CONSULTS
NEUROLOGY CONSULT NOTE      Requesting Physician:  Floyd Briggs MD    Reason for Consult:  Evaluate for:  please evaluate for numbness, tingling and weakness, gait instability .  Cervical myelogam did nor show significant stenosis     History of Present Illness:  Gloria Schuler is a 79 y.o. female  with h/o A-fib s/p pacemaker and watchman placement, brain aneurysm s/p surgery, HTN, essential tremor, arthritis, shingles, lumbar radiculopathy, and gastric ulcer who was admitted to [facility] on 11/15/2024 with presentation of fall with right lower extremity pain.  Patient had mechanical fall at home with subsequent onset of persistent right lower extremity pain that is worsened by movement.  Patient stated that she was ambulating and had a pair of slippers on that slipped underneath her in the right leg causing her to fall.  Associated right ankle, knee, and hip pain with history of arthritis.  X-ray of the right ankle did show an acute nondisplaced oblique fracture of the distal fibula with prominent medial ankle soft tissue swelling.  X-rays of right hip and pelvis were within normal limits with no evidence of acute fracture or dislocation.  Patient also had CT of cervical spine showing moderate central canal stenosis present C3-C4 level due to broad-based disc herniation.  There was multilevel neuroforaminal narrowing along with a stable anterolisthesis of C3 on C4.  Orthopedic surgery was consulted and patient was treated in a cam boot with no surgery performed.  Neurosurgery was also consulted for help with management of numbness and weakness in bilateral upper and lower extremities.  Patient does have a history of chronic back pain previously been seen by neurosurgery for evaluation.  CT myelogram had been performed at  hospitals, but it did not show any significant cervical stenosis.  However, patient stated that after receiving 3 myelograms in a row at  there was greater pain in her leg

## 2024-11-20 PROCEDURE — 6370000000 HC RX 637 (ALT 250 FOR IP): Performed by: PSYCHIATRY & NEUROLOGY

## 2024-11-20 PROCEDURE — 97530 THERAPEUTIC ACTIVITIES: CPT

## 2024-11-20 PROCEDURE — 1200000000 HC SEMI PRIVATE

## 2024-11-20 PROCEDURE — 99222 1ST HOSP IP/OBS MODERATE 55: CPT | Performed by: INTERNAL MEDICINE

## 2024-11-20 PROCEDURE — 6370000000 HC RX 637 (ALT 250 FOR IP): Performed by: INTERNAL MEDICINE

## 2024-11-20 PROCEDURE — 99233 SBSQ HOSP IP/OBS HIGH 50: CPT | Performed by: PHYSICIAN ASSISTANT

## 2024-11-20 PROCEDURE — 6370000000 HC RX 637 (ALT 250 FOR IP): Performed by: PHYSICIAN ASSISTANT

## 2024-11-20 PROCEDURE — 97535 SELF CARE MNGMENT TRAINING: CPT

## 2024-11-20 PROCEDURE — 6360000002 HC RX W HCPCS: Performed by: INTERNAL MEDICINE

## 2024-11-20 PROCEDURE — 99232 SBSQ HOSP IP/OBS MODERATE 35: CPT | Performed by: INTERNAL MEDICINE

## 2024-11-20 PROCEDURE — 6370000000 HC RX 637 (ALT 250 FOR IP)

## 2024-11-20 PROCEDURE — 2580000003 HC RX 258: Performed by: INTERNAL MEDICINE

## 2024-11-20 RX ORDER — LIDOCAINE 4 G/G
2 PATCH TOPICAL DAILY
Status: DISCONTINUED | OUTPATIENT
Start: 2024-11-20 | End: 2024-11-21 | Stop reason: HOSPADM

## 2024-11-20 RX ORDER — GABAPENTIN 100 MG/1
200 CAPSULE ORAL 3 TIMES DAILY
Status: DISCONTINUED | OUTPATIENT
Start: 2024-11-20 | End: 2024-11-21 | Stop reason: HOSPADM

## 2024-11-20 RX ORDER — VALACYCLOVIR HYDROCHLORIDE 1 G/1
1000 TABLET, FILM COATED ORAL DAILY
Status: DISCONTINUED | OUTPATIENT
Start: 2024-11-20 | End: 2024-11-21 | Stop reason: HOSPADM

## 2024-11-20 RX ADMIN — PAROXETINE 10 MG: 10 TABLET, FILM COATED ORAL at 08:33

## 2024-11-20 RX ADMIN — POTASSIUM CHLORIDE 20 MEQ: 1500 TABLET, EXTENDED RELEASE ORAL at 20:23

## 2024-11-20 RX ADMIN — SODIUM CHLORIDE, PRESERVATIVE FREE 10 ML: 5 INJECTION INTRAVENOUS at 08:24

## 2024-11-20 RX ADMIN — OXYCODONE HYDROCHLORIDE 5 MG: 5 TABLET ORAL at 02:30

## 2024-11-20 RX ADMIN — Medication 1000 UNITS: at 08:22

## 2024-11-20 RX ADMIN — OXYCODONE HYDROCHLORIDE 5 MG: 5 TABLET ORAL at 12:07

## 2024-11-20 RX ADMIN — ASPIRIN 81 MG: 81 TABLET, COATED ORAL at 08:23

## 2024-11-20 RX ADMIN — OXYCODONE HYDROCHLORIDE 5 MG: 5 TABLET ORAL at 20:23

## 2024-11-20 RX ADMIN — ACETAMINOPHEN 650 MG: 325 TABLET ORAL at 05:39

## 2024-11-20 RX ADMIN — TIZANIDINE 4 MG: 4 TABLET ORAL at 02:30

## 2024-11-20 RX ADMIN — POTASSIUM CHLORIDE 20 MEQ: 1500 TABLET, EXTENDED RELEASE ORAL at 14:27

## 2024-11-20 RX ADMIN — SODIUM CHLORIDE, PRESERVATIVE FREE 10 ML: 5 INJECTION INTRAVENOUS at 20:22

## 2024-11-20 RX ADMIN — IBUPROFEN 400 MG: 400 TABLET, FILM COATED ORAL at 05:39

## 2024-11-20 RX ADMIN — GABAPENTIN 200 MG: 100 CAPSULE ORAL at 14:27

## 2024-11-20 RX ADMIN — ACETAMINOPHEN 650 MG: 325 TABLET ORAL at 19:27

## 2024-11-20 RX ADMIN — POTASSIUM CHLORIDE 20 MEQ: 1500 TABLET, EXTENDED RELEASE ORAL at 08:23

## 2024-11-20 RX ADMIN — VALACYCLOVIR HYDROCHLORIDE 1000 MG: 1 TABLET, FILM COATED ORAL at 16:19

## 2024-11-20 RX ADMIN — GABAPENTIN 200 MG: 100 CAPSULE ORAL at 08:23

## 2024-11-20 RX ADMIN — IBUPROFEN 400 MG: 400 TABLET, FILM COATED ORAL at 19:27

## 2024-11-20 RX ADMIN — ENOXAPARIN SODIUM 40 MG: 100 INJECTION SUBCUTANEOUS at 08:24

## 2024-11-20 RX ADMIN — ACETAMINOPHEN 650 MG: 325 TABLET ORAL at 13:12

## 2024-11-20 RX ADMIN — HYDROCHLOROTHIAZIDE 25 MG: 25 TABLET ORAL at 08:23

## 2024-11-20 RX ADMIN — ROSUVASTATIN 5 MG: 10 TABLET, FILM COATED ORAL at 08:22

## 2024-11-20 RX ADMIN — PROPRANOLOL HYDROCHLORIDE 10 MG: 10 TABLET ORAL at 08:23

## 2024-11-20 RX ADMIN — IBUPROFEN 400 MG: 400 TABLET, FILM COATED ORAL at 12:07

## 2024-11-20 RX ADMIN — OXYCODONE HYDROCHLORIDE 5 MG: 5 TABLET ORAL at 06:48

## 2024-11-20 RX ADMIN — OXYCODONE HYDROCHLORIDE 5 MG: 5 TABLET ORAL at 16:17

## 2024-11-20 RX ADMIN — TIZANIDINE 2 MG: 4 TABLET ORAL at 20:23

## 2024-11-20 RX ADMIN — GABAPENTIN 200 MG: 100 CAPSULE ORAL at 20:23

## 2024-11-20 ASSESSMENT — PAIN DESCRIPTION - FREQUENCY
FREQUENCY: CONTINUOUS

## 2024-11-20 ASSESSMENT — PAIN DESCRIPTION - DESCRIPTORS
DESCRIPTORS: ACHING
DESCRIPTORS: ACHING;DISCOMFORT;SORE;SHOOTING;SHARP
DESCRIPTORS: ACHING;DISCOMFORT;SHOOTING;SORE
DESCRIPTORS: ACHING;DISCOMFORT;SHOOTING;SORE;SHARP
DESCRIPTORS: ACHING
DESCRIPTORS: ACHING;DISCOMFORT;SORE;SHOOTING
DESCRIPTORS: ACHING;DISCOMFORT;SHOOTING;SORE
DESCRIPTORS: ACHING;DISCOMFORT;SORE;SPASM
DESCRIPTORS: ACHING;DISCOMFORT;SORE;SHOOTING;SHARP
DESCRIPTORS: ACHING;DISCOMFORT;SORE;SHOOTING
DESCRIPTORS: ACHING;DISCOMFORT;SORE;SPASM
DESCRIPTORS: ACHING;DISCOMFORT;SORE;SPASM

## 2024-11-20 ASSESSMENT — PAIN DESCRIPTION - LOCATION
LOCATION: LEG
LOCATION: LEG
LOCATION: GENERALIZED;KNEE
LOCATION: LEG
LOCATION: LEG
LOCATION: GENERALIZED
LOCATION: GENERALIZED
LOCATION: LEG
LOCATION: GENERALIZED
LOCATION: LEG
LOCATION: LEG
LOCATION: GENERALIZED

## 2024-11-20 ASSESSMENT — PAIN SCALES - GENERAL
PAINLEVEL_OUTOF10: 8
PAINLEVEL_OUTOF10: 10
PAINLEVEL_OUTOF10: 4
PAINLEVEL_OUTOF10: 2
PAINLEVEL_OUTOF10: 5
PAINLEVEL_OUTOF10: 4
PAINLEVEL_OUTOF10: 5
PAINLEVEL_OUTOF10: 6
PAINLEVEL_OUTOF10: 5
PAINLEVEL_OUTOF10: 6
PAINLEVEL_OUTOF10: 7
PAINLEVEL_OUTOF10: 10
PAINLEVEL_OUTOF10: 6
PAINLEVEL_OUTOF10: 4

## 2024-11-20 ASSESSMENT — PAIN DESCRIPTION - ONSET
ONSET: ON-GOING

## 2024-11-20 ASSESSMENT — PAIN - FUNCTIONAL ASSESSMENT

## 2024-11-20 ASSESSMENT — PAIN DESCRIPTION - ORIENTATION
ORIENTATION: RIGHT
ORIENTATION: RIGHT;LEFT
ORIENTATION: RIGHT;LEFT
ORIENTATION: RIGHT
ORIENTATION: RIGHT;LEFT
ORIENTATION: RIGHT;LEFT
ORIENTATION: RIGHT
ORIENTATION: RIGHT;LEFT

## 2024-11-20 ASSESSMENT — PAIN DESCRIPTION - PAIN TYPE
TYPE: ACUTE PAIN
TYPE: CHRONIC PAIN
TYPE: ACUTE PAIN
TYPE: CHRONIC PAIN

## 2024-11-20 NOTE — PROGRESS NOTES
Physical Therapy  Treatment      Name: Gloria Schuler  : 1945  MRN: [id number]      Date of Service: 2024    Evaluating PT:  Shane Lara PT, DPT  [id number]    Room #:  5405/5405-A  Diagnosis:  Ambulatory dysfunction [R26.2]  Closed right ankle fracture, initial encounter [M64.334Z]  PMHx/PSHx:   has a past medical history of A-fib (HCC), Anxiety and depression, Arthritis, Artificial cardiac pacemaker, Brain aneurysm, Elevated hemoglobin A1c, Essential tremor, HTN (hypertension), Kidney stones, Lumbar radiculopathy, Shingles, and Ulcer.  Procedure/Surgery:    Precautions:  Falls, NWB RLE, Cam Boot  Equipment Needs: TBD      SUBJECTIVE:    Pt lives alone has caregiver daily 7:30 AM to 11:00 PM. Pt ambulated with assist from aid using WW or utilized transport chair PTA.   Equipment Owned:     OBJECTIVE:   Initial Evaluation  Date: 24 Treatment  24 Short Term/ Long Term   Goals   AM-PAC 6 Clicks 10/24 10/24    Was pt agreeable to Eval/treatment? yes yes    Does pt have pain? Mild pain in R ankle RLE pain with mobility    Bed Mobility  Rolling: MaxA  Supine to sit: ModA  Sit to supine: ModA  Scooting: ModA Rolling: NT  Supine to sit: NT  Sit to supine: NT  Scooting: NT Rolling: Independent  Supine to sit: Independent  Sit to supine: Independent  Scooting: Independent   Transfers Sit to stand: ModA x 2  Stand to sit: modA x 2  Stand pivot: MaxA x 2  With WW and without    Abides by NWB better without WW Sit to stand: MaxA x 2  Stand to sit: MaxA x 2  Stand pivot: MaxA x 2 with no AD Sit to stand: supervision  Stand to sit: supervision  Stand pivot: supervision  Slideboard transfer: Supervision   Ambulation    3' MaxA x 2 WW NT d/t poor compliance to WB restrictions 25 feet with Yoly WW   Stair negotiation: ascended and descended  NT NT    ROM BUE:  Defer to OT  BLE:  WFL     Strength BUE:  Defer to OT  R ankle 2-/5   R knee and hip 2+/5  LLE: 3/5  Improve 1 MMT   Balance Sitting EOB:

## 2024-11-20 NOTE — PROGRESS NOTES
Occupational Therapy  OT BEDSIDE TREATMENT NOTE   [facility]  [address] [place], [place]       Date:2024  Patient Name: Gloria Schuler  MRN: [id number]  : 1945  Room: [address] [place] [address]A     Per OT Eval:  Evaluating OT: Kodi Kee OTR/L [id number]     Referring Provider: Teressa Modi DO                                    Specific Provider Orders/Date: OT evaluation and treatment 11/15/24 9011     Diagnosis:  Ambulatory dysfunction [R26.2]  Closed right ankle fracture, initial encounter [C18.634Z]       Pertinent Medical History:  has a past medical history of A-fib (HCC), Anxiety and depression, Arthritis, Artificial cardiac pacemaker, Brain aneurysm, Elevated hemoglobin A1c, Essential tremor, HTN (hypertension), Kidney stones, Lumbar radiculopathy, Shingles, and Ulcer.   Past Surgical History         Past Surgical History:   Procedure Laterality Date    BRAIN ANEURYSM SURGERY        clipping    CARDIAC ASSIST DEVICE INSERTION   2020     Watchman inserted     CARPAL TUNNEL RELEASE         pinched nerve in left arm, had surgery    CATARACT REMOVAL WITH IMPLANT Right 2016    COLONOSCOPY W/ POLYPECTOMY        ENDOSCOPY, COLON, DIAGNOSTIC        HEMORRHOID SURGERY        HYSTERECTOMY (CERVIX STATUS UNKNOWN)        INTRACAPSULAR CATARACT EXTRACTION Left 10/12/2021     LEFT CATARACT EXTRACTION WITH IOL performed by Wally TSANG MD at [facility] OR    LITHOTRIPSY        PACEMAKER INSERTION N/A 2019     PACEMAKER INSERTION ([facility]) performed by Brooks Hahn MD at [facility] OR            Pt admitted to the hospital 11/15 with R ankle pain   Per ortho : Closed, right Gaytan B lateral malleolus fracture -nonweightbearing status in a cam boot to the right lower extremity.   Orders received, chart reviewed, eval complete.      Precautions:  Fall Risk, Nwbing RLE, cam boot      Assessment of current deficits   [x]  functional mobility with standard walker vs transport chair   Falls: yes   Driving: no   Occupation/leisure: did not state      Pleasant and cooperative throughout session        Pain Level: Pt complained of R ankle pain this session     Cognition: A&O: 4/4;               Follows single step directions: Good              Memory: Good              Sequencing: Fair+              Problem solving: Fair              Judgement/safety: Fair              Attention:  Good      Functional Assessment: AM-PAC Inpatient Daily Activity Raw Score: 14 /24       Initial Eval Status  Date: 11/16/2024   Treatment Status  Date:11/20/24 STG=LTG  Time frame: 10-14 days   Feeding Set up    Setup  Sitting upright in chair Independent   Grooming Min A     SBA  Pt washed face, applied deodorant, combed hair seated  Set up   UB Dressing Min A   Gown  Alan  Jean Claude/d[facility] hospital gown seated upright Set up   LB Dressing Total/Dependent  Socks and boot  Dependent  [facility] sock and cam boot to rLE. Jean Claude brief, assistance to thread with 2 assist to stand and pull over hips Max A   Bathing Max A  simulated maxA  Simulated Task    Pt able to wash of UB, assistance to wash of LB, buttocks and sanjay area Mod A    Toileting Max A  Clothing management and sanjay care at [facility]  maxAx2  Pt completed toileting task on BSC, pt requiring 2 assist to transfer, maintain NWB RLE, complete of hygiene and manage of brief Mod A    Bed Mobility  Rolling L/R: Max A   Supine to sit: Mod A    Sit to supine: Mod A   DNT  Pt seated EOB upon arrival and sitting upright in chair at end of session Supine to sit: Stand by assist   Sit to supine: Stand by assist    Functional Transfers Sit to stand: Mod x2   Stand to sit: Mod x2   Stand pivot: Max x2 with and without FWW EOB><commode   Pt with difficulty maintaining NWBing with FWW   maxAx2  Sit to Stand  Stand to Sit    Stand Pivot Transfer   EOB<>BSC  BSC<>Chair with HHA    MAX cueing and assistance to maintain NWB  RLE, poor follow through Mod A    Functional Mobility NT  DNT     Balance Sitting: Min A       Standing: Max x2  Sitting EOB:  SBA    Standing:  maxAx2  Assistance to maintain NWB RLE Sitting: Mod I       Standing: Mod A    Activity Tolerance Fair    Fair- Good   Visual/  Perceptual wears glasses          WFL         BUE  ROM/Strength/  Fine motor Coordination RUE: ROM WFL      Strength: grossly 3/5      Strength: WFL      Coordination:  impaired     LUE: ROM WFL      Strength: grossly 3/5      Strength: WFL      Coordination: impaired   Pt will participate in BUE exercise with supervision to increase strength, ROM, and coordination for increased independence in functional mobility and ADLs    Safety   Fair  fair Good during functional activity while maintaining wbing restrictions           Education: Pt was educated on role of OT, goals to be reached, importance of OOB activity, precautions to follow, safety/hand placement and maintaining NWB RLE with transfers and compensatory strategies to assist with ADL tasks.    Comments: Upon arrival pt seated EOB with nursing present, requesting to use of BSC. Pt assisted to BSC with 2 assist, and assistance to maintain NWB RLE as pt is unable too. Caregiver becoming present during session, encouraging pt to engage in session. Pt requiring increased time to complete toileting task, with pt requesting to sit for an hour, however pt did not. At end of session, pt seated upright in chair, all tubes and lines intact, call light within reach, caregiver still present.     Pt has made fair progress towards set goals.   Continue with current plan of care      Treatment Time In: 1:20pm        Treatment Time Out: 1:44pm     Treatment Charges: Mins Units   Ther Ex  15229     Manual Therapy 48662     Thera Activities 52668 10 1   ADL/Home Mgt 16364 14 1   Neuro Re-ed 53901     Group Therapy      Orthotic manage/training  23634     Non-Billable Time     Total Timed Treatment 24 2

## 2024-11-20 NOTE — CARE COORDINATION
11/20. Spoke with the pt's daughter, Radha. Discussed discharge plan. She wants to take the pt home with PT/OT/aide. She wants to buy a wheelchair vs obtaining it through her insurance. Will need prescription to be given to her upon discharge. Referral made to [facility] for BSC. Referral made to [facility]. Avril Platt RN

## 2024-11-20 NOTE — PROGRESS NOTES
NEUROLOGY CONSULT NOTE      Requesting Physician:  Floyd Briggs MD    Reason for Consult:  Evaluate for:  please evaluate for numbness, tingling and weakness, gait instability .  Cervical myelogam did nor show significant stenosis     History of Present Illness:  Gloria Schuler is a 79 y.o. female  with h/o A-fib s/p pacemaker and watchman placement, brain aneurysm s/p surgery, HTN, essential tremor, arthritis, shingles, lumbar radiculopathy, and gastric ulcer who was admitted to [facility] on 11/15/2024 with presentation of fall with right lower extremity pain.  Patient had mechanical fall at home with subsequent onset of persistent right lower extremity pain that is worsened by movement.  Patient stated that she was ambulating and had a pair of slippers on that slipped underneath her in the right leg causing her to fall.  Associated right ankle, knee, and hip pain with history of arthritis.  X-ray of the right ankle did show an acute nondisplaced oblique fracture of the distal fibula with prominent medial ankle soft tissue swelling.  X-rays of right hip and pelvis were within normal limits with no evidence of acute fracture or dislocation.  Patient also had CT of cervical spine showing moderate central canal stenosis present C3-C4 level due to broad-based disc herniation.  There was multilevel neuroforaminal narrowing along with a stable anterolisthesis of C3 on C4.  Orthopedic surgery was consulted and patient was treated in a cam boot with no surgery performed.  Neurosurgery was also consulted for help with management of numbness and weakness in bilateral upper and lower extremities.  Patient does have a history of chronic back pain previously been seen by neurosurgery for evaluation.  CT myelogram had been performed at  hospitals, but it did not show any significant cervical stenosis.  However, patient stated that after receiving 3 myelograms in a row at  there was greater pain in her leg  than it was before going to .  CT of the cervical spine was ordered by neurosurgery with findings as noted above.  MRI not available due to pacemaker and watchman devices.    11/20/24:  The patient is resting in bed, no signs of acute distress. No family is at bedside but daughter is on phone with patient during exam. Gabapentin and zanaflex ADRs were discussed at length with daughter. The patient does not endorse pain but reports to generalized weakness and heaviness. She reported at previously hospital stay at [facility], she was screaming in pain and it must be better controlled with new medications as that is not currently ongoing. It was decided through shared decision making with patient and daughter that we will decrease zanaflex to 2mg nightly as this mynor be contributing to feeling of heaviness. Patient and daughter verbalize understanding and are in agreement with current treatment plan.     Past Medical History:        Diagnosis Date    A-fib (HCC)     PACEMAKER AND WATCHMAN    Anxiety and depression     Arthritis     Artificial cardiac pacemaker     Brain aneurysm     s/p surgery    Elevated hemoglobin A1c     Essential tremor     HTN (hypertension)     Kidney stones     Lumbar radiculopathy     Shingles     recurrent    Ulcer            Procedure Laterality Date    BRAIN ANEURYSM SURGERY  1993    clipping    CARDIAC ASSIST DEVICE INSERTION  08/03/2020    Watchman inserted     CARPAL TUNNEL RELEASE      pinched nerve in left arm, had surgery    CATARACT REMOVAL WITH IMPLANT Right 11/22/2016    COLONOSCOPY W/ POLYPECTOMY      ENDOSCOPY, COLON, DIAGNOSTIC      HEMORRHOID SURGERY      HYSTERECTOMY (CERVIX STATUS UNKNOWN)      INTRACAPSULAR CATARACT EXTRACTION Left 10/12/2021    LEFT CATARACT EXTRACTION WITH IOL performed by Wally TSANG MD at [facility] OR    LITHOTRIPSY      PACEMAKER INSERTION N/A 05/08/2019    PACEMAKER INSERTION ([facility]) performed by Brooks Hahn MD at [facility] OR       Social  dictating this exam?->CRC FINDINGS: No evidence of cervical spine fracture.  There is stable anterolisthesis of C3 on C4 of approximately 4 mm.  There is severe disc space narrowing at C6/C7. C2/C3: No central canal stenosis.  There is moderate to severe bilateral neural foraminal narrowing. C3/C4: Central broad-based disc herniation is present with significant facet arthropathy resulting in moderate central canal stenosis.  There is severe bilateral neural foraminal narrowing. C4/C5: No obvious central canal stenosis.  Mild posterior disc/osteophyte complex is present with moderate to severe bilateral neural foraminal narrowing. C4/C5: No significant central canal stenosis.  There is moderate to severe right neural foraminal narrowing C6/C7: Posterior disc/osteophyte complex is present resulting in mild central canal stenosis.  There appears to be mild bilateral neural foraminal narrowing C7/T1: No central canal stenosis or significant neural foraminal narrowing.     1. Moderate central canal stenosis is present at C3/C4 due to broad-based disc herniation and facet arthropathy. 2. Multiple levels of neural foraminal narrowing as described above. 3. Stable anterolisthesis of C3 on C4 which is likely degenerative in etiology given significant facet arthropathy at this level.     Vascular duplex lower extremity venous right    The patient's records from referring provider and available information in the EHR was reviewed.      Impression:  Central cervical spinal at C3-C4 with herniated disc and spondylolisthesis.  Negative CT myelogram per report.  Primary symptoms appear to be paresthesias with subjective complaints of paresthesias and decreased sensation particularly in distal extremities.  She has proximal weakness in the right leg with normal strength on the left.  No arm weakness appreciated.  Abnormal sensation was probably on right versus left side findings concerning for cervical myelopathy versus

## 2024-11-20 NOTE — PROGRESS NOTES
[facility] Hospitalist Progress Note    Admitting Date and Time: 11/15/2024  5:51 PM  Admit Dx: Ambulatory dysfunction [R26.2]  Closed right ankle fracture, initial encounter [S82.571A]    Subjective:  Patient is being followed for Ambulatory dysfunction [R26.2]  Closed right ankle fracture, initial encounter [S82.001G]   Pt feels having pain in number both of her upper extremity, denies chest pain or shortness of breath.  She was sitting on the couch and took good breakfast.    ROS: denies fever, chills, cp, sob, n/v, HA unless stated above.      gabapentin  200 mg Oral TID    tiZANidine  2 mg Oral Nightly    valACYclovir  1,000 mg Oral Daily    lidocaine  2 patch TransDERmal Daily    sodium chloride  500 mL IntraVENous Once    sodium chloride flush  5-40 mL IntraVENous 2 times per day    enoxaparin  40 mg SubCUTAneous Daily    rosuvastatin  5 mg Oral Daily    propranolol  10 mg Oral Daily    PARoxetine  10 mg Oral Daily    potassium chloride  20 mEq Oral TID    hydroCHLOROthiazide  25 mg Oral Daily    folic acid  1,000 mcg Oral Daily    Vitamin D  1,000 Units Oral Daily    aspirin  81 mg Oral Daily    nicotine  1 patch TransDERmal Daily     ibuprofen, 400 mg, Q6H PRN  sodium chloride flush, 5-40 mL, PRN  sodium chloride, , PRN  potassium chloride, 40 mEq, PRN   Or  potassium alternative oral replacement, 40 mEq, PRN   Or  potassium chloride, 10 mEq, PRN  magnesium sulfate, 2,000 mg, PRN  ondansetron, 4 mg, Q8H PRN   Or  ondansetron, 4 mg, Q6H PRN  polyethylene glycol, 17 g, Daily PRN  acetaminophen, 650 mg, Q6H PRN   Or  acetaminophen, 650 mg, Q6H PRN  cloNIDine, 0.1 mg, Q8H PRN  oxyCODONE, 5 mg, Q4H PRN         Objective:    BP (!) 143/64   Pulse 65   Temp 96.9 °F (36.1 °C) (Temporal)   Resp 18   Ht 1.626 m (5' 4\")   Wt 79.4 kg (175 lb)   SpO2 96%   BMI 30.04 kg/m²     General Appearance: alert and oriented to person, place and time and in no acute distress  Skin: warm and dry  Head: normocephalic  distal fibular fracture: Orthopedic consulted and conservative management, pain medication as needed  Possible discharge tomorrow.    NOTE: This report was transcribed using voice recognition software. Every effort was made to ensure accuracy; however, inadvertent computerized transcription errors may be present.  Electronically signed by BRITTANY HELMS MD on 11/20/2024 at 2:55 PM

## 2024-11-20 NOTE — PLAN OF CARE
Problem: Pain  Goal: Verbalizes/displays adequate comfort level or baseline comfort level  11/20/2024 1216 by Nedra Quinteros, RN  Outcome: Progressing  11/19/2024 2337 by Vikas Anderson, RN  Outcome: Progressing

## 2024-11-21 VITALS
DIASTOLIC BLOOD PRESSURE: 62 MMHG | OXYGEN SATURATION: 90 % | HEIGHT: 64 IN | SYSTOLIC BLOOD PRESSURE: 151 MMHG | RESPIRATION RATE: 18 BRPM | TEMPERATURE: 96.6 F | BODY MASS INDEX: 29.88 KG/M2 | WEIGHT: 175 LBS | HEART RATE: 71 BPM

## 2024-11-21 PROCEDURE — 97530 THERAPEUTIC ACTIVITIES: CPT

## 2024-11-21 PROCEDURE — 97535 SELF CARE MNGMENT TRAINING: CPT

## 2024-11-21 PROCEDURE — 6360000002 HC RX W HCPCS: Performed by: INTERNAL MEDICINE

## 2024-11-21 PROCEDURE — 6370000000 HC RX 637 (ALT 250 FOR IP): Performed by: PSYCHIATRY & NEUROLOGY

## 2024-11-21 PROCEDURE — 6370000000 HC RX 637 (ALT 250 FOR IP): Performed by: INTERNAL MEDICINE

## 2024-11-21 PROCEDURE — 2580000003 HC RX 258: Performed by: INTERNAL MEDICINE

## 2024-11-21 PROCEDURE — 99239 HOSP IP/OBS DSCHRG MGMT >30: CPT | Performed by: INTERNAL MEDICINE

## 2024-11-21 PROCEDURE — 6370000000 HC RX 637 (ALT 250 FOR IP)

## 2024-11-21 RX ORDER — LIDOCAINE 4 G/G
1 PATCH TOPICAL DAILY
Qty: 30 PATCH | Refills: 0 | Status: SHIPPED | OUTPATIENT
Start: 2024-11-21 | End: 2024-12-21

## 2024-11-21 RX ORDER — GABAPENTIN 100 MG/1
200 CAPSULE ORAL 3 TIMES DAILY
Qty: 90 CAPSULE | Refills: 0 | Status: SHIPPED | OUTPATIENT
Start: 2024-11-21 | End: 2024-12-21

## 2024-11-21 RX ORDER — TIZANIDINE 2 MG/1
2 TABLET ORAL NIGHTLY
Qty: 30 TABLET | Refills: 0 | Status: SHIPPED | OUTPATIENT
Start: 2024-11-21

## 2024-11-21 RX ADMIN — ROSUVASTATIN 5 MG: 10 TABLET, FILM COATED ORAL at 09:15

## 2024-11-21 RX ADMIN — OXYCODONE HYDROCHLORIDE 5 MG: 5 TABLET ORAL at 00:29

## 2024-11-21 RX ADMIN — PROPRANOLOL HYDROCHLORIDE 10 MG: 10 TABLET ORAL at 09:15

## 2024-11-21 RX ADMIN — ASPIRIN 81 MG: 81 TABLET, COATED ORAL at 09:15

## 2024-11-21 RX ADMIN — SODIUM CHLORIDE, PRESERVATIVE FREE 10 ML: 5 INJECTION INTRAVENOUS at 09:16

## 2024-11-21 RX ADMIN — PAROXETINE 10 MG: 10 TABLET, FILM COATED ORAL at 09:16

## 2024-11-21 RX ADMIN — GABAPENTIN 200 MG: 100 CAPSULE ORAL at 13:59

## 2024-11-21 RX ADMIN — POTASSIUM CHLORIDE 20 MEQ: 1500 TABLET, EXTENDED RELEASE ORAL at 13:59

## 2024-11-21 RX ADMIN — OXYCODONE HYDROCHLORIDE 5 MG: 5 TABLET ORAL at 09:14

## 2024-11-21 RX ADMIN — ACETAMINOPHEN 650 MG: 325 TABLET ORAL at 03:16

## 2024-11-21 RX ADMIN — OXYCODONE HYDROCHLORIDE 5 MG: 5 TABLET ORAL at 04:32

## 2024-11-21 RX ADMIN — GABAPENTIN 200 MG: 100 CAPSULE ORAL at 09:14

## 2024-11-21 RX ADMIN — Medication 1000 UNITS: at 09:15

## 2024-11-21 RX ADMIN — HYDROCHLOROTHIAZIDE 25 MG: 25 TABLET ORAL at 09:14

## 2024-11-21 RX ADMIN — ENOXAPARIN SODIUM 40 MG: 100 INJECTION SUBCUTANEOUS at 09:16

## 2024-11-21 RX ADMIN — POTASSIUM CHLORIDE 20 MEQ: 1500 TABLET, EXTENDED RELEASE ORAL at 09:15

## 2024-11-21 RX ADMIN — VALACYCLOVIR HYDROCHLORIDE 1000 MG: 1 TABLET, FILM COATED ORAL at 09:16

## 2024-11-21 RX ADMIN — IBUPROFEN 400 MG: 400 TABLET, FILM COATED ORAL at 09:13

## 2024-11-21 RX ADMIN — IBUPROFEN 400 MG: 400 TABLET, FILM COATED ORAL at 03:16

## 2024-11-21 RX ADMIN — ACETAMINOPHEN 650 MG: 325 TABLET ORAL at 09:15

## 2024-11-21 ASSESSMENT — PAIN DESCRIPTION - ONSET
ONSET: ON-GOING

## 2024-11-21 ASSESSMENT — PAIN DESCRIPTION - ORIENTATION
ORIENTATION: RIGHT
ORIENTATION: RIGHT;LEFT
ORIENTATION: RIGHT

## 2024-11-21 ASSESSMENT — PAIN DESCRIPTION - DESCRIPTORS
DESCRIPTORS: ACHING;DISCOMFORT;SORE;SHOOTING
DESCRIPTORS: ACHING;DISCOMFORT;SORE;SHOOTING;SHARP
DESCRIPTORS: ACHING
DESCRIPTORS: ACHING;DISCOMFORT;SORE;SHOOTING
DESCRIPTORS: ACHING
DESCRIPTORS: ACHING;DISCOMFORT;SORE;SHOOTING
DESCRIPTORS: ACHING

## 2024-11-21 ASSESSMENT — PAIN DESCRIPTION - PAIN TYPE
TYPE: CHRONIC PAIN

## 2024-11-21 ASSESSMENT — PAIN SCALES - GENERAL
PAINLEVEL_OUTOF10: 0
PAINLEVEL_OUTOF10: 4
PAINLEVEL_OUTOF10: 4
PAINLEVEL_OUTOF10: 6
PAINLEVEL_OUTOF10: 4
PAINLEVEL_OUTOF10: 3
PAINLEVEL_OUTOF10: 4
PAINLEVEL_OUTOF10: 7
PAINLEVEL_OUTOF10: 10
PAINLEVEL_OUTOF10: 3

## 2024-11-21 ASSESSMENT — PAIN DESCRIPTION - LOCATION
LOCATION: LEG
LOCATION: GENERALIZED
LOCATION: LEG
LOCATION: KNEE
LOCATION: LEG
LOCATION: LEG
LOCATION: GENERALIZED

## 2024-11-21 ASSESSMENT — PAIN - FUNCTIONAL ASSESSMENT
PAIN_FUNCTIONAL_ASSESSMENT: PREVENTS OR INTERFERES WITH MANY ACTIVE NOT PASSIVE ACTIVITIES
PAIN_FUNCTIONAL_ASSESSMENT: PREVENTS OR INTERFERES SOME ACTIVE ACTIVITIES AND ADLS
PAIN_FUNCTIONAL_ASSESSMENT: PREVENTS OR INTERFERES WITH ALL ACTIVE AND SOME PASSIVE ACTIVITIES
PAIN_FUNCTIONAL_ASSESSMENT: PREVENTS OR INTERFERES SOME ACTIVE ACTIVITIES AND ADLS

## 2024-11-21 ASSESSMENT — PAIN DESCRIPTION - FREQUENCY
FREQUENCY: CONTINUOUS

## 2024-11-21 NOTE — PLAN OF CARE
Problem: Pain  Goal: Verbalizes/displays adequate comfort level or baseline comfort level  11/20/2024 2132 by Melonie Bales, RN  Outcome: Progressing  11/20/2024 1216 by Nedra Quinteros, RN  Outcome: Progressing

## 2024-11-21 NOTE — PLAN OF CARE
Problem: Pain  Goal: Verbalizes/displays adequate comfort level or baseline comfort level  11/21/2024 1106 by Nedra Quinteros, RN  Outcome: Progressing  11/20/2024 2132 by Melonie Bales, RN  Outcome: Progressing

## 2024-11-21 NOTE — PROGRESS NOTES
Message sent to attending to request lidocaine patches for bilateral knees for night. Awaiting response at this time.

## 2024-11-21 NOTE — CARE COORDINATION
11/21. Discharge order noted. Spoke with the pt's daughter, Radha. Transportation arranged with [employer] for 3:00. The pt's daughter was provided with the number to give payment. Prescription for wheelchair placed in homegoing folder. Meds from pharmacy given to assigned nurse. Spoke to [facility]. Drop arm BSC to be delivered tho the pt's home this evening. Pt worked with PT/OT this afternoon. They are recommending rehab. Spoke to pt and her daughter. Neither one is agreeing to rehab at this time. Pt's daughter is requesting topical lidocaine patches for home use. Perfect-served Dr Ladd to inform him of the above request. Avril Platt RN

## 2024-11-21 NOTE — DISCHARGE SUMMARY
[facility] Hospitalist Physician Discharge Summary       Shane Mcnulty, DO  [address]  [address]  [facility] [id number] [phone]    Call        Activity level: As tolerated    Dispo: home      Condition on discharge: Stable    Patient ID:  Gloria Schuler  [id number]  79 y.o.  1945    Admit date: 11/15/2024    Discharge date and time:  11/21/2024  1:48 PM    Admission Diagnoses: Principal Problem:    Ambulatory dysfunction  Active Problems:    Closed right ankle fracture, initial encounter    Cervical radiculopathy    Chronic back pain    Gait instability  Resolved Problems:    * No resolved hospital problems. *      Discharge Diagnoses: Principal Problem:    Ambulatory dysfunction  Active Problems:    Closed right ankle fracture, initial encounter    Cervical radiculopathy    Chronic back pain    Gait instability  Resolved Problems:    * No resolved hospital problems. *      Consults:  IP CONSULT TO ORTHOPEDIC SURGERY  IP CONSULT TO SOCIAL WORK  IP CONSULT TO NEUROSURGERY  IP CONSULT TO NEUROLOGY    Procedures:  none    Hospital Course:   Patient Gloria Schuler is a 79 y.o. presented with Ambulatory dysfunction [R26.2]  Closed right ankle fracture, initial encounter [S82.891A]  This is a 79-year-old female with past medical history of osteoarthritis, brain aneurysm s/p clipping, hypertension, hyperlipidemia, nephrolithiasis, chronic low back pain, cervical spine myelopathy, generalized weakness, recurrent falls, who presented from home with a fall.  Patient stated she was trying to use her walker, slipped, rolled her right ankle.  Unable to bear weight due to pain, denies any falls, head trauma, lightheadedness, dizziness, syncopal episode.  Patient have chronic back pain and she had myelogram done and neurosurgery consulted.  Patient's daughter who was here all the time and spoke with me and neurosurgeon as well.  Neurology consulted and recommended for medical management.   FINDINGS: There are no signs of fracture or dislocation.  The knee joint space and alignment appears preserved.  There signs of chondrocalcinosis.  No significant effusions are identified.  The position and alignment of the patella appears within normal range.     1. No acute fracture or dislocation. 2. Signs of chondrocalcinosis.       Patient Instructions:      Medication List        START taking these medications      gabapentin 100 MG capsule  Commonly known as: NEURONTIN  Take 2 capsules by mouth 3 times daily for 30 days.     tiZANidine 2 MG tablet  Commonly known as: ZANAFLEX  Take 1 tablet by mouth nightly            CONTINUE taking these medications      aspirin 81 MG EC tablet  Commonly known as: Ecotrin Low Strength  Take 1 tablet by mouth daily     cloNIDine 0.1 MG tablet  Commonly known as: CATAPRES     folic acid 1 MG tablet  Commonly known as: FOLVITE     hydroCHLOROthiazide 25 MG tablet  Commonly known as: HYDRODIURIL     Magnesium Oxide 250 MG Tabs tablet  Commonly known as: MAGNESIUM-OXIDE     metoclopramide 10 MG tablet  Commonly known as: Reglan  Take 1 tablet by mouth 4 times daily for 5 days     ondansetron 4 MG tablet  Commonly known as: ZOFRAN     PARoxetine 10 MG tablet  Commonly known as: PAXIL     potassium chloride 10 MEQ extended release tablet  Commonly known as: KLOR-CON     propranolol 10 MG tablet  Commonly known as: INDERAL  1 TABLET 4 TIMES DAILY.     rosuvastatin 5 MG tablet  Commonly known as: CRESTOR     Tylenol 325 MG Caps  Generic drug: Acetaminophen     valACYclovir 1 g tablet  Commonly known as: VALTREX     vitamin D 25 MCG (1000 UT) Caps            STOP taking these medications      MULTI-VITAMIN DAILY PO               Where to Get Your Medications        These medications were sent to [facility] [place] [id number] - P [phone] - F [phone]  [facility] [address], [place] [id number]      Phone: [phone]   gabapentin 100 MG  capsule  tiZANidine 2 MG tablet           Note that more than 30 minutes was spent in preparing discharge papers, discussing discharge with patient, medication review, etc.    Signed:  Electronically signed by BRITTANY HELMS MD on 11/21/2024 at 1:48 PM

## 2024-11-21 NOTE — PROGRESS NOTES
CLINICAL PHARMACY NOTE: MEDS TO BEDS    Total # of Prescriptions Filled: 2   The following medications were delivered to the patient:  Tizanidine 2 mg  Gabapentin 100 mg    Additional Documentation:

## 2024-11-21 NOTE — PROGRESS NOTES
Discharge instructions given to pt, Pt verbalized understanding of all instructions, IV removed without complication. Pt left with family and transport

## 2024-11-21 NOTE — PROGRESS NOTES
Physical Therapy  Treatment      Name: Gloria Schuler  : 1945  MRN: [id number]      Date of Service: 2024    Evaluating PT:  Shane Lara PT, DPT  [id number]    Room #:  5405/5405-A  Diagnosis:  Ambulatory dysfunction [R26.2]  Closed right ankle fracture, initial encounter [D56.515N]  PMHx/PSHx:   has a past medical history of A-fib (HCC), Anxiety and depression, Arthritis, Artificial cardiac pacemaker, Brain aneurysm, Elevated hemoglobin A1c, Essential tremor, HTN (hypertension), Kidney stones, Lumbar radiculopathy, Shingles, and Ulcer.  Procedure/Surgery:    Precautions:  Falls, NWB RLE, Cam Boot  Equipment Needs: TBD      SUBJECTIVE:    Pt lives alone has caregiver daily 7:30 AM to 11:00 PM. Pt ambulated with assist from aid using WW or utilized transport chair PTA.   Equipment Owned:     OBJECTIVE:   Initial Evaluation  Date: 24 Treatment  24 Short Term/ Long Term   Goals   AM-PAC 6 Clicks 10/24 11/24    Was pt agreeable to Eval/treatment? yes yes    Does pt have pain? Mild pain in R ankle RLE pain with mobility    Bed Mobility  Rolling: MaxA  Supine to sit: ModA  Sit to supine: ModA  Scooting: ModA Rolling: NT  Supine to sit: Yoly  Sit to supine: NT  Scooting: Yoly Rolling: Independent  Supine to sit: Independent  Sit to supine: Independent  Scooting: Independent   Transfers Sit to stand: ModA x 2  Stand to sit: modA x 2  Stand pivot: MaxA x 2  With WW and without    Abides by NWB better without WW Sit to stand: MaxA x 2  Stand to sit: MaxA x 2  Stand pivot: MaxA x 2 with no WW Sit to stand: supervision  Stand to sit: supervision  Stand pivot: supervision  Slideboard transfer: Supervision   Ambulation    3' MaxA x 2 WW NT d/t poor compliance to WB restrictions 25 feet with Yoly WW   Stair negotiation: ascended and descended  NT NT    ROM BUE:  Defer to OT  BLE:  WFL     Strength BUE:  Defer to OT  R ankle 2-/5   R knee and hip 2+/5  LLE: 3/5  Improve 1 MMT   Balance Sitting EOB:

## 2024-11-21 NOTE — PROGRESS NOTES
OCCUPATIONAL THERAPY TREATMENT NOTE    [facility]      OT BEDSIDE TREATMENT NOTE      Date:2024  Patient Name: Gloria Schuler  MRN: [id number]  : 1945  Room: [address] [place] [address]-A     Per OT Eval:  Evaluating OT: Kodi Kee OTR/L [id number]     Referring Provider: Teressa Modi DO                                    Specific Provider Orders/Date: OT evaluation and treatment 11/15/24 9861     Diagnosis:  Ambulatory dysfunction [R26.2]  Closed right ankle fracture, initial encounter [S82.893F]       Pertinent Medical History:  has a past medical history of A-fib (HCC), Anxiety and depression, Arthritis, Artificial cardiac pacemaker, Brain aneurysm, Elevated hemoglobin A1c, Essential tremor, HTN (hypertension), Kidney stones, Lumbar radiculopathy, Shingles, and Ulcer.   Past Surgical History             Past Surgical History:   Procedure Laterality Date    BRAIN ANEURYSM SURGERY        clipping    CARDIAC ASSIST DEVICE INSERTION   2020     Watchman inserted     CARPAL TUNNEL RELEASE         pinched nerve in left arm, had surgery    CATARACT REMOVAL WITH IMPLANT Right 2016    COLONOSCOPY W/ POLYPECTOMY        ENDOSCOPY, COLON, DIAGNOSTIC        HEMORRHOID SURGERY        HYSTERECTOMY (CERVIX STATUS UNKNOWN)        INTRACAPSULAR CATARACT EXTRACTION Left 10/12/2021     LEFT CATARACT EXTRACTION WITH IOL performed by Wally TSANG MD at [facility] OR    LITHOTRIPSY        PACEMAKER INSERTION N/A 2019     PACEMAKER INSERTION ([facility]) performed by Brooks Hahn MD at [facility] OR            Pt admitted to the hospital 11/15 with R ankle pain   Per ortho : Closed, right Gaytan B lateral malleolus fracture -nonweightbearing status in a cam boot to the right lower extremity.   Orders received, chart reviewed, eval complete.      Precautions:  Fall Risk, Nwbing RLE, cam boot      Assessment of current deficits   [x] Functional mobility             [x]ADLs           [x]  Strength                  []Cognition   [x] Functional transfers           [] IADLs         [x] Safety Awareness   [x]Endurance   [] Fine Motor Coordination    [x] Balance      [] Vision/perception    []Sensation     [] Gross Motor Coordination [] ROM          [] Delirium                  [] Motor Control      OT PLAN OF CARE   OT POC based on physician orders, patient diagnosis and results of clinical assessment     Frequency/Duration 2-4 days/wk for 2 weeks PRN   Specific OT Treatment to include:   * Instruction/training on adapted ADL techniques and AE recommendations to increase functional independence within precautions       * Training on energy conservation strategies, correct breathing pattern and techniques to improve independence/tolerance for self-care routine  * Functional transfer/mobility training/DME recommendations for increased independence, safety, and fall prevention  * Patient/Family education to increase follow through with safety techniques and functional independence  * Recommendation of environmental modifications for increased safety with functional transfers/mobility and ADLs  * Therapeutic exercise to improve motor endurance, ROM, and functional strength for ADLs/functional transfers  * Therapeutic activities to facilitate/challenge dynamic balance, stand tolerance for increased safety and independence with ADLs  * Positioning to improve skin integrity, interaction with environment and functional independence     Recommended Adaptive Equipment: TBD       Home Living:  Pt lives alone - Pt has private pay caregiver 7:30 am to 11 pm daily   in a 1 story house with 2 steps to enter       Bedroom setup: main level  standard bed   Bathroom setup: main level    Equipment owned: front wheeled walker  elevated toilet seat with hand rails  lift chair  Standard walker      Prior Level of Function:  Pt A with ADLs , A with IADLs, and completed functional mobility with standard walker vs transport  Coordination:  impaired     LUE: ROM WFL      Strength: grossly 3/5      Strength: WFL      Coordination: impaired   Pt will participate in BUE exercise with supervision to increase strength, ROM, and coordination for increased independence in functional mobility and ADLs    Safety   Fair  fair Good during functional activity while maintaining wbing restrictions       Comments: Upon arrival pt supine in bed, agreeable to OT, cleared by RN. Therapist facilitated bed mobility/ADL training/ functional transfers/mobility training with focus on safety, technique, precautions.  Pt. Instructed RE: safe transfers/mobility, ADLs, role of OT, treatment plan, recs., prec. At end of session pt. Seated in bedside chair, all needs met, RN notified,  all lines and tubes intact, call light within reach.     Pt has made F- progress towards set goals.   Continue with current plan of care    Treatment Time In:14:05            Treatment Time Out: 14:30                Treatment Charges: Mins Units   Ther Ex  [id number]     Manual Therapy 42489     Thera Activities 53056     ADL/Home Mgt 67647     Neuro Re-ed 06354     Group Therapy      Orthotic manage/training  67926     Non-Billable Time     Total Timed Treatment 25 2     Herlinda Berger, OTR/L   License #  [id number]

## 2024-11-22 NOTE — PROGRESS NOTES
CLINICAL PHARMACY NOTE: MEDS TO BEDS    Total # of Prescriptions Filled: 1   The following medications were delivered to the patient:  LIDOCAINE 4% PATCH    Additional Documentation:   PICKED UP IN PHARMACY

## 2024-11-26 NOTE — TELEPHONE ENCOUNTER
Fit this patient in dec.       But has to come to office      Criselda Gilbert MD     He is significantly overdue for an appt. December is too far out.

## 2024-11-27 LAB
EKG ATRIAL RATE: 79 BPM
EKG P AXIS: 66 DEGREES
EKG P-R INTERVAL: 168 MS
EKG Q-T INTERVAL: 400 MS
EKG QRS DURATION: 126 MS
EKG QTC CALCULATION (BAZETT): 458 MS
EKG R AXIS: -24 DEGREES
EKG T AXIS: 22 DEGREES
EKG VENTRICULAR RATE: 79 BPM

## 2024-12-05 ENCOUNTER — OFFICE VISIT (OUTPATIENT)
Dept: ORTHOPEDIC SURGERY | Age: 79
End: 2024-12-05
Payer: MEDICARE

## 2024-12-05 VITALS
RESPIRATION RATE: 20 BRPM | DIASTOLIC BLOOD PRESSURE: 66 MMHG | SYSTOLIC BLOOD PRESSURE: 117 MMHG | OXYGEN SATURATION: 92 % | HEART RATE: 72 BPM | TEMPERATURE: 97.8 F

## 2024-12-05 DIAGNOSIS — S82.891A CLOSED RIGHT ANKLE FRACTURE, INITIAL ENCOUNTER: Primary | ICD-10-CM

## 2024-12-05 PROCEDURE — 1125F AMNT PAIN NOTED PAIN PRSNT: CPT | Performed by: STUDENT IN AN ORGANIZED HEALTH CARE EDUCATION/TRAINING PROGRAM

## 2024-12-05 PROCEDURE — 1159F MED LIST DOCD IN RCRD: CPT | Performed by: STUDENT IN AN ORGANIZED HEALTH CARE EDUCATION/TRAINING PROGRAM

## 2024-12-05 PROCEDURE — 1160F RVW MEDS BY RX/DR IN RCRD: CPT | Performed by: STUDENT IN AN ORGANIZED HEALTH CARE EDUCATION/TRAINING PROGRAM

## 2024-12-05 PROCEDURE — 1123F ACP DISCUSS/DSCN MKR DOCD: CPT | Performed by: STUDENT IN AN ORGANIZED HEALTH CARE EDUCATION/TRAINING PROGRAM

## 2024-12-05 PROCEDURE — 99204 OFFICE O/P NEW MOD 45 MIN: CPT | Performed by: STUDENT IN AN ORGANIZED HEALTH CARE EDUCATION/TRAINING PROGRAM

## 2024-12-05 NOTE — PROGRESS NOTES
New Ankle Patient     Referring Provider: No referring provider defined for this encounter.    CHIEF COMPLAINT:   Chief Complaint   Patient presents with    Follow-up     F/u minimally displaced right distal fibula fx        Date of injury 11/15/2024    HPI:    Gloria Schuler is a 79 y.o. year old female with a right nondisplaced lateral malleolus fracture.  She fell 1 week prior to presentation in the hospital where x-ray revealed a nondisplaced distal fibula fracture.  She was placed in a cam boot.  Since that time she is been nonweightbearing.  She does have pain and swelling and is working with home therapy.    PAST MEDICAL HISTORY  Past Medical History:   Diagnosis Date    A-fib (HCC)     PACEMAKER AND WATCHMAN    Anxiety and depression     Arthritis     Artificial cardiac pacemaker     Brain aneurysm     s/p surgery    Elevated hemoglobin A1c     Essential tremor     HTN (hypertension)     Kidney stones     Lumbar radiculopathy     Shingles     recurrent    Ulcer        PAST SURGICAL HISTORY  Past Surgical History:   Procedure Laterality Date    BRAIN ANEURYSM SURGERY  1993    clipping    CARDIAC ASSIST DEVICE INSERTION  08/03/2020    Watchman inserted     CARPAL TUNNEL RELEASE      pinched nerve in left arm, had surgery    CATARACT REMOVAL WITH IMPLANT Right 11/22/2016    COLONOSCOPY W/ POLYPECTOMY      ENDOSCOPY, COLON, DIAGNOSTIC      HEMORRHOID SURGERY      HYSTERECTOMY (CERVIX STATUS UNKNOWN)      INTRACAPSULAR CATARACT EXTRACTION Left 10/12/2021    LEFT CATARACT EXTRACTION WITH IOL performed by Wally TSANG MD at [facility] OR    LITHOTRIPSY      PACEMAKER INSERTION N/A 05/08/2019    PACEMAKER INSERTION ([facility]) performed by Brooks Hahn MD at [facility] OR         FAMILY HISTORY   Family History   Problem Relation Age of Onset    Dementia Mother     Heart Disease Father     Ovarian Cancer Paternal Aunt         Per mammo hx sheet    Stomach Cancer Paternal Uncle         Per mammo hx sheet

## 2024-12-09 ENCOUNTER — TELEPHONE (OUTPATIENT)
Dept: ORTHOPEDIC SURGERY | Age: 79
End: 2024-12-09

## 2024-12-09 DIAGNOSIS — S82.891A CLOSED RIGHT ANKLE FRACTURE, INITIAL ENCOUNTER: Primary | ICD-10-CM

## 2024-12-09 NOTE — TELEPHONE ENCOUNTER
DOI 11/15/24 minimally displaced right distal fibula fx. Dr. Mcnulty.LOV 12/5/24.Patient   requesting script for light weight wheelchair with sliding board.

## 2024-12-26 ENCOUNTER — OFFICE VISIT (OUTPATIENT)
Age: 79
End: 2024-12-26
Payer: MEDICARE

## 2024-12-26 ENCOUNTER — OFFICE VISIT (OUTPATIENT)
Dept: ORTHOPEDIC SURGERY | Age: 79
End: 2024-12-26
Payer: MEDICARE

## 2024-12-26 VITALS
WEIGHT: 175 LBS | HEIGHT: 64 IN | BODY MASS INDEX: 29.88 KG/M2 | SYSTOLIC BLOOD PRESSURE: 135 MMHG | DIASTOLIC BLOOD PRESSURE: 73 MMHG | HEART RATE: 70 BPM

## 2024-12-26 DIAGNOSIS — R29.898 PROXIMAL LEG WEAKNESS: ICD-10-CM

## 2024-12-26 DIAGNOSIS — M79.10 MYALGIA: ICD-10-CM

## 2024-12-26 DIAGNOSIS — M62.81 HAND MUSCLE WEAKNESS: Primary | ICD-10-CM

## 2024-12-26 DIAGNOSIS — R29.2 GENERALIZED HYPERREFLEXIA: ICD-10-CM

## 2024-12-26 DIAGNOSIS — G25.0 ESSENTIAL TREMOR: ICD-10-CM

## 2024-12-26 DIAGNOSIS — S82.891A CLOSED RIGHT ANKLE FRACTURE, INITIAL ENCOUNTER: Primary | ICD-10-CM

## 2024-12-26 LAB
C-REACTIVE PROTEIN: <3 MG/L (ref 0–5)
SED RATE, AUTOMATED: 34 MM/HR (ref 0–20)
TOTAL CK: 36 U/L (ref 20–180)

## 2024-12-26 PROCEDURE — 99214 OFFICE O/P EST MOD 30 MIN: CPT | Performed by: PSYCHIATRY & NEUROLOGY

## 2024-12-26 PROCEDURE — 1159F MED LIST DOCD IN RCRD: CPT | Performed by: PSYCHIATRY & NEUROLOGY

## 2024-12-26 PROCEDURE — 1159F MED LIST DOCD IN RCRD: CPT | Performed by: STUDENT IN AN ORGANIZED HEALTH CARE EDUCATION/TRAINING PROGRAM

## 2024-12-26 PROCEDURE — 1123F ACP DISCUSS/DSCN MKR DOCD: CPT | Performed by: PSYCHIATRY & NEUROLOGY

## 2024-12-26 PROCEDURE — 99213 OFFICE O/P EST LOW 20 MIN: CPT | Performed by: STUDENT IN AN ORGANIZED HEALTH CARE EDUCATION/TRAINING PROGRAM

## 2024-12-26 PROCEDURE — 1160F RVW MEDS BY RX/DR IN RCRD: CPT | Performed by: STUDENT IN AN ORGANIZED HEALTH CARE EDUCATION/TRAINING PROGRAM

## 2024-12-26 PROCEDURE — 1123F ACP DISCUSS/DSCN MKR DOCD: CPT | Performed by: STUDENT IN AN ORGANIZED HEALTH CARE EDUCATION/TRAINING PROGRAM

## 2024-12-26 NOTE — PROGRESS NOTES
Criselda Gilbert MD    Gloria Schuler is a 79 y.o. female presenting as a new patient for a   Chief Complaint   Patient presents with    New Patient    Was seen by neurology NP in past      Onset: aug 2024   Was not feeling well.  Was in severe pain, moaning in sleep   Wobbly  Falling.   Progressive weakness    Progression:   In one of the falls, fractured right ankle.     Progressed from cane to walker to wheelchair.   Has been in a lot of pain , on neurontin 200 mg tid. Made her too tired. So d/yan          Neurosurgery was consulted and did not recommend surgical intervention. CK normal at 41 to assess for myositis       W/u:   Arterial duplex:   IMPRESSION:  1.  Normal ankle brachial indices bilaterally at 1.2 on the right and 1.1 on  the left.  Multiphasic waveforms identified at all levels bilaterally on  arterial Doppler evaluation.  This lower suspicion for the presence of a  high-grade stenosis.     2.  Digital waveform evaluation of the digits of the bilateral feet show  preserved pulsatility in all digits.  On the left there is moderate decreased  amplitude in digits 1 through 4 and severely decreased amplitude in digit 5.  On the right there is moderate decreased amplitude in digits 3 and moderate  to severe decreased amplitude in the remaining digits.  These changes  probably reflect the presence of some underlying microvascular disease right  worse than left.  At this time there appears to be decreased blood flow to  digits 1, 2, 4, and 5 on the right and digit 5 on the left.         Ct cervical/thoracic spine with myelogram:   1.  Mild-to-moderate stenosis of the central spinal canal from C2-C3   and C6-C7. Moderate stenosis of the central spinal canal with mild   compression of the cervical spinal cord at C3-C4.. No evidence of   cervical neural foraminal stenosis..   2. No evidence of significant central spinal canal or neural   foraminal stenosis of the thoracic spine.        Ct lumbar

## 2024-12-26 NOTE — PROGRESS NOTES
expansion symmetric   Skin: no rash, no open wounds, no erythema  Psych: normal affect; mood stable  Neurologic:, sensation grossly intact in extremities  MSK:    Ankle:   Right ankle: Swelling diffusely noted around her ankle and foot.  T no longer tender over the lateral or medial side of her ankle.  Negative squeeze test.  Normal plantar and dorsiflexion..  Sensation is intact grossly in sural saphenous superficial peroneal deep peroneal and tibial nerve distribution    Right hip: Nontender over greater trochanter.  No pain with logroll or Stinchfield.  Tender palpation of her SI joint and midline lower back.     IMAGING:    XR: Multiple x-rays of the right ankle demonstrate healing lateral malleolus fracture with symmetric joint spaces.  No signs of shortening or complication.    AP pelvis and multiple x-rays of the right hip mild osteoarthritic changes.  No fractures dislocations    Imaging discussed with patient    ASSESSMENT  Nondisplaced Gaytan B type right distal fibula fracture    PLAN  -Fracture is healing uneventfully.  Continue weightbearing as tolerated.  Can discontinue the cam boot and weightbearing in regular shoe.  Explained that it is normal to pain and swelling in the ankle for 6 months to a year after fracture.  She should be back to her baseline function soon.  We will see her back in 4 weeks to repeat x-rays of her ankle.          Shane Mcnulty DO   Orthopaedic Surgery   12/26/24  11:03 AM    Note: This report was completed using computer[employer]cast Grade [employer] Display System voiced recognition software.  Every effort has been made to ensure accuracy; however, inadvertent computerized transcription errors may be present.

## 2024-12-27 LAB
ANA PATTERN: ABNORMAL
ANA TITER: ABNORMAL
ANTI RNP AB: NEGATIVE
ANTI-NUCLEAR ANTIBODY (ANA): POSITIVE
ANTI-SMITH: NEGATIVE
JO-1 ANTIBODY: NEGATIVE
SCLERODERMA (SCL-70) AB: NEGATIVE
SJOGREN'S ANTIBODIES (SSA): NEGATIVE
SJOGREN'S ANTIBODIES (SSB): NEGATIVE

## 2024-12-28 LAB — ALDOLASE: 2.5 U/L (ref 1.2–7.6)

## 2025-01-03 LAB
SEND OUT REPORT: NORMAL
TEST NAME: NORMAL

## 2025-01-06 ENCOUNTER — TELEPHONE (OUTPATIENT)
Age: 80
End: 2025-01-06

## 2025-01-06 NOTE — TELEPHONE ENCOUNTER
Component      Latest Ref Rng 12/26/2024   Anti-Gill      NEGATIVE  NEGATIVE    Sjogren's Antibodies (SSA)      NEGATIVE  NEGATIVE    Sjogren's Antibodies (SSB)      NEGATIVE  NEGATIVE    Anti RNP      NEGATIVE  NEGATIVE    Julisa-1 Antibody      NEGATIVE  NEGATIVE    Scleroderma SCL-70      NEGATIVE  NEGATIVE    ELIESER      NEGATIVE  POSITIVE !    ELIESER Titer 1:80    ELIESER Pattern HOMOGENEOUS    Aldolase      1.2 - 7.6 U/L 2.5    Total CK      20 - 180 U/L 36    CRP      0 - 5 mg/L <3.0    Sed Rate, Automated      0 - 20 mm/Hr 34 (H)       Reviewed the labs:    ESR is mildly elevated but not significant enough to be worrisome  ELIESER is also mildly elevated at 1 is to 80 titer  But NICO antibodies are completely normal    Myositis antibody panel is also completely normal    Overall there is no evidence of muscle inflammatory disorder      Will need the EMG as planned  And also will need a DaTscan      Criselda Gilbert MD

## 2025-01-10 ENCOUNTER — HOSPITAL ENCOUNTER (OUTPATIENT)
Dept: NUCLEAR MEDICINE | Age: 80
Discharge: HOME | End: 2025-01-12
Attending: PSYCHIATRY & NEUROLOGY
Payer: MEDICARE

## 2025-01-10 DIAGNOSIS — R29.2 GENERALIZED HYPERREFLEXIA: ICD-10-CM

## 2025-01-10 PROCEDURE — A9584 IODINE I-123 IOFLUPANE: HCPCS | Performed by: RADIOLOGY

## 2025-01-10 PROCEDURE — 78803 RP LOCLZJ TUM SPECT 1 AREA: CPT

## 2025-01-10 PROCEDURE — 6370000000 HC RX 637 (ALT 250 FOR IP): Performed by: RADIOLOGY

## 2025-01-10 PROCEDURE — 3430000000 HC RX DIAGNOSTIC RADIOPHARMACEUTICAL: Performed by: RADIOLOGY

## 2025-01-10 RX ORDER — IODINE SOLUTION STRONG 5% (LUGOL'S) 5 %
0.2 SOLUTION ORAL 3 TIMES DAILY
Status: DISCONTINUED | OUTPATIENT
Start: 2025-01-10 | End: 2025-01-10

## 2025-01-10 RX ORDER — IODINE SOLUTION STRONG 5% (LUGOL'S) 5 %
0.8 SOLUTION ORAL ONCE
Status: COMPLETED | OUTPATIENT
Start: 2025-01-10 | End: 2025-01-10

## 2025-01-10 RX ADMIN — IODINE SOLUTION STRONG 5% (LUGOL'S) 0.8 ML: 5 SOLUTION at 08:40

## 2025-01-10 RX ADMIN — IOFLUPANE I-123 5.3 MILLICURIE: 2 INJECTION, SOLUTION INTRAVENOUS at 09:47

## 2025-01-13 ENCOUNTER — TELEPHONE (OUTPATIENT)
Age: 80
End: 2025-01-13

## 2025-01-13 RX ORDER — NABUMETONE 500 MG/1
500 TABLET, FILM COATED ORAL 2 TIMES DAILY
Qty: 60 TABLET | Refills: 0 | Status: SHIPPED
Start: 2025-01-13 | End: 2025-02-13 | Stop reason: SDUPTHER

## 2025-01-13 NOTE — TELEPHONE ENCOUNTER
Daughter calling for mom.   Patient had a DaTSCAN on 1/10 and they put her in a lot of different positions and now she is in a lot of pain, moaning.  Asking if she can get a prescription for Oxytocin or something similar if possible.

## 2025-01-13 NOTE — TELEPHONE ENCOUNTER
Romeo scan was nml      I dont prescribe narcotics      Will give relafen 500 mg bid prn      The following medication has been approved and sent to your pharmacy    Requested Prescriptions     Signed Prescriptions Disp Refills    nabumetone (RELAFEN) 500 MG tablet 60 tablet 0     Sig: Take 1 tablet by mouth 2 times daily     Authorizing Provider: CESAR BERMAN

## 2025-01-15 RX ORDER — DULOXETIN HYDROCHLORIDE 30 MG/1
CAPSULE, DELAYED RELEASE ORAL
Qty: 60 CAPSULE | Refills: 2 | Status: SHIPPED
Start: 2025-01-15 | End: 2025-02-13

## 2025-01-15 NOTE — TELEPHONE ENCOUNTER
Daughter called in stating Rx that was sent over for pt is upsetting her stomach. States pt is continuously complaining of pain and wants to go to hospital. Daughter asking if there is an alternative Rx

## 2025-01-15 NOTE — TELEPHONE ENCOUNTER
Daughter called in today, would like to know if there is an alternative medication for patient as she is not feeling well. Best call back is [phone], Thanks!

## 2025-01-15 NOTE — TELEPHONE ENCOUNTER
Relafen upset her stomach.     Suggest 1/2 tablet relafen with food.     The following medication has been approved and sent to your pharmacy    Requested Prescriptions     Signed Prescriptions Disp Refills    nabumetone (RELAFEN) 500 MG tablet 60 tablet 0     Sig: Take 1 tablet by mouth 2 times daily     Authorizing Provider: CESAR BERMAN    DULoxetine (CYMBALTA) 30 MG extended release capsule 60 capsule 2     Si mg daily for 2 weeks then 30 mg bid     Authorizing Provider: CESAR BERMAN

## 2025-01-17 NOTE — TELEPHONE ENCOUNTER
Medications may take upto a week before it helps      But if symptoms worse, kelly ahead of weekend- may need to go to ER      Criselda Gilbert MD

## 2025-01-17 NOTE — TELEPHONE ENCOUNTER
Daughter called back in, states she has followed your instructions regarding medications.     She states pt woke up this morning stating she is still having pain and now stiffness. States she cannot lift any of her extremities.     Asking for advice

## 2025-01-23 ENCOUNTER — TELEPHONE (OUTPATIENT)
Age: 80
End: 2025-01-23

## 2025-01-23 DIAGNOSIS — M79.10 MYALGIA: Primary | ICD-10-CM

## 2025-01-23 NOTE — TELEPHONE ENCOUNTER
Which hospital is she in?      What has the w/u revealed?      Will refer to porfirio severino in [place]. Is that ethel Gilbert MD

## 2025-01-23 NOTE — TELEPHONE ENCOUNTER
SPOKE TO DAUGHTER STATES CHANDA HAS BEEN IN THE HOSPITAL SINCE SUNDAY 1/19/25 DUE TO TOTAL BODY PAIN. THEY ARE REQUESTING A REFERRAL TO PAIN MANAGEMENT IF OK WITH YOU.

## 2025-01-29 ENCOUNTER — TELEPHONE (OUTPATIENT)
Age: 80
End: 2025-01-29

## 2025-01-29 NOTE — TELEPHONE ENCOUNTER
SPOKE TO DAUGHTER STATES MOTHER SHOULD BE DISCHARGED FROM THE HOSPITAL SOON. HOSPITAL ENCOUNTER FROM [facility] IS IN THE CHART

## 2025-02-03 ENCOUNTER — APPOINTMENT (OUTPATIENT)
Dept: NEUROLOGY | Facility: CLINIC | Age: 80
End: 2025-02-03
Payer: MEDICARE

## 2025-02-04 ENCOUNTER — OFFICE VISIT (OUTPATIENT)
Dept: PAIN MANAGEMENT | Age: 80
End: 2025-02-04
Payer: MEDICARE

## 2025-02-04 VITALS
SYSTOLIC BLOOD PRESSURE: 114 MMHG | DIASTOLIC BLOOD PRESSURE: 70 MMHG | TEMPERATURE: 96.3 F | HEIGHT: 64 IN | WEIGHT: 175 LBS | OXYGEN SATURATION: 97 % | HEART RATE: 65 BPM | BODY MASS INDEX: 29.88 KG/M2 | RESPIRATION RATE: 18 BRPM

## 2025-02-04 DIAGNOSIS — M19.019 PRIMARY OSTEOARTHRITIS OF SHOULDER, UNSPECIFIED LATERALITY: ICD-10-CM

## 2025-02-04 DIAGNOSIS — R52 DIFFUSE PAIN: ICD-10-CM

## 2025-02-04 DIAGNOSIS — R26.81 GAIT INSTABILITY: ICD-10-CM

## 2025-02-04 DIAGNOSIS — M47.812 CERVICAL SPONDYLOSIS: ICD-10-CM

## 2025-02-04 DIAGNOSIS — M50.30 DDD (DEGENERATIVE DISC DISEASE), CERVICAL: ICD-10-CM

## 2025-02-04 DIAGNOSIS — M25.511 CHRONIC RIGHT SHOULDER PAIN: ICD-10-CM

## 2025-02-04 DIAGNOSIS — M25.561 CHRONIC PAIN OF RIGHT KNEE: ICD-10-CM

## 2025-02-04 DIAGNOSIS — M79.2 NEURALGIA AND NEURITIS: Primary | ICD-10-CM

## 2025-02-04 DIAGNOSIS — M13.0 POLYARTHRITIS: ICD-10-CM

## 2025-02-04 DIAGNOSIS — G89.29 CHRONIC PAIN OF RIGHT KNEE: ICD-10-CM

## 2025-02-04 DIAGNOSIS — R53.1 GENERALIZED WEAKNESS: ICD-10-CM

## 2025-02-04 DIAGNOSIS — G89.29 CHRONIC RIGHT SHOULDER PAIN: ICD-10-CM

## 2025-02-04 DIAGNOSIS — M17.10 PRIMARY OSTEOARTHRITIS OF KNEE, UNSPECIFIED LATERALITY: ICD-10-CM

## 2025-02-04 PROCEDURE — 99204 OFFICE O/P NEW MOD 45 MIN: CPT | Performed by: ANESTHESIOLOGY

## 2025-02-04 PROCEDURE — 20610 DRAIN/INJ JOINT/BURSA W/O US: CPT | Performed by: ANESTHESIOLOGY

## 2025-02-04 PROCEDURE — 99205 OFFICE O/P NEW HI 60 MIN: CPT | Performed by: ANESTHESIOLOGY

## 2025-02-04 PROCEDURE — 1123F ACP DISCUSS/DSCN MKR DOCD: CPT | Performed by: ANESTHESIOLOGY

## 2025-02-04 PROCEDURE — 1159F MED LIST DOCD IN RCRD: CPT | Performed by: ANESTHESIOLOGY

## 2025-02-04 RX ORDER — METHYLPREDNISOLONE ACETATE 40 MG/ML
30 INJECTION, SUSPENSION INTRA-ARTICULAR; INTRALESIONAL; INTRAMUSCULAR; SOFT TISSUE ONCE
Status: COMPLETED | OUTPATIENT
Start: 2025-02-04 | End: 2025-02-04

## 2025-02-04 RX ORDER — METHOCARBAMOL 750 MG/1
TABLET, FILM COATED ORAL
COMMUNITY
Start: 2025-01-30 | End: 2025-02-04

## 2025-02-04 RX ORDER — OXYCODONE HYDROCHLORIDE 5 MG/1
TABLET ORAL EVERY 8 HOURS PRN
COMMUNITY
Start: 2025-01-30 | End: 2025-02-04

## 2025-02-04 RX ORDER — BACLOFEN 5 MG/1
5 TABLET ORAL 2 TIMES DAILY PRN
Qty: 30 TABLET | Refills: 0 | Status: SHIPPED | OUTPATIENT
Start: 2025-02-04

## 2025-02-04 RX ORDER — OXYCODONE HYDROCHLORIDE 5 MG/1
2.5 TABLET ORAL 2 TIMES DAILY PRN
Qty: 20 TABLET | Refills: 0 | Status: SHIPPED | OUTPATIENT
Start: 2025-02-04 | End: 2025-02-24

## 2025-02-04 RX ORDER — BUPIVACAINE HYDROCHLORIDE 2.5 MG/ML
8 INJECTION, SOLUTION EPIDURAL; INFILTRATION; INTRACAUDAL ONCE
Status: COMPLETED | OUTPATIENT
Start: 2025-02-04 | End: 2025-02-04

## 2025-02-04 RX ORDER — PREGABALIN 50 MG/1
CAPSULE ORAL
COMMUNITY
Start: 2025-01-30 | End: 2025-02-04 | Stop reason: SDUPTHER

## 2025-02-04 RX ORDER — PREGABALIN 50 MG/1
50 CAPSULE ORAL 2 TIMES DAILY
Qty: 60 CAPSULE | Refills: 1 | Status: SHIPPED | OUTPATIENT
Start: 2025-02-04 | End: 2025-04-05

## 2025-02-04 RX ORDER — LIDOCAINE 4 G/G
1 PATCH TOPICAL DAILY PRN
COMMUNITY
Start: 2025-01-30

## 2025-02-04 RX ADMIN — METHYLPREDNISOLONE ACETATE 30 MG: 40 INJECTION, SUSPENSION INTRA-ARTICULAR; INTRALESIONAL; INTRAMUSCULAR; INTRASYNOVIAL; SOFT TISSUE at 16:31

## 2025-02-04 RX ADMIN — BUPIVACAINE HYDROCHLORIDE 20 MG: 2.5 INJECTION, SOLUTION EPIDURAL; INFILTRATION; INTRACAUDAL at 16:31

## 2025-02-04 RX ADMIN — METHYLPREDNISOLONE ACETATE 30 MG: 40 INJECTION, SUSPENSION INTRA-ARTICULAR; INTRALESIONAL; INTRAMUSCULAR; INTRASYNOVIAL; SOFT TISSUE at 16:33

## 2025-02-04 NOTE — PROGRESS NOTES
Gloria Schuler presents to the [facility] on 2/4/2025. Gloria is complaining of pain ***. The pain is {Desc; intermittent/persistent/constant:80646}. The pain is described as {PAIN ASSESSMENT:30785}. Pain is rated on her best day at a {HH 0-10:258930}, on her worst day at a {HH 0-10:830354}, and on average at a {HH 0-10:350077} on the VAS scale. She took her last dose of {Pain Meds:35984} ***.     Any procedures since your last visit: {YES/NO:20444}, with *** % relief.    Pacemaker or defibrillator: {YES/NO:20444} managed by ***.    She {IS/IS NOT:19932} on NSAIDS and {IS/IS NOT:19932} on anticoagulation medications to include {anticoagulants:65184} and is managed by ***.     Do you want someone present when the provider examines you? {YES/NO:20444}    Medication Contract and Consent for Opioid Use Documents Filed        No documents found                    There were no vitals taken for this visit.     No LMP recorded. Patient has had a hysterectomy.                                                                                                          Gloria Schuler presents to the [facility] on 2/4/2025. Gloria is complaining of pain ***. The pain is {Desc; intermittent/persistent/constant:37275}. The pain is described as {PAIN ASSESSMENT:04982}. Pain is rated on her best day at a {HH 0-10:028636}, on her worst day at a {HH 0-10:844429}, and on average at a {HH 0-10:586075} on the VAS scale. She took her last dose of {Pain Meds:06951} ***.      Any procedures since your last visit: {YES/NO:20444}, with *** % relief.    She {IS/IS NOT:19932} on NSAIDS and  {IS/IS NOT:19932} on anticoagulation medications to include {anticoagulants:05972} and is managed by ***.     Pacemaker or defibrillator: {YES/NO:20444} Physician managing device is 
      Patient:  Gloria Schuler,  1945  Date of Service:  25      Patient presents to [facility] with complaints of in her right hip, right knee, b/l shoulders  pain that started 6 months ago and has been getting worse.     She states the pain began following No specific cause; pt does report falling in Dec. 2024 that made pain worse. She fractured her right leg at this time.    Pain is constant and is described as sharp. She rates the pain as a 10/10 on her worst day , 7/10 on her best day, and a 7/10 on average on the VAS scale.     Pain does not radiate     Alleviating factors include: medication.  Aggravating factors include:  walking/pt unable to walk since pain began to be severe in 2024. She states that the pain does keep her from sleeping at night. She took her last dose of Lyrica, oxycodone, and Robaxin  today.     She is not on NSAIDS and  is  on anticoagulation medications to include ASA and is managed by Dr. Hahn  (A-fib).     Previous treatments: Physical Therapy (going to start in home therapy today) and medications..      Personal Expectations from this treatment: increase activity and decrease pain      Resp 18   Ht 1.626 m (5' 4.02\")   Wt 79.4 kg (175 lb)   BMI 30.02 kg/m²     No LMP recorded. Patient has had a hysterectomy.    
treatment plan as well as documenting on the day of the visit.    Kamaljit Ribeiro MD    2025    Patient: Gloria Schuler  :  1945  Age:  79 y.o.  Sex:  female     PRE-OPERATIVE DIAGNOSIS:   Right  Shoulder pain, osteoarthritis.  Right  Knee pain, osteoarthitis.    POST-OPERATIVE DIAGNOSIS: Same.    PROCEDURE PERFORMED:   1) Right  Shoulder steroid injection.  2) right knee steroid injection    SURGEON:   Kamaljit Ribeiro MD    ANESTHESIA: local    ESTIMATED BLOOD LOSS: None.    BRIEF HISTORY: After discussing the potential risks and benefits of the procedure with the patient.  Gloria did request that we proceed. A complete History & Physical was reviewed.    DESCRIPTION OF PROCEDURE:   1) The patient was placed in a seated position. The area of the Right  shoulder was prepped with chloraprep and draped in a sterile manner. Using the posterior approach, a 25 gauge 1-1/2 inch  needle was advanced  In anterolateral projection into the joint capsule. After negative aspiration a solution of 0.25 % marcaine 4 cc and 30 mg DepoMedrol was injected easily without complications. The needle was then removed and Band-Aid applied.    2) at this point of time, the area of the Right knee was prepped with chloraprep and draped in a sterile manner. Then the targeted area of the patellar tendon was palpated and marked.  A 25 gauge 1-1/2 inch needle was advanced into the joint capsule.  A solution of 0.25 % marcaine 4 cc and 30 mg DepoMedrol was injected easily without complications. The needle was then removed and Band-Aid applied.    Disposition the patient tolerated the procedure well and there were no complications .     Gloria will follow up in our comprehensive [facility] as scheduled. She was encouraged to call with questions, concerns or if worsening of symptoms occurs.    Kamaljit Ribeiro MD    CC:    [place] [address]     Saurav Peña

## 2025-02-07 ENCOUNTER — HOSPITAL ENCOUNTER (OUTPATIENT)
Dept: NEUROLOGY | Age: 80
Discharge: HOME | End: 2025-02-07

## 2025-02-07 VITALS — WEIGHT: 175 LBS | BODY MASS INDEX: 29.88 KG/M2 | HEIGHT: 64 IN

## 2025-02-07 NOTE — PROCEDURES
PROCEDURE NOTE  Date: 2/7/2025   Name: Gloria Schuler  YOB: 1945    Procedures:    [facility] [place]  Electrodiagnostic Laboratory  [place]        Full Name: Gloria Schuler Gender: Female  MRN: [id number] YOB: 1945  Location:: [facility]      Visit Date: 2/7/2025 11:44  Age: 79 Years 7 Months Old  Examining Physician: Dr. SON Gilbert   Referring Physician: Dr. Gilbert   Technician: Ashlie Soto   Height: 5 feet 4 inch  Weight: 175 lbs  Notes: DM: No  BMI:   Blood thinner: Asprin  L hand/leg weakness      Motor NCS      Nerve / Sites Lat. Amplitude Amp.1-2 Distance Lat Diff Velocity Temp.    ms mV % cm ms m/s °C   L Median - APB      Wrist 4.11 7.1 100 8   32.4      Elbow 8.07 6.8 95.4 19 3.96 48 32.5   L Ulnar - ADM      Wrist 4.11 3.7 100 8   32.5      B.Elbow 8.28 2.7 72.8 17 4.17 41 32.3      A.Elbow 11.56 2.3 61.6 10 3.28 30 32.3   L Peroneal - EDB      Ankle 4.11 4.0 100 8   31      Fib head 12.71 3.4 84.6 28 8.59 33 31      Pop fossa 14.74 3.3 83.5 10 2.03 49 31   L Tibial - AH      Ankle 5.16 7.6 100 8   31      Pop fossa 16.72 5.8 76.1 34 11.56 29 31       Sensory NCS      Nerve / Sites Onset Lat Peak Lat PP Amp Distance Peak Diff Velocity Temp.    ms ms µV cm ms m/s °C   L Median - Digit II (Antidromic)      Mid Palm 1.30 2.03 25.2 7  54 32.6      Wrist 2.92 3.70 24.0 14  48 32.7   L Ulnar - Digit V (Antidromic)      Wrist NR NR NR 14  NR 32.5   L Radial - Anatomical snuff box (Forearm)      Forearm 2.14 2.66 16.5 10  47 32.4   L Dorsal ulnar cutaneous - Hand dorsum (Forearm)      Forearm 2.24 3.02 8.2 8  36 32.5   L Sural - Ankle (Calf)      Calf NR NR NR 14  NR 31   L Superficial peroneal - Ankle      Lat leg NR NR NR 10  NR 31   L Median, Ulnar - Transcarpal comparison      Median Palm 1.51 1.98 78.5 8  53 32.5      Ulnar Palm 1.41 1.98 10.2 8  57 32.5        0.00  32.5       F  Wave      Nerve F Lat M Lat F-M Lat    ms ms ms   L Median - APB 27.3

## 2025-02-13 ENCOUNTER — OFFICE VISIT (OUTPATIENT)
Age: 80
End: 2025-02-13
Payer: MEDICARE

## 2025-02-13 ENCOUNTER — TELEPHONE (OUTPATIENT)
Age: 80
End: 2025-02-13

## 2025-02-13 VITALS
HEIGHT: 64 IN | WEIGHT: 175 LBS | DIASTOLIC BLOOD PRESSURE: 88 MMHG | SYSTOLIC BLOOD PRESSURE: 146 MMHG | BODY MASS INDEX: 29.88 KG/M2 | HEART RATE: 93 BPM

## 2025-02-13 DIAGNOSIS — G60.9 IDIOPATHIC PERIPHERAL NEUROPATHY: ICD-10-CM

## 2025-02-13 DIAGNOSIS — M48.02 SPINAL STENOSIS OF CERVICAL REGION WITH RADICULOPATHY: Primary | ICD-10-CM

## 2025-02-13 DIAGNOSIS — G56.22 ULNAR NEUROPATHY OF LEFT UPPER EXTREMITY: ICD-10-CM

## 2025-02-13 DIAGNOSIS — M54.16 LUMBAR RADICULOPATHY: ICD-10-CM

## 2025-02-13 DIAGNOSIS — M54.12 SPINAL STENOSIS OF CERVICAL REGION WITH RADICULOPATHY: Primary | ICD-10-CM

## 2025-02-13 PROCEDURE — 1159F MED LIST DOCD IN RCRD: CPT | Performed by: PSYCHIATRY & NEUROLOGY

## 2025-02-13 PROCEDURE — 1123F ACP DISCUSS/DSCN MKR DOCD: CPT | Performed by: PSYCHIATRY & NEUROLOGY

## 2025-02-13 PROCEDURE — 99214 OFFICE O/P EST MOD 30 MIN: CPT | Performed by: PSYCHIATRY & NEUROLOGY

## 2025-02-13 RX ORDER — NABUMETONE 500 MG/1
500 TABLET, FILM COATED ORAL 2 TIMES DAILY
Qty: 60 TABLET | Refills: 2 | Status: SHIPPED | OUTPATIENT
Start: 2025-02-13

## 2025-02-13 RX ORDER — BACLOFEN 10 MG/1
10 TABLET ORAL 3 TIMES DAILY
Qty: 90 TABLET | Refills: 2 | Status: SHIPPED | OUTPATIENT
Start: 2025-02-13

## 2025-02-13 NOTE — PROGRESS NOTES
Criselda Gilbert MD       Gloria Schuler is a 79 y.o. female presenting as a follow patient for a   Chief Complaint   Patient presents with    Follow-up    Results     EMG RESULTS          INTERIM HX:   THUAN SCAN: NML    Component      Latest Ref Rng 12/26/2024   Anti-Gill      NEGATIVE  NEGATIVE    Sjogren's Antibodies (SSA)      NEGATIVE  NEGATIVE    Sjogren's Antibodies (SSB)      NEGATIVE  NEGATIVE    Anti RNP      NEGATIVE  NEGATIVE    Julisa-1 Antibody      NEGATIVE  NEGATIVE    Scleroderma SCL-70      NEGATIVE  NEGATIVE    ELIESER      NEGATIVE  POSITIVE !    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 It is mild in degree in the upper extremities.   There is evidence of a superimposed left ulnar neuropathy, with mixed axonal loss and demyelination features, best localized to be across the left elbow, severe in degree electrically.   Chronic left C5/C6 motor radiculopathy, mild to moderate in degree based on the degree of motor fiber loss.   There is no evidence of a left lumbosacral motor radiculopathy.   There is no evidence of a left median neuropathy.        Ct cervical spine:   11/2024:   C3/C4: Central broad-based disc herniation is present with significant facet  arthropathy resulting in moderate central canal stenosis.  There is

## 2025-02-13 NOTE — TELEPHONE ENCOUNTER
Pt's daughter called to state that pt was up and walking with the walker when she was in the hospital 2 weeks ago. She states since she brought her home, she started smoking again then suddenly stopped walking again.     Asking if the smoking could have had any affect w/ her walking/circulation.

## 2025-02-14 DIAGNOSIS — G60.9 IDIOPATHIC PERIPHERAL NEUROPATHY: ICD-10-CM

## 2025-02-14 LAB
ALBUMIN (CALCULATED): 3.4 G/DL (ref 3.5–4.7)
ALPHA-1-GLOBULIN: 0.3 G/DL (ref 0.2–0.4)
ALPHA-2-GLOBULIN: 1 G/DL (ref 0.5–1)
BETA GLOBULIN: 1.3 G/DL (ref 0.8–1.3)
GAMMA GLOBULIN: 1.1 G/DL (ref 0.7–1.6)
HBA1C MFR BLD: 7.3 % (ref 4–5.6)
PATHOLOGIST REVIEW: NORMAL
PATHOLOGIST: ABNORMAL
PROTEIN ELECTROPHORESIS, SERUM: ABNORMAL
SERUM IFX INTERP: NORMAL
TOTAL PROTEIN: 7.1 G/DL (ref 6.4–8.3)

## 2025-02-17 LAB
P E INTERPRETATION, U: NORMAL
PATHOLOGIST: NORMAL
SPECIMEN TYPE: NORMAL
URINE IFX INTERP: NORMAL
URINE IFX SPECIMEN: NORMAL

## 2025-02-21 ENCOUNTER — PREP FOR PROCEDURE (OUTPATIENT)
Dept: PAIN MANAGEMENT | Age: 80
End: 2025-02-21

## 2025-02-21 ENCOUNTER — OFFICE VISIT (OUTPATIENT)
Dept: PAIN MANAGEMENT | Age: 80
End: 2025-02-21
Payer: MEDICARE

## 2025-02-21 VITALS
HEIGHT: 64 IN | HEART RATE: 74 BPM | OXYGEN SATURATION: 96 % | WEIGHT: 175 LBS | TEMPERATURE: 96.4 F | RESPIRATION RATE: 18 BRPM | SYSTOLIC BLOOD PRESSURE: 138 MMHG | DIASTOLIC BLOOD PRESSURE: 70 MMHG | BODY MASS INDEX: 29.88 KG/M2

## 2025-02-21 DIAGNOSIS — M47.817 LUMBOSACRAL SPONDYLOSIS WITHOUT MYELOPATHY: ICD-10-CM

## 2025-02-21 DIAGNOSIS — M48.061 SPINAL STENOSIS OF LUMBAR REGION, UNSPECIFIED WHETHER NEUROGENIC CLAUDICATION PRESENT: ICD-10-CM

## 2025-02-21 DIAGNOSIS — M50.30 DDD (DEGENERATIVE DISC DISEASE), CERVICAL: Primary | ICD-10-CM

## 2025-02-21 DIAGNOSIS — M79.2 NEURALGIA AND NEURITIS: ICD-10-CM

## 2025-02-21 DIAGNOSIS — M54.12 CERVICAL RADICULAR PAIN: Primary | ICD-10-CM

## 2025-02-21 DIAGNOSIS — G62.9 NEUROPATHY: ICD-10-CM

## 2025-02-21 DIAGNOSIS — M54.12 CERVICAL RADICULAR PAIN: ICD-10-CM

## 2025-02-21 DIAGNOSIS — M51.369 DEGENERATION OF INTERVERTEBRAL DISC OF LUMBAR REGION, UNSPECIFIED WHETHER PAIN PRESENT: ICD-10-CM

## 2025-02-21 DIAGNOSIS — M47.812 CERVICAL SPONDYLOSIS: ICD-10-CM

## 2025-02-21 PROCEDURE — 99214 OFFICE O/P EST MOD 30 MIN: CPT | Performed by: ANESTHESIOLOGY

## 2025-02-21 PROCEDURE — 1123F ACP DISCUSS/DSCN MKR DOCD: CPT | Performed by: ANESTHESIOLOGY

## 2025-02-21 PROCEDURE — 1159F MED LIST DOCD IN RCRD: CPT | Performed by: ANESTHESIOLOGY

## 2025-02-21 RX ORDER — PREGABALIN 100 MG/1
100 CAPSULE ORAL 2 TIMES DAILY
Qty: 60 CAPSULE | Refills: 1 | Status: SHIPPED | OUTPATIENT
Start: 2025-02-21 | End: 2025-04-22

## 2025-02-21 RX ORDER — SODIUM CHLORIDE 0.9 % (FLUSH) 0.9 %
5-40 SYRINGE (ML) INJECTION PRN
Status: CANCELLED | OUTPATIENT
Start: 2025-02-21

## 2025-02-21 RX ORDER — SODIUM CHLORIDE 0.9 % (FLUSH) 0.9 %
5-40 SYRINGE (ML) INJECTION EVERY 12 HOURS SCHEDULED
Status: CANCELLED | OUTPATIENT
Start: 2025-02-21

## 2025-02-21 RX ORDER — SODIUM CHLORIDE 9 MG/ML
INJECTION, SOLUTION INTRAVENOUS PRN
Status: CANCELLED | OUTPATIENT
Start: 2025-02-21

## 2025-02-21 NOTE — PROGRESS NOTES
Gloria Schuler presents to the [facility] on 2/21/2025. Gloria is complaining of pain arms, right hip, right groin, knees, hands. The pain is constant. The pain is described as numb. Pain is rated on her best day at a 8, on her worst day at a 9, and on average at a 8 on the VAS scale. She took her last dose of oxycodone this morning.      Any procedures since your last visit: No    She is  on NSAIDS and  is  on anticoagulation medications to include ASA and is managed by PCP.     Pacemaker or defibrillator: Yes Physician managing device is Dr Hahn.    Do you want someone present when the provider examines you? Yes    Medication Contract and Consent for Opioid Use Documents Filed        No documents found                       Resp 18   Ht 1.626 m (5' 4\")   Wt 79.4 kg (175 lb)   BMI 30.04 kg/m²      No LMP recorded. Patient has had a hysterectomy.

## 2025-02-21 NOTE — PROGRESS NOTES
[facility]  [address], [place] [address]  [phone]    Follow up Note      Gloria LAGUNAS Ganga     Date of Visit:  2/21/2025    CC:  Patient presents for follow up   Chief Complaint   Patient presents with    Follow-up     Right shoulder, right hip, right groin, right knee, arms and hands       HPI:    h/o diffuse pain and polyarthritis.     Patient has history of chronic pain on and off.     Apparently had acute exacerbation of pain in August 2024 associated with balance issue and weakness.     She was extensively evaluated at [facility] and [facility].     She is seen multiple neurosurgery team apparently no surgery indicated at this point of time.     She has also been evaluated extensively by neurology.  Most recently has been evaluated by Dr. Gilbert.     Initially when she noted the symptoms she had diffuse pain -sharp stabbing in nature.     She has been started on Lyrica daily which has helped her moderately.    The earlier sharp intense pain is reduced now.     She still notices intermittent pain especially in the beginning of the day.     Her main complaint today is multiple joint pain , neck pain and UE pain/ shoulder blade pain.   She has difficulty in moving.    She continues to have significant weakness of the upper extremity and lower extremity more on the right side compared to the left side.     She has tried Cymbalta - had side effects : Dc'd    She also had a short course of low-dose oxycodone which had helped her.     She needs assistant ambulation and uses a wheelchair.  She has a caregiver at home to help her.     Patient does not have new bladder or bowel dysfunction.     Pain causes functional limitations/ limits Adl's : Yes     Can't get MRI due to intracranial aneurysm clip.    Previous treatments:   Physical Therapy : yes,  HEP yes     Medications: - NSAID's : yes                        - Membrane stabilizers : yes

## 2025-02-21 NOTE — H&P (VIEW-ONLY)
reviewed as detailed above.     Pertinent consult notes and image findings and lab results reviewed. + ELIESER, Elevated ESR- noted.     Her daughter who lives in [place] participated in the entire visit over the telephone.  Her ex- and caregiver were also present for the entire visit.     NOTE: Can't get MRI due to prior intracranial aneurysm clipping    Had EMG - cervical radic, ulnar neuropathy.    S/P Right shoulder injection and right knee injection- had helped.    Neck pain and UE pain.    Recent EMG reviewed.    PLAN:  Cervical JOHN under fluoroscopic guidance. RBA discussed.    Need to hold ASA.    Currently taking Lyrica 50 mg TID. Tolerating well.    Can increase to 100 mg bid.Use instructions and side effects explained.     Consult rheumatology for polyarthritis.     Short course of oxycodone for as needed use.  Recommend to take 0.5 tabs once or twice a day as needed.   RBA of opioid use discussed.  Side effects discussed.  This is to help with pain and functionality.  Recommend he take it only for severe pain.     Compound cream - side effects- DC'd     Short course of baclofen for as needed use for muscle spasm.     Continue PT/HEP. Apparently patient is getting home PT.     He will continue follow-up with neurosurgery/NSG.     Counseling : Patient encouraged to stay active and to continue Regular home exercise program as tolerated - stretching / strengthening.     Treatment plan discussed with the patient including medication and procedure side effects.     Controlled Substances Monitoring: [place] reviewed.     We discussed with the patient that combining opioids, benzodiazepines, alcohol, illicit drugs or sleep aids increases the risk of respiratory depression including death. We discussed that these medications may cause drowsiness, sedation or dizziness and have counseled the patient not to drive or operate machinery. We have discussed that these medications will be prescribed only by one provider. We

## 2025-02-26 ENCOUNTER — TELEPHONE (OUTPATIENT)
Dept: PAIN MANAGEMENT | Age: 80
End: 2025-02-26

## 2025-02-26 DIAGNOSIS — M54.12 CERVICAL RADICULOPATHY: ICD-10-CM

## 2025-02-26 NOTE — TELEPHONE ENCOUNTER
Call to Gloria LAGUNAS Sydneyshivani & spoke with daughter Elly that procedure was scheduled for 03/06/2025 and that [facility] should call her a few days before for the pre op call and between 2:00 PM and 4:00 PM  the business day before with the arrival time. Instructed Elly that Gloria is to hold ibuprofen for 24 hours, Celebrex, Mobic, and naprosyn for 4 days, Relafen for 6 days, and any aspirin containing products, CoQ 10, or fish oil for 7 days. Instructed to call office back if any questions. Elly verbalized understanding.    Electronically signed by Daria Glover RN on 2/26/2025 at 4:09 PM

## 2025-02-27 ENCOUNTER — OFFICE VISIT (OUTPATIENT)
Dept: ORTHOPEDIC SURGERY | Age: 80
End: 2025-02-27
Payer: MEDICARE

## 2025-02-27 ENCOUNTER — TELEPHONE (OUTPATIENT)
Dept: RHEUMATOLOGY | Age: 80
End: 2025-02-27

## 2025-02-27 VITALS — BODY MASS INDEX: 29.88 KG/M2 | WEIGHT: 175 LBS | HEIGHT: 64 IN

## 2025-02-27 DIAGNOSIS — G56.22 CUBITAL TUNNEL SYNDROME ON LEFT: Primary | ICD-10-CM

## 2025-02-27 PROCEDURE — 99203 OFFICE O/P NEW LOW 30 MIN: CPT | Performed by: ORTHOPAEDIC SURGERY

## 2025-02-27 PROCEDURE — 1125F AMNT PAIN NOTED PAIN PRSNT: CPT | Performed by: ORTHOPAEDIC SURGERY

## 2025-02-27 PROCEDURE — 1123F ACP DISCUSS/DSCN MKR DOCD: CPT | Performed by: ORTHOPAEDIC SURGERY

## 2025-02-27 PROCEDURE — 1159F MED LIST DOCD IN RCRD: CPT | Performed by: ORTHOPAEDIC SURGERY

## 2025-02-27 NOTE — TELEPHONE ENCOUNTER
02/27/25  Patient's daughter Radha called and asked if we could make Mela's appt with Dr. Brewer in order to get her in sooner and also at the advise of two of her other doctors' suggestions.  Patient is now scheduled with Dr. Brewer on 03/24/25.  Daughter has been notified.

## 2025-02-27 NOTE — PROGRESS NOTES
Chief Complaint   Patient presents with    Elbow Pain     Patient states there is a pinch nerve in the L elbow from carpal / cubital tunnel release surgery 5-10 years ago . She describes the pain as a burning numbness throughout the arm into the L pinky / ring finger. Patient treats the pain with oxycodone / Tylenol as needed. She admits to being R hand dominant.Patient rates the pain level at a 5/10.        Gloria Schuler  female  presents today for evaluation of her left elbow pain.  The patient states the pain started  a few years ago.  The pain is burning, sharp, and shooting in nature. She complains of numbness and tingling in the entire hand.  It is worse with movement and better with rest. She stated she had CTR and cubital tunnel release done years ago.  The patient does complain of night pain.  The patient is right hand dominant.  The patient is not working at this time.      Past Medical History:   Diagnosis Date    A-fib (HCC)     PACEMAKER AND WATCHMAN    Anxiety and depression     Arthritis     Artificial cardiac pacemaker     Brain aneurysm     s/p surgery    Elevated hemoglobin A1c     Essential tremor     HTN (hypertension)     Kidney stones     Lumbar radiculopathy     Shingles     recurrent    Ulcer      Past Surgical History:   Procedure Laterality Date    BRAIN ANEURYSM SURGERY  1993    clipping    CARDIAC ASSIST DEVICE INSERTION  08/03/2020    Watchman inserted     CARPAL TUNNEL RELEASE      pinched nerve in left arm, had surgery    CATARACT REMOVAL WITH IMPLANT Right 11/22/2016    COLONOSCOPY W/ POLYPECTOMY      ENDOSCOPY, COLON, DIAGNOSTIC      HEMORRHOID SURGERY      HYSTERECTOMY (CERVIX STATUS UNKNOWN)      INTRACAPSULAR CATARACT EXTRACTION Left 10/12/2021    LEFT CATARACT EXTRACTION WITH IOL performed by Wally TSANG MD at [facility] OR    LITHOTRIPSY      PACEMAKER INSERTION N/A 05/08/2019    PACEMAKER INSERTION ([facility]) performed by Brooks Hahn MD at [facility] OR

## 2025-02-28 LAB
ALBUMIN: 4.1 G/DL (ref 3.5–5.2)
ALP BLD-CCNC: 89 U/L (ref 35–104)
ALT SERPL-CCNC: 24 U/L (ref 0–32)
ANION GAP SERPL CALCULATED.3IONS-SCNC: 21 MMOL/L (ref 7–16)
AST SERPL-CCNC: 21 U/L (ref 0–31)
BASOPHILS ABSOLUTE: 0.06 K/UL (ref 0–0.2)
BASOPHILS RELATIVE PERCENT: 1 % (ref 0–2)
BILIRUB SERPL-MCNC: 0.5 MG/DL (ref 0–1.2)
BUN BLDV-MCNC: 24 MG/DL (ref 6–23)
CALCIUM SERPL-MCNC: 10.4 MG/DL (ref 8.6–10.2)
CHLORIDE BLD-SCNC: 105 MMOL/L (ref 98–107)
CHOLESTEROL, TOTAL: 212 MG/DL
CO2: 20 MMOL/L (ref 22–29)
CREAT SERPL-MCNC: 0.5 MG/DL (ref 0.5–1)
EOSINOPHILS ABSOLUTE: 0.18 K/UL (ref 0.05–0.5)
EOSINOPHILS RELATIVE PERCENT: 3 % (ref 0–6)
GFR, ESTIMATED: >90 ML/MIN/1.73M2
GLUCOSE BLD-MCNC: 161 MG/DL (ref 74–99)
HBA1C MFR BLD: 7.3 % (ref 4–5.6)
HCT VFR BLD CALC: 45.1 % (ref 34–48)
HDLC SERPL-MCNC: 58 MG/DL
HEMOGLOBIN: 14.8 G/DL (ref 11.5–15.5)
IMMATURE GRANULOCYTES %: 1 % (ref 0–5)
IMMATURE GRANULOCYTES ABSOLUTE: 0.03 K/UL (ref 0–0.58)
LDL CHOLESTEROL: 126 MG/DL
LYMPHOCYTES ABSOLUTE: 1.98 K/UL (ref 1.5–4)
LYMPHOCYTES RELATIVE PERCENT: 31 % (ref 20–42)
MAGNESIUM: 2.2 MG/DL (ref 1.6–2.6)
MCH RBC QN AUTO: 33.6 PG (ref 26–35)
MCHC RBC AUTO-ENTMCNC: 32.8 G/DL (ref 32–34.5)
MCV RBC AUTO: 102.5 FL (ref 80–99.9)
MONOCYTES ABSOLUTE: 0.42 K/UL (ref 0.1–0.95)
MONOCYTES RELATIVE PERCENT: 7 % (ref 2–12)
NEUTROPHILS ABSOLUTE: 3.81 K/UL (ref 1.8–7.3)
NEUTROPHILS RELATIVE PERCENT: 59 % (ref 43–80)
PDW BLD-RTO: 12.6 % (ref 11.5–15)
PLATELET # BLD: 200 K/UL (ref 130–450)
PMV BLD AUTO: 11.6 FL (ref 7–12)
POTASSIUM SERPL-SCNC: 4.5 MMOL/L (ref 3.5–5)
RBC # BLD: 4.4 M/UL (ref 3.5–5.5)
SODIUM BLD-SCNC: 146 MMOL/L (ref 132–146)
TOTAL PROTEIN: 7 G/DL (ref 6.4–8.3)
TRIGL SERPL-MCNC: 139 MG/DL
TSH SERPL DL<=0.05 MIU/L-ACNC: 1.63 UIU/ML (ref 0.27–4.2)
VLDLC SERPL CALC-MCNC: 28 MG/DL
WBC # BLD: 6.5 K/UL (ref 4.5–11.5)

## 2025-03-06 ENCOUNTER — TELEPHONE (OUTPATIENT)
Dept: PAIN MANAGEMENT | Age: 80
End: 2025-03-06

## 2025-03-06 ENCOUNTER — HOSPITAL ENCOUNTER (OUTPATIENT)
Dept: GENERAL RADIOLOGY | Age: 80
Setting detail: OUTPATIENT SURGERY
Discharge: HOME | End: 2025-03-08
Attending: ANESTHESIOLOGY

## 2025-03-06 DIAGNOSIS — R52 PAIN MANAGEMENT: ICD-10-CM

## 2025-03-06 DIAGNOSIS — M50.30 DDD (DEGENERATIVE DISC DISEASE), CERVICAL: Primary | ICD-10-CM

## 2025-03-06 DIAGNOSIS — M54.12 CERVICAL RADICULOPATHY: Primary | ICD-10-CM

## 2025-03-06 RX ORDER — OXYCODONE HYDROCHLORIDE 5 MG/1
2.5 TABLET ORAL 2 TIMES DAILY PRN
Qty: 30 TABLET | Refills: 0 | Status: SHIPPED | OUTPATIENT
Start: 2025-03-06 | End: 2025-04-05

## 2025-03-06 NOTE — TELEPHONE ENCOUNTER
Call to Gloria Schuler and Elly that procedure was scheduled for 3/11/2025 and that [facility] should call her a few days before for the pre op call and after 3:00 PM the business day before with the arrival time. Instructed Gloria to hold ibuprofen for 24 hours, Celebrex, Mobic, and naprosyn for 4 days and any aspirin containing products, CoQ 10, or fish oil for 7 days. Instructed to call office back if any questions. Lilia verbalized understanding.    Electronically signed by Naman Dumont RN on 3/6/2025 at 1:20 PM

## 2025-03-09 ENCOUNTER — RESULTS FOLLOW-UP (OUTPATIENT)
Age: 80
End: 2025-03-09

## 2025-03-11 ENCOUNTER — HOSPITAL ENCOUNTER (OUTPATIENT)
Dept: OPERATING ROOM | Age: 80
Setting detail: OUTPATIENT SURGERY
Discharge: HOME | End: 2025-03-11
Attending: ANESTHESIOLOGY
Payer: MEDICARE

## 2025-03-11 ENCOUNTER — HOSPITAL ENCOUNTER (OUTPATIENT)
Age: 80
Setting detail: OUTPATIENT SURGERY
Discharge: HOME | End: 2025-03-11
Attending: ANESTHESIOLOGY | Admitting: ANESTHESIOLOGY
Payer: MEDICARE

## 2025-03-11 VITALS
HEIGHT: 64 IN | RESPIRATION RATE: 18 BRPM | HEART RATE: 64 BPM | WEIGHT: 175 LBS | BODY MASS INDEX: 29.88 KG/M2 | OXYGEN SATURATION: 95 % | DIASTOLIC BLOOD PRESSURE: 72 MMHG | SYSTOLIC BLOOD PRESSURE: 134 MMHG | TEMPERATURE: 97.2 F

## 2025-03-11 DIAGNOSIS — M54.12 CERVICAL RADICULOPATHY, CHRONIC: ICD-10-CM

## 2025-03-11 PROCEDURE — 7100000011 HC PHASE II RECOVERY - ADDTL 15 MIN: Performed by: ANESTHESIOLOGY

## 2025-03-11 PROCEDURE — 6360000004 HC RX CONTRAST MEDICATION: Performed by: ANESTHESIOLOGY

## 2025-03-11 PROCEDURE — 62321 NJX INTERLAMINAR CRV/THRC: CPT | Performed by: ANESTHESIOLOGY

## 2025-03-11 PROCEDURE — 2709999900 HC NON-CHARGEABLE SUPPLY: Performed by: ANESTHESIOLOGY

## 2025-03-11 PROCEDURE — 3600000015 HC SURGERY LEVEL 5 ADDTL 15MIN: Performed by: ANESTHESIOLOGY

## 2025-03-11 PROCEDURE — 7100000010 HC PHASE II RECOVERY - FIRST 15 MIN: Performed by: ANESTHESIOLOGY

## 2025-03-11 PROCEDURE — 6360000002 HC RX W HCPCS: Performed by: ANESTHESIOLOGY

## 2025-03-11 PROCEDURE — 2500000003 HC RX 250 WO HCPCS: Performed by: ANESTHESIOLOGY

## 2025-03-11 PROCEDURE — 3600000005 HC SURGERY LEVEL 5 BASE: Performed by: ANESTHESIOLOGY

## 2025-03-11 RX ORDER — DEXAMETHASONE SODIUM PHOSPHATE 10 MG/ML
INJECTION, SOLUTION INTRAMUSCULAR; INTRAVENOUS PRN
Status: DISCONTINUED | OUTPATIENT
Start: 2025-03-11 | End: 2025-03-11 | Stop reason: ALTCHOICE

## 2025-03-11 RX ORDER — LIDOCAINE HYDROCHLORIDE 5 MG/ML
INJECTION, SOLUTION INFILTRATION; INTRAVENOUS PRN
Status: DISCONTINUED | OUTPATIENT
Start: 2025-03-11 | End: 2025-03-11 | Stop reason: ALTCHOICE

## 2025-03-11 RX ORDER — SODIUM CHLORIDE 0.9 % (FLUSH) 0.9 %
SYRINGE (ML) INJECTION PRN
Status: DISCONTINUED | OUTPATIENT
Start: 2025-03-11 | End: 2025-03-11 | Stop reason: ALTCHOICE

## 2025-03-11 ASSESSMENT — PAIN - FUNCTIONAL ASSESSMENT
PAIN_FUNCTIONAL_ASSESSMENT: PREVENTS OR INTERFERES SOME ACTIVE ACTIVITIES AND ADLS
PAIN_FUNCTIONAL_ASSESSMENT: 0-10
PAIN_FUNCTIONAL_ASSESSMENT: NONE - DENIES PAIN

## 2025-03-11 ASSESSMENT — PAIN DESCRIPTION - DESCRIPTORS: DESCRIPTORS: ACHING;BURNING;NAGGING

## 2025-03-11 NOTE — INTERVAL H&P NOTE
Update History & Physical    The patient's History and Physical of February 21, 2025 was reviewed with the patient and I examined the patient. There was no change. The surgical site was confirmed by the patient and me.     Plan: The risks, benefits, expected outcome, and alternative to the recommended procedure have been discussed with the patient. Patient understands and wants to proceed with the procedure.     Electronically signed by Kamaljit Ribeiro MD on 3/11/2025

## 2025-03-11 NOTE — DISCHARGE INSTRUCTIONS
[facility] Department  [phone]   Post-Pain Block/ Radiofrequency Home Going Instructions    1-Go home, rest for the remainder of the day  2-Please do not lift over 20 pounds the day of the injection  3-If you received sedation No: alcohol, driving, operating lawn mowers, plows, tractors or other dangerous equipment until next morning. Do not make important decisions or sign legal documents for 24 hours. You may experience light headedness, dizziness, nausea or sleepiness after sedation. Do not stay alone. A responsible adult must be with you for 24 hours. You could be nauseated from the medications you have received. Your IV site may be sore and bruised.    4-No dietary restrictions     5-Resume all medications the same day, blood thinners to be resumed 24 hours after injection    6-Keep the surgical site clean and dry, you may shower the next morning and remove the      dressing.     7- No sitz baths, tub baths or hot tubs/swimming for 24 hours.       8- If you have any pain at the injection site(s), application of an ice pack to the area should be       helpful, 20 minutes on/20 minutes off for next 48 hours.  9- Call [facility] pain management immediately at if you develop.  Fever greater than 100.4 F  Have bleeding or drainage from the puncture site  Have progressive Leg/arm numbness and or weakness  Loss of control of bowel and or bladder (wet/soil yourself)  Severe headache with inability to lift head  10-You may return to work the next day

## 2025-03-11 NOTE — OP NOTE
Operative Note      Patient: Gloria Schuler  YOB: 1945  MRN: [id number]    Date of Procedure: 3/11/2025    Pre-Op Diagnosis Codes:      * Cervical radiculopathy [M54.12]    Post-Op Diagnosis: Same       Procedure(s):  CERVICAL 7-THORACIC 1 STEROID INJECTION RIGHT PARAMEDIAN UNDER FLUOROSCOPIC GUIDANCE    Surgeon(s):  Kamaljit Ribeiro MD    Assistant:   * No surgical staff found *    Anesthesia: Local    Estimated Blood Loss (mL): Minimal    Complications: None    Specimens:   * No specimens in log *    Implants:  * No implants in log *      Drains: * No LDAs found *    Findings:  Infection Present At Time Of Surgery (PATOS) (choose all levels that have infection present):  No infection present  Other Findings: good needle placement    Detailed Description of Procedure:   3/11/2025    Patient: Gloria Schuler  :  1945  Age:  79 y.o.  Sex:  female     PRE-PROCEDURE DIAGNOSIS: Cervical degenerative disc disease, cervical radicular pain.    POST-PROCEDURE DIAGNOSIS: Same.    PROCEDURE: Fluoroscopic guided cervical epidural steroid injection, #1 at C7-T1 level.    SURGEON: Kamaljit Ribeiro MD    ANESTHESIA: Local    ESTIMATED BLOOD LOSS: None.  ______________________________________________________________________  BRIEF HISTORY:  Gloria Schuler comes in today for the first  therapeutic cervical epidural injection under fluoroscopic guidance. The potential complications of this procedure were discussed with the her again today.  She has elected to undergo the aforementioned procedure.    Gloria’s complete History & Physical examination were reviewed in depth, a copy of which is in the chart.      DESCRIPTION OF PROCEDURE:    After confirming written and informed consent, a time-out was performed and Gloria’s name and date of birth, the procedure to be performed as well as the plan for the location of the needle insertion were confirmed.    The patient was brought into the procedure

## 2025-03-13 ENCOUNTER — TELEPHONE (OUTPATIENT)
Age: 80
End: 2025-03-13

## 2025-03-13 DIAGNOSIS — M54.12 SPINAL STENOSIS OF CERVICAL REGION WITH RADICULOPATHY: Primary | ICD-10-CM

## 2025-03-13 DIAGNOSIS — M54.16 LUMBAR RADICULOPATHY: ICD-10-CM

## 2025-03-13 DIAGNOSIS — M48.02 SPINAL STENOSIS OF CERVICAL REGION WITH RADICULOPATHY: Primary | ICD-10-CM

## 2025-03-13 NOTE — TELEPHONE ENCOUNTER
Lilia a nurse for Gloria called in asking if Dr. Gilbert is willing to put in an External referral for for pt. To continue her OT for her DDD due to her PCP Dr. Peña office refusing to renew it for pt.

## 2025-03-20 ENCOUNTER — TELEPHONE (OUTPATIENT)
Age: 80
End: 2025-03-20

## 2025-03-24 ENCOUNTER — OFFICE VISIT (OUTPATIENT)
Dept: RHEUMATOLOGY | Age: 80
End: 2025-03-24
Payer: MEDICARE

## 2025-03-24 VITALS
SYSTOLIC BLOOD PRESSURE: 123 MMHG | BODY MASS INDEX: 29.88 KG/M2 | DIASTOLIC BLOOD PRESSURE: 77 MMHG | WEIGHT: 175 LBS | HEIGHT: 64 IN | HEART RATE: 81 BPM

## 2025-03-24 DIAGNOSIS — G62.9 PERIPHERAL POLYNEUROPATHY: Primary | ICD-10-CM

## 2025-03-24 DIAGNOSIS — M15.9 POLYARTICULAR OSTEOARTHRITIS: ICD-10-CM

## 2025-03-24 PROCEDURE — 1160F RVW MEDS BY RX/DR IN RCRD: CPT | Performed by: STUDENT IN AN ORGANIZED HEALTH CARE EDUCATION/TRAINING PROGRAM

## 2025-03-24 PROCEDURE — 1123F ACP DISCUSS/DSCN MKR DOCD: CPT | Performed by: STUDENT IN AN ORGANIZED HEALTH CARE EDUCATION/TRAINING PROGRAM

## 2025-03-24 PROCEDURE — 1159F MED LIST DOCD IN RCRD: CPT | Performed by: STUDENT IN AN ORGANIZED HEALTH CARE EDUCATION/TRAINING PROGRAM

## 2025-03-24 PROCEDURE — 99204 OFFICE O/P NEW MOD 45 MIN: CPT | Performed by: STUDENT IN AN ORGANIZED HEALTH CARE EDUCATION/TRAINING PROGRAM

## 2025-03-24 NOTE — PROGRESS NOTES
Gloria is here as a new patient for Arthralgia, PMR possibly(Hx of OA). Patient states she has neuropathy that is causing her problems. States she is having pain in her arms but doesn't know if its muscle or joint pain.

## 2025-04-11 ENCOUNTER — OFFICE VISIT (OUTPATIENT)
Dept: PAIN MANAGEMENT | Age: 80
End: 2025-04-11

## 2025-04-11 VITALS
SYSTOLIC BLOOD PRESSURE: 110 MMHG | RESPIRATION RATE: 18 BRPM | DIASTOLIC BLOOD PRESSURE: 60 MMHG | OXYGEN SATURATION: 95 % | WEIGHT: 175 LBS | HEART RATE: 68 BPM | HEIGHT: 64 IN | TEMPERATURE: 96.7 F | BODY MASS INDEX: 29.88 KG/M2

## 2025-04-11 DIAGNOSIS — M54.12 CERVICAL RADICULAR PAIN: ICD-10-CM

## 2025-04-11 DIAGNOSIS — M79.2 NEURALGIA AND NEURITIS: ICD-10-CM

## 2025-04-11 DIAGNOSIS — M50.30 DDD (DEGENERATIVE DISC DISEASE), CERVICAL: Primary | ICD-10-CM

## 2025-04-11 DIAGNOSIS — M70.60 TROCHANTERIC BURSITIS, UNSPECIFIED LATERALITY: ICD-10-CM

## 2025-04-11 DIAGNOSIS — M48.061 SPINAL STENOSIS OF LUMBAR REGION, UNSPECIFIED WHETHER NEUROGENIC CLAUDICATION PRESENT: ICD-10-CM

## 2025-04-11 DIAGNOSIS — M51.369 DEGENERATION OF INTERVERTEBRAL DISC OF LUMBAR REGION, UNSPECIFIED WHETHER PAIN PRESENT: ICD-10-CM

## 2025-04-11 NOTE — PROGRESS NOTES
Gloria Schuler presents to the [facility] on 4/11/2025. Gloria is complaining of pain right hip. Bilateral hands, arms and legs. The pain is intermittent. The pain is described as stabbing, sharp, numb, pins and needles. Pain is rated on her best day at a 6, on her worst day at a 10, and on average at a 6 on the VAS scale. She took her last dose of Lyrica, Relafen, and Baclofen  today, Baclofen @ [facility].      Any procedures since your last visit: Yes, with 40 % relief.    She is  on NSAIDS and  is  on anticoagulation medications to include ASA and is managed by Dr. Hahn.     Pacemaker or defibrillator: Yes Physician managing device is Dr. Hahn.Watchman    Do you want someone present when the provider examines you? No    Medication Contract and Consent for Opioid Use Documents Filed        No documents found                       Resp 18   Ht 1.626 m (5' 4\")   Wt 79.4 kg (175 lb)   BMI 30.04 kg/m²      No LMP recorded. Patient has had a hysterectomy.

## 2025-04-11 NOTE — PROGRESS NOTES
[facility]  [address], [place] [address]  [phone]    Follow up Note      Gloria LAGUNAS Ganga     Date of Visit:  4/11/2025    CC:  Patient presents for follow up   Chief Complaint   Patient presents with    Follow-up     CERVICAL 7-THORACIC 1 STEROID INJECTION RIGHT PARAMEDIAN UNDER FLUOROSCOPIC GUIDANCE       HPI:  h/o diffuse pain and polyarthritis.     Patient has history of chronic pain on and off.     Apparently had acute exacerbation of pain in August 2024 associated with balance issue and weakness.     She was extensively evaluated at [facility].     She is seen multiple neurosurgery team apparently no surgery indicated at this point of time.     She has also been evaluated extensively by neurology.  Most recently has been evaluated by Dr. Gilbert.     Initially when she noted the symptoms she had diffuse pain -sharp stabbing in nature.     She has been started on Lyrica daily which has helped her moderately.    The earlier sharp intense pain is reduced now.     She still notices intermittent pain especially in the beginning of the day.     Her main complaint today is multiple joint pain , neck pain and UE pain/ shoulder blade pain.   She has difficulty in moving.    She continues to have significant weakness of the upper extremity and lower extremity more on the right side compared to the left side.     She has tried Cymbalta - had side effects : Dc'd    She also had a short course of low-dose oxycodone which had helped her.     She needs assistant ambulation and uses a wheelchair.  She has a caregiver at home to help her.     Patient does not have new bladder or bowel dysfunction.     Pain causes functional limitations/ limits Adl's : Yes     Can't get MRI due to intracranial aneurysm clip.    Previous treatments:   Physical Therapy : yes,  HEP yes     Medications: - NSAID's : yes                        - Membrane

## 2025-04-14 ENCOUNTER — TELEPHONE (OUTPATIENT)
Dept: PHYSICAL THERAPY | Age: 80
End: 2025-04-14

## 2025-04-14 PROBLEM — M54.16 LUMBAR RADICULOPATHY: Status: ACTIVE | Noted: 2025-04-14

## 2025-04-14 PROBLEM — M48.02 SPINAL STENOSIS OF CERVICAL REGION WITH RADICULOPATHY: Status: ACTIVE | Noted: 2025-04-14

## 2025-04-14 PROBLEM — M54.12 SPINAL STENOSIS OF CERVICAL REGION WITH RADICULOPATHY: Status: ACTIVE | Noted: 2025-04-14

## 2025-04-14 NOTE — TELEPHONE ENCOUNTER
Date: 2025       Patient Name: Gloria Schuler  : 1945      MRN: [id number]    Patient canceled PT evaluation  appointment scheduled for today  at 1345 due to illness.    Lb Johnson, PT

## 2025-04-16 ENCOUNTER — TELEPHONE (OUTPATIENT)
Dept: PHYSICAL THERAPY | Age: 80
End: 2025-04-16

## 2025-04-21 ENCOUNTER — TELEPHONE (OUTPATIENT)
Age: 80
End: 2025-04-21

## 2025-04-21 DIAGNOSIS — M54.12 SPINAL STENOSIS OF CERVICAL REGION WITH RADICULOPATHY: Primary | ICD-10-CM

## 2025-04-21 DIAGNOSIS — M54.16 LUMBAR RADICULOPATHY: ICD-10-CM

## 2025-04-21 DIAGNOSIS — M48.02 SPINAL STENOSIS OF CERVICAL REGION WITH RADICULOPATHY: Primary | ICD-10-CM

## 2025-04-21 NOTE — TELEPHONE ENCOUNTER
Cristin the home care OT nurse called stating pt. Is being discharged frome home care OT and now pt. Needs a new order for outpatient PT order put in please.

## 2025-04-24 NOTE — TELEPHONE ENCOUNTER
Placed a referral to OT  Can be patient's choice of location      Criselda Gilbert MD     Speech Therapy      Visit Type: Evaluation  -  Clinical swallow and communication/cognition    Precautions     - Oxygen: room air.      - Lines in chart and on patient reviewed; cautions maintained through out session    - Cognition:         • Expression is verbal.          • Following commands impaired.          • Safety judgement impaired.          • Awareness of deficits impaired.          • Problem solving is impaired.    - Affect/Behavior: alert and cooperative    Current Diet: general and thin liquids      - Dentition: upper dentures and lower dentures    - Feeding: feeds self    SUBJECTIVE  Patient was agreeable for speech therapy. Video  was used during the session. Collaborated with CRISTINO Lo.   Patient/family goals: return home     OBJECTIVE     Oral Mechanism   Oral Motor Assessment: intact    Communication/Cognition:  Orientation Level:  Oriented to person  Oriented to place  Oriented to month  Oriented to year  Not oriented to date  Not oriented to day of week  Oriented to season  Oriented to reason for hospitalization  Oriented to length of stay  Behavior/Social Interaction:  cooperates with tasks intact (>90% accuracy) maintains eye contact intact (>90% accuracy) pragmatics intact (>90% accuracy) appropriate behavior intact (>90% accuracy)  Expression-Verbal: No impairment  Naming:  Impairmentconfrontation intact (>90% accuracy) generative naming moderate impairment (50-74% accuracy)   Auditory Comprehension:  Yes/No questions 2 unit intact (>90% accuracy) commands 2 unit mild impairment (75-89% accuracy)   Attention:  divided attention moderate impairment (50-74% accuracy) selective attention moderate impairment (50-74% accuracy)  sustained attention moderate impairment (50-74% accuracy)  Memory:   people memory moderate impairment (50-74% accuracy) immediate memory moderate impairment (50-74% accuracy) delayed memory  Severe impairment (25-49% accuracy)   Problem Solving:  simple  problem solving mild impairment (75-89% accuracy) complex problem solving severe impairment (25-49% accuracy)   Numerical Reasoning:  simple calculation mild impairment (75-89% accuracy) complex calculations severe impairment (25-49% accuracy)     Swallowing   No temperature spike noted.  Patient positioning: partially reclined  Pretrial vocal quality: normal    Consistencies:  Thin Liquid (cup):    - Amount given:Juice   - Oral: intact     - Pharyngeal: impaired, suspect delayed swallow response and delayed cough  Dysphagia 3/Easy Chew:    - Amount given: Kyle cracker     - Oral: intact   - Pharyngeal: intact          Test and Outcome Measures  MOCA Egyptian ([place] Cognitive Assessment)    Visuospatial/Executive 0/5  Naming 2/3  Attention 2/6  Language 2/3  Abstraction 0/2  Memory/Delayed Recall 0/5  Orientation 4/6  +1 for edu <12 years  Total 11/30  Score >25 = Normal Cognitive Functioning     ASSESSMENT                                                                        • Impairments: swallowing, memory, attention/concentration and problem solving/executive function  • Functional Limitations: eating/drinking and communication/cognition  • Skilled therapy is required to address these limitations in attempt to maximize the patient's independence. and is required to establish safe means of nutrition, hydration and medication administration  • The patient was seen on 10 LM.    Speech therapy consulted for stroke work up. [facility] with easy chew diet, thin liquids, recommend follow up OP speech therapy for comm/cog deficits and swallowing.     Bedside swallow evaluation was completed. Oral mechanism examination reveal intact structures and function. Patient donned dentures prior session. Patient was challenged with thin liquids and easy chew. Patient with timely transit and adequate bolus manipulation, suspect delayed swallow response upon palpation. 1x delayed  dry cough with thin liquids noted. No further ss of aspiration was noted. Recommend to continue current diet. Speech therapy will follow up to assess tolerance with current diet due to delayed cough. RN was informed on outcome of the session and recommendation.    Communication/cognition evaluation completed.  Patient presents with intact functioning of repetition, automatic speech, confrontation naming, sentence formation and motor speech .  moderate-severe deficits present in memory, attention, problem solving, insight of deficits and executive functioning.  These deficits are impacting the patient's ability to complete I/ADLs independently .  Speech therapy to follow up to complete communication/cognition treatment to improve functioning in order to return to baseline.    On this date, the patient and care partner educated on diet recommendation, comm/cog, plan of care.    The response to education: Verbalizes understanding    Requires SLP Follow Up: Yes    Discharge Recommendations           SLP Identified Barriers to Discharge: Medical, comm/cog deficits   Recommendations for Discharge: SLP: need for speech therapy for com/cog deficits         PLAN  SW: assess tolerance with general/thin liquids possible at meal. Delayed cough with thin liquids. Baseline general/thin liquids.   Comm/cog: focus on insight with education, memory, attention, assess reading and writing, MOCA Albanian score 11/30.    Frequency: BID W sw/ MThF cc, stroke (3/30)    Interventions:  Assess tolerance of least restrictive oral diet and communication/cognition therapy    Plan/Goal Agreement:  Patient agrees with goals and treatment plan    RECOMMENDATIONS     -Diet:          *general and thin liquids    -Medication Administration:         *whole, with liquids and with puree    -Feeding Guidelines:          *sit fully upright for all po intake and eat slowly only when alert    -Speech Reviewed Swallow:         *with patient/family and with  clinical caregivers    -Communication Cognition:          *Patient demonstrates acute communication and cognition deficits, will initiate speech therapy.      GOALS  Review Date: 4/5/2021  Short Term Goals:   Short Term Goal 1: Recall functional information r/t recent events and daily activities with 90% accuracy with min cues.  Goal Progress:       Short Term Goal 2: problem solve r/t IADLs situation with 90% accuracy with min cues.  Goal Progress:      Short Term Goal 3: attention to task for 15 mins with min cues.  Goal Progress:    Long Term Goals:   Long Term Goal 1: Tolerate general diet, thin liquids with <10% ss of aspiration and stable temps and lungs.    Long Term Goal 2: Pt. Will effectively communicate needs and participate in IADLs with functional cognition with min assist.  Documented in the chart in the following areas: prior living flowsheet Pain. Assessment. Patient education flowsheet      Therapy procedure time and total treatment time can be found documented on the Time Entry flowsheet

## 2025-04-25 ENCOUNTER — OFFICE VISIT (OUTPATIENT)
Dept: PAIN MANAGEMENT | Age: 80
End: 2025-04-25
Payer: MEDICARE

## 2025-04-25 ENCOUNTER — EVALUATION (OUTPATIENT)
Dept: PHYSICAL THERAPY | Age: 80
End: 2025-04-25

## 2025-04-25 VITALS
BODY MASS INDEX: 29.88 KG/M2 | DIASTOLIC BLOOD PRESSURE: 70 MMHG | RESPIRATION RATE: 18 BRPM | SYSTOLIC BLOOD PRESSURE: 130 MMHG | HEIGHT: 64 IN | HEART RATE: 82 BPM | OXYGEN SATURATION: 97 % | WEIGHT: 175 LBS

## 2025-04-25 DIAGNOSIS — M54.16 LUMBAR RADICULOPATHY: ICD-10-CM

## 2025-04-25 DIAGNOSIS — M48.02 SPINAL STENOSIS OF CERVICAL REGION WITH RADICULOPATHY: Primary | ICD-10-CM

## 2025-04-25 DIAGNOSIS — M54.12 SPINAL STENOSIS OF CERVICAL REGION WITH RADICULOPATHY: Primary | ICD-10-CM

## 2025-04-25 DIAGNOSIS — M70.61 TROCHANTERIC BURSITIS, RIGHT HIP: Primary | ICD-10-CM

## 2025-04-25 PROCEDURE — 20610 DRAIN/INJ JOINT/BURSA W/O US: CPT | Performed by: ANESTHESIOLOGY

## 2025-04-25 RX ORDER — BUPIVACAINE HYDROCHLORIDE 2.5 MG/ML
6 INJECTION, SOLUTION EPIDURAL; INFILTRATION; INTRACAUDAL; PERINEURAL ONCE
Status: COMPLETED | OUTPATIENT
Start: 2025-04-25 | End: 2025-04-25

## 2025-04-25 RX ORDER — METHYLPREDNISOLONE ACETATE 40 MG/ML
40 INJECTION, SUSPENSION INTRA-ARTICULAR; INTRALESIONAL; INTRAMUSCULAR; SOFT TISSUE ONCE
Status: COMPLETED | OUTPATIENT
Start: 2025-04-25 | End: 2025-04-25

## 2025-04-25 RX ADMIN — BUPIVACAINE HYDROCHLORIDE 15 MG: 2.5 INJECTION, SOLUTION EPIDURAL; INFILTRATION; INTRACAUDAL; PERINEURAL at 16:02

## 2025-04-25 RX ADMIN — METHYLPREDNISOLONE ACETATE 40 MG: 40 INJECTION, SUSPENSION INTRA-ARTICULAR; INTRALESIONAL; INTRAMUSCULAR; INTRASYNOVIAL; SOFT TISSUE at 16:03

## 2025-04-25 NOTE — PROGRESS NOTES
Physical Therapy Daily Treatment Note    Date: 2025  Patient Name: Gloria Schuler  Daughter Elly.  Gloria's ex- Higinio.   : 1945   MRN: [id number]  DOInjury: 2024  DOSx: --  Referring Provider: Criselda Gilbert MD   [address] [place] [address]     Medical Diagnosis:      Diagnosis Orders   1. Spinal stenosis of cervical region with radiculopathy        2. Lumbar radiculopathy          Gloria has significant weakness of B LE, R > L.  See OT evaluation for detailed UE assessment.  She is currently non-ambulatory and requires assist for transfers.  Treatment will focus on building leg strength.  Once she can achieve at least 3/5 strength, we will begin working on transfers and gait.       Outcome Measure:   Oswestry Low Back Disability Questionnaire 75% disability    X = TO BE PERFORMED NEXT VISIT  > = PROGRESS TO THIS FIRST  >> = PROGRESS TO THIS SECOND  >>> = PROGRESS TO THIS THIRD    S: See eval  O:    Time 5361-9708     Visit  Repeat outcome measure at mid point and end.    Pain Pain 6/10     ROM      Modalities            Exercise      Seated hip flexion  X  TE   LAQ X     SLR - assisted X     S-lying hip ABD X     Bridging X           Squats   TA   Calf Raises   TA   Toe Raises   TA   Marches   TA   Alt. Sidekicks   TA         Sit/Stands   TA         Gait training   GT         Marching Gait   NR   Sidestepping   NR                                 A:  Tolerated well.    P: Continue with rehab plan  Lb Johnson PT    Treatment Charges: Mins Units   Initial Evaluation 45 1   Re-Evaluation     Ther Exercise         TE     Manual Therapy     MT     Ther Activities        TA     Gait Training          GT     Neuro Re-education NR     Modalities     Non-Billable Service Time     Other     Total Time/Units 45 1      
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 It is mild in degree in the upper extremities.   There is evidence of a superimposed left ulnar neuropathy, with mixed axonal loss and demyelination features, best localized to be across the left elbow, severe in degree electrically.   Chronic left C5/C6 motor radiculopathy, mild to moderate in degree based on the degree of motor fiber loss.   There is no evidence of a left lumbosacral motor radiculopathy.   There is no evidence of a left median neuropathy.       Ct cervical spine:   11/2024:   C3/C4: Central broad-based disc herniation is present with significant facet  arthropathy resulting in moderate central canal stenosis.  There is severe  bilateral neural foraminal narrowing.     C4/C5: No obvious central canal stenosis.  Mild posterior disc/osteophyte  complex is present with moderate to severe bilateral neural foraminal  narrowing.     C4/C5: No significant central canal stenosis.  There is moderate to severe  right neural foraminal narrowing     C6/C7: Posterior disc/osteophyte complex is present resulting in mild central  canal stenosis.  There appears to be mild bilateral neural foraminal narrowing     C7/T1: No central canal stenosis or significant neural foraminal narrowing.

## 2025-04-25 NOTE — PROGRESS NOTES
2025    Patient: Gloria Schuler  :  1945  Age:  79 y.o.  Sex:  female     PRE-OPERATIVE DIAGNOSIS: Right Greater trochanter bursitis.    POST-OPERATIVE DIAGNOSIS: Same.    PROCEDURE PERFORMED: Right Greater trochanter bursea steroid injection.    SURGEON:   Kamaljit Ribeiro MD    ANESTHESIA: local    ESTIMATED BLOOD LOSS: None.    BRIEF HISTORY: Gloria Schuler comes in today for greater trochanter bursea steroid injection. After discussing the potential risks and benefits of the procedure with the patient  Gloria did request that we proceed. A complete History & Physical was reviewed and it is unchanged.    DESCRIPTION OF PROCEDURE:   Patient was placed in the lateral decubitus position.The area of the Right  hip was prepped with chloraprep and draped in a sterile manner.  After identifying the anatomic landmarks, a 22 gauge spinal 3-1/2 inch spinal needle was advanced until greater trochanter was contacted. After negative aspiration, a solution of 0.25 % marcaine 6 cc and 40 mg DepoMedrol was injected easily after negative aspiration without complications. The needle was then removed and Band-Aid applied.    Disposition the patient tolerated the procedure well and there were no complications .     Gloria will follow up in our comprehensive [facility] as scheduled. She was encouraged to call with questions, concerns or if worsening of symptoms occurs.    If she continues to have right hip pain or SI joint pain, consider right hip joint injection under fluoroscopy/right SI joint injection under fluoroscopy.    Kamaljit Ribeiro MD

## 2025-04-25 NOTE — PROGRESS NOTES
4/25/2025    Patient Active Problem List   Diagnosis    Hypercholesterolemia    Essential tremor    Stenosis of cervical spine with myelopathy (HCC)    Generalized weakness    Ambulatory dysfunction    Closed right ankle fracture, initial encounter    Cervical radicular pain    Chronic back pain    Gait instability    Cervical radiculopathy    Spinal stenosis of cervical region with radiculopathy    Lumbar radiculopathy       Allergies as of 04/25/2025 - Fully Reviewed 04/25/2025   Allergen Reaction Noted    Acyclovir  07/19/2023    Methocarbamol Other (See Comments) 12/05/2023    Mirtazapine  07/19/2023    Phenytoin Hives and Other (See Comments) 04/15/2020    Primidone Other (See Comments) 12/05/2023    Sulfa antibiotics Other (See Comments) 06/05/2013    Tramadol Other (See Comments) 02/04/2025    Corticosteroids Anxiety 05/03/2022    Dilantin [phenytoin sodium extended] Headaches 10/07/2017    Famciclovir Palpitations 04/14/2018    Famotidine Palpitations 07/19/2023    Fentanyl Other (See Comments) 10/17/2019    Mobic [meloxicam] Palpitations 10/07/2017    Omeprazole Nausea Only 05/18/2021    Phenobarbital Headaches 06/05/2013        Procedure: Right greater trochanteric bursa corticosteroid injection     Blocks:  none    Y consent signed Y procedure verified with patient  Y allergies noted Y pt confirms not pregnant    Patient rates pain a 6/10 on the VAS scale.    Skin Prep:  Chloraprep    Anesthetic:  Ethyl Chloride    Medication:  Marcaine and Depomedrol    Vitals:    04/25/25 1300   Resp: 18   Weight: 79.4 kg (175 lb)   Height: 1.626 m (5' 4.02\")       I witnessed patient signing consent to Medical Procedure and Treatment form.     Cheyenne Fan RN

## 2025-04-28 RX ORDER — PROPRANOLOL HYDROCHLORIDE 10 MG/1
TABLET ORAL
Qty: 360 TABLET | Refills: 0 | Status: SHIPPED | OUTPATIENT
Start: 2025-04-28

## 2025-04-28 NOTE — TELEPHONE ENCOUNTER
The following medication has been approved and sent to your pharmacy    Requested Prescriptions     Signed Prescriptions Disp Refills    propranolol (INDERAL) 10 MG tablet 360 tablet 0     Si TABLET 4 TIMES DAILY.     Authorizing Provider: CESAR BERMAN

## 2025-04-30 ENCOUNTER — TELEPHONE (OUTPATIENT)
Dept: PAIN MANAGEMENT | Age: 80
End: 2025-04-30

## 2025-04-30 DIAGNOSIS — G62.9 NEUROPATHY: ICD-10-CM

## 2025-04-30 DIAGNOSIS — M79.2 NEURALGIA AND NEURITIS: ICD-10-CM

## 2025-04-30 RX ORDER — PREGABALIN 100 MG/1
100 CAPSULE ORAL 2 TIMES DAILY
Qty: 60 CAPSULE | Refills: 2 | Status: SHIPPED | OUTPATIENT
Start: 2025-04-30 | End: 2025-07-29

## 2025-04-30 NOTE — TELEPHONE ENCOUNTER
Gloria is requesting a refill of Lyrica. Last filled 3/22. OARRS is consistent. Last appt 4/11. Next appt not scheduled.

## 2025-05-02 ENCOUNTER — TREATMENT (OUTPATIENT)
Dept: PHYSICAL THERAPY | Age: 80
End: 2025-05-02

## 2025-05-02 DIAGNOSIS — M48.02 SPINAL STENOSIS OF CERVICAL REGION WITH RADICULOPATHY: Primary | ICD-10-CM

## 2025-05-02 DIAGNOSIS — M54.12 SPINAL STENOSIS OF CERVICAL REGION WITH RADICULOPATHY: Primary | ICD-10-CM

## 2025-05-02 DIAGNOSIS — M54.16 LUMBAR RADICULOPATHY: ICD-10-CM

## 2025-05-02 NOTE — PROGRESS NOTES
Physical Therapy Daily Treatment Note    Date: 2025  Patient Name: Gloria Schuler  Daughter Elly.  Gloria's ex- Higinio.   : 1945   MRN: [id number]  DOInjury: 2024  DOSx: --  Referring Provider: Criselda Gilbert MD   [address] [place] [address]     Medical Diagnosis:      Diagnosis Orders   1. Spinal stenosis of cervical region with radiculopathy        2. Lumbar radiculopathy            Gloria has significant weakness of B LE, R > L.  See OT evaluation for detailed UE assessment.  She is currently non-ambulatory and requires assist for transfers.  Treatment will focus on building leg strength.  Once she can achieve at least 3/5 strength, we will begin working on transfers and gait.       Outcome Measure:   Oswestry Low Back Disability Questionnaire 75% disability    X = TO BE PERFORMED NEXT VISIT  > = PROGRESS TO THIS FIRST  >> = PROGRESS TO THIS SECOND  >>> = PROGRESS TO THIS THIRD    S: See eval  O:    Time 6658-7729     Visit  Repeat outcome measure at mid point and end.    Pain Pain 6/10     ROM      Modalities            Exercise      NuStep L5 x 5 min           Seated hip flexion  2 x 10  TE   LAQ 2 x 10 AAROM right LE     Clam shell 2 x 10 AAROM right LE     S-lying hip ABD Unable can't lay on back     Bridging Unable can't lay on back     Sit to stand 3 x 5  seated on AIREX  ModA x 2     Squats   TA   Calf Raises   TA   Toe Raises   TA   Marches   TA   Alt. Sidekicks   TA         Sit/Stands   TA         Gait training   GT         Marching Gait   NR   Sidestepping   NR         Transfers                         A:  Tolerated fair. Pt unable to lay supine due to right hip pain. Has difficultly time pushing/gripping with hands due to neuropathy. Performed sit/stand with ModA x 2 due to weakness. Feedback and cues necessary for developing neuromuscular control.  Movement education and guided movement interventions such as verbal and tactile cues used to improve

## 2025-05-05 ENCOUNTER — TELEPHONE (OUTPATIENT)
Dept: PHYSICAL THERAPY | Age: 80
End: 2025-05-05

## 2025-05-05 NOTE — TELEPHONE ENCOUNTER
Date: 2025       Patient Name: Gloria Schuler  : 1945      MRN: [id number]    Patient canceled PT follow-up appointment scheduled for today  at 1:30 due to illness.    Myrna Marin, PTA

## 2025-05-07 ENCOUNTER — TELEPHONE (OUTPATIENT)
Dept: PHYSICAL THERAPY | Age: 80
End: 2025-05-07

## 2025-05-07 NOTE — TELEPHONE ENCOUNTER
Date: 2025       Patient Name: Gloria Schuler  : 1945      MRN: [id number]    Patient canceled PT follow-up appointment scheduled for today  at 2:30 due to no reason given .    Myrna Marin, PTA

## 2025-05-08 ENCOUNTER — TELEPHONE (OUTPATIENT)
Age: 80
End: 2025-05-08

## 2025-05-08 NOTE — TELEPHONE ENCOUNTER
I think I prescribed the Inderal, baclofen, Relafen    None of this should cause diarrhea      Criselda Gilbert MD

## 2025-05-21 ENCOUNTER — TELEPHONE (OUTPATIENT)
Age: 80
End: 2025-05-21

## 2025-05-21 NOTE — TELEPHONE ENCOUNTER
nabumetone (RELAFEN) 500 MG tablet        Pharmacy    [id number] - [place], [place] - [address] - P [phone] - F [phone]   [place] [address]  Phone: [phone]  Fax: [phone]     FYI: patient completed out of medication per daughter. Patient has extreme edema. Rx Lasix by PCP. Please advise [phone]

## 2025-05-22 RX ORDER — NABUMETONE 500 MG/1
500 TABLET, FILM COATED ORAL 2 TIMES DAILY
Qty: 60 TABLET | Refills: 3 | Status: SHIPPED | OUTPATIENT
Start: 2025-05-22 | End: 2025-06-18 | Stop reason: SDUPTHER

## 2025-05-22 RX ORDER — PROPRANOLOL HYDROCHLORIDE 10 MG/1
TABLET ORAL
Qty: 360 TABLET | Refills: 0 | Status: SHIPPED | OUTPATIENT
Start: 2025-05-22 | End: 2025-07-09 | Stop reason: SDUPTHER

## 2025-05-22 RX ORDER — BACLOFEN 10 MG/1
5 TABLET ORAL NIGHTLY
Qty: 30 TABLET | Refills: 3 | Status: SHIPPED | OUTPATIENT
Start: 2025-05-22 | End: 2025-06-18

## 2025-05-22 NOTE — TELEPHONE ENCOUNTER
She should have enough in inderal, relafen , baclofen      The following medication has been approved and sent to your pharmacy    Requested Prescriptions     Signed Prescriptions Disp Refills    propranolol (INDERAL) 10 MG tablet 360 tablet 0     Si TABLET 4 TIMES DAILY.     Authorizing Provider: CESAR GILBERT    nabumetone (RELAFEN) 500 MG tablet 60 tablet 3     Sig: Take 1 tablet by mouth 2 times daily     Authorizing Provider: CESAR GILBERT    baclofen (LIORESAL) 10 MG tablet 30 tablet 3     Sig: Take 0.5 tablets by mouth nightly     Authorizing Provider: CESAR GILBERT prescribe      Cesar Gilbert MD

## 2025-06-10 ENCOUNTER — TELEPHONE (OUTPATIENT)
Dept: ADMINISTRATIVE | Age: 80
End: 2025-06-10

## 2025-06-13 ENCOUNTER — PREP FOR PROCEDURE (OUTPATIENT)
Age: 80
End: 2025-06-13

## 2025-06-13 ENCOUNTER — OFFICE VISIT (OUTPATIENT)
Age: 80
End: 2025-06-13
Payer: MEDICARE

## 2025-06-13 VITALS
WEIGHT: 175 LBS | RESPIRATION RATE: 16 BRPM | BODY MASS INDEX: 29.88 KG/M2 | HEIGHT: 64 IN | HEART RATE: 69 BPM | OXYGEN SATURATION: 95 % | SYSTOLIC BLOOD PRESSURE: 121 MMHG | TEMPERATURE: 96.8 F | DIASTOLIC BLOOD PRESSURE: 62 MMHG

## 2025-06-13 DIAGNOSIS — M53.3 SACROILIAC DYSFUNCTION: Primary | ICD-10-CM

## 2025-06-13 DIAGNOSIS — M50.30 DDD (DEGENERATIVE DISC DISEASE), CERVICAL: ICD-10-CM

## 2025-06-13 DIAGNOSIS — G62.9 NEUROPATHY: ICD-10-CM

## 2025-06-13 DIAGNOSIS — M79.2 NEURALGIA AND NEURITIS: ICD-10-CM

## 2025-06-13 DIAGNOSIS — M48.061 SPINAL STENOSIS OF LUMBAR REGION, UNSPECIFIED WHETHER NEUROGENIC CLAUDICATION PRESENT: ICD-10-CM

## 2025-06-13 DIAGNOSIS — M47.812 CERVICAL SPONDYLOSIS: ICD-10-CM

## 2025-06-13 DIAGNOSIS — M16.11 PRIMARY OSTEOARTHRITIS OF RIGHT HIP: ICD-10-CM

## 2025-06-13 DIAGNOSIS — M16.11 PRIMARY OSTEOARTHRITIS OF RIGHT HIP: Primary | ICD-10-CM

## 2025-06-13 PROCEDURE — 99214 OFFICE O/P EST MOD 30 MIN: CPT | Performed by: ANESTHESIOLOGY

## 2025-06-13 PROCEDURE — 1123F ACP DISCUSS/DSCN MKR DOCD: CPT | Performed by: ANESTHESIOLOGY

## 2025-06-13 PROCEDURE — 1159F MED LIST DOCD IN RCRD: CPT | Performed by: ANESTHESIOLOGY

## 2025-06-13 RX ORDER — SODIUM CHLORIDE 0.9 % (FLUSH) 0.9 %
5-40 SYRINGE (ML) INJECTION EVERY 12 HOURS SCHEDULED
OUTPATIENT
Start: 2025-06-13

## 2025-06-13 RX ORDER — SODIUM CHLORIDE 0.9 % (FLUSH) 0.9 %
5-40 SYRINGE (ML) INJECTION PRN
OUTPATIENT
Start: 2025-06-13

## 2025-06-13 RX ORDER — SODIUM CHLORIDE 0.9 % (FLUSH) 0.9 %
5-40 SYRINGE (ML) INJECTION EVERY 12 HOURS SCHEDULED
Status: CANCELLED | OUTPATIENT
Start: 2025-06-13

## 2025-06-13 RX ORDER — PREGABALIN 100 MG/1
100 CAPSULE ORAL 2 TIMES DAILY
Qty: 60 CAPSULE | Refills: 2 | Status: SHIPPED | OUTPATIENT
Start: 2025-06-13 | End: 2025-09-11

## 2025-06-13 RX ORDER — SODIUM CHLORIDE 9 MG/ML
INJECTION, SOLUTION INTRAVENOUS PRN
OUTPATIENT
Start: 2025-06-13

## 2025-06-13 RX ORDER — SODIUM CHLORIDE 9 MG/ML
INJECTION, SOLUTION INTRAVENOUS PRN
Status: CANCELLED | OUTPATIENT
Start: 2025-06-13

## 2025-06-13 RX ORDER — SODIUM CHLORIDE 0.9 % (FLUSH) 0.9 %
5-40 SYRINGE (ML) INJECTION PRN
Status: CANCELLED | OUTPATIENT
Start: 2025-06-13

## 2025-06-13 NOTE — PROGRESS NOTES
Gloria Schuler presents to the [facility] on 6/13/2025. Gloria is complaining of pain in her lower back radiating to right hip and into the right groin. The pain is intermittent. The pain is described as stabbing, sharp, numb, and pins/needles. Pain is rated on her best day at a 8, on her worst day at a 8, and on average at a 6 on the VAS scale. She took her last dose of Lyrica, Relafen, and baclofen today.      Any procedures since your last visit: Yes, with little relief.    She is  on NSAIDS and  is  on anticoagulation medications to include ASA and is managed by Dr. Hahn  .     Pacemaker or defibrillator: Yes Physician managing device is Dr. Hhan.      Medication Contract and Consent for Opioid Use Documents Filed        No documents found                       Resp 16   Ht 1.626 m (5' 4.02\")   Wt 79.4 kg (175 lb)   BMI 30.02 kg/m²      No LMP recorded. Patient has had a hysterectomy.

## 2025-06-13 NOTE — PROGRESS NOTES
[facility]  [address], [place] [address]  [phone]    Follow up Note      Gloria LAGUNAS Ganga     Date of Visit:  6/13/2025    CC:  Patient presents for follow up   Chief Complaint   Patient presents with    Follow-up      Right Greater trochanter bursitis.    Joint Pain       HPI:  h/o diffuse pain and polyarthritis.     Patient has history of chronic pain on and off.     Apparently had acute exacerbation of pain in August 2024 associated with balance issue and weakness.     She was extensively evaluated at [facility] [place] [place] and [facility].     She is seen multiple neurosurgery team apparently no surgery indicated at this point of time.     She has also been evaluated extensively by neurology.  Most recently has been evaluated by Dr. Gilbert.     Initially when she noted the symptoms she had diffuse pain -sharp stabbing in nature.     She has been started on Lyrica daily which has helped her moderately.    The earlier sharp intense pain is reduced now.     She still notices intermittent pain especially in the beginning of the day.     Her main complaint today is multiple joint pain , neck pain and UE pain/ shoulder blade pain.   She has difficulty in moving.    She continues to have significant weakness of the upper extremity and lower extremity more on the right side compared to the left side.     She has tried Cymbalta - had side effects : Dc'd    She also had a short course of low-dose oxycodone which had helped her.     She needs assistant ambulation and uses a wheelchair.  She has a caregiver at home to help her.     Patient does not have new bladder or bowel dysfunction.     Pain causes functional limitations/ limits Adl's : Yes     Can't get MRI due to intracranial aneurysm clip.    Previous treatments:   Physical Therapy : yes,  HEP yes     Medications: - NSAID's : yes                        - Membrane stabilizers : yes                        -

## 2025-06-18 ENCOUNTER — TELEPHONE (OUTPATIENT)
Dept: ADMINISTRATIVE | Age: 80
End: 2025-06-18

## 2025-06-18 ENCOUNTER — TELEMEDICINE (OUTPATIENT)
Age: 80
End: 2025-06-18
Payer: MEDICARE

## 2025-06-18 DIAGNOSIS — M48.02 SPINAL STENOSIS OF CERVICAL REGION WITH RADICULOPATHY: Primary | ICD-10-CM

## 2025-06-18 DIAGNOSIS — E11.42 DIABETIC PERIPHERAL NEUROPATHY (HCC): ICD-10-CM

## 2025-06-18 DIAGNOSIS — G25.81 RLS (RESTLESS LEGS SYNDROME): ICD-10-CM

## 2025-06-18 DIAGNOSIS — M54.16 LUMBAR RADICULOPATHY: ICD-10-CM

## 2025-06-18 DIAGNOSIS — G56.22 ULNAR NEUROPATHY OF LEFT UPPER EXTREMITY: ICD-10-CM

## 2025-06-18 DIAGNOSIS — M54.12 SPINAL STENOSIS OF CERVICAL REGION WITH RADICULOPATHY: Primary | ICD-10-CM

## 2025-06-18 PROCEDURE — 99214 OFFICE O/P EST MOD 30 MIN: CPT | Performed by: PSYCHIATRY & NEUROLOGY

## 2025-06-18 RX ORDER — ROPINIROLE 0.5 MG/1
0.5 TABLET, FILM COATED ORAL
Qty: 30 TABLET | Refills: 3 | Status: SHIPPED | OUTPATIENT
Start: 2025-06-18

## 2025-06-18 RX ORDER — NABUMETONE 500 MG/1
500 TABLET, FILM COATED ORAL 2 TIMES DAILY
Qty: 60 TABLET | Refills: 3 | Status: SHIPPED | OUTPATIENT
Start: 2025-06-18

## 2025-06-18 RX ORDER — BACLOFEN 10 MG/1
10 TABLET ORAL 2 TIMES DAILY
Qty: 60 TABLET | Refills: 3 | Status: SHIPPED | OUTPATIENT
Start: 2025-06-18

## 2025-06-18 NOTE — PROGRESS NOTES
Criselda Gilbert MD       Gloria Schuler is a 79 y.o. female presenting as a follow patient for a   Chief Complaint   Patient presents with    Follow-up    Peripheral Neuropathy    ULNAR NEUROPATHY    CERVICAL RADICULOPATHY    LUMBAR RADICULOPATHY      Interim hx:   Neuropathy pain wakes her up  If she sits up, pain is better    Legs are swelling up    Not able to walk    Now:    Pain: cannot sleep  Legs keep her up at night  If she sits up, pain relieved  As soon as she gets up it is better     From knees down- burning , hot searing pain  Worst on laying down  7/10    No pain in day time         Affects neck  Quirino shoulders, quirino arms.   Not too bad   5/10         Slurred speech- since brain aneurysm clipping in 1993     No trouble swallowing  No gagging  No choking.      No neck weakness     Weakness:   Can feed herself.   Cannot grasp.   Had trouble lifting arms.      Cannot stand on her own  Right foot fracture- healed.   Weakness higher up in legs , collapsing initially   Lost all her mobility.         Numbness:/tingling/burning:   Has tingling/numbness in quirino hands.      Has it in quirino feet and legs from knees down  Legs feel ice cold.            Spastically: feels spasms of arms/legs.   Intermittent  Severely painful      Balance: going from cane to walker to wheelchair     Falls: poor balance      Urinary Trouble: No, no urgency.   No incontinence   Bowl Trouble: No  Band like sensations around chest wall: none          treatment: lyrica 100 mg bid  Baclofen 10 mg bid  Oxycodone 2.5 mg bid  Relafen - 500 mg bid   Not tolerated neurontin    Last inj in hip- may 2025  Next in July 2025     W/u:   Autoimmune w/u neg  B12, tsh ok  No mgus  A1c: 7.3       omponent      Latest Ref Rng 12/26/2024   Anti-Gill      NEGATIVE  NEGATIVE    Sjogren's Antibodies (SSA)      NEGATIVE  NEGATIVE    Sjogren's Antibodies (SSB)      NEGATIVE  NEGATIVE    Anti RNP      NEGATIVE  NEGATIVE    Julisa-1 Antibody      NEGATIVE

## 2025-06-23 ENCOUNTER — TELEPHONE (OUTPATIENT)
Age: 80
End: 2025-06-23

## 2025-06-23 DIAGNOSIS — M53.3 SACROILIAC DYSFUNCTION: ICD-10-CM

## 2025-06-23 DIAGNOSIS — M16.11 PRIMARY OSTEOARTHRITIS OF RIGHT HIP: Primary | ICD-10-CM

## 2025-06-23 NOTE — TELEPHONE ENCOUNTER
Call to Elly for Gloria JANNETH Schuler and message left that procedure was scheduled for 7/1/2025 and that [facility] should call her a few days before for the pre op call and after 3:00 PM the business day before with the arrival time. Instructed for Gloria to hold  Mobic, and naprosyn for 4 days and any aspirin containing products, CoQ-10, or fish oil for 7 days. Instructed to call office back if any questions. Advised failure to follow medication hold instructions may result in their procedure being delayed.    Electronically signed by Naman Dumont RN on 6/23/2025 at 12:11 PM

## 2025-06-25 ENCOUNTER — TELEPHONE (OUTPATIENT)
Dept: VASCULAR SURGERY | Age: 80
End: 2025-06-25

## 2025-06-25 NOTE — TELEPHONE ENCOUNTER
Received referral from Dr. Peña for Dr. Lagos regarding lower leg edema, left message for patient to return call to schedule.   71

## 2025-06-30 ENCOUNTER — OFFICE VISIT (OUTPATIENT)
Dept: ORTHOPEDIC SURGERY | Age: 80
End: 2025-06-30
Payer: MEDICARE

## 2025-06-30 DIAGNOSIS — M48.061 SPINAL STENOSIS OF LUMBAR REGION, UNSPECIFIED WHETHER NEUROGENIC CLAUDICATION PRESENT: Primary | ICD-10-CM

## 2025-06-30 DIAGNOSIS — M16.11 PRIMARY OSTEOARTHRITIS OF RIGHT HIP: ICD-10-CM

## 2025-06-30 PROCEDURE — 99213 OFFICE O/P EST LOW 20 MIN: CPT | Performed by: ORTHOPAEDIC SURGERY

## 2025-06-30 PROCEDURE — 1123F ACP DISCUSS/DSCN MKR DOCD: CPT | Performed by: ORTHOPAEDIC SURGERY

## 2025-06-30 NOTE — PROGRESS NOTES
Chief Complaint   Patient presents with    Hip Pain     Right Hip: Pt expressed having increased altered sensation. Pt expressed having R Hip pain that moves into the medial aspect of the R Hip. Pt denied any crepitus within the R Hip. Pt has instability concerns with ambulation at the R Hip and R Knee. Pt described a catching and locking sensation in the R Hip and R Knee. Pt expressed symptoms have been chronic in nature for the past year.         Gloria Schuler  Is a 80 y.o.   female who presents today complaining of right hip pain. She states that the pain began 1  year(s) ago. She did not have a history of injury.  Patients states pain is located anterior, posterior and has tenderness over the  Anterior and Posterior portion of the hip. Hip pain is described as aching, sharp, and shooting and  is worse with weight bearing and is aggravated by walking along with groin and buttock discomfort. She states the pain occurs in the  no apparent pattern. Patient states hip pain is relieved by rest. Patient does have a history of back pain.  The patient does use ambulatory aid.    Past Medical History:   Diagnosis Date    A-fib (HCC)     PACEMAKER AND WATCHMAN    Anxiety and depression     Arthritis     CAD (coronary artery disease)     Essential tremor     HTN (hypertension)     Hyperlipidemia     Lumbar radiculopathy      Past Surgical History:   Procedure Laterality Date    BRAIN ANEURYSM SURGERY  1993    clipping    CARDIAC ASSIST DEVICE INSERTION  08/03/2020    Watchman inserted     CARPAL TUNNEL RELEASE      pinched nerve in left arm, had surgery    CATARACT REMOVAL WITH IMPLANT Right 11/22/2016    COLONOSCOPY      COLONOSCOPY W/ POLYPECTOMY      ENDOSCOPY, COLON, DIAGNOSTIC      HEMORRHOID SURGERY      HYSTERECTOMY (CERVIX STATUS UNKNOWN)      INTRACAPSULAR CATARACT EXTRACTION Left 10/12/2021    LEFT CATARACT EXTRACTION WITH IOL performed by Wally TSANG MD at [facility] OR    LITHOTRIPSY

## 2025-07-01 ENCOUNTER — HOSPITAL ENCOUNTER (OUTPATIENT)
Dept: OPERATING ROOM | Age: 80
Setting detail: OUTPATIENT SURGERY
Discharge: HOME | End: 2025-07-01
Attending: PAIN MEDICINE
Payer: MEDICARE

## 2025-07-01 ENCOUNTER — HOSPITAL ENCOUNTER (OUTPATIENT)
Age: 80
Setting detail: OUTPATIENT SURGERY
Discharge: HOME | End: 2025-07-01
Attending: PAIN MEDICINE | Admitting: PAIN MEDICINE
Payer: MEDICARE

## 2025-07-01 VITALS
WEIGHT: 175 LBS | DIASTOLIC BLOOD PRESSURE: 61 MMHG | RESPIRATION RATE: 18 BRPM | BODY MASS INDEX: 29.88 KG/M2 | OXYGEN SATURATION: 94 % | HEART RATE: 66 BPM | HEIGHT: 64 IN | SYSTOLIC BLOOD PRESSURE: 133 MMHG | TEMPERATURE: 97.4 F

## 2025-07-01 DIAGNOSIS — M53.3 SACROILIAC JOINT PAIN: ICD-10-CM

## 2025-07-01 PROCEDURE — 7100000011 HC PHASE II RECOVERY - ADDTL 15 MIN: Performed by: PAIN MEDICINE

## 2025-07-01 PROCEDURE — 2709999900 HC NON-CHARGEABLE SUPPLY: Performed by: PAIN MEDICINE

## 2025-07-01 PROCEDURE — 6360000002 HC RX W HCPCS: Performed by: PAIN MEDICINE

## 2025-07-01 PROCEDURE — 6360000004 HC RX CONTRAST MEDICATION: Performed by: PAIN MEDICINE

## 2025-07-01 PROCEDURE — 27096 INJECT SACROILIAC JOINT: CPT | Performed by: PAIN MEDICINE

## 2025-07-01 PROCEDURE — 7100000010 HC PHASE II RECOVERY - FIRST 15 MIN: Performed by: PAIN MEDICINE

## 2025-07-01 PROCEDURE — 3600000015 HC SURGERY LEVEL 5 ADDTL 15MIN: Performed by: PAIN MEDICINE

## 2025-07-01 PROCEDURE — 20610 DRAIN/INJ JOINT/BURSA W/O US: CPT | Performed by: PAIN MEDICINE

## 2025-07-01 PROCEDURE — 3600000005 HC SURGERY LEVEL 5 BASE: Performed by: PAIN MEDICINE

## 2025-07-01 RX ORDER — SODIUM CHLORIDE 9 MG/ML
INJECTION, SOLUTION INTRAVENOUS PRN
Status: DISCONTINUED | OUTPATIENT
Start: 2025-07-01 | End: 2025-07-01 | Stop reason: HOSPADM

## 2025-07-01 RX ORDER — SODIUM CHLORIDE 0.9 % (FLUSH) 0.9 %
5-40 SYRINGE (ML) INJECTION PRN
Status: DISCONTINUED | OUTPATIENT
Start: 2025-07-01 | End: 2025-07-01 | Stop reason: HOSPADM

## 2025-07-01 RX ORDER — SODIUM CHLORIDE 0.9 % (FLUSH) 0.9 %
5-40 SYRINGE (ML) INJECTION EVERY 12 HOURS SCHEDULED
Status: DISCONTINUED | OUTPATIENT
Start: 2025-07-01 | End: 2025-07-01 | Stop reason: HOSPADM

## 2025-07-01 RX ORDER — LIDOCAINE HYDROCHLORIDE 5 MG/ML
INJECTION, SOLUTION INFILTRATION; INTRAVENOUS PRN
Status: DISCONTINUED | OUTPATIENT
Start: 2025-07-01 | End: 2025-07-01 | Stop reason: ALTCHOICE

## 2025-07-01 ASSESSMENT — PAIN - FUNCTIONAL ASSESSMENT
PAIN_FUNCTIONAL_ASSESSMENT: 0-10

## 2025-07-01 NOTE — OP NOTE
2025    Patient: Gloria Schuler  :  1945  Age:  80 y.o.  Sex:  female     PRE-OPERATIVE DIAGNOSIS: Right   Sacroiliitis, somatic dysfunction of the lumbosacral spine.    POST-OPERATIVE DIAGNOSIS: Same.    PROCEDURE:  Fluoroscopic guided Right   sacroiliac joint injection with steroid (#1).    SURGEON:  JAY Rick    ANESTHESIA: Local    ESTIMATED BLOOD LOSS: None.  ______________________________________________________________________  BRIEF HISTORY:  Gloria Schuler comes in today for first Right sacroiliac joint injection under fluoroscopic guidance. The potential complications as well as the procedure in detail were explained to her today. She has elected to undergo the aforementioned procedure.    Gloria's complete History & Physical examination were reviewed in depth, a copy of which is in the chart.      DESCRIPTION OF PROCEDURE:    After confirming written and informed consent, a time-out was performed and Gloria’s name and date of birth, the procedure to be performed as well as the plan for the location of the needle insertion were confirmed.    The patient was brought into the procedure room and placed in the prone position on the fluoroscopy table. Standard monitors were placed and vital signs were observed throughout the procedure. The low back and upper buttocks area was prepped with chloraprep and draped in a sterile manner. AP fluoroscopy was used to visualize the sacroiliac joint. The fluoroscopic beam was then obliqued until the anterior and posterior margins of the joint were aligned. The inferior margin of the joint was identified and marked. The skin and subcutaneous tissue about this identified point were anesthestized with 0.5% lidocaine. A 22 gauge 3 1/2 spinal needle was advanced toward the the identified point under fluoroscopic guidance. Once the targeted point was reached and the joint space was entered, negative aspiration was confirmed, and 0.5 cc of 240 omnipaque was

## 2025-07-01 NOTE — DISCHARGE INSTRUCTIONS
[facility]  [phone]   Post-Pain Block Home Going Instructions    1-Go home, rest for the remainder of the day  2-Please do not lift over 20 pounds the day of the injection  3-If you received sedation No: alcohol, driving, operating lawn mowers, plows, tractors or other dangerous equipment until next morning. Do not make important decisions or sign legal documents for 24 hours. You may experience light headedness, dizziness, nausea or sleepiness after sedation. Do not stay alone. A responsible adult must be with you for 24 hours. You could be nauseated from the medications you have received. Your IV site may be sore and bruised.    4-No dietary restrictions     5-Resume all medications the same day, blood thinners to be resumed 24 hours after injection    6-Keep the surgical site clean and dry, you may shower the next morning and remove the      dressing.     7- No sitz baths, tub baths or hot tubs/swimming for 24 hours.       8- If you have any pain at the injection site(s), application of an ice pack to the area should be       helpful, 20 minutes on/20 minutes off for next 48 hours.  9- Call [facility] pain management immediately at if you develop.  Fever greater than 100.4 F  Have bleeding or drainage from the puncture site  Have progressive Leg numbness and or weakness  Loss of control of bowel and or bladder (wet/soil yourself)  Severe headache with inability to lift head  10-You may return to work the next day

## 2025-07-01 NOTE — H&P
[facility]  [address], [place] [address]  [phone]    Procedure History & Physical      Gloria LAGUNAS Ganga     HPI:    Patient  is here for Right buttocks pain for Right SIJ/hip injection  Labs/imaging studies reviewed   All question and concerns addressed including R/B/A associated with the procedure    Past Medical History:   Diagnosis Date    A-fib (HCC)     PACEMAKER AND WATCHMAN    Anxiety and depression     Arthritis     CAD (coronary artery disease)     Essential tremor     HTN (hypertension)     Hyperlipidemia     Lumbar radiculopathy        Past Surgical History:   Procedure Laterality Date    BRAIN ANEURYSM SURGERY  1993    clipping    CARDIAC ASSIST DEVICE INSERTION  08/03/2020    Watchman inserted     CARPAL TUNNEL RELEASE      pinched nerve in left arm, had surgery    CATARACT REMOVAL WITH IMPLANT Right 11/22/2016    COLONOSCOPY      COLONOSCOPY W/ POLYPECTOMY      ENDOSCOPY, COLON, DIAGNOSTIC      HEMORRHOID SURGERY      HYSTERECTOMY (CERVIX STATUS UNKNOWN)      INTRACAPSULAR CATARACT EXTRACTION Left 10/12/2021    LEFT CATARACT EXTRACTION WITH IOL performed by Wally TSANG MD at [facility] OR    LITHOTRIPSY      PACEMAKER INSERTION N/A 05/08/2019    PACEMAKER INSERTION ([facility]) performed by Brooks Hahn MD at [facility] OR    PAIN MANAGEMENT PROCEDURE N/A 3/11/2025    CERVICAL 7-THORACIC 1 STEROID INJECTION RIGHT PARAMEDIAN UNDER FLUOROSCOPIC GUIDANCE performed by Kamaljit Ribeiro MD at [facility] OR       Prior to Admission medications    Medication Sig Start Date End Date Taking? Authorizing Provider   baclofen (LIORESAL) 10 MG tablet Take 1 tablet by mouth 2 times daily 6/18/25  Yes Criselda Gilbert MD   rOPINIRole (REQUIP) 0.5 MG tablet Take 1 tablet by mouth nightly 6/18/25  Yes Criselda Gilbert MD   pregabalin (LYRICA) 100 MG capsule Take 1 capsule by mouth 2 times daily for 90 days. Max Daily Amount: 200 mg 6/13/25 9/11/25

## 2025-07-01 NOTE — OP NOTE
2025    Patient: Gloria Schuler  :  1945  Age:  80 y.o.  Sex:  female     PRE-OPERATIVE DIAGNOSIS: Right Hip osteoarthritis, hip pain.    POST-OPERATIVE DIAGNOSIS: Same.    PROCEDURE PERFORMED: Right Hip steroid injection under fluoroscopic guidance.    SURGEON:   Prabhjot THOMPSON    ANESTHESIA: Local    ESTIMATED BLOOD LOSS: None.    BRIEF HISTORY: Gloria Schuler comes in today for Right hip steroid injection under fluoroscopic guidance. After discussing the potential risks and benefits of the procedure with the patient  Gloria did request that we proceed. A complete History & Physical was reviewed and it is unchanged.    DESCRIPTION OF PROCEDURE:     After confirming written and informed consent, a time-out was performed and Gloria’s name and date of birth, the procedure to be performed as well as the plan for the location of the needle insertion were confirmed.    Patient was brought into the procedure room and was placed in the lateral decubitus position on the fluoroscopy table. Standard monitors were placed and vital signs were observed throughout the procedure  The area of the Right hip was prepped with chloraprep and draped in a sterile manner. The overlying skin and subcutaneous tissues were anesthetized with 0.5% lidocaine.  At this time, a 22 gauge spinal 3 1/2 inch spinal needle was advanced in lateral view until bone was contacted. At this point AP fluoroscopic view was used and the needle was slightly advanced into the hip joint. 0.5 cc of 240 omnipaque was injected with appropiate contrast spread under live fluoroscopy. A solution of 0.25 % marcaine 4 cc and 40 mg DepoMedrol was injected easily after negative aspiration without complications. The needle was then removed and Band-Aid applied.    Disposition the patient tolerated the procedure well and there were no complications . Vital signs remained stable throughout the procedure. The patient was escorted to the recovery area where

## 2025-07-09 RX ORDER — PROPRANOLOL HYDROCHLORIDE 10 MG/1
TABLET ORAL
Qty: 360 TABLET | Refills: 0 | Status: SHIPPED | OUTPATIENT
Start: 2025-07-09

## 2025-07-17 LAB
ANION GAP SERPL CALCULATED.3IONS-SCNC: 13 MMOL/L (ref 7–16)
BUN SERPL-MCNC: 23 MG/DL (ref 8–23)
CALCIUM SERPL-MCNC: 9.9 MG/DL (ref 8.8–10.2)
CHLORIDE SERPL-SCNC: 100 MMOL/L (ref 98–107)
CO2 SERPL-SCNC: 27 MMOL/L (ref 22–29)
CREAT SERPL-MCNC: 0.6 MG/DL (ref 0.5–1)
GFR, ESTIMATED: >90 ML/MIN/1.73M2
GLUCOSE SERPL-MCNC: 128 MG/DL (ref 74–99)
POTASSIUM SERPL-SCNC: 4 MMOL/L (ref 3.5–5.1)
SODIUM SERPL-SCNC: 140 MMOL/L (ref 136–145)

## 2025-07-19 NOTE — PROGRESS NOTES
Vascular Surgery Outpatient Consultation      No chief complaint on file.      Reason for Consult: Edema    Requesting Physician:  Dr. Saurav Peña    HISTORY OF PRESENT ILLNESS:                The patient is a 80 y.o. female who is referred for evaluation of lower extremity edema.  Personally reviewed external note by Dr Peña dated June 19, 2025. They have history notable for A-fib, has pacer as well as watchman, not on anticoagulation.  She also has history of brain aneurysm which required clipping.  She is seen by Dr. Peña and noted to have lower extremity swelling, she has been using compression stockings.  Of note she is also wheelchair-bound.  She is a current smoker.  At time extremity swelling in legs and feet have been going on for about 2 weeks.  Says that bilateral lower extremity swelling has been going on for several months now, she says she underwent significant cardiac workup which was negative.  She has been using compression intermittently, difficulty with sizing.  She is accompanied by caregiver.  She is an active smoker and continues to smoke.    Past Medical History:        Diagnosis Date    A-fib (HCC)     PACEMAKER AND WATCHMAN    Anxiety and depression     Arthritis     CAD (coronary artery disease)     Essential tremor     HTN (hypertension)     Hyperlipidemia     Lumbar radiculopathy      Past Surgical History:        Procedure Laterality Date    BACK INJECTION Right 7/1/2025    RIGHT SACROILIAC JOINT INJECTION UNDER FLUOROSCOPIC GUIDANCE performed by Erich Mercedes MD at [facility] OR    BRAIN ANEURYSM SURGERY  1993    clipping    CARDIAC ASSIST DEVICE INSERTION  08/03/2020    Watchman inserted     CARPAL TUNNEL RELEASE      pinched nerve in left arm, had surgery    CATARACT REMOVAL WITH IMPLANT Right 11/22/2016    COLONOSCOPY      COLONOSCOPY W/ POLYPECTOMY      ENDOSCOPY, COLON, DIAGNOSTIC      HEMORRHOID SURGERY      HIP SURGERY Right 7/1/2025    Right hip injection under

## 2025-07-21 ENCOUNTER — OFFICE VISIT (OUTPATIENT)
Dept: VASCULAR SURGERY | Age: 80
End: 2025-07-21
Payer: MEDICARE

## 2025-07-21 VITALS
SYSTOLIC BLOOD PRESSURE: 123 MMHG | BODY MASS INDEX: 29.52 KG/M2 | HEART RATE: 61 BPM | WEIGHT: 172 LBS | DIASTOLIC BLOOD PRESSURE: 64 MMHG

## 2025-07-21 DIAGNOSIS — F17.200 SMOKER: ICD-10-CM

## 2025-07-21 DIAGNOSIS — R60.0 BILATERAL LEG EDEMA: Primary | ICD-10-CM

## 2025-07-21 DIAGNOSIS — I87.2 VENOUS INSUFFICIENCY (CHRONIC) (PERIPHERAL): ICD-10-CM

## 2025-07-21 PROCEDURE — 99406 BEHAV CHNG SMOKING 3-10 MIN: CPT | Performed by: STUDENT IN AN ORGANIZED HEALTH CARE EDUCATION/TRAINING PROGRAM

## 2025-07-21 PROCEDURE — 1159F MED LIST DOCD IN RCRD: CPT | Performed by: STUDENT IN AN ORGANIZED HEALTH CARE EDUCATION/TRAINING PROGRAM

## 2025-07-21 PROCEDURE — 99204 OFFICE O/P NEW MOD 45 MIN: CPT | Performed by: STUDENT IN AN ORGANIZED HEALTH CARE EDUCATION/TRAINING PROGRAM

## 2025-07-21 PROCEDURE — 1123F ACP DISCUSS/DSCN MKR DOCD: CPT | Performed by: STUDENT IN AN ORGANIZED HEALTH CARE EDUCATION/TRAINING PROGRAM

## 2025-07-21 RX ORDER — FUROSEMIDE 40 MG/1
40 TABLET ORAL 2 TIMES DAILY
COMMUNITY

## 2025-07-22 ENCOUNTER — TELEPHONE (OUTPATIENT)
Dept: VASCULAR SURGERY | Age: 80
End: 2025-07-22

## 2025-07-22 NOTE — TELEPHONE ENCOUNTER
Notified Cecy of venous ultrasound at [facility] on 9-12-25 at 10:00 am. Turner at 9:30 am.

## 2025-07-31 ENCOUNTER — TELEPHONE (OUTPATIENT)
Age: 80
End: 2025-07-31

## 2025-07-31 DIAGNOSIS — M16.0 PRIMARY OSTEOARTHRITIS OF BOTH HIPS: ICD-10-CM

## 2025-07-31 NOTE — TELEPHONE ENCOUNTER
Call to Gloria Schuler that procedure was scheduled for 8/12/2025 and that [facility] should call her a few days before for the pre op call and after 3:00 PM the business day before with the arrival time. Instructed Gloria to hold ibuprofen for 24 hours, Celebrex, Mobic, and naprosyn for 4 days, Relafen for 6 days, and any aspirin containing products for 3 days. Instructed to call office back if any questions. Gloria verbalized understanding.    Electronically signed by Naman Dumont RN on 7/31/2025 at 4:40 PM

## 2025-08-01 PROBLEM — M16.0 PRIMARY OSTEOARTHRITIS OF BOTH HIPS: Status: ACTIVE | Noted: 2025-07-31

## 2025-08-12 ENCOUNTER — HOSPITAL ENCOUNTER (OUTPATIENT)
Dept: OPERATING ROOM | Age: 80
Setting detail: OUTPATIENT SURGERY
Discharge: HOME | End: 2025-08-12
Attending: ANESTHESIOLOGY
Payer: MEDICARE

## 2025-08-12 ENCOUNTER — HOSPITAL ENCOUNTER (OUTPATIENT)
Age: 80
Setting detail: OUTPATIENT SURGERY
Discharge: HOME | End: 2025-08-12
Attending: ANESTHESIOLOGY | Admitting: ANESTHESIOLOGY
Payer: MEDICARE

## 2025-08-12 VITALS
BODY MASS INDEX: 29.37 KG/M2 | TEMPERATURE: 97.8 F | RESPIRATION RATE: 16 BRPM | DIASTOLIC BLOOD PRESSURE: 68 MMHG | HEART RATE: 63 BPM | OXYGEN SATURATION: 97 % | HEIGHT: 64 IN | SYSTOLIC BLOOD PRESSURE: 144 MMHG | WEIGHT: 172 LBS

## 2025-08-12 DIAGNOSIS — M16.0 PRIMARY OSTEOARTHRITIS OF BOTH HIPS: ICD-10-CM

## 2025-08-12 PROBLEM — M70.62 TROCHANTERIC BURSITIS OF LEFT HIP: Status: ACTIVE | Noted: 2025-08-12

## 2025-08-12 PROCEDURE — 6360000004 HC RX CONTRAST MEDICATION: Performed by: ANESTHESIOLOGY

## 2025-08-12 PROCEDURE — 3600000015 HC SURGERY LEVEL 5 ADDTL 15MIN: Performed by: ANESTHESIOLOGY

## 2025-08-12 PROCEDURE — 2709999900 HC NON-CHARGEABLE SUPPLY: Performed by: ANESTHESIOLOGY

## 2025-08-12 PROCEDURE — 7100000011 HC PHASE II RECOVERY - ADDTL 15 MIN: Performed by: ANESTHESIOLOGY

## 2025-08-12 PROCEDURE — 7100000010 HC PHASE II RECOVERY - FIRST 15 MIN: Performed by: ANESTHESIOLOGY

## 2025-08-12 PROCEDURE — 6360000002 HC RX W HCPCS: Performed by: ANESTHESIOLOGY

## 2025-08-12 PROCEDURE — 20610 DRAIN/INJ JOINT/BURSA W/O US: CPT | Performed by: ANESTHESIOLOGY

## 2025-08-12 PROCEDURE — 77002 NEEDLE LOCALIZATION BY XRAY: CPT | Performed by: ANESTHESIOLOGY

## 2025-08-12 PROCEDURE — 3600000005 HC SURGERY LEVEL 5 BASE: Performed by: ANESTHESIOLOGY

## 2025-08-12 RX ORDER — LIDOCAINE HYDROCHLORIDE 5 MG/ML
INJECTION, SOLUTION INFILTRATION; INTRAVENOUS PRN
Status: DISCONTINUED | OUTPATIENT
Start: 2025-08-12 | End: 2025-08-12 | Stop reason: ALTCHOICE

## 2025-08-12 RX ORDER — BUPIVACAINE HYDROCHLORIDE 2.5 MG/ML
INJECTION, SOLUTION EPIDURAL; INFILTRATION; INTRACAUDAL; PERINEURAL PRN
Status: DISCONTINUED | OUTPATIENT
Start: 2025-08-12 | End: 2025-08-12 | Stop reason: ALTCHOICE

## 2025-08-12 RX ORDER — METHYLPREDNISOLONE ACETATE 40 MG/ML
INJECTION, SUSPENSION INTRA-ARTICULAR; INTRALESIONAL; INTRAMUSCULAR; SOFT TISSUE PRN
Status: DISCONTINUED | OUTPATIENT
Start: 2025-08-12 | End: 2025-08-12 | Stop reason: ALTCHOICE

## 2025-08-12 ASSESSMENT — PAIN - FUNCTIONAL ASSESSMENT
PAIN_FUNCTIONAL_ASSESSMENT: 0-10

## 2025-08-25 ENCOUNTER — HOSPITAL ENCOUNTER (OUTPATIENT)
Dept: ULTRASOUND IMAGING | Age: 80
Discharge: HOME | End: 2025-08-27
Attending: STUDENT IN AN ORGANIZED HEALTH CARE EDUCATION/TRAINING PROGRAM
Payer: MEDICARE

## 2025-08-25 DIAGNOSIS — R60.0 BILATERAL LEG EDEMA: ICD-10-CM

## 2025-08-25 DIAGNOSIS — I87.2 VENOUS INSUFFICIENCY (CHRONIC) (PERIPHERAL): ICD-10-CM

## 2025-08-25 PROCEDURE — 93970 EXTREMITY STUDY: CPT

## 2025-09-02 ENCOUNTER — OFFICE VISIT (OUTPATIENT)
Age: 80
End: 2025-09-02

## 2025-09-02 VITALS
BODY MASS INDEX: 29.37 KG/M2 | HEART RATE: 60 BPM | SYSTOLIC BLOOD PRESSURE: 108 MMHG | DIASTOLIC BLOOD PRESSURE: 62 MMHG | HEIGHT: 64 IN | WEIGHT: 172 LBS | OXYGEN SATURATION: 96 % | TEMPERATURE: 97.1 F | RESPIRATION RATE: 16 BRPM

## 2025-09-02 DIAGNOSIS — M70.61 TROCHANTERIC BURSITIS OF BOTH HIPS: ICD-10-CM

## 2025-09-02 DIAGNOSIS — M53.3 SACROILIAC DYSFUNCTION: ICD-10-CM

## 2025-09-02 DIAGNOSIS — M70.62 TROCHANTERIC BURSITIS OF BOTH HIPS: ICD-10-CM

## 2025-09-02 DIAGNOSIS — M47.817 LUMBOSACRAL SPONDYLOSIS WITHOUT MYELOPATHY: ICD-10-CM

## 2025-09-02 DIAGNOSIS — M54.12 CERVICAL RADICULAR PAIN: ICD-10-CM

## 2025-09-02 DIAGNOSIS — M48.061 SPINAL STENOSIS OF LUMBAR REGION, UNSPECIFIED WHETHER NEUROGENIC CLAUDICATION PRESENT: ICD-10-CM

## 2025-09-02 DIAGNOSIS — M25.511 CHRONIC RIGHT SHOULDER PAIN: ICD-10-CM

## 2025-09-02 DIAGNOSIS — M51.369 DEGENERATION OF INTERVERTEBRAL DISC OF LUMBAR REGION, UNSPECIFIED WHETHER PAIN PRESENT: ICD-10-CM

## 2025-09-02 DIAGNOSIS — M47.812 CERVICAL SPONDYLOSIS: ICD-10-CM

## 2025-09-02 DIAGNOSIS — R53.1 WEAKNESS: ICD-10-CM

## 2025-09-02 DIAGNOSIS — G89.29 CHRONIC RIGHT SHOULDER PAIN: ICD-10-CM

## 2025-09-02 DIAGNOSIS — M50.30 DDD (DEGENERATIVE DISC DISEASE), CERVICAL: Primary | ICD-10-CM

## 2025-09-02 DIAGNOSIS — M16.0 PRIMARY OSTEOARTHRITIS OF BOTH HIPS: ICD-10-CM

## 2025-09-02 RX ORDER — METHYLPREDNISOLONE ACETATE 40 MG/ML
40 INJECTION, SUSPENSION INTRA-ARTICULAR; INTRALESIONAL; INTRAMUSCULAR; SOFT TISSUE ONCE
Status: COMPLETED | OUTPATIENT
Start: 2025-09-02 | End: 2025-09-02

## 2025-09-02 RX ORDER — BUPIVACAINE HYDROCHLORIDE 2.5 MG/ML
8 INJECTION, SOLUTION EPIDURAL; INFILTRATION; INTRACAUDAL; PERINEURAL ONCE
Status: COMPLETED | OUTPATIENT
Start: 2025-09-02 | End: 2025-09-02

## 2025-09-02 RX ORDER — BUPIVACAINE HYDROCHLORIDE 2.5 MG/ML
4 INJECTION, SOLUTION EPIDURAL; INFILTRATION; INTRACAUDAL; PERINEURAL ONCE
Status: COMPLETED | OUTPATIENT
Start: 2025-09-02 | End: 2025-09-02

## 2025-09-02 RX ADMIN — BUPIVACAINE HYDROCHLORIDE 20 MG: 2.5 INJECTION, SOLUTION EPIDURAL; INFILTRATION; INTRACAUDAL; PERINEURAL at 11:14

## 2025-09-02 RX ADMIN — METHYLPREDNISOLONE ACETATE 40 MG: 40 INJECTION, SUSPENSION INTRA-ARTICULAR; INTRALESIONAL; INTRAMUSCULAR; SOFT TISSUE at 11:15

## 2025-09-02 RX ADMIN — BUPIVACAINE HYDROCHLORIDE 10 MG: 2.5 INJECTION, SOLUTION EPIDURAL; INFILTRATION; INTRACAUDAL; PERINEURAL at 11:13

## 2025-09-04 ENCOUNTER — OFFICE VISIT (OUTPATIENT)
Dept: ORTHOPEDIC SURGERY | Age: 80
End: 2025-09-04
Payer: MEDICARE

## 2025-09-04 VITALS
HEART RATE: 60 BPM | DIASTOLIC BLOOD PRESSURE: 79 MMHG | RESPIRATION RATE: 18 BRPM | OXYGEN SATURATION: 97 % | SYSTOLIC BLOOD PRESSURE: 136 MMHG | TEMPERATURE: 98.4 F

## 2025-09-04 DIAGNOSIS — M21.061 ACQUIRED GENU VALGUM OF RIGHT KNEE: ICD-10-CM

## 2025-09-04 DIAGNOSIS — S82.891A CLOSED RIGHT ANKLE FRACTURE, INITIAL ENCOUNTER: Primary | ICD-10-CM

## 2025-09-04 PROCEDURE — 99214 OFFICE O/P EST MOD 30 MIN: CPT

## 2025-09-04 PROCEDURE — 1159F MED LIST DOCD IN RCRD: CPT

## 2025-09-04 PROCEDURE — 1123F ACP DISCUSS/DSCN MKR DOCD: CPT

## (undated) DEVICE — TOWEL SURG W17XL27IN BLU COT STD PREWASHED STERILE 6 PER PK

## (undated) DEVICE — APPLICATOR MEDICATED 10.5 CC SOLUTION HI LT ORNG CHLORAPREP

## (undated) DEVICE — GAUZE,SPONGE,4"X4",12PLY,STERILE,LF,2'S: Brand: MEDLINE

## (undated) DEVICE — COUNTER NDL 10 COUNT HLD 20 FOAM BLK SGL MAG

## (undated) DEVICE — SYRINGE FLSH 6ML BOLD GRAD 0.2ML LUERLOCK TIP ULT SHRP TRI

## (undated) DEVICE — BANDAGE ADH W1XL3IN NAT FAB WVN FLX DURABLE N ADH PD SEAL

## (undated) DEVICE — 3 ML SYRINGE LUER-LOCK TIP: Brand: MONOJECT

## (undated) DEVICE — GLOVE ORANGE PI 7 1/2   MSG9075

## (undated) DEVICE — SPONGE GZ W4XL4IN 100% COT WVN 12 PLY 2 PER PK NON21424

## (undated) DEVICE — Device: Brand: NIPRO HYPODERMIC NEEDLE

## (undated) DEVICE — 6 ML SYRINGE LUER-LOCK TIP: Brand: MONOJECT

## (undated) DEVICE — NEEDLE HYPO 25GA L1.5IN BLU POLYPR HUB S STL REG BVL STR

## (undated) DEVICE — APPLICATOR MEDICATED 26 CC SOLUTION HI LT ORNG CHLORAPREP

## (undated) DEVICE — SOLUTION SCRB 32OZ 7.5% POVIDONE IOD BTL GENTLE EFFECTIVE

## (undated) DEVICE — INTENDED FOR TISSUE SEPARATION, AND OTHER PROCEDURES THAT REQUIRE A SHARP SURGICAL BLADE TO PUNCTURE OR CUT.: Brand: BARD-PARKER ® STAINLESS STEEL BLADES

## (undated) DEVICE — SOLUTION IV IRRIG WATER 1000ML POUR BRL 2F7114

## (undated) DEVICE — MEDI-VAC YANKAUER SUCTION HANDLE: Brand: CARDINAL HEALTH

## (undated) DEVICE — PACK,LAPAROTOMY,NO GOWNS: Brand: MEDLINE

## (undated) DEVICE — 3M™ RED DOT™ MONITORING ELECTRODE WITH FOAM TAPE AND STICKY GEL 2560, 50/BAG, 20/CASE, 72/PLT: Brand: RED DOT™

## (undated) DEVICE — HYPODERMIC SAFETY NEEDLE: Brand: MAGELLAN

## (undated) DEVICE — SYRINGE IRRIG 60ML SFT PLIABLE BLB EZ TO GRP 1 HND USE W/

## (undated) DEVICE — NDL CNTR 40CT FM MAG: Brand: MEDLINE INDUSTRIES, INC.

## (undated) DEVICE — COVER,LIGHT HANDLE,FLX,1/PK: Brand: MEDLINE INDUSTRIES, INC.

## (undated) DEVICE — TUBING, SUCTION, 1/4" X 10', STRAIGHT: Brand: MEDLINE

## (undated) DEVICE — CENTURION ULTRAVIT ANTERIOR VITRECTOMY PROBE: Brand: ALCON, CENTURION, ULTRAVIT

## (undated) DEVICE — GOWN,SIRUS,NONRNF,SETINSLV,XL,20/CS: Brand: MEDLINE

## (undated) DEVICE — 12 ML SYRINGE,LUER-LOCK TIP: Brand: MONOJECT

## (undated) DEVICE — DRAPE 64X41IN RADIOLOGY C ARM EQUIP STER

## (undated) DEVICE — TRAY EPI SGL DOSE 18GA NDL CUST AULTMAN HOSP

## (undated) DEVICE — NEEDLE HYPO 18GA L1.5IN PNK POLYPR HUB S STL REG BVL STR

## (undated) DEVICE — MARKER SURG SKIN GENTIAN VLT REG TIP W/ 6IN RUL AND BLNK DYNJSM02

## (undated) DEVICE — MARKER,SKIN,WI/RULER AND LABELS: Brand: MEDLINE

## (undated) DEVICE — READY WET SKIN SCRUB TRAY-LF: Brand: MEDLINE INDUSTRIES, INC.

## (undated) DEVICE — STERILE POLYISOPRENE POWDER-FREE SURGICAL GLOVES: Brand: PROTEXIS

## (undated) DEVICE — ENCORE® LATEX MICRO SIZE 8.5, STERILE LATEX POWDER-FREE SURGICAL GLOVE: Brand: ENCORE

## (undated) DEVICE — TRAY PROCED PLASTIC CUSTOM MINOR

## (undated) DEVICE — GAUZE,SPONGE,4"X4",16PLY,XRAY,STRL,LF: Brand: MEDLINE

## (undated) DEVICE — Device

## (undated) DEVICE — NEEDLE, QUINCKE, 22GX5": Brand: MEDLINE

## (undated) DEVICE — SOLUTION IV IRRIG POUR BRL 0.9% SODIUM CHL 2F7124

## (undated) DEVICE — SURGICAL PROCEDURE PACK CATRCT LT EYE BASIC CUST ST JOS LF

## (undated) DEVICE — TOWEL,OR,DSP,ST,BLUE,STD,6/PK,12PK/CS: Brand: MEDLINE

## (undated) DEVICE — SURGICAL PREP KIT DRY EYE TY STRL

## (undated) DEVICE — DOUBLE BASIN SET: Brand: MEDLINE INDUSTRIES, INC.

## (undated) DEVICE — NON-DEHP CATHETER EXTENSION SET, MALE LUER LOCK ADAPTER

## (undated) DEVICE — SYRINGE MED 3ML HYPO NDL BOLD GRAD 0.1ML STD LUERLOCK TIP

## (undated) DEVICE — SET EXTN PRIMING 0.26ML L6.8IN MICBOR STR TYP CATH M

## (undated) DEVICE — PATIENT RETURN ELECTRODE, SINGLE-USE, CONTACT QUALITY MONITORING, ADULT, WITH 9FT CORD, FOR PATIENTS WEIGING OVER 33LBS. (15KG): Brand: MEGADYNE

## (undated) DEVICE — DRESSING TRNSPAR W5XL4.5IN FLM SHT SEMIPERMEABLE WIND

## (undated) DEVICE — SYRINGE MED 12ML STD CLR PLAS LUERLOCK TIP ULT SHRP

## (undated) DEVICE — GAUZE,SPONGE,2"X2",8PLY,STERILE,LF,2'S: Brand: MEDLINE

## (undated) DEVICE — SYRINGE MED 20ML STD CLR PLAS LUERLOCK TIP N CTRL DISP

## (undated) DEVICE — ELECTRODE EKG AD W1.6XL1.36IN FOAM TAPE DIAPHORETIC FLD

## (undated) DEVICE — CANNULA IV 18GA L15IN BLNT FILL LUERLOCK HUB MJCT

## (undated) DEVICE — SET EXTN L14IN 1ML IV L BOR NO FLTR NO STPCOCK

## (undated) DEVICE — PENCIL ES L3M BTTN SWCH HOLSTER W/ BLDE ELECTRD EDGE

## (undated) DEVICE — PREP TRAY 10X5X2: Brand: MEDLINE INDUSTRIES, INC.